# Patient Record
Sex: FEMALE | Race: BLACK OR AFRICAN AMERICAN | NOT HISPANIC OR LATINO | Employment: OTHER | URBAN - METROPOLITAN AREA
[De-identification: names, ages, dates, MRNs, and addresses within clinical notes are randomized per-mention and may not be internally consistent; named-entity substitution may affect disease eponyms.]

---

## 2017-01-03 ENCOUNTER — DOCUMENTATION ONLY (OUTPATIENT)
Dept: FAMILY MEDICINE | Facility: CLINIC | Age: 52
End: 2017-01-03

## 2017-01-03 NOTE — PROGRESS NOTES
Health Maintenance Due   Topic Date Due    Hepatitis C Screening  1965    TETANUS VACCINE  01/02/1982    Pap Smear  01/02/1985    Colonoscopy  01/02/2014

## 2017-01-04 ENCOUNTER — TELEPHONE (OUTPATIENT)
Dept: FAMILY MEDICINE | Facility: CLINIC | Age: 52
End: 2017-01-04

## 2017-01-04 ENCOUNTER — CLINICAL SUPPORT (OUTPATIENT)
Dept: FAMILY MEDICINE | Facility: CLINIC | Age: 52
End: 2017-01-04
Payer: MEDICARE

## 2017-01-04 VITALS — HEART RATE: 68 BPM | SYSTOLIC BLOOD PRESSURE: 138 MMHG | DIASTOLIC BLOOD PRESSURE: 74 MMHG | OXYGEN SATURATION: 96 %

## 2017-01-04 DIAGNOSIS — Z53.21 PROCEDURE AND TREATMENT NOT CARRIED OUT DUE TO PATIENT LEAVING PRIOR TO BEING SEEN BY HEALTH CARE PROVIDER: Primary | ICD-10-CM

## 2017-01-04 PROCEDURE — 99499 UNLISTED E&M SERVICE: CPT | Mod: S$GLB,,, | Performed by: INTERNAL MEDICINE

## 2017-01-04 NOTE — TELEPHONE ENCOUNTER
Patient came in for blood pressure check and it was 138/74 right arm,daya,in sitting position.Pulse was 68,oxygen was 97.Gave results to Dr Gonzales and he said it was good and she could leave.

## 2017-01-05 ENCOUNTER — TELEPHONE (OUTPATIENT)
Dept: PHYSICAL MEDICINE AND REHAB | Facility: CLINIC | Age: 52
End: 2017-01-05

## 2017-01-23 ENCOUNTER — TELEPHONE (OUTPATIENT)
Dept: FAMILY MEDICINE | Facility: CLINIC | Age: 52
End: 2017-01-23

## 2017-01-23 DIAGNOSIS — R53.81 MALAISE AND FATIGUE: Primary | ICD-10-CM

## 2017-01-23 DIAGNOSIS — R53.83 MALAISE AND FATIGUE: Primary | ICD-10-CM

## 2017-01-23 NOTE — TELEPHONE ENCOUNTER
----- Message from Sarah Chatterjee sent at 1/23/2017  9:30 AM CST -----  Patient requesting order for TSH be added to labs for January 26th/if any questions call back at [phone]

## 2017-01-31 ENCOUNTER — LAB VISIT (OUTPATIENT)
Dept: LAB | Facility: HOSPITAL | Age: 52
End: 2017-01-31
Attending: INTERNAL MEDICINE
Payer: MEDICARE

## 2017-01-31 DIAGNOSIS — Z13.21 ENCOUNTER FOR VITAMIN DEFICIENCY SCREENING: ICD-10-CM

## 2017-01-31 DIAGNOSIS — I10 HYPERTENSION, ACCELERATED: ICD-10-CM

## 2017-01-31 DIAGNOSIS — Z11.59 NEED FOR HEPATITIS C SCREENING TEST: ICD-10-CM

## 2017-01-31 DIAGNOSIS — E66.01 MORBID OBESITY WITH BMI OF 50.0-59.9, ADULT: ICD-10-CM

## 2017-01-31 LAB
25(OH)D3+25(OH)D2 SERPL-MCNC: 14 NG/ML
ALBUMIN SERPL BCP-MCNC: 3.5 G/DL
ALP SERPL-CCNC: 58 U/L
ALT SERPL W/O P-5'-P-CCNC: 10 U/L
ANION GAP SERPL CALC-SCNC: 9 MMOL/L
AST SERPL-CCNC: 10 U/L
BILIRUB SERPL-MCNC: 0.3 MG/DL
BUN SERPL-MCNC: 18 MG/DL
CALCIUM SERPL-MCNC: 9.7 MG/DL
CHLORIDE SERPL-SCNC: 108 MMOL/L
CHOLEST/HDLC SERPL: 3.9 {RATIO}
CO2 SERPL-SCNC: 25 MMOL/L
CREAT SERPL-MCNC: 1.2 MG/DL
EST. GFR  (AFRICAN AMERICAN): 59.6 ML/MIN/1.73 M^2
EST. GFR  (NON AFRICAN AMERICAN): 51.7 ML/MIN/1.73 M^2
GLUCOSE SERPL-MCNC: 99 MG/DL
HDL/CHOLESTEROL RATIO: 25.7 %
HDLC SERPL-MCNC: 167 MG/DL
HDLC SERPL-MCNC: 43 MG/DL
LDLC SERPL CALC-MCNC: 104.4 MG/DL
NONHDLC SERPL-MCNC: 124 MG/DL
POTASSIUM SERPL-SCNC: 4.1 MMOL/L
PROT SERPL-MCNC: 7.4 G/DL
SODIUM SERPL-SCNC: 142 MMOL/L
TRIGL SERPL-MCNC: 98 MG/DL

## 2017-01-31 PROCEDURE — 80061 LIPID PANEL: CPT

## 2017-01-31 PROCEDURE — 82306 VITAMIN D 25 HYDROXY: CPT

## 2017-01-31 PROCEDURE — 86803 HEPATITIS C AB TEST: CPT

## 2017-01-31 PROCEDURE — 36415 COLL VENOUS BLD VENIPUNCTURE: CPT | Mod: PO

## 2017-01-31 PROCEDURE — 84443 ASSAY THYROID STIM HORMONE: CPT

## 2017-01-31 PROCEDURE — 80053 COMPREHEN METABOLIC PANEL: CPT

## 2017-02-01 ENCOUNTER — DOCUMENTATION ONLY (OUTPATIENT)
Dept: FAMILY MEDICINE | Facility: CLINIC | Age: 52
End: 2017-02-01

## 2017-02-01 LAB
HCV AB SERPL QL IA: NEGATIVE
TSH SERPL DL<=0.005 MIU/L-ACNC: 1.63 UIU/ML

## 2017-02-02 ENCOUNTER — TELEPHONE (OUTPATIENT)
Dept: FAMILY MEDICINE | Facility: CLINIC | Age: 52
End: 2017-02-02

## 2017-02-02 NOTE — TELEPHONE ENCOUNTER
----- Message from Jaxson Gonzales MD sent at 2/1/2017  1:13 PM CST -----  Notify the patient she does not have hepatitis C

## 2017-02-09 ENCOUNTER — HOSPITAL ENCOUNTER (OUTPATIENT)
Dept: RADIOLOGY | Facility: CLINIC | Age: 52
Discharge: HOME | End: 2017-02-09
Attending: SURGERY
Payer: MEDICARE

## 2017-02-09 DIAGNOSIS — Z01.818 PRE-OP TESTING: ICD-10-CM

## 2017-02-09 PROCEDURE — 71020 XR CHEST PA AND LATERAL: CPT | Mod: 26,,, | Performed by: RADIOLOGY

## 2017-02-09 PROCEDURE — 71020 XR CHEST PA AND LATERAL: CPT | Mod: TC,PO

## 2017-02-21 PROBLEM — R11.0 NAUSEA: Status: ACTIVE | Noted: 2017-02-21

## 2017-02-21 PROBLEM — N28.9 RENAL INSUFFICIENCY: Status: ACTIVE | Noted: 2017-02-21

## 2017-02-21 PROBLEM — E66.01 MORBID OBESITY DUE TO EXCESS CALORIES: Status: ACTIVE | Noted: 2017-02-21

## 2017-02-21 PROBLEM — I10 ESSENTIAL HYPERTENSION: Status: ACTIVE | Noted: 2017-02-21

## 2017-02-21 PROBLEM — N17.9 AKI (ACUTE KIDNEY INJURY): Status: ACTIVE | Noted: 2017-02-21

## 2017-02-22 PROBLEM — Z98.84 S/P LAPAROSCOPIC SLEEVE GASTRECTOMY: Status: ACTIVE | Noted: 2017-02-22

## 2017-03-15 PROBLEM — E66.01 MORBID OBESITY DUE TO EXCESS CALORIES: Status: RESOLVED | Noted: 2017-02-21 | Resolved: 2017-03-15

## 2017-03-15 PROBLEM — A04.8 H. PYLORI INFECTION: Status: ACTIVE | Noted: 2017-03-15

## 2017-03-27 ENCOUNTER — DOCUMENTATION ONLY (OUTPATIENT)
Dept: FAMILY MEDICINE | Facility: CLINIC | Age: 52
End: 2017-03-27

## 2017-03-27 NOTE — PROGRESS NOTES
Health Maintenance Due   Topic Date Due    TETANUS VACCINE  01/02/1982    Pap Smear  01/02/1985    Colonoscopy  01/02/2014

## 2017-06-14 PROBLEM — I10 ESSENTIAL HYPERTENSION: Status: RESOLVED | Noted: 2017-02-21 | Resolved: 2017-06-14

## 2017-07-10 ENCOUNTER — DOCUMENTATION ONLY (OUTPATIENT)
Dept: FAMILY MEDICINE | Facility: CLINIC | Age: 52
End: 2017-07-10

## 2017-07-10 NOTE — PROGRESS NOTES
Health Maintenance Due   Topic Date Due    TETANUS VACCINE  01/02/1982    Colonoscopy  01/02/2014

## 2017-07-11 ENCOUNTER — OFFICE VISIT (OUTPATIENT)
Dept: FAMILY MEDICINE | Facility: CLINIC | Age: 52
End: 2017-07-11
Payer: MEDICARE

## 2017-07-11 VITALS
OXYGEN SATURATION: 98 % | TEMPERATURE: 98 F | BODY MASS INDEX: 40.93 KG/M2 | HEART RATE: 73 BPM | WEIGHT: 222.44 LBS | HEIGHT: 62 IN | SYSTOLIC BLOOD PRESSURE: 198 MMHG | DIASTOLIC BLOOD PRESSURE: 110 MMHG | RESPIRATION RATE: 16 BRPM

## 2017-07-11 DIAGNOSIS — I63.419 CEREBRAL INFARCTION DUE TO EMBOLISM OF MIDDLE CEREBRAL ARTERY, UNSPECIFIED BLOOD VESSEL LATERALITY: ICD-10-CM

## 2017-07-11 DIAGNOSIS — Z12.4 CERVICAL CANCER SCREENING: ICD-10-CM

## 2017-07-11 DIAGNOSIS — R10.13 EPIGASTRIC PAIN: Primary | ICD-10-CM

## 2017-07-11 DIAGNOSIS — J44.9 CHRONIC OBSTRUCTIVE PULMONARY DISEASE, UNSPECIFIED COPD TYPE: ICD-10-CM

## 2017-07-11 DIAGNOSIS — I10 ACCELERATED HYPERTENSION: ICD-10-CM

## 2017-07-11 DIAGNOSIS — I50.32 CHRONIC DIASTOLIC (CONGESTIVE) HEART FAILURE: ICD-10-CM

## 2017-07-11 PROCEDURE — 99214 OFFICE O/P EST MOD 30 MIN: CPT | Mod: S$GLB,,, | Performed by: INTERNAL MEDICINE

## 2017-07-11 PROCEDURE — 99499 UNLISTED E&M SERVICE: CPT | Mod: S$GLB,,, | Performed by: INTERNAL MEDICINE

## 2017-07-11 RX ORDER — IBUPROFEN 800 MG/1
800 TABLET ORAL DAILY PRN
COMMUNITY
End: 2017-07-11

## 2017-07-11 RX ORDER — LISINOPRIL AND HYDROCHLOROTHIAZIDE 10; 12.5 MG/1; MG/1
1 TABLET ORAL DAILY
Qty: 90 TABLET | Refills: 3 | Status: SHIPPED | OUTPATIENT
Start: 2017-07-11 | End: 2017-08-08

## 2017-07-11 NOTE — PROGRESS NOTES
Subjective:       Patient ID: Faustina Rae is a 52 y.o. female.    Chief Complaint: Abdominal Pain (hurts all day and after eating) and Insomnia    HPI       Abdominal Pain.   Had gastric sleeve 5 mo ago.  the patient has been taking Motrin 800 mg 3 times a day for the last week.   ONSET:  7 d   ago.    DURATION:      QUALITY/COURSE: . unchanged    LOCATION:   [facility]  .--Radiation:      INTENSITY/SEVERITY: .Severity is #   5   (10 point scale).  Character:    CONTEXT/WHEN: .--Similar problems: no. Trauma: no.    The following symptoms/statements  are positive if BOLD, negative otherwise.    AGGRAVATING FACTORS: food . medications. alcohol. movement. position . bowel movements . emotional stress .  RELIEF WITH: food or milk, antacids . medications .  position . bowel movements . Eructation.  passing gas .  PAST TREATMENT OR EVALUATION: barium+_enema . Upper_gastrointestinal_series . CT_scans . sonograms . Endoscopic_procedures .  ASSOCIATED SYMPTOMS:      Weight_loss . jaundice .   FEMALE: relation_of_pain_to_menstrual_periods .      . Possibility_of_pregnancy . . dyspareunia .  HISTORY OF: Diabetes. CAD. prior abdomina surgery. Kidney_stones.  Gallbladder_disease. Hiatal_hernia. 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 colitis. Liver_disease.  FH  Colitis . . enteritis .     Last labs:  Lab Results   Component Value Date    WBC 9.63 02/22/2017    HGB 12.9 02/22/2017    HCT 41.2 02/22/2017     02/22/2017    CHOL 167 01/31/2017    TRIG 98 01/31/2017    HDL 43 01/31/2017    ALT 15 02/09/2017    AST 15 02/09/2017     02/22/2017    K 5.0 02/22/2017     02/22/2017    CREATININE 1.18 02/22/2017    BUN 12 02/22/2017    CO2 29 02/22/2017    TSH 1.632 01/31/2017    INR 1.1 08/12/2013    HGBA1C 6.0 12/14/2016               CHIEF COMPLAINT: Hypertension  HPI:     ONSET:      QUALITY/COURSE:   Controlled:  yes     INTENSITY/SEVERITY:  Average blood pressure is 123/77 .     MODIFIERS/TREATMENTS:  Taking medications: no. .High sodium  intake: no. alcohol: no      The following symptoms are positive only if BOLDED, otherwise are negative.      SYMPTOMS/RELATED: Possible medication side effects include:   Depression..  . Cough. . Constipation.    REVIEW OF SYMPTOMS: . Weight_loss . Weight_gain . Leg_cramps ..    TARGET ORGAN DAMAGE:: angina/ prior myocardial infarction, chronic kidney disease, heart failure, left ventricular hypertrophy, peripheral artery disease, prior coronary revascularization, retinopathy, stroke. transient ischemic attack.      Since losing 75 pounds the patient's been breathing much better.  She is a history of COPD no she's doesn't smoke.  She also has a history of acute on chronic diastolic heart failure.  She's also had a stroke and TIAs.  In spite of that she is now able to walk half mile twice a day.       the patient has been having trouble sleeping at night.  Because of her abdominal pain gets getting better when she sleeps she keeps taking naps and going to bed early.  She goes to bed at 8 and wakes up at 2 after taking a couple naps during the day.    Review of Systems   Constitutional: Positive for appetite change. Negative for diaphoresis, fever and unexpected weight change.   Eyes: Negative for visual disturbance.   Respiratory: Negative for cough, chest tightness and shortness of breath.    Cardiovascular: Negative for chest pain.   Gastrointestinal: Positive for abdominal pain. Negative for blood in stool, constipation, diarrhea, nausea and vomiting.   Genitourinary: Negative for dysuria, hematuria, vaginal bleeding and vaginal discharge.   Musculoskeletal: Negative for back pain.   Neurological: Negative for dizziness, syncope, weakness and headaches.   Psychiatric/Behavioral: Negative for dysphoric mood. The patient is not nervous/anxious.        Objective:      Vitals:    07/11/17 1143   BP: (!) 209/107   Pulse: 73   Resp: 16   Temp: 98.1 °F (36.7 °C)   TempSrc: Oral   SpO2: 98%   Weight: 100.9 kg (222 lb 7.1  "oz)   Height: 5' 2" (1.575 m)   PainSc:   8   PainLoc: Abdomen     Physical Exam   Constitutional: She appears well-developed and well-nourished.   Eyes: Pupils are equal, round, and reactive to light.   Cardiovascular: Normal rate, regular rhythm and normal heart sounds.    Pulmonary/Chest: Effort normal and breath sounds normal.   Abdominal: Soft. There is tenderness (epigastric). There is no rebound and no guarding. No hernia.   Neurological: She is alert.   Psychiatric: She has a normal mood and affect. Her behavior is normal. Thought content normal.   Nursing note and vitals reviewed.        Assessment:       No diagnosis found.      Plan:     There are no diagnoses linked to this encounter.  No Follow-up on file.      "

## 2017-07-11 NOTE — PATIENT INSTRUCTIONS
Stop ibuprofen.. You shouldn't be using anti-inflammatory drugs like ibuprofen, Naprosyn or aspirin.  No alcohol or tobacco either.    Try to avoid napping.

## 2017-07-18 ENCOUNTER — LAB VISIT (OUTPATIENT)
Dept: LAB | Facility: HOSPITAL | Age: 52
End: 2017-07-18
Attending: INTERNAL MEDICINE
Payer: MEDICARE

## 2017-07-18 DIAGNOSIS — R10.13 EPIGASTRIC PAIN: ICD-10-CM

## 2017-07-18 LAB
ALBUMIN SERPL BCP-MCNC: 3.2 G/DL
ALP SERPL-CCNC: 61 U/L
ALT SERPL W/O P-5'-P-CCNC: 8 U/L
ANION GAP SERPL CALC-SCNC: 8 MMOL/L
AST SERPL-CCNC: 14 U/L
BASOPHILS # BLD AUTO: 0.01 K/UL
BASOPHILS NFR BLD: 0.1 %
BILIRUB SERPL-MCNC: 0.5 MG/DL
BUN SERPL-MCNC: 19 MG/DL
CALCIUM SERPL-MCNC: 10.2 MG/DL
CHLORIDE SERPL-SCNC: 106 MMOL/L
CO2 SERPL-SCNC: 26 MMOL/L
CREAT SERPL-MCNC: 1.4 MG/DL
DIFFERENTIAL METHOD: NORMAL
EOSINOPHIL # BLD AUTO: 0.1 K/UL
EOSINOPHIL NFR BLD: 1.2 %
ERYTHROCYTE [DISTWIDTH] IN BLOOD BY AUTOMATED COUNT: 14.3 %
EST. GFR  (AFRICAN AMERICAN): 49.5 ML/MIN/1.73 M^2
EST. GFR  (NON AFRICAN AMERICAN): 42.9 ML/MIN/1.73 M^2
GLUCOSE SERPL-MCNC: 85 MG/DL
HCT VFR BLD AUTO: 37.2 %
HGB BLD-MCNC: 12.2 G/DL
LIPASE SERPL-CCNC: 25 U/L
LYMPHOCYTES # BLD AUTO: 2.2 K/UL
LYMPHOCYTES NFR BLD: 32.1 %
MCH RBC QN AUTO: 29 PG
MCHC RBC AUTO-ENTMCNC: 32.8 %
MCV RBC AUTO: 88 FL
MONOCYTES # BLD AUTO: 0.5 K/UL
MONOCYTES NFR BLD: 6.8 %
NEUTROPHILS # BLD AUTO: 4 K/UL
NEUTROPHILS NFR BLD: 59.7 %
PLATELET # BLD AUTO: 230 K/UL
PMV BLD AUTO: 10.4 FL
POTASSIUM SERPL-SCNC: 4.7 MMOL/L
PROT SERPL-MCNC: 7.3 G/DL
RBC # BLD AUTO: 4.21 M/UL
SODIUM SERPL-SCNC: 140 MMOL/L
WBC # BLD AUTO: 6.72 K/UL

## 2017-07-18 PROCEDURE — 80053 COMPREHEN METABOLIC PANEL: CPT

## 2017-07-18 PROCEDURE — 83690 ASSAY OF LIPASE: CPT

## 2017-07-18 PROCEDURE — 36415 COLL VENOUS BLD VENIPUNCTURE: CPT | Mod: PO

## 2017-07-18 PROCEDURE — 85025 COMPLETE CBC W/AUTO DIFF WBC: CPT

## 2017-07-24 ENCOUNTER — DOCUMENTATION ONLY (OUTPATIENT)
Dept: FAMILY MEDICINE | Facility: CLINIC | Age: 52
End: 2017-07-24

## 2017-07-25 ENCOUNTER — OFFICE VISIT (OUTPATIENT)
Dept: FAMILY MEDICINE | Facility: CLINIC | Age: 52
End: 2017-07-25
Payer: MEDICARE

## 2017-07-25 VITALS
TEMPERATURE: 98 F | DIASTOLIC BLOOD PRESSURE: 98 MMHG | SYSTOLIC BLOOD PRESSURE: 182 MMHG | BODY MASS INDEX: 40.4 KG/M2 | WEIGHT: 219.56 LBS | RESPIRATION RATE: 16 BRPM | HEART RATE: 70 BPM | HEIGHT: 62 IN | OXYGEN SATURATION: 95 %

## 2017-07-25 DIAGNOSIS — J30.1 ACUTE SEASONAL ALLERGIC RHINITIS DUE TO POLLEN: ICD-10-CM

## 2017-07-25 DIAGNOSIS — N17.9 ACUTE KIDNEY FAILURE, UNSPECIFIED: ICD-10-CM

## 2017-07-25 DIAGNOSIS — I10 HYPERTENSION, ACCELERATED: Primary | ICD-10-CM

## 2017-07-25 PROCEDURE — 99213 OFFICE O/P EST LOW 20 MIN: CPT | Mod: S$GLB,,, | Performed by: NURSE PRACTITIONER

## 2017-07-25 RX ORDER — FLUTICASONE PROPIONATE 50 MCG
2 SPRAY, SUSPENSION (ML) NASAL DAILY
Qty: 16 G | Refills: 11 | Status: SHIPPED | OUTPATIENT
Start: 2017-07-25 | End: 2020-01-21 | Stop reason: SDUPTHER

## 2017-07-25 RX ORDER — AMLODIPINE BESYLATE 5 MG/1
5 TABLET ORAL DAILY
Qty: 30 TABLET | Refills: 11 | Status: SHIPPED | OUTPATIENT
Start: 2017-07-25 | End: 2017-08-08

## 2017-07-25 NOTE — PROGRESS NOTES
Subjective:       Patient ID: Faustina Rae is a 52 y.o. female.    Chief Complaint: Follow-up    Hypertension   This is a chronic problem. The current episode started more than 1 year ago. The problem has been waxing and waning since onset. The problem is uncontrolled. Associated symptoms include headaches. Pertinent negatives include no blurred vision, chest pain or shortness of breath. Agents associated with hypertension include NSAIDs (stopped taking ibuprofen, did not realize advil was the same thing. ). Risk factors for coronary artery disease include diabetes mellitus and obesity. Past treatments include ACE inhibitors and diuretics. The current treatment provides mild (Denies dry cough. ) improvement. Hypertensive end-organ damage includes CVA.     Having rhinitis, worse in the past few days. Not using anything for it.     Had acute kidney injury several years ago, does not recall what happened, but kidneys have improved since then, appears to have chronic renal disease now.   Review of Systems   Eyes: Negative for blurred vision.   Respiratory: Negative for shortness of breath.    Cardiovascular: Negative for chest pain.   Neurological: Positive for headaches.       Objective:       Lab Results   Component Value Date    WBC 6.72 07/18/2017    HGB 12.2 07/18/2017    HCT 37.2 07/18/2017    MCV 88 07/18/2017     07/18/2017     CMP  Sodium   Date Value Ref Range Status   07/18/2017 140 136 - 145 mmol/L Final     Potassium   Date Value Ref Range Status   07/18/2017 4.7 3.5 - 5.1 mmol/L Final     Comment:     *Slightly Hemolyzed     Chloride   Date Value Ref Range Status   07/18/2017 106 95 - 110 mmol/L Final     CO2   Date Value Ref Range Status   07/18/2017 26 23 - 29 mmol/L Final     Glucose   Date Value Ref Range Status   07/18/2017 85 70 - 110 mg/dL Final     BUN, Bld   Date Value Ref Range Status   07/18/2017 19 6 - 20 mg/dL Final     Creatinine   Date Value Ref Range Status   07/18/2017 1.4 0.5  - 1.4 mg/dL Final   08/10/2013 1.4 0.5 - 1.4 mg/dL Final     Calcium   Date Value Ref Range Status   07/18/2017 10.2 8.7 - 10.5 mg/dL Final   08/10/2013 10.0 8.7 - 10.5 mg/dL Final     Total Protein   Date Value Ref Range Status   07/18/2017 7.3 6.0 - 8.4 g/dL Final     Albumin   Date Value Ref Range Status   07/18/2017 3.2 (L) 3.5 - 5.2 g/dL Final     Total Bilirubin   Date Value Ref Range Status   07/18/2017 0.5 0.1 - 1.0 mg/dL Final     Comment:     For infants and newborns, interpretation of results should be based  on gestational age, weight and in agreement with clinical  observations.  Premature Infant recommended reference ranges:  Up to 24 hours.............<8.0 mg/dL  Up to 48 hours............<12.0 mg/dL  3-5 days..................<15.0 mg/dL  6-29 days.................<15.0 mg/dL       Alkaline Phosphatase   Date Value Ref Range Status   07/18/2017 61 55 - 135 U/L Final   11/20/2012 54 (L) 55 - 135 U/L Final     AST   Date Value Ref Range Status   07/18/2017 14 10 - 40 U/L Final   11/20/2012 12 10 - 40 U/L Final     ALT   Date Value Ref Range Status   07/18/2017 8 (L) 10 - 44 U/L Final     Anion Gap   Date Value Ref Range Status   07/18/2017 8 8 - 16 mmol/L Final   08/10/2013 13 5 - 15 meq/L Final     eGFR if    Date Value Ref Range Status   07/18/2017 49.5 (A) >60 mL/min/1.73 m^2 Final     eGFR if non    Date Value Ref Range Status   07/18/2017 42.9 (A) >60 mL/min/1.73 m^2 Final     Comment:     Calculation used to obtain the estimated glomerular filtration  rate (eGFR) is the CKD-EPI equation. Since race is unknown   in our information system, the eGFR values for   -American and Non--American patients are given   for each creatinine result.       Lipase normal  Physical Exam   Constitutional: She is oriented to person, place, and time. She appears well-developed and well-nourished. No distress.   HENT:   Head: Normocephalic and atraumatic.   Right Ear:  External ear normal.   Left Ear: External ear normal.   Nares boggy and purplish bilaterally.    Eyes: Conjunctivae are normal. Right eye exhibits no discharge. Left eye exhibits no discharge. No scleral icterus.   Cardiovascular: Normal rate, regular rhythm and normal heart sounds.  Exam reveals no gallop and no friction rub.    No murmur heard.  Pulmonary/Chest: Effort normal and breath sounds normal. No respiratory distress. She has no wheezes. She has no rales.   Musculoskeletal: She exhibits no edema.   Neurological: She is alert and oriented to person, place, and time.   Skin: Skin is warm and dry. She is not diaphoretic.   Psychiatric: She has a normal mood and affect. Her behavior is normal.   Nursing note and vitals reviewed.      Assessment:       1. Hypertension, accelerated    2. Acute seasonal allergic rhinitis due to pollen    3. Acute kidney failure, unspecified      acute kidney failure is resolved.   Plan:       Hypertension, accelerated  -     Metanephrines, urine; Future  -     amlodipine (NORVASC) 5 MG tablet; Take 1 tablet (5 mg total) by mouth once daily.  Dispense: 30 tablet; Refill: 11    Acute seasonal allergic rhinitis due to pollen  -     fluticasone (FLONASE) 50 mcg/actuation nasal spray; 2 sprays by Each Nare route once daily.  Dispense: 16 g; Refill: 11    Acute kidney failure, unspecified         2 weeks

## 2017-08-07 ENCOUNTER — DOCUMENTATION ONLY (OUTPATIENT)
Dept: FAMILY MEDICINE | Facility: CLINIC | Age: 52
End: 2017-08-07

## 2017-08-07 NOTE — PROGRESS NOTES
Health Maintenance Due   Topic Date Due    TETANUS VACCINE  01/02/1982    Colonoscopy  01/02/2014    Influenza Vaccine  08/01/2017

## 2017-08-08 ENCOUNTER — OFFICE VISIT (OUTPATIENT)
Dept: FAMILY MEDICINE | Facility: CLINIC | Age: 52
End: 2017-08-08
Payer: MEDICARE

## 2017-08-08 VITALS
RESPIRATION RATE: 16 BRPM | WEIGHT: 217.13 LBS | DIASTOLIC BLOOD PRESSURE: 100 MMHG | OXYGEN SATURATION: 97 % | TEMPERATURE: 98 F | HEART RATE: 70 BPM | BODY MASS INDEX: 39.96 KG/M2 | SYSTOLIC BLOOD PRESSURE: 182 MMHG | HEIGHT: 62 IN

## 2017-08-08 DIAGNOSIS — I10 ACCELERATED HYPERTENSION: Primary | ICD-10-CM

## 2017-08-08 DIAGNOSIS — Z12.11 COLON CANCER SCREENING: ICD-10-CM

## 2017-08-08 DIAGNOSIS — I15.2 HYPERTENSION ASSOCIATED WITH DIABETES: ICD-10-CM

## 2017-08-08 DIAGNOSIS — E11.59 HYPERTENSION ASSOCIATED WITH DIABETES: ICD-10-CM

## 2017-08-08 DIAGNOSIS — E11.9 DIABETES MELLITUS WITHOUT COMPLICATION: ICD-10-CM

## 2017-08-08 PROCEDURE — 99214 OFFICE O/P EST MOD 30 MIN: CPT | Mod: S$GLB,,, | Performed by: NURSE PRACTITIONER

## 2017-08-08 PROCEDURE — 3008F BODY MASS INDEX DOCD: CPT | Mod: S$GLB,,, | Performed by: NURSE PRACTITIONER

## 2017-08-08 PROCEDURE — 4010F ACE/ARB THERAPY RXD/TAKEN: CPT | Mod: S$GLB,,, | Performed by: NURSE PRACTITIONER

## 2017-08-08 PROCEDURE — 3080F DIAST BP >= 90 MM HG: CPT | Mod: S$GLB,,, | Performed by: NURSE PRACTITIONER

## 2017-08-08 PROCEDURE — 3077F SYST BP >= 140 MM HG: CPT | Mod: S$GLB,,, | Performed by: NURSE PRACTITIONER

## 2017-08-08 PROCEDURE — 3044F HG A1C LEVEL LT 7.0%: CPT | Mod: S$GLB,,, | Performed by: NURSE PRACTITIONER

## 2017-08-08 RX ORDER — LISINOPRIL AND HYDROCHLOROTHIAZIDE 20; 25 MG/1; MG/1
1 TABLET ORAL DAILY
Qty: 30 TABLET | Refills: 11 | Status: SHIPPED | OUTPATIENT
Start: 2017-08-08 | End: 2018-01-18

## 2017-08-08 RX ORDER — AMLODIPINE BESYLATE 10 MG/1
10 TABLET ORAL DAILY
Qty: 30 TABLET | Refills: 11 | Status: SHIPPED | OUTPATIENT
Start: 2017-08-08 | End: 2021-08-18 | Stop reason: SDUPTHER

## 2017-08-08 NOTE — PATIENT INSTRUCTIONS
Established High Blood Pressure    High blood pressure (hypertension) is a chronic disease. Often health care providers dont know what causes it. But it can be caused by certain health conditions and medicines.  If you have high blood pressure, you may not have any symptoms. If you do have symptoms, they may include headache, dizziness, changes in your vision, chest pain, and shortness of breath. But even without symptoms, high blood pressure thats not treated raises your risk for heart attack and stroke. High blood pressure is a serious health risk and shouldnt be ignored.  A blood pressure reading is made up of two numbers: a higher number over a lower number. The top number is the systolic pressure. The bottom number is the diastolic pressure. A normal blood pressure is less than 120 over less than 80.  High blood pressure is when either the top number is 140 or higher, or the bottom number is 90 or higher. This must be the result when taking your blood pressure a number of times. The blood pressures between normal and high are called prehypertension.  Home care  If you have high blood pressure, you should do what is listed below to lower your blood pressure. If you are taking medicines for high blood pressure, these methods may reduce or end your need for medicines in the future.  · Begin a weight-loss program if you are overweight.  · Cut back on how much salt you get in your diet. Heres how to do this:  ¨ Dont eat foods that have a lot of salt. These include olives, pickles, smoked meats, and salted potato chips.  ¨ Dont add salt to your food at the table.  ¨ Use only small amounts of salt when cooking.  · Begin an exercise program. Talk with your health care provider about the type of exercise program that would be best for you. It doesn't have to be hard. Even brisk walking for 20 minutes 3 times a week is a good form of exercise.  · Dont take medicines that have heart stimulants. This includes many  cold and sinus decongestant pills and sprays, as well as diet pills. Check the warnings about hypertension on the label. Stimulants such as amphetamine or cocaine could be lethal for someone with high blood pressure. Never take these.  · Limit how much caffeine you get in your diet. Switch to caffeine-free products.  · Stop smoking. If you are a long-time smoker, this can be hard. Enroll in a stop-smoking program to make it more likely that you will quit for good.  · Learn how to handle stress. This is an important part of any program to lower blood pressure. Learn about relaxation methods like meditation, yoga, or biofeedback.  · If your provider prescribed medicines, take them exactly as directed. Missing doses may cause your blood pressure get out of control.  · Consider buying an automatic blood pressure machine. You can get one of these at most pharmacies. Use this to watch your blood pressure at home. Give the results to your provider.  Follow-up care  You will need to make regular visits to your health care provider. This is to check your blood pressure and to make changes to your medicines. Make a follow-up appointment as directed.  When to seek medical advice  Call your health care provider right away if any of these occur:  · Chest pain or shortness of breath  · Severe headache  · Throbbing or rushing sound in the ears  · Nosebleed  · Sudden severe pain in your belly (abdomen)  · Extreme drowsiness, confusion, or fainting  · Dizziness or dizziness with a spinning sensation (vertigo)  · Weakness of an arm or leg or one side of the face  · You have problems speaking or seeing   Date Last Reviewed: 11/25/2014  © 1515-5124 [employer]. [address] [place] [address], [place] [address]. All rights reserved. This information is not intended as a substitute for professional medical care. Always follow your healthcare professional's instructions.        Eating Heart-Healthy Food: Using the DASH  Plan    Eating for your heart doesnt have to be hard or boring. You just need to know how to make healthier choices. The DASH eating plan has been developed to help you do just that. DASH stands for Dietary Approaches to Stop Hypertension. It is a plan that has been proven to be healthier for your heart and to lower your risk for high blood pressure. It can also help lower your risk for cancer, heart disease, osteoporosis, and diabetes.  Choosing from each food group  Choose foods from each of the food groups below each day. Try to get the recommended number of servings for each food group. The serving numbers are based on a diet of 2,000 calories a day. Talk to your doctor if youre unsure about your calorie needs. Along with getting the correct servings, the DASH plan also recommends a sodium intake less than 2,300 mg per day.        Grains  Servings: 6 to 8 a day  A serving is:  · 1 slice bread  · 1 ounce dry cereal  · Half a cup cooked rice, pasta or cereal  Best choices: Whole grains and any grains high in fiber. Vegetables  Servings: 4 to 5 a day  A serving is:  · 1 cup raw leafy vegetable  · Half a cup cut-up raw or cooked vegetable  · Half a cup vegetable juice  Best choices: Fresh or frozen vegetables prepared without added salt or fat.   Fruits  Servings: 4 to 5 a day  A serving is:  · 1 medium fruit  · One-quarter cup dried fruit  · Half a cup fresh, frozen, or canned fruit  · Half a cup of 100% fruit juices  Best choices: A variety of fresh fruits of different colors. Whole fruits are a better choice than fruit juices. Low-fat or fat-free dairy  Servings: 2 to 3 a day  A serving is:  · 1 cup milk  · 1 cup yogurt  · One and a half ounces cheese  Best choices: Skim or 1% milk, low-fat or fat-free yogurt or buttermilk, and low-fat cheeses.         Lean meats, poultry, fish  Servings: 6 or fewer a day  A serving is:  · 1 ounce cooked meats, poultry, or fish  · 1 egg  Best choices: Lean poultry and fish.  Trim away visible fat. Broil, grill, roast, or boil instead of frying. Remove skin from poultry before eating. Limit how much red meat you eat.  Nuts, seeds, beans  Servings: 4 to 5 a week  A serving is:  · One-third cup nuts (one and a half ounces)  · 2 tablespoons nut butter or seeds  · Half a cup cooked dry beans or legumes  Best choices: Dry roasted nuts with no salt added, lentils, kidney beans, garbanzo beans, and whole church beans.   Fats and oils  Servings: 2 to 3 a day  A serving is:  · 1 teaspoon vegetable oil  · 1 teaspoon soft margarine  · 1 tablespoon mayonnaise  · 2 tablespoons salad dressing  Best choices: Nut and vegetable oils (nontropical vegetable oils), such as olive and canola oil. Sweets  Servings: 5 a week or fewer  A serving is:  · 1 tablespoon sugar, maple syrup, or honey  · 1 tablespoon jam or jelly  · 1 half-ounce jelly beans (about 15)  · 1 cup lemonade  Best choices: Dried fruit can be a satisfying sweet. Choose low-fat sweets. And watch your serving sizes!      For more on the DASH eating plan, visit:  www.nhlbi.nih.gov/health/health-topics/topics/dash   Date Last Reviewed: 6/1/2016  © 2440-4179 [employer]. [address] [place] [address], [place] [address]. All rights reserved. This information is not intended as a substitute for professional medical care. Always follow your healthcare professional's instructions.    No more than 6 servings of starches/carbohydrates, 3 fruits, rest is ok     Dash diet given, and explained. Double amlodipine and lisinopril/HCTZ until you get the new ones, then take 1 tab of each new one.

## 2017-08-08 NOTE — PROGRESS NOTES
Subjective:       Patient ID: Faustina Rae is a 52 y.o. female.    Chief Complaint: Hypertension    Hypertension   This is a chronic problem. The current episode started more than 1 year ago. The problem is unchanged. The problem is uncontrolled. Pertinent negatives include no blurred vision, chest pain, headaches or shortness of breath. Risk factors for coronary artery disease include obesity. Past treatments include ACE inhibitors, calcium channel blockers and diuretics. Compliance problems include diet (high sodium diet, using godwin's seasoning).  Hypertensive end-organ damage includes kidney disease. Identifiable causes of hypertension include chronic renal disease.   Did not do urine for metanephrine test yet.     Had diabetes prior to having gastric sleeve  surgery, htn related to diabetes, but now diabetes is well controlled with diet alone and she has normal blood sugars, essentially, diabetes has resolved.   Review of Systems   Eyes: Negative for blurred vision.   Respiratory: Negative for shortness of breath.    Cardiovascular: Negative for chest pain.   Neurological: Negative for headaches.       Objective:      Physical Exam   Constitutional: She is oriented to person, place, and time. She appears well-developed and well-nourished. No distress.   HENT:   Head: Normocephalic and atraumatic.   Eyes: Conjunctivae are normal. Right eye exhibits no discharge. Left eye exhibits no discharge. No scleral icterus.   Cardiovascular: Normal rate, regular rhythm and normal heart sounds.  Exam reveals no gallop and no friction rub.    No murmur heard.  Pulmonary/Chest: Effort normal and breath sounds normal. No respiratory distress. She has no wheezes. She has no rales.   Neurological: She is alert and oriented to person, place, and time.   Skin: Skin is warm and dry. She is not diaphoretic.   Psychiatric: She has a normal mood and affect. Her behavior is normal.   Nursing note and vitals reviewed.       Assessment:     This provider spent more than 25 minutes face to face with patient, more than half the time for counseling and coordination of care as noted.  1. Accelerated hypertension    2. Hypertension associated with diabetes    3. Diabetes mellitus without complication      diabetes and htn with diabetes resolved.   Plan:       Accelerated hypertension  -     US Renal Artery Stenosis Hyperten (xpd); Future; Expected date: 08/08/2017  -     amlodipine (NORVASC) 10 MG tablet; Take 1 tablet (10 mg total) by mouth once daily.  Dispense: 30 tablet; Refill: 11  -     lisinopril-hydrochlorothiazide (PRINZIDE,ZESTORETIC) 20-25 mg Tab; Take 1 tablet by mouth once daily.  Dispense: 30 tablet; Refill: 11    Hypertension associated with diabetes    Diabetes mellitus without complication         Dash diet given, and explained. Double amlodipine and lisinopril/HCTZ until you get the new ones, then take 1 tab of each new one.

## 2017-08-17 DIAGNOSIS — E11.9 TYPE 2 DIABETES MELLITUS WITHOUT COMPLICATION: ICD-10-CM

## 2017-08-18 ENCOUNTER — HOSPITAL ENCOUNTER (OUTPATIENT)
Dept: RADIOLOGY | Facility: HOSPITAL | Age: 52
Discharge: HOME | End: 2017-08-18
Attending: NURSE PRACTITIONER
Payer: MEDICARE

## 2017-08-18 DIAGNOSIS — I10 ACCELERATED HYPERTENSION: ICD-10-CM

## 2017-08-18 PROCEDURE — 93975 VASCULAR STUDY: CPT | Mod: 26,,, | Performed by: RADIOLOGY

## 2017-08-18 PROCEDURE — 76770 US EXAM ABDO BACK WALL COMP: CPT | Mod: 26,59,, | Performed by: RADIOLOGY

## 2017-08-18 PROCEDURE — 76770 US EXAM ABDO BACK WALL COMP: CPT | Mod: TC

## 2017-08-28 ENCOUNTER — DOCUMENTATION ONLY (OUTPATIENT)
Dept: FAMILY MEDICINE | Facility: CLINIC | Age: 52
End: 2017-08-28

## 2017-08-28 NOTE — PROGRESS NOTES
Pre-Visit Chart Review  For Appointment Scheduled on 8-    Health Maintenance Due   Topic Date Due    Eye Exam  01/02/1974    TETANUS VACCINE  01/02/1982    Colonoscopy  01/02/2014    Hemoglobin A1c  06/14/2017    Influenza Vaccine  08/01/2017

## 2017-09-14 ENCOUNTER — INITIAL CONSULT (OUTPATIENT)
Dept: GASTROENTEROLOGY | Facility: CLINIC | Age: 52
End: 2017-09-14
Payer: MEDICARE

## 2017-09-14 VITALS
WEIGHT: 214.5 LBS | DIASTOLIC BLOOD PRESSURE: 63 MMHG | HEIGHT: 62 IN | SYSTOLIC BLOOD PRESSURE: 121 MMHG | BODY MASS INDEX: 39.47 KG/M2 | HEART RATE: 71 BPM

## 2017-09-14 DIAGNOSIS — R10.13 EPIGASTRIC ABDOMINAL PAIN: ICD-10-CM

## 2017-09-14 DIAGNOSIS — Z98.84 S/P LAPAROSCOPIC SLEEVE GASTRECTOMY: ICD-10-CM

## 2017-09-14 DIAGNOSIS — Z12.12 SCREENING FOR COLORECTAL CANCER: ICD-10-CM

## 2017-09-14 DIAGNOSIS — J44.9 CHRONIC OBSTRUCTIVE PULMONARY DISEASE, UNSPECIFIED COPD TYPE: Primary | ICD-10-CM

## 2017-09-14 DIAGNOSIS — Z12.11 SCREENING FOR COLORECTAL CANCER: ICD-10-CM

## 2017-09-14 DIAGNOSIS — E66.01 MORBID OBESITY WITH BMI OF 40.0-44.9, ADULT: ICD-10-CM

## 2017-09-14 DIAGNOSIS — G47.33 OSA (OBSTRUCTIVE SLEEP APNEA): Chronic | ICD-10-CM

## 2017-09-14 PROBLEM — A04.8 H. PYLORI INFECTION: Status: RESOLVED | Noted: 2017-03-15 | Resolved: 2017-09-14

## 2017-09-14 PROBLEM — R11.0 NAUSEA: Status: RESOLVED | Noted: 2017-02-21 | Resolved: 2017-09-14

## 2017-09-14 PROCEDURE — 99499 UNLISTED E&M SERVICE: CPT | Mod: S$GLB,,, | Performed by: INTERNAL MEDICINE

## 2017-09-14 PROCEDURE — 99999 PR PBB SHADOW E&M-EST. PATIENT-LVL III: CPT | Mod: PBBFAC,,, | Performed by: INTERNAL MEDICINE

## 2017-09-14 PROCEDURE — 3078F DIAST BP <80 MM HG: CPT | Mod: S$GLB,,, | Performed by: INTERNAL MEDICINE

## 2017-09-14 PROCEDURE — 3074F SYST BP LT 130 MM HG: CPT | Mod: S$GLB,,, | Performed by: INTERNAL MEDICINE

## 2017-09-14 PROCEDURE — 99204 OFFICE O/P NEW MOD 45 MIN: CPT | Mod: S$GLB,,, | Performed by: INTERNAL MEDICINE

## 2017-09-14 PROCEDURE — 3008F BODY MASS INDEX DOCD: CPT | Mod: S$GLB,,, | Performed by: INTERNAL MEDICINE

## 2017-09-14 NOTE — LETTER
September 14, 2017      Jaxson Gonzales MD  [address]  [facility] [address]           [facility]  [address]  [facility] [address]  Phone: [phone]          Patient: Faustina Rae   MR Number: [id number]   YOB: 1965   Date of Visit: 9/14/2017       Dear Dr. Jaxson Gonzales:    Thank you for referring Faustina Rae to me for evaluation. Attached you will find relevant portions of my assessment and plan of care.    If you have questions, please do not hesitate to call me. I look forward to following Faustina Rae along with you.    Sincerely,    Steven Gomez MD    Enclosure  CC:  No Recipients    If you would like to receive this communication electronically, please contact [email or web address] or [phone] to request more information on [employer] access.    For providers and/or their staff who would like to refer a patient to [facility], please contact us through our one-stop-shop provider referral line, [facility], at [phone].    If you feel you have received this communication in error or would no longer like to receive these types of communications, please e-mail [email or web address]

## 2017-09-14 NOTE — PROGRESS NOTES
"Subjective:       Patient ID: Faustina Rae is a 52 y.o. female.    This patient is new to me.  Referring MD:  Dr. Gonzales for GERD.      Chief Complaint: Gastroesophageal Reflux and Heartburn    Gastroesophageal Reflux   This is a new problem. The current episode started in the past 7 days. The problem occurs frequently. The problem has been waxing and waning. The heartburn duration is several minutes. The heartburn is of moderate intensity. The heartburn wakes her from sleep. The heartburn does not limit her activity. The heartburn changes with position. The symptoms are aggravated by certain foods. Risk factors include obesity. She has tried a histamine-2 antagonist for the symptoms. The treatment provided significant relief.   Notes from Lupe Daivd NP with Dr. Gonzales's office reviewed and significant for trial of H2 blocker as above.  That office has also put in prior request for screening colonoscopy but patient has not yet scheduled.    Review of Systems   Constitutional: Negative for chills and fever.   HENT: Negative for trouble swallowing.    Respiratory: Negative for shortness of breath.    Cardiovascular: Negative for palpitations.   Gastrointestinal: Negative for blood in stool and vomiting.   Genitourinary: Negative for dysuria and hematuria.   Musculoskeletal: Negative for arthralgias and myalgias.   Skin: Negative for color change and rash.   Neurological: Negative for dizziness and headaches.   Hematological: Negative for adenopathy.   Psychiatric/Behavioral: Negative for confusion. The patient is not nervous/anxious.    All other systems reviewed and are negative.      Objective:       Vitals:    09/14/17 1323   BP: 121/63   Pulse: 71   Weight: 97.3 kg (214 lb 8.1 oz)   Height: 5' 2" (1.575 m)       Physical Exam   Constitutional: She appears well-developed and well-nourished.   HENT:   Head: Normocephalic and atraumatic.   Eyes: Pupils are equal, round, and reactive to light. No scleral icterus. "   Neck: Normal range of motion.   Cardiovascular: Normal rate and regular rhythm.    No murmur heard.  Pulmonary/Chest: Effort normal and breath sounds normal. She has no wheezes.   Abdominal: Soft. Bowel sounds are normal. She exhibits no distension. There is no tenderness.   Musculoskeletal: She exhibits no edema or tenderness.   Lymphadenopathy:     She has no cervical adenopathy.   Neurological: She is alert.   Skin: Skin is warm and dry. No rash noted.         Lab Results   Component Value Date    WBC 6.72 07/18/2017    HGB 12.2 07/18/2017    HCT 37.2 07/18/2017    MCV 88 07/18/2017     07/18/2017       CMP  Sodium   Date Value Ref Range Status   07/18/2017 140 136 - 145 mmol/L Final     Potassium   Date Value Ref Range Status   07/18/2017 4.7 3.5 - 5.1 mmol/L Final     Comment:     *Slightly Hemolyzed     Chloride   Date Value Ref Range Status   07/18/2017 106 95 - 110 mmol/L Final     CO2   Date Value Ref Range Status   07/18/2017 26 23 - 29 mmol/L Final     Glucose   Date Value Ref Range Status   07/18/2017 85 70 - 110 mg/dL Final     BUN, Bld   Date Value Ref Range Status   07/18/2017 19 6 - 20 mg/dL Final     Creatinine   Date Value Ref Range Status   07/18/2017 1.4 0.5 - 1.4 mg/dL Final   08/10/2013 1.4 0.5 - 1.4 mg/dL Final     Calcium   Date Value Ref Range Status   07/18/2017 10.2 8.7 - 10.5 mg/dL Final   08/10/2013 10.0 8.7 - 10.5 mg/dL Final     Total Protein   Date Value Ref Range Status   07/18/2017 7.3 6.0 - 8.4 g/dL Final     Albumin   Date Value Ref Range Status   07/18/2017 3.2 (L) 3.5 - 5.2 g/dL Final     Total Bilirubin   Date Value Ref Range Status   07/18/2017 0.5 0.1 - 1.0 mg/dL Final     Comment:     For infants and newborns, interpretation of results should be based  on gestational age, weight and in agreement with clinical  observations.  Premature Infant recommended reference ranges:  Up to 24 hours.............<8.0 mg/dL  Up to 48 hours............<12.0 mg/dL  3-5  days..................<15.0 mg/dL  6-29 days.................<15.0 mg/dL       Alkaline Phosphatase   Date Value Ref Range Status   07/18/2017 61 55 - 135 U/L Final   11/20/2012 54 (L) 55 - 135 U/L Final     AST   Date Value Ref Range Status   07/18/2017 14 10 - 40 U/L Final   11/20/2012 12 10 - 40 U/L Final     ALT   Date Value Ref Range Status   07/18/2017 8 (L) 10 - 44 U/L Final     Anion Gap   Date Value Ref Range Status   07/18/2017 8 8 - 16 mmol/L Final   08/10/2013 13 5 - 15 meq/L Final     eGFR if    Date Value Ref Range Status   07/18/2017 49.5 (A) >60 mL/min/1.73 m^2 Final     eGFR if non    Date Value Ref Range Status   07/18/2017 42.9 (A) >60 mL/min/1.73 m^2 Final     Comment:     Calculation used to obtain the estimated glomerular filtration  rate (eGFR) is the CKD-EPI equation. Since race is unknown   in our information system, the eGFR values for   -American and Non--American patients are given   for each creatinine result.         CXR was independently visualized and reviewed by me and showed borderline cardiomegaly.    Old records from Dr. Gonzales's office reviewed and are as summarized above in the HPI.      Assessment:       1. Chronic obstructive pulmonary disease, unspecified COPD type    2. DAVID (obstructive sleep apnea)    3. Morbid obesity with BMI of 40.0-44.9, adult    4. S/P laparoscopic sleeve gastrectomy    5. Epigastric abdominal pain    6. Screening for colorectal cancer        Plan:       1.  EGD for epigastric pain  2.  Antireflux precautions including avoidance of late night eating and lying down soon after eating.    3.  Agree with antisecretory therapy  4.  Colonoscopy for screening  5.  Further recommendations to follow after above.  6.  Communication will be sent to the referring MD, Dr. Gonzales's office regarding my assessment and plan on this patient via EPIC.

## 2017-10-04 ENCOUNTER — ANESTHESIA (OUTPATIENT)
Dept: ENDOSCOPY | Facility: HOSPITAL | Age: 52
End: 2017-10-04
Payer: MEDICARE

## 2017-10-04 ENCOUNTER — SURGERY (OUTPATIENT)
Age: 52
End: 2017-10-04

## 2017-10-04 ENCOUNTER — TELEPHONE (OUTPATIENT)
Dept: FAMILY MEDICINE | Facility: CLINIC | Age: 52
End: 2017-10-04

## 2017-10-04 ENCOUNTER — ANESTHESIA EVENT (OUTPATIENT)
Dept: ENDOSCOPY | Facility: HOSPITAL | Age: 52
End: 2017-10-04
Payer: MEDICARE

## 2017-10-04 ENCOUNTER — HOSPITAL ENCOUNTER (OUTPATIENT)
Facility: HOSPITAL | Age: 52
Discharge: HOME | End: 2017-10-04
Attending: INTERNAL MEDICINE | Admitting: INTERNAL MEDICINE
Payer: MEDICARE

## 2017-10-04 VITALS
WEIGHT: 212 LBS | TEMPERATURE: 97 F | OXYGEN SATURATION: 100 % | SYSTOLIC BLOOD PRESSURE: 120 MMHG | RESPIRATION RATE: 20 BRPM | BODY MASS INDEX: 39.01 KG/M2 | HEART RATE: 90 BPM | HEIGHT: 62 IN | DIASTOLIC BLOOD PRESSURE: 73 MMHG

## 2017-10-04 DIAGNOSIS — R10.9 ABDOMINAL PAIN: ICD-10-CM

## 2017-10-04 DIAGNOSIS — K29.70 GASTRITIS, PRESENCE OF BLEEDING UNSPECIFIED, UNSPECIFIED CHRONICITY, UNSPECIFIED GASTRITIS TYPE: Primary | ICD-10-CM

## 2017-10-04 DIAGNOSIS — G47.33 OSA (OBSTRUCTIVE SLEEP APNEA): Primary | ICD-10-CM

## 2017-10-04 PROCEDURE — 63600175 PHARM REV CODE 636 W HCPCS: Performed by: NURSE ANESTHETIST, CERTIFIED REGISTERED

## 2017-10-04 PROCEDURE — 37000009 HC ANESTHESIA EA ADD 15 MINS: Performed by: INTERNAL MEDICINE

## 2017-10-04 PROCEDURE — 25000003 PHARM REV CODE 250: Performed by: INTERNAL MEDICINE

## 2017-10-04 PROCEDURE — 43239 EGD BIOPSY SINGLE/MULTIPLE: CPT | Performed by: INTERNAL MEDICINE

## 2017-10-04 PROCEDURE — 43239 EGD BIOPSY SINGLE/MULTIPLE: CPT | Mod: ,,, | Performed by: INTERNAL MEDICINE

## 2017-10-04 PROCEDURE — 27201012 HC FORCEPS, HOT/COLD, DISP: Performed by: INTERNAL MEDICINE

## 2017-10-04 PROCEDURE — 88305 TISSUE EXAM BY PATHOLOGIST: CPT | Performed by: PATHOLOGY

## 2017-10-04 PROCEDURE — 37000008 HC ANESTHESIA 1ST 15 MINUTES: Performed by: INTERNAL MEDICINE

## 2017-10-04 PROCEDURE — D9220A PRA ANESTHESIA: Mod: CRNA,,, | Performed by: NURSE ANESTHETIST, CERTIFIED REGISTERED

## 2017-10-04 PROCEDURE — 88342 IMHCHEM/IMCYTCHM 1ST ANTB: CPT | Mod: 26,,, | Performed by: PATHOLOGY

## 2017-10-04 PROCEDURE — 27100019 HC AMBU BAG ADULT/PED: Performed by: NURSE ANESTHETIST, CERTIFIED REGISTERED

## 2017-10-04 PROCEDURE — 25000003 PHARM REV CODE 250: Performed by: NURSE ANESTHETIST, CERTIFIED REGISTERED

## 2017-10-04 PROCEDURE — D9220A PRA ANESTHESIA: Mod: ANES,,, | Performed by: ANESTHESIOLOGY

## 2017-10-04 RX ORDER — SODIUM CHLORIDE 9 MG/ML
INJECTION, SOLUTION INTRAVENOUS CONTINUOUS
Status: DISCONTINUED | OUTPATIENT
Start: 2017-10-04 | End: 2017-10-04 | Stop reason: HOSPADM

## 2017-10-04 RX ORDER — GLYCOPYRROLATE 0.2 MG/ML
INJECTION INTRAMUSCULAR; INTRAVENOUS
Status: DISCONTINUED
Start: 2017-10-04 | End: 2017-10-04 | Stop reason: HOSPADM

## 2017-10-04 RX ORDER — LIDOCAINE HCL/PF 100 MG/5ML
SYRINGE (ML) INTRAVENOUS
Status: DISCONTINUED | OUTPATIENT
Start: 2017-10-04 | End: 2017-10-04

## 2017-10-04 RX ORDER — PROPOFOL 10 MG/ML
VIAL (ML) INTRAVENOUS
Status: DISCONTINUED | OUTPATIENT
Start: 2017-10-04 | End: 2017-10-04

## 2017-10-04 RX ORDER — SODIUM CHLORIDE 0.9 % (FLUSH) 0.9 %
3 SYRINGE (ML) INJECTION
Status: CANCELLED | OUTPATIENT
Start: 2017-10-04

## 2017-10-04 RX ORDER — PANTOPRAZOLE SODIUM 40 MG/1
40 TABLET, DELAYED RELEASE ORAL DAILY
Qty: 30 TABLET | Refills: 3 | Status: SHIPPED | OUTPATIENT
Start: 2017-10-04 | End: 2017-11-13 | Stop reason: SDUPTHER

## 2017-10-04 RX ORDER — LIDOCAINE HYDROCHLORIDE 20 MG/ML
INJECTION, SOLUTION EPIDURAL; INFILTRATION; INTRACAUDAL; PERINEURAL
Status: DISCONTINUED
Start: 2017-10-04 | End: 2017-10-04 | Stop reason: HOSPADM

## 2017-10-04 RX ORDER — GLYCOPYRROLATE 0.2 MG/ML
INJECTION INTRAMUSCULAR; INTRAVENOUS
Status: DISCONTINUED | OUTPATIENT
Start: 2017-10-04 | End: 2017-10-04

## 2017-10-04 RX ADMIN — PROPOFOL 100 MG: 10 INJECTION, EMULSION INTRAVENOUS at 12:10

## 2017-10-04 RX ADMIN — LIDOCAINE HYDROCHLORIDE 100 MG: 20 INJECTION, SOLUTION INTRAVENOUS at 12:10

## 2017-10-04 RX ADMIN — SODIUM CHLORIDE 1000 ML: 0.9 INJECTION, SOLUTION INTRAVENOUS at 10:10

## 2017-10-04 RX ADMIN — SODIUM CHLORIDE: 0.9 INJECTION, SOLUTION INTRAVENOUS at 12:10

## 2017-10-04 RX ADMIN — GLYCOPYRROLATE 0.2 MG: 0.2 INJECTION, SOLUTION INTRAMUSCULAR; INTRAVENOUS at 12:10

## 2017-10-04 NOTE — TELEPHONE ENCOUNTER
Has not used in years, will fwd to Dr Gonzales.  Did let daughter know that new study may be required. Verbalized understanding.

## 2017-10-04 NOTE — H&P (VIEW-ONLY)
"Subjective:       Patient ID: Faustina Rae is a 52 y.o. female.    This patient is new to me.  Referring MD:  Dr. Gonzales for GERD.      Chief Complaint: Gastroesophageal Reflux and Heartburn    Gastroesophageal Reflux   This is a new problem. The current episode started in the past 7 days. The problem occurs frequently. The problem has been waxing and waning. The heartburn duration is several minutes. The heartburn is of moderate intensity. The heartburn wakes her from sleep. The heartburn does not limit her activity. The heartburn changes with position. The symptoms are aggravated by certain foods. Risk factors include obesity. She has tried a histamine-2 antagonist for the symptoms. The treatment provided significant relief.   Notes from Lupe David NP with Dr. Gonzales's office reviewed and significant for trial of H2 blocker as above.  That office has also put in prior request for screening colonoscopy but patient has not yet scheduled.    Review of Systems   Constitutional: Negative for chills and fever.   HENT: Negative for trouble swallowing.    Respiratory: Negative for shortness of breath.    Cardiovascular: Negative for palpitations.   Gastrointestinal: Negative for blood in stool and vomiting.   Genitourinary: Negative for dysuria and hematuria.   Musculoskeletal: Negative for arthralgias and myalgias.   Skin: Negative for color change and rash.   Neurological: Negative for dizziness and headaches.   Hematological: Negative for adenopathy.   Psychiatric/Behavioral: Negative for confusion. The patient is not nervous/anxious.    All other systems reviewed and are negative.      Objective:       Vitals:    09/14/17 1323   BP: 121/63   Pulse: 71   Weight: 97.3 kg (214 lb 8.1 oz)   Height: 5' 2" (1.575 m)       Physical Exam   Constitutional: She appears well-developed and well-nourished.   HENT:   Head: Normocephalic and atraumatic.   Eyes: Pupils are equal, round, and reactive to light. No scleral icterus. "   Neck: Normal range of motion.   Cardiovascular: Normal rate and regular rhythm.    No murmur heard.  Pulmonary/Chest: Effort normal and breath sounds normal. She has no wheezes.   Abdominal: Soft. Bowel sounds are normal. She exhibits no distension. There is no tenderness.   Musculoskeletal: She exhibits no edema or tenderness.   Lymphadenopathy:     She has no cervical adenopathy.   Neurological: She is alert.   Skin: Skin is warm and dry. No rash noted.         Lab Results   Component Value Date    WBC 6.72 07/18/2017    HGB 12.2 07/18/2017    HCT 37.2 07/18/2017    MCV 88 07/18/2017     07/18/2017       CMP  Sodium   Date Value Ref Range Status   07/18/2017 140 136 - 145 mmol/L Final     Potassium   Date Value Ref Range Status   07/18/2017 4.7 3.5 - 5.1 mmol/L Final     Comment:     *Slightly Hemolyzed     Chloride   Date Value Ref Range Status   07/18/2017 106 95 - 110 mmol/L Final     CO2   Date Value Ref Range Status   07/18/2017 26 23 - 29 mmol/L Final     Glucose   Date Value Ref Range Status   07/18/2017 85 70 - 110 mg/dL Final     BUN, Bld   Date Value Ref Range Status   07/18/2017 19 6 - 20 mg/dL Final     Creatinine   Date Value Ref Range Status   07/18/2017 1.4 0.5 - 1.4 mg/dL Final   08/10/2013 1.4 0.5 - 1.4 mg/dL Final     Calcium   Date Value Ref Range Status   07/18/2017 10.2 8.7 - 10.5 mg/dL Final   08/10/2013 10.0 8.7 - 10.5 mg/dL Final     Total Protein   Date Value Ref Range Status   07/18/2017 7.3 6.0 - 8.4 g/dL Final     Albumin   Date Value Ref Range Status   07/18/2017 3.2 (L) 3.5 - 5.2 g/dL Final     Total Bilirubin   Date Value Ref Range Status   07/18/2017 0.5 0.1 - 1.0 mg/dL Final     Comment:     For infants and newborns, interpretation of results should be based  on gestational age, weight and in agreement with clinical  observations.  Premature Infant recommended reference ranges:  Up to 24 hours.............<8.0 mg/dL  Up to 48 hours............<12.0 mg/dL  3-5  days..................<15.0 mg/dL  6-29 days.................<15.0 mg/dL       Alkaline Phosphatase   Date Value Ref Range Status   07/18/2017 61 55 - 135 U/L Final   11/20/2012 54 (L) 55 - 135 U/L Final     AST   Date Value Ref Range Status   07/18/2017 14 10 - 40 U/L Final   11/20/2012 12 10 - 40 U/L Final     ALT   Date Value Ref Range Status   07/18/2017 8 (L) 10 - 44 U/L Final     Anion Gap   Date Value Ref Range Status   07/18/2017 8 8 - 16 mmol/L Final   08/10/2013 13 5 - 15 meq/L Final     eGFR if    Date Value Ref Range Status   07/18/2017 49.5 (A) >60 mL/min/1.73 m^2 Final     eGFR if non    Date Value Ref Range Status   07/18/2017 42.9 (A) >60 mL/min/1.73 m^2 Final     Comment:     Calculation used to obtain the estimated glomerular filtration  rate (eGFR) is the CKD-EPI equation. Since race is unknown   in our information system, the eGFR values for   -American and Non--American patients are given   for each creatinine result.         CXR was independently visualized and reviewed by me and showed borderline cardiomegaly.    Old records from Dr. Gonzales's office reviewed and are as summarized above in the HPI.      Assessment:       1. Chronic obstructive pulmonary disease, unspecified COPD type    2. DAVID (obstructive sleep apnea)    3. Morbid obesity with BMI of 40.0-44.9, adult    4. S/P laparoscopic sleeve gastrectomy    5. Epigastric abdominal pain    6. Screening for colorectal cancer        Plan:       1.  EGD for epigastric pain  2.  Antireflux precautions including avoidance of late night eating and lying down soon after eating.    3.  Agree with antisecretory therapy  4.  Colonoscopy for screening  5.  Further recommendations to follow after above.  6.  Communication will be sent to the referring MD, Dr. Gonzales's office regarding my assessment and plan on this patient via EPIC.

## 2017-10-04 NOTE — ANESTHESIA PREPROCEDURE EVALUATION
10/04/2017  Faustina Rae is a 52 y.o., female.    Anesthesia Evaluation      I have reviewed the Medications.     Review of Systems  Anesthesia Hx:  No problems with previous Anesthesia   Social:  Non-Smoker, No Alcohol Use    Cardiovascular:   Hypertension    Pulmonary:   COPD Sleep Apnea    Renal/:  Renal/ Normal     Hepatic/GI:  Hepatic/GI Normal    Neurological:   CVA, residual symptoms    Endocrine:  Endocrine Normal        Physical Exam  General:  Obesity    Airway/Jaw/Neck:  Airway Findings: Mouth Opening: Normal Tongue: Normal  General Airway Assessment: Adult, Average  Mallampati: III  Jaw/Neck Findings:  Neck ROM: Normal ROM       Chest/Lungs:  Chest/Lungs Findings: Clear to auscultation, Normal Respiratory Rate     Heart/Vascular:  Heart Findings: Rate: Normal  Rhythm: Regular Rhythm  Sounds: Normal  Heart murmur: negative       Mental Status:  Mental Status Findings:  Cooperative, Alert and Oriented         Anesthesia Plan  Type of Anesthesia, risks & benefits discussed:  Anesthesia Type:  general  Patient's Preference:   Intra-op Monitoring Plan:   Intra-op Monitoring Plan Comments:   Post Op Pain Control Plan:   Post Op Pain Control Plan Comments:   Induction:   IV  Beta Blocker:  Patient is not currently on a Beta-Blocker (No further documentation required).       Informed Consent: Patient understands risks and agrees with Anesthesia plan.  Questions answered. Anesthesia consent signed with patient.  ASA Score: 3     Day of Surgery Review of History & Physical:        Anesthesia Plan Notes: Propofol general.        Ready For Surgery From Anesthesia Perspective.

## 2017-10-04 NOTE — OR NURSING
DR. Chapman SPOKE WITH PATIENT AND HER DAUGHTER.  ALSO ADDRESSED THE SLEEP APNEA ISSUE DURING PROCEDURE, AND STATED PT STRONGLY ADVISED TO USE HER CPAP MACHINE.

## 2017-10-04 NOTE — ANESTHESIA POSTPROCEDURE EVALUATION
"Anesthesia Post Evaluation    Patient: Faustina Rae    Procedure(s) Performed: Procedure(s) (LRB):  ESOPHAGOGASTRODUODENOSCOPY (EGD) (N/A)    Final Anesthesia Type: general  Patient location during evaluation: PACU  Patient participation: Yes- Able to Participate  Level of consciousness: awake and alert  Post-procedure vital signs: reviewed and stable  Pain management: adequate  Airway patency: patent  PONV status at discharge: No PONV  Anesthetic complications: yes  Perioperative Events: other periop events (see comments)  Hilda-operative Events Comments: Profound drop in O2 sats to 40%. Patient with respiratory effort. Breath sounds present, no wheeze. Vocal cords open and clean by fiberoptic scope. Ambu bag used to assist respirations. Patient awakened with coughing productive of secretions. No other sequelae.  Cardiovascular status: hemodynamically stable and blood pressure returned to baseline  Respiratory status: unassisted, spontaneous ventilation and room air  Hydration status: euvolemic  Follow-up not needed.        Visit Vitals  /73   Pulse 90   Temp 36.1 °C (97 °F) (Temporal)   Resp 20   Ht 5' 2" (1.575 m)   Wt 96.2 kg (212 lb)   SpO2 100%   Breastfeeding? No   BMI 38.78 kg/m²       Pain/Divya Score: Pain Assessment Performed: Yes (10/4/2017 10:05 AM)  Presence of Pain: denies (10/4/2017  1:20 PM)  Divya Score: 10 (10/4/2017  1:20 PM)      "

## 2017-10-04 NOTE — TRANSFER OF CARE
"Anesthesia Transfer of Care Note    Patient: Faustina Rae    Procedure(s) Performed: Procedure(s) (LRB):  ESOPHAGOGASTRODUODENOSCOPY (EGD) (N/A)    Patient location: PACU    Anesthesia Type: general    Transport from OR: Transported from OR on room air with adequate spontaneous ventilation    Post pain: adequate analgesia    Post assessment: no apparent anesthetic complications    Post vital signs: stable    Level of consciousness: awake    Nausea/Vomiting: no nausea/vomiting    Complications: none    Transfer of care protocol was followed      Last vitals:   Visit Vitals  BP (!) 143/79 (BP Location: Left arm, Patient Position: Lying)   Pulse 68   Temp 36.6 °C (97.9 °F) (Oral)   Resp 20   Ht 5' 2" (1.575 m)   Wt 96.2 kg (212 lb)   SpO2 100%   Breastfeeding? No   BMI 38.78 kg/m²     "

## 2017-10-04 NOTE — TELEPHONE ENCOUNTER
----- Message from Jeaneth Aburto sent at 10/4/2017  3:39 PM CDT -----  Daughter/Venessa is calling to see about getting patient a new c-pap machine. She had a procedure today and had breathing issues. She had an old one that got so bad that she had to stop using it. Please call to advise on how to get this started at [phone].

## 2017-10-10 ENCOUNTER — TELEPHONE (OUTPATIENT)
Dept: GASTROENTEROLOGY | Facility: CLINIC | Age: 52
End: 2017-10-10

## 2017-10-26 ENCOUNTER — HOSPITAL ENCOUNTER (EMERGENCY)
Facility: HOSPITAL | Age: 52
Discharge: HOME | End: 2017-10-26
Attending: EMERGENCY MEDICINE
Payer: MEDICARE

## 2017-10-26 VITALS
WEIGHT: 210 LBS | BODY MASS INDEX: 38.64 KG/M2 | DIASTOLIC BLOOD PRESSURE: 86 MMHG | SYSTOLIC BLOOD PRESSURE: 175 MMHG | TEMPERATURE: 97 F | HEART RATE: 75 BPM | OXYGEN SATURATION: 99 % | HEIGHT: 62 IN | RESPIRATION RATE: 16 BRPM

## 2017-10-26 DIAGNOSIS — M10.071 ACUTE IDIOPATHIC GOUT OF RIGHT FOOT: Primary | ICD-10-CM

## 2017-10-26 PROCEDURE — 99283 EMERGENCY DEPT VISIT LOW MDM: CPT

## 2017-10-26 PROCEDURE — 25000003 PHARM REV CODE 250: Performed by: EMERGENCY MEDICINE

## 2017-10-26 RX ORDER — COLCHICINE 0.6 MG/1
0.6 TABLET ORAL DAILY
Qty: 14 TABLET | Refills: 0 | Status: SHIPPED | OUTPATIENT
Start: 2017-10-26 | End: 2018-01-18 | Stop reason: ALTCHOICE

## 2017-10-26 RX ORDER — INDOMETHACIN 50 MG/1
50 CAPSULE ORAL 2 TIMES DAILY WITH MEALS
Qty: 14 CAPSULE | Refills: 0 | Status: SHIPPED | OUTPATIENT
Start: 2017-10-26 | End: 2017-11-02

## 2017-10-26 RX ORDER — INDOMETHACIN 25 MG/1
50 CAPSULE ORAL
Status: COMPLETED | OUTPATIENT
Start: 2017-10-26 | End: 2017-10-26

## 2017-10-26 RX ORDER — COLCHICINE 0.6 MG/1
0.6 TABLET, FILM COATED ORAL
Status: COMPLETED | OUTPATIENT
Start: 2017-10-26 | End: 2017-10-26

## 2017-10-26 RX ADMIN — COLCHICINE 0.6 MG: 0.6 CAPSULE ORAL at 07:10

## 2017-10-26 RX ADMIN — INDOMETHACIN 50 MG: 25 CAPSULE ORAL at 07:10

## 2017-10-27 NOTE — ED PROVIDER NOTES
"Encounter Date: 10/26/2017    SCRIBE #1 NOTE: I, Vidhya Vazquez, am scribing for, and in the presence of,  Dr. Robbins. I have scribed the entire note.       History     Chief Complaint   Patient presents with    Foot Pain     right foot, "i think i got gout" had gout in left foot in the past, having right foot pain since yesterday/. no injury       10/26/2017 6:49 PM     Chief complaint: Right foot pain      Faustina Rae is a 52 y.o. female with a history of gout on her left foot, CHF, asthma, COPD, HTN, stroke, and arthritis who presents to the ED with an onset of worsening right foot pain since yesterday. Patient states that she feels like her "foot is breaking in half" when she walks. She reports that the pain is located on the top part of her foot. The patient notes that she has "gout once a year" on the underside of her left foot. Her last episode of gout was "about a year ago," and she was given Colchicine. She does not endorse taking any gout medications daily. The patient notes eating "red sauce with spaghetti and barbeque all this week." She thinks she was "eating barbeque chicken" when her foot pain started. Patient denies having a rash, warmth to skin, foot injury, or any kidney problems. Her PSHx includes a hysterectomy, a cholecystectomy, and a sleeve gastroplasty.       The history is provided by the patient.     Review of patient's allergies indicates:  No Known Allergies  Past Medical History:   Diagnosis Date    Arthritis     Asthma     CHF (congestive heart failure)     COPD (chronic obstructive pulmonary disease)     Hypertension     Leaky heart valve     Rheumatoid arthritis(714.0)     Stroke      Past Surgical History:   Procedure Laterality Date    CHOLECYSTECTOMY      HYSTERECTOMY      SLEEVE GASTROPLASTY  02/21/2017     Family History   Problem Relation Age of Onset    Arthritis Mother     Cancer Mother     Diabetes Mother     Hyperlipidemia Mother     Hypertension " Mother     Stroke Mother     Heart disease Father     Hypertension Father     Asthma Son     Hypertension Sister     Hypertension Sister     Kidney disease Neg Hx      Social History   Substance Use Topics    Smoking status: Never Smoker    Smokeless tobacco: Never Used    Alcohol use No     Review of Systems   Constitutional: Negative for fever.   HENT: Negative for congestion.    Eyes: Negative for visual disturbance.   Respiratory: Negative for wheezing.    Cardiovascular: Negative for chest pain.   Gastrointestinal: Negative for abdominal pain.   Genitourinary: Negative for dysuria.   Musculoskeletal: Negative for joint swelling.        Positive for right foot pain.    Skin: Negative for rash and wound.   Neurological: Negative for syncope.   Hematological: Does not bruise/bleed easily.   Psychiatric/Behavioral: Negative for confusion.       Physical Exam     Initial Vitals [10/26/17 1812]   BP Pulse Resp Temp SpO2   (!) 175/86 75 16 97.4 °F (36.3 °C) 99 %      MAP       115.67         Physical Exam    Nursing note and vitals reviewed.  Constitutional: She appears well-nourished.   HENT:   Head: Normocephalic and atraumatic.   Eyes: Conjunctivae and EOM are normal.   Neck: Normal range of motion. Neck supple. No thyroid mass present.   Cardiovascular: Normal rate and regular rhythm.   Abdominal: Soft. Normal appearance and bowel sounds are normal. There is no tenderness.   Musculoskeletal:   Tenderness with manipulation of medial right mid foot with no underlying skin changes; compromisable warmth contralaterally and ipsilaterally.    Neurological: She is alert and oriented to person, place, and time. She has normal strength. No cranial nerve deficit or sensory deficit.   Skin: Skin is warm and dry. No rash noted. No erythema.   Psychiatric: She has a normal mood and affect. Her speech is normal. Cognition and memory are normal.         ED Course   Procedures  Labs Reviewed - No data to display           Medical Decision Making:   History:   Old Medical Records: I decided to obtain old medical records.  Initial Assessment:   This patient was interviewed and examined and currently has no focal joint complaints concerning for an underlying septic arthritis.  She has a history of gout on the contralateral foot and I do believe she is progressing with this, in early stages, on the right.  She does not take maintenance medication but reports she has been eating a lot of barbecue recently which appears to stimulate these flares in the past.  She denies a traumatic injury and I do not think additional radiographs are warranted.  The patient will be provided oral Indocin and colchicine here in the emergency department.  ED Management:  Patient be sent home with a course of colchicine and Indocin.  She is asked to rest over the next few days and follow-up with her primary care doctor as soon as possible regarding improvement.  She is asked to return to the ER for any new, concerning, or worsening symptoms, including worsening pain rash or fever.  Patient was agreeable with the plan for follow-up and she was discharged in stable condition.            Scribe Attestation:   Scribe #1: I performed the above scribed service and the documentation accurately describes the services I performed. I attest to the accuracy of the note.    I, Dr. Sean Robbins, personally performed the services described in this documentation. All medical record entries made by the scribe were at my direction and in my presence.  I have reviewed the chart and agree that the record reflects my personal performance and is accurate and complete. Sean Robbins MD.  8:27 PM 10/26/2017          ED Course      Clinical Impression:     1. Acute idiopathic gout of right foot          Disposition:   Disposition: Discharged  Condition: Stable                        Sean Robibns MD  10/26/17 2027

## 2017-11-10 ENCOUNTER — NURSE TRIAGE (OUTPATIENT)
Dept: ADMINISTRATIVE | Facility: CLINIC | Age: 52
End: 2017-11-10

## 2017-11-10 NOTE — TELEPHONE ENCOUNTER
"    Reason for Disposition   [1] MILD-MODERATE pain AND [2] constant AND [3] present > 2 hours    Answer Assessment - Initial Assessment Questions  1. LOCATION: "Where does it hurt?"       Upper part of my stomach    2. RADIATION: "Does the pain shoot anywhere else?" (e.g., chest, back)      Denies    3. ONSET: "When did the pain begin?" (e.g., minutes, hours or days ago)      Yesterday     4. SUDDEN: "Gradual or sudden onset?"      When I start to eat and constant pain     5. PATTERN "Does the pain come and go, or is it constant?"     - If constant: "Is it getting better, staying the same, or worsening?"       (Note: Constant means the pain never goes away completely; most serious pain is constant and it progresses)      - If intermittent: "How long does it last?" "Do you have pain now?"      (Note: Intermittent means the pain goes away completely between bouts)      Constant     6. SEVERITY: "How bad is the pain?"  (e.g., Scale 1-10; mild, moderate, or severe)    - MILD (1-3): doesn't interfere with normal activities, abdomen soft and not tender to touch     - MODERATE (4-7): interferes with normal activities or awakens from sleep, tender to touch     - SEVERE (8-10): excruciating pain, doubled over, unable to do any normal activities    Severe    7. RECURRENT SYMPTOM: "Have you ever had this type of abdominal pain before?" If so, ask: "When was the last time?" and "What happened that time?"       Denies    8. CAUSE: "What do you think is causing the abdominal pain?"      I dont know     9. RELIEVING/AGGRAVATING FACTORS: "What makes it better or worse?" (e.g., movement, antacids, bowel movement)      Eating     10. OTHER SYMPTOMS: "Has there been any vomiting, diarrhea, constipation, or urine problems?"        Vomiting Diarrha    11. PREGNANCY: "Is there any chance you are pregnant?" "When was your last menstrual period?"        Denies    Protocols used:  ABDOMINAL PAIN - FEMALE-A-AH    Patient c/o severe abd " pain since yesterday with nausea and vomiting. Patient advised to report to Ed. Patient verbalize understanding.

## 2017-11-13 ENCOUNTER — DOCUMENTATION ONLY (OUTPATIENT)
Dept: FAMILY MEDICINE | Facility: CLINIC | Age: 52
End: 2017-11-13

## 2017-11-13 ENCOUNTER — OFFICE VISIT (OUTPATIENT)
Dept: FAMILY MEDICINE | Facility: CLINIC | Age: 52
End: 2017-11-13
Payer: MEDICARE

## 2017-11-13 VITALS
HEIGHT: 62 IN | RESPIRATION RATE: 16 BRPM | TEMPERATURE: 98 F | BODY MASS INDEX: 40.37 KG/M2 | WEIGHT: 219.38 LBS | DIASTOLIC BLOOD PRESSURE: 90 MMHG | SYSTOLIC BLOOD PRESSURE: 184 MMHG | HEART RATE: 68 BPM | OXYGEN SATURATION: 100 %

## 2017-11-13 DIAGNOSIS — R10.13 EPIGASTRIC PAIN: Primary | ICD-10-CM

## 2017-11-13 DIAGNOSIS — E66.01 MORBID OBESITY: ICD-10-CM

## 2017-11-13 DIAGNOSIS — K29.70 GASTRITIS, PRESENCE OF BLEEDING UNSPECIFIED, UNSPECIFIED CHRONICITY, UNSPECIFIED GASTRITIS TYPE: ICD-10-CM

## 2017-11-13 DIAGNOSIS — R10.9 ABDOMINAL PAIN, UNSPECIFIED ABDOMINAL LOCATION: ICD-10-CM

## 2017-11-13 DIAGNOSIS — R11.10 NON-INTRACTABLE VOMITING, PRESENCE OF NAUSEA NOT SPECIFIED, UNSPECIFIED VOMITING TYPE: ICD-10-CM

## 2017-11-13 PROCEDURE — 99214 OFFICE O/P EST MOD 30 MIN: CPT | Mod: S$GLB,,, | Performed by: INTERNAL MEDICINE

## 2017-11-13 RX ORDER — ONDANSETRON 4 MG/1
4 TABLET, FILM COATED ORAL EVERY 8 HOURS PRN
Qty: 20 TABLET | Refills: 1 | Status: SHIPPED | OUTPATIENT
Start: 2017-11-13 | End: 2018-08-14

## 2017-11-13 RX ORDER — HYDROCODONE BITARTRATE AND ACETAMINOPHEN 5; 325 MG/1; MG/1
1 TABLET ORAL EVERY 6 HOURS PRN
Qty: 10 TABLET | Refills: 0 | Status: SHIPPED | OUTPATIENT
Start: 2017-11-13 | End: 2018-01-18

## 2017-11-13 RX ORDER — PANTOPRAZOLE SODIUM 40 MG/1
40 TABLET, DELAYED RELEASE ORAL 2 TIMES DAILY
Qty: 30 TABLET | Refills: 0 | Status: SHIPPED | OUTPATIENT
Start: 2017-11-13 | End: 2018-01-18 | Stop reason: SDUPTHER

## 2017-11-13 NOTE — PROGRESS NOTES
Subjective:       Patient ID: Faustina Rae is a 52 y.o. female.    Chief Complaint: Abdominal Pain; Nausea; and Emesis    HPI       Abdominal Pain.    ONSET:   7 days  ago.    DURATION:  Continuous    QUALITY/COURSE: .  Worsening    LOCATION:   Epigastric  .--Radiation:  Mid back    INTENSITY/SEVERITY: .Severity is #  10   (10 point scale).  Character: Sharp  CONTEXT/WHEN: .--Similar problems: no. Trauma: no.    The following symptoms/statements  are positive if BOLD, negative otherwise.    AGGRAVATING FACTORS: food . medications. alcohol. movement. position worse when she lays back, better when she leans forward . bowel movements . emotional stress .  RELIEF WITH: food or milk, antacids . medications .  position . bowel movements . Eructation.  passing gas .  PAST TREATMENT OR EVALUATION: barium+_enema . Upper_gastrointestinal_series . CT_scans . sonograms . Endoscopic_procedures .  ASSOCIATED SYMPTOMS:      Weight_loss . jaundice .   FEMALE: relation_of_pain_to_menstrual_periods .      . Possibility_of_pregnancy . . dyspareunia .  HISTORY OF: Diabetes. CAD. prior abdomina surgery. Kidney_stones.  Gallbladder_disease. Hiatal_hernia. 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 colitis. Liver_disease.  FH  Colitis . . enteritis .     Last labs:  Lab Results   Component Value Date    WBC 6.72 07/18/2017    HGB 12.2 07/18/2017    HCT 37.2 07/18/2017     07/18/2017    CHOL 167 01/31/2017    TRIG 98 01/31/2017    HDL 43 01/31/2017    ALT 8 (L) 07/18/2017    AST 14 07/18/2017     07/18/2017    K 4.7 07/18/2017     07/18/2017    CREATININE 1.4 07/18/2017    BUN 19 07/18/2017    CO2 26 07/18/2017    TSH 1.632 01/31/2017    INR 1.1 08/12/2013    HGBA1C 6.0 12/14/2016           Patient had bariatric surgery 4 months ago.  She stopped losing weight and is concerned of lites consult with a bariatric specialist          Review of Systems   Constitutional: Negative for appetite change, fever and unexpected weight change.   Respiratory:  "Negative for cough, chest tightness and shortness of breath.    Gastrointestinal: Positive for abdominal pain and diarrhea (once a day). Negative for blood in stool, constipation, nausea and vomiting (Twice a day).   Genitourinary: Negative for dysuria, hematuria, vaginal bleeding and vaginal discharge.   Musculoskeletal: Negative for back pain.       Objective:      Vitals:    11/13/17 1615   BP: (!) 184/90   Pulse: 68   Resp: 16   Temp: 97.8 °F (36.6 °C)   TempSrc: Oral   SpO2: 100%   Weight: 99.5 kg (219 lb 5.7 oz)   Height: 5' 2" (1.575 m)   PainSc: 10-Worst pain ever   PainLoc: Abdomen   up 9 lbs  Physical Exam   Constitutional: She appears well-developed and well-nourished.   Eyes: Pupils are equal, round, and reactive to light.   Cardiovascular: Normal rate, regular rhythm and normal heart sounds.    Pulmonary/Chest: Effort normal and breath sounds normal.   Abdominal: Soft. There is tenderness (Epigastric).   Neurological: She is alert.   Psychiatric: She has a normal mood and affect. Her behavior is normal. Thought content normal.   Nursing note and vitals reviewed.        Assessment:       1. Epigastric pain    2. Non-intractable vomiting, presence of nausea not specified, unspecified vomiting type    3. Abdominal pain, unspecified abdominal location    4. Gastritis, presence of bleeding unspecified, unspecified chronicity, unspecified gastritis type          Plan:   History is suggestive of acute pancreatitis.  Patient was offered admission as she is in severe pain but refused at this time.    Blood pressures elevated but likely due to pain.  Epigastric pain  -     CBC auto differential; Future; Expected date: 11/13/2017  -     Comprehensive metabolic panel; Future; Expected date: 11/13/2017  -     Lipase; Future; Expected date: 11/13/2017  -     US Abdomen Complete; Future; Expected date: 11/13/2017  -     hydrocodone-acetaminophen 5-325mg (NORCO) 5-325 mg per tablet; Take 1 tablet by mouth every 6 (six) " hours as needed.  Dispense: 10 tablet; Refill: 0    Non-intractable vomiting, presence of nausea not specified, unspecified vomiting type  -     ondansetron (ZOFRAN) 4 MG tablet; Take 1 tablet (4 mg total) by mouth every 8 (eight) hours as needed for Nausea.  Dispense: 20 tablet; Refill: 1    Abdominal pain, unspecified abdominal location  -     pantoprazole (PROTONIX) 40 MG tablet; Take 1 tablet (40 mg total) by mouth 2 (two) times daily.  Dispense: 30 tablet; Refill: 0    Gastritis, presence of bleeding unspecified, unspecified chronicity, unspecified gastritis type  -     pantoprazole (PROTONIX) 40 MG tablet; Take 1 tablet (40 mg total) by mouth 2 (two) times daily.  Dispense: 30 tablet; Refill: 0      Return in about 2 days (around 11/15/2017).

## 2017-11-13 NOTE — PROGRESS NOTES
Health Maintenance Due   Topic Date Due    Eye Exam  01/02/1974    TETANUS VACCINE  01/02/1982    Colonoscopy  01/02/2014    Hemoglobin A1c  06/14/2017    Influenza Vaccine  08/01/2017    Foot Exam  11/10/2017

## 2017-11-14 ENCOUNTER — HOSPITAL ENCOUNTER (OUTPATIENT)
Dept: RADIOLOGY | Facility: CLINIC | Age: 52
Discharge: HOME | End: 2017-11-14
Attending: INTERNAL MEDICINE
Payer: MEDICARE

## 2017-11-14 DIAGNOSIS — R10.13 EPIGASTRIC PAIN: ICD-10-CM

## 2017-11-14 PROCEDURE — 76700 US EXAM ABDOM COMPLETE: CPT | Mod: 26,,, | Performed by: RADIOLOGY

## 2017-11-14 PROCEDURE — 76700 US EXAM ABDOM COMPLETE: CPT | Mod: TC,PO

## 2017-11-15 ENCOUNTER — DOCUMENTATION ONLY (OUTPATIENT)
Dept: FAMILY MEDICINE | Facility: CLINIC | Age: 52
End: 2017-11-15

## 2017-11-15 ENCOUNTER — TELEPHONE (OUTPATIENT)
Dept: FAMILY MEDICINE | Facility: CLINIC | Age: 52
End: 2017-11-15

## 2017-11-15 ENCOUNTER — OFFICE VISIT (OUTPATIENT)
Dept: FAMILY MEDICINE | Facility: CLINIC | Age: 52
End: 2017-11-15
Payer: MEDICARE

## 2017-11-15 VITALS
HEART RATE: 59 BPM | OXYGEN SATURATION: 98 % | TEMPERATURE: 98 F | WEIGHT: 217.81 LBS | SYSTOLIC BLOOD PRESSURE: 158 MMHG | BODY MASS INDEX: 40.08 KG/M2 | HEIGHT: 62 IN | DIASTOLIC BLOOD PRESSURE: 84 MMHG

## 2017-11-15 DIAGNOSIS — K29.70 GASTRITIS, PRESENCE OF BLEEDING UNSPECIFIED, UNSPECIFIED CHRONICITY, UNSPECIFIED GASTRITIS TYPE: Primary | ICD-10-CM

## 2017-11-15 DIAGNOSIS — R11.2 NON-INTRACTABLE VOMITING WITH NAUSEA, UNSPECIFIED VOMITING TYPE: Primary | ICD-10-CM

## 2017-11-15 DIAGNOSIS — K91.2 MALNUTRITION FOLLOWING GASTROINTESTINAL SURGERY: ICD-10-CM

## 2017-11-15 PROCEDURE — 99214 OFFICE O/P EST MOD 30 MIN: CPT | Mod: S$GLB,,, | Performed by: NURSE PRACTITIONER

## 2017-11-15 RX ORDER — PROMETHAZINE HYDROCHLORIDE 25 MG/1
25 TABLET ORAL EVERY 4 HOURS
Qty: 20 TABLET | Refills: 0 | Status: SHIPPED | OUTPATIENT
Start: 2017-11-15 | End: 2018-01-18

## 2017-11-15 RX ORDER — SUCRALFATE 1 G/1
1 TABLET ORAL
Qty: 120 TABLET | Refills: 2 | Status: SHIPPED | OUTPATIENT
Start: 2017-11-15 | End: 2018-01-18

## 2017-11-15 NOTE — MEDICAL/APP STUDENT
Subjective:       Patient ID: Faustina Rae is a 52 y.o. female.    Chief Complaint: Follow-up    Abdominal Pain   Chronicity: started a week ago. The current episode started in the past 7 days. The onset quality is gradual. The problem occurs intermittently. Duration: 10 min per episode after she eats or drinks. The problem has been rapidly worsening. The pain is located in the LUQ, RUQ and epigastric region. The pain is at a severity of 10/10 (after she eats it is a 10/10, but currently it is a 6/10). The pain is severe. The quality of the pain is aching. The abdominal pain radiates to the back. Associated symptoms include diarrhea, flatus, nausea and vomiting. Pertinent negatives include no arthralgias, dysuria, hematuria or melena. The pain is aggravated by eating. Treatments tried: liquid diet. The treatment provided no relief. Prior diagnostic workup includes ultrasound. Her past medical history is significant for abdominal surgery and GERD. There is no history of PUD. Patient's medical history includes kidney stones.     Review of Systems   Respiratory: Negative for shortness of breath.    Cardiovascular: Negative for chest pain, palpitations and leg swelling.   Gastrointestinal: Positive for abdominal pain, diarrhea, flatus, nausea and vomiting. Negative for melena.   Genitourinary: Negative for dysuria and hematuria.   Musculoskeletal: Negative for arthralgias.   Skin: Negative for rash.       Objective:      Physical Exam   Constitutional: She is oriented to person, place, and time. She appears well-developed and well-nourished.   HENT:   Head: Normocephalic and atraumatic.   Eyes: EOM are normal. Pupils are equal, round, and reactive to light.   Neck: Normal range of motion. Neck supple.   Cardiovascular: Normal rate, regular rhythm, normal heart sounds and intact distal pulses.    Pulmonary/Chest: Effort normal and breath sounds normal.   Abdominal: Bowel sounds are normal.   Neurological: She is  alert and oriented to person, place, and time.   Skin: Skin is warm and dry. Capillary refill takes less than 2 seconds.   Psychiatric: She has a normal mood and affect. Her behavior is normal.       Assessment:     Plan:         DO NOT drink sami-aid    Drink lots of fluids (boullion, chicken, or beef broth), drink water or crystal light    Add protein shakes (premier protein)

## 2017-11-15 NOTE — PATIENT INSTRUCTIONS
DO NOT drink sami-aid or sweet tea.      Drink lots of fluids (boullion, chicken, or beef broth), drink water or crystal light     Add protein shakes (premier protein) or choi or slim fast (something with at least 15-20 gms of protein in the drink).      Try triple zero greek yogurt.      No skipping meals, use a protein shake if you are not eating a meal. Stop nibbling

## 2017-11-15 NOTE — TELEPHONE ENCOUNTER
----- Message from Arcelia Coombs sent at 11/15/2017  4:31 PM CST -----  Contact: self  Patient called regarding her appt today. Was prescribed medication but is on backorder until 12/15. Need a substitute Rx. Please contact [phone] (home)       [facility] [address] [place] [address] & [address] [address]  Phone: [phone] Fax: [phone]

## 2017-11-15 NOTE — PROGRESS NOTES
Medical/LEANA Student  Encounter Date: 11/15/2017 2:20 PM  [address]      []Hidagnieszka copied text  []Josephver for attribution information  Subjective:       Patient ID: Faustina Rae is a 52 y.o. female.     Chief Complaint: Follow-up     Abdominal Pain   Chronicity: started a week ago. The current episode started in the past 7 days. The onset quality is gradual. The problem occurs intermittently. Duration: 10 min per episode after she eats or drinks. The problem has been rapidly worsening. The pain is located in the LUQ, RUQ and epigastric region. The pain is at a severity of 10/10 (after she eats it is a 10/10, but currently it is a 6/10). The pain is severe. The quality of the pain is aching. The abdominal pain radiates to the back. Associated symptoms include diarrhea, flatus, nausea and vomiting. Pertinent negatives include no arthralgias, dysuria, hematuria or melena. The pain is aggravated by eating. Treatments tried: liquid diet. The treatment provided no relief. Prior diagnostic workup includes ultrasound. Her past medical history is significant for abdominal surgery and GERD. There is no history of PUD. Patient's medical history includes kidney stones.      Review of Systems   Respiratory: Negative for shortness of breath.    Cardiovascular: Negative for chest pain, palpitations and leg swelling.   Gastrointestinal: Positive for abdominal pain, diarrhea, flatus, nausea and vomiting. Negative for melena.   Genitourinary: Negative for dysuria and hematuria.   Musculoskeletal: Negative for arthralgias.   Skin: Negative for rash.       Objective:      Physical Exam   Constitutional: She is oriented to person, place, and time. She appears well-developed and well-nourished.   HENT:   Head: Normocephalic and atraumatic.   Eyes: EOM are normal. Pupils are equal, round, and reactive to light.   Neck: Normal range of motion. Neck supple.   Cardiovascular: Normal rate, regular rhythm, normal heart sounds and intact  distal pulses.    Pulmonary/Chest: Effort normal and breath sounds normal.   Abdominal: Bowel sounds are normal.   Neurological: She is alert and oriented to person, place, and time.   Skin: Skin is warm and dry. Capillary refill takes less than 2 seconds.   Psychiatric: She has a normal mood and affect. Her behavior is normal.       Assessment:    Gastritis, presence of bleeding unspecified, unspecified chronicity, unspecified gastritis type  -     sucralfate (CARAFATE) 1 gram tablet; Take 1 tablet (1 g total) by mouth 4 (four) times daily before meals and nightly.  Dispense: 120 tablet; Refill: 2    Malnutrition following gastrointestinal surgery  -     Ambulatory referral to Endocrinology      I also saw patient face to face and agree with medical student's H&P , I have diagnosed and written treatment plan.   This provider spent more than 25 minutes face to face with patient, more than half the time for counseling and coordination of care as noted.    Plan:        Gastritis, presence of bleeding unspecified, unspecified chronicity, unspecified gastritis type  -     sucralfate (CARAFATE) 1 gram tablet; Take 1 tablet (1 g total) by mouth 4 (four) times daily before meals and nightly.  Dispense: 120 tablet; Refill: 2    Malnutrition following gastrointestinal surgery  -     Ambulatory referral to Endocrinology        DO NOT drink sami-aid or sweet tea.      Drink lots of fluids (boullion, chicken, or beef broth), drink water or crystal light     Add protein shakes (premier protein) or choi or slim fast (something with at least 15-20 gms of protein in the drink).      Try triple zero greek yogurt.      No skipping meals, use a protein shake if you are not eating a meal. Stop hugo      Electronically signed by Luli St at 11/15/2017  2:50 PM

## 2017-11-17 DIAGNOSIS — R11.2 NON-INTRACTABLE VOMITING WITH NAUSEA, UNSPECIFIED VOMITING TYPE: ICD-10-CM

## 2017-11-20 RX ORDER — PROMETHAZINE HYDROCHLORIDE 25 MG/1
25 TABLET ORAL EVERY 4 HOURS
Qty: 20 TABLET | Refills: 0 | OUTPATIENT
Start: 2017-11-20

## 2017-11-22 DIAGNOSIS — Z13.5 DIABETIC RETINOPATHY SCREENING: ICD-10-CM

## 2017-12-12 ENCOUNTER — DOCUMENTATION ONLY (OUTPATIENT)
Dept: FAMILY MEDICINE | Facility: CLINIC | Age: 52
End: 2017-12-12

## 2018-01-18 ENCOUNTER — DOCUMENTATION ONLY (OUTPATIENT)
Dept: FAMILY MEDICINE | Facility: CLINIC | Age: 53
End: 2018-01-18

## 2018-01-18 ENCOUNTER — OFFICE VISIT (OUTPATIENT)
Dept: FAMILY MEDICINE | Facility: CLINIC | Age: 53
End: 2018-01-18
Payer: MEDICARE

## 2018-01-18 VITALS
HEIGHT: 62 IN | SYSTOLIC BLOOD PRESSURE: 164 MMHG | BODY MASS INDEX: 41.06 KG/M2 | WEIGHT: 223.13 LBS | HEART RATE: 77 BPM | OXYGEN SATURATION: 98 % | TEMPERATURE: 98 F | DIASTOLIC BLOOD PRESSURE: 116 MMHG

## 2018-01-18 DIAGNOSIS — J45.40 MODERATE PERSISTENT ASTHMA WITHOUT COMPLICATION: ICD-10-CM

## 2018-01-18 DIAGNOSIS — Z91.148 NONCOMPLIANCE WITH DIET AND MEDICATION REGIMEN: ICD-10-CM

## 2018-01-18 DIAGNOSIS — R10.9 ABDOMINAL PAIN, UNSPECIFIED ABDOMINAL LOCATION: ICD-10-CM

## 2018-01-18 DIAGNOSIS — E11.8 TYPE 2 DIABETES MELLITUS WITH COMPLICATION, WITHOUT LONG-TERM CURRENT USE OF INSULIN: ICD-10-CM

## 2018-01-18 DIAGNOSIS — E11.59 HYPERTENSION ASSOCIATED WITH DIABETES: Primary | ICD-10-CM

## 2018-01-18 DIAGNOSIS — Z91.199 NONCOMPLIANCE WITH DIET AND MEDICATION REGIMEN: ICD-10-CM

## 2018-01-18 DIAGNOSIS — I15.2 HYPERTENSION ASSOCIATED WITH DIABETES: Primary | ICD-10-CM

## 2018-01-18 DIAGNOSIS — K29.70 GASTRITIS, PRESENCE OF BLEEDING UNSPECIFIED, UNSPECIFIED CHRONICITY, UNSPECIFIED GASTRITIS TYPE: ICD-10-CM

## 2018-01-18 PROCEDURE — 99214 OFFICE O/P EST MOD 30 MIN: CPT | Mod: S$GLB,,, | Performed by: NURSE PRACTITIONER

## 2018-01-18 RX ORDER — MONTELUKAST SODIUM 10 MG/1
10 TABLET ORAL NIGHTLY
Qty: 90 TABLET | Refills: 3 | Status: SHIPPED | OUTPATIENT
Start: 2018-01-18 | End: 2020-01-21 | Stop reason: SDUPTHER

## 2018-01-18 RX ORDER — SUCRALFATE 1 G/1
1 TABLET ORAL
Qty: 360 TABLET | Refills: 0 | Status: SHIPPED | OUTPATIENT
Start: 2018-01-18 | End: 2018-08-14

## 2018-01-18 RX ORDER — PANTOPRAZOLE SODIUM 40 MG/1
40 TABLET, DELAYED RELEASE ORAL 2 TIMES DAILY
Qty: 90 TABLET | Refills: 0 | Status: SHIPPED | OUTPATIENT
Start: 2018-01-18 | End: 2018-04-12 | Stop reason: SDUPTHER

## 2018-01-18 RX ORDER — LISINOPRIL AND HYDROCHLOROTHIAZIDE 12.5; 2 MG/1; MG/1
2 TABLET ORAL DAILY
Qty: 180 TABLET | Refills: 3 | Status: ON HOLD | OUTPATIENT
Start: 2018-01-18 | End: 2018-03-08 | Stop reason: HOSPADM

## 2018-01-18 NOTE — PROGRESS NOTES
Subjective:       Patient ID: Faustina Rae is a 53 y.o. female.    Chief Complaint: Abdominal Pain; Cough; and Nasal Congestion  Has diabetes, had bariatric surgery, but it not following the diet and is gaining weight. She has chronic abdominal pain. Has not had recent a1c.    Having recurrent abdominal pain, not eating all day, then eating fast food, spicy food and only taking carafate once a day. Not taking protonix.    Hypertension is chronic and worsening. She is taking her b/p medication, but not following a low sodium diet, eating grits, luis and eggs for breakfast and popcorn for lunch.   HPI  Review of Systems   Respiratory: Positive for cough. Negative for shortness of breath.    Cardiovascular: Negative for chest pain.   Gastrointestinal: Positive for abdominal pain.   Neurological: Negative for headaches.       Objective:      Physical Exam   Constitutional: She is oriented to person, place, and time. She appears well-developed and well-nourished. No distress.   HENT:   Head: Normocephalic and atraumatic.   Eyes: Conjunctivae are normal. Right eye exhibits no discharge. Left eye exhibits no discharge. No scleral icterus.   Cardiovascular: Normal rate, regular rhythm and normal heart sounds.  Exam reveals no gallop and no friction rub.    No murmur heard.  Pulmonary/Chest: Effort normal and breath sounds normal. No respiratory distress. She has no wheezes. She has no rales.   Abdominal: Soft. Bowel sounds are normal. She exhibits no distension and no mass. There is tenderness. There is no rebound and no guarding.   Epigastric tenderness.   Neurological: She is alert and oriented to person, place, and time.   Skin: Skin is warm and dry. She is not diaphoretic.   Psychiatric: She has a normal mood and affect. Her behavior is normal.   Nursing note and vitals reviewed.      Assessment:     This provider spent more than 25 minutes face to face with patient, more than half the time for counseling and  coordination of care as noted.  This office visit cannot be billed incident to as it does not meet the needed criteria.     1. Hypertension associated with diabetes    2. Moderate persistent asthma without complication    3. Abdominal pain, unspecified abdominal location    4. Gastritis, presence of bleeding unspecified, unspecified chronicity, unspecified gastritis type        Plan:     Hypertension associated with diabetes  -     lisinopril-hydrochlorothiazide (PRINZIDE,ZESTORETIC) 20-12.5 mg per tablet; Take 2 tablets by mouth once daily.  Dispense: 180 tablet; Refill: 3    Moderate persistent asthma without complication  -     montelukast (SINGULAIR) 10 mg tablet; Take 1 tablet (10 mg total) by mouth every evening.  Dispense: 90 tablet; Refill: 3    Abdominal pain, unspecified abdominal location  -     pantoprazole (PROTONIX) 40 MG tablet; Take 1 tablet (40 mg total) by mouth 2 (two) times daily.  Dispense: 90 tablet; Refill: 0    Gastritis, presence of bleeding unspecified, unspecified chronicity, unspecified gastritis type  -     pantoprazole (PROTONIX) 40 MG tablet; Take 1 tablet (40 mg total) by mouth 2 (two) times daily.  Dispense: 90 tablet; Refill: 0  -     sucralfate (CARAFATE) 1 gram tablet; Take 1 tablet (1 g total) by mouth 4 (four) times daily before meals and nightly.  Dispense: 360 tablet; Refill: 0      Explained bariatric diet to patient, why she is not losing weight, why her b/p is elevated due to what she is eating.   For breakfast, either eggs or a protein drink, for lunch either a protein and vegetables or greek yogurt (triple zero is high in protein) or a protein drink (premiere protein has 30 grams of protein). For supper a protein and more low carb vegetables. For dessert or snack, fruit.    1 month with labs prior

## 2018-01-18 NOTE — PATIENT INSTRUCTIONS
For breakfast, either eggs or a protein drink, for lunch either a protein and vegetables or greek yogurt (triple zero is high in protein) or a protein drink (premiere protein has 30 grams of protein). For supper a protein and more low carb vegetables. For dessert or snack, fruit.  If canned fruit, in heavy syrup, wash the syrup off before eating.

## 2018-01-18 NOTE — PROGRESS NOTES
Patient, Faustina Rae (MRN #[id number]), presented with a recorded BMI of 40.81 kg/m^2 consistent with the definition of morbid obesity (ICD-10 E66.01). The patient's morbid obesity was monitored, evaluated, addressed and/or treated. This addendum to the medical record is made on 01/18/2018.

## 2018-02-19 ENCOUNTER — DOCUMENTATION ONLY (OUTPATIENT)
Dept: FAMILY MEDICINE | Facility: CLINIC | Age: 53
End: 2018-02-19

## 2018-02-19 ENCOUNTER — OFFICE VISIT (OUTPATIENT)
Dept: FAMILY MEDICINE | Facility: CLINIC | Age: 53
End: 2018-02-19
Payer: MEDICARE

## 2018-02-19 VITALS
OXYGEN SATURATION: 96 % | HEART RATE: 54 BPM | BODY MASS INDEX: 40.4 KG/M2 | SYSTOLIC BLOOD PRESSURE: 156 MMHG | WEIGHT: 219.56 LBS | HEIGHT: 62 IN | DIASTOLIC BLOOD PRESSURE: 80 MMHG | TEMPERATURE: 98 F

## 2018-02-19 DIAGNOSIS — G47.00 INSOMNIA, UNSPECIFIED TYPE: ICD-10-CM

## 2018-02-19 DIAGNOSIS — E66.01 MORBID OBESITY WITH BMI OF 40.0-44.9, ADULT: ICD-10-CM

## 2018-02-19 DIAGNOSIS — I69.328 CVA, OLD, SPEECH/LANGUAGE DEFICIT: ICD-10-CM

## 2018-02-19 DIAGNOSIS — M72.2 PLANTAR FASCIITIS: ICD-10-CM

## 2018-02-19 DIAGNOSIS — I12.9 HYPERTENSION, RENAL DISEASE, STAGE 1-4 OR UNSPECIFIED CHRONIC KIDNEY DISEASE: Primary | ICD-10-CM

## 2018-02-19 PROCEDURE — 99214 OFFICE O/P EST MOD 30 MIN: CPT | Mod: S$GLB,,, | Performed by: NURSE PRACTITIONER

## 2018-02-19 PROCEDURE — 3008F BODY MASS INDEX DOCD: CPT | Mod: S$GLB,,, | Performed by: NURSE PRACTITIONER

## 2018-02-19 RX ORDER — HYDRALAZINE HYDROCHLORIDE 50 MG/1
50 TABLET, FILM COATED ORAL 3 TIMES DAILY
Qty: 90 TABLET | Refills: 11 | Status: ON HOLD | OUTPATIENT
Start: 2018-02-19 | End: 2018-03-08

## 2018-02-19 RX ORDER — TRAZODONE HYDROCHLORIDE 50 MG/1
50 TABLET ORAL NIGHTLY PRN
Qty: 30 TABLET | Refills: 1 | Status: SHIPPED | OUTPATIENT
Start: 2018-02-19 | End: 2019-03-31 | Stop reason: SDUPTHER

## 2018-02-19 NOTE — PATIENT INSTRUCTIONS
You need to have a good supportive shoe, a tennis shoe works well, like New Balance, Nikkie or Rebok, they need to changed out every 3 months. Exercise the foot by standing on a step that has a railing. Hold on tight to the railing, put the front part of your  Foot on the step (the ball of your foot), holding on to the railing, put all your weight on the back of your foot and stretch the tendon in the back of your leg. Hold it for 1 minute, then ice the foot with a cold can that you keep in the refrigerator. Continue your diet, work on losing another 2 pounds in the next 2 weeks.

## 2018-02-19 NOTE — PROGRESS NOTES
"Subjective:       Patient ID: Faustina Rae is a 53 y.o. female.    Chief Complaint: Leg Pain; Foot Pain; and Follow-up    Foot Pain   This is a recurrent problem. The current episode started in the past 7 days (2 days ago). The problem occurs intermittently. The problem has been waxing and waning (Worse when she first steps on it. ). Pertinent negatives include no chest pain, headaches or numbness. The symptoms are aggravated by walking. She has tried nothing for the symptoms. The treatment provided no relief.   Hypertension   This is a chronic problem. The current episode started more than 1 year ago. The problem is unchanged. The problem is uncontrolled. Associated symptoms include peripheral edema. Pertinent negatives include no chest pain, headaches or shortness of breath. There are no associated agents to hypertension. Past treatments include ACE inhibitors, diuretics and calcium channel blockers. The current treatment provides mild improvement. Compliance problems include diet.  Hypertensive end-organ damage includes kidney disease. Identifiable causes of hypertension include chronic renal disease.     Obesity is chronic, has lost 4 pounds in the past month. Did not get any labs done.     Insomnia is a new problem, but she has been having problems falling asleep and waking early for about a year. She is using OTC "PM medication". They help her to fall asleep, but if she wakes up to use the bathroom, she cannot go back to sleep.   Review of Systems   Respiratory: Negative for shortness of breath.    Cardiovascular: Negative for chest pain.   Neurological: Negative for numbness and headaches.       Objective:       CMP  Sodium   Date Value Ref Range Status   11/14/2017 141 136 - 145 mmol/L Final     Potassium   Date Value Ref Range Status   11/14/2017 4.6 3.5 - 5.1 mmol/L Final     Chloride   Date Value Ref Range Status   11/14/2017 107 95 - 110 mmol/L Final     CO2   Date Value Ref Range Status "   11/14/2017 26 23 - 29 mmol/L Final     Glucose   Date Value Ref Range Status   11/14/2017 90 70 - 110 mg/dL Final     BUN, Bld   Date Value Ref Range Status   11/14/2017 35 (H) 6 - 20 mg/dL Final     Creatinine   Date Value Ref Range Status   11/14/2017 1.6 (H) 0.5 - 1.4 mg/dL Final   08/10/2013 1.4 0.5 - 1.4 mg/dL Final     Calcium   Date Value Ref Range Status   11/14/2017 9.6 8.7 - 10.5 mg/dL Final   08/10/2013 10.0 8.7 - 10.5 mg/dL Final     Total Protein   Date Value Ref Range Status   11/14/2017 7.6 6.0 - 8.4 g/dL Final     Albumin   Date Value Ref Range Status   11/14/2017 3.3 (L) 3.5 - 5.2 g/dL Final     Total Bilirubin   Date Value Ref Range Status   11/14/2017 0.3 0.1 - 1.0 mg/dL Final     Comment:     For infants and newborns, interpretation of results should be based  on gestational age, weight and in agreement with clinical  observations.  Premature Infant recommended reference ranges:  Up to 24 hours.............<8.0 mg/dL  Up to 48 hours............<12.0 mg/dL  3-5 days..................<15.0 mg/dL  6-29 days.................<15.0 mg/dL       Alkaline Phosphatase   Date Value Ref Range Status   11/14/2017 64 55 - 135 U/L Final   11/20/2012 54 (L) 55 - 135 U/L Final     AST   Date Value Ref Range Status   11/14/2017 11 10 - 40 U/L Final   11/20/2012 12 10 - 40 U/L Final     ALT   Date Value Ref Range Status   11/14/2017 7 (L) 10 - 44 U/L Final     Anion Gap   Date Value Ref Range Status   11/14/2017 8 8 - 16 mmol/L Final   08/10/2013 13 5 - 15 meq/L Final     eGFR if    Date Value Ref Range Status   11/14/2017 42.1 (A) >60 mL/min/1.73 m^2 Final     eGFR if non    Date Value Ref Range Status   11/14/2017 36.5 (A) >60 mL/min/1.73 m^2 Final     Comment:     Calculation used to obtain the estimated glomerular filtration  rate (eGFR) is the CKD-EPI equation.            Physical Exam   Constitutional: She is oriented to person, place, and time. She appears well-developed and  well-nourished. No distress.   HENT:   Head: Normocephalic and atraumatic.   Eyes: Conjunctivae are normal. Right eye exhibits no discharge. Left eye exhibits no discharge. No scleral icterus.   Cardiovascular: Normal rate, regular rhythm and normal heart sounds.  Exam reveals no gallop and no friction rub.    No murmur heard.  Pulmonary/Chest: Effort normal and breath sounds normal. No respiratory distress. She has no wheezes. She has no rales.   Musculoskeletal:   No edema or erythema of right foot   Neurological: She is alert and oriented to person, place, and time.   Skin: Skin is warm and dry. She is not diaphoretic.   Psychiatric: She has a normal mood and affect. Her behavior is normal.   Nursing note and vitals reviewed.      Assessment:       1. Hypertension, renal disease, stage 1-4 or unspecified chronic kidney disease    2. Plantar fasciitis    3. Insomnia, unspecified type    4. Morbid obesity with BMI of 40.0-44.9, adult    5. CVA, old, speech/language deficit        Plan:     Hypertension, renal disease, stage 1-4 or unspecified chronic kidney disease  -     hydrALAZINE (APRESOLINE) 50 MG tablet; Take 1 tablet (50 mg total) by mouth 3 (three) times daily.  Dispense: 90 tablet; Refill: 11    Plantar fasciitis    Insomnia, unspecified type  -     traZODone (DESYREL) 50 MG tablet; Take 1 tablet (50 mg total) by mouth nightly as needed for Insomnia.  Dispense: 30 tablet; Refill: 1    Morbid obesity with BMI of 40.0-44.9, adult  -     Ambulatory Referral to Nutrition Services    CVA, old, speech/language deficit    2 weeks with labs prior   Refuses flu vaccine.  You need to have a good supportive shoe, a tennis shoe works well, like New Balance, Nikkie or Rebok, they need to changed out every 3 months. Exercise the foot by standing on a step that has a railing. Hold on tight to the railing, put the front part of your  Foot on the step (the ball of your foot), holding on to the railing, put all your weight  on the back of your foot and stretch the tendon in the back of your leg. Hold it for 1 minute, then ice the foot with a cold can that you keep in the refrigerator. Continue your diet, work on losing another 2 pounds in the next 2 weeks.

## 2018-02-19 NOTE — PROGRESS NOTES
Health Maintenance Due   Topic Date Due    Eye Exam  01/02/1974    TETANUS VACCINE  01/02/1982    Colonoscopy  01/02/2014    Hemoglobin A1c  06/14/2017    Influenza Vaccine  08/01/2017    Foot Exam  11/10/2017    Urine Microalbumin  12/14/2017    Lipid Panel  01/31/2018

## 2018-03-07 ENCOUNTER — HOSPITAL ENCOUNTER (OUTPATIENT)
Facility: HOSPITAL | Age: 53
LOS: 1 days | Discharge: HOME | End: 2018-03-08
Attending: EMERGENCY MEDICINE | Admitting: HOSPITALIST
Payer: MEDICARE

## 2018-03-07 DIAGNOSIS — I63.9 STROKE: ICD-10-CM

## 2018-03-07 DIAGNOSIS — I63.9 ACUTE CVA (CEREBROVASCULAR ACCIDENT): ICD-10-CM

## 2018-03-07 DIAGNOSIS — I63.9 CVA (CEREBRAL VASCULAR ACCIDENT): ICD-10-CM

## 2018-03-07 DIAGNOSIS — I12.9 HYPERTENSION, RENAL DISEASE, STAGE 1-4 OR UNSPECIFIED CHRONIC KIDNEY DISEASE: ICD-10-CM

## 2018-03-07 DIAGNOSIS — I16.0 HYPERTENSIVE URGENCY: Primary | ICD-10-CM

## 2018-03-07 LAB
ALBUMIN SERPL BCP-MCNC: 3.1 G/DL
ALP SERPL-CCNC: 63 U/L
ALT SERPL W/O P-5'-P-CCNC: 8 U/L
ANION GAP SERPL CALC-SCNC: 7 MMOL/L
AST SERPL-CCNC: 12 U/L
BASOPHILS # BLD AUTO: 0.1 K/UL
BASOPHILS NFR BLD: 0.8 %
BILIRUB SERPL-MCNC: 0.2 MG/DL
BUN SERPL-MCNC: 24 MG/DL
CALCIUM SERPL-MCNC: 9.7 MG/DL
CHLORIDE SERPL-SCNC: 109 MMOL/L
CHOLEST SERPL-MCNC: 185 MG/DL
CHOLEST/HDLC SERPL: 4 {RATIO}
CO2 SERPL-SCNC: 28 MMOL/L
CREAT SERPL-MCNC: 1.6 MG/DL
DIFFERENTIAL METHOD: ABNORMAL
EOSINOPHIL # BLD AUTO: 0.1 K/UL
EOSINOPHIL NFR BLD: 1.6 %
ERYTHROCYTE [DISTWIDTH] IN BLOOD BY AUTOMATED COUNT: 14 %
EST. GFR  (AFRICAN AMERICAN): 42 ML/MIN/1.73 M^2
EST. GFR  (NON AFRICAN AMERICAN): 36 ML/MIN/1.73 M^2
GLUCOSE SERPL-MCNC: 85 MG/DL
HCT VFR BLD AUTO: 35.6 %
HDLC SERPL-MCNC: 46 MG/DL
HDLC SERPL: 24.9 %
HGB BLD-MCNC: 11.6 G/DL
INR PPP: 1
LDLC SERPL CALC-MCNC: 119.4 MG/DL
LYMPHOCYTES # BLD AUTO: 2.6 K/UL
LYMPHOCYTES NFR BLD: 34.3 %
MCH RBC QN AUTO: 29.3 PG
MCHC RBC AUTO-ENTMCNC: 32.7 G/DL
MCV RBC AUTO: 90 FL
MONOCYTES # BLD AUTO: 0.5 K/UL
MONOCYTES NFR BLD: 7 %
NEUTROPHILS # BLD AUTO: 4.3 K/UL
NEUTROPHILS NFR BLD: 56.3 %
NONHDLC SERPL-MCNC: 139 MG/DL
PLATELET # BLD AUTO: 239 K/UL
PMV BLD AUTO: 8.1 FL
POCT GLUCOSE: 70 MG/DL (ref 70–110)
POTASSIUM SERPL-SCNC: 4.6 MMOL/L
PROT SERPL-MCNC: 7.2 G/DL
PROTHROMBIN TIME: 9.6 SEC
RBC # BLD AUTO: 3.96 M/UL
SODIUM SERPL-SCNC: 144 MMOL/L
TRIGL SERPL-MCNC: 98 MG/DL
TROPONIN I SERPL DL<=0.01 NG/ML-MCNC: <0.006 NG/ML
TSH SERPL DL<=0.005 MIU/L-ACNC: 1.3 UIU/ML
WBC # BLD AUTO: 7.7 K/UL

## 2018-03-07 PROCEDURE — 80053 COMPREHEN METABOLIC PANEL: CPT

## 2018-03-07 PROCEDURE — 80061 LIPID PANEL: CPT

## 2018-03-07 PROCEDURE — 84443 ASSAY THYROID STIM HORMONE: CPT

## 2018-03-07 PROCEDURE — G0378 HOSPITAL OBSERVATION PER HR: HCPCS

## 2018-03-07 PROCEDURE — 25000003 PHARM REV CODE 250: Performed by: EMERGENCY MEDICINE

## 2018-03-07 PROCEDURE — 96374 THER/PROPH/DIAG INJ IV PUSH: CPT

## 2018-03-07 PROCEDURE — 99285 EMERGENCY DEPT VISIT HI MDM: CPT | Mod: 25

## 2018-03-07 PROCEDURE — 93005 ELECTROCARDIOGRAM TRACING: CPT

## 2018-03-07 PROCEDURE — 84484 ASSAY OF TROPONIN QUANT: CPT

## 2018-03-07 PROCEDURE — 85025 COMPLETE CBC W/AUTO DIFF WBC: CPT

## 2018-03-07 PROCEDURE — 12000002 HC ACUTE/MED SURGE SEMI-PRIVATE ROOM

## 2018-03-07 PROCEDURE — 36415 COLL VENOUS BLD VENIPUNCTURE: CPT

## 2018-03-07 PROCEDURE — 85610 PROTHROMBIN TIME: CPT

## 2018-03-07 PROCEDURE — 82962 GLUCOSE BLOOD TEST: CPT

## 2018-03-07 PROCEDURE — 83036 HEMOGLOBIN GLYCOSYLATED A1C: CPT

## 2018-03-07 RX ORDER — HYDRALAZINE HYDROCHLORIDE 25 MG/1
TABLET, FILM COATED ORAL
Status: DISPENSED
Start: 2018-03-07 | End: 2018-03-08

## 2018-03-07 RX ORDER — LABETALOL HYDROCHLORIDE 5 MG/ML
INJECTION, SOLUTION INTRAVENOUS
Status: DISPENSED
Start: 2018-03-07 | End: 2018-03-08

## 2018-03-07 RX ORDER — HYDRALAZINE HYDROCHLORIDE 25 MG/1
25 TABLET, FILM COATED ORAL
Status: COMPLETED | OUTPATIENT
Start: 2018-03-07 | End: 2018-03-07

## 2018-03-07 RX ORDER — LABETALOL HYDROCHLORIDE 5 MG/ML
10 INJECTION, SOLUTION INTRAVENOUS
Status: COMPLETED | OUTPATIENT
Start: 2018-03-07 | End: 2018-03-07

## 2018-03-07 RX ADMIN — LABETALOL HYDROCHLORIDE 10 MG: 5 INJECTION, SOLUTION INTRAVENOUS at 10:03

## 2018-03-07 RX ADMIN — HYDRALAZINE HYDROCHLORIDE 25 MG: 25 TABLET ORAL at 11:03

## 2018-03-08 ENCOUNTER — DOCUMENTATION ONLY (OUTPATIENT)
Dept: FAMILY MEDICINE | Facility: CLINIC | Age: 53
End: 2018-03-08

## 2018-03-08 VITALS
HEIGHT: 62 IN | HEART RATE: 77 BPM | TEMPERATURE: 98 F | OXYGEN SATURATION: 98 % | DIASTOLIC BLOOD PRESSURE: 84 MMHG | WEIGHT: 223.13 LBS | BODY MASS INDEX: 41.06 KG/M2 | SYSTOLIC BLOOD PRESSURE: 176 MMHG | RESPIRATION RATE: 18 BRPM

## 2018-03-08 PROBLEM — D64.9 NORMOCYTIC ANEMIA: Status: ACTIVE | Noted: 2018-03-08

## 2018-03-08 PROBLEM — I63.9 STROKE: Status: RESOLVED | Noted: 2018-03-08 | Resolved: 2018-03-08

## 2018-03-08 PROBLEM — E88.09 HYPOALBUMINEMIA: Status: ACTIVE | Noted: 2018-03-08

## 2018-03-08 PROBLEM — N18.30 CKD (CHRONIC KIDNEY DISEASE) STAGE 3, GFR 30-59 ML/MIN: Status: ACTIVE | Noted: 2018-03-08

## 2018-03-08 PROBLEM — I63.9 STROKE: Status: ACTIVE | Noted: 2018-03-08

## 2018-03-08 PROBLEM — Z86.73 HISTORY OF CVA (CEREBROVASCULAR ACCIDENT): Status: ACTIVE | Noted: 2018-03-07

## 2018-03-08 LAB
ALBUMIN SERPL BCP-MCNC: 2.8 G/DL
ALP SERPL-CCNC: 56 U/L
ALT SERPL W/O P-5'-P-CCNC: 8 U/L
ANION GAP SERPL CALC-SCNC: 7 MMOL/L
AST SERPL-CCNC: 11 U/L
BASOPHILS # BLD AUTO: 0 K/UL
BASOPHILS NFR BLD: 0.5 %
BILIRUB SERPL-MCNC: 0.3 MG/DL
BILIRUB UR QL STRIP: NEGATIVE
BUN SERPL-MCNC: 22 MG/DL
CALCIUM SERPL-MCNC: 9.4 MG/DL
CHLORIDE SERPL-SCNC: 109 MMOL/L
CLARITY UR: CLEAR
CO2 SERPL-SCNC: 26 MMOL/L
COLOR UR: YELLOW
CREAT SERPL-MCNC: 1.4 MG/DL
DIASTOLIC DYSFUNCTION: NO
DIFFERENTIAL METHOD: ABNORMAL
EOSINOPHIL # BLD AUTO: 0.1 K/UL
EOSINOPHIL NFR BLD: 1.1 %
ERYTHROCYTE [DISTWIDTH] IN BLOOD BY AUTOMATED COUNT: 13.7 %
EST. GFR  (AFRICAN AMERICAN): 49 ML/MIN/1.73 M^2
EST. GFR  (NON AFRICAN AMERICAN): 43 ML/MIN/1.73 M^2
ESTIMATED AVG GLUCOSE: 108 MG/DL
ESTIMATED PA SYSTOLIC PRESSURE: 28.6
GLOBAL PERICARDIAL EFFUSION: ABNORMAL
GLUCOSE SERPL-MCNC: 84 MG/DL
GLUCOSE UR QL STRIP: NEGATIVE
HBA1C MFR BLD HPLC: 5.4 %
HCT VFR BLD AUTO: 34 %
HGB BLD-MCNC: 11.2 G/DL
HGB UR QL STRIP: NEGATIVE
KETONES UR QL STRIP: NEGATIVE
LEUKOCYTE ESTERASE UR QL STRIP: NEGATIVE
LYMPHOCYTES # BLD AUTO: 3 K/UL
LYMPHOCYTES NFR BLD: 50.1 %
MAGNESIUM SERPL-MCNC: 1.6 MG/DL
MCH RBC QN AUTO: 29.6 PG
MCHC RBC AUTO-ENTMCNC: 32.9 G/DL
MCV RBC AUTO: 90 FL
MONOCYTES # BLD AUTO: 0.2 K/UL
MONOCYTES NFR BLD: 3.3 %
NEUTROPHILS # BLD AUTO: 2.7 K/UL
NEUTROPHILS NFR BLD: 45 %
NITRITE UR QL STRIP: NEGATIVE
PH UR STRIP: 6 [PH] (ref 5–8)
PHOSPHATE SERPL-MCNC: 3.9 MG/DL
PLATELET # BLD AUTO: 209 K/UL
PMV BLD AUTO: 8.2 FL
POCT GLUCOSE: 111 MG/DL (ref 70–110)
POTASSIUM SERPL-SCNC: 3.7 MMOL/L
PROT SERPL-MCNC: 6.5 G/DL
PROT UR QL STRIP: NEGATIVE
RBC # BLD AUTO: 3.78 M/UL
RETIRED EF AND QEF - SEE NOTES: 65 (ref 55–65)
SODIUM SERPL-SCNC: 142 MMOL/L
SP GR UR STRIP: 1.01 (ref 1–1.03)
TRICUSPID VALVE REGURGITATION: ABNORMAL
TROPONIN I SERPL DL<=0.01 NG/ML-MCNC: <0.006 NG/ML
URN SPEC COLLECT METH UR: NORMAL
UROBILINOGEN UR STRIP-ACNC: NEGATIVE EU/DL
WBC # BLD AUTO: 6 K/UL

## 2018-03-08 PROCEDURE — 25500020 PHARM REV CODE 255: Performed by: HOSPITALIST

## 2018-03-08 PROCEDURE — G8998 SWALLOW D/C STATUS: HCPCS | Mod: CH

## 2018-03-08 PROCEDURE — 25000242 PHARM REV CODE 250 ALT 637 W/ HCPCS: Performed by: NURSE PRACTITIONER

## 2018-03-08 PROCEDURE — G8996 SWALLOW CURRENT STATUS: HCPCS | Mod: CH

## 2018-03-08 PROCEDURE — 94761 N-INVAS EAR/PLS OXIMETRY MLT: CPT

## 2018-03-08 PROCEDURE — G0378 HOSPITAL OBSERVATION PER HR: HCPCS

## 2018-03-08 PROCEDURE — 87086 URINE CULTURE/COLONY COUNT: CPT

## 2018-03-08 PROCEDURE — G8978 MOBILITY CURRENT STATUS: HCPCS | Mod: CI

## 2018-03-08 PROCEDURE — 92610 EVALUATE SWALLOWING FUNCTION: CPT

## 2018-03-08 PROCEDURE — 25000003 PHARM REV CODE 250: Performed by: PSYCHIATRY & NEUROLOGY

## 2018-03-08 PROCEDURE — 97165 OT EVAL LOW COMPLEX 30 MIN: CPT

## 2018-03-08 PROCEDURE — G8989 SELF CARE D/C STATUS: HCPCS | Mod: CH

## 2018-03-08 PROCEDURE — 25000003 PHARM REV CODE 250: Performed by: NURSE PRACTITIONER

## 2018-03-08 PROCEDURE — G8979 MOBILITY GOAL STATUS: HCPCS | Mod: CI

## 2018-03-08 PROCEDURE — 81003 URINALYSIS AUTO W/O SCOPE: CPT

## 2018-03-08 PROCEDURE — 80053 COMPREHEN METABOLIC PANEL: CPT

## 2018-03-08 PROCEDURE — 85025 COMPLETE CBC W/AUTO DIFF WBC: CPT

## 2018-03-08 PROCEDURE — 99222 1ST HOSP IP/OBS MODERATE 55: CPT | Mod: ,,, | Performed by: PSYCHIATRY & NEUROLOGY

## 2018-03-08 PROCEDURE — G8997 SWALLOW GOAL STATUS: HCPCS | Mod: CH

## 2018-03-08 PROCEDURE — 36415 COLL VENOUS BLD VENIPUNCTURE: CPT

## 2018-03-08 PROCEDURE — 99900035 HC TECH TIME PER 15 MIN (STAT)

## 2018-03-08 PROCEDURE — 84484 ASSAY OF TROPONIN QUANT: CPT

## 2018-03-08 PROCEDURE — 83735 ASSAY OF MAGNESIUM: CPT

## 2018-03-08 PROCEDURE — G8988 SELF CARE GOAL STATUS: HCPCS | Mod: CH

## 2018-03-08 PROCEDURE — 84100 ASSAY OF PHOSPHORUS: CPT

## 2018-03-08 PROCEDURE — 97161 PT EVAL LOW COMPLEX 20 MIN: CPT

## 2018-03-08 PROCEDURE — G8987 SELF CARE CURRENT STATUS: HCPCS | Mod: CH

## 2018-03-08 RX ORDER — HYDRALAZINE HYDROCHLORIDE 25 MG/1
50 TABLET, FILM COATED ORAL EVERY 6 HOURS
Status: DISCONTINUED | OUTPATIENT
Start: 2018-03-08 | End: 2018-03-08 | Stop reason: HOSPADM

## 2018-03-08 RX ORDER — IBUPROFEN 200 MG
24 TABLET ORAL
Status: DISCONTINUED | OUTPATIENT
Start: 2018-03-08 | End: 2018-03-08 | Stop reason: HOSPADM

## 2018-03-08 RX ORDER — SUCRALFATE 1 G/1
1 TABLET ORAL
Status: DISCONTINUED | OUTPATIENT
Start: 2018-03-08 | End: 2018-03-08 | Stop reason: HOSPADM

## 2018-03-08 RX ORDER — HYDRALAZINE HYDROCHLORIDE 25 MG/1
25 TABLET, FILM COATED ORAL EVERY 6 HOURS
Status: DISCONTINUED | OUTPATIENT
Start: 2018-03-08 | End: 2018-03-08

## 2018-03-08 RX ORDER — ATORVASTATIN CALCIUM 40 MG/1
80 TABLET, FILM COATED ORAL DAILY
Status: DISCONTINUED | OUTPATIENT
Start: 2018-03-08 | End: 2018-03-08 | Stop reason: HOSPADM

## 2018-03-08 RX ORDER — HYDRALAZINE HYDROCHLORIDE 25 MG/1
50 TABLET, FILM COATED ORAL EVERY 6 HOURS
Status: DISCONTINUED | OUTPATIENT
Start: 2018-03-08 | End: 2018-03-08

## 2018-03-08 RX ORDER — FLUTICASONE PROPIONATE 110 UG/1
1 AEROSOL, METERED RESPIRATORY (INHALATION) 2 TIMES DAILY
Status: DISCONTINUED | OUTPATIENT
Start: 2018-03-08 | End: 2018-03-08 | Stop reason: HOSPADM

## 2018-03-08 RX ORDER — RAMELTEON 8 MG/1
8 TABLET ORAL NIGHTLY PRN
Status: DISCONTINUED | OUTPATIENT
Start: 2018-03-08 | End: 2018-03-08 | Stop reason: HOSPADM

## 2018-03-08 RX ORDER — POTASSIUM CHLORIDE 20 MEQ/15ML
60 SOLUTION ORAL
Status: DISCONTINUED | OUTPATIENT
Start: 2018-03-08 | End: 2018-03-08 | Stop reason: HOSPADM

## 2018-03-08 RX ORDER — LANOLIN ALCOHOL/MO/W.PET/CERES
800 CREAM (GRAM) TOPICAL
Status: DISCONTINUED | OUTPATIENT
Start: 2018-03-08 | End: 2018-03-08 | Stop reason: HOSPADM

## 2018-03-08 RX ORDER — POTASSIUM CHLORIDE 20 MEQ/15ML
40 SOLUTION ORAL
Status: DISCONTINUED | OUTPATIENT
Start: 2018-03-08 | End: 2018-03-08 | Stop reason: HOSPADM

## 2018-03-08 RX ORDER — HYDRALAZINE HYDROCHLORIDE 50 MG/1
50 TABLET, FILM COATED ORAL 4 TIMES DAILY
Qty: 120 TABLET | Refills: 0 | Status: SHIPPED | OUTPATIENT
Start: 2018-03-08 | End: 2018-04-07

## 2018-03-08 RX ORDER — SODIUM CHLORIDE 9 MG/ML
INJECTION, SOLUTION INTRAVENOUS CONTINUOUS
Status: DISCONTINUED | OUTPATIENT
Start: 2018-03-08 | End: 2018-03-08 | Stop reason: HOSPADM

## 2018-03-08 RX ORDER — ONDANSETRON 2 MG/ML
8 INJECTION INTRAMUSCULAR; INTRAVENOUS EVERY 8 HOURS PRN
Status: DISCONTINUED | OUTPATIENT
Start: 2018-03-08 | End: 2018-03-08 | Stop reason: HOSPADM

## 2018-03-08 RX ORDER — FERROUS SULFATE 325(65) MG
325 TABLET, DELAYED RELEASE (ENTERIC COATED) ORAL DAILY
Status: DISCONTINUED | OUTPATIENT
Start: 2018-03-08 | End: 2018-03-08 | Stop reason: HOSPADM

## 2018-03-08 RX ORDER — LABETALOL HYDROCHLORIDE 5 MG/ML
10 INJECTION, SOLUTION INTRAVENOUS EVERY 4 HOURS PRN
Status: DISCONTINUED | OUTPATIENT
Start: 2018-03-08 | End: 2018-03-08 | Stop reason: HOSPADM

## 2018-03-08 RX ORDER — PANTOPRAZOLE SODIUM 40 MG/1
40 TABLET, DELAYED RELEASE ORAL DAILY
Status: DISCONTINUED | OUTPATIENT
Start: 2018-03-08 | End: 2018-03-08 | Stop reason: HOSPADM

## 2018-03-08 RX ORDER — ACETAMINOPHEN 500 MG
1000 TABLET ORAL EVERY 6 HOURS PRN
Status: DISCONTINUED | OUTPATIENT
Start: 2018-03-08 | End: 2018-03-08 | Stop reason: HOSPADM

## 2018-03-08 RX ORDER — TRAZODONE HYDROCHLORIDE 50 MG/1
50 TABLET ORAL NIGHTLY PRN
Status: DISCONTINUED | OUTPATIENT
Start: 2018-03-08 | End: 2018-03-08 | Stop reason: HOSPADM

## 2018-03-08 RX ORDER — ASPIRIN 325 MG
325 TABLET ORAL DAILY
Status: DISCONTINUED | OUTPATIENT
Start: 2018-03-08 | End: 2018-03-08 | Stop reason: HOSPADM

## 2018-03-08 RX ORDER — IPRATROPIUM BROMIDE AND ALBUTEROL SULFATE 2.5; .5 MG/3ML; MG/3ML
3 SOLUTION RESPIRATORY (INHALATION) EVERY 6 HOURS PRN
Status: DISCONTINUED | OUTPATIENT
Start: 2018-03-08 | End: 2018-03-08 | Stop reason: HOSPADM

## 2018-03-08 RX ORDER — ACETAMINOPHEN 500 MG
1000 TABLET ORAL EVERY 6 HOURS PRN
Refills: 0 | COMMUNITY
Start: 2018-03-08 | End: 2018-07-13

## 2018-03-08 RX ORDER — AMLODIPINE BESYLATE 5 MG/1
10 TABLET ORAL DAILY
Status: DISCONTINUED | OUTPATIENT
Start: 2018-03-08 | End: 2018-03-08 | Stop reason: HOSPADM

## 2018-03-08 RX ORDER — IBUPROFEN 200 MG
16 TABLET ORAL
Status: DISCONTINUED | OUTPATIENT
Start: 2018-03-08 | End: 2018-03-08 | Stop reason: HOSPADM

## 2018-03-08 RX ORDER — AMOXICILLIN 250 MG
1 CAPSULE ORAL 2 TIMES DAILY
Status: DISCONTINUED | OUTPATIENT
Start: 2018-03-08 | End: 2018-03-08 | Stop reason: HOSPADM

## 2018-03-08 RX ORDER — ATORVASTATIN CALCIUM 80 MG/1
80 TABLET, FILM COATED ORAL DAILY
Qty: 30 TABLET | Refills: 0 | Status: SHIPPED | OUTPATIENT
Start: 2018-03-09 | End: 2019-05-31 | Stop reason: ALTCHOICE

## 2018-03-08 RX ORDER — MONTELUKAST SODIUM 10 MG/1
10 TABLET ORAL NIGHTLY
Status: DISCONTINUED | OUTPATIENT
Start: 2018-03-08 | End: 2018-03-08 | Stop reason: HOSPADM

## 2018-03-08 RX ORDER — GLUCAGON 1 MG
1 KIT INJECTION
Status: DISCONTINUED | OUTPATIENT
Start: 2018-03-08 | End: 2018-03-08 | Stop reason: HOSPADM

## 2018-03-08 RX ORDER — SODIUM CHLORIDE 0.9 % (FLUSH) 0.9 %
5 SYRINGE (ML) INJECTION
Status: DISCONTINUED | OUTPATIENT
Start: 2018-03-08 | End: 2018-03-08 | Stop reason: HOSPADM

## 2018-03-08 RX ORDER — ASPIRIN 325 MG
325 TABLET, DELAYED RELEASE (ENTERIC COATED) ORAL DAILY
Refills: 0 | Status: ON HOLD | COMMUNITY
Start: 2018-03-08 | End: 2019-10-06 | Stop reason: HOSPADM

## 2018-03-08 RX ORDER — FLUTICASONE PROPIONATE 50 MCG
2 SPRAY, SUSPENSION (ML) NASAL DAILY
Status: DISCONTINUED | OUTPATIENT
Start: 2018-03-08 | End: 2018-03-08 | Stop reason: HOSPADM

## 2018-03-08 RX ADMIN — FLUTICASONE PROPIONATE 100 MCG: 50 SPRAY, METERED NASAL at 09:03

## 2018-03-08 RX ADMIN — THERA TABS 1 TABLET: TAB at 09:03

## 2018-03-08 RX ADMIN — HYDRALAZINE HYDROCHLORIDE 25 MG: 25 TABLET, FILM COATED ORAL at 11:03

## 2018-03-08 RX ADMIN — SODIUM CHLORIDE: 0.9 INJECTION, SOLUTION INTRAVENOUS at 10:03

## 2018-03-08 RX ADMIN — STANDARDIZED SENNA CONCENTRATE AND DOCUSATE SODIUM 1 TABLET: 8.6; 5 TABLET, FILM COATED ORAL at 09:03

## 2018-03-08 RX ADMIN — SUCRALFATE 1 G: 1 TABLET ORAL at 10:03

## 2018-03-08 RX ADMIN — ASPIRIN 325 MG ORAL TABLET 325 MG: 325 PILL ORAL at 01:03

## 2018-03-08 RX ADMIN — SUCRALFATE 1 G: 1 TABLET ORAL at 05:03

## 2018-03-08 RX ADMIN — PANTOPRAZOLE SODIUM 40 MG: 40 TABLET, DELAYED RELEASE ORAL at 09:03

## 2018-03-08 RX ADMIN — MONTELUKAST SODIUM 10 MG: 10 TABLET, FILM COATED ORAL at 01:03

## 2018-03-08 RX ADMIN — ATORVASTATIN CALCIUM 80 MG: 40 TABLET, FILM COATED ORAL at 10:03

## 2018-03-08 RX ADMIN — SUCRALFATE 1 G: 1 TABLET ORAL at 04:03

## 2018-03-08 RX ADMIN — STANDARDIZED SENNA CONCENTRATE AND DOCUSATE SODIUM 1 TABLET: 8.6; 5 TABLET, FILM COATED ORAL at 01:03

## 2018-03-08 RX ADMIN — HYDRALAZINE HYDROCHLORIDE 25 MG: 25 TABLET, FILM COATED ORAL at 05:03

## 2018-03-08 RX ADMIN — HYDRALAZINE HYDROCHLORIDE 25 MG: 25 TABLET, FILM COATED ORAL at 04:03

## 2018-03-08 RX ADMIN — FERROUS SULFATE TAB EC 325 MG (65 MG FE EQUIVALENT) 325 MG: 325 (65 FE) TABLET DELAYED RESPONSE at 09:03

## 2018-03-08 RX ADMIN — IOHEXOL 100 ML: 350 INJECTION, SOLUTION INTRAVENOUS at 02:03

## 2018-03-08 NOTE — PLAN OF CARE
Problem: Occupational Therapy Goal  Goal: Occupational Therapy Goal  Outcome: Outcome(s) achieved Date Met: 03/08/18  Pt is modified independent with ADLs. No further acute OT needs. Discharging from skilled OT services.    NILAY Conde  3/8/2018

## 2018-03-08 NOTE — ASSESSMENT & PLAN NOTE
Cr stable.  Follow renal function panel.  Avoid non-essential nephrotoxins.  Dose medications for Estimated Creatinine Clearance: 44.7 mL/min (A) (based on SCr of 1.6 mg/dL (H)).

## 2018-03-08 NOTE — PLAN OF CARE
Cm completed the assessment with pt, gume Clifford at bedside.  Pt arrived from home, she is self care and lives alone.  PCP is Dr. Gonzales.  Insurance verified as PHN.  Pt has no DME and denies diabetes, dialysis and coumadin.  Disposition:  Pt will discharge to home with family.  No needs verbalized at this time.       03/08/18 1225   Discharge Assessment   Assessment Type Discharge Planning Assessment   Confirmed/corrected address and phone number on facesheet? Yes   Assessment information obtained from? Patient   Prior to hospitilization cognitive status: Alert/Oriented   Prior to hospitalization functional status: Independent   Current cognitive status: Alert/Oriented   Current Functional Status: Independent   Facility Arrived From: home   Lives With alone   Is patient able to care for self after discharge? Yes   Who are your caregiver(s) and their phone number(s)? wellington Tellez - [phone]   Patient's perception of discharge disposition home or selfcare   Readmission Within The Last 30 Days no previous admission in last 30 days   Patient currently being followed by outpatient case management? Yes   If yes, name of outpatient case management following: insurance company assigned oupatient case management   Patient currently receives any other outside agency services? No   Equipment Currently Used at Home none   Do you have any problems affording any of your prescribed medications? No   Is the patient taking medications as prescribed? yes  [facility] [address]   Does the patient have transportation home? Yes   Transportation Available family or friend will provide   Dialysis Name and Scheduled days NA   Does the patient receive services at the Coumadin Clinic? No   Discharge Plan A Home   Discharge Plan B Home   Patient/Family In Agreement With Plan yes

## 2018-03-08 NOTE — ASSESSMENT & PLAN NOTE
Concern for CVA with LEFT upper extremity heaviness/weakness and worsening speech deficits.  CT negative for acute findings.  Check MRI/MRA brain/neck.  Consult neurology.  Add ASA; needs stroke prevention rx on d/c (ASA, statin).  Allow for permissive HTN to ensure cerebral perfusion (use IV labetalol for > 220/110).  Maintain fall precautions.  Consult PT/OT/SLP.

## 2018-03-08 NOTE — PT/OT/SLP EVAL
"Speech Language Pathology Evaluation  Bedside Swallow    Patient Name:  Faustina Rae   MRN:  [id number]  Admitting Diagnosis: CVA (cerebral vascular accident)    Recommendations:                 General Recommendations:  Speech language evaluation  Diet recommendations:  Regular, Thin, no straws per pt 2/2 s/p gastric sleeve  Aspiration Precautions: Standard aspiration precautions   General Precautions: Standard, fall  Communication strategies:  none and provide increased time to answer    History:     Past Medical History:   Diagnosis Date    Arthritis     Asthma     CHF (congestive heart failure)     COPD (chronic obstructive pulmonary disease)     Hypertension     Leaky heart valve     Rheumatoid arthritis(714.0)     Stroke        Past Surgical History:   Procedure Laterality Date    CHOLECYSTECTOMY      HYSTERECTOMY      SLEEVE GASTROPLASTY  02/21/2017       Social History: Patient lives alone.    Prior Intubation HX:  None this admit.    Modified Barium Swallow: No recent MBSS. Underwent MBSS following CVA 10/2013. +dysphagia, however, dysphagia has resolved per pt.     Chest X-Rays: "No acute abnormality."    Prior diet:Regular textures and thin liquids.     Occupation/hobbies/homemaking: lives alone. Independent with ALS, finances and medication management. She does not dive. Her daughter brings her to run errands.     Subjective     AAOx4. Pleasant and cooperative. Pt denies dysphagia. Reports language at baseline. +expressive aphasia noted; word finding problems at conversational level.       Pain/Comfort:  · Pain Rating 1: 0/10    Objective:     Pt seen for clinical swallow evaluation.     Oral Musculature Evaluation  · Oral Musculature: WFL  · Dentition: present and adequate  · Mucosal Quality: adequate  · Mandibular Strength and Mobility: WFL  · Oral Labial Strength and Mobility: WFL (R eye squints during labial pursing)  · Lingual Strength and Mobility: WFL  · Buccal Strength and Mobility: " WFL  · Volitional Cough: effective  · Volitional Swallow: able to palpate laryngeal rise  · Voice Prior to PO Intake: clear    Bedside Swallow Eval:   Consistencies Assessed:  · Thin liquids via tsp, cup  · Puree applesauce  · Mixed consistencies diced peaches  · Solids cookie     Oral Phase:   · WFL    Pharyngeal Phase:   · no overt clinical signs/symptoms of aspiration  · no overt clinical signs/symptoms of pharyngeal dysphagia    Compensatory Strategies  · None    Assessment:     Faustina Rae is a 53 y.o. female with an SLP diagnosis of oropharyngeal swallow WFL and Aphasia.  Clinical swallowing evaluation completed. All PO trials tolerated with no overt s/s swallow dysfunction. REC regular diet as textures and thin liquids. Pt and sister report speech/language/cognition at baseline. Pt with residual expressive aphasia since CVA 10/2013.         Plan:     · Patient to be seen:      · Plan of Care expires:     · Plan of Care reviewed with:  patient, sibling   · SLP Follow-Up:  Yes        Time Tracking:     SLP Treatment Date:   03/08/18  Speech Start Time:  1129  Speech Stop Time:  1143     Speech Total Time (min):  14 min    Billable Minutes: Eval Swallow and Oral Function 14 and Total Time 14    Raisa Mahan CCC-SLP  03/08/2018

## 2018-03-08 NOTE — PLAN OF CARE
Problem: SLP Goal  Goal: SLP Goal    Clinical swallowing evaluation completed. All PO trials tolerated with no overt s/s swallow dysfunction. REC regular textures with thin liquids. Pt reports speech/lnguage and cognition at baseline. Will f/u for full SLP evaluation.     Raisa Mahan MS, CCC/SLP

## 2018-03-08 NOTE — PT/OT/SLP EVAL
"Occupational Therapy   Evaluation and Discharge Note    Name: Faustina Rae  MRN: [id number]  Admitting Diagnosis:  CVA (cerebral vascular accident)      Recommendations:     Discharge Recommendations: home, home health PT  Discharge Equipment Recommendations:  none  Barriers to discharge:  None    History:     Occupational Profile:  Living Environment: Pt lives alone in a single story apartment. Pt has 5 children (3 live thirty min away &  2 live out of state).  Previous level of function: Pt was independent with ADLs and functional mobility  Roles and Routines: Pt stated she walked everyday prior to admission.  Equipment Owned:  none  Assistance upon Discharge: Pt receive assistance from family if needed.    Past Medical History:   Diagnosis Date    Arthritis     Asthma     CHF (congestive heart failure)     COPD (chronic obstructive pulmonary disease)     Hypertension     Leaky heart valve     Rheumatoid arthritis(714.0)     Stroke        Past Surgical History:   Procedure Laterality Date    CHOLECYSTECTOMY      HYSTERECTOMY      SLEEVE GASTROPLASTY  02/21/2017       Subjective   Pt was found awake and at HOB upon arrival  Pt agreeable to OT evaluation.    Chief Complaint: LUE weakness  Patient/Family stated goals: "I feel fine now."  Communicated with: nurse Torres prior to session.  Pain/Comfort:  · Pain Rating 1: 0/10  · Pain Rating Post-Intervention 1: 0/10    Patients cultural, spiritual, Moravian conflicts given the current situation:      Objective:     Patient found with:  (no connections)    General Precautions: Standard, fall, aphasia   Orthopedic Precautions:N/A   Braces: N/A     Occupational Performance:    Bed Mobility:    · Patient completed Scooting/Bridging with independence  · Patient completed Supine to Sit with independence  · Patient completed Sit to Supine with independence    Functional Mobility/Transfers:  · Patient completed Sit <> Stand Transfer with modified independence  " "with  no assistive device   · Patient completed Toilet Transfer Stand Pivot technique with modified independence with  no AD  · Functional Mobility: Pt ambulated to bathroom with mod I and no AD. No LOB and c/o fatigue    Activities of Daily Living:  · LB Dressing: modified independence to don/doff socks    Cognitive/Visual Perceptual:  Cognitive/Psychosocial Skills:     -       Oriented to: x4   -       Follows Commands/attention:Follows multistep  commands  -       Communication: Pt presented with minor speech disturbance (some stuttering).  -       Safety awareness/insight to disability: intact   -       Mood/Affect/Coping skills/emotional control: Appropriate to situation  Visual/Perceptual:      -Intact    Physical Exam:  Postural examination/scapula alignment:    -       Rounded shoulders  -       Forward head  Upper Extremity Range of Motion:     -       Right Upper Extremity: WNL  -       Left Upper Extremity: WNL  Upper Extremity Strength:    -       Right Upper Extremity: 4/5  -       Left Upper Extremity: 3+/5   Strength:    -       Right Upper Extremity: WNL  -       Left Upper Extremity: WNL  Fine Motor Coordination:    -       Intact  Gross motor coordination:   WFL    Patient left HOB elevated with call button in reach    AMPA 6 Click:  AMPA Total Score: 24    Education:    Assessment:     Faustina Rae is a 53 y.o. female with a medical diagnosis of CVA (cerebral vascular accident). At this time, patient is functioning at their prior level of function and does not require further acute OT services.     Clinical Decision Makin.  OT Low:  "Pt evaluation falls under low complexity for evaluation coding due to performance deficits noted in 1-3 areas as stated above and 0 co-morbities affecting current functional status. Data obtained from problem focused assessments. No modifications or assistance was required for completion of evaluation. Only brief occupational profile and history " "review completed."     Plan:     During this hospitalization, patient does not require further acute OT services.  Please re-consult if situation changes.    · Plan of Care Reviewed with: patient    This Plan of care has been discussed with the patient who was involved in its development and understands and is in agreement with the identified goals and treatment plan    GOALS:    Occupational Therapy Goals     Not on file          Multidisciplinary Problems (Resolved)        Problem: Occupational Therapy Goal    Goal Priority Disciplines Outcome Interventions   Occupational Therapy Goal   (Resolved)     OT, PT/OT Outcome(s) achieved                    Time Tracking:     OT Date of Treatment: 03/08/18  OT Start Time: 0900  OT Stop Time: 0909  OT Total Time (min): 9 min    Billable Minutes:Evaluation 9    Fidel Lamar OT  3/8/2018    "

## 2018-03-08 NOTE — ASSESSMENT & PLAN NOTE
No evidence of acute decompensation.  Continue ACEI as clinically appropriate (allowing for permissive HTN acutely-- MRI pending).  Monitor on telemetry.

## 2018-03-08 NOTE — CONSULTS
"[facility]  Neurology  Consult    Patient Name: Faustina Rae  MRN: [id number]  Admission Date: 3/7/2018  Hospital Length of Stay: 1 days  Code Status: Full Code   Attending Provider: Fred Hernandez MD  Primary Care Physician: Jaxson Gonzales MD   Principal Problem:CVA (cerebral vascular accident)      Reason for consult:  Weakness     Informant:  Patient, chart       HPI:     Faustina Rae is a 53 y.o. year old right handed female with history of large right MCA stroke in 2013, cause undetermined, asthma, CHF< COPD, HTN, RA who presented to the ED with left sided weakness x 2 days admitted for stoke workup.    She initially had her stroke in 2013 which affected her speech (aphasia, interestingly), left arm and left leg which all improved dramatically with rehab. She reports at baseline she has some language difficulty and she staggers on her left leg. For the past 2 days she has had varying levels of left arm "heaviness" that she initially attributed to sagging skin from weight loss (she recently had gastric sleeve surgery). She reports she used to be on aspirin, and possibly plavix and coumadin but her previous primary took her off of everything. In the ED she was profoundly hypertensive at 197/97 with HR 80. She reports hypertension is a long standing problem for her. She did not agree to brain MRI in favor of open MRI at another location. Her HCT only showed the encephalomalacia of her old stroke involving a large right frontoparietal area, ( possibly a right cerebellar stroke which I think may be artifactual.)    No headache, blurred vision, chest pain SOB or illness       Review of systems:  Comprehensive review of systems is negative except as mentioned in the HPI.     Allergies/Meds:   Allergies:  Patient has no known allergies.    Prior to Admission medications    Medication Sig Start Date End Date Taking? Authorizing Provider   albuterol (PROVENTIL) 2.5 mg /3 mL (0.083 %) nebulizer " solution Take 2.5 mg by nebulization every 24 hours as needed.     Yes Historical Provider, MD   amlodipine (NORVASC) 10 MG tablet Take 1 tablet (10 mg total) by mouth once daily. 8/8/17 8/8/18 Yes NANDO Prescott   b complex vitamins tablet Take 1 tablet by mouth once daily.   Yes Historical Provider, MD   calcium citrate-vitamin D3 315-200 mg (CITRACAL+D) 315-200 mg-unit per tablet Take 1 tablet by mouth once daily.    Yes Historical Provider, MD   cyanocobalamin (VITAMIN B-12) 1000 MCG tablet Take 1,000 mcg by mouth once daily.    Yes Historical Provider, MD   ferrous sulfate 325 (65 FE) MG EC tablet Take 325 mg by mouth once daily.    Yes Historical Provider, MD   FLOVENT  mcg/actuation inhaler TAKE 1 PUFF BY MOUTH TWICE DAILY 11/22/16  Yes Jaxson Gonzales MD   fluticasone (FLONASE) 50 mcg/actuation nasal spray 2 sprays by Each Nare route once daily. 7/25/17  Yes ANNDO Prescott   hydrALAZINE (APRESOLINE) 50 MG tablet Take 1 tablet (50 mg total) by mouth 3 (three) times daily.  Patient taking differently: Take 50 mg by mouth every 6 (six) hours.  2/19/18 2/19/19 Yes NANDO Prescott   lisinopril-hydrochlorothiazide (PRINZIDE,ZESTORETIC) 20-12.5 mg per tablet Take 2 tablets by mouth once daily. 1/18/18 1/18/19 Yes NANDO Prescott   montelukast (SINGULAIR) 10 mg tablet Take 1 tablet (10 mg total) by mouth every evening. 1/18/18  Yes NANDO Prescott   multivitamin (THERAGRAN) per tablet Take 1 tablet by mouth once daily.   Yes Historical Provider, MD   ondansetron (ZOFRAN) 4 MG tablet Take 1 tablet (4 mg total) by mouth every 8 (eight) hours as needed for Nausea. 11/13/17  Yes Jaxson Gonzales MD   pantoprazole (PROTONIX) 40 MG tablet Take 1 tablet (40 mg total) by mouth 2 (two) times daily.  Patient taking differently: Take 40 mg by mouth once daily.  1/18/18 3/7/19 Yes NANDO Prescott   sucralfate (CARAFATE) 1 gram tablet Take 1 tablet (1 g total) by mouth 4 (four) times daily  before meals and nightly. 1/18/18  Yes NANDO Prescott   traZODone (DESYREL) 50 MG tablet Take 1 tablet (50 mg total) by mouth nightly as needed for Insomnia. 2/19/18 2/19/19 Yes NANDO Prescott   furosemide (LASIX) 20 MG tablet Take 1 tablet (20 mg total) by mouth once daily. 8/15/13 12/11/13  Rian Burgos MD       Current Medications:    Current Facility-Administered Medications   Medication Dose Route Frequency Provider Last Rate Last Dose    0.9%  NaCl infusion   Intravenous Continuous CATALINA Salazar        acetaminophen tablet 1,000 mg  1,000 mg Oral Q6H PRN Aracelis Benjamin, NP        albuterol-ipratropium 2.5mg-0.5mg/3mL nebulizer solution 3 mL  3 mL Nebulization Q6H PRN Aracelis Roseit, NP        aspirin tablet 325 mg  325 mg Oral Daily Aracelis Benjamin, NP   325 mg at 03/08/18 0124    atorvastatin tablet 80 mg  80 mg Oral Daily uYki Toribio MD        dextrose 50% injection 12.5 g  12.5 g Intravenous PRN Aracelis M. Suit, NP        dextrose 50% injection 25 g  25 g Intravenous PRN Aracelis M. Suit, NP        ferrous sulfate EC tablet 325 mg  325 mg Oral Daily Aracelis MNicky Suit, NP   325 mg at 03/08/18 0939    fluticasone 110 mcg/actuation inhaler 1 puff  1 puff Inhalation BID CATALINA Salazar        fluticasone 50 mcg/actuation nasal spray 100 mcg  2 spray Each Nare Daily Aracelis Benjamin, NP   100 mcg at 03/08/18 0939    glucagon (human recombinant) injection 1 mg  1 mg Intramuscular PRN Aracelis ALTAMIRANO. Suit, NP        glucose chewable tablet 16 g  16 g Oral PRN Aracelis M. Suit, NP        glucose chewable tablet 24 g  24 g Oral PRN Aracelis M. Suit, NP        hydrALAZINE tablet 25 mg  25 mg Oral Q6H CATALINA Salazar        labetalol injection 10 mg  10 mg Intravenous Q4H PRN Aracelis NO Suit, NP        magnesium oxide tablet 800 mg  800 mg Oral PRN Aracelis Benjamin, NP        magnesium oxide tablet 800 mg  800 mg Oral PRN Aracelis Benjamin, NP        montelukast tablet 10 mg   10 mg Oral QHS Aracelis Benjamin, NP   10 mg at 03/08/18 0116    multivitamin tablet 1 tablet  1 tablet Oral Daily Aracelis Benjamin, NP   1 tablet at 03/08/18 0900    ondansetron injection 8 mg  8 mg Intravenous Q8H PRN Aracelis Benjamin, KYREE        pantoprazole EC tablet 40 mg  40 mg Oral Daily Aracelis Benjamin, NP   40 mg at 03/08/18 0937    potassium chloride 10% solution 40 mEq  40 mEq Oral PRN Aracelis Benjamin, NP        potassium chloride 10% solution 60 mEq  60 mEq Oral PRN Aracelis Benjamin, NP        ramelteon tablet 8 mg  8 mg Oral Nightly PRN Aracelis Benjamin, NP        senna-docusate 8.6-50 mg per tablet 1 tablet  1 tablet Oral BID Aracelis Benjamin, NP   1 tablet at 03/08/18 0939    sodium chloride 0.9% flush 5 mL  5 mL Intravenous PRN Aracelis Benjamin, KYREE        sucralfate tablet 1 g  1 g Oral QID (AC & HS) Aracelis Benjamin, NP   1 g at 03/08/18 0556    traZODone tablet 50 mg  50 mg Oral Nightly PRN CATALINA Salazar           Past Medical/Surgical/Family History/Social:   Medical:   Past Medical History:   Diagnosis Date    Arthritis     Asthma     CHF (congestive heart failure)     COPD (chronic obstructive pulmonary disease)     Hypertension     Leaky heart valve     Rheumatoid arthritis(714.0)     Stroke       Surgeries:   Past Surgical History:   Procedure Laterality Date    CHOLECYSTECTOMY      HYSTERECTOMY      SLEEVE GASTROPLASTY  02/21/2017      Family:   Family History   Problem Relation Age of Onset    Arthritis Mother     Cancer Mother     Diabetes Mother     Hyperlipidemia Mother     Hypertension Mother     Stroke Mother     Heart disease Father     Hypertension Father     Asthma Son     Hypertension Sister     Hypertension Sister     Kidney disease Neg Hx      Social:  reports that she has never smoked. She has never used smokeless tobacco. She reports that she does not drink alcohol or use drugs.    Physical Exam:   Vital Signs:   Vitals:    03/08/18 0427   BP:  (!) 165/76   Pulse: 66   Resp: 18   Temp: 97.7 °F (36.5 °C)        PHYSICAL EXAM:  General: Well developed, well nourished.  No acute distress.  HEENT: Atraumatic, normocephalic.  Neck: Trachea midline  Cardiovascular: Vitals reviewed. RRR, no murmurs appreciated. Normal peripheral perfusion.   Pulmonary: No increased work of breathing. Chest sounds clear.   Abdomen/GI: Soft, non tender. No guarding  Musculoskeletal: No obvious joint deformities.    NEUROLOGICAL EXAM:  Mental status: Awake, alert, and oriented to person, place, and time. Recent and remote memory appear to be intact.   Speech/Language: No dysarthria. Naming intact, repetition of simple phrase intact but significant trouble with complex phrase   Cranial nerves: Visual fields full. Pupils equal round and reactive to light, extraocular movements intact, facial strength intact bilaterally, facial sensation V1-V3 intact bilaterally, palate symmetric, tongue midline, hearing grossly intact bilaterally.  Motor: + mild left pronator drift. + mild left orbit. 5 out of 5 strength throughout the upper and lower extremities bilaterally. Normal bulk and tone. No tremor. No asterixis.   Sensation: Intact to temp bilaterally.  Coordination: Finger-nose-finger testing normal bilaterally.  Gait: deferred     Data:     I have personally reviewed the referring provider's notes, labs, & imaging made available to me today.     LABORATORY STUDIES:  Recent Results (from the past 24 hour(s))   POCT glucose    Collection Time: 03/07/18  7:39 PM   Result Value Ref Range    POCT Glucose 70 70 - 110 mg/dL   CBC W/ AUTO DIFFERENTIAL    Collection Time: 03/07/18  8:13 PM   Result Value Ref Range    WBC 7.70 3.90 - 12.70 K/uL    RBC 3.96 (L) 4.00 - 5.40 M/uL    Hemoglobin 11.6 (L) 12.0 - 16.0 g/dL    Hematocrit 35.6 (L) 37.0 - 48.5 %    MCV 90 82 - 98 fL    MCH 29.3 27.0 - 31.0 pg    MCHC 32.7 32.0 - 36.0 g/dL    RDW 14.0 11.5 - 14.5 %    Platelets 239 150 - 350 K/uL    MPV 8.1  (L) 9.2 - 12.9 fL    Gran # (ANC) 4.3 1.8 - 7.7 K/uL    Lymph # 2.6 1.0 - 4.8 K/uL    Mono # 0.5 0.3 - 1.0 K/uL    Eos # 0.1 0.0 - 0.5 K/uL    Baso # 0.10 0.00 - 0.20 K/uL    Gran% 56.3 38.0 - 73.0 %    Lymph% 34.3 18.0 - 48.0 %    Mono% 7.0 4.0 - 15.0 %    Eosinophil% 1.6 0.0 - 8.0 %    Basophil% 0.8 0.0 - 1.9 %    Differential Method Automated    Comprehensive metabolic panel    Collection Time: 03/07/18  8:13 PM   Result Value Ref Range    Sodium 144 136 - 145 mmol/L    Potassium 4.6 3.5 - 5.1 mmol/L    Chloride 109 95 - 110 mmol/L    CO2 28 23 - 29 mmol/L    Glucose 85 70 - 110 mg/dL    BUN, Bld 24 (H) 6 - 20 mg/dL    Creatinine 1.6 (H) 0.5 - 1.4 mg/dL    Calcium 9.7 8.7 - 10.5 mg/dL    Total Protein 7.2 6.0 - 8.4 g/dL    Albumin 3.1 (L) 3.5 - 5.2 g/dL    Total Bilirubin 0.2 0.1 - 1.0 mg/dL    Alkaline Phosphatase 63 55 - 135 U/L    AST 12 10 - 40 U/L    ALT 8 (L) 10 - 44 U/L    Anion Gap 7 (L) 8 - 16 mmol/L    eGFR if African American 42 (A) >60 mL/min/1.73 m^2    eGFR if non African American 36 (A) >60 mL/min/1.73 m^2   Protime-INR    Collection Time: 03/07/18  8:13 PM   Result Value Ref Range    Prothrombin Time 9.6 9.0 - 12.5 sec    INR 1.0 0.8 - 1.2   TSH    Collection Time: 03/07/18  8:13 PM   Result Value Ref Range    TSH 1.296 0.400 - 4.000 uIU/mL   LDL - Lipid Panel    Collection Time: 03/07/18  8:13 PM   Result Value Ref Range    Cholesterol 185 120 - 199 mg/dL    Triglycerides 98 30 - 150 mg/dL    HDL 46 40 - 75 mg/dL    LDL Cholesterol 119.4 63.0 - 159.0 mg/dL    HDL/Chol Ratio 24.9 20.0 - 50.0 %    Total Cholesterol/HDL Ratio 4.0 2.0 - 5.0    Non-HDL Cholesterol 139 mg/dL   Troponin I    Collection Time: 03/07/18  8:13 PM   Result Value Ref Range    Troponin I <0.006 0.000 - 0.026 ng/mL   Comprehensive Metabolic Panel (CMP)    Collection Time: 03/08/18  5:14 AM   Result Value Ref Range    Sodium 142 136 - 145 mmol/L    Potassium 3.7 3.5 - 5.1 mmol/L    Chloride 109 95 - 110 mmol/L    CO2 26 23 - 29  mmol/L    Glucose 84 70 - 110 mg/dL    BUN, Bld 22 (H) 6 - 20 mg/dL    Creatinine 1.4 0.5 - 1.4 mg/dL    Calcium 9.4 8.7 - 10.5 mg/dL    Total Protein 6.5 6.0 - 8.4 g/dL    Albumin 2.8 (L) 3.5 - 5.2 g/dL    Total Bilirubin 0.3 0.1 - 1.0 mg/dL    Alkaline Phosphatase 56 55 - 135 U/L    AST 11 10 - 40 U/L    ALT 8 (L) 10 - 44 U/L    Anion Gap 7 (L) 8 - 16 mmol/L    eGFR if African American 49 (A) >60 mL/min/1.73 m^2    eGFR if non African American 43 (A) >60 mL/min/1.73 m^2   Magnesium    Collection Time: 03/08/18  5:14 AM   Result Value Ref Range    Magnesium 1.6 1.6 - 2.6 mg/dL   Phosphorus    Collection Time: 03/08/18  5:14 AM   Result Value Ref Range    Phosphorus 3.9 2.7 - 4.5 mg/dL   CBC with Automated Differential    Collection Time: 03/08/18  5:14 AM   Result Value Ref Range    WBC 6.00 3.90 - 12.70 K/uL    RBC 3.78 (L) 4.00 - 5.40 M/uL    Hemoglobin 11.2 (L) 12.0 - 16.0 g/dL    Hematocrit 34.0 (L) 37.0 - 48.5 %    MCV 90 82 - 98 fL    MCH 29.6 27.0 - 31.0 pg    MCHC 32.9 32.0 - 36.0 g/dL    RDW 13.7 11.5 - 14.5 %    Platelets 209 150 - 350 K/uL    MPV 8.2 (L) 9.2 - 12.9 fL    Gran # (ANC) 2.7 1.8 - 7.7 K/uL    Lymph # 3.0 1.0 - 4.8 K/uL    Mono # 0.2 (L) 0.3 - 1.0 K/uL    Eos # 0.1 0.0 - 0.5 K/uL    Baso # 0.00 0.00 - 0.20 K/uL    Gran% 45.0 38.0 - 73.0 %    Lymph% 50.1 (H) 18.0 - 48.0 %    Mono% 3.3 (L) 4.0 - 15.0 %    Eosinophil% 1.1 0.0 - 8.0 %    Basophil% 0.5 0.0 - 1.9 %    Differential Method Automated    Troponin I    Collection Time: 03/08/18  5:14 AM   Result Value Ref Range    Troponin I <0.006 0.000 - 0.026 ng/mL   Urinalysis    Collection Time: 03/08/18  7:34 AM   Result Value Ref Range    Specimen UA Urine, Clean Catch     Color, UA Yellow Yellow, Straw, Zulay    Appearance, UA Clear Clear    pH, UA 6.0 5.0 - 8.0    Specific Gravity, UA 1.010 1.005 - 1.030    Protein, UA Negative Negative    Glucose, UA Negative Negative    Ketones, UA Negative Negative    Bilirubin (UA) Negative Negative     Occult Blood UA Negative Negative    Nitrite, UA Negative Negative    Urobilinogen, UA Negative <2.0 EU/dL    Leukocytes, UA Negative Negative       RADIOLOGY STUDIES:  I have personally reviewed the pertinent images performed.   Imaging Results          X-Ray Chest AP Portable (Final result)  Result time 03/08/18 08:39:20    Final result by Scotty Verma Jr., MD (03/08/18 08:39:20)                 Impression:      No acute abnormality.      Electronically signed by: Scotty Verma MD  Date:    03/08/2018  Time:    08:39             Narrative:    EXAMINATION:  XR CHEST AP PORTABLE    CLINICAL HISTORY:  Stroke;    TECHNIQUE:  Single frontal view of the chest was performed.    COMPARISON:  Chest x-ray of February 9, 2017    FINDINGS:  The lungs are clear, with normal appearance of pulmonary vasculature and no pleural effusion or pneumothorax.    The cardiac silhouette is normal in size. The hilar and mediastinal contours are unremarkable.    Bones are intact.                               CT Head Without Contrast (Final result)  Result time 03/08/18 08:16:27    Final result by Jeff Joyner MD (03/08/18 08:16:27)                 Impression:      1. There is no acute abnormality.  There is no hemorrhage, mass, obvious acute infarction.  It should be noted that MRI is more sensitive in the detection of subtle or acute nonhemorrhagic ischemic disease.  2. There is encephalomalacia in the right temporal frontal parietal lobe from remote infarction.  There is also encephalomalacia in the lateral right cerebellum from remote infarction.  3. There is mild to moderate nonspecific white matter change.  These findings likely reflect sequela of chronic microvascular ischemic disease.  Note: Preliminary results were provided by Dr. Clark ([facility]).  There is no significant discrepancy.      Electronically signed by: Jeff Joyner MD  Date:    03/08/2018  Time:    08:16             Narrative:    EXAMINATION:  CT  HEAD WITHOUT CONTRAST    CLINICAL HISTORY:  Facial muscle weakness/paralysis;    TECHNIQUE:  Routine unenhanced axial images were obtained through the head.  Sagittal and coronal reformatted images were created.  The study is reviewed in bone and soft tissue windows.    COMPARISON:  Head CT dated 08/17/2016, head CT dated 08/11/2013, brain MRI also dated 08/11/2013    FINDINGS:  Intracranial contents: There is no acute intracranial abnormality.  There is encephalomalacia in the right temporal, frontal parietal lobes from remote infarction.  There is also encephalomalacia in the lateral right cerebellum from remote infarction.  There is no hemorrhage or mass.  There is mild ex vacuo dilatation of the right lateral ventricle.  There is no midline shift.  There is no abnormal extra-axial fluid collection.  There is a mild to moderate burden of nonspecific periventricular and subcortical white matter hypoattenuation.  The findings likely reflect sequela of chronic microvascular ischemic disease.  The gray-white interface is preserved without obvious acute infarction.  There is no abnormal extra-axial fluid collection.  The basilar cisterns are open.  The cerebellar tonsils are in normal position.    Extracranial contents, calvarium, soft tissues: There is no acute skull fracture.  The calvarium is normal.  The included paranasal sinuses and mastoid air cells are clear.                                Assessment and Plan:  P     #1 left sided weakness - probably recrudescence in the setting os severe hypertension   #2 history of large right MCA stroke in 2013 - with residual aphasia and left leg weakness     Patient with previous significant left sided weakness from right MCA stroke in 2013 now with gradual onset fluctuating mild weakness of the left arm, within the distribution of previous symptoms but much lesser degree. Was very hypertensive on admission. Most likely, this is recrudescence in setting of severe  hypertension. Only MRI will tell which she declines. However, treatment at this  Point will not differe - I still recommend she resume ASA/statin in both cases for secondary stroke prevention which should have not been stopped in first place. If cerebellar stroke (old) is real - then need to consider a cardiac source with a 30 day event monitor. Since unable to MRI would pursue CTA head and neck.     Recommend a gradual reduction to blood pressure       Recommendations:    1. Agree with resuming   2. Resume lipitor 80mg  3. CTA head and neck   4. Follow up in 4-6 weeks in stroke clinic     Thank you for the opportunity to assist in this patient's care.  If you have any questions or concerns, please do not hesitate to contact me at any time.     Yuki Toribio MD  Neurologist

## 2018-03-08 NOTE — PLAN OF CARE
03/08/18 1254   PRE-[id number]   O2 Device (Oxygen Therapy) room air   SpO2 100 %   Pulse Oximetry Type Intermittent   $ Pulse Oximetry - Multiple Charge Pulse Oximetry - Multiple   Inhaler   $ Inhaler Charges Other (see comments)  (MDI not available)

## 2018-03-08 NOTE — PLAN OF CARE
Problem: Patient Care Overview  Goal: Plan of Care Review  Outcome: Ongoing (interventions implemented as appropriate)  POC reviewed with pt, understanding verbalized. BP monitored, Medications given as ordered/PRN, see MAR. Telemetry monitoring in place. AAOX4. SCD's maintained. Stand by Assist. Safety Maintained. Will continue to monitor.

## 2018-03-08 NOTE — H&P
"[facility] Medicine  History & Physical    Patient Name: Faustina Rae  MRN: [id number]  Admission Date: 3/7/2018  Attending Physician: Fred Hernandez MD   Primary Care Provider: Jaxson Gonzales MD         Patient information was obtained from patient and ER records.     Subjective:     Principal Problem:CVA (cerebral vascular accident)    Chief Complaint:   Chief Complaint   Patient presents with    Extremity Weakness     left arm weakness x 2 days / hx. rt. CVA six  years ago        HPI: Faustina Rae is a 53 y.o. female with PMHx of CVA, CHF, COPD, HTN, and arthritis.  She was admitted to the service of hospital medicine with concern for CVA, as well as accelerated HTN. She presented to the ED with c/o progressively worsening "heaviness" and intermittent numbness/weakness to the left arm which started 2 days ago. Pt reports she awoke two days ago with left arm heaviness and numbness which has been intermittent since onset. She also endorses some worsening of expressive aphasia that she states had improved since last CVA but within last two days has become more noticeable . No facial droop, headaches, visual changes, dizziness or lightheadedness. The patient has a hx of CVA  6-7 years ago which resulted in slight left leg weakness and occasional stuttering. No recent neurology visit and reports her physician took her off aspirin. Pt had a recent gastric sleeve surgery in 2017 at [facility] [place]. Not on blood thinners. She denies any chest pain, SOB, N/V, or other complaints at this time.  She reports compliance with all prescribed medications.  Other pertinent medical history as below:    Past Medical History:   Diagnosis Date    Arthritis     Asthma     CHF (congestive heart failure)     COPD (chronic obstructive pulmonary disease)     Hypertension     Leaky heart valve     Rheumatoid arthritis(714.0)     Stroke        Past Surgical History:   Procedure Laterality Date    " CHOLECYSTECTOMY      HYSTERECTOMY      SLEEVE GASTROPLASTY  02/21/2017       Review of patient's allergies indicates:  No Known Allergies    No current facility-administered medications on file prior to encounter.      Current Outpatient Prescriptions on File Prior to Encounter   Medication Sig    albuterol (PROVENTIL) 2.5 mg /3 mL (0.083 %) nebulizer solution Take 2.5 mg by nebulization every 24 hours as needed.      amlodipine (NORVASC) 10 MG tablet Take 1 tablet (10 mg total) by mouth once daily.    b complex vitamins tablet Take 1 tablet by mouth once daily.    calcium citrate-vitamin D3 315-200 mg (CITRACAL+D) 315-200 mg-unit per tablet Take 1 tablet by mouth once daily.     cyanocobalamin (VITAMIN B-12) 1000 MCG tablet Take 1,000 mcg by mouth once daily.     ferrous sulfate 325 (65 FE) MG EC tablet Take 325 mg by mouth once daily.     FLOVENT  mcg/actuation inhaler TAKE 1 PUFF BY MOUTH TWICE DAILY    fluticasone (FLONASE) 50 mcg/actuation nasal spray 2 sprays by Each Nare route once daily.    hydrALAZINE (APRESOLINE) 50 MG tablet Take 1 tablet (50 mg total) by mouth 3 (three) times daily. (Patient taking differently: Take 50 mg by mouth every 6 (six) hours. )    lisinopril-hydrochlorothiazide (PRINZIDE,ZESTORETIC) 20-12.5 mg per tablet Take 2 tablets by mouth once daily.    montelukast (SINGULAIR) 10 mg tablet Take 1 tablet (10 mg total) by mouth every evening.    multivitamin (THERAGRAN) per tablet Take 1 tablet by mouth once daily.    ondansetron (ZOFRAN) 4 MG tablet Take 1 tablet (4 mg total) by mouth every 8 (eight) hours as needed for Nausea.    pantoprazole (PROTONIX) 40 MG tablet Take 1 tablet (40 mg total) by mouth 2 (two) times daily. (Patient taking differently: Take 40 mg by mouth once daily. )    sucralfate (CARAFATE) 1 gram tablet Take 1 tablet (1 g total) by mouth 4 (four) times daily before meals and nightly.    traZODone (DESYREL) 50 MG tablet Take 1 tablet (50 mg  total) by mouth nightly as needed for Insomnia.    [DISCONTINUED] furosemide (LASIX) 20 MG tablet Take 1 tablet (20 mg total) by mouth once daily.     Family History     Problem Relation (Age of Onset)    Arthritis Mother    Asthma Son    Cancer Mother    Diabetes Mother    Heart disease Father    Hyperlipidemia Mother    Hypertension Mother, Father, Sister, Sister    Stroke Mother        Social History Main Topics    Smoking status: Never Smoker    Smokeless tobacco: Never Used    Alcohol use No    Drug use: No    Sexual activity: Not on file     Review of Systems   Constitutional: Negative for activity change, appetite change, chills, fatigue and fever.   HENT: Negative for congestion, postnasal drip, sinus pain, sore throat and trouble swallowing.    Eyes: Negative for photophobia and visual disturbance.   Respiratory: Negative for cough, shortness of breath and wheezing.    Cardiovascular: Negative for chest pain, palpitations and leg swelling.   Gastrointestinal: Negative for abdominal distention, constipation, diarrhea, nausea and vomiting.   Genitourinary: Negative for difficulty urinating, dysuria, flank pain and hematuria.   Musculoskeletal: Negative for arthralgias, myalgias and neck pain.   Skin: Negative for color change.   Neurological: Positive for speech difficulty, weakness and numbness. Negative for dizziness, seizures and headaches.   Psychiatric/Behavioral: Negative for confusion. The patient is not nervous/anxious.      Objective:     Vital Signs (Most Recent):  Temp: 97.7 °F (36.5 °C) (03/08/18 0023)  Pulse: 76 (03/08/18 0032)  Resp: 16 (03/08/18 0023)  BP: (!) 182/77 (03/08/18 0023)  SpO2: 99 % (03/08/18 0032) Vital Signs (24h Range):  Temp:  [97.7 °F (36.5 °C)-98.6 °F (37 °C)] 97.7 °F (36.5 °C)  Pulse:  [70-80] 76  Resp:  [16-18] 16  SpO2:  [97 %-100 %] 99 %  BP: (182-232)/() 182/77     Weight: 101.2 kg (223 lb 1.7 oz)  Body mass index is 40.81 kg/m².    Physical Exam    Constitutional: She is oriented to person, place, and time. She appears well-developed and well-nourished. No distress.   HENT:   Head: Normocephalic and atraumatic.   Eyes: Conjunctivae and EOM are normal. Pupils are equal, round, and reactive to light.   Neck: Normal range of motion. Neck supple. No thyromegaly present.   Cardiovascular: Normal rate, regular rhythm, normal heart sounds and intact distal pulses.    No murmur heard.  Pulmonary/Chest: Effort normal and breath sounds normal. No respiratory distress. Tenderness:      Abdominal: Soft. Bowel sounds are normal. She exhibits no distension. There is no tenderness.   Musculoskeletal: Normal range of motion. She exhibits no edema or tenderness.   Neurological: She is alert and oriented to person, place, and time. No cranial nerve deficit. Coordination normal.   Strength 4/5 Left LEG.  4/5 LEFT arm.  Hand grasps equal bilaterally. Tactile sensation intact bilaterally.  No pronator drift noted.  No facial droop.  +expressive aphasia with delay in responses and rare difficulty with word finding.   Skin: Skin is warm and dry. Capillary refill takes less than 2 seconds. No erythema.   Psychiatric: She has a normal mood and affect. Her behavior is normal. Judgment and thought content normal.         CRANIAL NERVES     CN III, IV, VI   Pupils are equal, round, and reactive to light.  Extraocular motions are normal.        Significant Labs:   CBC:   Recent Labs  Lab 03/07/18 2013   WBC 7.70   HGB 11.6*   HCT 35.6*        CMP:   Recent Labs  Lab 03/07/18 2013      K 4.6      CO2 28   GLU 85   BUN 24*   CREATININE 1.6*   CALCIUM 9.7   PROT 7.2   ALBUMIN 3.1*   BILITOT 0.2   ALKPHOS 63   AST 12   ALT 8*   ANIONGAP 7*   EGFRNONAA 36*     Coagulation:   Recent Labs  Lab 03/07/18 2013   INR 1.0     Lipid Panel:   Recent Labs  Lab 03/07/18 2013   CHOL 185   HDL 46   LDLCALC 119.4   TRIG 98   CHOLHDL 24.9     Troponin:   Recent Labs  Lab  03/07/18 2013   TROPONINI <0.006     TSH:   Recent Labs  Lab 03/07/18 2013   TSH 1.296       Significant Imaging:  CT Head: ([facility]) Chronic right MCA infarct. Age-related cerebral and microangiopathy. No acute intracranial abnormality.     EKG: I reviewed. NSR 70 with no ischemic ST or T wave abnormalities.    Assessment/Plan:     * CVA (cerebral vascular accident)    Concern for CVA with LEFT upper extremity heaviness/weakness and worsening speech deficits.  CT negative for acute findings.  Check MRI/MRA brain/neck.  Consult neurology.  Add ASA; needs stroke prevention rx on d/c (ASA, statin).  Allow for permissive HTN to ensure cerebral perfusion (use IV labetalol for > 220/110).  Maintain fall precautions.  Consult PT/OT/SLP.          Accelerated hypertension    Patient with stable (though elevated) BP after labetalol and hydralazine in ED.  Will hold off on resuming oral antihypertensives for now as there is concern for acute CVA and want to ensure cerebral perfusion. Will utilize PRN labetalol if BP exceeds 220/110.  Resume oral antihypertensives as soon as clinically appropriate (will likely need at least some of these cont'd even if MRI confirms acute CVA as she has very poor HTN control).          CKD (chronic kidney disease) stage 3, GFR 30-59 ml/min    Cr stable.  Follow renal function panel.  Avoid non-essential nephrotoxins.  Dose medications for Estimated Creatinine Clearance: 44.7 mL/min (A) (based on SCr of 1.6 mg/dL (H)).            Chronic diastolic CHF (congestive heart failure)    No evidence of acute decompensation.  Continue ACEI as clinically appropriate (allowing for permissive HTN acutely-- MRI pending).  Monitor on telemetry.          COPD (chronic obstructive pulmonary disease)    No evidence of acute decompensation.  Utilize PRN O2 and bronchodilators as required for shortness of breath.          Morbid obesity with BMI of 40.0-44.9, adult    Body mass index is 40.81 kg/m².  Obesity  compounds patient co-morbidities and complicates treatment course.  Counseling given regarding diet modification and exercise recommendations.                VTE Risk Mitigation         Ordered     Medium Risk of VTE  Once      03/08/18 0023     Place SOL hose  Until discontinued      03/08/18 0023     Place sequential compression device  Until discontinued      03/08/18 0023             Aracelis Benjamin NP  Department of Hospital Medicine   [facility]

## 2018-03-08 NOTE — PLAN OF CARE
Problem: Physical Therapy Goal  Goal: Physical Therapy Goal  Goals to be met by: 3-     Patient will increase functional independence with mobility by performin. Sit to stand transfer with Supervision.  2. Gait  x 250 feet with Supervision.   3. Lower extremity exercise program x20 reps per handout, with assistance as needed.    Outcome: Ongoing (interventions implemented as appropriate)  PT evaluation was completed. Home with home health PT recommended.

## 2018-03-08 NOTE — ASSESSMENT & PLAN NOTE
Patient with stable (though elevated) BP after labetalol and hydralazine in ED.  Will hold off on resuming oral antihypertensives for now as there is concern for acute CVA and want to ensure cerebral perfusion. Will utilize PRN labetalol if BP exceeds 220/110.  Resume oral antihypertensives as soon as clinically appropriate (will likely need at least some of these cont'd even if MRI confirms acute CVA as she has very poor HTN control).

## 2018-03-08 NOTE — PLAN OF CARE
03/08/18 0032   Patient Assessment/Suction   Level of Consciousness (AVPU) alert   PRE-TX-O2-ETCO2   O2 Device (Oxygen Therapy) room air   SpO2 99 %   Pulse Oximetry Type Intermittent

## 2018-03-08 NOTE — ED PROVIDER NOTES
Encounter Date: 3/7/2018    SCRIBE #1 NOTE: Carol ARANDA, am scribing for, and in the presence of, Dr. Robbins.       History     Chief Complaint   Patient presents with    Extremity Weakness     left arm weakness x 2 days / hx. rt. CVA six  years ago     3/7/2018  7:12 PM     Chief Complaint: Extremity weakness    The patient is a 53 y.o. female with PMHx of asthma, CHF, COPD, HTN, leaky heart valve, RA, stroke and arthritis who presents with extremity weakness. Patient c/o progressively worsening weakness to the left arm which started 2 days ago. Pt reports she awoke with left arm heaviness and numbness which has been persistent since onset. She denies any changes in speech. No facial droop, headaches, dizziness or lightheadedness. The patient has a hx of mini strokes 6-7 years ago which resulted in slight left leg weakness and occasional stuttering. No recent neurology visit. Pt had a recent gastric sleeve surgery in 2017 at [facility]. Not on blood thinners. No hx of cardiac issue. No hx of high cholesterol or DM. Shx of hysterectomy, cholecystectomy and sleeve gastroplasty.      The history is provided by the patient and a relative.     Review of patient's allergies indicates:  No Known Allergies  Past Medical History:   Diagnosis Date    Arthritis     Asthma     CHF (congestive heart failure)     COPD (chronic obstructive pulmonary disease)     Hypertension     Leaky heart valve     Rheumatoid arthritis(714.0)     Stroke      Past Surgical History:   Procedure Laterality Date    CHOLECYSTECTOMY      HYSTERECTOMY      SLEEVE GASTROPLASTY  02/21/2017     Family History   Problem Relation Age of Onset    Arthritis Mother     Cancer Mother     Diabetes Mother     Hyperlipidemia Mother     Hypertension Mother     Stroke Mother     Heart disease Father     Hypertension Father     Asthma Son     Hypertension Sister     Hypertension Sister     Kidney disease Neg Hx      Social  History   Substance Use Topics    Smoking status: Never Smoker    Smokeless tobacco: Never Used    Alcohol use No     Review of Systems   Constitutional: Negative for appetite change, chills and fever.   HENT: Negative for congestion, rhinorrhea and sore throat.    Respiratory: Negative for cough and shortness of breath.    Cardiovascular: Negative for chest pain.   Gastrointestinal: Negative for abdominal pain, diarrhea, nausea and vomiting.   Genitourinary: Negative for dysuria.   Musculoskeletal: Negative for back pain and myalgias.   Skin: Negative for rash.   Neurological: Positive for weakness. Negative for dizziness, facial asymmetry, speech difficulty, light-headedness, numbness and headaches.   Hematological: Does not bruise/bleed easily.   All other systems reviewed and are negative.      Physical Exam     Initial Vitals [03/07/18 1841]   BP Pulse Resp Temp SpO2   (!) 195/97 80 18 98.6 °F (37 °C) 100 %      MAP       129.67         Physical Exam    Nursing note and vitals reviewed.  Constitutional: No distress.   HENT:   Head: Normocephalic and atraumatic.   Eyes: EOM are normal. Pupils are equal, round, and reactive to light.   Neck: Normal range of motion.   Cardiovascular: Normal rate, regular rhythm, normal heart sounds and intact distal pulses.   Pulmonary/Chest: Breath sounds normal. She has no wheezes. She has no rhonchi. She has no rales.   Abdominal: Soft. She exhibits no distension. There is no tenderness.   Musculoskeletal: Normal range of motion. She exhibits no edema or tenderness.   Neurological: She is alert and oriented to person, place, and time.   Mild wordless aphasia. No acute gross cranial nerve deficit. 5/5  strength bilaterally with upper extremity flexion and extension. Patient reports sensation deficits to the upper and posterior left arm.   Skin: No rash noted.   Psychiatric: She has a normal mood and affect.         ED Course   Procedures  Labs Reviewed   CBC W/ AUTO  DIFFERENTIAL - Abnormal; Notable for the following:        Result Value    RBC 3.96 (*)     Hemoglobin 11.6 (*)     Hematocrit 35.6 (*)     MPV 8.1 (*)     All other components within normal limits   COMPREHENSIVE METABOLIC PANEL - Abnormal; Notable for the following:     BUN, Bld 24 (*)     Creatinine 1.6 (*)     Albumin 3.1 (*)     ALT 8 (*)     Anion Gap 7 (*)     eGFR if  42 (*)     eGFR if non  36 (*)     All other components within normal limits   PROTIME-INR   TSH   LIPID PANEL   TROPONIN I   POCT GLUCOSE   POCT GLUCOSE     EKG Readings: (Independently Interpreted)   Normal sinus rhythm, 70 bpm, normal axis, normal intervals, possible left atrial enlargement, no STEMI         X-Rays:   Independently Interpreted Readings:   Chest X-Ray: Left basilar discoid atelectasis or scar.   Head CT: Chronic right MCA infarct. Age-related cervical and microangiopathy. No acute intracranial abnormality.     Medical Decision Making:   Clinical Tests:   Lab Tests: Ordered and Reviewed  Radiological Study: Ordered and Reviewed  Medical Tests: Ordered and Reviewed  ED Management:  Faustina Rae is a 53 y.o. female who presents to the Emergency Department with worsenign left arm weakness over the last 48 hours. She reports chronic left lower extremity mild deficit. Lab abnormalities found noncontributory for exacerbating factors. CT scan indicates chronic findings but I do think admission is warranted at this time for further neurological monitoring, MRI imaging and neurological consultation. Case was discussed with Ms. Benjamin who agrees to admit the patient. Patient and daughter updated on treatement plan and agreeable. Patient will be transferred to a telemetry bed in guarded condition.    Of note, patient had markedly elevated blood pressure of oral and IV hypertensives in the ED.            Scribe Attestation:   Scribe #1: I performed the above scribed service and the documentation  accurately describes the services I performed. I attest to the accuracy of the note.               Clinical Impression:   Diagnoses of Stroke and CVA (cerebral vascular accident) were pertinent to this visit.    Disposition:   Disposition: Admitted  Condition: Stable         I, Dr. Sean Robbins, personally performed the services described in this documentation. All medical record entries made by the scribe were at my direction and in my presence.  I have reviewed the chart and agree that the record reflects my personal performance and is accurate and complete. Sean Robbins MD.  2:43 AM 03/08/2018               Sean Robbins MD  03/08/18 0243

## 2018-03-08 NOTE — ASSESSMENT & PLAN NOTE
No evidence of acute decompensation.  Utilize PRN O2 and bronchodilators as required for shortness of breath.

## 2018-03-08 NOTE — PROGRESS NOTES
Health Maintenance Due   Topic Date Due    Eye Exam  01/02/1974    TETANUS VACCINE  01/02/1982    Colonoscopy  01/02/2014    Hemoglobin A1c  06/14/2017    Influenza Vaccine  08/01/2017    Foot Exam  11/10/2017    Urine Microalbumin  12/14/2017

## 2018-03-08 NOTE — ED NOTES
"Pt presents with c/o weakness without numbness to left arm x 2 days. Pt had a stroke 6 years ago with residual deficits of some stuttering with speech and left leg can be "wobbly."   "

## 2018-03-08 NOTE — ASSESSMENT & PLAN NOTE
Body mass index is 40.81 kg/m².  Obesity compounds patient co-morbidities and complicates treatment course.  Counseling given regarding diet modification and exercise recommendations.

## 2018-03-08 NOTE — SUBJECTIVE & OBJECTIVE
Past Medical History:   Diagnosis Date    Arthritis     Asthma     CHF (congestive heart failure)     COPD (chronic obstructive pulmonary disease)     Hypertension     Leaky heart valve     Rheumatoid arthritis(714.0)     Stroke        Past Surgical History:   Procedure Laterality Date    CHOLECYSTECTOMY      HYSTERECTOMY      SLEEVE GASTROPLASTY  02/21/2017       Review of patient's allergies indicates:  No Known Allergies    No current facility-administered medications on file prior to encounter.      Current Outpatient Prescriptions on File Prior to Encounter   Medication Sig    albuterol (PROVENTIL) 2.5 mg /3 mL (0.083 %) nebulizer solution Take 2.5 mg by nebulization every 24 hours as needed.      amlodipine (NORVASC) 10 MG tablet Take 1 tablet (10 mg total) by mouth once daily.    b complex vitamins tablet Take 1 tablet by mouth once daily.    calcium citrate-vitamin D3 315-200 mg (CITRACAL+D) 315-200 mg-unit per tablet Take 1 tablet by mouth once daily.     cyanocobalamin (VITAMIN B-12) 1000 MCG tablet Take 1,000 mcg by mouth once daily.     ferrous sulfate 325 (65 FE) MG EC tablet Take 325 mg by mouth once daily.     FLOVENT  mcg/actuation inhaler TAKE 1 PUFF BY MOUTH TWICE DAILY    fluticasone (FLONASE) 50 mcg/actuation nasal spray 2 sprays by Each Nare route once daily.    hydrALAZINE (APRESOLINE) 50 MG tablet Take 1 tablet (50 mg total) by mouth 3 (three) times daily. (Patient taking differently: Take 50 mg by mouth every 6 (six) hours. )    lisinopril-hydrochlorothiazide (PRINZIDE,ZESTORETIC) 20-12.5 mg per tablet Take 2 tablets by mouth once daily.    montelukast (SINGULAIR) 10 mg tablet Take 1 tablet (10 mg total) by mouth every evening.    multivitamin (THERAGRAN) per tablet Take 1 tablet by mouth once daily.    ondansetron (ZOFRAN) 4 MG tablet Take 1 tablet (4 mg total) by mouth every 8 (eight) hours as needed for Nausea.    pantoprazole (PROTONIX) 40 MG tablet Take 1  tablet (40 mg total) by mouth 2 (two) times daily. (Patient taking differently: Take 40 mg by mouth once daily. )    sucralfate (CARAFATE) 1 gram tablet Take 1 tablet (1 g total) by mouth 4 (four) times daily before meals and nightly.    traZODone (DESYREL) 50 MG tablet Take 1 tablet (50 mg total) by mouth nightly as needed for Insomnia.    [DISCONTINUED] furosemide (LASIX) 20 MG tablet Take 1 tablet (20 mg total) by mouth once daily.     Family History     Problem Relation (Age of Onset)    Arthritis Mother    Asthma Son    Cancer Mother    Diabetes Mother    Heart disease Father    Hyperlipidemia Mother    Hypertension Mother, Father, Sister, Sister    Stroke Mother        Social History Main Topics    Smoking status: Never Smoker    Smokeless tobacco: Never Used    Alcohol use No    Drug use: No    Sexual activity: Not on file     Review of Systems   Constitutional: Negative for activity change, appetite change, chills, fatigue and fever.   HENT: Negative for congestion, postnasal drip, sinus pain, sore throat and trouble swallowing.    Eyes: Negative for photophobia and visual disturbance.   Respiratory: Negative for cough, shortness of breath and wheezing.    Cardiovascular: Negative for chest pain, palpitations and leg swelling.   Gastrointestinal: Negative for abdominal distention, constipation, diarrhea, nausea and vomiting.   Genitourinary: Negative for difficulty urinating, dysuria, flank pain and hematuria.   Musculoskeletal: Negative for arthralgias, myalgias and neck pain.   Skin: Negative for color change.   Neurological: Positive for speech difficulty, weakness and numbness. Negative for dizziness, seizures and headaches.   Psychiatric/Behavioral: Negative for confusion. The patient is not nervous/anxious.      Objective:     Vital Signs (Most Recent):  Temp: 97.7 °F (36.5 °C) (03/08/18 0023)  Pulse: 76 (03/08/18 0032)  Resp: 16 (03/08/18 0023)  BP: (!) 182/77 (03/08/18 0023)  SpO2: 99 %  (03/08/18 0032) Vital Signs (24h Range):  Temp:  [97.7 °F (36.5 °C)-98.6 °F (37 °C)] 97.7 °F (36.5 °C)  Pulse:  [70-80] 76  Resp:  [16-18] 16  SpO2:  [97 %-100 %] 99 %  BP: (182-232)/() 182/77     Weight: 101.2 kg (223 lb 1.7 oz)  Body mass index is 40.81 kg/m².    Physical Exam   Constitutional: She is oriented to person, place, and time. She appears well-developed and well-nourished. No distress.   HENT:   Head: Normocephalic and atraumatic.   Eyes: Conjunctivae and EOM are normal. Pupils are equal, round, and reactive to light.   Neck: Normal range of motion. Neck supple. No thyromegaly present.   Cardiovascular: Normal rate, regular rhythm, normal heart sounds and intact distal pulses.    No murmur heard.  Pulmonary/Chest: Effort normal and breath sounds normal. No respiratory distress. Tenderness:      Abdominal: Soft. Bowel sounds are normal. She exhibits no distension. There is no tenderness.   Musculoskeletal: Normal range of motion. She exhibits no edema or tenderness.   Neurological: She is alert and oriented to person, place, and time. No cranial nerve deficit. Coordination normal.   Strength 4/5 Left LEG.  4/5 LEFT arm.  Hand grasps equal bilaterally. Tactile sensation intact bilaterally.  No pronator drift noted.  No facial droop.  +expressive aphasia with delay in responses and rare difficulty with word finding.   Skin: Skin is warm and dry. Capillary refill takes less than 2 seconds. No erythema.   Psychiatric: She has a normal mood and affect. Her behavior is normal. Judgment and thought content normal.         CRANIAL NERVES     CN III, IV, VI   Pupils are equal, round, and reactive to light.  Extraocular motions are normal.        Significant Labs:   CBC:   Recent Labs  Lab 03/07/18 2013   WBC 7.70   HGB 11.6*   HCT 35.6*        CMP:   Recent Labs  Lab 03/07/18 2013      K 4.6      CO2 28   GLU 85   BUN 24*   CREATININE 1.6*   CALCIUM 9.7   PROT 7.2   ALBUMIN 3.1*    BILITOT 0.2   ALKPHOS 63   AST 12   ALT 8*   ANIONGAP 7*   EGFRNONAA 36*     Coagulation:   Recent Labs  Lab 03/07/18 2013   INR 1.0     Lipid Panel:   Recent Labs  Lab 03/07/18 2013   CHOL 185   HDL 46   LDLCALC 119.4   TRIG 98   CHOLHDL 24.9     Troponin:   Recent Labs  Lab 03/07/18 2013   TROPONINI <0.006     TSH:   Recent Labs  Lab 03/07/18 2013   TSH 1.296       Significant Imaging:  CT Head: ([facility]) Chronic right MCA infarct. Age-related cerebral and microangiopathy. No acute intracranial abnormality.     EKG: I reviewed. NSR 70 with no ischemic ST or T wave abnormalities.

## 2018-03-08 NOTE — PT/OT/SLP EVAL
"Physical Therapy Evaluation    Patient Name:  Faustina Rae   MRN:  [id number]    Recommendations:     Discharge Recommendations:  home health PT   Discharge Equipment Recommendations: none   Barriers to discharge: None    Assessment:     Faustina Rae is a 53 y.o. female admitted with a medical diagnosis of CVA (cerebral vascular accident).  She presents with the following impairments/functional limitations:  weakness, impaired endurance, impaired functional mobilty, decreased upper extremity function, decreased lower extremity function, impaired cardiopulmonary response to activity. Pt with reported resolution of worsening LUE weakness, however does have residual L sided weakness from previous CVA which results in minimal limitations to mobility. She would benefit from continued PT in hospital to prevent deconditioning and decrease fall risk.     Rehab Prognosis:  good; patient would benefit from acute skilled PT services to address these deficits and reach maximum level of function.      Recent Surgery: * No surgery found *      Plan:     During this hospitalization, patient to be seen 5 x/week to address the above listed problems via gait training, therapeutic activities, therapeutic exercises  · Plan of Care Expires:  04/06/18   Plan of Care Reviewed with: patient, sibling    Subjective     Communicated with nurse prior to session.  Patient found supine upon PT entry to room, agreeable to evaluation.      Chief Complaint: "weakness of L side"  Patient comments/goals: "I feel like my L leg gives out on me"  Pain/Comfort:  · Pain Rating 1: 0/10    Patients cultural, spiritual, Uatsdin conflicts given the current situation:  None    Living Environment:  Pt lives alone in a ground floor apartment.  Pt is retired and no longer drives.  Pt has kids in the area to assist her as needed.  Pt stated that she walked everyday prior to admission.  Prior to admission, patients level of function was independent. "   Patient has the following equipment: none.  Upon discharge, patient will have assistance from kids.    Objective:     Patient found with: peripheral IV     General Precautions: Standard, fall, aphasia   Orthopedic Precautions:N/A   Braces: N/A     Exams:  · Fine Motor Coordination:    · -       Intact B  · Sensation:    · -       Intact B  · RUE ROM: WFL  · RUE Strength: WFL  · LUE ROM: WFL  · LUE Strength: decreased  strength compared to RUE  · RLE ROM: WFL  · RLE Strength: WFL  · LLE ROM: WFL  · LLE Strength: DF: 5/5, knee extension 4/5, knee flexion 5/5, hip flexion 5/5    Functional Mobility:  · Balance:  · Pt performed the TUG with a time of 14.93 seconds indicating fall risk (>14 sec is cutoff)  · Pt had good static balance while sitting EOB  · Gait:  · Pt walked 20ft to perform the TUG with stand by assist  · Vitals  · Sittin/98 and Standin/97  · Further mobility held due to hypertension  · Transfers  · Sit to Stand:  Pt needed stand by assist with no AD  · Stand to sit: Pt needed stand by assistance with no AD    AM-PAC 6 CLICK MOBILITY  Total Score:21     Patient left up in chair with all lines intact, call button in reach and sister present.    GOALS:    Physical Therapy Goals        Problem: Physical Therapy Goal    Goal Priority Disciplines Outcome Goal Variances Interventions   Physical Therapy Goal     PT/OT, PT Ongoing (interventions implemented as appropriate)     Description:  Goals to be met by: 3-     Patient will increase functional independence with mobility by performin. Sit to stand transfer with Supervision.  2. Gait  x 250 feet with Supervision.   3. Lower extremity exercise program x20 reps per handout, with assistance as needed.                      History:     Past Medical History:   Diagnosis Date    Arthritis     Asthma     CHF (congestive heart failure)     COPD (chronic obstructive pulmonary disease)     Hypertension     Leaky heart valve      Rheumatoid arthritis(714.0)     Stroke        Past Surgical History:   Procedure Laterality Date    CHOLECYSTECTOMY      HYSTERECTOMY      SLEEVE GASTROPLASTY  02/21/2017       Clinical Decision Making:     History  Co-morbidities and personal factors that may impact the plan of care Examination  Body Structures and Functions, activity limitations and participation restrictions that may impact the plan of care Clinical Presentation   Decision Making/ Complexity Score   Co-morbidities:   [] Time since onset of injury / illness / exacerbation  [] Status of current condition  []Patient's cognitive status and safety concerns    [] Multiple Medical Problems (see med hx)  Personal Factors:   [] Patient's age  [] Prior Level of function   [] Patient's home situation (environment and family support)  [] Patient's level of motivation  [] Expected progression of patient      HISTORY:(criteria)    [] 36478 - no personal factors/history    [] 48030 - has 1-2 personal factor/comorbidity     [] 95130 - has >3 personal factor/comorbidity     Body Regions:  [] Objective examination findings  [] Head     []  Neck  [] Trunk   [] Upper Extremity  [] Lower Extremity    Body Systems:  [] For communication ability, affect, cognition, language, and learning style: the assessment of the ability to make needs known, consciousness, orientation (person, place, and time), expected emotional /behavioral responses, and learning preferences (eg, learning barriers, education  needs)  [] For the neuromuscular system: a general assessment of gross coordinated movement (eg, balance, gait, locomotion, transfers, and transitions) and motor function  (motor control and motor learning)  [] For the musculoskeletal system: the assessment of gross symmetry, gross range of motion, gross strength, height, and weight  [] For the integumentary system: the assessment of pliability(texture), presence of scar formation, skin color, and skin integrity  [] For  cardiovascular/pulmonary system: the assessment of heart rate, respiratory rate, blood pressure, and edema     Activity limitations:    [] Patient's cognitive status and saf ety concerns          [] Status of current condition      [] Weight bearing restriction  [] Cardiopulmunary Restriction    Participation Restrictions:   [] Goals and goal agreement with the patient     [] Rehab potential (prognosis) and probable outcome      Examination of Body System: (criteria)    [] 08195 - addressing 1-2 elements    [] 75123 - addressing a total of 3 or more elements     [] 87043 -  Addressing a total of 4 or more elements         Clinical Presentation: (criteria)  Choose one     On examination of body system using standardized tests and measures patient presents with (CHOOSE ONE) elements from any of the following: body structures and functions, activity limitations, and/or participation restrictions.  Leading to a clinical presentation that is considered (CHOOSE ONE)                              Clinical Decision Making  (Eval Complexity):  Choose One     Time Tracking:     PT Received On: 03/08/18  PT Start Time: 1006     PT Stop Time: 1021  PT Total Time (min): 15 min     Billable Minutes: Evaluation 15      Madyson Kirby [facility]  03/08/2018     I certify that I was present in the room directing the student in service delivery and guiding them using my skilled judgment. As the co-signing therapist I have reviewed the students documentation and am responsible for the treatment, assessment, and plan.       Jessica LeJeune, PT, DPT

## 2018-03-08 NOTE — PLAN OF CARE
Problem: Patient Care Overview  Goal: Plan of Care Review  Outcome: Ongoing (interventions implemented as appropriate)  Pt AAOx4, new admit from Er, PIV in place, saline locked.  Andi/SCD initiated and maintained.  Tele maintained and monitored.  I/O monitored and documented.  Neuro checks assessed.  VS stable.  Hourly rounding completed.  Will continue to monitor closely.  Pt has remained free of injuries and falls throughout shift.  Environment free of clutter, side rails up x2, and call light within reach.  Pt has verbalized understanding of plan of care.

## 2018-03-08 NOTE — HPI
"Faustina Rae is a 53 y.o. female with PMHx of CVA, CHF, COPD, HTN, and arthritis.  She was admitted to the service of hospital medicine with concern for CVA, as well as accelerated HTN. She presented to the ED with c/o progressively worsening "heaviness" and intermittent numbness/weakness to the left arm which started 2 days ago. Pt reports she awoke two days ago with left arm heaviness and numbness which has been intermittent since onset. She also endorses some worsening of expressive aphasia that she states had improved since last CVA but within last two days has become more noticeable . No facial droop, headaches, visual changes, dizziness or lightheadedness. The patient has a hx of CVA  6-7 years ago which resulted in slight left leg weakness and occasional stuttering. No recent neurology visit and reports her physician took her off aspirin. Pt had a recent gastric sleeve surgery in 2017 at [facility]. Not on blood thinners. She denies any chest pain, SOB, N/V, or other complaints at this time.  She reports compliance with all prescribed medications. Patient was placed in hospital for further evaluation and treatment.  "

## 2018-03-09 NOTE — HOSPITAL COURSE
The patient was monitored closely during her stay. She was placed on aspirin, statin, and q 4 hour neuro checks. She was ordered for a Neurology consult and a MRI/MRA of brain. Patient refused MRI/MRA due to reports of claustrophobia and requested it to be done at open MRI. Patient was discussed with Dr. Hernandez and a CTA of head and neck were ordered. Her home diuretic was held due to Hx CKD and going to have IV dye exposure. Patient was given IVF for gentle hydration. Patient initially noted to have Hypertensive urgency in ER and given hydralazine and labetalol with improvement in her HTN noted. Her home medications for HTN were held at first till CVA could be ruled out. CTA of head and neck revealed no new acute abnormalities. It was felt she had not had a CVA and that her symptoms had been caused by severe hypertension. Her initial symptoms had resolved. However, patient was to continue on aspirin and statin for secondary stroke prevention. Her ACE/HCTZ were held due to possible signs of progressing CKD and due to her exposure to IVP dye. She was placed back on home hydralazine and norvasc and her hydralazine dose was increased. Patient was to take her blood pressure and pulse  3 x day and follow up with her PCP next week. Patient to hold her ACEI/HCTZ for now and have repeat BMP prior to PCP appointment to evaluate renal status prior to resuming. She was also to follow up with Dr. Toribio in 4 weeks.

## 2018-03-09 NOTE — DISCHARGE INSTRUCTIONS
Increase your Hydralazine to 4 times a day    Take your blood pressure and your pulse 3 x day and keep log for follow up with PCP    Hold lisinopril/HCT for now    Follow up with Dr. Gonzales next week and do lab work (BMP) in 1-2 days prior to next appointment with results to be sent to Dr. Gonzales    Thank you for choosing [facility] for your medical care. The primary doctor who is taking care of you at the time of your discharge is Fred Hernandez MD.     You were admitted to the hospital with Hypertensive Urgency with History of prior CVA (cerebrovascular accident).     Please note your discharge instructions, including diet/activity restrictions, follow-up appointments, and medication changes.  If you have any questions about your medical issues, prescriptions, or any other questions, please feel free to contact the [facility] Medicine Dept at [phone] and we will help.    Please direct all long term medication refills and follow up to your primary care provider, Jaxson Gonzales MD. Thank you again for letting us take care of your health care needs.    Please note the following discharge instructions per your discharging physician-  uLli Ugalde Np

## 2018-03-09 NOTE — PLAN OF CARE
03/09/18 0834   Final Note   Assessment Type Final Discharge Note   Discharge Disposition Home

## 2018-03-09 NOTE — DISCHARGE SUMMARY
"[facility] Medicine  Discharge Summary      Patient Name: Faustina Rae  MRN: [id number]  Admission Date: 3/7/2018  Hospital Length of Stay: 1 days  Discharge Date and Time:  03/09/2018 5:15 PM  Attending Physician: No att. providers found   Discharging Provider: CATALINA Mcguire  Primary Care Provider: Jaxson Gonzales MD    HPI:   Faustina Rae is a 53 y.o. female with PMHx of CVA, CHF, COPD, HTN, and arthritis.  She was admitted to the service of hospital medicine with concern for CVA, as well as accelerated HTN. She presented to the ED with c/o progressively worsening "heaviness" and intermittent numbness/weakness to the left arm which started 2 days ago. Pt reports she awoke two days ago with left arm heaviness and numbness which has been intermittent since onset. She also endorses some worsening of expressive aphasia that she states had improved since last CVA but within last two days has become more noticeable . No facial droop, headaches, visual changes, dizziness or lightheadedness. The patient has a hx of CVA  6-7 years ago which resulted in slight left leg weakness and occasional stuttering. No recent neurology visit and reports her physician took her off aspirin. Pt had a recent gastric sleeve surgery in 2017 at [facility]. Not on blood thinners. She denies any chest pain, SOB, N/V, or other complaints at this time. She reports compliance with all prescribed medications. A Ct scan of head was done with no acute issues noted. Patient was placed in hospital for further evaluation and treatment.    * No surgery found *      Hospital Course:   The patient was monitored closely during her stay. She was placed on aspirin, statin, and q 4 hour neuro checks. She was ordered for a Neurology consult and a MRI/MRA of brain. Patient refused MRI/MRA due to reports of claustrophobia and requested it to be done at open MRI. Patient was discussed with Dr. Hernandez and a CTA of head " and neck were ordered. Her home diuretic was held due to Hx CKD and going to have IV dye exposure. Patient was given IVF for gentle hydration. Patient initially noted to have Hypertensive urgency in ER and given hydralazine and labetalol with improvement in her HTN noted. Her home medications for HTN were held at first till CVA could be ruled out. CTA of head and neck revealed no new acute abnormalities. It was felt she had not had a CVA and that her symptoms had been caused by severe hypertension. Her initial symptoms had resolved. However, patient was to continue on aspirin and statin for secondary stroke prevention. Her ACE/HCTZ were held due to possible signs of progressing CKD and due to her exposure to IVP dye. She was placed back on home hydralazine and norvasc and her hydralazine dose was increased. Patient was to take her blood pressure and pulse  3 x day and follow up with her PCP next week. Patient to hold her ACEI/HCTZ for now and have repeat BMP prior to PCP appointment to evaluate renal status prior to resuming. She was also to follow up with Dr. Toribio in 4 weeks.     Consults:   Consults         Status Ordering Provider     Inpatient consult to Neurology  Once     Provider:  MD Mary Ware JESSICA M.        Service: Hospital Medicine    Final Active Diagnoses:    Diagnosis Date Noted POA    PRINCIPAL PROBLEM:  History of CVA (cerebrovascular accident) [Z86.73] 03/07/2018 Not Applicable    CKD (chronic kidney disease) stage 3, GFR 30-59 ml/min [N18.3] 03/08/2018 Yes    Normocytic anemia [D64.9] 03/08/2018 Yes    Hypoalbuminemia [E88.09] 03/08/2018 Yes    Chronic diastolic CHF (congestive heart failure) [I50.32] 08/17/2016 Yes    COPD (chronic obstructive pulmonary disease) [J44.9] 08/08/2013 Yes    Morbid obesity with BMI of 40.0-44.9, adult [E66.01, Z68.41] 06/10/2012 Not Applicable      Problems Resolved During this Admission:    Diagnosis Date Noted Date Resolved POA     Stroke [I63.9] 03/08/2018 03/08/2018 Yes    Hypertensive urgency [I16.0] 06/10/2012 03/08/2018 Yes       Discharged Condition: stable    Disposition: Home or Self Care    Follow Up:  Follow-up Information     Jaxson Gonzales MD In 1 week.    Specialties:  Family Medicine, Internal Medicine  Why:  Take blood pressure and pulse 3 x day and keep log for follow up with PCP. BMP 1-2 days prior to follow up appointment  Contact information:  [address]  [facility] [address]  [phone]             Yuki Toribio MD In 4 weeks.    Specialty:  Neurology  Contact information:  [address]  [address]  [facility] [address]  [phone]                 Patient Instructions:     Diet Cardiac     Activity as tolerated     Notify your health care provider if you experience any of the following:  severe persistent headache     Notify your health care provider if you experience any of the following:  persistent dizziness, light-headedness, or visual disturbances     Notify your health care provider if you experience any of the following:  increased confusion or weakness     Notify your health care provider if you experience any of the following:   Order Comments: Any decline in condition     Significant Diagnostic Studies:     CT Head Without Contrast-  1. There is no acute abnormality.  There is no hemorrhage, mass, obvious acute infarction.  It should be noted that MRI is more sensitive in the detection of subtle or acute nonhemorrhagic ischemic disease.  2. There is encephalomalacia in the right temporal frontal parietal lobe from remote infarction.  There is also encephalomalacia in the lateral right cerebellum from remote infarction.  3. There is mild to moderate nonspecific white matter change.  These findings likely reflect sequela of chronic microvascular ischemic disease.  Note: Preliminary results were provided by Dr. Clark ([facility]).  There is no significant discrepancy    CTA Head and Neck-  1. There is  encephalomalacia from chronic infarction in the right temporal, frontal and parietal lobes.  There is also encephalomalacia in the lateral right cerebellum.  These findings are chronic.  There is no acute intracranial abnormality.  There is no hemorrhage, mass effect or obvious acute infarction.  It should be noted that MRI is more sensitive in the detection of subtle or acute nonhemorrhagic ischemic disease.  2. Normal CT angiography head.  There is no critical stenosis, occlusion, thrombosis, dissection or aneurysm involving the vessels which comprised anterior or posterior circulation.  3. CTA neck is also essentially normal.  The vessels are mildly tortuous.  There is limited evaluation of the left subclavian artery due to artifact from left-sided contrast injection.  Otherwise, the aortic arch and the origin of the great vessels are all patent.  The common carotid arteries, internal and external carotid arteries are patent without critical stenosis, occlusion, thrombosis or dissection.  Similarly, the vertebral arteries are patent.  The left vertebral artery is dominant.  Please see above discussion.    Cxr- No acute abnormality    2D Echo-    1 - Concentric remodeling.     2 - Normal left ventricular systolic function (EF 60-65%).     3 - No wall motion abnormalities.     4 - Normal left ventricular diastolic function.     5 - Normal right ventricular systolic function .     6 - The estimated PA systolic pressure is 29 mmHg.     7 - Suggest improved LV diastolic function from Echo in 8/2016.   Intracavitary: There is no evidence of intracavity mass, thrombi, or vegetation.     Labs:   [facility]   Recent Labs  Lab 03/07/18 2013 03/08/18  0514    142   K 4.6 3.7    109   CO2 28 26   GLU 85 84   BUN 24* 22*   CREATININE 1.6* 1.4   CALCIUM 9.7 9.4   PROT 7.2 6.5   ALBUMIN 3.1* 2.8*   BILITOT 0.2 0.3   ALKPHOS 63 56   AST 12 11   ALT 8* 8*   ANIONGAP 7* 7*   ESTGFRAFRICA 42* 49*   EGFRNONAA 36* 43*    and  CBC   Recent Labs  Lab 03/07/18 2013 03/08/18  0514   WBC 7.70 6.00   HGB 11.6* 11.2*   HCT 35.6* 34.0*    209       Pending Diagnostic Studies:     None         Medications:  Reconciled Home Medications:   Discharge Medication List as of 3/8/2018  6:17 PM      START taking these medications    Details   acetaminophen (TYLENOL) 500 MG tablet Take 2 tablets (1,000 mg total) by mouth every 6 (six) hours as needed., Starting Thu 3/8/2018, OTC      aspirin (ECOTRIN) 325 MG EC tablet Take 1 tablet (325 mg total) by mouth once daily., Starting Thu 3/8/2018, Until Fri 3/8/2019, OTC      atorvastatin (LIPITOR) 80 MG tablet Take 1 tablet (80 mg total) by mouth once daily., Starting Fri 3/9/2018, Until Sun 4/8/2018, Normal         CONTINUE these medications which have CHANGED    Details   hydrALAZINE (APRESOLINE) 50 MG tablet Take 1 tablet (50 mg total) by mouth 4 (four) times daily., Starting Thu 3/8/2018, Until Sat 4/7/2018, Normal         CONTINUE these medications which have NOT CHANGED    Details   albuterol (PROVENTIL) 2.5 mg /3 mL (0.083 %) nebulizer solution Take 2.5 mg by nebulization every 24 hours as needed.  , Until Discontinued, Historical Med      amlodipine (NORVASC) 10 MG tablet Take 1 tablet (10 mg total) by mouth once daily., Starting Tue 8/8/2017, Until Wed 8/8/2018, Normal      b complex vitamins tablet Take 1 tablet by mouth once daily., Until Discontinued, Historical Med      calcium citrate-vitamin D3 315-200 mg (CITRACAL+D) 315-200 mg-unit per tablet Take 1 tablet by mouth once daily. , Historical Med      cyanocobalamin (VITAMIN B-12) 1000 MCG tablet Take 1,000 mcg by mouth once daily. , Historical Med      ferrous sulfate 325 (65 FE) MG EC tablet Take 325 mg by mouth once daily. , Historical Med      FLOVENT  mcg/actuation inhaler TAKE 1 PUFF BY MOUTH TWICE DAILY, Normal      fluticasone (FLONASE) 50 mcg/actuation nasal spray 2 sprays by Each Nare route once daily., Starting Tue  7/25/2017, Normal      montelukast (SINGULAIR) 10 mg tablet Take 1 tablet (10 mg total) by mouth every evening., Starting Thu 1/18/2018, Normal      multivitamin (THERAGRAN) per tablet Take 1 tablet by mouth once daily., Until Discontinued, Historical Med      ondansetron (ZOFRAN) 4 MG tablet Take 1 tablet (4 mg total) by mouth every 8 (eight) hours as needed for Nausea., Starting Mon 11/13/2017, Normal      pantoprazole (PROTONIX) 40 MG tablet Take 1 tablet (40 mg total) by mouth 2 (two) times daily., Starting Thu 1/18/2018, Until Thu 3/7/2019, Normal      sucralfate (CARAFATE) 1 gram tablet Take 1 tablet (1 g total) by mouth 4 (four) times daily before meals and nightly., Starting Thu 1/18/2018, Normal      traZODone (DESYREL) 50 MG tablet Take 1 tablet (50 mg total) by mouth nightly as needed for Insomnia., Starting Mon 2/19/2018, Until Tue 2/19/2019, Normal         STOP taking these medications       lisinopril-hydrochlorothiazide (PRINZIDE,ZESTORETIC) 20-12.5 mg per tablet Comments:   Reason for Stopping:               Indwelling Lines/Drains at time of discharge:   Lines/Drains/Airways          No matching active lines, drains, or airways        Time spent on the discharge of patient: 58 minutes  Patient was seen and examined on the date of discharge and determined to be suitable for discharge.    Luli Ugalde, CATALINA  Department of Hospital Medicine  [facility]

## 2018-03-10 LAB — BACTERIA UR CULT: NORMAL

## 2018-03-29 ENCOUNTER — OFFICE VISIT (OUTPATIENT)
Dept: FAMILY MEDICINE | Facility: CLINIC | Age: 53
End: 2018-03-29
Payer: MEDICARE

## 2018-03-29 VITALS
BODY MASS INDEX: 38.56 KG/M2 | OXYGEN SATURATION: 98 % | HEIGHT: 63 IN | HEART RATE: 76 BPM | SYSTOLIC BLOOD PRESSURE: 111 MMHG | TEMPERATURE: 98 F | DIASTOLIC BLOOD PRESSURE: 64 MMHG | WEIGHT: 217.63 LBS | RESPIRATION RATE: 18 BRPM

## 2018-03-29 DIAGNOSIS — R05.3 PERSISTENT COUGH: ICD-10-CM

## 2018-03-29 DIAGNOSIS — R11.0 NAUSEA: ICD-10-CM

## 2018-03-29 DIAGNOSIS — J11.1 INFLUENZA: Primary | ICD-10-CM

## 2018-03-29 PROCEDURE — 99999 PR PBB SHADOW E&M-EST. PATIENT-LVL III: CPT | Mod: PBBFAC,,, | Performed by: FAMILY MEDICINE

## 2018-03-29 PROCEDURE — 3078F DIAST BP <80 MM HG: CPT | Mod: CPTII,S$GLB,, | Performed by: FAMILY MEDICINE

## 2018-03-29 PROCEDURE — 99214 OFFICE O/P EST MOD 30 MIN: CPT | Mod: S$GLB,,, | Performed by: FAMILY MEDICINE

## 2018-03-29 PROCEDURE — 3074F SYST BP LT 130 MM HG: CPT | Mod: CPTII,S$GLB,, | Performed by: FAMILY MEDICINE

## 2018-03-29 RX ORDER — ALBUTEROL SULFATE 90 UG/1
2 AEROSOL, METERED RESPIRATORY (INHALATION) 4 TIMES DAILY
Qty: 1 INHALER | Refills: 1 | Status: SHIPPED | OUTPATIENT
Start: 2018-03-29 | End: 2020-01-21 | Stop reason: SDUPTHER

## 2018-03-29 RX ORDER — OSELTAMIVIR PHOSPHATE 75 MG/1
75 CAPSULE ORAL 2 TIMES DAILY
Qty: 10 CAPSULE | Refills: 0 | Status: SHIPPED | OUTPATIENT
Start: 2018-03-29 | End: 2018-04-03

## 2018-03-29 RX ORDER — PROMETHAZINE HYDROCHLORIDE 25 MG/1
25 TABLET ORAL EVERY 4 HOURS
Qty: 40 TABLET | Refills: 0 | Status: SHIPPED | OUTPATIENT
Start: 2018-03-29 | End: 2018-08-14

## 2018-03-29 NOTE — PROGRESS NOTES
Subjective:       Patient ID: Faustina Rae is a 53 y.o. female.    Chief Complaint: No chief complaint on file.    Influenza   This is a new problem. Episode onset: 2 days. The problem occurs constantly. Associated symptoms include anorexia, a change in bowel habit, chills, congestion, coughing, fatigue, a fever, myalgias, nausea and vomiting. Pertinent negatives include no abdominal pain, chest pain, rash or sore throat. Associated symptoms comments: Also diarrhea. Treatments tried: Diet change. The treatment provided no relief.     Review of Systems   Constitutional: Positive for chills, fatigue and fever.   HENT: Positive for congestion. Negative for sore throat.    Respiratory: Positive for cough. Negative for shortness of breath.    Cardiovascular: Negative for chest pain.   Gastrointestinal: Positive for anorexia, change in bowel habit, nausea and vomiting. Negative for abdominal pain.   Musculoskeletal: Positive for myalgias.   Skin: Negative for rash.   All other systems reviewed and are negative.      Objective:      Physical Exam   Constitutional: She appears well-developed. No distress.   HENT:   Right Ear: Tympanic membrane normal. Tympanic membrane is not erythematous.   Left Ear: Tympanic membrane normal. Tympanic membrane is not erythematous.   Nose: Mucosal edema present. Right sinus exhibits no maxillary sinus tenderness. Left sinus exhibits no maxillary sinus tenderness.   Mouth/Throat: Posterior oropharyngeal erythema present.   Neck: Neck supple.   Cardiovascular: Normal rate and regular rhythm.    No murmur heard.  Pulmonary/Chest: Effort normal and breath sounds normal.   Abdominal: Soft. Bowel sounds are normal. There is no tenderness.   Lymphadenopathy:     She has no cervical adenopathy.   Skin: Skin is warm and dry.       Assessment:       1. Influenza        Plan:      Diagnoses and all orders for this visit:    Influenza    Nausea    Persistent cough    Other orders  -      oseltamivir (TAMIFLU) 75 MG capsule; Take 1 capsule (75 mg total) by mouth 2 (two) times daily.  -     albuterol 90 mcg/actuation inhaler; Inhale 2 puffs into the lungs 4 (four) times daily.  -     promethazine (PHENERGAN) 25 MG tablet; Take 1 tablet (25 mg total) by mouth every 4 (four) hours. As needed for nausea or cough.

## 2018-04-08 DIAGNOSIS — J45.40 MODERATE PERSISTENT ASTHMA WITHOUT COMPLICATION: ICD-10-CM

## 2018-04-08 DIAGNOSIS — I10 HYPERTENSION, ACCELERATED: ICD-10-CM

## 2018-04-08 DIAGNOSIS — I63.411 CEREBRAL INFARCTION DUE TO EMBOLISM OF RIGHT MIDDLE CEREBRAL ARTERY: ICD-10-CM

## 2018-04-09 RX ORDER — HYDRALAZINE HYDROCHLORIDE 100 MG/1
TABLET, FILM COATED ORAL
Qty: 270 TABLET | Refills: 0 | Status: SHIPPED | OUTPATIENT
Start: 2018-04-09 | End: 2018-07-13 | Stop reason: SDUPTHER

## 2018-04-09 RX ORDER — AMLODIPINE BESYLATE 10 MG/1
TABLET ORAL
Qty: 90 TABLET | Refills: 0 | Status: ON HOLD | OUTPATIENT
Start: 2018-04-09 | End: 2018-04-30 | Stop reason: HOSPADM

## 2018-04-09 RX ORDER — MONTELUKAST SODIUM 10 MG/1
TABLET ORAL
Qty: 90 TABLET | Refills: 0 | Status: ON HOLD | OUTPATIENT
Start: 2018-04-09 | End: 2018-04-30 | Stop reason: HOSPADM

## 2018-04-09 RX ORDER — DULOXETIN HYDROCHLORIDE 30 MG/1
CAPSULE, DELAYED RELEASE ORAL
Qty: 90 CAPSULE | Refills: 0 | Status: SHIPPED | OUTPATIENT
Start: 2018-04-09 | End: 2019-10-05

## 2018-04-12 DIAGNOSIS — R10.9 ABDOMINAL PAIN, UNSPECIFIED ABDOMINAL LOCATION: ICD-10-CM

## 2018-04-12 DIAGNOSIS — K29.70 GASTRITIS, PRESENCE OF BLEEDING UNSPECIFIED, UNSPECIFIED CHRONICITY, UNSPECIFIED GASTRITIS TYPE: ICD-10-CM

## 2018-04-12 RX ORDER — PANTOPRAZOLE SODIUM 40 MG/1
40 TABLET, DELAYED RELEASE ORAL DAILY
Qty: 90 TABLET | Refills: 1 | Status: SHIPPED | OUTPATIENT
Start: 2018-04-12 | End: 2020-01-21 | Stop reason: SDUPTHER

## 2018-04-29 ENCOUNTER — HOSPITAL ENCOUNTER (OUTPATIENT)
Facility: HOSPITAL | Age: 53
Discharge: HOME | End: 2018-05-01
Attending: EMERGENCY MEDICINE | Admitting: HOSPITALIST
Payer: MEDICARE

## 2018-04-29 DIAGNOSIS — R07.9 CHEST PAIN: Primary | ICD-10-CM

## 2018-04-29 LAB
ALBUMIN SERPL BCP-MCNC: 3.3 G/DL
ALP SERPL-CCNC: 57 U/L
ALT SERPL W/O P-5'-P-CCNC: 9 U/L
ANION GAP SERPL CALC-SCNC: 10 MMOL/L
AST SERPL-CCNC: 13 U/L
BASOPHILS # BLD AUTO: 0 K/UL
BASOPHILS NFR BLD: 0.5 %
BILIRUB SERPL-MCNC: 0.2 MG/DL
BNP SERPL-MCNC: 47 PG/ML
BUN SERPL-MCNC: 29 MG/DL
CALCIUM SERPL-MCNC: 9.4 MG/DL
CHLORIDE SERPL-SCNC: 110 MMOL/L
CO2 SERPL-SCNC: 23 MMOL/L
CREAT SERPL-MCNC: 1.7 MG/DL
DIFFERENTIAL METHOD: ABNORMAL
EOSINOPHIL # BLD AUTO: 0.1 K/UL
EOSINOPHIL NFR BLD: 1.5 %
ERYTHROCYTE [DISTWIDTH] IN BLOOD BY AUTOMATED COUNT: 14.6 %
EST. GFR  (AFRICAN AMERICAN): 39 ML/MIN/1.73 M^2
EST. GFR  (NON AFRICAN AMERICAN): 34 ML/MIN/1.73 M^2
GLUCOSE SERPL-MCNC: 90 MG/DL
HCT VFR BLD AUTO: 32.4 %
HGB BLD-MCNC: 10.4 G/DL
LYMPHOCYTES # BLD AUTO: 2.3 K/UL
LYMPHOCYTES NFR BLD: 31.1 %
MCH RBC QN AUTO: 29.2 PG
MCHC RBC AUTO-ENTMCNC: 32.2 G/DL
MCV RBC AUTO: 91 FL
MONOCYTES # BLD AUTO: 0.3 K/UL
MONOCYTES NFR BLD: 4.2 %
NEUTROPHILS # BLD AUTO: 4.6 K/UL
NEUTROPHILS NFR BLD: 62.7 %
PLATELET # BLD AUTO: 224 K/UL
PMV BLD AUTO: 8.3 FL
POTASSIUM SERPL-SCNC: 4.3 MMOL/L
PROT SERPL-MCNC: 7 G/DL
RBC # BLD AUTO: 3.57 M/UL
SODIUM SERPL-SCNC: 143 MMOL/L
TROPONIN I SERPL DL<=0.01 NG/ML-MCNC: 0.01 NG/ML
WBC # BLD AUTO: 7.3 K/UL

## 2018-04-29 PROCEDURE — 63600175 PHARM REV CODE 636 W HCPCS: Performed by: EMERGENCY MEDICINE

## 2018-04-29 PROCEDURE — 93010 ELECTROCARDIOGRAM REPORT: CPT | Mod: ,,, | Performed by: INTERNAL MEDICINE

## 2018-04-29 PROCEDURE — 85025 COMPLETE CBC W/AUTO DIFF WBC: CPT

## 2018-04-29 PROCEDURE — 99285 EMERGENCY DEPT VISIT HI MDM: CPT | Mod: 25

## 2018-04-29 PROCEDURE — 80053 COMPREHEN METABOLIC PANEL: CPT

## 2018-04-29 PROCEDURE — 84484 ASSAY OF TROPONIN QUANT: CPT

## 2018-04-29 PROCEDURE — 36415 COLL VENOUS BLD VENIPUNCTURE: CPT

## 2018-04-29 PROCEDURE — 93005 ELECTROCARDIOGRAM TRACING: CPT

## 2018-04-29 PROCEDURE — 83880 ASSAY OF NATRIURETIC PEPTIDE: CPT

## 2018-04-29 RX ORDER — NITROGLYCERIN 0.4 MG/1
0.4 TABLET SUBLINGUAL EVERY 5 MIN PRN
Status: DISCONTINUED | OUTPATIENT
Start: 2018-04-29 | End: 2018-05-01 | Stop reason: HOSPADM

## 2018-04-29 RX ORDER — ASPIRIN 325 MG
325 TABLET ORAL
Status: DISCONTINUED | OUTPATIENT
Start: 2018-04-29 | End: 2018-04-29

## 2018-04-29 RX ADMIN — NITROGLYCERIN 0.4 MG: 0.4 TABLET SUBLINGUAL at 10:04

## 2018-04-29 RX ADMIN — NITROGLYCERIN 0.4 MG: 0.4 TABLET SUBLINGUAL at 11:04

## 2018-04-30 PROBLEM — R07.9 CHEST PAIN: Status: ACTIVE | Noted: 2018-04-30

## 2018-04-30 PROBLEM — I25.729: Status: ACTIVE | Noted: 2018-04-30

## 2018-04-30 PROBLEM — K21.00 GASTROESOPHAGEAL REFLUX DISEASE WITH ESOPHAGITIS: Status: ACTIVE | Noted: 2018-04-30

## 2018-04-30 PROBLEM — I25.118 ATHEROSCLEROSIS OF NATIVE CORONARY ARTERY WITH STABLE ANGINA PECTORIS: Status: ACTIVE | Noted: 2018-04-30

## 2018-04-30 LAB
ALBUMIN SERPL BCP-MCNC: 3 G/DL
ALP SERPL-CCNC: 49 U/L
ALT SERPL W/O P-5'-P-CCNC: 7 U/L
ANION GAP SERPL CALC-SCNC: 7 MMOL/L
AST SERPL-CCNC: 14 U/L
BASOPHILS # BLD AUTO: 0.1 K/UL
BASOPHILS NFR BLD: 0.9 %
BILIRUB SERPL-MCNC: 0.3 MG/DL
BUN SERPL-MCNC: 28 MG/DL
CALCIUM SERPL-MCNC: 9.5 MG/DL
CHLORIDE SERPL-SCNC: 113 MMOL/L
CO2 SERPL-SCNC: 22 MMOL/L
CREAT SERPL-MCNC: 1.5 MG/DL
DIFFERENTIAL METHOD: ABNORMAL
EOSINOPHIL # BLD AUTO: 0.1 K/UL
EOSINOPHIL NFR BLD: 1.7 %
ERYTHROCYTE [DISTWIDTH] IN BLOOD BY AUTOMATED COUNT: 14.4 %
EST. GFR  (AFRICAN AMERICAN): 45 ML/MIN/1.73 M^2
EST. GFR  (NON AFRICAN AMERICAN): 39 ML/MIN/1.73 M^2
GLUCOSE SERPL-MCNC: 86 MG/DL
HCT VFR BLD AUTO: 32 %
HGB BLD-MCNC: 10.7 G/DL
LYMPHOCYTES # BLD AUTO: 2.3 K/UL
LYMPHOCYTES NFR BLD: 38.3 %
MAGNESIUM SERPL-MCNC: 1.8 MG/DL
MCH RBC QN AUTO: 29.9 PG
MCHC RBC AUTO-ENTMCNC: 33.3 G/DL
MCV RBC AUTO: 90 FL
MONOCYTES # BLD AUTO: 0.3 K/UL
MONOCYTES NFR BLD: 5.2 %
NEUTROPHILS # BLD AUTO: 3.2 K/UL
NEUTROPHILS NFR BLD: 53.9 %
PHOSPHATE SERPL-MCNC: 3.4 MG/DL
PLATELET # BLD AUTO: 207 K/UL
PMV BLD AUTO: 7.5 FL
POTASSIUM SERPL-SCNC: 4 MMOL/L
PROT SERPL-MCNC: 6.6 G/DL
RBC # BLD AUTO: 3.58 M/UL
SODIUM SERPL-SCNC: 142 MMOL/L
STRESS ECHO POST ESTIMATED WORKLOAD: 1 METS
STRESS ECHO POST EXERCISE DUR MIN: 1 MIN
STRESS ECHO POST EXERCISE DUR SEC: 34
TROPONIN I SERPL DL<=0.01 NG/ML-MCNC: 0.01 NG/ML
TROPONIN I SERPL DL<=0.01 NG/ML-MCNC: 0.01 NG/ML
TROPONIN I SERPL DL<=0.01 NG/ML-MCNC: <0.006 NG/ML
TSH SERPL DL<=0.005 MIU/L-ACNC: 1.06 UIU/ML
WBC # BLD AUTO: 5.9 K/UL

## 2018-04-30 PROCEDURE — 96374 THER/PROPH/DIAG INJ IV PUSH: CPT

## 2018-04-30 PROCEDURE — 94640 AIRWAY INHALATION TREATMENT: CPT

## 2018-04-30 PROCEDURE — 96361 HYDRATE IV INFUSION ADD-ON: CPT

## 2018-04-30 PROCEDURE — 63600175 PHARM REV CODE 636 W HCPCS

## 2018-04-30 PROCEDURE — G0378 HOSPITAL OBSERVATION PER HR: HCPCS

## 2018-04-30 PROCEDURE — 84443 ASSAY THYROID STIM HORMONE: CPT

## 2018-04-30 PROCEDURE — 85025 COMPLETE CBC W/AUTO DIFF WBC: CPT

## 2018-04-30 PROCEDURE — 84100 ASSAY OF PHOSPHORUS: CPT

## 2018-04-30 PROCEDURE — 25000003 PHARM REV CODE 250: Performed by: NURSE PRACTITIONER

## 2018-04-30 PROCEDURE — 96360 HYDRATION IV INFUSION INIT: CPT

## 2018-04-30 PROCEDURE — 83735 ASSAY OF MAGNESIUM: CPT

## 2018-04-30 PROCEDURE — 63600175 PHARM REV CODE 636 W HCPCS: Performed by: EMERGENCY MEDICINE

## 2018-04-30 PROCEDURE — 25000242 PHARM REV CODE 250 ALT 637 W/ HCPCS: Performed by: NURSE PRACTITIONER

## 2018-04-30 PROCEDURE — 80053 COMPREHEN METABOLIC PANEL: CPT

## 2018-04-30 PROCEDURE — 36415 COLL VENOUS BLD VENIPUNCTURE: CPT

## 2018-04-30 PROCEDURE — 99900035 HC TECH TIME PER 15 MIN (STAT)

## 2018-04-30 PROCEDURE — 84484 ASSAY OF TROPONIN QUANT: CPT | Mod: 91

## 2018-04-30 RX ORDER — METOPROLOL SUCCINATE 50 MG/1
50 TABLET, EXTENDED RELEASE ORAL DAILY
Status: DISCONTINUED | OUTPATIENT
Start: 2018-04-30 | End: 2018-05-01 | Stop reason: HOSPADM

## 2018-04-30 RX ORDER — SODIUM CHLORIDE 9 MG/ML
INJECTION, SOLUTION INTRAVENOUS CONTINUOUS
Status: ACTIVE | OUTPATIENT
Start: 2018-04-30 | End: 2018-04-30

## 2018-04-30 RX ORDER — FLUTICASONE PROPIONATE 50 MCG
2 SPRAY, SUSPENSION (ML) NASAL DAILY
Status: DISCONTINUED | OUTPATIENT
Start: 2018-04-30 | End: 2018-05-01 | Stop reason: HOSPADM

## 2018-04-30 RX ORDER — TRAZODONE HYDROCHLORIDE 50 MG/1
50 TABLET ORAL NIGHTLY PRN
Status: DISCONTINUED | OUTPATIENT
Start: 2018-04-30 | End: 2018-05-01 | Stop reason: HOSPADM

## 2018-04-30 RX ORDER — IPRATROPIUM BROMIDE AND ALBUTEROL SULFATE 2.5; .5 MG/3ML; MG/3ML
3 SOLUTION RESPIRATORY (INHALATION) EVERY 4 HOURS PRN
Status: DISCONTINUED | OUTPATIENT
Start: 2018-04-30 | End: 2018-05-01 | Stop reason: HOSPADM

## 2018-04-30 RX ORDER — MONTELUKAST SODIUM 10 MG/1
10 TABLET ORAL NIGHTLY
Status: DISCONTINUED | OUTPATIENT
Start: 2018-04-30 | End: 2018-05-01 | Stop reason: HOSPADM

## 2018-04-30 RX ORDER — SODIUM CHLORIDE 0.9 % (FLUSH) 0.9 %
5 SYRINGE (ML) INJECTION
Status: DISCONTINUED | OUTPATIENT
Start: 2018-04-30 | End: 2018-05-01 | Stop reason: HOSPADM

## 2018-04-30 RX ORDER — CLONIDINE HYDROCHLORIDE 0.1 MG/1
0.1 TABLET ORAL EVERY 8 HOURS PRN
Status: DISCONTINUED | OUTPATIENT
Start: 2018-04-30 | End: 2018-05-01 | Stop reason: HOSPADM

## 2018-04-30 RX ORDER — ATORVASTATIN CALCIUM 40 MG/1
80 TABLET, FILM COATED ORAL DAILY
Status: DISCONTINUED | OUTPATIENT
Start: 2018-04-30 | End: 2018-05-01 | Stop reason: HOSPADM

## 2018-04-30 RX ORDER — REGADENOSON 0.08 MG/ML
0.4 INJECTION, SOLUTION INTRAVENOUS ONCE
Status: DISCONTINUED | OUTPATIENT
Start: 2018-04-30 | End: 2018-04-30

## 2018-04-30 RX ORDER — AMOXICILLIN 250 MG
1 CAPSULE ORAL 2 TIMES DAILY PRN
Status: DISCONTINUED | OUTPATIENT
Start: 2018-04-30 | End: 2018-05-01 | Stop reason: HOSPADM

## 2018-04-30 RX ORDER — FERROUS SULFATE 325(65) MG
325 TABLET, DELAYED RELEASE (ENTERIC COATED) ORAL DAILY
Status: DISCONTINUED | OUTPATIENT
Start: 2018-04-30 | End: 2018-05-01 | Stop reason: HOSPADM

## 2018-04-30 RX ORDER — SUCRALFATE 1 G/10ML
1 SUSPENSION ORAL EVERY 6 HOURS
Qty: 1 BOTTLE | Refills: 0 | Status: SHIPPED | OUTPATIENT
Start: 2018-04-30 | End: 2018-07-13

## 2018-04-30 RX ORDER — ONDANSETRON 2 MG/ML
4 INJECTION INTRAMUSCULAR; INTRAVENOUS EVERY 6 HOURS PRN
Status: DISCONTINUED | OUTPATIENT
Start: 2018-04-30 | End: 2018-05-01 | Stop reason: HOSPADM

## 2018-04-30 RX ORDER — ACETAMINOPHEN 325 MG/1
650 TABLET ORAL EVERY 6 HOURS PRN
Status: DISCONTINUED | OUTPATIENT
Start: 2018-04-30 | End: 2018-05-01 | Stop reason: HOSPADM

## 2018-04-30 RX ORDER — ASPIRIN 325 MG
325 TABLET, DELAYED RELEASE (ENTERIC COATED) ORAL DAILY
Status: DISCONTINUED | OUTPATIENT
Start: 2018-04-30 | End: 2018-05-01 | Stop reason: HOSPADM

## 2018-04-30 RX ORDER — HYDRALAZINE HYDROCHLORIDE 25 MG/1
100 TABLET, FILM COATED ORAL EVERY 8 HOURS
Status: DISCONTINUED | OUTPATIENT
Start: 2018-04-30 | End: 2018-05-01 | Stop reason: HOSPADM

## 2018-04-30 RX ORDER — REGADENOSON 0.08 MG/ML
0.4 INJECTION, SOLUTION INTRAVENOUS ONCE
Status: COMPLETED | OUTPATIENT
Start: 2018-04-30 | End: 2018-04-30

## 2018-04-30 RX ORDER — DULOXETIN HYDROCHLORIDE 30 MG/1
30 CAPSULE, DELAYED RELEASE ORAL DAILY
Status: DISCONTINUED | OUTPATIENT
Start: 2018-04-30 | End: 2018-05-01 | Stop reason: HOSPADM

## 2018-04-30 RX ORDER — REGADENOSON 0.08 MG/ML
INJECTION, SOLUTION INTRAVENOUS
Status: COMPLETED
Start: 2018-04-30 | End: 2018-04-30

## 2018-04-30 RX ORDER — PANTOPRAZOLE SODIUM 40 MG/1
40 TABLET, DELAYED RELEASE ORAL DAILY
Status: DISCONTINUED | OUTPATIENT
Start: 2018-04-30 | End: 2018-05-01 | Stop reason: HOSPADM

## 2018-04-30 RX ORDER — AMLODIPINE BESYLATE 5 MG/1
10 TABLET ORAL DAILY
Status: DISCONTINUED | OUTPATIENT
Start: 2018-04-30 | End: 2018-05-01 | Stop reason: HOSPADM

## 2018-04-30 RX ORDER — FLUTICASONE PROPIONATE 110 UG/1
1 AEROSOL, METERED RESPIRATORY (INHALATION) 2 TIMES DAILY
Status: DISCONTINUED | OUTPATIENT
Start: 2018-04-30 | End: 2018-05-01 | Stop reason: HOSPADM

## 2018-04-30 RX ORDER — LANOLIN ALCOHOL/MO/W.PET/CERES
1000 CREAM (GRAM) TOPICAL DAILY
Status: DISCONTINUED | OUTPATIENT
Start: 2018-04-30 | End: 2018-05-01 | Stop reason: HOSPADM

## 2018-04-30 RX ORDER — SUCRALFATE 1 G/10ML
1 SUSPENSION ORAL EVERY 6 HOURS
Status: DISCONTINUED | OUTPATIENT
Start: 2018-04-30 | End: 2018-05-01 | Stop reason: HOSPADM

## 2018-04-30 RX ADMIN — REGADENOSON 0.4 MG: 0.08 INJECTION, SOLUTION INTRAVENOUS at 11:04

## 2018-04-30 RX ADMIN — NITROGLYCERIN 0.4 MG: 0.4 TABLET SUBLINGUAL at 01:04

## 2018-04-30 RX ADMIN — FERROUS SULFATE TAB EC 325 MG (65 MG FE EQUIVALENT) 325 MG: 325 (65 FE) TABLET DELAYED RESPONSE at 12:04

## 2018-04-30 RX ADMIN — FLUTICASONE PROPIONATE 100 MCG: 50 SPRAY, METERED NASAL at 12:04

## 2018-04-30 RX ADMIN — TRAZODONE HYDROCHLORIDE 50 MG: 50 TABLET ORAL at 09:04

## 2018-04-30 RX ADMIN — ASPIRIN 325 MG: 325 TABLET, DELAYED RELEASE ORAL at 12:04

## 2018-04-30 RX ADMIN — HYDRALAZINE HYDROCHLORIDE 100 MG: 25 TABLET, FILM COATED ORAL at 04:04

## 2018-04-30 RX ADMIN — PANTOPRAZOLE SODIUM 40 MG: 40 TABLET, DELAYED RELEASE ORAL at 12:04

## 2018-04-30 RX ADMIN — HYDRALAZINE HYDROCHLORIDE 100 MG: 25 TABLET, FILM COATED ORAL at 06:04

## 2018-04-30 RX ADMIN — CLONIDINE HYDROCHLORIDE 0.1 MG: 0.1 TABLET ORAL at 07:04

## 2018-04-30 RX ADMIN — SUCRALFATE 1 G: 1 SUSPENSION ORAL at 05:04

## 2018-04-30 RX ADMIN — SODIUM CHLORIDE: 0.9 INJECTION, SOLUTION INTRAVENOUS at 03:04

## 2018-04-30 RX ADMIN — HYDRALAZINE HYDROCHLORIDE 100 MG: 25 TABLET, FILM COATED ORAL at 09:04

## 2018-04-30 RX ADMIN — FLUTICASONE PROPIONATE 1 PUFF: 110 AEROSOL, METERED RESPIRATORY (INHALATION) at 08:04

## 2018-04-30 RX ADMIN — DULOXETINE 30 MG: 30 CAPSULE, DELAYED RELEASE ORAL at 12:04

## 2018-04-30 RX ADMIN — METOPROLOL SUCCINATE 50 MG: 50 TABLET, EXTENDED RELEASE ORAL at 07:04

## 2018-04-30 RX ADMIN — AMLODIPINE BESYLATE 10 MG: 5 TABLET ORAL at 12:04

## 2018-04-30 RX ADMIN — ATORVASTATIN CALCIUM 80 MG: 40 TABLET, FILM COATED ORAL at 12:04

## 2018-04-30 RX ADMIN — CYANOCOBALAMIN TAB 1000 MCG 1000 MCG: 1000 TAB at 12:04

## 2018-04-30 RX ADMIN — MONTELUKAST SODIUM 10 MG: 10 TABLET, FILM COATED ORAL at 09:04

## 2018-04-30 RX ADMIN — THERA TABS 1 TABLET: TAB at 12:04

## 2018-04-30 NOTE — PLAN OF CARE
Spoke with KYREE Hsu about possible stress test. Stated she will be up to see pt and to keep pt NPO for now. Will monitor.

## 2018-04-30 NOTE — SUBJECTIVE & OBJECTIVE
Past Medical History:   Diagnosis Date    Arthritis     Asthma     CHF (congestive heart failure)     COPD (chronic obstructive pulmonary disease)     Hypertension     Leaky heart valve     Rheumatoid arthritis(714.0)     Stroke        Past Surgical History:   Procedure Laterality Date    CAROTID STENT      3 years ago    CHOLECYSTECTOMY      HYSTERECTOMY      SLEEVE GASTROPLASTY  02/21/2017       Review of patient's allergies indicates:  No Known Allergies    No current facility-administered medications on file prior to encounter.      Current Outpatient Prescriptions on File Prior to Encounter   Medication Sig    acetaminophen (TYLENOL) 500 MG tablet Take 2 tablets (1,000 mg total) by mouth every 6 (six) hours as needed.    albuterol (PROVENTIL) 2.5 mg /3 mL (0.083 %) nebulizer solution Take 2.5 mg by nebulization every 24 hours as needed.      albuterol 90 mcg/actuation inhaler Inhale 2 puffs into the lungs 4 (four) times daily.    amlodipine (NORVASC) 10 MG tablet Take 1 tablet (10 mg total) by mouth once daily.    amLODIPine (NORVASC) 10 MG tablet TAKE 1 TABLET(10 MG) BY MOUTH EVERY DAY    aspirin (ECOTRIN) 325 MG EC tablet Take 1 tablet (325 mg total) by mouth once daily.    atorvastatin (LIPITOR) 80 MG tablet Take 1 tablet (80 mg total) by mouth once daily.    b complex vitamins tablet Take 1 tablet by mouth once daily.    calcium citrate-vitamin D3 315-200 mg (CITRACAL+D) 315-200 mg-unit per tablet Take 1 tablet by mouth once daily.     cyanocobalamin (VITAMIN B-12) 1000 MCG tablet Take 1,000 mcg by mouth once daily.     DULoxetine (CYMBALTA) 30 MG capsule TAKE ONE CAPSULE BY MOUTH DAILY AS NEEDED    ferrous sulfate 325 (65 FE) MG EC tablet Take 325 mg by mouth once daily.     FLOVENT  mcg/actuation inhaler TAKE 1 PUFF BY MOUTH TWICE DAILY    fluticasone (FLONASE) 50 mcg/actuation nasal spray 2 sprays by Each Nare route once daily.    hydrALAZINE (APRESOLINE) 100 MG tablet  TAKE 1 TABLET(100 MG) BY MOUTH THREE TIMES DAILY    montelukast (SINGULAIR) 10 mg tablet Take 1 tablet (10 mg total) by mouth every evening.    montelukast (SINGULAIR) 10 mg tablet TAKE 1 TABLET(10 MG) BY MOUTH EVERY EVENING    multivitamin (THERAGRAN) per tablet Take 1 tablet by mouth once daily.    ondansetron (ZOFRAN) 4 MG tablet Take 1 tablet (4 mg total) by mouth every 8 (eight) hours as needed for Nausea.    pantoprazole (PROTONIX) 40 MG tablet Take 1 tablet (40 mg total) by mouth once daily.    promethazine (PHENERGAN) 25 MG tablet Take 1 tablet (25 mg total) by mouth every 4 (four) hours. As needed for nausea or cough.    sucralfate (CARAFATE) 1 gram tablet Take 1 tablet (1 g total) by mouth 4 (four) times daily before meals and nightly.    traZODone (DESYREL) 50 MG tablet Take 1 tablet (50 mg total) by mouth nightly as needed for Insomnia.    [DISCONTINUED] furosemide (LASIX) 20 MG tablet Take 1 tablet (20 mg total) by mouth once daily.     Family History     Problem Relation (Age of Onset)    Arthritis Mother    Asthma Son    Cancer Mother    Diabetes Mother    Heart disease Father    Hyperlipidemia Mother    Hypertension Mother, Father, Sister, Sister    Stroke Mother        Social History Main Topics    Smoking status: Never Smoker    Smokeless tobacco: Never Used    Alcohol use No    Drug use: No    Sexual activity: Not on file     Review of Systems   Constitutional: Negative for chills, diaphoresis and fever.   HENT: Negative for congestion and sore throat.    Eyes: Negative for discharge and visual disturbance.   Respiratory: Negative for cough, chest tightness and shortness of breath.    Cardiovascular: Positive for chest pain. Negative for palpitations and leg swelling.   Gastrointestinal: Positive for nausea. Negative for abdominal distention, abdominal pain, diarrhea and vomiting.   Genitourinary: Negative for dysuria and frequency.   Musculoskeletal: Negative for arthralgias and  myalgias.   Skin: Negative.    Neurological: Negative for seizures and syncope.     Objective:     Vital Signs (Most Recent):  Temp: 98.5 °F (36.9 °C) (04/29/18 2217)  Pulse: 67 (04/30/18 0002)  Resp: 18 (04/29/18 2217)  BP: (!) 141/89 (04/30/18 0029)  SpO2: 99 % (04/30/18 0002) Vital Signs (24h Range):  Temp:  [98.5 °F (36.9 °C)] 98.5 °F (36.9 °C)  Pulse:  [67-85] 67  Resp:  [18] 18  SpO2:  [97 %-99 %] 99 %  BP: (141-182)/(78-89) 141/89     Weight: 88.5 kg (195 lb)  Body mass index is 33.47 kg/m².    Physical Exam   Constitutional: She is oriented to person, place, and time. She appears well-developed and well-nourished. No distress.   HENT:   Head: Normocephalic and atraumatic.   Eyes: EOM are normal. Pupils are equal, round, and reactive to light.   Neck: Normal range of motion. Neck supple.   Cardiovascular: Normal rate, regular rhythm, normal heart sounds and intact distal pulses.    No murmur heard.  Pulmonary/Chest: Effort normal and breath sounds normal. No respiratory distress. She has no wheezes. She has no rales.   Abdominal: Soft. Bowel sounds are normal. She exhibits no distension. There is no tenderness.   Musculoskeletal: Normal range of motion. She exhibits no edema.   Neurological: She is alert and oriented to person, place, and time.   Skin: Skin is warm and dry. She is not diaphoretic.   Psychiatric: She has a normal mood and affect. Her behavior is normal. Her speech is delayed.   Nursing note and vitals reviewed.        CRANIAL NERVES     CN III, IV, VI   Pupils are equal, round, and reactive to light.  Extraocular motions are normal.        Significant Labs:   CBC:   Recent Labs  Lab 04/29/18 2255   WBC 7.30   HGB 10.4*   HCT 32.4*        CMP:   Recent Labs  Lab 04/29/18 2255      K 4.3      CO2 23   GLU 90   BUN 29*   CREATININE 1.7*   CALCIUM 9.4   PROT 7.0   ALBUMIN 3.3*   BILITOT 0.2   ALKPHOS 57   AST 13   ALT 9*   ANIONGAP 10   EGFRNONAA 34*     Cardiac Markers:    Recent Labs  Lab 04/29/18  2255   BNP 47     Troponin:   Recent Labs  Lab 04/29/18  2255   TROPONINI 0.008       Significant Imaging: I have reviewed all pertinent imaging results/findings within the past 24 hours.

## 2018-04-30 NOTE — PROGRESS NOTES
ETHAN Lemus NP notified.      04/30/18 1717   Vital Signs   Pulse 90   Heart Rate Source Monitor   Resp 18   SpO2 99 %   O2 Device (Oxygen Therapy) room air   BP (!) 194/92   MAP (mmHg) 132   Patient Position Lying

## 2018-04-30 NOTE — ED PROVIDER NOTES
Encounter Date: 4/29/2018    SCRIBE #1 NOTE: Tamiko ARANDA, shana scribing for, and in the presence of, .       History     Chief Complaint   Patient presents with    Chest Pain     for sharp upper chest pain for 30 minutes PTA       04/29/2018 10:24 PM     Chief complaint: chest pain      Faustina Rae is a 53 y.o. female with CHF, HTN, COPD, Asthma, who presents to the ED via EMS for chest pain onset 30 min. Patient describes the pain as sharp acute onset with nausea. She states she was drinking cold ice tea when she noticed the pain. EMS state giving her 2 nitro en route that relieved the pain.Patient endorses use of daily aspirin. Denies abdominal pain, vomiting, diarrhea, diaphoresis, radiating pain, shortness of breath, back pain, neck pain, or fever. Shx includes carotid stents and had an MI <5 years ago. Mhx includes CVA.        The history is provided by the patient. No  was used.     Review of patient's allergies indicates:  No Known Allergies  Past Medical History:   Diagnosis Date    Arthritis     Asthma     CHF (congestive heart failure)     COPD (chronic obstructive pulmonary disease)     Hypertension     Leaky heart valve     Rheumatoid arthritis(714.0)     Stroke      Past Surgical History:   Procedure Laterality Date    CAROTID STENT      3 years ago    CHOLECYSTECTOMY      HYSTERECTOMY      SLEEVE GASTROPLASTY  02/21/2017     Family History   Problem Relation Age of Onset    Arthritis Mother     Cancer Mother     Diabetes Mother     Hyperlipidemia Mother     Hypertension Mother     Stroke Mother     Heart disease Father     Hypertension Father     Asthma Son     Hypertension Sister     Hypertension Sister     Kidney disease Neg Hx      Social History   Substance Use Topics    Smoking status: Never Smoker    Smokeless tobacco: Never Used    Alcohol use No     Review of Systems   Constitutional: Negative for fever.   HENT: Negative for sore  throat.    Respiratory: Negative for cough and shortness of breath.    Cardiovascular: Positive for chest pain.   Gastrointestinal: Positive for nausea. Negative for abdominal pain, diarrhea and vomiting.   Genitourinary: Negative for dysuria.   Musculoskeletal: Negative for back pain.   Skin: Negative for rash.   Neurological: Negative for weakness.   Hematological: Does not bruise/bleed easily.       Physical Exam     Initial Vitals [04/29/18 2217]   BP Pulse Resp Temp SpO2   (!) 182/82 85 18 98.5 °F (36.9 °C) 98 %      MAP       115.33         Physical Exam    Nursing note and vitals reviewed.  Constitutional: She appears well-developed and well-nourished.  Non-toxic appearance. No distress.   HENT:   Head: Normocephalic and atraumatic.   Eyes: EOM are normal. Pupils are equal, round, and reactive to light.   Neck: Normal range of motion. Neck supple. No neck rigidity. No JVD present.   Cardiovascular: Normal rate, regular rhythm, normal heart sounds and intact distal pulses. Exam reveals no gallop and no friction rub.    No murmur heard.  Pulmonary/Chest: Breath sounds normal. She has no wheezes. She has no rhonchi. She has no rales. She exhibits tenderness.   Abdominal: Soft. Bowel sounds are normal. She exhibits no distension. There is no tenderness. There is no rigidity, no rebound and no guarding.   Musculoskeletal: Normal range of motion.   Tenderness to palpation over xyphoid process.   Neurological: She is alert and oriented to person, place, and time. She has normal strength and normal reflexes. No cranial nerve deficit or sensory deficit. She exhibits normal muscle tone. Coordination normal. GCS eye subscore is 4. GCS verbal subscore is 5. GCS motor subscore is 6.   Skin: Skin is warm and dry.   Psychiatric: She has a normal mood and affect. Her speech is normal and behavior is normal. She is not actively hallucinating.         ED Course   Procedures  Labs Reviewed   CBC W/ AUTO DIFFERENTIAL - Abnormal;  Notable for the following:        Result Value    RBC 3.57 (*)     Hemoglobin 10.4 (*)     Hematocrit 32.4 (*)     RDW 14.6 (*)     MPV 8.3 (*)     All other components within normal limits   COMPREHENSIVE METABOLIC PANEL - Abnormal; Notable for the following:     BUN, Bld 29 (*)     Creatinine 1.7 (*)     Albumin 3.3 (*)     ALT 9 (*)     eGFR if  39 (*)     eGFR if non  34 (*)     All other components within normal limits   TROPONIN I   B-TYPE NATRIURETIC PEPTIDE     EKG Readings: (Independently Interpreted)   Normal EKG. Non specific T wave changes in lead II.          Medical Decision Making:   History:   Old Medical Records: I decided to obtain old medical records.  Initial Assessment:   Assessment/Plan:  Faustina Rae is a 53 y.o. female with chest pain.    This is an emergent evaluation.  Patient is complaining of chest pain.  My differential diagnosis includes: Acute MI, unstable angina, stable angina, congestive heart failure, pneumonia, pneumothorax, COPD/asthma, bronchitis, and mass.    Clinically, the patient does not have any symptoms of bronchitis, asthma, or COPD exacerbation.    An EKG was performed and was negative for ST segment elevation.  A chest x-ray was performed and was negative for signs of heart failure or pulmonary edema.  There was no evidence of pneumonia or pneumothorax or mass lesion.    Cardiac markers-troponin and BNP-are both negative.  No evidence for NSTEMI or CHF.  I believe the patient was likely suffering from esophageal spasm as her pain began after drinking cold iced tea and was improved with nitroglycerin however she is high risk.  This patient has multiple cardiac risk factors. Risk stratification with serial cardiac markers and stress testing is warranted in this patient.  I believe the patient should be admitted for observation to the definitively rule out acute coronary syndrome.    I discussed the patient's presentation and workup  with the hospitalist who agrees with placing the patient in the hospital.  I have placed orders for the hospitalist.     Clinton Hurtado M.D. 2:35 AM 4/30/2018      Clinical Tests:   Lab Tests: Reviewed and Ordered  Medical Tests: Ordered and Reviewed            Scribe Attestation:   Scribe #1: I performed the above scribed service and the documentation accurately describes the services I performed. I attest to the accuracy of the note.    I, Bryant Moarles, personally performed the services described in this documentation. All medical record entries made by the scribe were at my direction and in my presence.  I have reviewed the chart and agree that the record reflects my personal performance and is accurate and complete. Clinton Hurtado MD.  2:36 AM 04/30/2018             Clinical Impression:   The encounter diagnosis was Chest pain.    Disposition:   Disposition: Admitted  Condition: Stable                        Clinton Hurtado MD  04/30/18 0237

## 2018-04-30 NOTE — ASSESSMENT & PLAN NOTE
- in NAD  - duo nebs Q 4 PRN SOB / wheezing  - supplemental oxygen as needed to maintain saturations greater than 92%

## 2018-04-30 NOTE — ASSESSMENT & PLAN NOTE
- SBP ranging between 141-182  - resume home dosing of amlodipine and hydralazine   - monitor closely and consider adding PRN agent if indicated

## 2018-04-30 NOTE — ASSESSMENT & PLAN NOTE
- hx of diastolic dysfunction  - most recent ECHO 03/08/18 EF estimated at 65%  - BNP 47  - continue to monitor

## 2018-04-30 NOTE — PLAN OF CARE
Problem: Patient Care Overview  Goal: Plan of Care Review  Outcome: Ongoing (interventions implemented as appropriate)  Pain is well controlled, slept well, episode of chest pain relieved with ntg. No other issues or events. Safety maintained.

## 2018-04-30 NOTE — PLAN OF CARE
Problem: Cardiac: ACS (Acute Coronary Syndrome) (Adult)  Intervention: Manage Acute Chest Pain  Ntg 1/150 sl given. Stat ekg done. Full relief in less than 5 minutes. Nurse practitioner Dannie at bedside during event.

## 2018-04-30 NOTE — PLAN OF CARE
PCP is Dr Gonzales.  Verified insurance as [employer].  Pharmacy is [email or web address] on [facility].  Denies HH/DME.  Patient states her daughter will pick her up on discharge.  Discharge plan is home; no needs.       04/30/18 1505   Discharge Assessment   Assessment Type Discharge Planning Assessment   Confirmed/corrected address and phone number on facesheet? Yes   Assessment information obtained from? Patient   Prior to hospitilization cognitive status: Alert/Oriented   Prior to hospitalization functional status: Independent   Current cognitive status: Alert/Oriented   Current Functional Status: Independent   Lives With alone   Able to Return to Prior Arrangements yes   Is patient able to care for self after discharge? Yes   Patient's perception of discharge disposition home or selfcare   Readmission Within The Last 30 Days no previous admission in last 30 days   Patient currently being followed by outpatient case management? No   Patient currently receives any other outside agency services? No   Equipment Currently Used at Home none   Do you have any problems affording any of your prescribed medications? No   Is the patient taking medications as prescribed? yes   Does the patient have transportation home? Yes   Transportation Available family or friend will provide   Does the patient receive services at the [facility]? No   Discharge Plan A Home   Patient/Family In Agreement With Plan yes

## 2018-04-30 NOTE — HPI
52 y/o female, who presents via EMS to the ED with c/o acute sharp chest discomfort.  She has a PMH of  CAD s/p stent placement, CHF, HTN, COPD, obesity s/p gastric sleeve, and CVA. She reports that she was drinking cold ice tea when the pain started at home.  She endorses associative symptoms of nausea.  She denies SOB, diaphoresis, fever, chills, V/D, or  symptoms.  Discomfort is not affected by deep inspiration, nor is it reproducible to touch.  She states that she was recently treated for pneumonia, but denies any respiratory symptoms at this time.  She reports frequent episodes of similar discomfort that is relieved with PRN NTG.  She received 2 SL NTG while en route with EMS and reports resolution of pain on arrival to ED.Patient endorses compliance with her home medication regimen. Initial troponin 0.008, EKG without obvious acute ischemic changes.

## 2018-04-30 NOTE — H&P
[facility] Hospital Medicine  History & Physical    Patient Name: Faustina Rae  MRN: [id number]  Admission Date: 4/29/2018  Attending Physician: Jenna Coy MD   Primary Care Provider: Jaxson Gonzales MD         Patient information was obtained from patient and ER records.     Subjective:     Principal Problem:Chest pain    Chief Complaint:   Chief Complaint   Patient presents with    Chest Pain     for sharp upper chest pain for 30 minutes PTA        HPI: 54 y/o female, who presents via EMS to the ED with c/o acute sharp chest discomfort.  She has a PMH of  CAD s/p stent placement, CHF, HTN, COPD, obesity s/p gastric sleeve, and CVA. She reports that she was drinking cold ice tea when the pain started at home.  She endorses associative symptoms of nausea.  She denies SOB, diaphoresis, fever, chills, V/D, or  symptoms.  Discomfort is not affected by deep inspiration, nor is it reproducible to touch.  She states that she was recently treated for pneumonia, but denies any respiratory symptoms at this time.  She reports frequent episodes of similar discomfort that is relieved with PRN NTG.  She received 2 SL NTG while en route with EMS and reports resolution of pain on arrival to ED.Patient endorses compliance with her home medication regimen. Initial troponin 0.008, EKG without obvious acute ischemic changes.      Past Medical History:   Diagnosis Date    Arthritis     Asthma     CHF (congestive heart failure)     COPD (chronic obstructive pulmonary disease)     Hypertension     Leaky heart valve     Rheumatoid arthritis(714.0)     Stroke        Past Surgical History:   Procedure Laterality Date    CAROTID STENT      3 years ago    CHOLECYSTECTOMY      HYSTERECTOMY      SLEEVE GASTROPLASTY  02/21/2017       Review of patient's allergies indicates:  No Known Allergies    No current facility-administered medications on file prior to encounter.      Current Outpatient  Prescriptions on File Prior to Encounter   Medication Sig    acetaminophen (TYLENOL) 500 MG tablet Take 2 tablets (1,000 mg total) by mouth every 6 (six) hours as needed.    albuterol (PROVENTIL) 2.5 mg /3 mL (0.083 %) nebulizer solution Take 2.5 mg by nebulization every 24 hours as needed.      albuterol 90 mcg/actuation inhaler Inhale 2 puffs into the lungs 4 (four) times daily.    amlodipine (NORVASC) 10 MG tablet Take 1 tablet (10 mg total) by mouth once daily.    amLODIPine (NORVASC) 10 MG tablet TAKE 1 TABLET(10 MG) BY MOUTH EVERY DAY    aspirin (ECOTRIN) 325 MG EC tablet Take 1 tablet (325 mg total) by mouth once daily.    atorvastatin (LIPITOR) 80 MG tablet Take 1 tablet (80 mg total) by mouth once daily.    b complex vitamins tablet Take 1 tablet by mouth once daily.    calcium citrate-vitamin D3 315-200 mg (CITRACAL+D) 315-200 mg-unit per tablet Take 1 tablet by mouth once daily.     cyanocobalamin (VITAMIN B-12) 1000 MCG tablet Take 1,000 mcg by mouth once daily.     DULoxetine (CYMBALTA) 30 MG capsule TAKE ONE CAPSULE BY MOUTH DAILY AS NEEDED    ferrous sulfate 325 (65 FE) MG EC tablet Take 325 mg by mouth once daily.     FLOVENT  mcg/actuation inhaler TAKE 1 PUFF BY MOUTH TWICE DAILY    fluticasone (FLONASE) 50 mcg/actuation nasal spray 2 sprays by Each Nare route once daily.    hydrALAZINE (APRESOLINE) 100 MG tablet TAKE 1 TABLET(100 MG) BY MOUTH THREE TIMES DAILY    montelukast (SINGULAIR) 10 mg tablet Take 1 tablet (10 mg total) by mouth every evening.    montelukast (SINGULAIR) 10 mg tablet TAKE 1 TABLET(10 MG) BY MOUTH EVERY EVENING    multivitamin (THERAGRAN) per tablet Take 1 tablet by mouth once daily.    ondansetron (ZOFRAN) 4 MG tablet Take 1 tablet (4 mg total) by mouth every 8 (eight) hours as needed for Nausea.    pantoprazole (PROTONIX) 40 MG tablet Take 1 tablet (40 mg total) by mouth once daily.    promethazine (PHENERGAN) 25 MG tablet Take 1 tablet (25 mg  total) by mouth every 4 (four) hours. As needed for nausea or cough.    sucralfate (CARAFATE) 1 gram tablet Take 1 tablet (1 g total) by mouth 4 (four) times daily before meals and nightly.    traZODone (DESYREL) 50 MG tablet Take 1 tablet (50 mg total) by mouth nightly as needed for Insomnia.    [DISCONTINUED] furosemide (LASIX) 20 MG tablet Take 1 tablet (20 mg total) by mouth once daily.     Family History     Problem Relation (Age of Onset)    Arthritis Mother    Asthma Son    Cancer Mother    Diabetes Mother    Heart disease Father    Hyperlipidemia Mother    Hypertension Mother, Father, Sister, Sister    Stroke Mother        Social History Main Topics    Smoking status: Never Smoker    Smokeless tobacco: Never Used    Alcohol use No    Drug use: No    Sexual activity: Not on file     Review of Systems   Constitutional: Negative for chills, diaphoresis and fever.   HENT: Negative for congestion and sore throat.    Eyes: Negative for discharge and visual disturbance.   Respiratory: Negative for cough, chest tightness and shortness of breath.    Cardiovascular: Positive for chest pain. Negative for palpitations and leg swelling.   Gastrointestinal: Positive for nausea. Negative for abdominal distention, abdominal pain, diarrhea and vomiting.   Genitourinary: Negative for dysuria and frequency.   Musculoskeletal: Negative for arthralgias and myalgias.   Skin: Negative.    Neurological: Negative for seizures and syncope.     Objective:     Vital Signs (Most Recent):  Temp: 98.5 °F (36.9 °C) (04/29/18 2217)  Pulse: 67 (04/30/18 0002)  Resp: 18 (04/29/18 2217)  BP: (!) 141/89 (04/30/18 0029)  SpO2: 99 % (04/30/18 0002) Vital Signs (24h Range):  Temp:  [98.5 °F (36.9 °C)] 98.5 °F (36.9 °C)  Pulse:  [67-85] 67  Resp:  [18] 18  SpO2:  [97 %-99 %] 99 %  BP: (141-182)/(78-89) 141/89     Weight: 88.5 kg (195 lb)  Body mass index is 33.47 kg/m².    Physical Exam   Constitutional: She is oriented to person, place,  and time. She appears well-developed and well-nourished. No distress.   HENT:   Head: Normocephalic and atraumatic.   Eyes: EOM are normal. Pupils are equal, round, and reactive to light.   Neck: Normal range of motion. Neck supple.   Cardiovascular: Normal rate, regular rhythm, normal heart sounds and intact distal pulses.    No murmur heard.  Pulmonary/Chest: Effort normal and breath sounds normal. No respiratory distress. She has no wheezes. She has no rales.   Abdominal: Soft. Bowel sounds are normal. She exhibits no distension. There is no tenderness.   Musculoskeletal: Normal range of motion. She exhibits no edema.   Neurological: She is alert and oriented to person, place, and time.   Skin: Skin is warm and dry. She is not diaphoretic.   Psychiatric: She has a normal mood and affect. Her behavior is normal. Her speech is delayed.   Nursing note and vitals reviewed.        CRANIAL NERVES     CN III, IV, VI   Pupils are equal, round, and reactive to light.  Extraocular motions are normal.        Significant Labs:   CBC:   Recent Labs  Lab 04/29/18 2255   WBC 7.30   HGB 10.4*   HCT 32.4*        CMP:   Recent Labs  Lab 04/29/18 2255      K 4.3      CO2 23   GLU 90   BUN 29*   CREATININE 1.7*   CALCIUM 9.4   PROT 7.0   ALBUMIN 3.3*   BILITOT 0.2   ALKPHOS 57   AST 13   ALT 9*   ANIONGAP 10   EGFRNONAA 34*     Cardiac Markers:   Recent Labs  Lab 04/29/18 2255   BNP 47     Troponin:   Recent Labs  Lab 04/29/18 2255   TROPONINI 0.008       Significant Imaging: I have reviewed all pertinent imaging results/findings within the past 24 hours.    Assessment/Plan:     * Chest pain    - presents with acute sharp chest discomfort with associative nausea   - hx of prior stent placement  - reports resolution of discomfort with SL NTG - continue PRN   - EKG without acute ischemic changes noted  - initial troponin 0.008, BNP 47 - trend troponin Q 6 x 3  - possibly GI in nature as pain started while  drinking cold beverage   - GI cocktail PRN x 1 epigastric discomfort / indigestion   - maintain on telemetry  - continue ASA and statin        Chronic diastolic CHF (congestive heart failure)    - hx of diastolic dysfunction  - most recent ECHO 03/08/18 EF estimated at 65%  - BNP 47  - continue to monitor         CKD (chronic kidney disease) stage 3, GFR 30-59 ml/min    - crt 1.7 (baseline 1.1-1.6)  - gentle hydration overnight and reassess fluid status in AM  - trend chemistry daily   - strict I/O's  - avoid nephrotoxins if possible         COPD (chronic obstructive pulmonary disease)    - in NAD  - duo nebs Q 4 PRN SOB / wheezing  - supplemental oxygen as needed to maintain saturations greater than 92%        Essential hypertension    - SBP ranging between 141-182  - resume home dosing of amlodipine and hydralazine   - monitor closely and consider adding PRN agent if indicated         Normocytic anemia    - Hgb 10.4 (baseline 11.2-12.9) slowly trending down  - trend H/H daily   - stool for OCB x 1        Morbid obesity with BMI of 40.0-44.9, adult    - hx of gastric sleeve  - continue ongoing weight loss efforts   - promote healthy lifestyle          VTE Risk Mitigation         Ordered     IP VTE HIGH RISK PATIENT  Once      04/30/18 0045     Place SOL hose  Until discontinued      04/30/18 0045     Place sequential compression device  Until discontinued      04/30/18 0045             Dannie Redding NP  Department of Hospital Medicine   [facility]

## 2018-04-30 NOTE — ASSESSMENT & PLAN NOTE
- crt 1.7 (baseline 1.1-1.6)  - gentle hydration overnight and reassess fluid status in AM  - trend chemistry daily   - strict I/O's  - avoid nephrotoxins if possible

## 2018-04-30 NOTE — PLAN OF CARE
04/30/18 1625   Final Note   Assessment Type Final Discharge Note   Discharge Disposition Home

## 2018-04-30 NOTE — ASSESSMENT & PLAN NOTE
- presents with acute sharp chest discomfort with associative nausea   - hx of prior stent placement  - reports resolution of discomfort with SL NTG - continue PRN   - EKG without acute ischemic changes noted  - initial troponin 0.008, BNP 47 - trend troponin Q 6 x 3  - possibly GI in nature as pain started while drinking cold beverage   - GI cocktail PRN x 1 epigastric discomfort / indigestion   - maintain on telemetry  - continue ASA and statin

## 2018-05-01 VITALS
HEART RATE: 71 BPM | BODY MASS INDEX: 33.29 KG/M2 | DIASTOLIC BLOOD PRESSURE: 61 MMHG | OXYGEN SATURATION: 100 % | SYSTOLIC BLOOD PRESSURE: 125 MMHG | HEIGHT: 64 IN | WEIGHT: 195 LBS | TEMPERATURE: 98 F | RESPIRATION RATE: 15 BRPM

## 2018-05-01 LAB
ALBUMIN SERPL BCP-MCNC: 3 G/DL
ALP SERPL-CCNC: 54 U/L
ALT SERPL W/O P-5'-P-CCNC: 8 U/L
ANION GAP SERPL CALC-SCNC: 8 MMOL/L
AST SERPL-CCNC: 13 U/L
BASOPHILS # BLD AUTO: 0 K/UL
BASOPHILS NFR BLD: 0.5 %
BILIRUB SERPL-MCNC: 0.4 MG/DL
BUN SERPL-MCNC: 21 MG/DL
CALCIUM SERPL-MCNC: 9.5 MG/DL
CHLORIDE SERPL-SCNC: 111 MMOL/L
CO2 SERPL-SCNC: 21 MMOL/L
CREAT SERPL-MCNC: 1.3 MG/DL
DIFFERENTIAL METHOD: ABNORMAL
EOSINOPHIL # BLD AUTO: 0.1 K/UL
EOSINOPHIL NFR BLD: 1.9 %
ERYTHROCYTE [DISTWIDTH] IN BLOOD BY AUTOMATED COUNT: 14.3 %
EST. GFR  (AFRICAN AMERICAN): 54 ML/MIN/1.73 M^2
EST. GFR  (NON AFRICAN AMERICAN): 47 ML/MIN/1.73 M^2
GLUCOSE SERPL-MCNC: 94 MG/DL
HCT VFR BLD AUTO: 31.7 %
HGB BLD-MCNC: 10.7 G/DL
LYMPHOCYTES # BLD AUTO: 1.9 K/UL
LYMPHOCYTES NFR BLD: 33.4 %
MAGNESIUM SERPL-MCNC: 1.7 MG/DL
MCH RBC QN AUTO: 30 PG
MCHC RBC AUTO-ENTMCNC: 33.6 G/DL
MCV RBC AUTO: 89 FL
MONOCYTES # BLD AUTO: 0.2 K/UL
MONOCYTES NFR BLD: 3.9 %
NEUTROPHILS # BLD AUTO: 3.5 K/UL
NEUTROPHILS NFR BLD: 60.3 %
PHOSPHATE SERPL-MCNC: 3.5 MG/DL
PLATELET # BLD AUTO: 225 K/UL
PMV BLD AUTO: 8.1 FL
POTASSIUM SERPL-SCNC: 4 MMOL/L
PROT SERPL-MCNC: 6.5 G/DL
RBC # BLD AUTO: 3.56 M/UL
SODIUM SERPL-SCNC: 140 MMOL/L
WBC # BLD AUTO: 5.8 K/UL

## 2018-05-01 PROCEDURE — 84100 ASSAY OF PHOSPHORUS: CPT

## 2018-05-01 PROCEDURE — 25000003 PHARM REV CODE 250: Performed by: NURSE PRACTITIONER

## 2018-05-01 PROCEDURE — 83735 ASSAY OF MAGNESIUM: CPT

## 2018-05-01 PROCEDURE — G0378 HOSPITAL OBSERVATION PER HR: HCPCS

## 2018-05-01 PROCEDURE — 36415 COLL VENOUS BLD VENIPUNCTURE: CPT

## 2018-05-01 PROCEDURE — 25000242 PHARM REV CODE 250 ALT 637 W/ HCPCS: Performed by: NURSE PRACTITIONER

## 2018-05-01 PROCEDURE — 94761 N-INVAS EAR/PLS OXIMETRY MLT: CPT

## 2018-05-01 PROCEDURE — 85025 COMPLETE CBC W/AUTO DIFF WBC: CPT

## 2018-05-01 PROCEDURE — 80053 COMPREHEN METABOLIC PANEL: CPT

## 2018-05-01 PROCEDURE — 94640 AIRWAY INHALATION TREATMENT: CPT

## 2018-05-01 RX ORDER — METOPROLOL SUCCINATE 50 MG/1
50 TABLET, EXTENDED RELEASE ORAL DAILY
Qty: 30 TABLET | Refills: 0 | Status: SHIPPED | OUTPATIENT
Start: 2018-05-01 | End: 2018-12-13 | Stop reason: SDUPTHER

## 2018-05-01 RX ADMIN — FERROUS SULFATE TAB EC 325 MG (65 MG FE EQUIVALENT) 325 MG: 325 (65 FE) TABLET DELAYED RESPONSE at 09:05

## 2018-05-01 RX ADMIN — AMLODIPINE BESYLATE 10 MG: 5 TABLET ORAL at 08:05

## 2018-05-01 RX ADMIN — PANTOPRAZOLE SODIUM 40 MG: 40 TABLET, DELAYED RELEASE ORAL at 09:05

## 2018-05-01 RX ADMIN — ATORVASTATIN CALCIUM 80 MG: 40 TABLET, FILM COATED ORAL at 08:05

## 2018-05-01 RX ADMIN — METOPROLOL SUCCINATE 50 MG: 50 TABLET, EXTENDED RELEASE ORAL at 09:05

## 2018-05-01 RX ADMIN — DULOXETINE 30 MG: 30 CAPSULE, DELAYED RELEASE ORAL at 09:05

## 2018-05-01 RX ADMIN — FLUTICASONE PROPIONATE 1 PUFF: 110 AEROSOL, METERED RESPIRATORY (INHALATION) at 09:05

## 2018-05-01 RX ADMIN — SUCRALFATE 1 G: 1 SUSPENSION ORAL at 12:05

## 2018-05-01 RX ADMIN — SUCRALFATE 1 G: 1 SUSPENSION ORAL at 05:05

## 2018-05-01 RX ADMIN — FLUTICASONE PROPIONATE 100 MCG: 50 SPRAY, METERED NASAL at 08:05

## 2018-05-01 RX ADMIN — CYANOCOBALAMIN TAB 1000 MCG 1000 MCG: 1000 TAB at 09:05

## 2018-05-01 RX ADMIN — THERA TABS 1 TABLET: TAB at 08:05

## 2018-05-01 RX ADMIN — ASPIRIN 325 MG: 325 TABLET, DELAYED RELEASE ORAL at 09:05

## 2018-05-01 RX ADMIN — HYDRALAZINE HYDROCHLORIDE 100 MG: 25 TABLET, FILM COATED ORAL at 05:05

## 2018-05-01 NOTE — HOSPITAL COURSE
Patient monitored during observation stay. Telemetry with no arrhythmias noted. Troponins trended and negative x 3. Patient underwent Lexiscan which is positive for Moderate intensity reversible defect in inferior wall suggesting ischemia. Cardiology consulted. Dr. Valladares discussed results of cardiac stress test with with patient. Plans for outpatient follow up in 1 week to schedule coronary angiogram. Patient instructed to  continue ASA and statin.     PE; chest CTA. Heart RRR. Denies chest pain and dyspnea.

## 2018-05-01 NOTE — PROGRESS NOTES
05/01/18 0917   Patient Assessment/Suction   Level of Consciousness (AVPU) alert   PRE-TX-O2-ETCO2   O2 Device (Oxygen Therapy) room air   SpO2 100 %   Pulse 71   Resp 15   Inhaler   $ Inhaler Charges MDI (Metered Dose Inahler) Treatment   Respiratory Treatment Status given   SVN/Inhaler Treatment Route mouthpiece   Patient Tolerance good      Well developed

## 2018-05-01 NOTE — ASSESSMENT & PLAN NOTE
- presents with acute sharp chest discomfort with associative nausea   - hx of prior stent placement  - reports resolution of discomfort with SL NTG - continue PRN   - EKG without acute ischemic changes noted  - initial troponin 0.008, BNP 47 - trend troponin Q 6 x 3  - possibly GI in nature as pain started while drinking cold beverage   - GI cocktail PRN x 1 epigastric discomfort / indigestion   - maintain on telemetry  - continue ASA and statin  -SINAN score 4.. NPO for cardiac stress test.

## 2018-05-01 NOTE — NURSING
Alert and oriented. Discussed plan of care with patient. Blood pressure elevated and was explained that her elevated BP is holding discharge at this time per cardiologist. Plan is to control BP before discharge home. Patient denies any s/s of HTN. Verbalized understanding. Will continue to monitor.

## 2018-05-01 NOTE — CONSULTS
HISTORY OF PRESENT ILLNESS:  This 53-year-old patient was admitted from the   Emergency Room.  The patient was in her usual state of health yesterday when she   drank some ice cold tea.  The pain started immediately in the left subxiphoid   area of the chest.  There was no radiation, no associated dyspnea or   diaphoresis.  The pain was intense and she decided to come to the Emergency   Room.  She continued to have pain, which finally resolved around 1:00 p.m.   today.  The pain lasted for several hours continuously.  She denies having any   recent chest pains.  There is no known history of myocardial infarction.  The   patient had cardiac angiography done in 2009, which showed normal coronaries.    She did have moderately severe mitral regurgitation; however, she has done well   without any further cardiac intervention.  She has no history of coronary stent   placement, though it is mentioned in her current H and P.  The patient is   noncompliant with medication.  Echocardiogram in March 2018 showed normal LV   function.    PAST MEDICAL HISTORY:  COPD, hypertension, rheumatoid arthritis, cerebrovascular   accident.    PAST SURGICAL HISTORY:  Status post sleeve gastroplasty, hysterectomy and   cholecystectomy.    ALLERGIES:  Not known.    HOME MEDICATIONS:  Include acetaminophen p.r.n., Proventil p.r.n., amlodipine 10   mg a day, aspirin 325 mg a day, Lipitor 80 mg daily, vitamin B10, B12, calcium,   Cymbalta, ferrous sulfate, hydralazine.    SOCIAL HISTORY:  She denies any drinking or smoking.    PHYSICAL EXAMINATION:  GENERAL:  Shows a well-built female patient in no acute distress.  VITAL SIGNS:  Temperature 98, pulse 90 per minute and regular, blood pressure   170/80.  HEENT:  Normocephalic.  Sclerae nonicteric.  NECK:  No jugular venous distention.  Carotids 1+ without bruit.  CARDIAC:  Regular rhythm.  No murmur, gallop or rub.  There is no chest wall   tenderness.  LUNGS:  Clear to auscultation.  ABDOMEN:   Obese, soft, nontender.  Bowel sounds present.  EXTREMITIES:  No feet or calf tenderness, clubbing or cyanosis.    Chest x-ray is negative for any acute process.  EKG shows normal sinus rhythm   within normal limits x3.    LABORATORY TESTS:  WBC 5.9, hemoglobin 10.7, hematocrit 32, platelet count 207.    Sodium 142, potassium 4.0, chloride 113,  CO2 of 22, BUN 28, creatinine 1.7 and   1.5.  GFR is estimated at 39.  Liver function tests normal.  Troponin 0.008 x3.    BNP 47.  Nuclear stress test suggestive of inferior wall reversible defect.    The actual images are not available for review.    IMPRESSION:  1.  Chest pain with atypical features, myocardial infarction ruled out.  2.  Hypertension.  3.  Chronic kidney disease.  4.  Obesity.  5.  Noncompliance.    I had a long discussion with the patient and explained to her about the report   of the stress test showing reversible defect and possibility of coronary artery   disease.  We discussed the option of continuing medical therapy controlling   blood pressure versus cardiac angiography in the a.m.  She has decided not to   have the cardiac angiography done at this time and wants to continue with   medication.  She may need further adjustment to control her blood pressure   medication.        /alvaro [id number] luis fernando(s)        /SARAHI  dd: 04/30/2018 20:12:58 (CDT)  td: 04/30/2018 23:06:48 (CDT)  Doc ID   #[id number]  Job ID #[id number]    CC: Jenna Coy M.D.

## 2018-05-01 NOTE — PLAN OF CARE
05/01/18 1157   Final Note   Assessment Type Final Discharge Note   Discharge Disposition Home

## 2018-05-01 NOTE — DISCHARGE SUMMARY
[facility] Hospital Medicine  Discharge Summary      Patient Name: Faustina Rae  MRN: [id number]  Admission Date: 4/29/2018  Hospital Length of Stay: 0 days  Discharge Date and Time: 5/1/2018 11:08 AM  Attending Physician: No att. providers found   Discharging Provider: Francia Lemus NP  Primary Care Provider: Jaxson Gonzales MD      HPI:   52 y/o female, who presents via EMS to the ED with c/o acute sharp chest discomfort.  She has a PMH of  CAD s/p stent placement, CHF, HTN, COPD, obesity s/p gastric sleeve, and CVA. She reports that she was drinking cold ice tea when the pain started at home.  She endorses associative symptoms of nausea.  She denies SOB, diaphoresis, fever, chills, V/D, or  symptoms.  Discomfort is not affected by deep inspiration, nor is it reproducible to touch.  She states that she was recently treated for pneumonia, but denies any respiratory symptoms at this time.  She reports frequent episodes of similar discomfort that is relieved with PRN NTG.  She received 2 SL NTG while en route with EMS and reports resolution of pain on arrival to ED.Patient endorses compliance with her home medication regimen. Initial troponin 0.008, EKG without obvious acute ischemic changes.      * No surgery found *      Hospital Course:   Patient monitored during observation stay. Telemetry with no arrhythmias noted. Troponins trended and negative x 3. Patient underwent Lexiscan which is positive for Moderate intensity reversible defect in inferior wall suggesting ischemia. Cardiology consulted. Dr. Valladares discussed results of cardiac stress test with with patient. Plans for outpatient follow up in 1 week to schedule coronary angiogram. Patient instructed to  continue ASA and statin.     PE; chest CTA. Heart RRR. Denies chest pain and dyspnea.      Consults:   Consults         Status Ordering Provider     Inpatient consult to Cardiology  Once     Provider:  Jo Valladares MD     MICHAEL Kendall          No new Assessment & Plan notes have been filed under this hospital service since the last note was generated.  Service: Hospital Medicine    Final Active Diagnoses:    Diagnosis Date Noted POA    PRINCIPAL PROBLEM:  Atherosclerosis of native coronary artery with stable angina pectoris [I25.118] 04/30/2018 Yes    Gastroesophageal reflux disease with esophagitis [K21.0] 04/30/2018 Yes    CKD (chronic kidney disease) stage 3, GFR 30-59 ml/min [N18.3] 03/08/2018 Yes    Normocytic anemia [D64.9] 03/08/2018 Yes    Essential hypertension [I10] 02/21/2017 Yes    Chronic diastolic CHF (congestive heart failure) [I50.32] 08/17/2016 Yes    COPD (chronic obstructive pulmonary disease) [J44.9] 08/08/2013 Yes    Morbid obesity with BMI of 40.0-44.9, adult [E66.01, Z68.41] 06/10/2012 Not Applicable      Problems Resolved During this Admission:    Diagnosis Date Noted Date Resolved POA       Discharged Condition: stable    Disposition: Home or Self Care    Follow Up:  Follow-up Information     Jaxson Gonzales MD In 1 week.    Specialties:  Family Medicine, Internal Medicine  Why:  hosptial follow up  Contact information:  [address]  [facility] [address]  [phone]             Steven Gomez MD In 2 weeks.    Specialty:  Gastroenterology  Why:  outpatient GI evaluation   Contact information:  [address] [facility]  [address]  [place] [address]  [phone]             Jo Valladares MD In 1 week.    Specialty:  Cardiology  Why:  hospital follow up/ angiogram outpatient   Contact information:  [address]  [place] [address]  [phone]                 Patient Instructions:     Diet diabetic     Activity as tolerated     Notify your health care provider if you experience any of the following:  difficulty breathing or increased cough     Notify your health care provider if you experience any of the following:  redness, tenderness, or signs of infection (pain, swelling,  redness, odor or green/yellow discharge around incision site)     Notify your health care provider if you experience any of the following:  severe uncontrolled pain     Notify your health care provider if you experience any of the following:  persistent nausea and vomiting or diarrhea     Notify your health care provider if you experience any of the following:  persistent dizziness, light-headedness, or visual disturbances         Significant Diagnostic Studies: Labs:   BMP:   Recent Labs  Lab 04/29/18 2255 04/30/18  0545 05/01/18  0420   GLU 90 86 94    142 140   K 4.3 4.0 4.0    113* 111*   CO2 23 22* 21*   BUN 29* 28* 21*   CREATININE 1.7* 1.5* 1.3   CALCIUM 9.4 9.5 9.5   MG  --  1.8 1.7    and CMP   Recent Labs  Lab 04/29/18 2255 04/30/18  0545 05/01/18  0420    142 140   K 4.3 4.0 4.0    113* 111*   CO2 23 22* 21*   GLU 90 86 94   BUN 29* 28* 21*   CREATININE 1.7* 1.5* 1.3   CALCIUM 9.4 9.5 9.5   PROT 7.0 6.6 6.5   ALBUMIN 3.3* 3.0* 3.0*   BILITOT 0.2 0.3 0.4   ALKPHOS 57 49* 54*   AST 13 14 13   ALT 9* 7* 8*   ANIONGAP 10 7* 8   ESTGFRAFRICA 39* 45* 54*   EGFRNONAA 34* 39* 47*     Radiology: X-Ray: CXR: X-Ray Chest 1 View (CXR):   Results for orders placed or performed during the hospital encounter of 04/29/18   X-Ray Chest 1 View    Narrative    EXAMINATION:  XR CHEST 1 VIEW    CLINICAL HISTORY:  Chest pain, unspecified    TECHNIQUE:  Single frontal view of the chest was performed.    COMPARISON:  Prior chest of March 7, 2018.    FINDINGS:  The lungs are clear, with normal appearance of pulmonary vasculature and no pleural effusion or pneumothorax.    The cardiac silhouette is normal in size. The hilar and mediastinal contours are unremarkable.    Bones are intact.      Impression    No acute abnormality.      Electronically signed by: Scotty Verma MD  Date:    04/30/2018  Time:    07:55       Pending Diagnostic Studies:     None         Medications:  Reconciled Home Medications:       Medication List      START taking these medications    metoprolol succinate 50 MG 24 hr tablet  Commonly known as:  TOPROL-XL  Take 1 tablet (50 mg total) by mouth once daily.        CHANGE how you take these medications    amLODIPine 10 MG tablet  Commonly known as:  NORVASC  Take 1 tablet (10 mg total) by mouth once daily.  What changed:  Another medication with the same name was removed. Continue taking this medication, and follow the directions you see here.     montelukast 10 mg tablet  Commonly known as:  SINGULAIR  Take 1 tablet (10 mg total) by mouth every evening.  What changed:  Another medication with the same name was removed. Continue taking this medication, and follow the directions you see here.     * sucralfate 1 gram tablet  Commonly known as:  CARAFATE  Take 1 tablet (1 g total) by mouth 4 (four) times daily before meals and nightly.  What changed:  Another medication with the same name was added. Make sure you understand how and when to take each.     * sucralfate 100 mg/mL suspension  Commonly known as:  CARAFATE  Take 10 mLs (1 g total) by mouth every 6 (six) hours.  What changed:  You were already taking a medication with the same name, and this prescription was added. Make sure you understand how and when to take each.        * This list has 2 medication(s) that are the same as other medications prescribed for you. Read the directions carefully, and ask your doctor or other care provider to review them with you.            CONTINUE taking these medications    acetaminophen 500 MG tablet  Commonly known as:  TYLENOL  Take 2 tablets (1,000 mg total) by mouth every 6 (six) hours as needed.     * albuterol 2.5 mg /3 mL (0.083 %) nebulizer solution  Commonly known as:  PROVENTIL  Take 2.5 mg by nebulization every 24 hours as needed.     * albuterol 90 mcg/actuation inhaler  Inhale 2 puffs into the lungs 4 (four) times daily.     aspirin 325 MG EC tablet  Commonly known as:  ECOTRIN  Take 1  tablet (325 mg total) by mouth once daily.     atorvastatin 80 MG tablet  Commonly known as:  LIPITOR  Take 1 tablet (80 mg total) by mouth once daily.     b complex vitamins tablet  Take 1 tablet by mouth once daily.     calcium citrate-vitamin D3 315-200 mg 315-200 mg-unit per tablet  Commonly known as:  CITRACAL+D  Take 1 tablet by mouth once daily.     cyanocobalamin 1000 MCG tablet  Commonly known as:  VITAMIN B-12  Take 1,000 mcg by mouth once daily.     DULoxetine 30 MG capsule  Commonly known as:  CYMBALTA  TAKE ONE CAPSULE BY MOUTH DAILY AS NEEDED     ferrous sulfate 325 (65 FE) MG EC tablet  Take 325 mg by mouth once daily.     * FLOVENT  mcg/actuation inhaler  Generic drug:  fluticasone  TAKE 1 PUFF BY MOUTH TWICE DAILY     * fluticasone 50 mcg/actuation nasal spray  Commonly known as:  FLONASE  2 sprays by Each Nare route once daily.     hydrALAZINE 100 MG tablet  Commonly known as:  APRESOLINE  TAKE 1 TABLET(100 MG) BY MOUTH THREE TIMES DAILY     multivitamin per tablet  Commonly known as:  THERAGRAN  Take 1 tablet by mouth once daily.     ondansetron 4 MG tablet  Commonly known as:  ZOFRAN  Take 1 tablet (4 mg total) by mouth every 8 (eight) hours as needed for Nausea.     pantoprazole 40 MG tablet  Commonly known as:  PROTONIX  Take 1 tablet (40 mg total) by mouth once daily.     promethazine 25 MG tablet  Commonly known as:  PHENERGAN  Take 1 tablet (25 mg total) by mouth every 4 (four) hours. As needed for nausea or cough.     traZODone 50 MG tablet  Commonly known as:  DESYREL  Take 1 tablet (50 mg total) by mouth nightly as needed for Insomnia.        * This list has 4 medication(s) that are the same as other medications prescribed for you. Read the directions carefully, and ask your doctor or other care provider to review them with you.                Indwelling Lines/Drains at time of discharge:   Lines/Drains/Airways          No matching active lines, drains, or airways          Time  spent on the discharge of patient: 35 minutes  Patient was seen and examined on the date of discharge and determined to be suitable for discharge.         Francia Lemus NP  Department of Hospital Medicine  [facility]

## 2018-05-01 NOTE — SUBJECTIVE & OBJECTIVE
Interval History: no new issues. Still with intermittent midsternal chest pain.   States epigastric pain after eating.  Troponin negative. Awaiting stress test results.     Review of Systems   Constitutional: Positive for appetite change and fatigue. Negative for diaphoresis and fever.   Respiratory: Negative for cough and shortness of breath.    Cardiovascular: Positive for chest pain and leg swelling.   Gastrointestinal: Positive for abdominal pain (epigastric) and nausea. Negative for abdominal distention and constipation.   Genitourinary: Negative for flank pain and frequency.   Musculoskeletal: Negative for back pain and gait problem.   Skin: Negative for rash.   Neurological: Negative for speech difficulty and numbness.     Objective:     Vital Signs (Most Recent):  Temp: 97.9 °F (36.6 °C) (04/30/18 1717)  Pulse: 90 (04/30/18 1717)  Resp: 18 (04/30/18 1717)  BP: (!) 194/92 (04/30/18 1717)  SpO2: 99 % (04/30/18 1717) Vital Signs (24h Range):  Temp:  [97 °F (36.1 °C)-98.5 °F (36.9 °C)] 97.9 °F (36.6 °C)  Pulse:  [67-90] 90  Resp:  [18-20] 18  SpO2:  [94 %-99 %] 99 %  BP: (141-199)/(78-97) 194/92     Weight: 88.5 kg (195 lb)  Body mass index is 33.47 kg/m².  No intake or output data in the 24 hours ending 04/30/18 2028   Physical Exam   Constitutional: She is oriented to person, place, and time. She appears well-developed and well-nourished.   HENT:   Head: Normocephalic and atraumatic.   Mouth/Throat: Oropharynx is clear and moist.   Eyes: Conjunctivae and EOM are normal. Pupils are equal, round, and reactive to light.   Neck: Normal range of motion. Neck supple.   Cardiovascular: Normal rate, regular rhythm, normal heart sounds and intact distal pulses.    No murmur heard.  Pulmonary/Chest: Effort normal and breath sounds normal. She has no wheezes.   Abdominal: Soft. Bowel sounds are normal. There is tenderness (epigastric tenderness with palpation).   Musculoskeletal: Normal range of motion.   Neurological:  She is alert and oriented to person, place, and time. Coordination normal.   Skin: Skin is warm and dry.   Psychiatric: She has a normal mood and affect. Her behavior is normal. Judgment normal.   Nursing note and vitals reviewed.      Significant Labs:   BMP:   Recent Labs  Lab 04/30/18  0545   GLU 86      K 4.0   *   CO2 22*   BUN 28*   CREATININE 1.5*   CALCIUM 9.5   MG 1.8     CBC:   Recent Labs  Lab 04/29/18  2255 04/30/18  0545   WBC 7.30 5.90   HGB 10.4* 10.7*   HCT 32.4* 32.0*    207     CMP:   Recent Labs  Lab 04/29/18  2255 04/30/18  0545    142   K 4.3 4.0    113*   CO2 23 22*   GLU 90 86   BUN 29* 28*   CREATININE 1.7* 1.5*   CALCIUM 9.4 9.5   PROT 7.0 6.6   ALBUMIN 3.3* 3.0*   BILITOT 0.2 0.3   ALKPHOS 57 49*   AST 13 14   ALT 9* 7*   ANIONGAP 10 7*   EGFRNONAA 34* 39*     Lipid Panel: No results for input(s): CHOL, HDL, LDLCALC, TRIG, CHOLHDL in the last 48 hours.  Troponin:   Recent Labs  Lab 04/30/18  0545 04/30/18  1235 04/30/18  1741   TROPONINI 0.007 0.009 <0.006       Significant Imaging: I have reviewed and interpreted all pertinent imaging results/findings within the past 24 hours.

## 2018-05-01 NOTE — PROGRESS NOTES
[facility] Hospital Medicine  Progress Note    Patient Name: Faustina Rae  MRN: [id number]  Patient Class: OP- Observation   Admission Date: 4/29/2018  Length of Stay: 0 days  Attending Physician: Jenna Coy MD  Primary Care Provider: Jaxson Gonzales MD        Subjective:     Principal Problem:Atherosclerosis of native coronary artery with stable angina pectoris    HPI:  54 y/o female, who presents via EMS to the ED with c/o acute sharp chest discomfort.  She has a PMH of  CAD s/p stent placement, CHF, HTN, COPD, obesity s/p gastric sleeve, and CVA. She reports that she was drinking cold ice tea when the pain started at home.  She endorses associative symptoms of nausea.  She denies SOB, diaphoresis, fever, chills, V/D, or  symptoms.  Discomfort is not affected by deep inspiration, nor is it reproducible to touch.  She states that she was recently treated for pneumonia, but denies any respiratory symptoms at this time.  She reports frequent episodes of similar discomfort that is relieved with PRN NTG.  She received 2 SL NTG while en route with EMS and reports resolution of pain on arrival to ED.Patient endorses compliance with her home medication regimen. Initial troponin 0.008, EKG without obvious acute ischemic changes.      Hospital Course:  No notes on file    Interval History: no new issues. Still with intermittent midsternal chest pain.   States epigastric pain after eating.  Troponin negative. Awaiting stress test results.     Review of Systems   Constitutional: Positive for appetite change and fatigue. Negative for diaphoresis and fever.   Respiratory: Negative for cough and shortness of breath.    Cardiovascular: Positive for chest pain and leg swelling.   Gastrointestinal: Positive for abdominal pain (epigastric) and nausea. Negative for abdominal distention and constipation.   Genitourinary: Negative for flank pain and frequency.   Musculoskeletal: Negative for back pain  and gait problem.   Skin: Negative for rash.   Neurological: Negative for speech difficulty and numbness.     Objective:     Vital Signs (Most Recent):  Temp: 97.9 °F (36.6 °C) (04/30/18 1717)  Pulse: 90 (04/30/18 1717)  Resp: 18 (04/30/18 1717)  BP: (!) 194/92 (04/30/18 1717)  SpO2: 99 % (04/30/18 1717) Vital Signs (24h Range):  Temp:  [97 °F (36.1 °C)-98.5 °F (36.9 °C)] 97.9 °F (36.6 °C)  Pulse:  [67-90] 90  Resp:  [18-20] 18  SpO2:  [94 %-99 %] 99 %  BP: (141-199)/(78-97) 194/92     Weight: 88.5 kg (195 lb)  Body mass index is 33.47 kg/m².  No intake or output data in the 24 hours ending 04/30/18 2028   Physical Exam   Constitutional: She is oriented to person, place, and time. She appears well-developed and well-nourished.   HENT:   Head: Normocephalic and atraumatic.   Mouth/Throat: Oropharynx is clear and moist.   Eyes: Conjunctivae and EOM are normal. Pupils are equal, round, and reactive to light.   Neck: Normal range of motion. Neck supple.   Cardiovascular: Normal rate, regular rhythm, normal heart sounds and intact distal pulses.    No murmur heard.  Pulmonary/Chest: Effort normal and breath sounds normal. She has no wheezes.   Abdominal: Soft. Bowel sounds are normal. There is tenderness (epigastric tenderness with palpation).   Musculoskeletal: Normal range of motion.   Neurological: She is alert and oriented to person, place, and time. Coordination normal.   Skin: Skin is warm and dry.   Psychiatric: She has a normal mood and affect. Her behavior is normal. Judgment normal.   Nursing note and vitals reviewed.      Significant Labs:   BMP:   Recent Labs  Lab 04/30/18  0545   GLU 86      K 4.0   *   CO2 22*   BUN 28*   CREATININE 1.5*   CALCIUM 9.5   MG 1.8     CBC:   Recent Labs  Lab 04/29/18  2255 04/30/18  0545   WBC 7.30 5.90   HGB 10.4* 10.7*   HCT 32.4* 32.0*    207     CMP:   Recent Labs  Lab 04/29/18  2255 04/30/18  0545    142   K 4.3 4.0    113*   CO2 23 22*   GLU  90 86   BUN 29* 28*   CREATININE 1.7* 1.5*   CALCIUM 9.4 9.5   PROT 7.0 6.6   ALBUMIN 3.3* 3.0*   BILITOT 0.2 0.3   ALKPHOS 57 49*   AST 13 14   ALT 9* 7*   ANIONGAP 10 7*   EGFRNONAA 34* 39*     Lipid Panel: No results for input(s): CHOL, HDL, LDLCALC, TRIG, CHOLHDL in the last 48 hours.  Troponin:   Recent Labs  Lab 04/30/18  0545 04/30/18  1235 04/30/18  1741   TROPONINI 0.007 0.009 <0.006       Significant Imaging: I have reviewed and interpreted all pertinent imaging results/findings within the past 24 hours.    Assessment/Plan:      * Atherosclerosis of native coronary artery with stable angina pectoris    - presents with acute sharp chest discomfort with associative nausea   - hx of prior stent placement  - reports resolution of discomfort with SL NTG - continue PRN   - EKG without acute ischemic changes noted  - initial troponin 0.008, BNP 47 - trend troponin Q 6 x 3  - possibly GI in nature as pain started while drinking cold beverage   - GI cocktail PRN x 1 epigastric discomfort / indigestion   - maintain on telemetry  - continue ASA and statin  -SINAN score 4.. NPO for cardiac stress test.         Gastroesophageal reflux disease with esophagitis      -GI cocktail  - resume PPI. Add carafate.         Normocytic anemia    - Hgb 10.4 (baseline 11.2-12.9) slowly trending down  - trend H/H daily   - stool for OCB x 1        CKD (chronic kidney disease) stage 3, GFR 30-59 ml/min    - crt 1.7 (baseline 1.1-1.6)  - gentle hydration overnight and reassess fluid status in AM  - trend chemistry daily   - strict I/O's  - avoid nephrotoxins if possible         Essential hypertension    - SBP ranging between 141-182  - resume home dosing of amlodipine and hydralazine   - monitor closely and consider adding PRN agent if indicated   -Add beta blocker daily and Clonidine 0.1 mg every 8 hours prn        Chronic diastolic CHF (congestive heart failure)    - hx of diastolic dysfunction  - most recent ECHO 03/08/18 EF  estimated at 65%  - BNP 47  - continue to monitor         COPD (chronic obstructive pulmonary disease)    - in NAD  - duo nebs Q 4 PRN SOB / wheezing  - supplemental oxygen as needed to maintain saturations greater than 92%        Morbid obesity with BMI of 40.0-44.9, adult    - hx of gastric sleeve  - continue ongoing weight loss efforts   - promote healthy lifestyle          VTE Risk Mitigation         Ordered     IP VTE HIGH RISK PATIENT  Once      04/30/18 0045     Place SOL hose  Until discontinued      04/30/18 0045     Place sequential compression device  Until discontinued      04/30/18 0045          Addendum: 1700. Discussed results of cardiac stress test with Dr. Villafuerte + stress test.   Patient known to Dr. Valladares. Call Dr. Valladares to discuss results. Will see patient this evening.     Francia Lemus NP  Department of Hospital Medicine   [facility]

## 2018-05-01 NOTE — ASSESSMENT & PLAN NOTE
- SBP ranging between 141-182  - resume home dosing of amlodipine and hydralazine   - monitor closely and consider adding PRN agent if indicated   -Add beta blocker daily and Clonidine 0.1 mg every 8 hours prn

## 2018-05-01 NOTE — PLAN OF CARE
Problem: Fall Risk (Adult)  Goal: Identify Related Risk Factors and Signs and Symptoms  Related risk factors and signs and symptoms are identified upon initiation of Human Response Clinical Practice Guideline (CPG)   Outcome: Ongoing (interventions implemented as appropriate)  Alert and oriented  Denies pain/discomfort  Blood pressure decreasing with medication: Denies s/s  Patient wants to be discharged home and f/u with cardiology outpt  Sinus rhythm  Out of bed to bathroom  Safe

## 2018-05-02 ENCOUNTER — TELEPHONE (OUTPATIENT)
Dept: MEDSURG UNIT | Facility: HOSPITAL | Age: 53
End: 2018-05-02

## 2018-06-04 ENCOUNTER — NURSE TRIAGE (OUTPATIENT)
Dept: ADMINISTRATIVE | Facility: CLINIC | Age: 53
End: 2018-06-04

## 2018-06-05 NOTE — TELEPHONE ENCOUNTER
"  Reason for Disposition   [1] Can't walk or can barely walk AND [2] new onset    Answer Assessment - Initial Assessment Questions  1. ONSET: "When did the swelling start?" (e.g., minutes, hours, days)      Noted this am  2. LOCATION: "What part of the leg is swollen?"  "Are both legs swollen or just one leg?"      Feet/ankles and hands  3. SEVERITY: "How bad is the swelling?" (e.g., localized; mild, moderate, severe)   - Localized - small area of swelling localized to one leg   - MILD pedal edema - swelling limited to foot and ankle, pitting edema < 1/4 inch (6 mm) deep, rest and elevation eliminate most or all swelling   - MODERATE edema - swelling of lower leg to knee, pitting edema > 1/4 inch (6 mm) deep, rest and elevation only partially reduce swelling   - SEVERE edema - swelling extends above knee, facial or hand swelling present       Has pitting in feet - stated hands so swollen she can hardly hold anything  4. REDNESS: "Does the swelling look red or infected?"      no  5. PAIN: "Is the swelling painful to touch?" If so, ask: "How painful is it?"   (Scale 1-10; mild, moderate or severe)      Painful to walk   6. FEVER: "Do you have a fever?" If so, ask: "What is it, how was it measured, and when did it start?"       n/a  7. CAUSE: "What do you think is causing the leg swelling?"      Not sure  8. MEDICAL HISTORY: "Do you have a history of heart failure, kidney disease, liver failure, or cancer?"      yes  9. RECURRENT SYMPTOM: "Have you had leg swelling before?" If so, ask: "When was the last time?" "What happened that time?"      n/a  10. OTHER SYMPTOMS: "Do you have any other symptoms?" (e.g., chest pain, difficulty breathing)        Difficulty walking  11. PREGNANCY: "Is there any chance you are pregnant?" "When was your last menstrual period?"        n/a    Protocols used: ST LEG SWELLING AND EDEMA-A-    "

## 2018-07-13 ENCOUNTER — OFFICE VISIT (OUTPATIENT)
Dept: FAMILY MEDICINE | Facility: CLINIC | Age: 53
End: 2018-07-13
Payer: MEDICARE

## 2018-07-13 ENCOUNTER — LAB VISIT (OUTPATIENT)
Dept: LAB | Facility: HOSPITAL | Age: 53
End: 2018-07-13
Attending: INTERNAL MEDICINE
Payer: MEDICARE

## 2018-07-13 VITALS
WEIGHT: 219.38 LBS | RESPIRATION RATE: 18 BRPM | TEMPERATURE: 99 F | HEIGHT: 62 IN | DIASTOLIC BLOOD PRESSURE: 92 MMHG | SYSTOLIC BLOOD PRESSURE: 208 MMHG | BODY MASS INDEX: 40.37 KG/M2 | HEART RATE: 84 BPM

## 2018-07-13 DIAGNOSIS — I10 ESSENTIAL HYPERTENSION, MALIGNANT: ICD-10-CM

## 2018-07-13 DIAGNOSIS — N18.30 CHRONIC KIDNEY DISEASE, STAGE III (MODERATE): ICD-10-CM

## 2018-07-13 DIAGNOSIS — R94.4 NONSPECIFIC ABNORMAL RESULTS OF KIDNEY FUNCTION STUDY: ICD-10-CM

## 2018-07-13 DIAGNOSIS — I10 UNCONTROLLED HYPERTENSION: Primary | ICD-10-CM

## 2018-07-13 DIAGNOSIS — M10.9 GOUT, UNSPECIFIED: ICD-10-CM

## 2018-07-13 DIAGNOSIS — R94.4 NONSPECIFIC ABNORMAL RESULTS OF KIDNEY FUNCTION STUDY: Primary | ICD-10-CM

## 2018-07-13 DIAGNOSIS — R51.9 BILATERAL HEADACHES: ICD-10-CM

## 2018-07-13 LAB
25(OH)D3+25(OH)D2 SERPL-MCNC: 37 NG/ML
ALBUMIN SERPL BCP-MCNC: 3.4 G/DL
ANION GAP SERPL CALC-SCNC: 9 MMOL/L
BASOPHILS # BLD AUTO: 0.03 K/UL
BASOPHILS NFR BLD: 0.5 %
BUN SERPL-MCNC: 25 MG/DL
CALCIUM SERPL-MCNC: 9.9 MG/DL
CHLORIDE SERPL-SCNC: 109 MMOL/L
CO2 SERPL-SCNC: 22 MMOL/L
CREAT SERPL-MCNC: 1.5 MG/DL
DIFFERENTIAL METHOD: ABNORMAL
EOSINOPHIL # BLD AUTO: 0.1 K/UL
EOSINOPHIL NFR BLD: 0.8 %
ERYTHROCYTE [DISTWIDTH] IN BLOOD BY AUTOMATED COUNT: 13.1 %
EST. GFR  (AFRICAN AMERICAN): 45.2 ML/MIN/1.73 M^2
EST. GFR  (NON AFRICAN AMERICAN): 39.2 ML/MIN/1.73 M^2
GLUCOSE SERPL-MCNC: 87 MG/DL
HCT VFR BLD AUTO: 37 %
HGB BLD-MCNC: 11.7 G/DL
IMM GRANULOCYTES # BLD AUTO: 0.02 K/UL
IMM GRANULOCYTES NFR BLD AUTO: 0.3 %
LYMPHOCYTES # BLD AUTO: 1.6 K/UL
LYMPHOCYTES NFR BLD: 25.4 %
MAGNESIUM SERPL-MCNC: 2.1 MG/DL
MCH RBC QN AUTO: 30.1 PG
MCHC RBC AUTO-ENTMCNC: 31.6 G/DL
MCV RBC AUTO: 95 FL
MONOCYTES # BLD AUTO: 0.5 K/UL
MONOCYTES NFR BLD: 7 %
NEUTROPHILS # BLD AUTO: 4.3 K/UL
NEUTROPHILS NFR BLD: 66 %
NRBC BLD-RTO: 0 /100 WBC
PHOSPHATE SERPL-MCNC: 3.5 MG/DL
PHOSPHATE SERPL-MCNC: 3.5 MG/DL
PLATELET # BLD AUTO: 251 K/UL
PMV BLD AUTO: 10.8 FL
POTASSIUM SERPL-SCNC: 4.5 MMOL/L
PTH-INTACT SERPL-MCNC: 124 PG/ML
RBC # BLD AUTO: 3.89 M/UL
SODIUM SERPL-SCNC: 140 MMOL/L
URATE SERPL-MCNC: 7.3 MG/DL
WBC # BLD AUTO: 6.45 K/UL

## 2018-07-13 PROCEDURE — 80069 RENAL FUNCTION PANEL: CPT

## 2018-07-13 PROCEDURE — 85025 COMPLETE CBC W/AUTO DIFF WBC: CPT

## 2018-07-13 PROCEDURE — 84550 ASSAY OF BLOOD/URIC ACID: CPT

## 2018-07-13 PROCEDURE — 36415 COLL VENOUS BLD VENIPUNCTURE: CPT | Mod: PO

## 2018-07-13 PROCEDURE — 3008F BODY MASS INDEX DOCD: CPT | Mod: CPTII,S$GLB,, | Performed by: FAMILY MEDICINE

## 2018-07-13 PROCEDURE — 99214 OFFICE O/P EST MOD 30 MIN: CPT | Mod: S$GLB,,, | Performed by: FAMILY MEDICINE

## 2018-07-13 PROCEDURE — 82306 VITAMIN D 25 HYDROXY: CPT

## 2018-07-13 PROCEDURE — 83970 ASSAY OF PARATHORMONE: CPT

## 2018-07-13 PROCEDURE — 99999 PR PBB SHADOW E&M-EST. PATIENT-LVL IV: CPT | Mod: PBBFAC,,, | Performed by: FAMILY MEDICINE

## 2018-07-13 PROCEDURE — 3077F SYST BP >= 140 MM HG: CPT | Mod: CPTII,S$GLB,, | Performed by: FAMILY MEDICINE

## 2018-07-13 PROCEDURE — 83735 ASSAY OF MAGNESIUM: CPT

## 2018-07-13 PROCEDURE — 3080F DIAST BP >= 90 MM HG: CPT | Mod: CPTII,S$GLB,, | Performed by: FAMILY MEDICINE

## 2018-07-13 RX ORDER — BUDESONIDE AND FORMOTEROL FUMARATE DIHYDRATE 160; 4.5 UG/1; UG/1
AEROSOL RESPIRATORY (INHALATION)
COMMUNITY
Start: 2018-04-09 | End: 2018-08-14

## 2018-07-13 RX ORDER — HYDRALAZINE HYDROCHLORIDE 100 MG/1
TABLET, FILM COATED ORAL
Qty: 90 TABLET | Refills: 11 | Status: SHIPPED | OUTPATIENT
Start: 2018-07-13 | End: 2018-12-13 | Stop reason: SDUPTHER

## 2018-07-13 NOTE — PROGRESS NOTES
Subjective:       Patient ID: Faustina Rae is a 53 y.o. female.    Chief Complaint: Headache (X 3 days on/off)    Pt is followed by Nephrology and Cardiology as well as her PCP.  States she feels pretty good overall and still walking twice a day for exercise.      Headache    This is a new problem. The current episode started in the past 7 days. The problem occurs intermittently. The pain is located in the bilateral region. The pain does not radiate. The pain quality is similar to prior headaches. The quality of the pain is described as aching. The pain is moderate. Pertinent negatives include no abdominal pain, fever or nausea. Associated symptoms comments: S/p CVA - no new Neuro symptoms. Exacerbated by: She ran out of Hydralazine; BP is elevated. She has tried NSAIDs for the symptoms. The treatment provided significant relief.     Review of Systems   Constitutional: Negative for fever.   Respiratory: Negative for shortness of breath.    Cardiovascular: Negative for chest pain.   Gastrointestinal: Negative for abdominal pain and nausea.   Skin: Negative for rash.   Neurological: Positive for headaches.   All other systems reviewed and are negative.      Objective:      Physical Exam   Constitutional: She is oriented to person, place, and time. She appears well-developed. No distress.   Cardiovascular: Normal rate and regular rhythm.    No murmur heard.  Pulmonary/Chest: Effort normal and breath sounds normal.   Neurological: She is alert and oriented to person, place, and time.   Dysarthria noted.  Left sided weakness - pt reports all at baseline.       Assessment:       Faustina was seen today for headache.    Diagnoses and all orders for this visit:    Uncontrolled hypertension  -     hydrALAZINE (APRESOLINE) 100 MG tablet; TAKE 1 TABLET(100 MG) BY MOUTH THREE TIMES DAILY    Bilateral headaches      Patient Instructions   Use Tylenol as needed for headache instead of Ibuprofen.

## 2018-07-27 ENCOUNTER — PATIENT MESSAGE (OUTPATIENT)
Dept: GASTROENTEROLOGY | Facility: CLINIC | Age: 53
End: 2018-07-27

## 2018-07-27 DIAGNOSIS — Z12.11 COLON CANCER SCREENING: Primary | ICD-10-CM

## 2018-07-31 ENCOUNTER — DOCUMENTATION ONLY (OUTPATIENT)
Dept: FAMILY MEDICINE | Facility: CLINIC | Age: 53
End: 2018-07-31

## 2018-07-31 NOTE — PROGRESS NOTES
Health Maintenance Due   Topic Date Due    Eye Exam  01/02/1974    TETANUS VACCINE  01/02/1982    Colonoscopy  01/02/2014    Foot Exam  11/10/2017

## 2018-08-14 ENCOUNTER — OFFICE VISIT (OUTPATIENT)
Dept: FAMILY MEDICINE | Facility: CLINIC | Age: 53
End: 2018-08-14
Payer: MEDICARE

## 2018-08-14 ENCOUNTER — HOSPITAL ENCOUNTER (OUTPATIENT)
Dept: RADIOLOGY | Facility: HOSPITAL | Age: 53
Discharge: HOME | End: 2018-08-14
Attending: NURSE PRACTITIONER
Payer: MEDICARE

## 2018-08-14 ENCOUNTER — DOCUMENTATION ONLY (OUTPATIENT)
Dept: FAMILY MEDICINE | Facility: CLINIC | Age: 53
End: 2018-08-14

## 2018-08-14 VITALS
HEIGHT: 67 IN | TEMPERATURE: 98 F | SYSTOLIC BLOOD PRESSURE: 138 MMHG | BODY MASS INDEX: 34.6 KG/M2 | WEIGHT: 220.44 LBS | HEART RATE: 99 BPM | OXYGEN SATURATION: 95 % | DIASTOLIC BLOOD PRESSURE: 80 MMHG

## 2018-08-14 DIAGNOSIS — Z91.199 NON-COMPLIANCE: ICD-10-CM

## 2018-08-14 DIAGNOSIS — R51.9 ACUTE INTRACTABLE HEADACHE, UNSPECIFIED HEADACHE TYPE: ICD-10-CM

## 2018-08-14 DIAGNOSIS — R51.9 ACUTE INTRACTABLE HEADACHE, UNSPECIFIED HEADACHE TYPE: Primary | ICD-10-CM

## 2018-08-14 PROCEDURE — 70450 CT HEAD/BRAIN W/O DYE: CPT | Mod: 26,,, | Performed by: RADIOLOGY

## 2018-08-14 PROCEDURE — 70450 CT HEAD/BRAIN W/O DYE: CPT | Mod: TC

## 2018-08-14 PROCEDURE — 99213 OFFICE O/P EST LOW 20 MIN: CPT | Mod: S$GLB,,, | Performed by: NURSE PRACTITIONER

## 2018-08-14 RX ORDER — PREDNISONE 5 MG/1
TABLET ORAL
Qty: 21 TABLET | Refills: 0 | Status: SHIPPED | OUTPATIENT
Start: 2018-08-14 | End: 2018-10-03 | Stop reason: CLARIF

## 2018-08-14 NOTE — PROGRESS NOTES
Subjective:       Patient ID: Faustina Rae is a 53 y.o. female.    Chief Complaint: Headache    Headache    This is a new problem. The current episode started in the past 7 days (started 3 days ago). The pain is at a severity of 10/10. Associated symptoms include dizziness, a loss of balance, nausea, numbness and photophobia. Pertinent negatives include no blurred vision, hearing loss, visual change or vomiting. She has tried acetaminophen and darkened room (and took double her normal dose of hydralazine.  ) for the symptoms. The treatment provided no relief. Her past medical history is significant for hypertension, migraines in the family and obesity. There is no history of migraine headaches, recent head traumas or sinus disease. (Had a CVA in the past)   But she was seen a month ago for headache at the [facility]. She is a poor historian. States this is the worst headache of her life.     She c/o nausea, feels she is not eating enough, but had bariatric surgery in the past (which she denied until I pointed it out in her chart) and is gaining weight. When told that she needs to eat less she states her daughter told her that she is not eating enough.   Review of Systems   HENT: Negative for hearing loss.    Eyes: Positive for photophobia. Negative for blurred vision.   Gastrointestinal: Positive for nausea. Negative for vomiting.   Neurological: Positive for dizziness, numbness, headaches and loss of balance.       Objective:      Physical Exam   Constitutional: She is oriented to person, place, and time. She appears well-developed and well-nourished. No distress.   HENT:   Head: Normocephalic and atraumatic.   Eyes: Conjunctivae are normal. Pupils are equal, round, and reactive to light. Right eye exhibits no discharge. Left eye exhibits no discharge. No scleral icterus.   Has nastagmus with EOM testing and is unable to follow finger with both eyes.    Cardiovascular: Normal rate, regular rhythm and  normal heart sounds. Exam reveals no gallop and no friction rub.   No murmur heard.  Pulmonary/Chest: Effort normal and breath sounds normal. No respiratory distress. She has no wheezes. She has no rales.   Neurological: She is alert and oriented to person, place, and time.   Skin: Skin is warm and dry. She is not diaphoretic.   Psychiatric: She has a normal mood and affect. Her behavior is normal.   Nursing note and vitals reviewed.      Assessment:       1. Acute intractable headache, unspecified headache type    2. Non-compliance        Plan:     Acute intractable headache, unspecified headache type  -     CT Head Without Contrast; Future; Expected date: 08/14/2018  -     C-reactive protein; Future; Expected date: 08/14/2018  -     Sedimentation rate; Future; Expected date: 08/14/2018  -     CBC auto differential; Future; Expected date: 08/14/2018  -     Comprehensive metabolic panel; Future; Expected date: 08/14/2018  -     predniSONE (DELTASONE) 5 MG tablet; 6 tabs the first day, then 5 the next, then 4, 3, 2, 1  Dispense: 21 tablet; Refill: 0    Non-compliance      Cannot give triptans as it is contraindicated with her history of ischemic CVA and (frequently uncontrolled) HTN, eloiseorcet is not covered by her insurance and she cannot take NSAIDs. Will start prednisone while we try to rule out temporal arteritis.   Do not take extra blood pressure medications unless you call and we instruct you to do so.

## 2018-08-14 NOTE — PROGRESS NOTES
Health Maintenance Due   Topic Date Due    Eye Exam  01/02/1974    TETANUS VACCINE  01/02/1982    Colonoscopy  01/02/2014    Foot Exam  11/10/2017    Influenza Vaccine  08/01/2018

## 2018-08-15 DIAGNOSIS — R51.9 ACUTE INTRACTABLE HEADACHE, UNSPECIFIED HEADACHE TYPE: Primary | ICD-10-CM

## 2018-08-15 DIAGNOSIS — R70.0 ELEVATED SED RATE: ICD-10-CM

## 2018-08-16 DIAGNOSIS — N18.9 CHRONIC KIDNEY DISEASE, UNSPECIFIED CKD STAGE: ICD-10-CM

## 2018-08-28 ENCOUNTER — DOCUMENTATION ONLY (OUTPATIENT)
Dept: FAMILY MEDICINE | Facility: CLINIC | Age: 53
End: 2018-08-28

## 2018-10-03 ENCOUNTER — HOSPITAL ENCOUNTER (EMERGENCY)
Facility: HOSPITAL | Age: 53
Discharge: HOME | End: 2018-10-03
Attending: EMERGENCY MEDICINE
Payer: MEDICARE

## 2018-10-03 VITALS
DIASTOLIC BLOOD PRESSURE: 96 MMHG | TEMPERATURE: 98 F | WEIGHT: 190 LBS | SYSTOLIC BLOOD PRESSURE: 199 MMHG | HEART RATE: 71 BPM | BODY MASS INDEX: 34.96 KG/M2 | HEIGHT: 62 IN | RESPIRATION RATE: 18 BRPM

## 2018-10-03 DIAGNOSIS — R10.13 EPIGASTRIC PAIN: Primary | ICD-10-CM

## 2018-10-03 LAB
ALBUMIN SERPL BCP-MCNC: 3.5 G/DL
ALP SERPL-CCNC: 59 U/L
ALT SERPL W/O P-5'-P-CCNC: 10 U/L
ANION GAP SERPL CALC-SCNC: 8 MMOL/L
AST SERPL-CCNC: 13 U/L
BASOPHILS # BLD AUTO: 0 K/UL
BASOPHILS NFR BLD: 0.4 %
BILIRUB SERPL-MCNC: 0.3 MG/DL
BILIRUB UR QL STRIP: NEGATIVE
BUN SERPL-MCNC: 30 MG/DL
CALCIUM SERPL-MCNC: 9.7 MG/DL
CHLORIDE SERPL-SCNC: 108 MMOL/L
CLARITY UR: CLEAR
CO2 SERPL-SCNC: 23 MMOL/L
COLOR UR: YELLOW
CREAT SERPL-MCNC: 1.8 MG/DL
DIFFERENTIAL METHOD: ABNORMAL
EOSINOPHIL # BLD AUTO: 0.1 K/UL
EOSINOPHIL NFR BLD: 0.8 %
ERYTHROCYTE [DISTWIDTH] IN BLOOD BY AUTOMATED COUNT: 14.3 %
EST. GFR  (AFRICAN AMERICAN): 36 ML/MIN/1.73 M^2
EST. GFR  (NON AFRICAN AMERICAN): 31 ML/MIN/1.73 M^2
GLUCOSE SERPL-MCNC: 90 MG/DL
GLUCOSE UR QL STRIP: NEGATIVE
HCT VFR BLD AUTO: 35.7 %
HGB BLD-MCNC: 11.5 G/DL
HGB UR QL STRIP: NEGATIVE
KETONES UR QL STRIP: NEGATIVE
LEUKOCYTE ESTERASE UR QL STRIP: NEGATIVE
LIPASE SERPL-CCNC: 46 U/L
LYMPHOCYTES # BLD AUTO: 1.9 K/UL
LYMPHOCYTES NFR BLD: 27.5 %
MCH RBC QN AUTO: 28.9 PG
MCHC RBC AUTO-ENTMCNC: 32.4 G/DL
MCV RBC AUTO: 89 FL
MONOCYTES # BLD AUTO: 0.1 K/UL
MONOCYTES NFR BLD: 0.9 %
NEUTROPHILS # BLD AUTO: 4.8 K/UL
NEUTROPHILS NFR BLD: 70.4 %
NITRITE UR QL STRIP: NEGATIVE
PH UR STRIP: 6 [PH] (ref 5–8)
PLATELET # BLD AUTO: 233 K/UL
PMV BLD AUTO: 8.7 FL
POTASSIUM SERPL-SCNC: 4.2 MMOL/L
PROT SERPL-MCNC: 7.2 G/DL
PROT UR QL STRIP: NEGATIVE
RBC # BLD AUTO: 3.99 M/UL
SODIUM SERPL-SCNC: 139 MMOL/L
SP GR UR STRIP: 1.02 (ref 1–1.03)
URN SPEC COLLECT METH UR: NORMAL
UROBILINOGEN UR STRIP-ACNC: NEGATIVE EU/DL
WBC # BLD AUTO: 6.9 K/UL

## 2018-10-03 PROCEDURE — 83690 ASSAY OF LIPASE: CPT

## 2018-10-03 PROCEDURE — 36415 COLL VENOUS BLD VENIPUNCTURE: CPT

## 2018-10-03 PROCEDURE — 96374 THER/PROPH/DIAG INJ IV PUSH: CPT

## 2018-10-03 PROCEDURE — 96375 TX/PRO/DX INJ NEW DRUG ADDON: CPT

## 2018-10-03 PROCEDURE — 99284 EMERGENCY DEPT VISIT MOD MDM: CPT | Mod: 25

## 2018-10-03 PROCEDURE — 81003 URINALYSIS AUTO W/O SCOPE: CPT

## 2018-10-03 PROCEDURE — 80053 COMPREHEN METABOLIC PANEL: CPT

## 2018-10-03 PROCEDURE — 85025 COMPLETE CBC W/AUTO DIFF WBC: CPT

## 2018-10-03 PROCEDURE — 63600175 PHARM REV CODE 636 W HCPCS: Performed by: EMERGENCY MEDICINE

## 2018-10-03 PROCEDURE — C9113 INJ PANTOPRAZOLE SODIUM, VIA: HCPCS | Performed by: EMERGENCY MEDICINE

## 2018-10-03 RX ORDER — PANTOPRAZOLE SODIUM 40 MG/10ML
40 INJECTION, POWDER, LYOPHILIZED, FOR SOLUTION INTRAVENOUS
Status: COMPLETED | OUTPATIENT
Start: 2018-10-03 | End: 2018-10-03

## 2018-10-03 RX ORDER — ONDANSETRON 2 MG/ML
4 INJECTION INTRAMUSCULAR; INTRAVENOUS
Status: COMPLETED | OUTPATIENT
Start: 2018-10-03 | End: 2018-10-03

## 2018-10-03 RX ADMIN — ONDANSETRON 4 MG: 2 INJECTION INTRAMUSCULAR; INTRAVENOUS at 05:10

## 2018-10-03 RX ADMIN — PANTOPRAZOLE SODIUM 40 MG: 40 INJECTION, POWDER, FOR SOLUTION INTRAVENOUS at 05:10

## 2018-10-03 NOTE — ED PROVIDER NOTES
Encounter Date: 10/3/2018    SCRIBE #1 NOTE: IMuriel am scribing for, and in the presence of, Dr. Mcmillan.   SCRIBE #2 NOTE: ROSI Shantelannika Rae am scribing for, and in the presence of, Dr. Mcmillan.     History     Chief Complaint   Patient presents with    Abdominal Pain     C/o mid upper abdominal pain  x 3 days. Pt reports pain worse when eating. Denies nausea or vomiting. Normal BMs. Denies urinary sx.        Time seen by provider: 4:40 PM on 10/03/2018    Faustina Rae is a 53 y.o. female who presents to the ED with an onset of vomiting, diarrhea and abdominal pain. The pt's symptoms began 3 days ago with abd pain. She rates the pain as 10/10 in severity, being located in her upper mid abdomen and radiating to her left lower back, and worsened with eating. She has had 2 episodes of vomiting without blood and took an Exlax with 2 episodes of subsequent diarrhea but no improvement to her abd pain. The pt received a laparoscopic gastric sleeve on 3/15/17 by Dr Robert Joel and has lost 150 lbs but has not had a follow-up GI visit since he left his practice shortly after her surgery. Pt endorses constant nausea at baseline and states that this is unchanged since her surgery. She denies fever, blood in stool or urine, burning with urination, CP, and difficulty breathing. Her PMHx includes HTN. Her SHx includes gallbladder removal. No known drug allergies noted.       The history is provided by the patient.     Review of patient's allergies indicates:  No Known Allergies  Past Medical History:   Diagnosis Date    Arthritis     Asthma     CHF (congestive heart failure)     COPD (chronic obstructive pulmonary disease)     Hypertension     Leaky heart valve     Rheumatoid arthritis(714.0)     Stroke      Past Surgical History:   Procedure Laterality Date    CAROTID STENT      3 years ago    CHOLECYSTECTOMY      ESOPHAGOGASTRODUODENOSCOPY (EGD) N/A 10/4/2017    [facility] by Steven Gomez,  MD at [facility]O    GASTRECTOMY-SLEEVE-LAPAROSCOPIC N/A 2/21/2017    [facility]    HYSTERECTOMY      SLEEVE GASTROPLASTY  02/21/2017     Family History   Problem Relation Age of Onset    Arthritis Mother     Cancer Mother     Diabetes Mother     Hyperlipidemia Mother     Hypertension Mother     Stroke Mother     Heart disease Father     Hypertension Father     Asthma Son     Hypertension Sister     Hypertension Sister     Kidney disease Neg Hx      Social History     Tobacco Use    Smoking status: Never Smoker    Smokeless tobacco: Never Used   Substance Use Topics    Alcohol use: No    Drug use: No     Review of Systems   Constitutional: Negative for fever.   HENT: Negative for sore throat.    Respiratory: Negative for shortness of breath.    Cardiovascular: Negative for chest pain.   Gastrointestinal: Positive for abdominal pain (upper mid), diarrhea (x2 episodes), nausea (chronic, unchanged) and vomiting (x2 episodes). Negative for blood in stool.        Negative for hematemesis.    Genitourinary: Negative for dysuria and hematuria.   Musculoskeletal: Positive for back pain (radiating left lower).   Skin: Negative for rash.   Neurological: Negative for weakness.   Hematological: Does not bruise/bleed easily.     Physical Exam     Initial Vitals   BP Pulse Resp Temp SpO2   10/03/18 1621 10/03/18 1621 10/03/18 1621 10/03/18 1620 --   (!) 199/96 71 18 98.2 °F (36.8 °C)       MAP       --                Physical Exam    Nursing note and vitals reviewed.  Constitutional: She appears well-developed and well-nourished. She is not diaphoretic. No distress.   HENT:   Head: Normocephalic and atraumatic.   Eyes: EOM are normal. Pupils are equal, round, and reactive to light.   Neck: Normal range of motion. Neck supple.   Cardiovascular: Normal rate, regular rhythm, normal heart sounds and intact distal pulses. Exam reveals no gallop and no friction rub.    No murmur  heard.  Pulmonary/Chest: Breath sounds normal. No respiratory distress. She has no wheezes. She has no rhonchi. She has no rales.   Abdominal: Soft. Bowel sounds are normal. She exhibits no distension and no mass. There is tenderness in the epigastric area and left upper quadrant. There is no rebound and no guarding.   Musculoskeletal: Normal range of motion.   Neurological: She is alert and oriented to person, place, and time.   Skin: Skin is warm and dry.   Psychiatric: She has a normal mood and affect. Her behavior is normal. Judgment and thought content normal.       ED Course   Procedures  Labs Reviewed   COMPREHENSIVE METABOLIC PANEL - Abnormal; Notable for the following components:       Result Value    BUN, Bld 30 (*)     Creatinine 1.8 (*)     eGFR if  36 (*)     eGFR if non  31 (*)     All other components within normal limits   CBC W/ AUTO DIFFERENTIAL - Abnormal; Notable for the following components:    RBC 3.99 (*)     Hemoglobin 11.5 (*)     Hematocrit 35.7 (*)     MPV 8.7 (*)     Mono # 0.1 (*)     Mono% 0.9 (*)     All other components within normal limits   URINALYSIS, REFLEX TO URINE CULTURE    Narrative:     Preferred Collection Type->Urine, Clean Catch   LIPASE       Imaging Results          CT Abdomen Pelvis  Without Contrast (In process)    Procedure changed from CT Abdomen Pelvis With Contrast                  Medical Decision Making:   History:   Old Medical Records: I decided to obtain old medical records.  Initial Assessment:   53-year-old female presenting with a chief complaint of upper abdominal pain.  Differential Diagnosis:   My differential diagnosis includes ulcer, pancreatitis, gastritis and gastric sleeve leak.  Clinical Tests:   Lab Tests: Ordered and Reviewed  Radiological Study: Reviewed and Ordered  ED Management:  The patient was emergently evaluated in the emergency department, her evaluation was significant for middle-age female with  epigastric and left upper quadrant tenderness to palpation.  The patient's labs showed no acute abnormalities.  The patient was handed over at the change of shift to Dr. Robbins pending the results of her CT scan.  If her CT scan is okay then she will likely be discharged home with a referral to see her GI physician for further workup of her symptoms.             Scribe Attestation:   Scribe #1: I performed the above scribed service and the documentation accurately describes the services I performed. I attest to the accuracy of the note.  Scribe #2: I performed the above scribed service and the documentation accurately describes the services I performed. I attest to the accuracy of the note.        I, Dr. Damien Mcmillan, personally performed the services described in this documentation. All medical record entries made by the scribe were at my direction and in my presence.  I have reviewed the chart and agree that the record reflects my personal performance and is accurate and complete. Damien Mcmillan MD.  9:09 AM 10/04/2018          Clinical Impression:   The encounter diagnosis was Epigastric pain.                             Damien Mcmillan MD  10/04/18 0909

## 2018-10-04 ENCOUNTER — PES CALL (OUTPATIENT)
Dept: ADMINISTRATIVE | Facility: CLINIC | Age: 53
End: 2018-10-04

## 2018-11-15 DIAGNOSIS — N18.9 CHRONIC KIDNEY DISEASE, UNSPECIFIED CKD STAGE: ICD-10-CM

## 2018-11-15 DIAGNOSIS — Z12.39 BREAST CANCER SCREENING: ICD-10-CM

## 2018-12-08 ENCOUNTER — LAB VISIT (OUTPATIENT)
Dept: LAB | Facility: HOSPITAL | Age: 53
End: 2018-12-08
Attending: SPECIALIST
Payer: MEDICARE

## 2018-12-08 DIAGNOSIS — M18.30 TRAUMATIC DEGENERATIVE ARTHRITIS OF CARPOMETACARPAL JOINT OF THUMB: Primary | ICD-10-CM

## 2018-12-08 LAB
ANION GAP SERPL CALC-SCNC: 8 MMOL/L
BUN SERPL-MCNC: 24 MG/DL
CALCIUM SERPL-MCNC: 10 MG/DL
CHLORIDE SERPL-SCNC: 109 MMOL/L
CO2 SERPL-SCNC: 25 MMOL/L
CREAT SERPL-MCNC: 1.7 MG/DL
EST. GFR  (AFRICAN AMERICAN): 38.9 ML/MIN/1.73 M^2
EST. GFR  (NON AFRICAN AMERICAN): 33.7 ML/MIN/1.73 M^2
GLUCOSE SERPL-MCNC: 68 MG/DL
POTASSIUM SERPL-SCNC: 3.8 MMOL/L
SODIUM SERPL-SCNC: 142 MMOL/L

## 2018-12-08 PROCEDURE — 36415 COLL VENOUS BLD VENIPUNCTURE: CPT | Mod: PO

## 2018-12-08 PROCEDURE — 80048 BASIC METABOLIC PNL TOTAL CA: CPT

## 2018-12-12 ENCOUNTER — DOCUMENTATION ONLY (OUTPATIENT)
Dept: FAMILY MEDICINE | Facility: CLINIC | Age: 53
End: 2018-12-12

## 2018-12-12 NOTE — PROGRESS NOTES
Health Maintenance Due   Topic Date Due    Fecal Occult Blood Test (FOBT)/[employer]  1965    TETANUS VACCINE  01/02/1982    Eye Exam  09/30/2017    Foot Exam  11/10/2017    Influenza Vaccine  08/01/2018    Hemoglobin A1c  09/07/2018    Mammogram  11/10/2018

## 2018-12-13 ENCOUNTER — OFFICE VISIT (OUTPATIENT)
Dept: FAMILY MEDICINE | Facility: CLINIC | Age: 53
End: 2018-12-13
Payer: MEDICARE

## 2018-12-13 VITALS
RESPIRATION RATE: 16 BRPM | HEIGHT: 62 IN | DIASTOLIC BLOOD PRESSURE: 84 MMHG | WEIGHT: 220.44 LBS | BODY MASS INDEX: 40.57 KG/M2 | HEART RATE: 64 BPM | SYSTOLIC BLOOD PRESSURE: 132 MMHG

## 2018-12-13 DIAGNOSIS — J44.9 CHRONIC OBSTRUCTIVE PULMONARY DISEASE, UNSPECIFIED COPD TYPE: ICD-10-CM

## 2018-12-13 DIAGNOSIS — I20.89 ANGINA AT REST: Primary | ICD-10-CM

## 2018-12-13 DIAGNOSIS — I50.9 CONGESTIVE HEART FAILURE, UNSPECIFIED HF CHRONICITY, UNSPECIFIED HEART FAILURE TYPE: ICD-10-CM

## 2018-12-13 DIAGNOSIS — I10 UNCONTROLLED HYPERTENSION: ICD-10-CM

## 2018-12-13 DIAGNOSIS — N18.30 CHRONIC KIDNEY DISEASE, STAGE 3: ICD-10-CM

## 2018-12-13 PROCEDURE — 99499 UNLISTED E&M SERVICE: CPT | Mod: S$GLB,,, | Performed by: INTERNAL MEDICINE

## 2018-12-13 PROCEDURE — 3075F SYST BP GE 130 - 139MM HG: CPT | Mod: CPTII,S$GLB,, | Performed by: INTERNAL MEDICINE

## 2018-12-13 PROCEDURE — 99214 OFFICE O/P EST MOD 30 MIN: CPT | Mod: S$GLB,,, | Performed by: INTERNAL MEDICINE

## 2018-12-13 PROCEDURE — 3008F BODY MASS INDEX DOCD: CPT | Mod: CPTII,S$GLB,, | Performed by: INTERNAL MEDICINE

## 2018-12-13 PROCEDURE — 3079F DIAST BP 80-89 MM HG: CPT | Mod: CPTII,S$GLB,, | Performed by: INTERNAL MEDICINE

## 2018-12-13 RX ORDER — HYDRALAZINE HYDROCHLORIDE 100 MG/1
TABLET, FILM COATED ORAL
Qty: 90 TABLET | Refills: 11 | Status: SHIPPED | OUTPATIENT
Start: 2018-12-13 | End: 2019-08-30

## 2018-12-13 RX ORDER — METOPROLOL SUCCINATE 50 MG/1
50 TABLET, EXTENDED RELEASE ORAL DAILY
Qty: 30 TABLET | Refills: 0 | Status: ON HOLD | OUTPATIENT
Start: 2018-12-13 | End: 2019-10-06 | Stop reason: SDUPTHER

## 2018-12-13 RX ORDER — ISOSORBIDE DINITRATE 5 MG/1
5 TABLET ORAL 3 TIMES DAILY
Qty: 90 TABLET | Refills: 11 | Status: SHIPPED | OUTPATIENT
Start: 2018-12-13 | End: 2020-03-27 | Stop reason: SDUPTHER

## 2018-12-13 NOTE — PROGRESS NOTES
"Subjective:       Patient ID: Faustina Rae is a 53 y.o. female.    Chief Complaint: Hospital Follow Up (refills)    HPI       CHIEF COMPLAINT: CHEST PAIN    HPI: had moderate intensity inferior wall reversible defect but angiogram showed nothing that could be stented.  She has a history of congestive heart failure and COPD.    ONSET/TIMIN wk ago  ago    DURATION:  15 mins  QUALITY/COURSE:  unchanged.    SEVERITY: #    8  /10 now 0 ( on 1 to 10 scale)    PREVIOUS CARDIAC TESTING: : angiogram, stress test.     LOCATION:  substernal   Radiation -  none    All choices for next 4 sections below are positive to the patient ONLY if BOLDED, otherwise negative:    AGGRAVATING FACTORS: Swallowing, , Deep_Breathing, Coughing, Neck/Arm/Chest_Movement     RELIEVING FACTORS: Nitroglycerin, Resting, Change_of_Position    ASSOCIATED SYMPTOMS:  Hemoptysis,Tenderness,  Leg_Pain    RISK FACTORS: Diabetes, HTN, Hyperlipidemia, smoking,         The patient has a baseline estimated GFR of 32 and did receive contrast for her angiogram.            Review of Systems   Constitutional: Positive for diaphoresis. Negative for fever and unexpected weight change.   HENT: Negative for trouble swallowing.    Respiratory: Positive for shortness of breath. Negative for cough.    Cardiovascular: Positive for chest pain and palpitations. Negative for leg swelling.   Gastrointestinal: Negative for nausea and vomiting.   Neurological: Positive for dizziness and light-headedness. Negative for syncope and numbness.   Psychiatric/Behavioral: Negative for dysphoric mood. The patient is not nervous/anxious.          Objective:      Vitals:    18 1033   BP: 132/84   Pulse: 64   Resp: 16   Weight: 100 kg (220 lb 7.4 oz)   Height: 5' 2" (1.575 m)   PainSc: 0-No pain     Physical Exam   Constitutional: She appears well-developed and well-nourished.   Cardiovascular: Normal rate, regular rhythm and normal heart sounds.   Pulmonary/Chest: Effort " normal and breath sounds normal.   Abdominal: Soft. There is no tenderness.   Musculoskeletal: She exhibits no edema.   Neurological: She is alert.   Psychiatric: She has a normal mood and affect. Her behavior is normal. Thought content normal.   Nursing note and vitals reviewed.        Assessment:       1. Angina at rest    2. Chronic kidney disease, stage 3    3. Congestive heart failure, unspecified HF chronicity, unspecified heart failure type    4. Chronic obstructive pulmonary disease, unspecified COPD type    5. Uncontrolled hypertension          Plan:       Angina at rest  -     isosorbide dinitrate (ISORDIL) 5 MG Tab; Take 1 tablet (5 mg total) by mouth 3 (three) times daily.  Dispense: 90 tablet; Refill: 11  -     metoprolol succinate (TOPROL-XL) 50 MG 24 hr tablet; Take 1 tablet (50 mg total) by mouth once daily.  Dispense: 30 tablet; Refill: 0    Chronic kidney disease, stage 3  -     Comprehensive metabolic panel; Future; Expected date: 12/13/2018    Congestive heart failure, unspecified HF chronicity, unspecified heart failure type  -     metoprolol succinate (TOPROL-XL) 50 MG 24 hr tablet; Take 1 tablet (50 mg total) by mouth once daily.  Dispense: 30 tablet; Refill: 0    Chronic obstructive pulmonary disease, unspecified COPD type    Uncontrolled hypertension  -     hydrALAZINE (APRESOLINE) 100 MG tablet; TAKE 1 TABLET(100 MG) BY MOUTH THREE TIMES DAILY  Dispense: 90 tablet; Refill: 11      Follow-up in about 6 weeks (around 1/24/2019).

## 2018-12-13 NOTE — PATIENT INSTRUCTIONS
Lose 5 pounds.  Suggest [place] diet    If you get a chest pain last more than 5 minutes taken nitroglycerin and some Mylanta.  If it lasts more than 20 minutes call [phone]    Isordil  cause a headache.  If it is a problem you can take Tylenol 20 min before you take the Isordil

## 2018-12-20 ENCOUNTER — TELEPHONE (OUTPATIENT)
Dept: FAMILY MEDICINE | Facility: CLINIC | Age: 53
End: 2018-12-20

## 2018-12-20 NOTE — TELEPHONE ENCOUNTER
----- Message from Ramses Jensen sent at 12/20/2018  3:48 PM CST -----  Contact: Patient   Advised needs to be seen sooner than 12-26-18 at 3:PM if at all possible.  The peeling hands and lips have just started, her angiogram was about two weeks ago.  Patient thinks it could be related to procedure.  Please call [phone] (C)

## 2018-12-20 NOTE — TELEPHONE ENCOUNTER
----- Message from Ramses Jensen sent at 12/20/2018  3:35 PM CST -----  Contact: Patient  Returning call to Francia Marcelo MA .  Please call [phone] (C)

## 2018-12-20 NOTE — TELEPHONE ENCOUNTER
----- Message from Ramses Jensen sent at 12/20/2018  3:02 PM CST -----  Contact: Daughter Cisco  Advised ptnt's hands and lips have been peeling since her angiogram about two weeks ago.  And has cough and would like something prescribed.  Please call Venessa at [phone]

## 2019-01-03 DIAGNOSIS — E11.9 TYPE 2 DIABETES MELLITUS WITHOUT COMPLICATION, UNSPECIFIED WHETHER LONG TERM INSULIN USE: ICD-10-CM

## 2019-01-06 ENCOUNTER — OFFICE VISIT (OUTPATIENT)
Dept: URGENT CARE | Facility: CLINIC | Age: 54
End: 2019-01-06
Payer: MEDICARE

## 2019-01-06 VITALS
SYSTOLIC BLOOD PRESSURE: 186 MMHG | BODY MASS INDEX: 40.67 KG/M2 | RESPIRATION RATE: 14 BRPM | OXYGEN SATURATION: 98 % | TEMPERATURE: 99 F | HEART RATE: 90 BPM | HEIGHT: 62 IN | DIASTOLIC BLOOD PRESSURE: 97 MMHG | WEIGHT: 221 LBS

## 2019-01-06 DIAGNOSIS — H20.9 UVEITIS OF LEFT EYE: Primary | ICD-10-CM

## 2019-01-06 PROCEDURE — 99214 PR OFFICE/OUTPT VISIT, EST, LEVL IV, 30-39 MIN: ICD-10-PCS | Mod: S$GLB,,, | Performed by: NURSE PRACTITIONER

## 2019-01-06 PROCEDURE — 3008F BODY MASS INDEX DOCD: CPT | Mod: CPTII,S$GLB,, | Performed by: NURSE PRACTITIONER

## 2019-01-06 PROCEDURE — 3077F SYST BP >= 140 MM HG: CPT | Mod: CPTII,S$GLB,, | Performed by: NURSE PRACTITIONER

## 2019-01-06 PROCEDURE — 99214 OFFICE O/P EST MOD 30 MIN: CPT | Mod: S$GLB,,, | Performed by: NURSE PRACTITIONER

## 2019-01-06 PROCEDURE — 3008F PR BODY MASS INDEX (BMI) DOCUMENTED: ICD-10-PCS | Mod: CPTII,S$GLB,, | Performed by: NURSE PRACTITIONER

## 2019-01-06 PROCEDURE — 3080F PR MOST RECENT DIASTOLIC BLOOD PRESSURE >= 90 MM HG: ICD-10-PCS | Mod: CPTII,S$GLB,, | Performed by: NURSE PRACTITIONER

## 2019-01-06 PROCEDURE — 3080F DIAST BP >= 90 MM HG: CPT | Mod: CPTII,S$GLB,, | Performed by: NURSE PRACTITIONER

## 2019-01-06 PROCEDURE — 3077F PR MOST RECENT SYSTOLIC BLOOD PRESSURE >= 140 MM HG: ICD-10-PCS | Mod: CPTII,S$GLB,, | Performed by: NURSE PRACTITIONER

## 2019-01-06 RX ORDER — GENTAMICIN SULFATE 3 MG/ML
1 SOLUTION/ DROPS OPHTHALMIC EVERY 4 HOURS
Qty: 15 ML | Refills: 0 | Status: SHIPPED | OUTPATIENT
Start: 2019-01-06 | End: 2019-10-05

## 2019-01-06 NOTE — PROGRESS NOTES
"Subjective:       Patient ID: Faustina Rae is a 54 y.o. female.    Vitals:  height is 5' 2" (1.575 m) and weight is 100.2 kg (221 lb). Her temperature is 98.5 °F (36.9 °C). Her blood pressure is 186/97 (abnormal) and her pulse is 90. Her respiration is 14 and oxygen saturation is 98%.     Chief Complaint: Conjunctivitis    Left eye - swollen. States she woke up with discharge and pain.       Conjunctivitis   This is a new problem. The current episode started today. The problem has been gradually worsening. Associated symptoms include headaches.       HENT: Positive for ear pain and ear discharge.    Neurological: Positive for headaches.       Objective:      Physical Exam   Constitutional: She is oriented to person, place, and time. She appears well-developed and well-nourished.   HENT:   Mouth/Throat: Oropharynx is clear and moist.   Eyes: EOM are normal.   L eye injected, + tearing. No FB, no fluorescine uptake, Entire conjunctiva injected    Neck: Normal range of motion.   Cardiovascular: Normal rate.   Pulmonary/Chest: Effort normal.   Abdominal: Soft.   Musculoskeletal: Normal range of motion.   Neurological: She is alert and oriented to person, place, and time.   Psychiatric: She has a normal mood and affect. Her behavior is normal.   Nursing note and vitals reviewed.      Assessment:       1. Uveitis of left eye        Plan:         Uveitis of left eye    Other orders  -     gentamicin (GARAMYCIN) 0.3 % ophthalmic solution; Place 1 drop into the left eye every 4 (four) hours.  Dispense: 15 mL; Refill: 0    Pt presents since this a.m. With severe burning to L eye with discharge. She does wear color contacts and has had them on for approx 4 weeks. No FB, no abrasion. Discharged with gentamycin gtts and advised with ophthalmologist follow up.      "

## 2019-01-06 NOTE — PROGRESS NOTES
Subjective:       Patient ID: Faustina Rae is a 54 y.o. female.    Chief Complaint: Conjunctivitis    HPI  Review of Systems    Objective:      Physical Exam    Assessment:       No diagnosis found.    Plan:       ***

## 2019-01-06 NOTE — PATIENT INSTRUCTIONS
DO not wear Contacts till symptoms resolve. Follow up with EYE doctor in 2 days.  Iritis    The iris is the colored part of the eye that controls the size of the pupil. Iritis is an inflammation of the iris. It can be caused by injury to the eye or disease elsewhere in the body. Diseases linked to iritis include gout, Lyme disease, inflammatory bowel disease, or and rheumatoid arthritis. Often, no cause is found.  Iritis usually develops suddenly in one eye. Symptoms include redness, pain in the eye or brow region, sensitivity to light, and blurred vision.  Iritis is treated with eye drops to dilate the pupil and reduce pain. This will cause your vision to be blurred from a few hours up to a week, depending on the medicine used. Steroid drops may also be prescribed, to reduce pain from inflammation. Iritis from eye injury usually resolves in 1 to 2 weeks. Iritis from other causes may take several weeks to months to resolve.  Home care  · Use eye drops as prescribed.  · Wear sunglasses to decrease light sensitivity and discomfort.  · If your eye is dilated or if you have been given an eye patch, your driving ability will be affected. Do not drive until the blurred vision wears off and you no longer need an eye patch.  · You may use acetaminophen or ibuprofen to control pain, unless another medicine was prescribed. (Note: If you have chronic liver or kidney disease, or if you have ever had a stomach ulcer or gastrointestinal bleeding, talk with your doctor before using these medicines.)  Follow-up care  Follow up with your healthcare provider, or as advised.  When to seek medical advice  Contact your healthcare provider right away if any of these occur:  · Symptoms do not start to improve after 1 week.  · Iritis from eye injury causes symptoms for more than 2 weeks.  · Pain increases.  · Your eye becomes red.  · You lose some or all of your vision.  Date Last Reviewed: 6/22/2015  © 8130-1216 [employer] [address] [place] [address]. All rights reserved. This information is not intended as a substitute for professional medical care. Always follow your healthcare professional's instructions.

## 2019-01-15 ENCOUNTER — PATIENT OUTREACH (OUTPATIENT)
Dept: ADMINISTRATIVE | Facility: HOSPITAL | Age: 54
End: 2019-01-15

## 2019-01-15 NOTE — LETTER
"January 23, 2019    Faustina Rae  [address]  [address]  [place] [address]             [facility]  [address]  [facility] [address]  Phone: [phone] Dear Julia [facility] is committed to your overall health and would like to ensure that you are up to date on your recommended test and/or procedures.   Jaxson Gonzales MD  has found that your chart shows you may be due for the following:     DIABETIC EYE EXAM & FOOT EXAM   HEMOGLOBIN  A1C   MAMMOGRAM   COLORECTAL CANCER SCREENING     If you have already had your screening, or you have made an appointment for your screening, congratulations!  You're on the road to good health. If you haven't signed up for a colorectal screening please accept this invitation to be screened.       According to the American Cancer Society, colon cancer is the third most common cancer for people in the [place].  A simple screening test "Fit Kit" - done in your own home - can help find colon cancer at an e mayelin stage when it can be treated, even before any signs or symptoms develop.     Testing for blood in your stool (feces or bowel movement) is the first step. If you have an upcoming visit with your Primary Care Physician you can  a Fit Kit during your visit or you can  a Fit Kit at your Primary Care Clinic prior to your visit.     The Fit Kit includes:     Instructions on how to perform the test   (1) Sheet of tissue paper   (1) Small Absorption pad   (1) Bottle to hold the sample and a small probe to help you take the sample   (1) Small plastic bio-hazard bag   (1) Postage-paid return envelope     Please do your test (the instructions will tell you how) and then return your sample in the postage-paid return envelope within 24 hours of collection.     If your test results are negative, you won't need testing again for another year.  If results show you need more testing, we will call you with next steps.     Regular colorectal cancer " "screening is one of the most powerful weapons for preventing colon cancer.  The website https://www.cancer.org/cancer/colon-rectal-cancer.html can answer many of your questions about this cancer and its prevention.  Just search for "colorectal cancer".     If you have any questions please call [facility] [phone] for assistance.     If you have had any of the above done at another facility, please let us know so that we may obtain copies from that facility.  If you have a copy of these records, please provide a copy for us to scan into your chart.  You are welcome to request that the report be faxed to us at  ([phone]).     Otherwise, please schedule these appointments at your earliest convenience by calling [phone] or going to [email or web address].     If you have an upcoming scheduled appointment, please disregard this letter.     Sincerely,   Your [facility]   MD Holly Chamberlain LPN Clinical Care Coordinator   [facility] [address] [place] [address]   Phone [phone]   Fax [phone]        "

## 2019-01-31 DIAGNOSIS — E11.9 TYPE 2 DIABETES MELLITUS WITHOUT COMPLICATION: ICD-10-CM

## 2019-02-14 DIAGNOSIS — N18.9 CHRONIC KIDNEY DISEASE, UNSPECIFIED CKD STAGE: ICD-10-CM

## 2019-03-08 DIAGNOSIS — E11.9 TYPE 2 DIABETES MELLITUS WITHOUT COMPLICATION: ICD-10-CM

## 2019-03-31 DIAGNOSIS — G47.00 INSOMNIA, UNSPECIFIED TYPE: ICD-10-CM

## 2019-04-01 RX ORDER — TRAZODONE HYDROCHLORIDE 50 MG/1
TABLET ORAL
Qty: 30 TABLET | Refills: 2 | Status: SHIPPED | OUTPATIENT
Start: 2019-04-01 | End: 2019-05-31 | Stop reason: ALTCHOICE

## 2019-05-14 ENCOUNTER — TELEPHONE (OUTPATIENT)
Dept: FAMILY MEDICINE | Facility: CLINIC | Age: 54
End: 2019-05-14

## 2019-05-14 NOTE — TELEPHONE ENCOUNTER
----- Message from Rachelle Tellez sent at 5/14/2019 10:40 AM CDT -----  Contact: pt  Calling with questions before her appointment today and please advise [phone] (home)

## 2019-05-23 DIAGNOSIS — N18.9 CHRONIC KIDNEY DISEASE, UNSPECIFIED CKD STAGE: ICD-10-CM

## 2019-05-31 ENCOUNTER — OFFICE VISIT (OUTPATIENT)
Dept: FAMILY MEDICINE | Facility: CLINIC | Age: 54
End: 2019-05-31
Payer: MEDICARE

## 2019-05-31 VITALS
TEMPERATURE: 98 F | WEIGHT: 228.63 LBS | DIASTOLIC BLOOD PRESSURE: 90 MMHG | HEART RATE: 70 BPM | SYSTOLIC BLOOD PRESSURE: 190 MMHG | HEIGHT: 62 IN | BODY MASS INDEX: 42.07 KG/M2

## 2019-05-31 DIAGNOSIS — E55.9 VITAMIN D DEFICIENCY: ICD-10-CM

## 2019-05-31 DIAGNOSIS — E11.40 TYPE 2 DIABETES MELLITUS WITH DIABETIC NEUROPATHY, WITHOUT LONG-TERM CURRENT USE OF INSULIN: Primary | ICD-10-CM

## 2019-05-31 DIAGNOSIS — J44.89 COPD WITH ASTHMA: ICD-10-CM

## 2019-05-31 DIAGNOSIS — E53.8 B12 DEFICIENCY: ICD-10-CM

## 2019-05-31 DIAGNOSIS — E78.5 HYPERLIPIDEMIA ASSOCIATED WITH TYPE 2 DIABETES MELLITUS: ICD-10-CM

## 2019-05-31 DIAGNOSIS — G47.33 OSA (OBSTRUCTIVE SLEEP APNEA): ICD-10-CM

## 2019-05-31 DIAGNOSIS — E11.69 HYPERLIPIDEMIA ASSOCIATED WITH TYPE 2 DIABETES MELLITUS: ICD-10-CM

## 2019-05-31 DIAGNOSIS — R53.83 LETHARGY: ICD-10-CM

## 2019-05-31 PROCEDURE — 99999 PR PBB SHADOW E&M-EST. PATIENT-LVL V: CPT | Mod: PBBFAC,,, | Performed by: FAMILY MEDICINE

## 2019-05-31 PROCEDURE — 99214 PR OFFICE/OUTPT VISIT, EST, LEVL IV, 30-39 MIN: ICD-10-PCS | Mod: 25,S$GLB,, | Performed by: FAMILY MEDICINE

## 2019-05-31 PROCEDURE — 3080F PR MOST RECENT DIASTOLIC BLOOD PRESSURE >= 90 MM HG: ICD-10-PCS | Mod: CPTII,S$GLB,, | Performed by: FAMILY MEDICINE

## 2019-05-31 PROCEDURE — 99999 PR PBB SHADOW E&M-EST. PATIENT-LVL V: ICD-10-PCS | Mod: PBBFAC,,, | Performed by: FAMILY MEDICINE

## 2019-05-31 PROCEDURE — 3077F SYST BP >= 140 MM HG: CPT | Mod: CPTII,S$GLB,, | Performed by: FAMILY MEDICINE

## 2019-05-31 PROCEDURE — 99499 RISK ADDL DX/OHS AUDIT: ICD-10-PCS | Mod: S$GLB,,, | Performed by: FAMILY MEDICINE

## 2019-05-31 PROCEDURE — 3080F DIAST BP >= 90 MM HG: CPT | Mod: CPTII,S$GLB,, | Performed by: FAMILY MEDICINE

## 2019-05-31 PROCEDURE — 99214 OFFICE O/P EST MOD 30 MIN: CPT | Mod: 25,S$GLB,, | Performed by: FAMILY MEDICINE

## 2019-05-31 PROCEDURE — 99499 UNLISTED E&M SERVICE: CPT | Mod: S$GLB,,, | Performed by: FAMILY MEDICINE

## 2019-05-31 PROCEDURE — 3077F PR MOST RECENT SYSTOLIC BLOOD PRESSURE >= 140 MM HG: ICD-10-PCS | Mod: CPTII,S$GLB,, | Performed by: FAMILY MEDICINE

## 2019-05-31 PROCEDURE — 3008F BODY MASS INDEX DOCD: CPT | Mod: CPTII,S$GLB,, | Performed by: FAMILY MEDICINE

## 2019-05-31 PROCEDURE — 3008F PR BODY MASS INDEX (BMI) DOCUMENTED: ICD-10-PCS | Mod: CPTII,S$GLB,, | Performed by: FAMILY MEDICINE

## 2019-05-31 NOTE — PROGRESS NOTES
Patient, Faustina Rae (MRN #[id number]), presented with a recent Estimated Glumerular Filtration Rate (EGFR) between 30 and 45 consistent with the definition of chronic kidney disease stage 3 - moderate (ICD10 - N18.3).    eGFR if    Date Value Ref Range Status   12/08/2018 38.9 (A) >60 mL/min/1.73 m^2 Final       The patient's chronic kidney disease stage 3 was monitored, evaluated, addressed and/or treated. This addendum to the medical record is made on 05/31/2019.

## 2019-05-31 NOTE — PROGRESS NOTES
Subjective:       Patient ID: Faustina Rae is a 54 y.o. female.    Chief Complaint: Pre-op Exam    HPI   Patient presents requesting clearance for bariatric surgery.  She is a patient of Dr. Gonzales last seen on 12/13/2018.  Unfortunately she is an extremely poor historian and cannot tell me much of anything about her past medical history and is unsure of what medications she is currently taking.  We were able to speak with her daughter on the phone and she was somewhat more help but much of her information was obtained through chart review.  At her last visit she was diagnosed with angina at rest, congestive heart failure, COPD and uncontrolled hypertension and was recommended a 6 week follow-up but she does not recall any of this.  She tells me she is following with her cardiologist (Dr. Valladares) closely for all of her care and she only sees Dr. Gonzales when she is sick.  She is unsure of what type of bariatric surgery she is going to be completing but tells me she has had a previous gastric surgery where she lost a significant amount of weight but gained much of this back.  She tells me she had absolutely no problems with anesthesia or the surgery and had a quick recovery discharged same day.  Reviewing her records it appears she has had a gastric sleeve procedure previously.  Her daughter informed me that at her last cardiology visit several medications were discontinued although they were not certain of which ones.  Her daughter thought lisinopril was discontinued secondary to decreased kidney function although I do not have this medication on her med list.  She is certain that Lipitor was discontinued although they are unsure of why.  Her daughter does not believe she has been taking Lasix for some time even though she has had some lower extremity edema on and off.  She is also no longer taking trazodone although they are unsure of why.  She tells me she continues to have problems with sleep but her daughter  tells me this is because she is not using her CPAP machine which apparently was lost 3-4 years ago with moving.  She has a history of seasonal allergies but tells me these are well controlled on her singular and Flonase.  She does note that if she does not use these her asthma and COPD are significantly worse.  She admits to chronic shortness of breath and cough with exertion but tells me using her Flovent daily she is only using her albuterol rescue inhaler 2-4 times daily.  She notes she use this before and after her daily walk for exercise.  She is taking a daily aspirin with her history of CVA but as above her statin has been discontinued for unknown reasons.  She denies any muscle aches or other adverse effects when using the medication.  She has history of congestive heart failure but denies symptoms of orthopnea or claudication although she does admit to infrequent lower extremity edema bilateral.  She does not watch her fluid intake or sodium intake and does not monitor her weight at home.  She has a history of hypertension and her blood pressures are significantly elevated today although she tells me this is somewhat normal for her.  She tells me she does check her blood pressures at home but notes they are all over the place ranging from 120-210/.  She tells me she believes she has taken all of her blood pressure medications this morning.  She takes calcium plus vitamin-D likely for her previous gastric surgery but she tells me she believe she has had vitamin-D and vitamin B12 deficiency previously.  It is unknown as to why she takes Cymbalta as she denies any anxiety or depression history but this may be secondary to diabetic peripheral neuropathy as she tells me she has had stocking-glove neuropathy for quite some time although she strongly denies this being secondary to diabetes.  She is requesting referral to Podiatry for their opinion on this today when discussing it with her as symptoms are  much worse in her feet.  She has a history of GERD but tells me she has not had any breakthrough reflux symptoms with use of her Protonix.  She also tells me she has a history of iron deficiency anemia and uses iron supplementation for this.  She has a history of mixed hyperlipidemia remotely although both her and her daughter tell me her cholesterol levels checked by her cardiologist have been normal when she was taking her Lipitor.  They have no other concerns or complaints today.    Review of Systems   Constitutional: Positive for fatigue. Negative for activity change, appetite change, chills, fever and unexpected weight change.   HENT: Positive for sinus pain. Negative for congestion, dental problem, ear pain, hearing loss, postnasal drip, rhinorrhea, sinus pressure, sneezing, sore throat, tinnitus, trouble swallowing and voice change.    Eyes: Negative for photophobia, pain and visual disturbance.   Respiratory: Positive for apnea, cough and shortness of breath. Negative for chest tightness and wheezing.    Cardiovascular: Positive for leg swelling. Negative for chest pain and palpitations.   Gastrointestinal: Negative for abdominal pain, blood in stool, constipation, diarrhea, nausea and vomiting.   Endocrine: Positive for cold intolerance and polydipsia. Negative for heat intolerance, polyphagia and polyuria.   Genitourinary: Negative for difficulty urinating, dysuria, flank pain, frequency, pelvic pain and urgency.   Musculoskeletal: Negative for arthralgias and myalgias.   Skin: Negative for rash and wound.   Neurological: Positive for numbness.   Hematological: Negative for adenopathy. Does not bruise/bleed easily.   Psychiatric/Behavioral: Positive for sleep disturbance. Negative for dysphoric mood. The patient is not nervous/anxious.          Objective:      Vitals:    05/31/19 1436 05/31/19 1522   BP: (!) 191/95 (!) 190/90   Pulse: 70    Temp: 98 °F (36.7 °C)    TempSrc: Oral    Weight: 103.7 kg (228 lb  "9.9 oz)    Height: 5' 2" (1.575 m)    PainSc: 0-No pain      Physical Exam   Constitutional: She is oriented to person, place, and time. She appears well-developed and well-nourished. She is cooperative.  Non-toxic appearance. She does not have a sickly appearance. She does not appear ill. No distress.   Pulse ox 97%.   HENT:   Head: Normocephalic and atraumatic.   Right Ear: External ear normal.   Left Ear: External ear normal.   Nose: Nose normal.   Mouth/Throat: Oropharynx is clear and moist.   Eyes: Pupils are equal, round, and reactive to light. Conjunctivae and EOM are normal. No scleral icterus.   Neck: Trachea normal, normal range of motion and phonation normal. Neck supple. No JVD present. No thyroid mass and no thyromegaly present.   Cardiovascular: Normal rate, regular rhythm and normal heart sounds. Exam reveals no gallop and no friction rub.   No murmur heard.  Pulses:       Dorsalis pedis pulses are 2+ on the right side.        Posterior tibial pulses are 2+ on the right side.   Pulmonary/Chest: Effort normal. No respiratory distress. She has no decreased breath sounds. She has wheezes in the right lower field and the left lower field. She has no rhonchi. She has no rales. Chest wall is not dull to percussion. She exhibits no tenderness and no bony tenderness.   Faint expiratory wheezes at the bilateral bases.   Abdominal: Soft. Bowel sounds are normal. She exhibits no distension and no mass. There is no tenderness. There is no guarding.   Musculoskeletal: Normal range of motion. She exhibits edema (Trace edema bilateral.). She exhibits no deformity.        Right foot: There is normal range of motion and no deformity.   Diabetic foot exam only allowed right.  Left-sided refused.   Feet:   Right Foot:   Protective Sensation: 5 sites tested. 0 sites sensed.   Skin Integrity: Positive for callus and dry skin. Negative for ulcer, blister, skin breakdown, erythema or warmth.   Lymphadenopathy:     She has " no cervical adenopathy.   Neurological: She is alert and oriented to person, place, and time.   Skin: Skin is warm and dry. She is not diaphoretic.   Psychiatric: She has a normal mood and affect. Her behavior is normal. Judgment and thought content normal.   Nursing note and vitals reviewed.        Assessment:       1. Type 2 diabetes mellitus with diabetic neuropathy, without long-term current use of insulin    2. COPD with asthma    3. DAVID (obstructive sleep apnea)    4. Lethargy    5. Vitamin D deficiency    6. B12 deficiency    7. Hyperlipidemia associated with type 2 diabetes mellitus          Plan:   Type 2 diabetes mellitus with diabetic neuropathy, without long-term current use of insulin  -     Ambulatory referral to Podiatry  -     Hemoglobin A1c; Future; Expected date: 05/31/2019    COPD with asthma  -     Ambulatory referral to Pulmonology    DAVID (obstructive sleep apnea)  -     Ambulatory referral to Sleep Disorders    Lethargy  -     CBC auto differential; Future; Expected date: 05/31/2019  -     Comprehensive metabolic panel; Future; Expected date: 05/31/2019  -     TSH; Future; Expected date: 05/31/2019    Vitamin D deficiency  -     Vitamin D; Future; Expected date: 05/31/2019    B12 deficiency  -     Vitamin B12; Future; Expected date: 05/31/2019    Hyperlipidemia associated with type 2 diabetes mellitus  -     Lipid panel; Future; Expected date: 05/31/2019      Follow up if symptoms worsen or fail to improve.    Faustina appears to be at her baseline today but has some problems with uncontrolled obstructive sleep apnea, asthma/COPD, cardiac symptoms and uncontrolled blood pressures.  I advised her that I would need to determine what surgery she was going to undertake and I asked my nurse to call the [facility] to determine this.  Unfortunately they were closed and we will have to determine this next week.  I also advised her that we would have to get records from her  cardiologist to determine what medications were stopped and why she is not taking Lipitor any longer as this is strongly indicated for her.  Unfortunately their office was closed as well but we have faxed a records release for this information.  I advised her that she will need cardiac clearance prior to surgery and she told me that this had already been given noting that she had completed a chest x-ray and EKG already with this.  We will obtain these records if this is the case.  I also advised her I would recommend pulmonary function testing with her frequent complaints of shortness of breath and coughing even with these improved or resolved with her albuterol use.  Her daughter was also very interested in her getting back on a CPAP machine and advised him I would be happy to refer her to both Sleep Medicine and pulmonology for these problems. They were interested in both referrals during the visit by my nurse informed me after the visit that she refused to set up an appointment for pulmonology.  With her past medical history and reported low energy levels I recommended checking labs as above and reviewing these with her she was in agreement with all of these but was not fasting today.  After the visit my nurse provided me with an order from [facility] that had all of these labs listed plus a.m. serum magnesium, zinc, thiamin, ferritin, pre-albumin and vitamin-A listed.  She wanted to complete these labs outside of our office and I have asked her just to make sure a copy is sent to me.  We had a fairly lengthy conversation about diabetes and peripheral neuropathy and I could not convince her that her symptoms may be secondary to diabetes.  She was interested in referral to Podiatry for their expert opinion on this and I have placed this referral for her as she requests.  Clearance for surgery will be held pending records and results.  Discussing follow-up with her, she advised me she would  continue following with her cardiologist for all of her care and would continue to see Dr. Gonzales as needed.  I recommended against this and advised her to make a follow-up appointment with him as soon as possible for routine checkup and to recheck her blood pressure as she would not accept a blood pressure log for 2 week nursing follow-up visit for recheck.  She would also not allow me to make any changes to her current blood pressure medications.  I did discuss the Tdap vaccine in detail with risks and benefits and highly recommended she receive this prior to her surgery as she has not had a tetanus vaccine in greater than 10 years.  She was initially interested in this but I advised her she would have to receive this at the pharmacy as her insurance would not cover vaccination here.  She advised me she would not receive any vaccines at the pharmacy and wanted a doctor to do this and would pay out-of-pocket for this.  Unfortunately she could not receive the vaccine as she would not sign any documentation stating that she would be responsible for the cost of the vaccine if her insurance did not cover this which it should not.    Faustina was seen for a 30 minute visit in greater than half this time was spent coordinating care and counseling on the above.

## 2019-06-03 ENCOUNTER — TELEPHONE (OUTPATIENT)
Dept: FAMILY MEDICINE | Facility: CLINIC | Age: 54
End: 2019-06-03

## 2019-06-03 NOTE — TELEPHONE ENCOUNTER
Rika states pt is not ready for this clearance she has not completed all visits yet. Rika states she will except your pre op clearance to hold on to if you clear her. Rika knows she needs Cardiac clearance from her Cardio dr as well.             ----- Message from Tono Anderson sent at 6/3/2019  9:15 AM CDT -----  Type: Needs Medical Advice    Who Called:  [facility]/Rika  Best Call Back Number: [phone]  Additional Information: Type of surgery the patient is having  - gastric sleeve under general anesthesia. Fax clearance: [phone]

## 2019-06-04 NOTE — TELEPHONE ENCOUNTER
Rika notified of the findings and states she will contact pt and have pt see her PCP, or cardiologist

## 2019-06-05 ENCOUNTER — TELEPHONE (OUTPATIENT)
Dept: FAMILY MEDICINE | Facility: CLINIC | Age: 54
End: 2019-06-05

## 2019-06-05 NOTE — TELEPHONE ENCOUNTER
Informed Alysia Dr David did not clear pt as her BP was uncontrolled and pt refused a change in meds or a f/u for her bp

## 2019-06-05 NOTE — TELEPHONE ENCOUNTER
----- Message from [address] sent at 6/5/2019 10:29 AM CDT -----  Contact: Alysia with Dr. Beal  Type: Needs Medical Advice    Who Called:  Alysia Davis Call Back Number: [phone]   Fax number: [phone]  Additional Information: Calling to follow up to see if the pt kept her appointment on 5/31/19 and that the pt has clearance for her procedure. Please call to advise.

## 2019-07-26 ENCOUNTER — DOCUMENTATION ONLY (OUTPATIENT)
Dept: FAMILY MEDICINE | Facility: CLINIC | Age: 54
End: 2019-07-26

## 2019-07-26 ENCOUNTER — OFFICE VISIT (OUTPATIENT)
Dept: FAMILY MEDICINE | Facility: CLINIC | Age: 54
End: 2019-07-26
Payer: MEDICARE

## 2019-07-26 VITALS
TEMPERATURE: 98 F | HEART RATE: 75 BPM | WEIGHT: 231.5 LBS | HEIGHT: 62 IN | SYSTOLIC BLOOD PRESSURE: 130 MMHG | DIASTOLIC BLOOD PRESSURE: 60 MMHG | BODY MASS INDEX: 42.6 KG/M2 | OXYGEN SATURATION: 98 %

## 2019-07-26 DIAGNOSIS — I50.32 CHRONIC DIASTOLIC CHF (CONGESTIVE HEART FAILURE): ICD-10-CM

## 2019-07-26 DIAGNOSIS — E66.01 MORBID OBESITY WITH BMI OF 40.0-44.9, ADULT: ICD-10-CM

## 2019-07-26 DIAGNOSIS — N18.30 CKD (CHRONIC KIDNEY DISEASE) STAGE 3, GFR 30-59 ML/MIN: Primary | ICD-10-CM

## 2019-07-26 DIAGNOSIS — B35.4 TINEA CORPORIS: ICD-10-CM

## 2019-07-26 PROCEDURE — 99499 RISK ADDL DX/OHS AUDIT: ICD-10-PCS | Mod: S$GLB,,, | Performed by: NURSE PRACTITIONER

## 2019-07-26 PROCEDURE — 99214 PR OFFICE/OUTPT VISIT, EST, LEVL IV, 30-39 MIN: ICD-10-PCS | Mod: S$GLB,,, | Performed by: NURSE PRACTITIONER

## 2019-07-26 PROCEDURE — 99499 UNLISTED E&M SERVICE: CPT | Mod: S$GLB,,, | Performed by: NURSE PRACTITIONER

## 2019-07-26 PROCEDURE — 99214 OFFICE O/P EST MOD 30 MIN: CPT | Mod: S$GLB,,, | Performed by: NURSE PRACTITIONER

## 2019-07-26 RX ORDER — KETOCONAZOLE 20 MG/G
CREAM TOPICAL DAILY
Qty: 60 G | Refills: 2 | Status: SHIPPED | OUTPATIENT
Start: 2019-07-26 | End: 2020-01-21 | Stop reason: SDUPTHER

## 2019-07-26 RX ORDER — NYSTATIN 100000 [USP'U]/G
POWDER TOPICAL 2 TIMES DAILY
Qty: 60 G | Refills: 2 | Status: SHIPPED | OUTPATIENT
Start: 2019-07-26 | End: 2020-01-21 | Stop reason: SDUPTHER

## 2019-07-26 NOTE — PATIENT INSTRUCTIONS
Slimfast, choi, premiere protein shakes are all ok. Do not mix a whole bag of frozen fruit into a protein shake, it is too many carbs.   You can try triple zero yogurt in your protein shakes, only one container to a shake, no more than 1/2 cup fruit.  A shake is a meal REPLACEMENT.     Keep a diet diary of everything you are eating and drinking. STOP putting sugar in the tea. Either drink it without sweeteners or use an artificial sweetener.     Get your labs done. Come back in 2 weeks. Wash and pat dry the rash areas twice a day, use the cream after it is dry. Once the rash is gone, continue wash and dry the area twice a day and use the powder instead of the cream.

## 2019-07-26 NOTE — PROGRESS NOTES
Health Maintenance Due   Topic Date Due    TETANUS VACCINE  01/02/1982    Aspirin/Antiplatelet Therapy  01/02/1982    High Dose Statin  01/02/1985    Colonoscopy  01/02/2014    Eye Exam  09/30/2017    Hemoglobin A1c  09/07/2018    Mammogram  11/10/2018    Lipid Panel  03/07/2019

## 2019-07-26 NOTE — PROGRESS NOTES
Subjective:       Patient ID: Faustina Rae is a 54 y.o. female.    Chief Complaint: Rash and Mid-back Pain  Patient is here today with her daughter.  Pt. has rash under both breasts, itchy, sometimes smells bad and keeps coming back. It also gets in skin folds at times. She has tried putting rubbing alcohol on it, but that was painful, however it did seem to dry the rash. She puts cotton balls in the skin folds at night.     She had a sleeve done in 2017, but the surgeon left after that and she has not been following the bariatric diet. She has lost about 50 pounds since 2016, but is eating too many carbs daily and skipping meals. Now wants to have a danay-n-Y.     She states she has pain in back from large breasts, wants breast reduction.  She also wants a tummy tuck.     Has chronic kidney disease, has not had any recent labs done. Labs were ordered in May, she states she did them at [facility], but we have no record of them.     Has chronic congestive heart failure, sees Dr. Valladares. States he stopped her lisinopril because of her kidney disease, did not put her on entresto or any ace/arb.   HPI  Review of Systems   Constitutional: Negative for fatigue, fever and unexpected weight change.   HENT: Negative for congestion, hearing loss and sore throat.    Eyes: Negative for pain, redness and visual disturbance.   Respiratory: Negative for cough and shortness of breath.    Cardiovascular: Negative for chest pain and leg swelling.   Gastrointestinal: Negative for constipation, diarrhea, nausea and vomiting.   Endocrine: Negative for cold intolerance and heat intolerance.   Genitourinary: Negative for dysuria and hematuria.   Musculoskeletal: Positive for back pain. Negative for arthralgias and myalgias.   Skin: Negative for pallor and rash.   Neurological: Negative for dizziness and headaches.   Psychiatric/Behavioral: Negative for dysphoric mood. The patient is not nervous/anxious.        Objective:       CMP  Sodium    Date Value Ref Range Status   12/08/2018 142 136 - 145 mmol/L Final     Potassium   Date Value Ref Range Status   12/08/2018 3.8 3.5 - 5.1 mmol/L Final     Chloride   Date Value Ref Range Status   12/08/2018 109 95 - 110 mmol/L Final     CO2   Date Value Ref Range Status   12/08/2018 25 23 - 29 mmol/L Final     Glucose   Date Value Ref Range Status   12/08/2018 68 (L) 70 - 110 mg/dL Final     BUN, Bld   Date Value Ref Range Status   12/08/2018 24 (H) 6 - 20 mg/dL Final     Creatinine   Date Value Ref Range Status   12/08/2018 1.7 (H) 0.5 - 1.4 mg/dL Final   08/10/2013 1.4 0.5 - 1.4 mg/dL Final     Calcium   Date Value Ref Range Status   12/08/2018 10.0 8.7 - 10.5 mg/dL Final   08/10/2013 10.0 8.7 - 10.5 mg/dL Final     Total Protein   Date Value Ref Range Status   10/03/2018 7.2 6.0 - 8.4 g/dL Final     Albumin   Date Value Ref Range Status   10/03/2018 3.5 3.5 - 5.2 g/dL Final     Total Bilirubin   Date Value Ref Range Status   10/03/2018 0.3 0.1 - 1.0 mg/dL Final     Comment:     For infants and newborns, interpretation of results should be based  on gestational age, weight and in agreement with clinical  observations.  Premature Infant recommended reference ranges:  Up to 24 hours.............<8.0 mg/dL  Up to 48 hours............<12.0 mg/dL  3-5 days..................<15.0 mg/dL  6-29 days.................<15.0 mg/dL       Alkaline Phosphatase   Date Value Ref Range Status   10/03/2018 59 55 - 135 U/L Final   11/20/2012 54 (L) 55 - 135 U/L Final     AST   Date Value Ref Range Status   10/03/2018 13 10 - 40 U/L Final   11/20/2012 12 10 - 40 U/L Final     ALT   Date Value Ref Range Status   10/03/2018 10 10 - 44 U/L Final     Anion Gap   Date Value Ref Range Status   12/08/2018 8 8 - 16 mmol/L Final   08/10/2013 13 5 - 15 meq/L Final     eGFR if    Date Value Ref Range Status   12/08/2018 38.9 (A) >60 mL/min/1.73 m^2 Final     eGFR if non    Date Value Ref Range Status    12/08/2018 33.7 (A) >60 mL/min/1.73 m^2 Final     Comment:     Calculation used to obtain the estimated glomerular filtration  rate (eGFR) is the CKD-EPI equation.          Physical Exam   Constitutional: She is oriented to person, place, and time. She appears well-developed and well-nourished. No distress.   HENT:   Head: Normocephalic and atraumatic.   Eyes: Conjunctivae are normal. Right eye exhibits no discharge. Left eye exhibits no discharge. No scleral icterus.   Cardiovascular: Normal rate, regular rhythm and normal heart sounds. Exam reveals no gallop and no friction rub.   No murmur heard.  Pulmonary/Chest: Effort normal and breath sounds normal. No stridor. No respiratory distress. She has no wheezes. She has no rales.   Musculoskeletal: She exhibits no edema.   Neurological: She is alert and oriented to person, place, and time.   Skin: Skin is warm and dry. No rash noted. She is not diaphoretic. No pallor.   Psychiatric: She has a normal mood and affect. Her behavior is normal.   Nursing note and vitals reviewed.      Assessment:     This provider spent  25 minutes face to face with patient, more than half the time for counseling and coordination of care as noted.    1. CKD (chronic kidney disease) stage 3, GFR 30-59 ml/min    2. Morbid obesity with BMI of 40.0-44.9, adult    3. Tinea corporis    4. Chronic diastolic CHF (congestive heart failure)        Plan:       CKD (chronic kidney disease) stage 3, GFR 30-59 ml/min    Morbid obesity with BMI of 40.0-44.9, adult    Tinea corporis  -     nystatin (MYCOSTATIN) powder; Apply topically 2 (two) times daily.  Dispense: 60 g; Refill: 2  -     ketoconazole (NIZORAL) 2 % cream; Apply topically once daily.  Dispense: 60 g; Refill: 2    Chronic diastolic CHF (congestive heart failure)    Had long discussion of her weight, her diet and her kidney disease. Explained that she needs to work on a better diet and get labs so we can follow up on her health before  she considers any more surgery.   Slimfast, choi, premiere protein shakes are all ok. Do not mix a whole bag of frozen fruit into a protein shake, it is too many carbs.   You can try triple zero yogurt in your protein shakes, only one container to a shake, no more than 1/2 cup fruit.  A shake is a meal REPLACEMENT.     Keep a diet diary of everything you are eating and drinking. STOP putting sugar in the tea. Either drink it without sweeteners or use an artificial sweetener.     Get your labs done so we can see how your kidneys are doing. Come back in 2 weeks.    Wash and pat dry the rash areas twice a day, use the cream after it is dry. Once the rash is gone, continue wash and dry the area twice a day and use the powder instead of the cream. Will try to contact Dr. Valladares to see if patient was supposed to stop lisinopril permanently, or if she is supposed to be on something to take it's place.

## 2019-08-08 ENCOUNTER — TELEPHONE (OUTPATIENT)
Dept: FAMILY MEDICINE | Facility: CLINIC | Age: 54
End: 2019-08-08

## 2019-08-08 NOTE — TELEPHONE ENCOUNTER
----- Message from Garrett Frye sent at 8/8/2019  3:32 PM CDT -----  Contact: Venessa Tellez (Daughter)  Type:  RX Refill Request    Who Called:  Venessa Tellez (Daughter)  Refill or New Rx:  Refill  RX Name and Strength:  lisinopril-hydrochlorothiazide (PRINZIDE,ZESTORETIC) 20-12.5 mg per tablet   How is the patient currently taking it? (ex. 1XDay):  x2 daily  Is this a 30 day or 90 day RX:  90  Preferred Pharmacy with phone number:    [facility] #[id number] [address] & [address] [address]  Phone: [phone] Fax: [phone]  Local or Mail Order:  Local  Ordering Provider:  Christian Davis Call Back Number:  [phone]  Additional Information:  Please advise when refill is complete

## 2019-08-09 NOTE — TELEPHONE ENCOUNTER
I see in your last notes on 7/26 that Will try to contact Dr. Valladares to see if patient was supposed to stop lisinopril permanently, or if she is supposed to be on something to take it's place. Did you find out from Dr. Valladares?

## 2019-08-14 NOTE — TELEPHONE ENCOUNTER
Called Venessa and advised her that they will need to futher discuss with Dr. Valladares because our office tried getting that information but havent heard anything back regarding it. Venessa verbalized understanding.

## 2019-08-21 ENCOUNTER — TELEPHONE (OUTPATIENT)
Dept: FAMILY MEDICINE | Facility: CLINIC | Age: 54
End: 2019-08-21

## 2019-08-21 NOTE — TELEPHONE ENCOUNTER
----- Message from [address] sent at 8/21/2019  3:55 PM CDT -----  Contact: pt  Type: Needs Medical Advice    Who Called:  pt  Symptoms (please be specific):  Left bottom side of pt's mouth is swollen and hurts  How long has patient had these symptoms:  Since yesterday  Pharmacy name and phone #:    [facility] #[id number] [address] & [address] [address]  Phone: [phone] Fax: [phone]      Best Call Back Number: [phone]  Additional Information: pt states that it just started last night. Not sure what brought it on. Pt would like some antibiotics  and pain pills called into the pharmacy. Would like to also speak to a nurse in the office.

## 2019-08-21 NOTE — TELEPHONE ENCOUNTER
Returned patient's call and advised that she needed an appointment. Offered her an appointment for tomorrow but patient stated that she could not come at that time and she will call back for an appointment.

## 2019-08-26 DIAGNOSIS — N18.9 CHRONIC KIDNEY DISEASE, UNSPECIFIED CKD STAGE: ICD-10-CM

## 2019-08-29 ENCOUNTER — LAB VISIT (OUTPATIENT)
Dept: LAB | Facility: HOSPITAL | Age: 54
End: 2019-08-29
Attending: INTERNAL MEDICINE
Payer: MEDICARE

## 2019-08-29 DIAGNOSIS — N18.30 CHRONIC KIDNEY DISEASE, STAGE 3: ICD-10-CM

## 2019-08-29 DIAGNOSIS — E11.9 TYPE 2 DIABETES MELLITUS WITHOUT COMPLICATION: ICD-10-CM

## 2019-08-29 LAB
ALBUMIN SERPL BCP-MCNC: 3.6 G/DL (ref 3.5–5.2)
ALP SERPL-CCNC: 65 U/L (ref 55–135)
ALT SERPL W/O P-5'-P-CCNC: 9 U/L (ref 10–44)
ANION GAP SERPL CALC-SCNC: 9 MMOL/L (ref 8–16)
AST SERPL-CCNC: 16 U/L (ref 10–40)
BILIRUB SERPL-MCNC: 0.2 MG/DL (ref 0.1–1)
BUN SERPL-MCNC: 24 MG/DL (ref 6–20)
CALCIUM SERPL-MCNC: 10 MG/DL (ref 8.7–10.5)
CHLORIDE SERPL-SCNC: 111 MMOL/L (ref 95–110)
CHOLEST SERPL-MCNC: 168 MG/DL (ref 120–199)
CHOLEST/HDLC SERPL: 3.7 {RATIO} (ref 2–5)
CO2 SERPL-SCNC: 21 MMOL/L (ref 23–29)
CREAT SERPL-MCNC: 1.4 MG/DL (ref 0.5–1.4)
EST. GFR  (AFRICAN AMERICAN): 48.8 ML/MIN/1.73 M^2
EST. GFR  (NON AFRICAN AMERICAN): 42.3 ML/MIN/1.73 M^2
GLUCOSE SERPL-MCNC: 83 MG/DL (ref 70–110)
HDLC SERPL-MCNC: 45 MG/DL (ref 40–75)
HDLC SERPL: 26.8 % (ref 20–50)
LDLC SERPL CALC-MCNC: 93.2 MG/DL (ref 63–159)
NONHDLC SERPL-MCNC: 123 MG/DL
POTASSIUM SERPL-SCNC: 4.6 MMOL/L (ref 3.5–5.1)
PROT SERPL-MCNC: 7.6 G/DL (ref 6–8.4)
SODIUM SERPL-SCNC: 141 MMOL/L (ref 136–145)
TRIGL SERPL-MCNC: 149 MG/DL (ref 30–150)

## 2019-08-29 PROCEDURE — 36415 COLL VENOUS BLD VENIPUNCTURE: CPT | Mod: PO

## 2019-08-29 PROCEDURE — 80053 COMPREHEN METABOLIC PANEL: CPT

## 2019-08-29 PROCEDURE — 83036 HEMOGLOBIN GLYCOSYLATED A1C: CPT

## 2019-08-29 PROCEDURE — 80061 LIPID PANEL: CPT

## 2019-08-30 ENCOUNTER — OFFICE VISIT (OUTPATIENT)
Dept: FAMILY MEDICINE | Facility: CLINIC | Age: 54
End: 2019-08-30
Payer: MEDICARE

## 2019-08-30 ENCOUNTER — DOCUMENTATION ONLY (OUTPATIENT)
Dept: FAMILY MEDICINE | Facility: CLINIC | Age: 54
End: 2019-08-30

## 2019-08-30 VITALS
HEIGHT: 62 IN | SYSTOLIC BLOOD PRESSURE: 134 MMHG | RESPIRATION RATE: 16 BRPM | BODY MASS INDEX: 42.03 KG/M2 | OXYGEN SATURATION: 97 % | HEART RATE: 88 BPM | TEMPERATURE: 98 F | DIASTOLIC BLOOD PRESSURE: 70 MMHG | WEIGHT: 228.38 LBS

## 2019-08-30 DIAGNOSIS — R19.7 DIARRHEA, UNSPECIFIED TYPE: ICD-10-CM

## 2019-08-30 DIAGNOSIS — I15.1 HYPERTENSION SECONDARY TO OTHER RENAL DISORDERS: ICD-10-CM

## 2019-08-30 DIAGNOSIS — R42 ORTHOSTATIC DIZZINESS: Primary | ICD-10-CM

## 2019-08-30 DIAGNOSIS — M27.3 DRY TOOTH SOCKET: ICD-10-CM

## 2019-08-30 LAB
ESTIMATED AVG GLUCOSE: 108 MG/DL (ref 68–131)
HBA1C MFR BLD HPLC: 5.4 % (ref 4–5.6)

## 2019-08-30 PROCEDURE — 99499 UNLISTED E&M SERVICE: CPT | Mod: S$GLB,,, | Performed by: INTERNAL MEDICINE

## 2019-08-30 PROCEDURE — 3078F DIAST BP <80 MM HG: CPT | Mod: CPTII,S$GLB,, | Performed by: INTERNAL MEDICINE

## 2019-08-30 PROCEDURE — 99499 RISK ADDL DX/OHS AUDIT: ICD-10-PCS | Mod: S$GLB,,, | Performed by: INTERNAL MEDICINE

## 2019-08-30 PROCEDURE — 3078F PR MOST RECENT DIASTOLIC BLOOD PRESSURE < 80 MM HG: ICD-10-PCS | Mod: CPTII,S$GLB,, | Performed by: INTERNAL MEDICINE

## 2019-08-30 PROCEDURE — 99214 PR OFFICE/OUTPT VISIT, EST, LEVL IV, 30-39 MIN: ICD-10-PCS | Mod: S$GLB,,, | Performed by: INTERNAL MEDICINE

## 2019-08-30 PROCEDURE — 3075F PR MOST RECENT SYSTOLIC BLOOD PRESS GE 130-139MM HG: ICD-10-PCS | Mod: CPTII,S$GLB,, | Performed by: INTERNAL MEDICINE

## 2019-08-30 PROCEDURE — 3008F BODY MASS INDEX DOCD: CPT | Mod: CPTII,S$GLB,, | Performed by: INTERNAL MEDICINE

## 2019-08-30 PROCEDURE — 3075F SYST BP GE 130 - 139MM HG: CPT | Mod: CPTII,S$GLB,, | Performed by: INTERNAL MEDICINE

## 2019-08-30 PROCEDURE — 99214 OFFICE O/P EST MOD 30 MIN: CPT | Mod: S$GLB,,, | Performed by: INTERNAL MEDICINE

## 2019-08-30 PROCEDURE — 3008F PR BODY MASS INDEX (BMI) DOCUMENTED: ICD-10-PCS | Mod: CPTII,S$GLB,, | Performed by: INTERNAL MEDICINE

## 2019-08-30 RX ORDER — HYDRALAZINE HYDROCHLORIDE 25 MG/1
25 TABLET, FILM COATED ORAL EVERY 12 HOURS
Qty: 60 TABLET | Refills: 11 | Status: SHIPPED | OUTPATIENT
Start: 2019-08-30 | End: 2020-08-20 | Stop reason: SDUPTHER

## 2019-08-30 RX ORDER — METOLAZONE 2.5 MG/1
2.5 TABLET ORAL DAILY
Qty: 30 TABLET | Refills: 11 | Status: SHIPPED | OUTPATIENT
Start: 2019-08-30 | End: 2021-06-16

## 2019-08-30 NOTE — PROGRESS NOTES
Subjective:      9:03 AM     Patient ID: Faustina Rae is a 54 y.o. female.    Chief Complaint: Dizziness and Dental Pain    HPI     CHIEF COMPLAINT: dizziness .  HPI:     ONSET/TIMING: Onset       days ago.         Trauma: no    DURATION:  Intermittent     QUALITY/COURSE:  unchanged    INTENSITY/SEVERITY:  severity 5   (on a 1-10 scale).        MODIFIERS/TREATMENTS:   Taking medications:   . ENT consult done: no.     SYMPTOMS/RELATED:  Possible medication side effects include:        The following symptoms/statements are positive if BOLDED, otherwise negative.          CONTEXT/WHEN:  Lying down.  Sitting up .Standing up. turning head.  Sudden.. Trauma. Similar_problems_in_past.           .     REVIEW OF SYSTEMS :   vertigo . visual aura.    CULPRIT MEDICATIONS: : alpha blockers, beta blockers, calcium channel blockers, diuretics, epileptogenic medication, hypoglycemic agents, hypotensive medications            CHIEF COMPLAINT: Hypertension  HPI:  Patient stop taking Lasix because it was making her incontinent    ONSET:      QUALITY/COURSE:   Unchanged.     INTENSITY/SEVERITY:  Average blood pressure is:  5 days ago was 200/100.      MODIFIERS/TREATMENTS:  Taking medications: yes. .High sodium intake: no. alcohol: no      The following symptoms are positive only if BOLDED, otherwise are negative.      SYMPTOMS/RELATED: Possible medication side effects include:   Depression..  . Cough. . Constipation.    REVIEW OF SYMPTOMS: . Weight_loss 3 lb . Weight_gain . Leg_cramps .Potency_problems .    TARGET ORGAN DAMAGE:: angina/ prior myocardial infarction, chronic kidney disease, heart failure, left ventricular hypertrophy, peripheral artery disease, prior coronary revascularization, retinopathy, stroke. transient ischemic attack.          Review of Systems      Objective:      Vitals:    08/30/19 0847   BP: 122/68   Pulse: 88   Resp: 16   Temp: 98.4 °F (36.9 °C)   TempSrc: Oral   SpO2: 97%   Weight: 103.6 kg (228 lb  "6.3 oz)   Height: 5' 2" (1.575 m)   PainSc: 10-Worst pain ever   PainLoc: Teeth     Physical Exam   Constitutional: She appears well-developed and well-nourished.   2 sec capillary refill   HENT:   Right upper 2nd tooth has broken off below the gum   Cardiovascular: Normal rate, regular rhythm and normal heart sounds.   Pulmonary/Chest: Effort normal and breath sounds normal.   Abdominal: Soft. There is no tenderness.   Neurological: She is alert.   Psychiatric: She has a normal mood and affect. Her behavior is normal. Thought content normal.   Nursing note and vitals reviewed.        Assessment:       1. Orthostatic dizziness    2. Hypertension secondary to other renal disorders    3. Diarrhea, unspecified type    4. Dry tooth socket          Plan:   Is mildly dehydrated.  This is from diarrhea.  For her blood pressure she will definitely need a diuretic so will switch her to metolazone from Lasix.  Cut back on the hydralazine to 25 mg  Given Gatorade  Orthostatic dizziness    Hypertension secondary to other renal disorders  -     hydrALAZINE (APRESOLINE) 25 MG tablet; Take 1 tablet (25 mg total) by mouth every 12 (twelve) hours.  Dispense: 60 tablet; Refill: 11  -     metOLazone (ZAROXOLYN) 2.5 MG tablet; Take 1 tablet (2.5 mg total) by mouth once daily.  Dispense: 30 tablet; Refill: 11  -     Comprehensive metabolic panel; Future; Expected date: 08/30/2019    Diarrhea, unspecified type  -     Comprehensive metabolic panel; Future; Expected date: 08/30/2019    Dry tooth socket      Follow up in about 6 weeks (around 10/11/2019).      "

## 2019-08-30 NOTE — PROGRESS NOTES
Health Maintenance Due   Topic Date Due    TETANUS VACCINE  01/02/1982    Aspirin/Antiplatelet Therapy  01/02/1982    High Dose Statin  01/02/1985    Colonoscopy  01/02/2014    Eye Exam  09/30/2017    Mammogram  11/10/2018

## 2019-08-30 NOTE — PATIENT INSTRUCTIONS
Start metolazone when you're not dizzy or having diarrhea    Imodium ad 2 with the first loose bowel movement and then one with every loose bowel movement thereafter.    For the dental pain pack the dry socket with saline soaked cotton ball and see a dentist    Lose 5 pounds.  Suggest [facility]    Please fill out the patient experience survey.

## 2019-09-06 ENCOUNTER — OFFICE VISIT (OUTPATIENT)
Dept: FAMILY MEDICINE | Facility: CLINIC | Age: 54
End: 2019-09-06
Payer: MEDICARE

## 2019-09-06 ENCOUNTER — CLINICAL SUPPORT (OUTPATIENT)
Dept: FAMILY MEDICINE | Facility: CLINIC | Age: 54
End: 2019-09-06
Attending: INTERNAL MEDICINE
Payer: MEDICARE

## 2019-09-06 VITALS
HEART RATE: 68 BPM | SYSTOLIC BLOOD PRESSURE: 130 MMHG | WEIGHT: 227.5 LBS | HEIGHT: 62 IN | TEMPERATURE: 97 F | DIASTOLIC BLOOD PRESSURE: 82 MMHG | BODY MASS INDEX: 41.86 KG/M2

## 2019-09-06 DIAGNOSIS — I15.1 HYPERTENSION SECONDARY TO OTHER RENAL DISORDERS: Primary | ICD-10-CM

## 2019-09-06 DIAGNOSIS — I15.1 HYPERTENSION SECONDARY TO OTHER RENAL DISORDERS: ICD-10-CM

## 2019-09-06 DIAGNOSIS — N18.30 CKD (CHRONIC KIDNEY DISEASE) STAGE 3, GFR 30-59 ML/MIN: ICD-10-CM

## 2019-09-06 DIAGNOSIS — Z12.11 COLON CANCER SCREENING: ICD-10-CM

## 2019-09-06 DIAGNOSIS — R19.7 DIARRHEA, UNSPECIFIED TYPE: ICD-10-CM

## 2019-09-06 DIAGNOSIS — E78.5 HYPERLIPIDEMIA ASSOCIATED WITH TYPE 2 DIABETES MELLITUS: ICD-10-CM

## 2019-09-06 DIAGNOSIS — E66.01 MORBID OBESITY: ICD-10-CM

## 2019-09-06 DIAGNOSIS — E11.9 CONTROLLED TYPE 2 DIABETES MELLITUS WITHOUT COMPLICATION, WITHOUT LONG-TERM CURRENT USE OF INSULIN: ICD-10-CM

## 2019-09-06 DIAGNOSIS — E11.69 HYPERLIPIDEMIA ASSOCIATED WITH TYPE 2 DIABETES MELLITUS: ICD-10-CM

## 2019-09-06 DIAGNOSIS — Z12.39 BREAST CANCER SCREENING: ICD-10-CM

## 2019-09-06 PROCEDURE — 3075F SYST BP GE 130 - 139MM HG: CPT | Mod: CPTII,S$GLB,, | Performed by: INTERNAL MEDICINE

## 2019-09-06 PROCEDURE — 90686 IIV4 VACC NO PRSV 0.5 ML IM: CPT | Mod: S$GLB,,, | Performed by: INTERNAL MEDICINE

## 2019-09-06 PROCEDURE — 3044F PR MOST RECENT HEMOGLOBIN A1C LEVEL <7.0%: ICD-10-PCS | Mod: CPTII,S$GLB,, | Performed by: INTERNAL MEDICINE

## 2019-09-06 PROCEDURE — 3075F PR MOST RECENT SYSTOLIC BLOOD PRESS GE 130-139MM HG: ICD-10-PCS | Mod: CPTII,S$GLB,, | Performed by: INTERNAL MEDICINE

## 2019-09-06 PROCEDURE — 99499 UNLISTED E&M SERVICE: CPT | Mod: S$GLB,,, | Performed by: INTERNAL MEDICINE

## 2019-09-06 PROCEDURE — 99214 PR OFFICE/OUTPT VISIT, EST, LEVL IV, 30-39 MIN: ICD-10-PCS | Mod: 25,S$GLB,, | Performed by: INTERNAL MEDICINE

## 2019-09-06 PROCEDURE — 99499 RISK ADDL DX/OHS AUDIT: ICD-10-PCS | Mod: S$GLB,,, | Performed by: INTERNAL MEDICINE

## 2019-09-06 PROCEDURE — G0008 FLU VACCINE (QUAD) GREATER THAN OR EQUAL TO 3YO PRESERVATIVE FREE IM: ICD-10-PCS | Mod: S$GLB,,, | Performed by: INTERNAL MEDICINE

## 2019-09-06 PROCEDURE — 3044F HG A1C LEVEL LT 7.0%: CPT | Mod: CPTII,S$GLB,, | Performed by: INTERNAL MEDICINE

## 2019-09-06 PROCEDURE — 3079F DIAST BP 80-89 MM HG: CPT | Mod: CPTII,S$GLB,, | Performed by: INTERNAL MEDICINE

## 2019-09-06 PROCEDURE — 3008F PR BODY MASS INDEX (BMI) DOCUMENTED: ICD-10-PCS | Mod: CPTII,S$GLB,, | Performed by: INTERNAL MEDICINE

## 2019-09-06 PROCEDURE — 3079F PR MOST RECENT DIASTOLIC BLOOD PRESSURE 80-89 MM HG: ICD-10-PCS | Mod: CPTII,S$GLB,, | Performed by: INTERNAL MEDICINE

## 2019-09-06 PROCEDURE — G0008 ADMIN INFLUENZA VIRUS VAC: HCPCS | Mod: S$GLB,,, | Performed by: INTERNAL MEDICINE

## 2019-09-06 PROCEDURE — 90686 FLU VACCINE (QUAD) GREATER THAN OR EQUAL TO 3YO PRESERVATIVE FREE IM: ICD-10-PCS | Mod: S$GLB,,, | Performed by: INTERNAL MEDICINE

## 2019-09-06 PROCEDURE — 3008F BODY MASS INDEX DOCD: CPT | Mod: CPTII,S$GLB,, | Performed by: INTERNAL MEDICINE

## 2019-09-06 PROCEDURE — 99214 OFFICE O/P EST MOD 30 MIN: CPT | Mod: 25,S$GLB,, | Performed by: INTERNAL MEDICINE

## 2019-09-06 RX ORDER — ATORVASTATIN CALCIUM 40 MG/1
40 TABLET, FILM COATED ORAL DAILY
Qty: 30 TABLET | Refills: 11 | Status: SHIPPED | OUTPATIENT
Start: 2019-09-06 | End: 2020-01-21

## 2019-09-06 NOTE — PROGRESS NOTES
"Subjective:      9:08 AM     Patient ID: Faustina Rae is a 54 y.o. female.    Chief Complaint: Dizziness (one week follow up) and return to work    HPI     Patient's diarrhea is resolved.  She no longer has orthostatic dizziness.        CHIEF COMPLAINT: Hypertension  HPI:     ONSET:      QUALITY/COURSE:   Unchanged.     INTENSITY/SEVERITY:  Average blood pressure is unknown.      MODIFIERS/TREATMENTS:  Taking medications: yes. .High sodium intake: no. alcohol: no      The following symptoms are positive only if BOLDED, otherwise are negative.      SYMPTOMS/RELATED: Possible medication side effects include:   Depression..  . Cough. . Constipation.    REVIEW OF SYMPTOMS: . Weight_loss . Weight_gain . Leg_cramps .Potency_problems .    TARGET ORGAN DAMAGE:: angina/ prior myocardial infarction, chronic kidney disease, heart failure, left ventricular hypertrophy, peripheral artery disease, prior coronary revascularization, retinopathy, stroke. transient ischemic attack.    Patient would like to lose weight and is interested in bariatric surgery.    Review of Systems      Objective:      Vitals:    09/06/19 0845   BP: 130/82   Pulse: 68   Temp: 97 °F (36.1 °C)   TempSrc: Oral   Weight: 103.2 kg (227 lb 8.2 oz)   Height: 5' 2" (1.575 m)   PainSc: 0-No pain     Physical Exam   Constitutional: She appears well-developed and well-nourished.   Cardiovascular: Normal rate, regular rhythm and normal heart sounds.   Pulmonary/Chest: Effort normal and breath sounds normal.   Abdominal: Soft. There is no tenderness.   Neurological: She is alert.   Psychiatric: She has a normal mood and affect. Her behavior is normal. Thought content normal.   Nursing note and vitals reviewed.      The 10-year ASCVD risk score ([place] JORGE Jr., et al., 2013) is: 12.4%    Values used to calculate the score:      Age: 54 years      Sex: Female      Is Non- : Yes      Diabetic: Yes      Tobacco smoker: No      Systolic Blood " Pressure: 130 mmHg      Is BP treated: Yes      HDL Cholesterol: 45 mg/dL      Total Cholesterol: 168 mg/dL    Assessment:       1. Hypertension secondary to other renal disorders    2. CKD (chronic kidney disease) stage 3, GFR 30-59 ml/min    3. Controlled type 2 diabetes mellitus without complication, without long-term current use of insulin    4. Breast cancer screening    5. Colon cancer screening    6. Morbid obesity    7. Hyperlipidemia associated with type 2 diabetes mellitus          Plan:   Tetanus shot refused.    Hypertension secondary to other renal disorders  -     Comprehensive metabolic panel; Future; Expected date: 09/06/2019    CKD (chronic kidney disease) stage 3, GFR 30-59 ml/min    Controlled type 2 diabetes mellitus without complication, without long-term current use of insulin  -     Ambulatory referral to Optometry  -     CBC auto differential; Future; Expected date: 09/06/2019  -     Comprehensive metabolic panel; Future; Expected date: 09/06/2019  -     Hemoglobin A1c; Future; Expected date: 09/06/2019  -     Lipid panel; Future; Expected date: 09/06/2019  -     Microalbumin/creatinine urine ratio; Future    Breast cancer screening  -     Mammo Digital Screening Bilat; Future; Expected date: 09/06/2019    Colon cancer screening  -     Fecal Immunochemical Test (iFOBT); Future; Expected date: 09/20/2019    Morbid obesity  -     Ambulatory Referral to Bariatric Medicine    Hyperlipidemia associated with type 2 diabetes mellitus  -     atorvastatin (LIPITOR) 40 MG tablet; Take 1 tablet (40 mg total) by mouth once daily.  Dispense: 30 tablet; Refill: 11  -     Lipid panel; Future; Expected date: 09/06/2019    Other orders  -     Influenza - Quadrivalent (3 years & older) (PF)      Follow up in about 3 months (around 12/6/2019).

## 2019-09-24 ENCOUNTER — TELEPHONE (OUTPATIENT)
Dept: FAMILY MEDICINE | Facility: CLINIC | Age: 54
End: 2019-09-24

## 2019-09-30 ENCOUNTER — TELEPHONE (OUTPATIENT)
Dept: FAMILY MEDICINE | Facility: CLINIC | Age: 54
End: 2019-09-30

## 2019-09-30 NOTE — TELEPHONE ENCOUNTER
Patient stated that you had gave her a doctor's name regarding getting a breast reduction but she can't remember the name. Please advise.

## 2019-09-30 NOTE — TELEPHONE ENCOUNTER
----- Message from Simone Mae sent at 9/30/2019 12:32 PM CDT -----  Contact: self   Patient want to speak with a nurse regarding what doctors you referred her too patient can't remember please call back at [phone]

## 2019-10-05 ENCOUNTER — HOSPITAL ENCOUNTER (OUTPATIENT)
Facility: HOSPITAL | Age: 54
Discharge: HOME | End: 2019-10-06
Attending: EMERGENCY MEDICINE | Admitting: INTERNAL MEDICINE
Payer: MEDICARE

## 2019-10-05 DIAGNOSIS — I63.81 ACUTE LACUNAR INFARCTION: Primary | ICD-10-CM

## 2019-10-05 DIAGNOSIS — R41.82 ALTERED MENTAL STATUS, UNSPECIFIED ALTERED MENTAL STATUS TYPE: ICD-10-CM

## 2019-10-05 DIAGNOSIS — R42 DIZZINESS: ICD-10-CM

## 2019-10-05 DIAGNOSIS — I20.89 ANGINA AT REST: ICD-10-CM

## 2019-10-05 DIAGNOSIS — I50.9 CONGESTIVE HEART FAILURE, UNSPECIFIED HF CHRONICITY, UNSPECIFIED HEART FAILURE TYPE: ICD-10-CM

## 2019-10-05 LAB
ALBUMIN SERPL BCP-MCNC: 3.7 G/DL (ref 3.5–5.2)
ALP SERPL-CCNC: 56 U/L (ref 55–135)
ALT SERPL W/O P-5'-P-CCNC: 11 U/L (ref 10–44)
ANION GAP SERPL CALC-SCNC: 9 MMOL/L (ref 8–16)
AST SERPL-CCNC: 13 U/L (ref 10–40)
B-HCG UR QL: NEGATIVE
BASOPHILS # BLD AUTO: 0.01 K/UL (ref 0–0.2)
BASOPHILS NFR BLD: 0.1 % (ref 0–1.9)
BILIRUB SERPL-MCNC: 0.8 MG/DL (ref 0.1–1)
BILIRUB UR QL STRIP: NEGATIVE
BNP SERPL-MCNC: 25 PG/ML (ref 0–99)
BUN SERPL-MCNC: 43 MG/DL (ref 6–20)
CALCIUM SERPL-MCNC: 9.3 MG/DL (ref 8.7–10.5)
CHLORIDE SERPL-SCNC: 103 MMOL/L (ref 95–110)
CLARITY UR: CLEAR
CO2 SERPL-SCNC: 26 MMOL/L (ref 23–29)
COLOR UR: YELLOW
CREAT SERPL-MCNC: 1.9 MG/DL (ref 0.5–1.4)
CTP QC/QA: YES
DIFFERENTIAL METHOD: ABNORMAL
EOSINOPHIL # BLD AUTO: 0.1 K/UL (ref 0–0.5)
EOSINOPHIL NFR BLD: 1.1 % (ref 0–8)
ERYTHROCYTE [DISTWIDTH] IN BLOOD BY AUTOMATED COUNT: 13.7 % (ref 11.5–14.5)
EST. GFR  (AFRICAN AMERICAN): 33.7 ML/MIN/1.73 M^2
EST. GFR  (NON AFRICAN AMERICAN): 29.3 ML/MIN/1.73 M^2
GLUCOSE SERPL-MCNC: 106 MG/DL (ref 70–110)
GLUCOSE SERPL-MCNC: 114 MG/DL (ref 70–110)
GLUCOSE UR QL STRIP: NEGATIVE
HCT VFR BLD AUTO: 40.6 % (ref 37–48.5)
HGB BLD-MCNC: 12.8 G/DL (ref 12–16)
HGB UR QL STRIP: NEGATIVE
IMM GRANULOCYTES # BLD AUTO: 0.03 K/UL (ref 0–0.04)
IMM GRANULOCYTES NFR BLD AUTO: 0.4 % (ref 0–0.5)
INR PPP: 1
KETONES UR QL STRIP: NEGATIVE
LEUKOCYTE ESTERASE UR QL STRIP: NEGATIVE
LYMPHOCYTES # BLD AUTO: 2 K/UL (ref 1–4.8)
LYMPHOCYTES NFR BLD: 26.9 % (ref 18–48)
MAGNESIUM SERPL-MCNC: 1.8 MG/DL (ref 1.6–2.6)
MCH RBC QN AUTO: 29.1 PG (ref 27–31)
MCHC RBC AUTO-ENTMCNC: 31.5 G/DL (ref 32–36)
MCV RBC AUTO: 92 FL (ref 82–98)
MONOCYTES # BLD AUTO: 0.7 K/UL (ref 0.3–1)
MONOCYTES NFR BLD: 9.4 % (ref 4–15)
NEUTROPHILS # BLD AUTO: 4.7 K/UL (ref 1.8–7.7)
NEUTROPHILS NFR BLD: 62.1 % (ref 38–73)
NITRITE UR QL STRIP: NEGATIVE
NRBC BLD-RTO: 0 /100 WBC
PH UR STRIP: 6 [PH] (ref 5–8)
PLATELET # BLD AUTO: 245 K/UL (ref 150–350)
PMV BLD AUTO: 10.6 FL (ref 9.2–12.9)
POTASSIUM SERPL-SCNC: 4.5 MMOL/L (ref 3.5–5.1)
PROT SERPL-MCNC: 7.8 G/DL (ref 6–8.4)
PROT UR QL STRIP: NEGATIVE
PROTHROMBIN TIME: 12.6 SEC (ref 11.7–14)
RBC # BLD AUTO: 4.4 M/UL (ref 4–5.4)
SODIUM SERPL-SCNC: 138 MMOL/L (ref 136–145)
SP GR UR STRIP: 1.01 (ref 1–1.03)
TROPONIN I SERPL DL<=0.01 NG/ML-MCNC: <0.03 NG/ML (ref 0.02–0.04)
TROPONIN I SERPL DL<=0.01 NG/ML-MCNC: <0.03 NG/ML (ref 0.02–0.04)
TSH SERPL DL<=0.005 MIU/L-ACNC: 1.4 UIU/ML (ref 0.34–5.6)
URN SPEC COLLECT METH UR: NORMAL
UROBILINOGEN UR STRIP-ACNC: NEGATIVE EU/DL
WBC # BLD AUTO: 7.48 K/UL (ref 3.9–12.7)

## 2019-10-05 PROCEDURE — 25000003 PHARM REV CODE 250: Performed by: NURSE PRACTITIONER

## 2019-10-05 PROCEDURE — 80053 COMPREHEN METABOLIC PANEL: CPT

## 2019-10-05 PROCEDURE — 93005 ELECTROCARDIOGRAM TRACING: CPT

## 2019-10-05 PROCEDURE — 84484 ASSAY OF TROPONIN QUANT: CPT

## 2019-10-05 PROCEDURE — 96360 HYDRATION IV INFUSION INIT: CPT

## 2019-10-05 PROCEDURE — G0378 HOSPITAL OBSERVATION PER HR: HCPCS

## 2019-10-05 PROCEDURE — 84443 ASSAY THYROID STIM HORMONE: CPT

## 2019-10-05 PROCEDURE — 96361 HYDRATE IV INFUSION ADD-ON: CPT

## 2019-10-05 PROCEDURE — 99285 EMERGENCY DEPT VISIT HI MDM: CPT | Mod: 25

## 2019-10-05 PROCEDURE — 81003 URINALYSIS AUTO W/O SCOPE: CPT

## 2019-10-05 PROCEDURE — 85610 PROTHROMBIN TIME: CPT

## 2019-10-05 PROCEDURE — 96372 THER/PROPH/DIAG INJ SC/IM: CPT | Mod: 59

## 2019-10-05 PROCEDURE — 63600175 PHARM REV CODE 636 W HCPCS: Performed by: EMERGENCY MEDICINE

## 2019-10-05 PROCEDURE — 85025 COMPLETE CBC W/AUTO DIFF WBC: CPT

## 2019-10-05 PROCEDURE — 81025 URINE PREGNANCY TEST: CPT | Performed by: EMERGENCY MEDICINE

## 2019-10-05 PROCEDURE — 63600175 PHARM REV CODE 636 W HCPCS: Performed by: NURSE PRACTITIONER

## 2019-10-05 PROCEDURE — 83735 ASSAY OF MAGNESIUM: CPT

## 2019-10-05 PROCEDURE — 36415 COLL VENOUS BLD VENIPUNCTURE: CPT

## 2019-10-05 PROCEDURE — 84484 ASSAY OF TROPONIN QUANT: CPT | Mod: 91

## 2019-10-05 PROCEDURE — 82962 GLUCOSE BLOOD TEST: CPT

## 2019-10-05 PROCEDURE — 83880 ASSAY OF NATRIURETIC PEPTIDE: CPT

## 2019-10-05 RX ORDER — METOPROLOL SUCCINATE 50 MG/1
50 TABLET, EXTENDED RELEASE ORAL DAILY
Status: DISCONTINUED | OUTPATIENT
Start: 2019-10-06 | End: 2019-10-06

## 2019-10-05 RX ORDER — FERROUS SULFATE 325(65) MG
325 TABLET ORAL DAILY
Status: DISCONTINUED | OUTPATIENT
Start: 2019-10-06 | End: 2019-10-06 | Stop reason: HOSPADM

## 2019-10-05 RX ORDER — ONDANSETRON 2 MG/ML
4 INJECTION INTRAMUSCULAR; INTRAVENOUS EVERY 6 HOURS PRN
Status: DISCONTINUED | OUTPATIENT
Start: 2019-10-05 | End: 2019-10-06 | Stop reason: HOSPADM

## 2019-10-05 RX ORDER — ENOXAPARIN SODIUM 100 MG/ML
40 INJECTION SUBCUTANEOUS EVERY 24 HOURS
Status: DISCONTINUED | OUTPATIENT
Start: 2019-10-05 | End: 2019-10-06 | Stop reason: HOSPADM

## 2019-10-05 RX ORDER — SODIUM CHLORIDE 9 MG/ML
INJECTION, SOLUTION INTRAVENOUS CONTINUOUS
Status: DISCONTINUED | OUTPATIENT
Start: 2019-10-05 | End: 2019-10-06 | Stop reason: HOSPADM

## 2019-10-05 RX ORDER — HYDRALAZINE HYDROCHLORIDE 25 MG/1
25 TABLET, FILM COATED ORAL EVERY 12 HOURS
Status: DISCONTINUED | OUTPATIENT
Start: 2019-10-06 | End: 2019-10-05

## 2019-10-05 RX ORDER — SODIUM CHLORIDE 0.9 % (FLUSH) 0.9 %
10 SYRINGE (ML) INJECTION
Status: DISCONTINUED | OUTPATIENT
Start: 2019-10-05 | End: 2019-10-06 | Stop reason: HOSPADM

## 2019-10-05 RX ORDER — ATORVASTATIN CALCIUM 40 MG/1
40 TABLET, FILM COATED ORAL DAILY
Status: DISCONTINUED | OUTPATIENT
Start: 2019-10-06 | End: 2019-10-06

## 2019-10-05 RX ORDER — ASPIRIN 325 MG
325 TABLET, DELAYED RELEASE (ENTERIC COATED) ORAL DAILY
Status: DISCONTINUED | OUTPATIENT
Start: 2019-10-06 | End: 2019-10-06 | Stop reason: HOSPADM

## 2019-10-05 RX ORDER — ACETAMINOPHEN 325 MG/1
650 TABLET ORAL EVERY 6 HOURS PRN
Status: DISCONTINUED | OUTPATIENT
Start: 2019-10-05 | End: 2019-10-06 | Stop reason: HOSPADM

## 2019-10-05 RX ORDER — PANTOPRAZOLE SODIUM 40 MG/1
40 TABLET, DELAYED RELEASE ORAL DAILY
Status: DISCONTINUED | OUTPATIENT
Start: 2019-10-06 | End: 2019-10-06 | Stop reason: HOSPADM

## 2019-10-05 RX ORDER — ASPIRIN 325 MG
325 TABLET, DELAYED RELEASE (ENTERIC COATED) ORAL DAILY
Status: DISCONTINUED | OUTPATIENT
Start: 2019-10-06 | End: 2019-10-05

## 2019-10-05 RX ORDER — LANOLIN ALCOHOL/MO/W.PET/CERES
1000 CREAM (GRAM) TOPICAL DAILY
Status: DISCONTINUED | OUTPATIENT
Start: 2019-10-06 | End: 2019-10-06 | Stop reason: HOSPADM

## 2019-10-05 RX ORDER — AMLODIPINE BESYLATE 5 MG/1
10 TABLET ORAL DAILY
Status: DISCONTINUED | OUTPATIENT
Start: 2019-10-06 | End: 2019-10-05

## 2019-10-05 RX ORDER — FLUTICASONE PROPIONATE 50 MCG
2 SPRAY, SUSPENSION (ML) NASAL DAILY
Status: DISCONTINUED | OUTPATIENT
Start: 2019-10-06 | End: 2019-10-06 | Stop reason: HOSPADM

## 2019-10-05 RX ORDER — MONTELUKAST SODIUM 10 MG/1
10 TABLET ORAL NIGHTLY
Status: DISCONTINUED | OUTPATIENT
Start: 2019-10-05 | End: 2019-10-06 | Stop reason: HOSPADM

## 2019-10-05 RX ORDER — ISOSORBIDE DINITRATE 5 MG/1
5 TABLET ORAL 3 TIMES DAILY
Status: DISCONTINUED | OUTPATIENT
Start: 2019-10-06 | End: 2019-10-05

## 2019-10-05 RX ADMIN — MONTELUKAST 10 MG: 10 TABLET, FILM COATED ORAL at 08:10

## 2019-10-05 RX ADMIN — SODIUM CHLORIDE 1000 ML: 0.9 INJECTION, SOLUTION INTRAVENOUS at 01:10

## 2019-10-05 RX ADMIN — ENOXAPARIN SODIUM 40 MG: 100 INJECTION SUBCUTANEOUS at 08:10

## 2019-10-05 RX ADMIN — SODIUM CHLORIDE: 0.9 INJECTION, SOLUTION INTRAVENOUS at 08:10

## 2019-10-05 NOTE — PLAN OF CARE
This note also relates to the following rows which could not be included:  Pulse - Cannot attach notes to unvalidated device data       10/05/19 4547   Patient Assessment/Suction   Level of Consciousness (AVPU) alert   Respiratory Effort Normal;Unlabored   Expansion/Accessory Muscles/Retractions diaphragmatic;expansion symmetric;no use of accessory muscles   Rhythm/Pattern, Respiratory no shortness of breath reported;pattern regular;unlabored   PRE-TX-O2   O2 Device (Oxygen Therapy) room air   SpO2 99 %   Resp 16

## 2019-10-05 NOTE — ED PROVIDER NOTES
Encounter Date: 10/5/2019       History     Chief Complaint   Patient presents with    Hypertension     THIS AM    Dizziness     54-year-old female presents with dizziness, patient reports acute onset of dizziness at approximately 10:00 a.m. patient reports that after this event she became tired and she reports that she went to sleep.  Patient woke up and her family encouraged her to go to the hospital for evaluation.  Patient reports that she feels tired but otherwise has no current symptoms. Patient denies focal weakness patient denies shortness of breath patient denies chest pain patient does have a past medical history of CVA, COPD, CHF.        Review of patient's allergies indicates:  No Known Allergies  Past Medical History:   Diagnosis Date    Arthritis     Asthma     CHF (congestive heart failure)     COPD (chronic obstructive pulmonary disease)     Depression     Hyperlipemia     Hypertension     Leaky heart valve     Obese     Rheumatoid arthritis(714.0)     Stroke      Past Surgical History:   Procedure Laterality Date    CAROTID STENT      3 years ago    CHOLECYSTECTOMY      HYSTERECTOMY      SLEEVE GASTROPLASTY  02/21/2017     Family History   Problem Relation Age of Onset    Arthritis Mother     Cancer Mother     Diabetes Mother     Hyperlipidemia Mother     Hypertension Mother     Stroke Mother     Heart disease Father     Hypertension Father     Asthma Son     Hypertension Sister     Hypertension Sister     Kidney disease Neg Hx      Social History     Tobacco Use    Smoking status: Never Smoker    Smokeless tobacco: Never Used   Substance Use Topics    Alcohol use: No    Drug use: No     Review of Systems   Constitutional: Negative for fever.   HENT: Negative for congestion, rhinorrhea, sore throat and trouble swallowing.    Eyes: Negative for visual disturbance.   Respiratory: Negative for cough, chest tightness, shortness of breath and wheezing.    Cardiovascular:  Negative for chest pain, palpitations and leg swelling.   Gastrointestinal: Negative for abdominal distention, abdominal pain, constipation, diarrhea, nausea and vomiting.   Genitourinary: Negative for difficulty urinating, dysuria, flank pain and frequency.   Musculoskeletal: Negative for arthralgias, back pain, joint swelling and neck pain.   Skin: Negative for color change and rash.   Neurological: Positive for dizziness. Negative for syncope, speech difficulty, weakness, numbness and headaches.   All other systems reviewed and are negative.      Physical Exam     Initial Vitals [10/05/19 1321]   BP Pulse Resp Temp SpO2   117/61 (!) 55 16 97.7 °F (36.5 °C) 100 %      MAP       --         Physical Exam    Nursing note and vitals reviewed.  Constitutional: She appears well-developed and well-nourished. She is not diaphoretic. No distress.   HENT:   Head: Normocephalic and atraumatic.   Right Ear: External ear normal.   Left Ear: External ear normal.   Nose: Nose normal.   Mouth/Throat: Oropharynx is clear and moist. No oropharyngeal exudate.   Eyes: Conjunctivae and EOM are normal. Pupils are equal, round, and reactive to light. Right eye exhibits no discharge. Left eye exhibits no discharge. No scleral icterus.   Neck: Normal range of motion. Neck supple. No thyromegaly present. No tracheal deviation present. No JVD present.   Cardiovascular: Normal rate, regular rhythm, normal heart sounds and intact distal pulses. Exam reveals no gallop and no friction rub.    No murmur heard.  Pulmonary/Chest: Breath sounds normal. No stridor. No respiratory distress. She has no wheezes. She has no rhonchi. She has no rales. She exhibits no tenderness.   Abdominal: Soft. Bowel sounds are normal. She exhibits no distension and no mass. There is no tenderness. There is no rebound and no guarding.   Musculoskeletal: Normal range of motion. She exhibits no edema or tenderness.   Lymphadenopathy:     She has no cervical adenopathy.    Neurological: She is alert and oriented to person, place, and time. She has normal strength. She displays normal reflexes. No cranial nerve deficit or sensory deficit.   Skin: Skin is warm and dry. No rash and no abscess noted. No erythema. No pallor.         ED Course   Procedures  Labs Reviewed   CBC W/ AUTO DIFFERENTIAL - Abnormal; Notable for the following components:       Result Value    Mean Corpuscular Hemoglobin Conc 31.5 (*)     All other components within normal limits   COMPREHENSIVE METABOLIC PANEL - Abnormal; Notable for the following components:    Glucose 114 (*)     BUN, Bld 43 (*)     Creatinine 1.9 (*)     eGFR if  33.7 (*)     eGFR if non  29.3 (*)     All other components within normal limits    Narrative:     Recoll. [id number] by Jigsee at 10/05/2019 14:25, reason: Specimen   hemolyzed  Notified [facility]   B-TYPE NATRIURETIC PEPTIDE   MAGNESIUM    Narrative:     Recoll. [id number] by [email or web address] at 10/05/2019 14:25, reason: Specimen   hemolyzed  Notified [facility]   PROTIME-INR   TROPONIN I    Narrative:     Recoll. [id number] by [email or web address] at 10/05/2019 14:25, reason: Specimen   hemolyzed  Notified [facility]   TSH    Narrative:     Recoll. [id number] by Jigsee at 10/05/2019 14:25, reason: Specimen   hemolyzed  Notified [facility]   URINALYSIS   DRUG SCREEN PANEL, URINE EMERGENCY   POCT URINE PREGNANCY   POCT GLUCOSE   POCT GLUCOSE MONITORING CONTINUOUS        ECG Results          EKG 12-lead (In process)  Result time 10/05/19 14:42:20    In process by Interface, Lab In [facility] (10/05/19 14:42:20)                 Narrative:    Test Reason : R42,    Vent. Rate : 055 BPM     Atrial Rate : 055 BPM     P-R Int : 196 ms          QRS Dur : 086 ms      QT Int : 480 ms       P-R-T Axes : 059 010 017 degrees     QTc Int : 459 ms    Sinus bradycardia  Otherwise normal ECG  When compared with ECG of 30-APR-2018 01:24,  No significant change was  found    Referred By: [email or web address]   SELF           Confirmed By:                   In process by Interface, Lab In [facility] (10/05/19 13:42:06)                 Narrative:    Test Reason : R42,    Vent. Rate : 055 BPM     Atrial Rate : 055 BPM     P-R Int : 196 ms          QRS Dur : 086 ms      QT Int : 480 ms       P-R-T Axes : 059 010 017 degrees     QTc Int : 459 ms    Sinus bradycardia  Otherwise normal ECG  When compared with ECG of 30-APR-2018 01:24,  No significant change was found    Referred By: [email or web address]   SELF           Confirmed By:                             Imaging Results          MRI BRAIN WITHOUT CONTRAST (In process)                US Carotid Bilateral (Final result)  Result time 10/05/19 19:58:19    Final result by Daniela Deng MD (10/05/19 19:58:19)                 Impression:      No hemodynamically significant internal carotid artery stenosis.      Electronically signed by: Daniela Deng MD  Date:    10/05/2019  Time:    19:58             Narrative:    EXAMINATION:  US CAROTID BILATERAL    CLINICAL HISTORY:  AMS; dizziness;    TECHNIQUE:  CMS MANDATED QUALITY DATA - CAROTID - 195    All measurements and percent stenosis described below were determined using NASCET criteria or criteria similar to NASCET, as defined by the Society of Radiologists in Ultrasound Consensus Conference, Radiology, 2003    COMPARISON:  08/17/2016    FINDINGS:  Color Doppler, spectral Doppler, and grayscale analysis was performed.    Grayscale images show there is minimal scattered plaque bilaterally.    Right internal carotid artery estimated peak systolic velocity is 77 cm/sec. Antegrade flow in right vertebral artery.    Left internal carotid artery estimated peak systolic velocity is 127 cm/sec. Antegrade flow in left vertebral artery.                               CT Head Without Contrast (Final result)  Result time 10/05/19 14:48:15    Final result by Daniela Deng MD (10/05/19 14:48:15)                  Impression:      Stable old infarct in the right parietal and temporal lobes    No acute intracranial process      Electronically signed by: Daniela Deng MD  Date:    10/05/2019  Time:    14:48             Narrative:      CMS MANDATED QUALITY DATA - CT RADIATION - 436    All CT scans at this facility utilize dose modulation, iterative reconstruction, and/or weight based dosing when appropriate to reduce radiation dose to as low as reasonably achievable.    EXAMINATION:  CT HEAD WITHOUT CONTRAST    CLINICAL HISTORY:  Syncope/fainting;    TECHNIQUE:  Head CT without IV contrast. All CT scans at this facility use dose modulation, iterative reconstruction, and/or weight based dosing when appropriate to reduce radiation dose to as low as reasonably achievable.    COMPARISON:  08/14/2018    FINDINGS:  The ventricles and sulci are normal in size and configuration for age.  There is no hemorrhage, mass or midline shift.  There are no extra-axial fluid collections.    There is stable wedge-shaped area of encephalomalacia in the posterior right parietal and temporal lobes compatible with prior infarct.    The paranasal sinuses and mastoid air cells are clear.                               X-Ray Chest AP Portable (Final result)  Result time 10/05/19 14:16:08    Final result by Daniela Deng MD (10/05/19 14:16:08)                 Impression:      No acute cardiopulmonary abnormality.      Electronically signed by: Daniela Deng MD  Date:    10/05/2019  Time:    14:16             Narrative:    EXAMINATION:  XR CHEST AP PORTABLE    CLINICAL HISTORY:  congestion;    FINDINGS:  Portable chest at 13:47 hours is compared to 11/24/2018 shows normal cardiomediastinal silhouette.    Lungs are clear. Pulmonary vasculature is normal. No acute osseous abnormality.                                 Medical Decision Making:   History:   Old Medical Records: I decided to obtain old medical records.  Initial Assessment:   Emergent  evaluation of 54-year-old female presenting with dizziness starting at approximately 10:00 a.m., differential diagnosis is broad but includes dehydration, electrolyte abnormality, endocrine dysfunction, infection, CVA, ACS.  Patient found to be orthostatic positive.              Attending Attestation:             Attending ED Notes:   Discuss case with Neurology in the do not feel that patient is a candidate for tPA given that her symptoms resolved sometime ago, patient will however be admitted to Internal Medicine with Neurology to follow for further evaluation and management.               Clinical Impression:       ICD-10-CM ICD-9-CM   1. Altered mental status, unspecified altered mental status type R41.82 780.97   2. Dizziness R42 780.4                                Alex Mccurdy MD  10/05/19 2135

## 2019-10-06 ENCOUNTER — CLINICAL SUPPORT (OUTPATIENT)
Dept: CARDIOLOGY | Facility: HOSPITAL | Age: 54
End: 2019-10-06
Attending: INTERNAL MEDICINE
Payer: MEDICARE

## 2019-10-06 VITALS
HEART RATE: 54 BPM | HEIGHT: 63 IN | WEIGHT: 228.81 LBS | TEMPERATURE: 98 F | OXYGEN SATURATION: 94 % | SYSTOLIC BLOOD PRESSURE: 134 MMHG | RESPIRATION RATE: 20 BRPM | DIASTOLIC BLOOD PRESSURE: 61 MMHG | BODY MASS INDEX: 40.54 KG/M2

## 2019-10-06 DIAGNOSIS — I50.9 CONGESTIVE HEART FAILURE, UNSPECIFIED HF CHRONICITY, UNSPECIFIED HEART FAILURE TYPE: ICD-10-CM

## 2019-10-06 DIAGNOSIS — I20.89 ANGINA AT REST: ICD-10-CM

## 2019-10-06 PROBLEM — R41.82 ALTERED MENTAL STATUS: Status: RESOLVED | Noted: 2019-10-05 | Resolved: 2019-10-06

## 2019-10-06 PROBLEM — I63.81 ACUTE LACUNAR INFARCTION: Status: ACTIVE | Noted: 2019-10-06

## 2019-10-06 LAB
ALBUMIN SERPL BCP-MCNC: 3.3 G/DL (ref 3.5–5.2)
ALP SERPL-CCNC: 49 U/L (ref 55–135)
ALT SERPL W/O P-5'-P-CCNC: 12 U/L (ref 10–44)
ANION GAP SERPL CALC-SCNC: 8 MMOL/L (ref 8–16)
AORTIC ROOT ANNULUS: 2.45 CM
AORTIC VALVE CUSP SEPERATION: 1.71 CM
AST SERPL-CCNC: 12 U/L (ref 10–40)
AV INDEX (PROSTH): 1.09
AV MEAN GRADIENT: 6 MMHG
AV PEAK GRADIENT: 11 MMHG
AV VALVE AREA: 3.42 CM2
AV VELOCITY RATIO: 91.89
BASOPHILS # BLD AUTO: 0.02 K/UL (ref 0–0.2)
BASOPHILS NFR BLD: 0.3 % (ref 0–1.9)
BILIRUB SERPL-MCNC: 0.7 MG/DL (ref 0.1–1)
BSA FOR ECHO PROCEDURE: 1.98 M2
BUN SERPL-MCNC: 37 MG/DL (ref 6–20)
CALCIUM SERPL-MCNC: 9.1 MG/DL (ref 8.7–10.5)
CHLORIDE SERPL-SCNC: 103 MMOL/L (ref 95–110)
CHOLEST SERPL-MCNC: 165 MG/DL (ref 120–199)
CHOLEST/HDLC SERPL: 4.9 {RATIO} (ref 2–5)
CK MB SERPL-MCNC: 1.5 NG/ML (ref 0.1–6.5)
CK MB SERPL-MCNC: 1.5 NG/ML (ref 0.1–6.5)
CO2 SERPL-SCNC: 26 MMOL/L (ref 23–29)
CREAT SERPL-MCNC: 1.7 MG/DL (ref 0.5–1.4)
CV ECHO LV RWT: 0.53 CM
DIFFERENTIAL METHOD: ABNORMAL
DOP CALC AO PEAK VEL: 1.66 M/S
DOP CALC AO VTI: 32.91 CM
DOP CALC LVOT AREA: 3.1 CM2
DOP CALC LVOT DIAMETER: 2 CM
DOP CALC LVOT PEAK VEL: 152.53 M/S
DOP CALC LVOT STROKE VOLUME: 112.41 CM3
DOP CALCLVOT PEAK VEL VTI: 35.8 CM
E WAVE DECELERATION TIME: 248.63 MSEC
E/A RATIO: 0.93
E/E' RATIO: 14.57 M/S
ECHO LV POSTERIOR WALL: 1.21 CM (ref 0.6–1.1)
EOSINOPHIL # BLD AUTO: 0.1 K/UL (ref 0–0.5)
EOSINOPHIL NFR BLD: 1.1 % (ref 0–8)
ERYTHROCYTE [DISTWIDTH] IN BLOOD BY AUTOMATED COUNT: 13.5 % (ref 11.5–14.5)
EST. GFR  (AFRICAN AMERICAN): 38.6 ML/MIN/1.73 M^2
EST. GFR  (NON AFRICAN AMERICAN): 33.5 ML/MIN/1.73 M^2
ESTIMATED AVG GLUCOSE: 117 MG/DL (ref 68–131)
FRACTIONAL SHORTENING: 29 % (ref 28–44)
GLUCOSE SERPL-MCNC: 100 MG/DL (ref 70–110)
HBA1C MFR BLD HPLC: 5.7 % (ref 4.5–6.2)
HCT VFR BLD AUTO: 38.3 % (ref 37–48.5)
HDLC SERPL-MCNC: 34 MG/DL (ref 40–75)
HDLC SERPL: 20.6 % (ref 20–50)
HGB BLD-MCNC: 12 G/DL (ref 12–16)
IMM GRANULOCYTES # BLD AUTO: 0.02 K/UL (ref 0–0.04)
IMM GRANULOCYTES NFR BLD AUTO: 0.3 % (ref 0–0.5)
INTERVENTRICULAR SEPTUM: 1.21 CM (ref 0.6–1.1)
LDLC SERPL CALC-MCNC: 98 MG/DL (ref 63–159)
LEFT ATRIUM SIZE: 3.45 CM
LEFT INTERNAL DIMENSION IN SYSTOLE: 3.24 CM (ref 2.1–4)
LEFT VENTRICLE DIASTOLIC VOLUME INDEX: 34.14 ML/M2
LEFT VENTRICLE DIASTOLIC VOLUME: 69.9 ML
LEFT VENTRICLE MASS INDEX: 100 G/M2
LEFT VENTRICLE SYSTOLIC VOLUME INDEX: 14.3 ML/M2
LEFT VENTRICLE SYSTOLIC VOLUME: 29.2 ML
LEFT VENTRICULAR INTERNAL DIMENSION IN DIASTOLE: 4.55 CM (ref 3.5–6)
LEFT VENTRICULAR MASS: 203.95 G
LV LATERAL E/E' RATIO: 12.75 M/S
LV SEPTAL E/E' RATIO: 17 M/S
LYMPHOCYTES # BLD AUTO: 1.6 K/UL (ref 1–4.8)
LYMPHOCYTES NFR BLD: 24.9 % (ref 18–48)
MAGNESIUM SERPL-MCNC: 1.8 MG/DL (ref 1.6–2.6)
MCH RBC QN AUTO: 28.7 PG (ref 27–31)
MCHC RBC AUTO-ENTMCNC: 31.3 G/DL (ref 32–36)
MCV RBC AUTO: 92 FL (ref 82–98)
MONOCYTES # BLD AUTO: 0.5 K/UL (ref 0.3–1)
MONOCYTES NFR BLD: 7.6 % (ref 4–15)
MV PEAK A VEL: 1.1 M/S
MV PEAK E VEL: 1.02 M/S
NEUTROPHILS # BLD AUTO: 4.3 K/UL (ref 1.8–7.7)
NEUTROPHILS NFR BLD: 65.8 % (ref 38–73)
NONHDLC SERPL-MCNC: 131 MG/DL
NRBC BLD-RTO: 0 /100 WBC
PHOSPHATE SERPL-MCNC: 3.9 MG/DL (ref 2.7–4.5)
PISA TR MAX VEL: 2.74 M/S
PLATELET # BLD AUTO: 222 K/UL (ref 150–350)
PMV BLD AUTO: 10.3 FL (ref 9.2–12.9)
POTASSIUM SERPL-SCNC: 3.8 MMOL/L (ref 3.5–5.1)
PROT SERPL-MCNC: 7 G/DL (ref 6–8.4)
PV PEAK VELOCITY: 90.44 CM/S
RBC # BLD AUTO: 4.18 M/UL (ref 4–5.4)
SODIUM SERPL-SCNC: 137 MMOL/L (ref 136–145)
TDI LATERAL: 0.08 M/S
TDI SEPTAL: 0.06 M/S
TDI: 0.07 M/S
TR MAX PG: 30 MMHG
TRIGL SERPL-MCNC: 165 MG/DL (ref 30–150)
TROPONIN I SERPL DL<=0.01 NG/ML-MCNC: <0.03 NG/ML (ref 0.02–0.04)
WBC # BLD AUTO: 6.58 K/UL (ref 3.9–12.7)

## 2019-10-06 PROCEDURE — 83735 ASSAY OF MAGNESIUM: CPT

## 2019-10-06 PROCEDURE — 97165 OT EVAL LOW COMPLEX 30 MIN: CPT

## 2019-10-06 PROCEDURE — 84100 ASSAY OF PHOSPHORUS: CPT

## 2019-10-06 PROCEDURE — 97162 PT EVAL MOD COMPLEX 30 MIN: CPT

## 2019-10-06 PROCEDURE — 93306 TTE W/DOPPLER COMPLETE: CPT

## 2019-10-06 PROCEDURE — 25000003 PHARM REV CODE 250: Performed by: NURSE PRACTITIONER

## 2019-10-06 PROCEDURE — 85025 COMPLETE CBC W/AUTO DIFF WBC: CPT

## 2019-10-06 PROCEDURE — 82553 CREATINE MB FRACTION: CPT

## 2019-10-06 PROCEDURE — 36415 COLL VENOUS BLD VENIPUNCTURE: CPT

## 2019-10-06 PROCEDURE — G0378 HOSPITAL OBSERVATION PER HR: HCPCS

## 2019-10-06 PROCEDURE — 25000003 PHARM REV CODE 250: Performed by: INTERNAL MEDICINE

## 2019-10-06 PROCEDURE — 94761 N-INVAS EAR/PLS OXIMETRY MLT: CPT

## 2019-10-06 PROCEDURE — 84484 ASSAY OF TROPONIN QUANT: CPT

## 2019-10-06 PROCEDURE — 82553 CREATINE MB FRACTION: CPT | Mod: 91

## 2019-10-06 PROCEDURE — 83036 HEMOGLOBIN GLYCOSYLATED A1C: CPT

## 2019-10-06 PROCEDURE — 80053 COMPREHEN METABOLIC PANEL: CPT

## 2019-10-06 PROCEDURE — 80061 LIPID PANEL: CPT

## 2019-10-06 PROCEDURE — 96361 HYDRATE IV INFUSION ADD-ON: CPT

## 2019-10-06 RX ORDER — CLOPIDOGREL BISULFATE 75 MG/1
75 TABLET ORAL DAILY
Qty: 30 TABLET | Refills: 11 | Status: SHIPPED | OUTPATIENT
Start: 2019-10-06 | End: 2020-04-08 | Stop reason: SDUPTHER

## 2019-10-06 RX ORDER — ATORVASTATIN CALCIUM 40 MG/1
40 TABLET, FILM COATED ORAL NIGHTLY
Status: DISCONTINUED | OUTPATIENT
Start: 2019-10-06 | End: 2019-10-06 | Stop reason: HOSPADM

## 2019-10-06 RX ORDER — METOPROLOL SUCCINATE 25 MG/1
25 TABLET, EXTENDED RELEASE ORAL DAILY
Status: DISCONTINUED | OUTPATIENT
Start: 2019-10-06 | End: 2019-10-06 | Stop reason: HOSPADM

## 2019-10-06 RX ORDER — METOPROLOL SUCCINATE 25 MG/1
50 TABLET, EXTENDED RELEASE ORAL DAILY
Qty: 30 TABLET | Refills: 0 | Status: SHIPPED | OUTPATIENT
Start: 2019-10-06 | End: 2019-10-06 | Stop reason: SDUPTHER

## 2019-10-06 RX ADMIN — ASPIRIN 325 MG: 325 TABLET, COATED ORAL at 12:10

## 2019-10-06 RX ADMIN — ASPIRIN 325 MG: 325 TABLET, COATED ORAL at 10:10

## 2019-10-06 RX ADMIN — THERA TABS 1 TABLET: TAB at 10:10

## 2019-10-06 RX ADMIN — CYANOCOBALAMIN TAB 1000 MCG 1000 MCG: 1000 TAB at 10:10

## 2019-10-06 RX ADMIN — ATORVASTATIN CALCIUM 40 MG: 40 TABLET, FILM COATED ORAL at 10:10

## 2019-10-06 RX ADMIN — PANTOPRAZOLE SODIUM 40 MG: 40 TABLET, DELAYED RELEASE ORAL at 10:10

## 2019-10-06 RX ADMIN — FERROUS SULFATE TAB 325 MG (65 MG ELEMENTAL FE) 325 MG: 325 (65 FE) TAB at 10:10

## 2019-10-06 NOTE — CONSULTS
[facility]  Neurology  Consult Note    Patient Name: Faustina Rae  MRN: [id number]  Admission Date: 10/5/2019  Hospital Length of Stay: 0 days  Code Status: Full Code   Attending Provider: Marty Vargas MD   Consulting Provider: Dr. Baez  Consulting NP: Mariely Vidal NP  Primary Care Physician: Jaxson Gonzales MD  Principal Problem:Altered mental status    Consults  Subjective:     Chief Complaint:  dizziness    HPI: Mrs. Rae is a 54 year female with a history of HTN, CVA, CHF, COPD, CKD, and leaky heart valve who presented to the ED last night with the complaint of dizziness. The patient reports that she had an acute onset of dizziness at about 10am this morning. She states that she felt tired so she laid down for a nap for an unknown amount of time. Her daughter states that she called the patient on the phone and the patient sounded drowsy with slow, slurred speech. 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 She encouraged the patient to come to the ED to be evaluated. In the ED the patient was found to be orthostatic.    Neurology Interval History: Patient is seen and examined by Dr. Baez. Patient reports that she feels back to her baseline today. She has not experienced any more episodes of dizziness. She states she keeps her grandchildren 7 days a week from 5am-8pm. Their ages are 3, 2, and 1. She states she stays in a constant state of stress because of this. She has a history of previous CVA 4 years ago. She is complaint with lipitor 40mg daily and  daily. She is AAOx3, no facial droop, weakness, or numbness. She denies any difficulty with gait. She experiences some mild object recall deficit. NIHSS of 1.Her MRI did show a small acute lacunar infarct involving left centrum semiovale and an old right lacunar infarct.  MRA and TTE pending. She reports no further complaints today.    Past  Medical History:   Diagnosis Date    Arthritis     Asthma     CHF (congestive heart failure)     COPD (chronic obstructive pulmonary disease)     Depression     Hyperlipemia     Hypertension     Leaky heart valve     Obese     Rheumatoid arthritis(714.0)     Stroke        Past Surgical History:   Procedure Laterality Date    CAROTID STENT      3 years ago    CHOLECYSTECTOMY      HYSTERECTOMY      SLEEVE GASTROPLASTY  02/21/2017       Review of patient's allergies indicates:  No Known Allergies    Current Neurological Medications:    Current Outpatient Medications on File Prior to Encounter   Medication Sig    albuterol 90 mcg/actuation inhaler Inhale 2 puffs into the lungs 4 (four) times daily.    aspirin (ECOTRIN) 325 MG EC tablet Take 1 tablet (325 mg total) by mouth once daily.    atorvastatin (LIPITOR) 40 MG tablet Take 1 tablet (40 mg total) by mouth once daily.    b complex vitamins tablet Take 1 tablet by mouth once daily.    calcium citrate-vitamin D3 315-200 mg (CITRACAL+D) 315-200 mg-unit per tablet Take 1 tablet by mouth once daily.     cyanocobalamin (VITAMIN B-12) 1000 MCG tablet Take 1,000 mcg by mouth once daily.     ferrous sulfate 325 (65 FE) MG EC tablet Take 325 mg by mouth once daily.     FLOVENT  mcg/actuation inhaler TAKE 1 PUFF BY MOUTH TWICE DAILY (Patient taking differently: Inhale 1 puff into the lungs every 12 (twelve) hours. )    fluticasone (FLONASE) 50 mcg/actuation nasal spray 2 sprays by Each Nare route once daily.    hydrALAZINE (APRESOLINE) 25 MG tablet Take 1 tablet (25 mg total) by mouth every 12 (twelve) hours.    isosorbide dinitrate (ISORDIL) 5 MG Tab Take 1 tablet (5 mg total) by mouth 3 (three) times daily.    ketoconazole (NIZORAL) 2 % cream Apply topically once daily. (Patient taking differently: Apply 1 application topically once daily. )    metOLazone (ZAROXOLYN) 2.5 MG tablet Take 1 tablet (2.5 mg total) by mouth once daily.     metoprolol succinate (TOPROL-XL) 50 MG 24 hr tablet Take 1 tablet (50 mg total) by mouth once daily.    montelukast (SINGULAIR) 10 mg tablet Take 1 tablet (10 mg total) by mouth every evening.    multivitamin (THERAGRAN) per tablet Take 1 tablet by mouth once daily.    nystatin (MYCOSTATIN) powder Apply topically 2 (two) times daily.    pantoprazole (PROTONIX) 40 MG tablet Take 1 tablet (40 mg total) by mouth once daily.    amlodipine (NORVASC) 10 MG tablet Take 1 tablet (10 mg total) by mouth once daily.    [DISCONTINUED] furosemide (LASIX) 20 MG tablet Take 1 tablet (20 mg total) by mouth once daily.      Family History     Problem Relation (Age of Onset)    Arthritis Mother    Asthma Son    Cancer Mother    Diabetes Mother    Heart disease Father    Hyperlipidemia Mother    Hypertension Mother, Father, Sister, Sister    Stroke Mother        Tobacco Use    Smoking status: Never Smoker    Smokeless tobacco: Never Used   Substance and Sexual Activity    Alcohol use: No    Drug use: No    Sexual activity: Not on file     Review of Systems   Constitutional: Positive for fatigue. Negative for chills, diaphoresis, fever and unexpected weight change.   HENT: Negative for sore throat, tinnitus, trouble swallowing and voice change.    Eyes: Negative for visual disturbance.   Respiratory: Negative for cough, choking, chest tightness, shortness of breath and wheezing.    Cardiovascular: Negative for chest pain, palpitations and leg swelling.   Gastrointestinal: Negative for abdominal pain, blood in stool, constipation, diarrhea, nausea and vomiting.   Genitourinary: Negative for difficulty urinating, dysuria, flank pain, frequency, hematuria, urgency, vaginal bleeding and vaginal discharge.   Musculoskeletal: Negative for gait problem, myalgias, neck pain and neck stiffness.   Skin: Negative for rash and wound.   Neurological: Positive for dizziness, speech difficulty and weakness on admission, has since resolved.  Negative for tremors, syncope, facial asymmetry, light-headedness, numbness and headaches.   Psychiatric/Behavioral: Negative for confusion and sleep disturbance. The patient is not nervous/anxious.      Objective:     Vital Signs (Most Recent):  Temp: 98 °F (36.7 °C) (10/06/19 0828)  Pulse: (!) 56 (10/06/19 1031)  Resp: 18 (10/06/19 0828)  BP: (!) 121/58 (10/06/19 0828)  SpO2: 100 % (10/06/19 1031) Vital Signs (24h Range):  Temp:  [97.7 °F (36.5 °C)-98.5 °F (36.9 °C)] 98 °F (36.7 °C)  Pulse:  [49-64] 56  Resp:  [14-21] 18  SpO2:  [86 %-100 %] 100 %  BP: ()/(51-83) 121/58     Weight: 103.8 kg (228 lb 13.4 oz)  Body mass index is 40.54 kg/m².    Physical Exam   Constitutional: She is oriented to person, place, and time. She appears well-developed and well-nourished.   HENT:   Head: Normocephalic and atraumatic.   Eyes: Pupils are equal, round, and reactive to light.   Neck: Normal range of motion. Neck supple.   Cardiovascular: Normal rate and regular rhythm.   Pulmonary/Chest: Effort normal.   Abdominal: Soft.   Musculoskeletal: Normal range of motion.   Neurological: She is alert and oriented to person, place, and time. She has normal strength. She has a normal Finger-Nose-Finger Test and a normal Heel to Shin Test.   Reflex Scores:       Tricep reflexes are 2+ on the right side and 2+ on the left side.       Bicep reflexes are 2+ on the right side and 2+ on the left side.       Brachioradialis reflexes are 2+ on the right side and 2+ on the left side.       Patellar reflexes are 2+ on the right side and 2+ on the left side.       Achilles reflexes are 2+ on the right side and 2+ on the left side.  Skin: Skin is warm and dry. Capillary refill takes less than 2 seconds.   Psychiatric: She has a normal mood and affect.       NEUROLOGICAL EXAMINATION:     MENTAL STATUS   Oriented to person, place, and time.   Level of consciousness: alert  Knowledge: good.        Experiences some mild object recall deficit.  NIHSS of 1.     CRANIAL NERVES   Cranial nerves II through XII intact.     CN III, IV, VI   Pupils are equal, round, and reactive to light.    MOTOR EXAM   Muscle bulk: normal  Overall muscle tone: normal    Strength   Strength 5/5 throughout.     REFLEXES     Reflexes   Right brachioradialis: 2+  Left brachioradialis: 2+  Right biceps: 2+  Left biceps: 2+  Right triceps: 2+  Left triceps: 2+  Right patellar: 2+  Left patellar: 2+  Right achilles: 2+  Left achilles: 2+  Right : 2+  Left : 2+    SENSORY EXAM   Light touch normal.     GAIT AND COORDINATION      Coordination   Finger to nose coordination: normal  Heel to shin coordination: normal    Tremor   Resting tremor: absent  Intention tremor: absent      Significant Labs:   Hgb A1C: pending  LDL:98.0  TSH: 1.4  Creat: 1.7      Significant Imaging:     MRA brain: pending  TTE w/bubble: pending    MRI brain without contrast:    IMPRESSION:    Small acute lacunar infarct involving left centrum semiovale.  Large area of gliosis involving the right posterior parietal, temporal lobe and  right cerebellar hemisphere.  Old lacunar infarction right centrum semiovale and right basal ganglia region.  Moderate diffuse brain parenchymal atrophy changes.  Moderate periventricular chronic microangiopathic ischemic changes.  Partially empty sella.      CUS:  Impression       No hemodynamically significant internal carotid artery stenosis.         CT head without contrast  Impression       Stable old infarct in the right parietal and temporal lobes    No acute intracranial process     Pending imaging: MRA Brain, TTE    Assessment and Plan:   1. Acute lacunar infarct involving left centrum semiovale  -Obtain MRA brain and TTE today.  -Start Plavix 75mg daily for three weeks in addition to ASA 325mg. After completing the three weeks of dual platelet therapy, patient may discontinue the ASA 325mg daily and just remain on Plavix 75mg daily. She will need to follow up with us  in 2 weeks. She needs to continue Lipitor 40mg daily.     2. AMS  -Has resolved. Patient back to baseline.    Active Diagnoses:    Diagnosis Date Noted POA    PRINCIPAL PROBLEM:  Altered mental status [R41.82] 10/05/2019 Yes      Problems Resolved During this Admission:       VTE Risk Mitigation (From admission, onward)         Ordered     enoxaparin injection 40 mg  Daily      10/05/19 1825     IP VTE HIGH RISK PATIENT  Once      10/05/19 1825     Place sequential compression device  Until discontinued      10/05/19 1825                Patient was seen and examined by Dr. Baez.      Neurology  [facility]      [phone] within 2 weeks from discharge.  Stroke education was provided including stroke risk factors modification and any acute neurological changes including weakness, confusion, visual changes to come straight to the ER.  All side effects of new medications were discussed with patient/next of kin including mood changes, severe rash, organ failure,and severe lab abnormalities.

## 2019-10-06 NOTE — H&P
[facility] Medicine  History & Physical    Patient Name: Faustina Rae  MRN: [id number]  Admission Date: 10/5/2019  Attending Physician: Joselyn Bentley MD   Primary Care Provider: Jaxson Gonzales MD         Patient information was obtained from patient, relative(s) and ER records.     Subjective:     Principal Problem:Altered mental status    Chief Complaint:   Chief Complaint   Patient presents with    Hypertension     THIS AM    Dizziness        HPI: Mrs. Rae is a 54 year female with a history of HTN, CVA, CHF, COPD, CKD, and leaky heart valve who presents to the ED with the complaint of dizziness. The patient reports that she had an acute onset of dizziness at about 10am this morning. She states that she felt tired so she laid down for a nap for an unknown amount of time. Her daughter states that she called the patient on the phone and the patient sounded drowsy with slow, slurred speech. 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 She encouraged the patient to come to the ED to be evaluated. In the ED the patient is found to be orthostatic. Lab work shows elevated BUN/Creat 43/1.9. On assessment, the patient is drowsy, but alert and oriented x3.  She denies blurred vision, headache, fever, chills, chest pain, sob, difficulty speaking, difficulty swallowing, focal neuro deficits, nausea, vomiting, diarrhea, numbness, or tingling.      Past Medical History:   Diagnosis Date    Arthritis     Asthma     CHF (congestive heart failure)     COPD (chronic obstructive pulmonary disease)     Depression     Hyperlipemia     Hypertension     Leaky heart valve     Obese     Rheumatoid arthritis(714.0)     Stroke        Past Surgical History:   Procedure Laterality Date    CAROTID STENT      3 years ago    CHOLECYSTECTOMY      HYSTERECTOMY      SLEEVE GASTROPLASTY  02/21/2017       Review of  patient's allergies indicates:  No Known Allergies    No current facility-administered medications on file prior to encounter.      Current Outpatient Medications on File Prior to Encounter   Medication Sig    albuterol 90 mcg/actuation inhaler Inhale 2 puffs into the lungs 4 (four) times daily.    aspirin (ECOTRIN) 325 MG EC tablet Take 1 tablet (325 mg total) by mouth once daily.    atorvastatin (LIPITOR) 40 MG tablet Take 1 tablet (40 mg total) by mouth once daily.    b complex vitamins tablet Take 1 tablet by mouth once daily.    calcium citrate-vitamin D3 315-200 mg (CITRACAL+D) 315-200 mg-unit per tablet Take 1 tablet by mouth once daily.     cyanocobalamin (VITAMIN B-12) 1000 MCG tablet Take 1,000 mcg by mouth once daily.     ferrous sulfate 325 (65 FE) MG EC tablet Take 325 mg by mouth once daily.     FLOVENT  mcg/actuation inhaler TAKE 1 PUFF BY MOUTH TWICE DAILY (Patient taking differently: Inhale 1 puff into the lungs every 12 (twelve) hours. )    fluticasone (FLONASE) 50 mcg/actuation nasal spray 2 sprays by Each Nare route once daily.    hydrALAZINE (APRESOLINE) 25 MG tablet Take 1 tablet (25 mg total) by mouth every 12 (twelve) hours.    isosorbide dinitrate (ISORDIL) 5 MG Tab Take 1 tablet (5 mg total) by mouth 3 (three) times daily.    ketoconazole (NIZORAL) 2 % cream Apply topically once daily. (Patient taking differently: Apply 1 application topically once daily. )    metOLazone (ZAROXOLYN) 2.5 MG tablet Take 1 tablet (2.5 mg total) by mouth once daily.    metoprolol succinate (TOPROL-XL) 50 MG 24 hr tablet Take 1 tablet (50 mg total) by mouth once daily.    montelukast (SINGULAIR) 10 mg tablet Take 1 tablet (10 mg total) by mouth every evening.    multivitamin (THERAGRAN) per tablet Take 1 tablet by mouth once daily.    nystatin (MYCOSTATIN) powder Apply topically 2 (two) times daily.    pantoprazole (PROTONIX) 40 MG tablet Take 1 tablet (40 mg total) by mouth once daily.     amlodipine (NORVASC) 10 MG tablet Take 1 tablet (10 mg total) by mouth once daily.    [DISCONTINUED] furosemide (LASIX) 20 MG tablet Take 1 tablet (20 mg total) by mouth once daily.     Family History     Problem Relation (Age of Onset)    Arthritis Mother    Asthma Son    Cancer Mother    Diabetes Mother    Heart disease Father    Hyperlipidemia Mother    Hypertension Mother, Father, Sister, Sister    Stroke Mother        Tobacco Use    Smoking status: Never Smoker    Smokeless tobacco: Never Used   Substance and Sexual Activity    Alcohol use: No    Drug use: No    Sexual activity: Not on file     Review of Systems   Constitutional: Positive for fatigue. Negative for chills, diaphoresis, fever and unexpected weight change.   HENT: Negative for sore throat, tinnitus, trouble swallowing and voice change.    Eyes: Negative for visual disturbance.   Respiratory: Negative for cough, choking, chest tightness, shortness of breath and wheezing.    Cardiovascular: Negative for chest pain, palpitations and leg swelling.   Gastrointestinal: Negative for abdominal pain, blood in stool, constipation, diarrhea, nausea and vomiting.   Genitourinary: Negative for difficulty urinating, dysuria, flank pain, frequency, hematuria, urgency, vaginal bleeding and vaginal discharge.   Musculoskeletal: Negative for gait problem, myalgias, neck pain and neck stiffness.   Skin: Negative for rash and wound.   Neurological: Positive for dizziness, speech difficulty and weakness. Negative for tremors, syncope, facial asymmetry, light-headedness, numbness and headaches.   Psychiatric/Behavioral: Negative for confusion and sleep disturbance. The patient is not nervous/anxious.      Objective:     Vital Signs (Most Recent):  Temp: 98.5 °F (36.9 °C) (10/05/19 2100)  Pulse: (!) 54 (10/05/19 2100)  Resp: 18 (10/05/19 2100)  BP: (!) 141/70 (10/05/19 2100)  SpO2: 97 % (10/05/19 2100) Vital Signs (24h Range):  Temp:  [97.7 °F (36.5 °C)-98.5  °F (36.9 °C)] 98.5 °F (36.9 °C)  Pulse:  [49-64] 54  Resp:  [14-21] 18  SpO2:  [86 %-100 %] 97 %  BP: ()/(51-83) 141/70     Weight: 88.5 kg (195 lb)  Body mass index is 34.54 kg/m².    Physical Exam   Constitutional: She is oriented to person, place, and time. She appears well-developed and well-nourished. She appears lethargic.   HENT:   Head: Normocephalic and atraumatic.   Eyes: Pupils are equal, round, and reactive to light. Conjunctivae, EOM and lids are normal.   Neck: Trachea normal and normal range of motion. Neck supple.   Cardiovascular: Regular rhythm, S1 normal, S2 normal, normal heart sounds, intact distal pulses and normal pulses. Bradycardia present.   Pulmonary/Chest: Effort normal and breath sounds normal.   Abdominal: Soft. Normal appearance and bowel sounds are normal.   Neurological: She is oriented to person, place, and time. She has normal strength. She appears lethargic. GCS eye subscore is 4. GCS verbal subscore is 5. GCS motor subscore is 6.   Skin: Skin is warm, dry and intact. Capillary refill takes less than 2 seconds.   Psychiatric: She has a normal mood and affect. Her speech is delayed. She is slowed.         CRANIAL NERVES     CN III, IV, VI   Pupils are equal, round, and reactive to light.  Extraocular motions are normal.        Significant Labs:   CBC:   Recent Labs   Lab 10/05/19  1355   WBC 7.48   HGB 12.8   HCT 40.6        CMP:   Recent Labs   Lab 10/05/19  1505      K 4.5      CO2 26   *   BUN 43*   CREATININE 1.9*   CALCIUM 9.3   PROT 7.8   ALBUMIN 3.7   BILITOT 0.8   ALKPHOS 56   AST 13   ALT 11   ANIONGAP 9   EGFRNONAA 29.3*     Cardiac Markers:   Recent Labs   Lab 10/05/19  1355   BNP 25     Magnesium:   Recent Labs   Lab 10/05/19  1505   MG 1.8     Troponin:   Recent Labs   Lab 10/05/19  1505 10/05/19  2133   TROPONINI <0.030 <0.030     Urine Studies:   Recent Labs   Lab 10/05/19  1411   COLORU Yellow   APPEARANCEUA Clear   PHUR 6.0    SPECGRAV 1.010   PROTEINUA Negative   GLUCUA Negative   KETONESU Negative   BILIRUBINUA Negative   OCCULTUA Negative   NITRITE Negative   UROBILINOGEN Negative   LEUKOCYTESUR Negative       Significant Imaging: I have reviewed all pertinent imaging results/findings within the past 24 hours.    Assessment/Plan:     * Altered mental status  Admit to Cardiology B  Consult Neurology  CT of head with old infarct  MRI brain  2-D echo with bubble study  U/S bilateral carotids  Neuro checks  PT/OT/ST consult  Continue meds to include ASA/Statin  Check lipid panel/Hba1c      VTE Risk Mitigation (From admission, onward)         Ordered     enoxaparin injection 40 mg  Daily      10/05/19 1825     IP VTE HIGH RISK PATIENT  Once      10/05/19 1825     Place sequential compression device  Until discontinued      10/05/19 1825                   Rosa Schneider NP  Department of Hospital Medicine   [facility]

## 2019-10-06 NOTE — HPI
Mrs. Rae is a 54 year female with a history of HTN, CVA, CHF, COPD, CKD, and leaky heart valve who presents to the ED with the complaint of dizziness. The patient reports that she had an acute onset of dizziness at about 10am this morning. She states that she felt tired so she laid down for a nap for an unknown amount of time. Her daughter states that she called the patient on the phone and the patient sounded drowsy with slow, slurred speech. 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 She encouraged the patient to come to the ED to be evaluated. In the ED the patient is found to be orthostatic. Lab work shows elevated BUN/Creat 43/1.9. On assessment, the patient is drowsy, but alert and oriented x3.  She denies blurred vision, headache, fever, chills, chest pain, sob, difficulty speaking, difficulty swallowing, focal neuro deficits, nausea, vomiting, diarrhea, numbness, or tingling.

## 2019-10-06 NOTE — PLAN OF CARE
OT initial eval completed. Pt back to baseline with ADLS and fx transfers. Will discontinued OT orders. Pt No further OT needs.

## 2019-10-06 NOTE — PLAN OF CARE
10/06/19 0945   IRWIN Message   Medicare Outpatient and Observation Notification regarding financial responsibility Given to patient/caregiver;Explained to patient/caregiver;Signed/date by patient/caregiver   Date IRWIN was signed 10/06/19   Time IRWIN was signed 0945

## 2019-10-06 NOTE — PT/OT/SLP EVAL
Occupational Therapy   Evaluation and Discharge Note    Name: Faustina Rae  MRN: [id number]  Admitting Diagnosis:  Altered mental status      Recommendations:     Discharge Recommendations: home  Discharge Equipment Recommendations:  none  Barriers to discharge:  None    Assessment:     Faustina Rae is a 54 y.o. female with a medical diagnosis of Altered mental status. At this time, patient is functioning at their prior level of function and does not require further acute OT services.     Plan:     During this hospitalization, patient does not require further acute OT services.  Please re-consult if situation changes.    · Plan of Care Reviewed with: patient    Subjective     Chief Complaint: none  Patient/Family Comments/goals: none    Occupational Profile:  Living Environment: pt. lives in ground floor apartment with tub/shower combo.  Previous level of function: Indep ADLS, ambulation, drives, does IADLS; watches grandchildren age 1,2 4.   Roles and Routines: active  Equipment Used at home:  none  Assistance upon Discharge: esther    Pain/Comfort:  · Pain Rating 1: 0/10  · Pain Rating Post-Intervention 1: 0/10    Patients cultural, spiritual, Moravian conflicts given the current situation: no    Objective:     Communicated with: nurse prior to session.  Patient found HOB elevated with   upon OT entry to room.    General Precautions: Standard, fall   Orthopedic Precautions:N/A   Braces: N/A     Occupational Performance:    Bed Mobility:    · Patient completed Rolling/Turning to Right with independence  · Patient completed Scooting/Bridging with independence  · Patient completed Supine to Sit with independence  · Patient completed Sit to Supine with independence    Functional Mobility/Transfers:  · Patient completed Sit <> Stand Transfer with independence  with  no assistive device   · Patient completed Toilet Transfer Step Transfer technique with independence with  no AD   Functional Mobility:  ambulated independently to bathroom sink and back to bed; no LOB    Activities of Daily Living:  · Feeding:  independence .  · Grooming: independence standing at sink  · Upper Body Dressing: independence .  · Lower Body Dressing: independence socks  · Toileting: independence toilet    Cognitive/Visual Perceptual:  Cognitive/Psychosocial Skills:     -       Oriented to: Person, Place, Time and Situation   -       Follows Commands/attention:Follows multistep  commands  -       Communication: dysarthria  -       Memory: No Deficits noted  -       Safety awareness/insight to disability: intact   -       Mood/Affect/Coping skills/emotional control: Appropriate to situation  Visual/Perceptual:      -Intact .    Physical Exam:  Balance:    -       sitting : good  standing:  good  dynamic:  good -  Postural examination/scapula alignment:    -       No postural abnormalities identified  Sensation:    -       Intact  Motor Planning:    -       intact  Dominant hand:    -       right  Upper Extremity Range of Motion:     -       Right Upper Extremity: WFL  -       Left Upper Extremity: WFL  Upper Extremity Strength:    -       Right Upper Extremity: WFL  -       Left Upper Extremity: WFL   Strength:    -       Right Upper Extremity: WFL  -       Left Upper Extremity: WFL  Fine Motor Coordination:    -       Intact  Gross motor coordination:   WFL  Neurological:    -       normal tone    AMPAC 6 Click ADL:  AMPAC Total Score: 24    Treatment & Education:  Role of oT   DC OT no OT needs identified.  Education:    Patient left seated EOB with all lines intact and call button in reach    GOALS:   Multidisciplinary Problems     Occupational Therapy Goals     Not on file                History:     Past Medical History:   Diagnosis Date    Arthritis     Asthma     CHF (congestive heart failure)     COPD (chronic obstructive pulmonary disease)     Depression     Hyperlipemia     Hypertension     Leaky heart valve      Obese     Rheumatoid arthritis(714.0)     Stroke        Past Surgical History:   Procedure Laterality Date    CAROTID STENT      3 years ago    CHOLECYSTECTOMY      HYSTERECTOMY      SLEEVE GASTROPLASTY  02/21/2017       Time Tracking:     OT Date of Treatment: 10/06/19  OT Start Time: 1049  OT Stop Time: 1105  OT Total Time (min): 16 min    Billable Minutes:Evaluation 16  Total Time 16    Sharon Berg OT  10/6/2019

## 2019-10-06 NOTE — PT/OT/SLP EVAL
"Physical Therapy Evaluation and Discharge Note    Patient Name:  Faustina Rae   MRN:  [id number]    Recommendations:     Discharge Recommendations:  home   Discharge Equipment Recommendations: none   Barriers to discharge: None    Assessment:     Faustina Rae is a 54 y.o. female admitted with a medical diagnosis of Altered mental status. .  At this time, patient is functioning at their prior level of function and does not require further acute PT services.     Recent Surgery: * No surgery found *      Plan:     During this hospitalization, patient does not require further acute PT services.  Please re-consult if situation changes.      Subjective     Chief Complaint: "I feel better"  Patient/Family Comments/goals: to go home  Pain/Comfort:  · Pain Rating 1: 0/10    Patients cultural, spiritual, Worship conflicts given the current situation:      Living Environment:  Pt lives alone and takes care of 3 grandchildren ages 1,2 and 4. No steps to enter house.   Prior to admission, patients level of function was independent.  Equipment used at home: none.  DME owned (not currently used): unknown.  Upon discharge, patient will have assistance from family.    Objective:     Communicated with rn prior to session.  Patient found supine with telemetry upon PT entry to room.    General Precautions: Standard,     Orthopedic Precautions:    Braces:       Exams:  · Cognitive Exam:  Patient is oriented to Person, Place, Time and Situation  · Gross Motor Coordination:  WFL  · RUE Strength: WNL  · LUE Strength: WfL but 4/5  · RLE ROM: WNL  · RLE Strength: WNL  · LLE ROM: WNL  · LLE Strength: WNL    Functional Mobility:  · Bed Mobility:     · Supine to Sit: modified independence  · Sit to Supine: modified independence  · Transfers:     · Sit to Stand:  modified independence with no AD  · Gait: pt able to amb 325 ft no assistive device, mild limp on LLE from prior CVA per pt. No sob, no lob, no dizziness.     AM-PAC 6 CLICK " MOBILITY  Total Score:24       Therapeutic Activities and Exercises:   education, fall prevention, dc planning.     AM-PAC 6 CLICK MOBILITY  Total Score:24     Patient left supine with call button in reach and rn notified.    GOALS:   Multidisciplinary Problems     Physical Therapy Goals     Not on file                History:     Past Medical History:   Diagnosis Date    Arthritis     Asthma     CHF (congestive heart failure)     COPD (chronic obstructive pulmonary disease)     Depression     Hyperlipemia     Hypertension     Leaky heart valve     Obese     Rheumatoid arthritis(714.0)     Stroke        Past Surgical History:   Procedure Laterality Date    CAROTID STENT      3 years ago    CHOLECYSTECTOMY      HYSTERECTOMY      SLEEVE GASTROPLASTY  02/21/2017       Time Tracking:     PT Received On: 10/06/19  PT Start Time: 1010     PT Stop Time: 1021  PT Total Time (min): 11 min     Billable Minutes: Evaluation 11      Isabel Seaman, PT  10/06/2019

## 2019-10-06 NOTE — NURSING
Post d/c contacted by Dr. Baez NP @17:03. Asked to inform pt of the need to take plavix with aspirin for 3 weeks then d/c aspirin. Will call back regarding loading dose of plavix. Called back at 17:22. Told that pt does not need loading dose. Call pt at 17:23. Pt returned call at 17:25. Instructed pt to take 325mg aspirin with 75mg plavix daily for 3 weeks, then can d/c aspirin. Pt verbalized understanding of instructions provided.

## 2019-10-06 NOTE — NURSING
D/c instructions reviewed with pt. Pt verbalized understanding of information provided. Pt transported downstairs via wheelchair to private vehicle without incident.

## 2019-10-06 NOTE — PROGRESS NOTES
MRI of Brain was done. Patient came with AMS, confusion and dizziness. Symptoms began earlier today.

## 2019-10-06 NOTE — ASSESSMENT & PLAN NOTE
Admit to Cardiology B  Consult Neurology  CT of head with old infarct  MRI brain  2-D echo with bubble study  U/S bilateral carotids  Neuro checks  PT/OT/ST consult  Continue meds to include ASA/Statin  Check lipid panel/Hba1c

## 2019-10-07 ENCOUNTER — TELEPHONE (OUTPATIENT)
Dept: FAMILY MEDICINE | Facility: CLINIC | Age: 54
End: 2019-10-07

## 2019-10-07 DIAGNOSIS — I63.89 CEREBROVASCULAR ACCIDENT (CVA) DUE TO OTHER MECHANISM: Primary | ICD-10-CM

## 2019-10-07 NOTE — TELEPHONE ENCOUNTER
----- Message from Dulce Beltrán sent at 10/7/2019  4:03 PM CDT -----  Contact: Zoie  Type: Needs Medical Advice    Who Called:  ERVIN Lino care coordinator/ [employer]  Best Call Back Number: [phone]  Additional Information: patient need orders for home health PT , OT, ST, and walker. [facility]. Diagnosis of CVA. Please advise

## 2019-10-07 NOTE — TELEPHONE ENCOUNTER
----- Message from Sarai Busby sent at 10/7/2019  4:03 PM CDT -----  Contact: Venessa  Type: Needs Medical Advice    Who Called:  gume Clifford  Best Call Back Number: [phone] (daughter) [phone](patient)  Additional Information: daughter stated patient had stroke. Patient already has appt next Tuesday 10/16 but daughter is requesting to either be seen this week and/or put orders for physical and speech therapy until  Sees her on Tuesday. Please call to advise, thank you

## 2019-10-08 ENCOUNTER — TELEPHONE (OUTPATIENT)
Dept: FAMILY MEDICINE | Facility: CLINIC | Age: 54
End: 2019-10-08

## 2019-10-08 NOTE — HOSPITAL COURSE
Patient is a 54-year-old  female with known history of hypertension, prior CVA with no residual deficit, COPD and chronic kidney disease stage 3 presented to the ED with chief complaint of dizziness. MRI brain revealed a small acute lacunar infarct involving the left centrum semiovale.  A old lacunar infarct on the right side was also noted. Other workup including MRA of the head and neck and echocardiogram with bubble study unremarkable.  Patient compliant with home aspirin and atorvastatin.  Advised to start clopidogrel in addition to aspirin for 3 weeks and then switch to clopidogrel 75 mg daily.  Advised to follow up with Neurology.  Seen by PT/OT; no acute therapy needs noted.  Stable to be discharged home.    Instructions provided to follow up with primary care physician as outpatient. Patient verbalized understanding and is aware to contact primary care physician or return to ED if new or worsening symptoms.    Physical exam on the day of discharge:  General: Patient resting comfortably in no acute distress.  Lungs: CTA. Good air entry.  Cor: Regular rate and rhythm. No murmurs. No pedal edema.  Abd: Soft. Nontender. Non-distended.  Neuro: Alert and oriented x3.  Speech is clear and coherent.  Cranial nerves 2-12 are grossly intact. Motor strength is 5/5 in all extremities.  Sensory exam unremarkable.  No dysmetria or dysdiadochokinesia.  Reflexes are 2+ at the level of knee.  No Romberg sign.  Gait unremarkable.  Ext: No clubbing. No cyanosis.

## 2019-10-08 NOTE — DISCHARGE SUMMARY
[facility] Medicine  Discharge Summary      Patient Name: Faustina Rae  MRN: [id number]  Admission Date: 10/5/2019  Hospital Length of Stay: 0 days  Discharge Date and Time: 10/6/2019  4:20 PM  Attending Physician: No att. providers found   Discharging Provider: Marty Vargas MD  Primary Care Provider: Jaxson Gonzales MD      HPI:   Mrs. Rae is a 54 year female with a history of HTN, CVA, CHF, COPD, CKD, and leaky heart valve who presents to the ED with the complaint of dizziness. The patient reports that she had an acute onset of dizziness at about 10am this morning. She states that she felt tired so she laid down for a nap for an unknown amount of time. Her daughter states that she called the patient on the phone and the patient sounded drowsy with slow, slurred speech. 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 She encouraged the patient to come to the ED to be evaluated. In the ED the patient is found to be orthostatic. Lab work shows elevated BUN/Creat 43/1.9. On assessment, the patient is drowsy, but alert and oriented x3.  She denies blurred vision, headache, fever, chills, chest pain, sob, difficulty speaking, difficulty swallowing, focal neuro deficits, nausea, vomiting, diarrhea, numbness, or tingling.      * No surgery found *      Hospital Course:   Patient is a 54-year-old  female with known history of hypertension, prior CVA with no residual deficit, COPD and chronic kidney disease stage 3 presented to the ED with chief complaint of dizziness. MRI brain revealed a small acute lacunar infarct involving the left centrum semiovale.  A old lacunar infarct on the right side was also noted. Other workup including MRA of the head and neck and echocardiogram with bubble study unremarkable.  Patient compliant with home aspirin and atorvastatin.  Advised to start clopidogrel in  addition to aspirin for 3 weeks and then switch to clopidogrel 75 mg daily.  Advised to follow up with Neurology.  Seen by PT/OT; no acute therapy needs noted.  Stable to be discharged home.    Instructions provided to follow up with primary care physician as outpatient. Patient verbalized understanding and is aware to contact primary care physician or return to ED if new or worsening symptoms.    Physical exam on the day of discharge:  General: Patient resting comfortably in no acute distress.  Lungs: CTA. Good air entry.  Cor: Regular rate and rhythm. No murmurs. No pedal edema.  Abd: Soft. Nontender. Non-distended.  Neuro: Alert and oriented x3.  Speech is clear and coherent.  Cranial nerves 2-12 are grossly intact. Motor strength is 5/5 in all extremities.  Sensory exam unremarkable.  No dysmetria or dysdiadochokinesia.  Reflexes are 2+ at the level of knee.  No Romberg sign.  Gait unremarkable.  Ext: No clubbing. No cyanosis.          Consults:     No new Assessment & Plan notes have been filed under this hospital service since the last note was generated.  Service: Hospital Medicine    Final Active Diagnoses:    Diagnosis Date Noted POA    PRINCIPAL PROBLEM:  Acute lacunar infarction [I63.81] 10/06/2019 Yes      Problems Resolved During this Admission:    Diagnosis Date Noted Date Resolved POA    Altered mental status [R41.82] 10/05/2019 10/06/2019 Yes       Discharged Condition: fair    Disposition: Home or Self Care    Follow Up:  Follow-up Information     Jaxson Gonzales MD. Schedule an appointment as soon as possible for a visit in 1 week.    Specialties:  Family Medicine, Internal Medicine  Why:  Stroke follow-up   Contact information:  [address]  [facility] [address]  [phone]             Dannie Baez MD. Schedule an appointment as soon as possible for a visit in 2 weeks.    Specialty:  Neurology  Why:  Stroke follow-up   Contact information:  [address] [facility]  [address]  [phone]                 Patient Instructions:      Diet Cardiac     Notify your health care provider if you experience any of the following:  temperature >100.4     Notify your health care provider if you experience any of the following:  persistent nausea and vomiting or diarrhea     Notify your health care provider if you experience any of the following:  persistent dizziness, light-headedness, or visual disturbances     Activity as tolerated       Significant Diagnostic Studies: Labs: All labs within the past 24 hours have been reviewed    Pending Diagnostic Studies:     None         Medications:  Reconciled Home Medications:      Medication List      START taking these medications    clopidogrel 75 mg tablet  Commonly known as:  PLAVIX  Take 1 tablet (75 mg total) by mouth once daily.        CHANGE how you take these medications    * FLOVENT  mcg/actuation inhaler  Generic drug:  fluticasone propionate  TAKE 1 PUFF BY MOUTH TWICE DAILY  What changed:  when to take this     * fluticasone propionate 50 mcg/actuation nasal spray  Commonly known as:  FLONASE  2 sprays by Each Nare route once daily.  What changed:  Another medication with the same name was changed. Make sure you understand how and when to take each.     ketoconazole 2 % cream  Commonly known as:  NIZORAL  Apply topically once daily.  What changed:  how much to take     metoprolol succinate 25 MG 24 hr tablet  Commonly known as:  TOPROL-XL  Take 2 tablets (50 mg total) by mouth once daily.  What changed:  medication strength         * This list has 2 medication(s) that are the same as other medications prescribed for you. Read the directions carefully, and ask your doctor or other care provider to review them with you.            CONTINUE taking these medications    albuterol 90 mcg/actuation inhaler  Commonly known as:  PROVENTIL/VENTOLIN HFA  Inhale 2 puffs into the lungs 4 (four) times daily.     amLODIPine 10 MG tablet  Commonly  known as:  NORVASC  Take 1 tablet (10 mg total) by mouth once daily.     atorvastatin 40 MG tablet  Commonly known as:  LIPITOR  Take 1 tablet (40 mg total) by mouth once daily.     b complex vitamins tablet  Take 1 tablet by mouth once daily.     calcium citrate-vitamin D3 315-200 mg 315-200 mg-unit per tablet  Commonly known as:  CITRACAL+D  Take 1 tablet by mouth once daily.     cyanocobalamin 1000 MCG tablet  Commonly known as:  VITAMIN B-12  Take 1,000 mcg by mouth once daily.     ferrous sulfate 325 (65 FE) MG EC tablet  Take 325 mg by mouth once daily.     hydrALAZINE 25 MG tablet  Commonly known as:  APRESOLINE  Take 1 tablet (25 mg total) by mouth every 12 (twelve) hours.     isosorbide dinitrate 5 MG Tab  Commonly known as:  ISORDIL  Take 1 tablet (5 mg total) by mouth 3 (three) times daily.     metOLazone 2.5 MG tablet  Commonly known as:  ZAROXOLYN  Take 1 tablet (2.5 mg total) by mouth once daily.     montelukast 10 mg tablet  Commonly known as:  SINGULAIR  Take 1 tablet (10 mg total) by mouth every evening.     multivitamin per tablet  Commonly known as:  THERAGRAN  Take 1 tablet by mouth once daily.     nystatin powder  Commonly known as:  MYCOSTATIN  Apply topically 2 (two) times daily.     pantoprazole 40 MG tablet  Commonly known as:  PROTONIX  Take 1 tablet (40 mg total) by mouth once daily.        STOP taking these medications    aspirin 325 MG EC tablet  Commonly known as:  ECOTRIN     DULoxetine 30 MG capsule  Commonly known as:  CYMBALTA     gentamicin 0.3 % ophthalmic solution  Commonly known as:  GARAMYCIN            Indwelling Lines/Drains at time of discharge:   Lines/Drains/Airways     None                 Time spent on the discharge of patient: 35 minutes  Patient was seen and examined on the date of discharge and determined to be suitable for discharge.         Marty Vargas MD  Department of Hospital Medicine  [facility]

## 2019-10-08 NOTE — TELEPHONE ENCOUNTER
----- Message from Mily Palacio sent at 10/8/2019  8:49 AM CDT -----  Per pt declined Outpatient Therapy need [facility] she states. Please call to set that up.         Thanks!

## 2019-10-08 NOTE — TELEPHONE ENCOUNTER
----- Message from Marlyn Feliz sent at 10/8/2019  3:06 PM CDT -----  Contact: Venessa daughter  Type: Needs Medical Advice    Who Called:  Venessa  Symptoms (please be specific):  Pt needs home health orders  Best Call Back Number: [phone]  Additional Information: Pt is unable to get to therapy she needs them to come to her

## 2019-10-15 ENCOUNTER — OFFICE VISIT (OUTPATIENT)
Dept: FAMILY MEDICINE | Facility: CLINIC | Age: 54
End: 2019-10-15
Payer: MEDICARE

## 2019-10-15 ENCOUNTER — DOCUMENTATION ONLY (OUTPATIENT)
Dept: FAMILY MEDICINE | Facility: CLINIC | Age: 54
End: 2019-10-15

## 2019-10-15 VITALS
HEIGHT: 63 IN | DIASTOLIC BLOOD PRESSURE: 86 MMHG | BODY MASS INDEX: 40.43 KG/M2 | SYSTOLIC BLOOD PRESSURE: 144 MMHG | WEIGHT: 228.19 LBS | HEART RATE: 63 BPM | RESPIRATION RATE: 16 BRPM | OXYGEN SATURATION: 94 % | TEMPERATURE: 98 F

## 2019-10-15 DIAGNOSIS — I15.1 HYPERTENSION SECONDARY TO OTHER RENAL DISORDERS: ICD-10-CM

## 2019-10-15 DIAGNOSIS — G47.00 INSOMNIA, UNSPECIFIED TYPE: ICD-10-CM

## 2019-10-15 DIAGNOSIS — Z12.31 BREAST CANCER SCREENING BY MAMMOGRAM: ICD-10-CM

## 2019-10-15 DIAGNOSIS — R51.0 POSITIONAL HEADACHE: ICD-10-CM

## 2019-10-15 DIAGNOSIS — I63.89 CEREBROVASCULAR ACCIDENT (CVA) DUE TO OTHER MECHANISM: Primary | ICD-10-CM

## 2019-10-15 PROBLEM — I63.9 CEREBROVASCULAR ACCIDENT: Status: ACTIVE | Noted: 2019-04-23

## 2019-10-15 PROCEDURE — 3008F PR BODY MASS INDEX (BMI) DOCUMENTED: ICD-10-PCS | Mod: CPTII,S$GLB,, | Performed by: INTERNAL MEDICINE

## 2019-10-15 PROCEDURE — 99499 UNLISTED E&M SERVICE: CPT | Mod: S$GLB,,, | Performed by: INTERNAL MEDICINE

## 2019-10-15 PROCEDURE — 99499 RISK ADDL DX/OHS AUDIT: ICD-10-PCS | Mod: S$GLB,,, | Performed by: INTERNAL MEDICINE

## 2019-10-15 PROCEDURE — 99214 OFFICE O/P EST MOD 30 MIN: CPT | Mod: S$GLB,,, | Performed by: INTERNAL MEDICINE

## 2019-10-15 PROCEDURE — 3008F BODY MASS INDEX DOCD: CPT | Mod: CPTII,S$GLB,, | Performed by: INTERNAL MEDICINE

## 2019-10-15 PROCEDURE — 3077F PR MOST RECENT SYSTOLIC BLOOD PRESSURE >= 140 MM HG: ICD-10-PCS | Mod: CPTII,S$GLB,, | Performed by: INTERNAL MEDICINE

## 2019-10-15 PROCEDURE — 3077F SYST BP >= 140 MM HG: CPT | Mod: CPTII,S$GLB,, | Performed by: INTERNAL MEDICINE

## 2019-10-15 PROCEDURE — 99214 PR OFFICE/OUTPT VISIT, EST, LEVL IV, 30-39 MIN: ICD-10-PCS | Mod: S$GLB,,, | Performed by: INTERNAL MEDICINE

## 2019-10-15 PROCEDURE — 3079F DIAST BP 80-89 MM HG: CPT | Mod: CPTII,S$GLB,, | Performed by: INTERNAL MEDICINE

## 2019-10-15 PROCEDURE — 3079F PR MOST RECENT DIASTOLIC BLOOD PRESSURE 80-89 MM HG: ICD-10-PCS | Mod: CPTII,S$GLB,, | Performed by: INTERNAL MEDICINE

## 2019-10-15 RX ORDER — TRAZODONE HYDROCHLORIDE 50 MG/1
TABLET ORAL
COMMUNITY
End: 2020-01-21 | Stop reason: SDUPTHER

## 2019-10-15 RX ORDER — FUROSEMIDE 20 MG/1
TABLET ORAL
COMMUNITY
End: 2021-06-16

## 2019-10-15 NOTE — PATIENT INSTRUCTIONS
Take a baby aspirin every day for 3 weeks and then stop.  Start Plavix every day and continue indefinitely.    Need to try to move as much as possible.  Use the walker so you do not fall.  Do not stay in bed all day.      Thank you for choosing [facility].     Please fill out the patient experience survey.

## 2019-10-15 NOTE — PROGRESS NOTES
"Subjective:      10:40 AM     Patient ID: Faustina Rae is a 54 y.o. female.    Chief Complaint: Hospital Follow Up (need orders for in home PT and speech therapy,order for walker or cane) and acute lacunar infarction    HPI   Patient was hospitalized on 10/08/2019 for problems with alertness and of the speaking.  She was admitted to the hospital and found to have a lacunar infarct.  She is having trouble with her speech still and also trouble walking.  She has not fallen yet.  The patient it has not been taking her Plavix or her aspirin in did not know she needs to take the aspirin.  She denies any worsening.  She has however getting headaches involving the forehead the last 2 min and are 5/10 severity.  When she gets the she just lays down in bed for the whole day.  She says the headaches are worse when she is sitting up.  The headaches started after the stroke.  She has no headache after the 2 min at all.  She is just afraid that she is going to get more.    Review of Systems      Objective:      Vitals:    10/15/19 1025   BP: (!) 144/86   Pulse: 63   Resp: 16   Temp: 98.3 °F (36.8 °C)   TempSrc: Oral   SpO2: (!) 94%   Weight: 103.5 kg (228 lb 2.8 oz)   Height: 5' 3" (1.6 m)   PainSc: 0-No pain     Physical Exam   Constitutional: She appears well-developed and well-nourished.   Cardiovascular: Normal rate, regular rhythm and normal heart sounds.   Pulmonary/Chest: Effort normal and breath sounds normal.   Abdominal: Soft. There is no tenderness.   Neurological: She is alert.   Psychiatric: She has a normal mood and affect. Her behavior is normal. Thought content normal.   Nursing note and vitals reviewed.        Assessment:       1. Cerebrovascular accident (CVA) due to other mechanism    2. Positional headache    3. Hypertension secondary to other renal disorders    4. Insomnia, unspecified type          Plan:       Cerebrovascular accident (CVA) due to other mechanism  -     Ambulatory referral to Home " Health  -     WALKER FOR HOME USE  -     Ambulatory consult to Neurology    Positional headache    Hypertension secondary to other renal disorders    Insomnia, unspecified type  -     Ambulatory consult to Neurology      Follow up in about 6 weeks (around 11/26/2019).

## 2019-10-15 NOTE — PROGRESS NOTES
Health Maintenance Due   Topic Date Due    TETANUS VACCINE  01/02/1982    Colonoscopy  01/02/2014    Eye Exam  09/30/2017    Pneumococcal Vaccine (Highest Risk) (2 of 3 - PCV13) 12/21/2017    Mammogram  11/10/2018    Urine Microalbumin  11/24/2019

## 2019-10-16 RX ORDER — METOPROLOL SUCCINATE 25 MG/1
TABLET, EXTENDED RELEASE ORAL
Qty: 180 TABLET | Refills: 0 | Status: SHIPPED | OUTPATIENT
Start: 2019-10-16 | End: 2020-03-27 | Stop reason: SDUPTHER

## 2019-10-17 ENCOUNTER — TELEPHONE (OUTPATIENT)
Dept: FAMILY MEDICINE | Facility: CLINIC | Age: 54
End: 2019-10-17

## 2019-10-17 NOTE — TELEPHONE ENCOUNTER
----- Message from Josephine Palacio sent at 10/17/2019  2:54 PM CDT -----  Type: Needs Medical Advice    Who Called:  Patient     Pharmacy name and phone #:   [id number] - [address] & [address] [address]  Phone: [phone] Fax: [phone]      Best Call Back Number: [phone]   Additional Information: pt is checking on her Rx that he was supposed to call in for her thinks its called the Plavix - for her last visit she never got - please contact pt directly and advise

## 2019-10-29 ENCOUNTER — HOSPITAL ENCOUNTER (OUTPATIENT)
Dept: RADIOLOGY | Facility: HOSPITAL | Age: 54
Discharge: HOME | End: 2019-10-29
Attending: INTERNAL MEDICINE
Payer: MEDICARE

## 2019-10-29 DIAGNOSIS — Z12.39 BREAST CANCER SCREENING: ICD-10-CM

## 2019-10-29 PROCEDURE — 77067 MAMMO DIGITAL SCREENING BILAT WITH TOMOSYNTHESIS_CAD: ICD-10-PCS | Mod: 26,,, | Performed by: RADIOLOGY

## 2019-10-29 PROCEDURE — 77067 SCR MAMMO BI INCL CAD: CPT | Mod: 26,,, | Performed by: RADIOLOGY

## 2019-10-29 PROCEDURE — 77063 BREAST TOMOSYNTHESIS BI: CPT | Mod: 26,,, | Performed by: RADIOLOGY

## 2019-10-29 PROCEDURE — 77063 MAMMO DIGITAL SCREENING BILAT WITH TOMOSYNTHESIS_CAD: ICD-10-PCS | Mod: 26,,, | Performed by: RADIOLOGY

## 2019-10-29 PROCEDURE — 77067 SCR MAMMO BI INCL CAD: CPT | Mod: TC

## 2019-10-30 ENCOUNTER — TELEPHONE (OUTPATIENT)
Dept: FAMILY MEDICINE | Facility: CLINIC | Age: 54
End: 2019-10-30

## 2019-10-30 NOTE — TELEPHONE ENCOUNTER
----- Message from Aracelis  sent at 10/30/2019  3:39 PM CDT -----  Contact: Ryan  Type: Needs Medical Advice    Who Called:  Daughter  Best Call Back Number:   Additional Information: Requesting a call back from nurse regarding Home Health has not contacted pt ,please call with advice ASAP so pt can get Home Health call on number provided

## 2019-10-30 NOTE — TELEPHONE ENCOUNTER
Notified michelle that I faxed over the referral to [facility] again and I gave her the phone number to contact them.

## 2019-10-31 ENCOUNTER — PATIENT OUTREACH (OUTPATIENT)
Dept: ADMINISTRATIVE | Facility: HOSPITAL | Age: 54
End: 2019-10-31

## 2019-10-31 ENCOUNTER — TELEPHONE (OUTPATIENT)
Dept: FAMILY MEDICINE | Facility: CLINIC | Age: 54
End: 2019-10-31

## 2019-10-31 DIAGNOSIS — I63.89 CEREBROVASCULAR ACCIDENT (CVA) DUE TO OTHER MECHANISM: Primary | ICD-10-CM

## 2019-10-31 NOTE — LETTER
October 31, 2019    Faustina Rae  [address]  [address]  [place] [address]             [facility]  [address]  [facility] [address]  Phone: [phone] Dear Julia [facility] is committed to your overall health and would like to ensure that you are up to date on all of your health maintenance testing.  Our records indicate that you are overdue for your colorectal cancer screening.  After reviewing your chart, it has been documented that a FIT KIT (colorectal cancer screening kit) was given at a clinic visit or  mailed to you,  but the lab has yet to receive the kit so that we may update your chart.   This is a friendly reminder to complete this test (the instructions will tell you how) and then return your sample in the postage-paid return envelope within 24 hours of collection.  Please remember to put the collection date on your sample!    If your test results are negative, you won't need testing again for another year.  If results show you need more testing, we will call you with next steps.    Sincerely,      Jaxson Gonzales MD and your [facility]

## 2019-10-31 NOTE — TELEPHONE ENCOUNTER
----- Message from Sommer Cerda sent at 10/31/2019  1:46 PM CDT -----  Contact: Aracelis copeland/ [facility]  Type: Needs Medical Advice    Who Called:  Aracelis copeland/ [facility]  Best Call Back Number: Aracelis at , ext [phone]  Additional Information: Calling to inform the Doctor that [facility] isn't in network with the patient's insurance company [employer] and they can't accept the referral. Please advise

## 2019-10-31 NOTE — TELEPHONE ENCOUNTER
Advised patient that the number provided was not in service. She will have her daughter call us back with the [employer] company information.

## 2019-10-31 NOTE — TELEPHONE ENCOUNTER
----- Message from Ancelmo Goldberg sent at 10/31/2019  9:13 AM CDT -----  Type: Needs Medical Advice    Who Called:  Patient    Best Call Back Number: [phone]  Additional Information: Patient states that her Home Health was out of network and would like another order for home health sent to [phone].  Please call to advise.

## 2019-11-05 ENCOUNTER — PATIENT OUTREACH (OUTPATIENT)
Dept: ADMINISTRATIVE | Facility: HOSPITAL | Age: 54
End: 2019-11-05

## 2019-11-05 NOTE — LETTER
November 5, 2019    Faustina Rae  [address]  [address]  [place] [address]     [facility]  [address]  [facility] [address]  Phone: [phone] Faustina Rae  [address]  [address]  [place] [address]    Dear, Faustina Rae,    [facility] is committed to your overall health.  To help you get the most out of each of your visits, we will review your information to make sure you are up to date on all of your recommended tests and/or procedures.  Jaxson Gonzales MD  has found that your chart shows you may be due for the following:    Health Maintenance Due   Topic    TETANUS VACCINE     Colonoscopy     Eye Exam     Pneumococcal Vaccine (Highest Risk) (2 of 3 - PCV13)    Urine Microalbumin      If you have had any of the above done at another facility, please bring the records with you or Fax them to [phone] so that your record at [facility] will be complete. If you have not had any of these tests or procedures done recently and would like to complete this testing ,  please call [phone] or send a message through your [facility] portal to your provider's office.     If you have an upcoming scheduled appointment for the above test and/or procedures, please disregard this letter.    If you are currently taking medication, please bring it with you to your appointment for review.    Thank you for letting us care for you,    Phil Santoyo, Care Coordinator  [facility]  Phone: [phone]  Fax: [phone]

## 2019-11-11 ENCOUNTER — TELEPHONE (OUTPATIENT)
Dept: NEUROLOGY | Facility: CLINIC | Age: 54
End: 2019-11-11

## 2019-11-11 NOTE — TELEPHONE ENCOUNTER
Called and spoke to pt about referral; let her know that we don't have anything available at this time; I let her know that we can put her on the wait list or she could call [facility] ph number provided. Pt wanted me to call her daughter Venessa at [phone], called no answer left [facility].

## 2019-11-12 ENCOUNTER — PATIENT OUTREACH (OUTPATIENT)
Dept: ADMINISTRATIVE | Facility: OTHER | Age: 54
End: 2019-11-12

## 2019-11-15 ENCOUNTER — TELEPHONE (OUTPATIENT)
Dept: FAMILY MEDICINE | Facility: CLINIC | Age: 54
End: 2019-11-15

## 2019-11-15 NOTE — TELEPHONE ENCOUNTER
Returned patient's call and advised her that she will need to further discuss with pcp on Tuesday when she comes in for her appt. Insurance will not cover a nebulizer if she is not seen for symptoms. Patient verbalized understanding.

## 2019-11-15 NOTE — TELEPHONE ENCOUNTER
----- Message from Sarah Chatterjee sent at 11/15/2019  2:06 PM CST -----  Type: Needs Nebulizer     Who Called:  Patient  Best Call Back Number: [phone]  Additional Information: Requesting Nebulizer machine be ordered/please call back to advise.

## 2019-11-19 ENCOUNTER — OFFICE VISIT (OUTPATIENT)
Dept: FAMILY MEDICINE | Facility: CLINIC | Age: 54
End: 2019-11-19
Payer: MEDICARE

## 2019-11-19 VITALS
HEIGHT: 63 IN | BODY MASS INDEX: 41.02 KG/M2 | WEIGHT: 231.5 LBS | DIASTOLIC BLOOD PRESSURE: 88 MMHG | TEMPERATURE: 98 F | SYSTOLIC BLOOD PRESSURE: 138 MMHG

## 2019-11-19 DIAGNOSIS — N18.30 CKD (CHRONIC KIDNEY DISEASE) STAGE 3, GFR 30-59 ML/MIN: ICD-10-CM

## 2019-11-19 DIAGNOSIS — E11.9 CONTROLLED TYPE 2 DIABETES MELLITUS WITHOUT COMPLICATION, WITHOUT LONG-TERM CURRENT USE OF INSULIN: ICD-10-CM

## 2019-11-19 DIAGNOSIS — J45.40 MODERATE PERSISTENT ASTHMA WITHOUT COMPLICATION: Primary | ICD-10-CM

## 2019-11-19 PROCEDURE — 3008F BODY MASS INDEX DOCD: CPT | Mod: CPTII,S$GLB,, | Performed by: INTERNAL MEDICINE

## 2019-11-19 PROCEDURE — 99213 PR OFFICE/OUTPT VISIT, EST, LEVL III, 20-29 MIN: ICD-10-PCS | Mod: S$GLB,,, | Performed by: INTERNAL MEDICINE

## 2019-11-19 PROCEDURE — 3079F DIAST BP 80-89 MM HG: CPT | Mod: CPTII,S$GLB,, | Performed by: INTERNAL MEDICINE

## 2019-11-19 PROCEDURE — 3075F PR MOST RECENT SYSTOLIC BLOOD PRESS GE 130-139MM HG: ICD-10-PCS | Mod: CPTII,S$GLB,, | Performed by: INTERNAL MEDICINE

## 2019-11-19 PROCEDURE — 99213 OFFICE O/P EST LOW 20 MIN: CPT | Mod: S$GLB,,, | Performed by: INTERNAL MEDICINE

## 2019-11-19 PROCEDURE — 3079F PR MOST RECENT DIASTOLIC BLOOD PRESSURE 80-89 MM HG: ICD-10-PCS | Mod: CPTII,S$GLB,, | Performed by: INTERNAL MEDICINE

## 2019-11-19 PROCEDURE — 3075F SYST BP GE 130 - 139MM HG: CPT | Mod: CPTII,S$GLB,, | Performed by: INTERNAL MEDICINE

## 2019-11-19 PROCEDURE — 3008F PR BODY MASS INDEX (BMI) DOCUMENTED: ICD-10-PCS | Mod: CPTII,S$GLB,, | Performed by: INTERNAL MEDICINE

## 2019-11-19 NOTE — PATIENT INSTRUCTIONS
If the nebulizer isn't working double your Lasix to 40 mg.  Daily.      Low-Salt Diet  This diet removes foods that are high in salt. It also limits the amount of salt you use when cooking. It is most often used for people with high blood pressure, edema (fluid retention), and kidney, liver, or heart disease.  Table salt contains the mineral sodium. Your body needs sodium to work normally. But too much sodium can make your health problems worse. Your healthcare provider is recommending a low-salt (also called low-sodium) diet for you. Your total daily allowance of salt is 1,500 to 2,300 milligrams (mg). It is less than 1 teaspoon of table salt. This means you can have only about 500 to 700 mg of sodium at each meal. People with certain health problems should limit salt intake to the lower end of the recommended range.    When you cook, don´t add much salt. If you can cook without using salt, even better. Don´t add salt to your food at the table.  When shopping, read food labels. Salt is often called sodium on the label. Choose foods that are salt-free, low salt, or very low salt. Note that foods with reduced salt may not lower your salt intake enough.    Beans, potatoes, and pasta  Ok: Dry beans, split peas, lentils, potatoes, rice, macaroni, pasta, spaghetti without added salt  Avoid: Potato chips, tortilla chips, and similar products  Breads and cereals  Ok: Low-sodium breads, rolls, cereals, and cakes; low-salt crackers, matzo crackers  Avoid: Salted crackers, pretzels, popcorn, Japanese toast, pancakes, muffins  Dairy  Ok: Milk, chocolate milk, hot chocolate mix, low-salt cheeses, and yogurt  Avoid: Processed cheese and cheese spreads; Roquefort, Camembert, and cottage cheese; buttermilk, instant breakfast drink  Desserts  Ok: Ice cream, frozen yogurt, juice bars, gelatin, cookies and pies, sugar, honey, jelly, hard candy  Avoid: Most pies, cakes and cookies prepared or processed with salt; instant  pudding  Drinks  Ok: Tea, coffee, fizzy (carbonated) drinks, juices  Avoid: Flavored coffees, electrolyte replacement drinks, sports drinks  Meats  Ok: All fresh meat, fish, poultry, low-salt tuna, eggs, egg substitute  Avoid: Smoked, pickled, brine-cured, or salted meats and fish. This includes luis, chipped beef, corned beef, hot dogs, deli meats, ham, kosher meats, salt pork, sausage, canned tuna, salted codfish, smoked salmon, herring, sardines, or anchovies.  Seasonings and spices  Ok: Most seasonings are okay. Good substitutes for salt include: fresh herb blends, hot sauce, lemon, garlic, bazan, vinegar, dry mustard, parsley, cilantro, horseradish, tomato paste, regular margarine, mayonnaise, unsalted butter, cream cheese, vegetable oil, cream, low-salt salad dressing and gravy.  Avoid: Regular ketchup, relishes, pickles, soy sauce, teriyaki sauce, Worcestershire sauce, BBQ sauce, tartar sauce, meat tenderizer, chili sauce, regular gravy, regular salad dressing, salted butter  Soups  Ok: Low-salt soups and broths made with allowed foods  Avoid: Bouillon cubes, soups with smoked or salted meats, regular soup and broth  Vegetables  Ok: Most vegetables are okay; also low-salt tomato and vegetable juices  Avoid: Sauerkraut and other brine-soaked vegetables; pickles and other pickled vegetables; tomato juice, olives  © 7265-5397 [employer]. [address] [place] [address]. All rights reserved. This information is not intended as a substitute for professional medical care. Always follow your healthcare professional's instructions.      Thank you for choosing [facility].     Please fill out the patient experience survey.

## 2019-11-19 NOTE — PROGRESS NOTES
"Subjective:      12:01 PM     Patient ID: Faustina Rae is a 54 y.o. female.    Chief Complaint: Medication Refill (nebulizer and medication ); Cough; and Nasal Congestion    HPI         CHIEF COMPLAINT: Cough(+).  HPI:  Only short of breath at night, mainly orthopnea.  Patient lost her nebulizer which she used in the past and since then has been worse.    ONSET/TIMIN month   ago    DURATION:               Paroxysmal: no .    QUALITY/COURSE:. unchanged    INTENSITY/SEVERITY: Severity is #  8 at night (10 point scale)      The following symptoms/statements are positive if BOLD, negative otherwise.      CONTEXT/WHEN:  Tobacco_use. Smokers_in_home. Seasonal_pattern. Allergies/Hayfever. Sinusitis. Irritant_Exposure(smoke/dust/fumes). Exposure_to_others_with_similar_symptoms.        Similar_problems_in_past.   PAST TREATMENT OR EVALUATION:   previous PPD. Recent_previous_chest_x-ray. Recent_antibiotics.  Associated Symptoms:     sputum production: scant. copious. Hemoptysis.  Medical History: Past_pulmonary_infections.  Cardiovascular_disease/ congestive heart failure.chronic_lung_disease.  tuberculosis. Asthma. AIDS. Gastroesophageal_reflux_disease .      Review of Systems      Objective:      Vitals:    19 1147   BP: 138/88   Temp: 98.1 °F (36.7 °C)   TempSrc: Oral   Weight: 105 kg (231 lb 7.7 oz)   Height: 5' 3" (1.6 m)   PainSc: 0-No pain     Physical Exam   Constitutional: She appears well-developed and well-nourished.   Cardiovascular: Normal rate, regular rhythm and normal heart sounds.   Pulmonary/Chest: Effort normal and breath sounds normal.   Abdominal: Soft. There is no tenderness.   Musculoskeletal: She exhibits no edema.   Neurological: She is alert.   Psychiatric: She has a normal mood and affect. Her behavior is normal. Thought content normal.   Nursing note and vitals reviewed.        Assessment:       1. Moderate persistent asthma without complication          Plan:       Moderate " persistent asthma without complication  -     NEBULIZER FOR HOME USE      No follow-ups on file.

## 2019-11-25 ENCOUNTER — PATIENT OUTREACH (OUTPATIENT)
Dept: ADMINISTRATIVE | Facility: OTHER | Age: 54
End: 2019-11-25

## 2019-11-29 DIAGNOSIS — N18.9 CHRONIC KIDNEY DISEASE, UNSPECIFIED CKD STAGE: ICD-10-CM

## 2019-12-16 ENCOUNTER — PATIENT OUTREACH (OUTPATIENT)
Dept: ADMINISTRATIVE | Facility: OTHER | Age: 54
End: 2019-12-16

## 2020-01-21 ENCOUNTER — OFFICE VISIT (OUTPATIENT)
Dept: FAMILY MEDICINE | Facility: CLINIC | Age: 55
End: 2020-01-21
Payer: MEDICARE

## 2020-01-21 VITALS
OXYGEN SATURATION: 98 % | DIASTOLIC BLOOD PRESSURE: 68 MMHG | WEIGHT: 233.94 LBS | HEIGHT: 63 IN | HEART RATE: 81 BPM | SYSTOLIC BLOOD PRESSURE: 138 MMHG | RESPIRATION RATE: 18 BRPM | TEMPERATURE: 98 F | BODY MASS INDEX: 41.45 KG/M2

## 2020-01-21 DIAGNOSIS — B35.4 TINEA CORPORIS: ICD-10-CM

## 2020-01-21 DIAGNOSIS — E66.01 MORBID OBESITY: ICD-10-CM

## 2020-01-21 DIAGNOSIS — K29.70 GASTRITIS, PRESENCE OF BLEEDING UNSPECIFIED, UNSPECIFIED CHRONICITY, UNSPECIFIED GASTRITIS TYPE: ICD-10-CM

## 2020-01-21 DIAGNOSIS — J44.89 COPD WITH ASTHMA: Primary | ICD-10-CM

## 2020-01-21 DIAGNOSIS — E11.69 HYPERLIPIDEMIA ASSOCIATED WITH TYPE 2 DIABETES MELLITUS: ICD-10-CM

## 2020-01-21 DIAGNOSIS — Z12.11 COLON CANCER SCREENING: ICD-10-CM

## 2020-01-21 DIAGNOSIS — R10.9 ABDOMINAL PAIN, UNSPECIFIED ABDOMINAL LOCATION: ICD-10-CM

## 2020-01-21 DIAGNOSIS — I50.9 CONGESTIVE HEART FAILURE, UNSPECIFIED HF CHRONICITY, UNSPECIFIED HEART FAILURE TYPE: ICD-10-CM

## 2020-01-21 DIAGNOSIS — E78.5 HYPERLIPIDEMIA ASSOCIATED WITH TYPE 2 DIABETES MELLITUS: ICD-10-CM

## 2020-01-21 DIAGNOSIS — G47.00 INSOMNIA, UNSPECIFIED TYPE: ICD-10-CM

## 2020-01-21 DIAGNOSIS — N18.30 CKD (CHRONIC KIDNEY DISEASE) STAGE 3, GFR 30-59 ML/MIN: ICD-10-CM

## 2020-01-21 DIAGNOSIS — I69.321 DYSPHASIA AS LATE EFFECT OF CEREBROVASCULAR ACCIDENT (CVA): ICD-10-CM

## 2020-01-21 DIAGNOSIS — J30.1 ACUTE SEASONAL ALLERGIC RHINITIS DUE TO POLLEN: ICD-10-CM

## 2020-01-21 DIAGNOSIS — I20.89 ANGINA AT REST: ICD-10-CM

## 2020-01-21 PROBLEM — I63.9 CEREBROVASCULAR ACCIDENT: Status: RESOLVED | Noted: 2019-04-23 | Resolved: 2020-01-21

## 2020-01-21 PROCEDURE — 99214 PR OFFICE/OUTPT VISIT, EST, LEVL IV, 30-39 MIN: ICD-10-PCS | Mod: S$GLB,,, | Performed by: NURSE PRACTITIONER

## 2020-01-21 PROCEDURE — 99214 OFFICE O/P EST MOD 30 MIN: CPT | Mod: S$GLB,,, | Performed by: NURSE PRACTITIONER

## 2020-01-21 RX ORDER — ALBUTEROL SULFATE 90 UG/1
2 AEROSOL, METERED RESPIRATORY (INHALATION) 4 TIMES DAILY
Qty: 1 INHALER | Refills: 5 | Status: SHIPPED | OUTPATIENT
Start: 2020-01-21 | End: 2020-08-10

## 2020-01-21 RX ORDER — FLUTICASONE PROPIONATE 50 MCG
2 SPRAY, SUSPENSION (ML) NASAL DAILY
Qty: 16 G | Refills: 11 | Status: SHIPPED | OUTPATIENT
Start: 2020-01-21 | End: 2022-03-24 | Stop reason: SDUPTHER

## 2020-01-21 RX ORDER — NYSTATIN 100000 [USP'U]/G
POWDER TOPICAL 2 TIMES DAILY
Qty: 60 G | Refills: 2 | Status: SHIPPED | OUTPATIENT
Start: 2020-01-21 | End: 2022-03-24 | Stop reason: SDUPTHER

## 2020-01-21 RX ORDER — MONTELUKAST SODIUM 10 MG/1
10 TABLET ORAL NIGHTLY
Qty: 90 TABLET | Refills: 3 | Status: SHIPPED | OUTPATIENT
Start: 2020-01-21 | End: 2023-11-22 | Stop reason: SDUPTHER

## 2020-01-21 RX ORDER — PANTOPRAZOLE SODIUM 40 MG/1
40 TABLET, DELAYED RELEASE ORAL DAILY
Qty: 30 TABLET | Refills: 0 | Status: SHIPPED | OUTPATIENT
Start: 2020-01-21 | End: 2020-03-16

## 2020-01-21 RX ORDER — ATORVASTATIN CALCIUM 40 MG/1
40 TABLET, FILM COATED ORAL NIGHTLY
Qty: 30 TABLET | Refills: 11
Start: 2020-01-21 | End: 2020-04-08

## 2020-01-21 RX ORDER — FLUTICASONE PROPIONATE 110 UG/1
1 AEROSOL, METERED RESPIRATORY (INHALATION) 2 TIMES DAILY
Qty: 12 G | Refills: 11 | Status: SHIPPED | OUTPATIENT
Start: 2020-01-21 | End: 2020-03-27 | Stop reason: SDUPTHER

## 2020-01-21 RX ORDER — TRAZODONE HYDROCHLORIDE 50 MG/1
50 TABLET ORAL NIGHTLY PRN
Qty: 90 TABLET | Refills: 1 | Status: SHIPPED | OUTPATIENT
Start: 2020-01-21 | End: 2020-06-15

## 2020-01-21 NOTE — PATIENT INSTRUCTIONS
Get co Q-10 100 mg. Over the counter and take one daily, if leg cramps persist after a week increase to 2 caps a day.    Get labs done as ordered 11/19/19.  Use flovent twice a day, everyday and rinse your mouth with water after use, spit the water out.

## 2020-01-22 ENCOUNTER — DOCUMENTATION ONLY (OUTPATIENT)
Dept: FAMILY MEDICINE | Facility: CLINIC | Age: 55
End: 2020-01-22

## 2020-01-27 ENCOUNTER — PATIENT OUTREACH (OUTPATIENT)
Dept: ADMINISTRATIVE | Facility: OTHER | Age: 55
End: 2020-01-27

## 2020-01-29 ENCOUNTER — OFFICE VISIT (OUTPATIENT)
Dept: PLASTIC SURGERY | Facility: CLINIC | Age: 55
End: 2020-01-29
Payer: MEDICARE

## 2020-01-29 VITALS
DIASTOLIC BLOOD PRESSURE: 87 MMHG | SYSTOLIC BLOOD PRESSURE: 147 MMHG | HEIGHT: 63 IN | WEIGHT: 229.94 LBS | HEART RATE: 88 BPM | BODY MASS INDEX: 40.74 KG/M2

## 2020-01-29 DIAGNOSIS — G89.29 CHRONIC UPPER BACK PAIN: ICD-10-CM

## 2020-01-29 DIAGNOSIS — M54.9 CHRONIC UPPER BACK PAIN: ICD-10-CM

## 2020-01-29 DIAGNOSIS — G89.29 CHRONIC NECK PAIN: ICD-10-CM

## 2020-01-29 DIAGNOSIS — M54.2 CHRONIC NECK PAIN: ICD-10-CM

## 2020-01-29 DIAGNOSIS — N62 MACROMASTIA: Primary | ICD-10-CM

## 2020-01-29 DIAGNOSIS — R21 EXCORIATED RASH: ICD-10-CM

## 2020-01-29 PROCEDURE — 99203 OFFICE O/P NEW LOW 30 MIN: CPT | Mod: S$GLB,,, | Performed by: SURGERY

## 2020-01-29 PROCEDURE — 3008F PR BODY MASS INDEX (BMI) DOCUMENTED: ICD-10-PCS | Mod: CPTII,S$GLB,, | Performed by: SURGERY

## 2020-01-29 PROCEDURE — 3077F PR MOST RECENT SYSTOLIC BLOOD PRESSURE >= 140 MM HG: ICD-10-PCS | Mod: CPTII,S$GLB,, | Performed by: SURGERY

## 2020-01-29 PROCEDURE — 3077F SYST BP >= 140 MM HG: CPT | Mod: CPTII,S$GLB,, | Performed by: SURGERY

## 2020-01-29 PROCEDURE — 99203 PR OFFICE/OUTPT VISIT, NEW, LEVL III, 30-44 MIN: ICD-10-PCS | Mod: S$GLB,,, | Performed by: SURGERY

## 2020-01-29 PROCEDURE — 3079F DIAST BP 80-89 MM HG: CPT | Mod: CPTII,S$GLB,, | Performed by: SURGERY

## 2020-01-29 PROCEDURE — 3008F BODY MASS INDEX DOCD: CPT | Mod: CPTII,S$GLB,, | Performed by: SURGERY

## 2020-01-29 PROCEDURE — 99999 PR PBB SHADOW E&M-EST. PATIENT-LVL III: ICD-10-PCS | Mod: PBBFAC,,, | Performed by: SURGERY

## 2020-01-29 PROCEDURE — 99999 PR PBB SHADOW E&M-EST. PATIENT-LVL III: CPT | Mod: PBBFAC,,, | Performed by: SURGERY

## 2020-01-29 PROCEDURE — 3079F PR MOST RECENT DIASTOLIC BLOOD PRESSURE 80-89 MM HG: ICD-10-PCS | Mod: CPTII,S$GLB,, | Performed by: SURGERY

## 2020-01-29 NOTE — PROGRESS NOTES
History & Physical    SUBJECTIVE:   Chief complaint: large breasts    History of Present Illness:  Patient is a 55 y.o. female presents with symptomatic bilateral large pendulous  breasts. She is 45 DD and often would have to wear 2 bras. States that she has back and neck pain for greater than 4 years. Takes naproxen relieve back and neck pain. Complaints of shoulder grooving from milo straps and rashes under the breast for which she has seen a dermatologist and uses baby powder and cream, and triple oitment abx with minimal relief of symptoms. Also has macerating rashes during summer time. She is active.    Of note her PMH is significant for TIA in 2018 and CVA in 2014 due to hypertension. She reports hypertension is well controlled now.     Past Medical History:   Diagnosis Date    Arthritis     Asthma     CHF (congestive heart failure)     COPD (chronic obstructive pulmonary disease)     Depression     Hyperlipemia     Hypertension     Leaky heart valve     Obese     Rheumatoid arthritis(714.0)     Stroke        Past Surgical History:   Procedure Laterality Date    CAROTID STENT      3 years ago    CHOLECYSTECTOMY      HYSTERECTOMY      SLEEVE GASTROPLASTY  02/21/2017       Family History   Problem Relation Age of Onset    Arthritis Mother     Cancer Mother     Diabetes Mother     Hyperlipidemia Mother     Hypertension Mother     Stroke Mother     Breast cancer Mother     Heart disease Father     Hypertension Father     Asthma Son     Hypertension Sister     Hypertension Sister     Kidney disease Neg Hx        Social History     Socioeconomic History    Marital status:      Spouse name: Not on file    Number of children: Not on file    Years of education: Not on file    Highest education level: Not on file   Occupational History    Not on file   Social Needs    Financial resource strain: Not on file    Food insecurity:     Worry: Not on file     Inability: Not on file     Transportation needs:     Medical: Not on file     Non-medical: Not on file   Tobacco Use    Smoking status: Never Smoker    Smokeless tobacco: Never Used   Substance and Sexual Activity    Alcohol use: No    Drug use: No    Sexual activity: Not on file   Lifestyle    Physical activity:     Days per week: Not on file     Minutes per session: Not on file    Stress: Not on file   Relationships    Social connections:     Talks on phone: Not on file     Gets together: Not on file     Attends Anabaptist service: Not on file     Active member of club or organization: Not on file     Attends meetings of clubs or organizations: Not on file     Relationship status: Not on file   Other Topics Concern    Not on file   Social History Narrative    Not on file       Current Outpatient Medications   Medication Sig Dispense Refill    albuterol (PROVENTIL/VENTOLIN HFA) 90 mcg/actuation inhaler Inhale 2 puffs into the lungs 4 (four) times daily. 1 Inhaler 5    amlodipine (NORVASC) 10 MG tablet Take 1 tablet (10 mg total) by mouth once daily. 30 tablet 11    atorvastatin (LIPITOR) 40 MG tablet Take 1 tablet (40 mg total) by mouth every evening. 30 tablet 11    b complex vitamins tablet Take 1 tablet by mouth once daily.      calcium citrate-vitamin D3 315-200 mg (CITRACAL+D) 315-200 mg-unit per tablet Take 1 tablet by mouth once daily.       clopidogrel (PLAVIX) 75 mg tablet Take 1 tablet (75 mg total) by mouth once daily. 30 tablet 11    cyanocobalamin (VITAMIN B-12) 1000 MCG tablet Take 1,000 mcg by mouth once daily.       ferrous sulfate 325 (65 FE) MG EC tablet Take 325 mg by mouth once daily.       fluticasone propionate (FLONASE) 50 mcg/actuation nasal spray 2 sprays (100 mcg total) by Each Nostril route once daily. 16 g 11    fluticasone propionate (FLOVENT HFA) 110 mcg/actuation inhaler Inhale 1 puff into the lungs 2 (two) times daily. Controller. Rinse mouth with water and spit out after use. 12 g 11  "   furosemide (LASIX) 20 MG tablet furosemide 20 mg tablet      hydrALAZINE (APRESOLINE) 25 MG tablet Take 1 tablet (25 mg total) by mouth every 12 (twelve) hours. 60 tablet 11    isosorbide dinitrate (ISORDIL) 5 MG Tab Take 1 tablet (5 mg total) by mouth 3 (three) times daily. 90 tablet 11    metOLazone (ZAROXOLYN) 2.5 MG tablet Take 1 tablet (2.5 mg total) by mouth once daily. 30 tablet 11    metoprolol succinate (TOPROL-XL) 25 MG 24 hr tablet TAKE 2 TABLET BY MOUTH EVERY DAY. 180 tablet 0    montelukast (SINGULAIR) 10 mg tablet Take 1 tablet (10 mg total) by mouth every evening. 90 tablet 3    multivitamin (THERAGRAN) per tablet Take 1 tablet by mouth once daily.      nystatin (MYCOSTATIN) powder Apply topically 2 (two) times daily. 60 g 2    pantoprazole (PROTONIX) 40 MG tablet Take 1 tablet (40 mg total) by mouth once daily. 30 tablet 0    traZODone (DESYREL) 50 MG tablet Take 1 tablet (50 mg total) by mouth nightly as needed for Insomnia. 90 tablet 1     No current facility-administered medications for this visit.        Review of patient's allergies indicates:  No Known Allergies      Review of Systems:    Review of Systems   HENT: Positive for neck pain.    Musculoskeletal: Positive for back pain.         OBJECTIVE:     BP (!) 147/87   Pulse 88   Ht 5' 3" (1.6 m)   Wt 104.3 kg (229 lb 15 oz)   LMP  (LMP Unknown) Comment: Does not menstruate   BMI 40.73 kg/m²       Physical Exam:    Physical Exam   Constitutional: She is oriented to person, place, and time. She appears well-developed and well-nourished.   Neck: Normal range of motion. Neck supple. No tracheal deviation present.   Cardiovascular: Normal rate, regular rhythm and normal heart sounds.    Pulmonary/Chest: Effort normal and breath sounds normal. bilaterally enlarged breasts, evidence of previous rashes, shoulder grooving, no palpable masses, nipple everted  Abdominal: Soft. Bowel sounds are normal.   Musculoskeletal: Normal range of " motion.   Neurological: She is alert and oriented to person, place, and time.   Skin: Skin is warm.       Last MMG 11/2019 - no abnormalities    ASSESSMENT/PLAN:     1.Symptomatic Bilateral Macromastia  2. Chronic neck pain  3. Chronic back pain  4. Chronic breast rashes    PLAN:Plan    -Will need 1800 gm reduction per side  -Will need medical clearance from PCP and neurologist  -Will submit paper work for insurance approval  -Photos obtained  -Risk, benefits, and alternatives explained.

## 2020-02-06 ENCOUNTER — TELEPHONE (OUTPATIENT)
Dept: NEUROLOGY | Facility: CLINIC | Age: 55
End: 2020-02-06

## 2020-02-06 NOTE — TELEPHONE ENCOUNTER
Called and let pt know that we will have to cancel her appt with Dr Tellez due to medical leave; instructed pt to call PCP and asked to be referred to another neurologist in the area; pt stated that she wanted to be put on the wait list for when Dr Tellez returned around June time frame; pt verbalized understanding.

## 2020-02-20 ENCOUNTER — TELEPHONE (OUTPATIENT)
Dept: FAMILY MEDICINE | Facility: CLINIC | Age: 55
End: 2020-02-20

## 2020-02-20 NOTE — TELEPHONE ENCOUNTER
----- Message from Ramses Jensen sent at 2/20/2020  8:11 AM CST -----  Contact: Ptnt  [phone]  Type: Needs Medical Advice    Who Called:  Ptnt  [phone]    Symptoms (please be specific):  Bad cold congestion started Tuesday.    How long has patient had these symptoms: Since Tuesday.    Pharmacy name and phone #:      [facility] #[id number] [address] & [address] [address]  Phone: [phone] Fax: [phone]    Best Call Back Number:Local    Additional Information:    Advised would like something sent to pharmacy for her symptoms today, please call to confirm when sent.

## 2020-02-21 ENCOUNTER — OFFICE VISIT (OUTPATIENT)
Dept: FAMILY MEDICINE | Facility: CLINIC | Age: 55
End: 2020-02-21
Payer: MEDICARE

## 2020-02-21 ENCOUNTER — DOCUMENTATION ONLY (OUTPATIENT)
Dept: FAMILY MEDICINE | Facility: CLINIC | Age: 55
End: 2020-02-21

## 2020-02-21 VITALS
TEMPERATURE: 98 F | OXYGEN SATURATION: 97 % | RESPIRATION RATE: 16 BRPM | HEART RATE: 85 BPM | SYSTOLIC BLOOD PRESSURE: 142 MMHG | HEIGHT: 63 IN | WEIGHT: 237 LBS | BODY MASS INDEX: 41.99 KG/M2 | DIASTOLIC BLOOD PRESSURE: 100 MMHG

## 2020-02-21 DIAGNOSIS — Z12.11 COLON CANCER SCREENING: ICD-10-CM

## 2020-02-21 DIAGNOSIS — J44.1 CHRONIC OBSTRUCTIVE PULMONARY DISEASE WITH ACUTE EXACERBATION: Primary | ICD-10-CM

## 2020-02-21 DIAGNOSIS — I10 ESSENTIAL HYPERTENSION: ICD-10-CM

## 2020-02-21 DIAGNOSIS — I50.32 CHRONIC DIASTOLIC CHF (CONGESTIVE HEART FAILURE): ICD-10-CM

## 2020-02-21 PROCEDURE — 99214 PR OFFICE/OUTPT VISIT, EST, LEVL IV, 30-39 MIN: ICD-10-PCS | Mod: S$GLB,,, | Performed by: NURSE PRACTITIONER

## 2020-02-21 PROCEDURE — 99214 OFFICE O/P EST MOD 30 MIN: CPT | Mod: S$GLB,,, | Performed by: NURSE PRACTITIONER

## 2020-02-21 RX ORDER — AZITHROMYCIN 250 MG/1
TABLET, FILM COATED ORAL
Qty: 6 TABLET | Refills: 0 | Status: SHIPPED | OUTPATIENT
Start: 2020-02-21 | End: 2020-02-26

## 2020-02-21 RX ORDER — PROMETHAZINE HYDROCHLORIDE AND DEXTROMETHORPHAN HYDROBROMIDE 6.25; 15 MG/5ML; MG/5ML
5 SYRUP ORAL NIGHTLY
Qty: 120 ML | Refills: 0 | Status: SHIPPED | OUTPATIENT
Start: 2020-02-21 | End: 2020-03-02

## 2020-02-21 RX ORDER — PREDNISONE 20 MG/1
20 TABLET ORAL DAILY
Qty: 5 TABLET | Refills: 0 | Status: SHIPPED | OUTPATIENT
Start: 2020-02-21 | End: 2020-03-27

## 2020-02-21 NOTE — PROGRESS NOTES
Health Maintenance Due   Topic Date Due    TETANUS VACCINE  01/02/1982    Colonoscopy  01/02/2014    Pneumococcal Vaccine (Highest Risk) (2 of 3 - PCV13) 12/21/2017    Eye Exam  02/14/2020

## 2020-02-21 NOTE — PROGRESS NOTES
Subjective:       Patient ID: Faustina Rae is a 55 y.o. female.    Chief Complaint: Cough (no refills needed)    Cough   This is a recurrent (Has known COPD and asthma) problem. The current episode started in the past 7 days (started 3 days ago). The problem has been gradually worsening. The problem occurs every few minutes. The cough is productive of sputum. Associated symptoms include chills, a fever, myalgias, nasal congestion and shortness of breath. Pertinent negatives include no headaches. The symptoms are aggravated by lying down (can't lay down or she coughs.). Risk factors: denies any travel or being around anyone who has travelled anywhere.  She has tried a beta-agonist inhaler (using nebulizer twice a day) for the symptoms. The treatment provided mild relief. Her past medical history is significant for asthma and COPD.     Has chronic HTN, worsening, forgot to take her b/p meds today. Has h/o CVA.     Has chronic CHF, has 7 pound weight gain recently.   Review of Systems   Constitutional: Positive for chills and fever.   Respiratory: Positive for cough and shortness of breath.    Musculoskeletal: Positive for myalgias.   Neurological: Negative for headaches.       Objective:      Physical Exam   Constitutional: She is oriented to person, place, and time. She appears well-developed and well-nourished. No distress.   HENT:   Head: Normocephalic and atraumatic.   Right Ear: External ear normal.   Mouth/Throat: Oropharynx is clear and moist. No oropharyngeal exudate.   Right TM pearly grey with light reflex, left canal has cerumen impaction.   Eyes: Conjunctivae are normal. Right eye exhibits no discharge. Left eye exhibits no discharge. No scleral icterus.   Cardiovascular: Normal rate, regular rhythm and normal heart sounds. Exam reveals no gallop and no friction rub.   No murmur heard.  Pulmonary/Chest: Effort normal and breath sounds normal. No stridor. No respiratory distress. She has no wheezes.  She has no rales.   Musculoskeletal: She exhibits no edema.   Neurological: She is alert and oriented to person, place, and time.   Skin: Skin is warm and dry. No rash noted. She is not diaphoretic. No pallor.   Psychiatric: She has a normal mood and affect. Her behavior is normal.   Nursing note and vitals reviewed.      Assessment:       1. Chronic obstructive pulmonary disease with acute exacerbation    2. Colon cancer screening    3. Chronic diastolic CHF (congestive heart failure)    4. Essential hypertension        Plan:       Chronic obstructive pulmonary disease with acute exacerbation  -     predniSONE (DELTASONE) 20 MG tablet; Take 1 tablet (20 mg total) by mouth once daily.  Dispense: 5 tablet; Refill: 0  -     promethazine-dextromethorphan (PROMETHAZINE-DM) 6.25-15 mg/5 mL Syrp; Take 5 mLs by mouth every evening. for 10 days  Dispense: 120 mL; Refill: 0  -     azithromycin (Z-LISA) 250 MG tablet; Take 2 tablets by mouth on day 1; Take 1 tablet by mouth on days 2-5  Dispense: 6 tablet; Refill: 0    Colon cancer screening  -     Case request GI: COLONOSCOPY    Chronic diastolic CHF (congestive heart failure)    Essential hypertension         1 month with labs ordered 11/19/19 prior. 1 week if not better. Increase nebulizer treatments to 4 times a day while coughing.  Weight yourself daily, if you have a 2 pound weight gain in 1 day, or a 4 pound weight gain in 1 week, call us right away.

## 2020-02-21 NOTE — PATIENT INSTRUCTIONS
1 month with labs ordered 11/19/19 prior. 1 week if not better. Increase nebulizer treatments to 4 times a day while coughing.  Weight yourself daily, if you have a 2 pound weight gain in 1 day, or a 4 pound weight gain in 1 week, call us right away.

## 2020-02-22 ENCOUNTER — HOSPITAL ENCOUNTER (EMERGENCY)
Facility: HOSPITAL | Age: 55
Discharge: HOME | End: 2020-02-22
Attending: EMERGENCY MEDICINE
Payer: MEDICARE

## 2020-02-22 VITALS
BODY MASS INDEX: 36.8 KG/M2 | HEIGHT: 62 IN | SYSTOLIC BLOOD PRESSURE: 223 MMHG | WEIGHT: 200 LBS | OXYGEN SATURATION: 98 % | RESPIRATION RATE: 18 BRPM | TEMPERATURE: 99 F | HEART RATE: 77 BPM | DIASTOLIC BLOOD PRESSURE: 111 MMHG

## 2020-02-22 DIAGNOSIS — R05.9 COUGH: ICD-10-CM

## 2020-02-22 LAB
INFLUENZA A, MOLECULAR: NEGATIVE
INFLUENZA B, MOLECULAR: NEGATIVE
SPECIMEN SOURCE: NORMAL

## 2020-02-22 PROCEDURE — 87502 INFLUENZA DNA AMP PROBE: CPT

## 2020-02-22 PROCEDURE — 99284 EMERGENCY DEPT VISIT MOD MDM: CPT | Mod: 25

## 2020-02-22 PROCEDURE — 94640 AIRWAY INHALATION TREATMENT: CPT

## 2020-02-22 PROCEDURE — 25000003 PHARM REV CODE 250: Performed by: EMERGENCY MEDICINE

## 2020-02-22 PROCEDURE — 25000242 PHARM REV CODE 250 ALT 637 W/ HCPCS: Performed by: EMERGENCY MEDICINE

## 2020-02-22 RX ORDER — DOXYCYCLINE 100 MG/1
100 CAPSULE ORAL 2 TIMES DAILY
Qty: 20 CAPSULE | Refills: 0 | Status: SHIPPED | OUTPATIENT
Start: 2020-02-22 | End: 2020-03-03

## 2020-02-22 RX ORDER — BENZONATATE 100 MG/1
200 CAPSULE ORAL 3 TIMES DAILY PRN
Qty: 30 CAPSULE | Refills: 1 | Status: SHIPPED | OUTPATIENT
Start: 2020-02-22 | End: 2020-03-23

## 2020-02-22 RX ORDER — IPRATROPIUM BROMIDE AND ALBUTEROL SULFATE 2.5; .5 MG/3ML; MG/3ML
3 SOLUTION RESPIRATORY (INHALATION)
Status: COMPLETED | OUTPATIENT
Start: 2020-02-22 | End: 2020-02-22

## 2020-02-22 RX ORDER — AMLODIPINE BESYLATE 5 MG/1
10 TABLET ORAL
Status: COMPLETED | OUTPATIENT
Start: 2020-02-22 | End: 2020-02-22

## 2020-02-22 RX ORDER — AMLODIPINE BESYLATE 5 MG/1
5 TABLET ORAL
Status: DISCONTINUED | OUTPATIENT
Start: 2020-02-22 | End: 2020-02-22

## 2020-02-22 RX ORDER — BENZONATATE 100 MG/1
200 CAPSULE ORAL 3 TIMES DAILY PRN
Status: DISCONTINUED | OUTPATIENT
Start: 2020-02-22 | End: 2020-02-22 | Stop reason: HOSPADM

## 2020-02-22 RX ADMIN — IPRATROPIUM BROMIDE AND ALBUTEROL SULFATE 3 ML: .5; 3 SOLUTION RESPIRATORY (INHALATION) at 06:02

## 2020-02-22 RX ADMIN — BENZONATATE 200 MG: 100 CAPSULE ORAL at 06:02

## 2020-02-22 RX ADMIN — AMLODIPINE BESYLATE 10 MG: 5 TABLET ORAL at 06:02

## 2020-02-23 NOTE — ED PROVIDER NOTES
Encounter Date: 2/22/2020       History     Chief Complaint   Patient presents with    Cough     X 4 DAYS, SEEN YESTERDAY AT PCP    Generalized Body Aches     55-year-old female with cough and runny nose and generalized body aches for the past 4 days.  Patient denies fever or chills or nausea vomiting or chest pain or shortness of breath. Patient has inhaler and has been using inhaler which is helping.  Patient was coughing all night so came here.  Patient denies chest pain or shortness of breath or abdominal pain or weakness or numbness.  Denies fever chills. Patient has COPD and has medication at home.  Patient said she missed her afternoon blood pressure medication.        Review of patient's allergies indicates:  No Known Allergies  Past Medical History:   Diagnosis Date    Arthritis     Asthma     CHF (congestive heart failure)     COPD (chronic obstructive pulmonary disease)     Depression     Hyperlipemia     Hypertension     Leaky heart valve     Obese     Obese     Rheumatoid arthritis(714.0)     Stroke      Past Surgical History:   Procedure Laterality Date    CAROTID STENT      3 years ago    CHOLECYSTECTOMY      HYSTERECTOMY      SLEEVE GASTROPLASTY  02/21/2017     Family History   Problem Relation Age of Onset    Arthritis Mother     Cancer Mother     Diabetes Mother     Hyperlipidemia Mother     Hypertension Mother     Stroke Mother     Breast cancer Mother     Heart disease Father     Hypertension Father     Asthma Son     Hypertension Sister     Hypertension Sister     Kidney disease Neg Hx      Social History     Tobacco Use    Smoking status: Never Smoker    Smokeless tobacco: Never Used   Substance Use Topics    Alcohol use: No    Drug use: No     Review of Systems   Constitutional: Negative.    HENT: Positive for congestion, postnasal drip, rhinorrhea and sinus pressure. Negative for ear pain and sore throat.    Eyes: Negative.  Negative for pain and visual  disturbance.   Respiratory: Positive for cough.    Cardiovascular: Negative.  Negative for chest pain.   Gastrointestinal: Negative.  Negative for abdominal pain, constipation, diarrhea and vomiting.   Endocrine: Negative.    Genitourinary: Negative.  Negative for difficulty urinating and dysuria.   Musculoskeletal: Negative.  Negative for myalgias.   Skin: Negative.    Allergic/Immunologic: Negative.    Neurological: Negative.  Negative for headaches.   Hematological: Negative.  Negative for adenopathy.   Psychiatric/Behavioral: Negative.    All other systems reviewed and are negative.      Physical Exam     Initial Vitals [02/22/20 1726]   BP Pulse Resp Temp SpO2   (!) 200/96 70 18 98.7 °F (37.1 °C) 98 %      MAP       --         Physical Exam    Nursing note and vitals reviewed.  Constitutional: She appears well-developed and well-nourished.   HENT:   Head: Normocephalic and atraumatic.   Nose: Nose normal.   Mouth/Throat: Oropharynx is clear and moist.   Eyes: Conjunctivae and EOM are normal. Pupils are equal, round, and reactive to light.   Neck: Normal range of motion. Neck supple. No thyromegaly present. No tracheal deviation present. No JVD present.   Cardiovascular: Normal rate, regular rhythm, normal heart sounds and intact distal pulses.   No murmur heard.  Pulmonary/Chest: Breath sounds normal. No stridor. No respiratory distress. She has no wheezes. She has no rhonchi. She has no rales. She exhibits no tenderness.   Abdominal: Soft. Bowel sounds are normal. She exhibits no distension. There is no tenderness.   Musculoskeletal: Normal range of motion. She exhibits no edema.   Neurological: She is alert and oriented to person, place, and time. She has normal strength. GCS score is 15. GCS eye subscore is 4. GCS verbal subscore is 5. GCS motor subscore is 6.   Skin: Skin is warm and dry. Capillary refill takes less than 2 seconds.   Psychiatric: She has a normal mood and affect. Thought content normal.          ED Course   Procedures  Labs Reviewed   INFLUENZA A AND B ANTIGEN          Imaging Results          X-Ray Chest PA And Lateral (Final result)  Result time 02/22/20 17:52:33    Final result by Mikel Acosta MD (02/22/20 17:52:33)                 Impression:      Normal 2 view chest.      Electronically signed by: Mikel Acosta MD  Date:    02/22/2020  Time:    17:52             Narrative:    EXAMINATION:  XR CHEST PA AND LATERAL    CLINICAL HISTORY:  Cough    COMPARISON:  October 5, 2019    FINDINGS:  PA and lateral views demonstrate no cardiac, pulmonary, or osseous abnormalities.                              X-Rays:   Independently Interpreted Readings:   Other Readings:  Chest x-ray within normal limits    Medical Decision Making:   Differential Diagnosis:   55-year-old female presented emergency department with cough and symptoms of upper respiratory infection and bronchitis.  Patient feeling well and has difficulty sleeping at night secondary to cough.  Chest x-ray unremarkable and patient nontoxic.  Patient missed blood pressure medications of 1 dose of medication given in the emergency department.  As feeling better discharged with instructions and follow-up.  Given patient's multiple medical problems will treat with antibiotics as infection does not seem to be improving fast enough.  Clinical Tests:   Radiological Study: Reviewed                                 Clinical Impression:       ICD-10-CM ICD-9-CM   1. Cough R05 786.2                             Salvador Maravilla MD  02/22/20 1825

## 2020-02-23 NOTE — ED NOTES
PROVIDER AWARE OF ABNORMAL VS. PT OK TO BE DC. PT INSTRUCTED TO MONITOR BP AND F/U WITH PCP. PT VERBALIZED FULL UNDERSTANDING.

## 2020-02-26 ENCOUNTER — TELEPHONE (OUTPATIENT)
Dept: FAMILY MEDICINE | Facility: CLINIC | Age: 55
End: 2020-02-26

## 2020-02-26 NOTE — TELEPHONE ENCOUNTER
----- Message from Lizzette Jackson sent at 2/26/2020  7:30 AM CST -----  Contact: self  Type:  RX Refill Request    Who Called:  patient  Refill or New Rx:  Refill  RX Name and Strength:  Needs refill for her gout medicine didn't know the name   How is the patient currently taking it? (ex. 1XDay):  As directed  Is this a 30 day or 90 day RX:  30  Preferred Pharmacy with phone number:    [facility] #[id number] [address] & [address] [address]  Phone: [phone] Fax: [phone]  Local or Mail Order:  local  Ordering Provider:  Dr Christian Davis Call Back Number:  [phone] (home)   Additional Information:  She also wanted an appt but nothing available until Friday, she can hardly walk and hurting real bad.  Thank you

## 2020-02-28 DIAGNOSIS — E11.9 TYPE 2 DIABETES MELLITUS WITHOUT COMPLICATION, UNSPECIFIED WHETHER LONG TERM INSULIN USE: ICD-10-CM

## 2020-02-28 DIAGNOSIS — N18.9 CHRONIC KIDNEY DISEASE, UNSPECIFIED CKD STAGE: ICD-10-CM

## 2020-03-06 ENCOUNTER — TELEPHONE (OUTPATIENT)
Dept: PLASTIC SURGERY | Facility: CLINIC | Age: 55
End: 2020-03-06

## 2020-03-06 NOTE — TELEPHONE ENCOUNTER
I called pt to re schedule her medical photography appointment pt no showed for.  Pt answered the phone but when I tried to verify that she was the correct person, patient got silent.  Pt did not say anything as I continued to repeat her name to see if she was still there. Call was disconnected.

## 2020-03-13 ENCOUNTER — PATIENT OUTREACH (OUTPATIENT)
Dept: ADMINISTRATIVE | Facility: HOSPITAL | Age: 55
End: 2020-03-13

## 2020-03-13 NOTE — PROGRESS NOTES
Chart review completed 03/13/2020.  Care Everywhere updates requested and reviewed.  Immunizations reconciled. Media reviewed.     Letter mailed.  Portal Message Sent.

## 2020-03-13 NOTE — LETTER
March 23, 2020    Faustina Rae  [address]  [address]  [place] [address]             [facility]  [address]  [facility] [address]  Phone: [phone] Faustina Rae   [address]   [address]   [place] [address]     Dear, Faustina Rae,     [facility] is committed to your overall health.  To help you get the most out of each of your visits, we will review your information to make sure you are up to date on all of your recommended tests and/or procedures.  Jaxson Gonzales MD  has found that your chart shows you may be due for the following:     TETANUS VACCINE   Colonoscopy   Pneumococcal Vaccine   Eye Exam     If you have had any of the above done at another facility, please bring the records with you or Fax them to [phone] so that your record at [facility] will be complete. If you have not had any of these tests or procedures done recently and would like to complete this testing ,  please call [phone] or send a message through your [facility] portal to your provider's office.     If you have an upcoming scheduled appointment for the above test and/or procedures, please disregard this letter.     If you are currently taking medication, please bring it with you to your appointment for review.     Thank you for letting us care for you,     Phil Santoyo, Care Coordinator   [facility]   Phone: [phone]   Fax: [phone]

## 2020-03-16 DIAGNOSIS — R10.9 ABDOMINAL PAIN, UNSPECIFIED ABDOMINAL LOCATION: ICD-10-CM

## 2020-03-16 DIAGNOSIS — K29.70 GASTRITIS, PRESENCE OF BLEEDING UNSPECIFIED, UNSPECIFIED CHRONICITY, UNSPECIFIED GASTRITIS TYPE: ICD-10-CM

## 2020-03-16 RX ORDER — PANTOPRAZOLE SODIUM 40 MG/1
TABLET, DELAYED RELEASE ORAL
Qty: 30 TABLET | Refills: 2 | Status: SHIPPED | OUTPATIENT
Start: 2020-03-16 | End: 2020-06-15

## 2020-03-26 NOTE — PATIENT INSTRUCTIONS
If you are asked to answer a survey from [facility], please do so and let them know how I did at your visit today.     Weigh yourself every day, if your weight goes up 2 pounds in 24 hours (1 day) or 4 pounds in 1 week, let us know immediately.  Use flonase 1 spray each nostril twice a day and cut back on using albuterol to 1 or 2 times a day, if cough improves, continue flonase and stop albuterol.     The Corona virus is not very hardy, soap and water will kill it. Please wash your hands with soap and water frequently. The virus can survive on hard surfaces like plastic and metal for about 3 days and on cardboard for about 24 hours. It can stay in the air for about 3 hours after someone coughs or sneezes. Wash hard surfaces that are frequently touched with soap and water. Avoid other people. Stay home as much as you can. If you have to go out, Stay 6 feet away from other people. Avoid crowds, do not go to gatherings of people.

## 2020-03-26 NOTE — PROGRESS NOTES
Subjective:       Patient ID: Faustina Rae is a 55 y.o. female.    Chief Complaint: COPD; Congestive Heart Failure; and Gout  The patient location is: [place]  The chief complaint leading to consultation is: congestive heart   Visit type: Virtual visit with synchronous audio and video  Total time spent with patient: 30  Each patient to whom he or she provides medical services by telemedicine is:  (1) informed of the relationship between the physician and patient and the respective role of any other health care provider with respect to management of the patient; and (2) notified that he or she may decline to receive medical services by telemedicine and may withdraw from such care at any time.    Notes:   Patient has chronic copd, had exacerbation, but has improved. She still has occassional cough and is using nebulized albuterol 3 times a day. Cough is dry, she denies fever.  Saw plastic surgeon about having breast reduction. He is agreeable, but will need clearance. Not likely it will be done any time soon due to Covid 19 pandemic and she understands this. Pt. Is ok if it does not get done until next year.   Has labs ordered since November of 2019, still has not gotten them done. Has transportation difficulties.   Has chronic CHF, is not weighing herself daily because her scale is not working properly.   Had some pain in her feet last week and believes it was gout. It lasted for a day or two, she did not treat it and the pain improved. States she has had foot pain that lasted a week in the past.  Was treated about 3 years ago at the ER for gout, has never been seen for it in outpatient clinic. Does not take allopurinol, has not had a uric acid test done. Wants gout treatment now.   HPI  Review of Systems    Objective:    All medications reviewed with patient  Physical Exam  There is no physical exam as this was a virtual visit.   Patient was able to point camera at her foot which did not appear to be  erythematous or swollen.   Assessment:       1. Congestive heart failure, unspecified HF chronicity, unspecified heart failure type    2. COPD with asthma    3. Angina at rest    4. Gout, unspecified cause, unspecified chronicity, unspecified site    5. Allergic rhinitis, unspecified seasonality, unspecified trigger        Plan:     Congestive heart failure, unspecified HF chronicity, unspecified heart failure type  -     metoprolol succinate (TOPROL-XL) 25 MG 24 hr tablet; Take 2 tablets (50 mg total) by mouth once daily.  Dispense: 60 tablet; Refill: 11    COPD with asthma  -     fluticasone propionate (FLOVENT HFA) 110 mcg/actuation inhaler; Inhale 1 puff into the lungs 2 (two) times daily. Controller. Rinse mouth with water and spit out after use.  Dispense: 12 g; Refill: 11    Angina at rest  -     isosorbide dinitrate (ISORDIL) 5 MG Tab; Take 1 tablet (5 mg total) by mouth 3 (three) times daily.  Dispense: 90 tablet; Refill: 11  -     metoprolol succinate (TOPROL-XL) 25 MG 24 hr tablet; Take 2 tablets (50 mg total) by mouth once daily.  Dispense: 60 tablet; Refill: 11    Gout, unspecified cause, unspecified chronicity, unspecified site  -     Uric acid; Future; Expected date: 03/27/2020    Allergic rhinitis, unspecified seasonality, unspecified trigger      Weigh yourself every day, if your weight goes up 2 pounds in 24 hours (1 day) or 4 pounds in 1 week, let us know immediately.  Use flonase 1 spray each nostril twice a day and cut back on using albuterol to 1 or 2 times a day, if cough improves, continue flonase and stop albuterol. Discussed gout and that if her uric acid level is high, we will prescribe treatment, but does not appear to be in gout crisis currently and should not be taking anything until we have a positive uric acid level or she has an attack. The patient verbalized understanding and is agreeable.        The Corona virus is not very hardy, soap and water will kill it. Please wash your hands  with soap and water frequently. The virus can survive on hard surfaces like plastic and metal for about 3 days and on cardboard for about 24 hours. It can stay in the air for about 3 hours after someone coughs or sneezes. Wash hard surfaces that are frequently touched with soap and water. Avoid other people. Stay home as much as you can. If you have to go out, Stay 6 feet away from other people. Avoid crowds, do not go to gatherings of people.

## 2020-03-27 ENCOUNTER — OFFICE VISIT (OUTPATIENT)
Dept: FAMILY MEDICINE | Facility: CLINIC | Age: 55
End: 2020-03-27
Payer: MEDICARE

## 2020-03-27 DIAGNOSIS — M10.9 GOUT, UNSPECIFIED CAUSE, UNSPECIFIED CHRONICITY, UNSPECIFIED SITE: ICD-10-CM

## 2020-03-27 DIAGNOSIS — J30.9 ALLERGIC RHINITIS, UNSPECIFIED SEASONALITY, UNSPECIFIED TRIGGER: ICD-10-CM

## 2020-03-27 DIAGNOSIS — I20.89 ANGINA AT REST: ICD-10-CM

## 2020-03-27 DIAGNOSIS — J44.89 COPD WITH ASTHMA: ICD-10-CM

## 2020-03-27 DIAGNOSIS — I50.9 CONGESTIVE HEART FAILURE, UNSPECIFIED HF CHRONICITY, UNSPECIFIED HEART FAILURE TYPE: Primary | ICD-10-CM

## 2020-03-27 PROCEDURE — 99214 PR OFFICE/OUTPT VISIT, EST, LEVL IV, 30-39 MIN: ICD-10-PCS | Mod: 95,,, | Performed by: NURSE PRACTITIONER

## 2020-03-27 PROCEDURE — 99214 OFFICE O/P EST MOD 30 MIN: CPT | Mod: 95,,, | Performed by: NURSE PRACTITIONER

## 2020-03-27 RX ORDER — METOPROLOL SUCCINATE 25 MG/1
50 TABLET, EXTENDED RELEASE ORAL DAILY
Qty: 60 TABLET | Refills: 11 | Status: SHIPPED | OUTPATIENT
Start: 2020-03-27 | End: 2020-08-20 | Stop reason: SDUPTHER

## 2020-03-27 RX ORDER — FLUTICASONE PROPIONATE 110 UG/1
1 AEROSOL, METERED RESPIRATORY (INHALATION) 2 TIMES DAILY
Qty: 12 G | Refills: 11 | Status: SHIPPED | OUTPATIENT
Start: 2020-03-27 | End: 2023-03-06 | Stop reason: SDUPTHER

## 2020-03-27 RX ORDER — ISOSORBIDE DINITRATE 5 MG/1
5 TABLET ORAL 3 TIMES DAILY
Qty: 90 TABLET | Refills: 11 | Status: SHIPPED | OUTPATIENT
Start: 2020-03-27 | End: 2020-08-20 | Stop reason: SDUPTHER

## 2020-04-07 ENCOUNTER — PATIENT OUTREACH (OUTPATIENT)
Dept: ADMINISTRATIVE | Facility: OTHER | Age: 55
End: 2020-04-07

## 2020-04-07 NOTE — PROGRESS NOTES
"NEUROLOGY  Outpatient CONSULT  *Telemedicine*  [facility]  [address], [place] [address]  [phone] (office) / [phone] (fax)    Patient Name:  Faustina Rae  :  1965  MR #:  [id number]  Acct #:  [id number]    Date of Neurology Consult: 2020    Name of Provider: Yessenia Rajan, [facility]-    Other Physicians:  Jaxson Gonzales MD (Primary Care Physician); No ref. provider found (Referring)      Chief Complaint: Stroke and Establish Care      History of Present Illness (HPI):  Faustina Rae is a very pleasant 55 y.o. R-handed female who was referred by her PCP to establish neurological care given her history of multiple CVAs.     She states that she suffered her first CVA about 3-4 years ago. She said that she was having trouble breathing due to her asthma and told her daughter that she needed to go to the hospital. She states that her daughter noted that her speech was "weird." She was in the hospital for over a month and was in rehab for 2 months. She also had some weakness of the L side and walked with a limp. She still has some speech difficulty and LLE weakness per her report, but got better after her stay in rehab. She was most recently at [facility] for new onset dizziness and states that she saw Dr. Baez and was told that she had a TIA. At one point, she was taking ASA for stroke prevention, but was told to stop taking it by one of her doctors. She then had another stroke and started taking it again. She is now taking just Plavix per Dr. Baez. She has reportedly been on a statin for quite some time and denies any intolerance or ASE. She had gastric surgery about 3 years ago and has lost a considerable amount of weight, but still has more to loose. She lives alone, but has a daughter and a sister nearby. She does not have any issues with ADLs. She denies any vision trouble, but is due to see her eye doctor again. She endorses transportation issues. She does " get overwhelmed easily, which makes her anxious. Loud noises, like trips to the grocery store, often trigger her. She has not had any falls. She still struggles with her speech and states that she gets flustered when she can't get her words out. She states that she used to wear a CPAP, but no longer has her machine. She states that her BP used to be very high, ranging in the 200s/100s. Now, she states that it typically runs 130s/60-70s. She is aware that she is overdue for repeat lab work and has orders from her PCP, but wants to wait until current COVID19 situation improves to have these done. She does not have any acute concerns today.    Extensive review of the EMR reveals hospitalization at [facility] with subsequent transfer to [facility] in August 2013 after a large R MCA infarct. Thereafter, she was evaluated in March 2018 by Dr. Toribio at [facility] for acute onset dizziness in the setting of HTN. It is not clear if she had another infarct at that time, as she deferred repeat MRI. CTA H&N was done and did r/o any LVO and aneurysm. Most recently (October 2019), she was admitted to [facility] and was evaluated by Dr. Baez for acute onset dizziness, encephalopathy and speech changes. Imaging revealed a new lacunar infarct of the left centrum semiovale. She was discharged on DAPT for 3 weeks and continued on Lipitor 40mg.    Treatment to date:    ASA, Lipitor, gastric bypass surgery    Review of Systems:   General: Weight gain: No, Weight Loss: No, Fatigue: No,   Fever: No, Chills: No, Night Sweats: No, Insomnia: Yes, Excessive sleeping: No   Respiratory:  Cough: Yes, Shortness of Breath: No,   Wheezing: No, Excessive Snoring: No, Coughing up blood: No  Endocrine: Heat Intolerance: No, Cold Intolerance: No,   Excessive Thirst: No, Excessive Hunger: No,   Eyes:  Blurred Vision: No, Double Vision: No,   Light Sensitivity: No, Eye pain: No  Musculoskeletal: Muscle Aches/Pain: No, Joint Pain/Swelling: No, Muscle Cramps: No, Muscle Weakness:  No, Neck Pain: No, Back Pain: No   Neurological: Difficulty Walking/Falls: No, Headache Migraine: Yes, Dizziness/Vertigo: No, Fainting: No, Difficulty with Speech: Yes, Weakness: No, Tingling/Numbness: No, Tremors: No, Memory Problems: No, Seizures: No, Difficulty Swallowing: No, Altered Taste: No.  Cardiovascular: Chest Pain: No, Shortness of Breath: No,   Palpitations: No,  Gastrointestinal: Nausea/Vomiting: No, Constipation: No, Diarrhea: No, Bloody Stools: No   Psych/Cog:  Depression: Yes, Anxiety: No, Hallucinations: No, Problems Concentrating: No  : Frequent Urination: No, Incontinence: No, Blood of Urine: No, Urinary Infections: No, Changes in Sex Drive: No   ENT:Hearing Loss: No, Earache: No, Ringing in Ears: No,   Facial Pain: No, Chronic Congestion: No   Immune: Seasonal Allergies: No, Hives and/or Rashes: No  The remainder of the review of twelve body systems was reviewed and normal.    Past Medical, Surgical, Family & Social History:   Past Medical History:   Diagnosis Date    Arthritis     Asthma     CHF (congestive heart failure)     COPD (chronic obstructive pulmonary disease)     Depression     Hyperlipemia     Hypertension     Leaky heart valve     Obese     Obese     Obstructive sleep apnea     lost her CPAP    Rheumatoid arthritis(714.0)     Stroke      Past Surgical History:   Procedure Laterality Date    CAROTID STENT      3 years ago    CHOLECYSTECTOMY      HYSTERECTOMY      SLEEVE GASTROPLASTY  02/21/2017     Family History   Problem Relation Age of Onset    Arthritis Mother     Cancer Mother     Diabetes Mother     Hyperlipidemia Mother     Hypertension Mother     Stroke Mother     Breast cancer Mother     Heart disease Father     Hypertension Father     Asthma Son     Hypertension Sister     Hypertension Sister     Kidney disease Neg Hx      Alcohol use:  reports that she does not drink alcohol.   (Of note, 0.6 oz = 1 beer or 6 oz = 10 beers).  Tobacco use:   reports that she has never smoked. She has never used smokeless tobacco.  Street drug use:  reports that she does not use drugs.  Allergies: Patient has no known allergies..    Home Medications:     Current Outpatient Medications:     albuterol (PROVENTIL/VENTOLIN HFA) 90 mcg/actuation inhaler, Inhale 2 puffs into the lungs 4 (four) times daily., Disp: 1 Inhaler, Rfl: 5    amlodipine (NORVASC) 10 MG tablet, Take 1 tablet (10 mg total) by mouth once daily., Disp: 30 tablet, Rfl: 11    atorvastatin (LIPITOR) 80 MG tablet, Take 1 tablet (80 mg total) by mouth every evening., Disp: 90 tablet, Rfl: 1    b complex vitamins tablet, Take 1 tablet by mouth once daily., Disp: , Rfl:     calcium citrate-vitamin D3 315-200 mg (CITRACAL+D) 315-200 mg-unit per tablet, Take 1 tablet by mouth once daily. , Disp: , Rfl:     clopidogreL (PLAVIX) 75 mg tablet, Take 1 tablet (75 mg total) by mouth once daily., Disp: 90 tablet, Rfl: 1    cyanocobalamin (VITAMIN B-12) 1000 MCG tablet, Take 1,000 mcg by mouth once daily. , Disp: , Rfl:     ferrous sulfate 325 (65 FE) MG EC tablet, Take 325 mg by mouth once daily. , Disp: , Rfl:     fluticasone propionate (FLONASE) 50 mcg/actuation nasal spray, 2 sprays (100 mcg total) by Each Nostril route once daily., Disp: 16 g, Rfl: 11    fluticasone propionate (FLOVENT HFA) 110 mcg/actuation inhaler, Inhale 1 puff into the lungs 2 (two) times daily. Controller. Rinse mouth with water and spit out after use., Disp: 12 g, Rfl: 11    furosemide (LASIX) 20 MG tablet, furosemide 20 mg tablet, Disp: , Rfl:     hydrALAZINE (APRESOLINE) 25 MG tablet, Take 1 tablet (25 mg total) by mouth every 12 (twelve) hours., Disp: 60 tablet, Rfl: 11    isosorbide dinitrate (ISORDIL) 5 MG Tab, Take 1 tablet (5 mg total) by mouth 3 (three) times daily., Disp: 90 tablet, Rfl: 11    metOLazone (ZAROXOLYN) 2.5 MG tablet, Take 1 tablet (2.5 mg total) by mouth once daily., Disp: 30 tablet, Rfl: 11    metoprolol  succinate (TOPROL-XL) 25 MG 24 hr tablet, Take 2 tablets (50 mg total) by mouth once daily., Disp: 60 tablet, Rfl: 11    montelukast (SINGULAIR) 10 mg tablet, Take 1 tablet (10 mg total) by mouth every evening., Disp: 90 tablet, Rfl: 3    multivitamin (THERAGRAN) per tablet, Take 1 tablet by mouth once daily., Disp: , Rfl:     nystatin (MYCOSTATIN) powder, Apply topically 2 (two) times daily., Disp: 60 g, Rfl: 2    pantoprazole (PROTONIX) 40 MG tablet, TAKE 1 TABLET(40 MG) BY MOUTH EVERY DAY, Disp: 30 tablet, Rfl: 2    traZODone (DESYREL) 50 MG tablet, Take 1 tablet (50 mg total) by mouth nightly as needed for Insomnia., Disp: 90 tablet, Rfl: 1    Physical Examination:  LMP  (LMP Unknown) Comment: Does not menstruate   No VS were taken today - virtual visit     GENERAL:  General appearance: Well, non-toxic appearing.  No apparent distress. Obese.     MENTAL STATUS:  Alert, oriented to person/place/time/president  Speech: mild degree of expressive aphasia, mild dysarthria  Follows complex commands  Normal recall and naming    CRANIAL NERVES:  EOMI, No ptosis, No nystagmus.  V - Facial sensation: normal.  VII - Face symmetry & mobility: normal.  VIII - Hearing: normal.  XI - Shoulder shrug: normal.  XII - Tongue protrusion: normal.    GROSS MOTOR:  Ambulatory with assistive devices  No abnormal movements noted.  Finger-nose normal  No drift    SENSORY:  No deficits reported    Diagnostic Data Reviewed:   Component      Latest Ref Rng & Units 10/6/2019 10/5/2019   WBC      3.90 - 12.70 K/uL 6.58    RBC      4.00 - 5.40 M/uL 4.18    Hemoglobin      12.0 - 16.0 g/dL 12.0    Hematocrit      37.0 - 48.5 % 38.3    MCV      82 - 98 fL 92    MCH      27.0 - 31.0 pg 28.7    MCHC      32.0 - 36.0 g/dL 31.3 (L)    RDW      11.5 - 14.5 % 13.5    Platelets      150 - 350 K/uL 222    MPV      9.2 - 12.9 fL 10.3    Immature Granulocytes      0.0 - 0.5 % 0.3    Gran # (ANC)      1.8 - 7.7 K/uL 4.3    Immature Grans (Abs)       0.00 - 0.04 K/uL 0.02    Lymph #      1.0 - 4.8 K/uL 1.6    Mono #      0.3 - 1.0 K/uL 0.5    Eos #      0.0 - 0.5 K/uL 0.1    Baso #      0.00 - 0.20 K/uL 0.02    nRBC      0 /100 WBC 0    Gran%      38.0 - 73.0 % 65.8    Lymph%      18.0 - 48.0 % 24.9    Mono%      4.0 - 15.0 % 7.6    Eosinophil%      0.0 - 8.0 % 1.1    Basophil%      0.0 - 1.9 % 0.3    Differential Method       Automated    Sodium      136 - 145 mmol/L 137    Potassium      3.5 - 5.1 mmol/L 3.8    Chloride      95 - 110 mmol/L 103    CO2      23 - 29 mmol/L 26    Glucose      70 - 110 mg/dL 100    BUN, Bld      6 - 20 mg/dL 37 (H)    Creatinine      0.5 - 1.4 mg/dL 1.7 (H)    Calcium      8.7 - 10.5 mg/dL 9.1    PROTEIN TOTAL      6.0 - 8.4 g/dL 7.0    Albumin      3.5 - 5.2 g/dL 3.3 (L)    BILIRUBIN TOTAL      0.1 - 1.0 mg/dL 0.7    Alkaline Phosphatase      55 - 135 U/L 49 (L)    AST      10 - 40 U/L 12    ALT      10 - 44 U/L 12    Anion Gap      8 - 16 mmol/L 8    eGFR if African American      >60 mL/min/1.73 m:2 38.6 (A)    eGFR if non African American      >60 mL/min/1.73 m:2 33.5 (A)    Cholesterol      120 - 199 mg/dL 165    Triglycerides      30 - 150 mg/dL 165 (H)    HDL      40 - 75 mg/dL 34 (L)    LDL Cholesterol External      63.0 - 159.0 mg/dL 98.0    Hdl/Cholesterol Ratio      20.0 - 50.0 % 20.6    Total Cholesterol/HDL Ratio      2.0 - 5.0 4.9    Non-HDL Cholesterol      mg/dL 131    Hemoglobin A1C External      4.5 - 6.2 % 5.7    Estimated Avg Glucose      68 - 131 mg/dL 117    TSH      0.340 - 5.600 uIU/mL  1.400           Assessment and Plan:  Faustina Rae is a 55 y.o. R-handed female who has had multiple CVAs in the past.     Problem List Items Addressed This Visit        Neuro    Dysarthria    History of CVA (cerebrovascular accident) - Primary    Overview     Last imaging 10/2019 - MRI, MRA, CUS, TTE  Risk factors: HTN, obesity, HLD, DAVID  Deficits: dysarthria, expressive aphasia, L HP  Secondary prevention: Plavix,  Lipitor         Current Assessment & Plan     Continue risk factor control for secondary prevention:  Last LDL above goal at 98 despite Lipitor 40mg. Will step up therapy to Lipitor 80mg qHS for LDL goal of < 70 given her age  Continue Plavix - refill today  Pt reports BP at goal (ranging 130s/70s) on current medications - f/u with PCP for continued monitoring  Last A1C 5.7 - continue f/u with PCP for monitoring  Pt previously on CPAP for DAVID, but has been off of therapy for several years per report. Will likely need repeat HST/testing since having bariatric surgery to determine if she still has DAVID. If so, certainly would benefit from being on treatment for DAVID given CVA risk associated with untreated disease. Will try to arrange for referral at next visit given current COVID19 pandemic.          Hemiparesis affecting left side as late effect of cerebrovascular accident    Current Assessment & Plan     Mild residual deficit in the LLE per report. No reported recent falls. Ambulatory without any assistive devices at present.             Cardiac/Vascular    Chronic diastolic CHF (congestive heart failure)    Overview     Last TTE 10/2019 with preserved EF  No cardiologist, follows with PCP         Essential hypertension    Current Assessment & Plan     Continue current medications. BP reportedly at goal. Follow up as planned with PCP.          Hyperlipidemia, unspecified    Overview     LDL 98 in 10/2019 despite Lipitor 40mg  As above, step up to Lipitor 80mg today given history and age for goal LDL < 70  Repeat labs as planned when able.             Endocrine    Morbid obesity with BMI of 40.0-44.9, adult    Current Assessment & Plan     S/p gastric surgery, but remains above goal weight  Dietary changes with regards to hyperlipidemia discussed with patient today.          S/P laparoscopic sleeve gastrectomy       Other    DAVID (obstructive sleep apnea) (Chronic)    Current Assessment & Plan     As above, likely  needs to be retested since having gastric surgery and to even requalify for CPAP.   Will refer to sleep medicine at a later date once COVID19 situation improves given that she is certainly at risk for further CVA           Other Visit Diagnoses     Hyperlipidemia associated with type 2 diabetes mellitus              The patient will return to clinic for face to face evaluation in 3 months.      I have discussed this patient's case in detail with Dr. Su, as well.     Important to note, also  has a past medical history of Arthritis, Asthma, CHF (congestive heart failure), COPD (chronic obstructive pulmonary disease), Depression, Hyperlipemia, Hypertension, Leaky heart valve, Obese, Obese, Obstructive sleep apnea, Rheumatoid arthritis(714.0), and Stroke.    The patient location is at home in [place], [place]  The chief complaint leading to consultation is history of multiple CVAs  Visit type: Virtual visit with synchronous audio and video  Total time spent with patient: 32 minutes  Each patient to whom he or she provides medical services by telemedicine is:  (1) informed of the relationship between the physician and patient and the respective role of any other health care provider with respect to management of the patient; and (2) notified that he or she may decline to receive medical services by telemedicine and may withdraw from such care at any time.        Yessenia Rajan, ACN - AG

## 2020-04-08 ENCOUNTER — TELEPHONE (OUTPATIENT)
Dept: NEUROLOGY | Facility: CLINIC | Age: 55
End: 2020-04-08

## 2020-04-08 ENCOUNTER — OFFICE VISIT (OUTPATIENT)
Dept: NEUROLOGY | Facility: CLINIC | Age: 55
End: 2020-04-08
Payer: MEDICARE

## 2020-04-08 DIAGNOSIS — E78.5 HYPERLIPIDEMIA, UNSPECIFIED HYPERLIPIDEMIA TYPE: ICD-10-CM

## 2020-04-08 DIAGNOSIS — I50.32 CHRONIC DIASTOLIC CHF (CONGESTIVE HEART FAILURE): ICD-10-CM

## 2020-04-08 DIAGNOSIS — G47.33 OSA (OBSTRUCTIVE SLEEP APNEA): Chronic | ICD-10-CM

## 2020-04-08 DIAGNOSIS — R47.1 DYSARTHRIA: ICD-10-CM

## 2020-04-08 DIAGNOSIS — I69.354 HEMIPARESIS AFFECTING LEFT SIDE AS LATE EFFECT OF CEREBROVASCULAR ACCIDENT: ICD-10-CM

## 2020-04-08 DIAGNOSIS — E66.01 MORBID OBESITY WITH BMI OF 40.0-44.9, ADULT: ICD-10-CM

## 2020-04-08 DIAGNOSIS — E11.69 HYPERLIPIDEMIA ASSOCIATED WITH TYPE 2 DIABETES MELLITUS: ICD-10-CM

## 2020-04-08 DIAGNOSIS — E78.5 HYPERLIPIDEMIA ASSOCIATED WITH TYPE 2 DIABETES MELLITUS: ICD-10-CM

## 2020-04-08 DIAGNOSIS — Z98.84 S/P LAPAROSCOPIC SLEEVE GASTRECTOMY: ICD-10-CM

## 2020-04-08 DIAGNOSIS — I10 ESSENTIAL HYPERTENSION: ICD-10-CM

## 2020-04-08 DIAGNOSIS — Z86.73 HISTORY OF CVA (CEREBROVASCULAR ACCIDENT): Primary | ICD-10-CM

## 2020-04-08 PROBLEM — R10.9 ABDOMINAL PAIN: Status: RESOLVED | Noted: 2017-10-04 | Resolved: 2020-04-08

## 2020-04-08 PROCEDURE — 3044F HG A1C LEVEL LT 7.0%: CPT | Mod: CPTII,,, | Performed by: NURSE PRACTITIONER

## 2020-04-08 PROCEDURE — 99214 OFFICE O/P EST MOD 30 MIN: CPT | Mod: 95,,, | Performed by: NURSE PRACTITIONER

## 2020-04-08 PROCEDURE — 99214 PR OFFICE/OUTPT VISIT, EST, LEVL IV, 30-39 MIN: ICD-10-PCS | Mod: 95,,, | Performed by: NURSE PRACTITIONER

## 2020-04-08 PROCEDURE — 99499 UNLISTED E&M SERVICE: CPT | Mod: 95,,, | Performed by: NURSE PRACTITIONER

## 2020-04-08 PROCEDURE — 3044F PR MOST RECENT HEMOGLOBIN A1C LEVEL <7.0%: ICD-10-PCS | Mod: CPTII,,, | Performed by: NURSE PRACTITIONER

## 2020-04-08 PROCEDURE — 99499 RISK ADDL DX/OHS AUDIT: ICD-10-PCS | Mod: 95,,, | Performed by: NURSE PRACTITIONER

## 2020-04-08 RX ORDER — ATORVASTATIN CALCIUM 80 MG/1
80 TABLET, FILM COATED ORAL NIGHTLY
Qty: 90 TABLET | Refills: 1
Start: 2020-04-08 | End: 2021-08-18 | Stop reason: SDUPTHER

## 2020-04-08 RX ORDER — CLOPIDOGREL BISULFATE 75 MG/1
75 TABLET ORAL DAILY
Qty: 90 TABLET | Refills: 1 | Status: SHIPPED | OUTPATIENT
Start: 2020-04-08 | End: 2020-08-20 | Stop reason: SDUPTHER

## 2020-04-08 NOTE — ASSESSMENT & PLAN NOTE
S/p gastric surgery, but remains above goal weight  Dietary changes with regards to hyperlipidemia discussed with patient today.

## 2020-04-08 NOTE — ASSESSMENT & PLAN NOTE
Continue risk factor control for secondary prevention:  Last LDL above goal at 98 despite Lipitor 40mg. Will step up therapy to Lipitor 80mg qHS for LDL goal of < 70 given her age  Continue Plavix - refill today  Pt reports BP at goal (ranging 130s/70s) on current medications - f/u with PCP for continued monitoring  Last A1C 5.7 - continue f/u with PCP for monitoring  Pt previously on CPAP for DAVID, but has been off of therapy for several years per report. Will likely need repeat HST/testing since having bariatric surgery to determine if she still has DAVID. If so, certainly would benefit from being on treatment for DAVID given CVA risk associated with untreated disease. Will try to arrange for referral at next visit given current COVID19 pandemic.

## 2020-04-08 NOTE — PATIENT INSTRUCTIONS
For stroke prevention, continue taking Plavix (clopidogrel) daily. A refill for this medication was sent to your pharmacy today. You do NOT need to take additional aspirin.     Continue taking your blood pressure medications as prescribed. We would like to keep your BP < 140/90 mmHg. Follow up with your PCP for management.     Stop taking your current Lipitor (atorvastatin) 40mg. We are increasing the dose to 80mg daily. A prescription was sent to your pharmacy today. Please notify me if you have any problems at this higher dose, such as muscle pain or weakness.     Please have your labs done ASAP as we discussed.     STROKE EDUCATION  1. I have discussed the patient's stroke risk factors and the importance to modify them in order to reduce the risk of future events.   2. Discussed the importance of a healthy diet and daily exercise in order to reduce the risk of future strokes.  3. Reviewed the usual stroke warning signs and symptoms, and the need to activate EMS (call 911) as soon as the symptoms present.   *Sudden onset numbness or weakness of the face, arm, or leg; especially on one side of he body  *Sudden confusion, trouble speaking, or understanding  *Sudden trouble seeing in one or both eyes  *Sudden trouble walking, dizziness, loss of coordination  *Sudden severe headache with no known cause  4. Discussed target goal range for BP <140/90 diabetic patients <130/80  5. Discussed target goal range for Lipids- Total Cholesterol <200, LDL <100 diabetic patients <70  6. Discussed target goal range for HgA1c <7.0  7. Discussed mportance of evaluation for and control of sleep apnea  8. I have given the patient/caregiver written instructions and information regarding stroke.    Recommended Mediterranean dietEating Heart-Healthy Food: Using the DASH Plan  Eating for your heart doesnt have to be hard or boring. You just need to know how to make healthier choices. The DASH eating plan has been developed to help you do  just that. DASH stands for Dietary Approaches to Stop Hypertension. It is a plan that has been proven to be healthier for your heart and to lower your risk for high blood pressure. It can also help lower your risk for cancer, heart disease, osteoporosis, and diabetes.  Choosing from Each Food Group  Choose foods from each of the food groups below each day. Try to get the recommended number of servings for each food group. The serving numbers are based on a diet of 2,000 calories a day. Talk to your doctor if youre unsure about your calorie needs.  Grains   Servings: 7-8 a day  A serving is:  · 1 slice bread  · 1 ounce dry cereal  · half a cup cooked rice or pasta  Best choices: Whole grains and any grains high in fiber.  Vegetables   Servings: 4-5 a day  A serving is:  · 1 cup raw leafy vegetable  · Half a cup cooked vegetable  · Three-quarter cup vegetable juice  Best choices: Fresh or frozen vegetable prepared without too much added salt or fat.    Fruits   Servings: 4-5 a day  A serving is:  · Three-quarter cup fruit juice  · 1 medium fruit  · One-quarter cup dried fruit  · One-half cup fresh, frozen, or canned fruit  Best choices: A variety of fresh fruits of different colors. Whole fruits are a much better choice than fruit juices.  Low-fat or Fat Free Dairy   Servings: 2-3 a day  A serving is:  · 8 ounces milk  · 1 cup yogurt  · One and a half ounces cheese  Best choices: Skim or 1% milk, low-fat or fat free yogurt or buttermilk, and low-fat cheeses.       Meat, Poultry, Fish   Servings: 2 or fewer a day  A serving is:  · 3 ounces cooked meat, poultry, or fish  Best choices: Lean meats and fish. Trim away visible fat. Broil, roast, or boil instead of frying. Remove skin from poultry before eating.  Nuts, Seeds, Beans   Servings: 4-5 a week  A serving is:  · One third cup nuts (or one and a half ounces)  · 2 tablespoons sunflower seeds  · Half a cup cooked beans  Best choices: Dry roasted nuts with no salt  added, lentils, kidney beans, garbanzo beans, and whole church beans.    Fats and Oils   Servings: 2 a day  A serving is:  · 1 teaspoon vegetable oil  · 1 teaspoon soft margarine  · 1 tablespoon low-fat mayonnaise  · 1 teaspoon regular mayonnaise  · 2 tablespoons light salad dressing  · 1 tablespoon regular salad dressing  Best choices: Monounsaturated and polyunsaturated fats such as olive, canola, or safflower oil.  Sweets   Servings: 5 a week or fewer  A serving is:  · 1 tablespoon sugar, maple syrup, or honey  · 1 tablespoon jam or jelly  · 1 half-ounce jelly beans (about 15)  · 8 ounces lemonade  Best choices: Dried fruit can be a satisfying sweet. Choose low-fat sweets when possible. And watch your serving sizes!       FALL PREVENTION   Falls often occur due to slipping, tripping or losing your balance. Here are ways to reduce your risk of falling again.   · Was there anything that caused your fall that can be fixed, removed or replaced?   · Make your home safe by keeping walkways clear of objects you may trip over.   · Use non-slip pads under rugs.   · Do not walk in poorly lit areas.   · Do not stand on chairs or wobbly ladders.   · Use caution when reaching overhead or looking upward. This position can cause a loss of balance.   · Be sure your shoes fit properly, have non-slip bottoms and are in good condition.   · Be cautious when going up and down stairs, curbs, and when walking on uneven sidewalks.   · If your balance is poor, consider using a cane or walker.   · If your fall was related to alcohol use, stop or limit alcohol intake.   · If your fall was related to use of sleeping medicines, talk to your doctor about this. You may need to reduce your dosage at bedtime if you awaken during the night to go to the bathroom.   · To reduce the need for nighttime bathroom trips:   · Avoid drinking fluids for several hours before going to bed   · Empty your bladder before going to bed   · Men can keep a urinal at  the bedside   © 5902-3297 [employer], [address] [place] [address], [place] [address]. All rights reserved. This information is not intended as a substitute for professional medical care. Always follow your healthcare professional's instructions.

## 2020-04-08 NOTE — ASSESSMENT & PLAN NOTE
As above, likely needs to be retested since having gastric surgery and to even requalify for CPAP.   Will refer to sleep medicine at a later date once COVID19 situation improves given that she is certainly at risk for further CVA

## 2020-04-08 NOTE — ASSESSMENT & PLAN NOTE
Mild residual deficit in the LLE per report. No reported recent falls. Ambulatory without any assistive devices at present.

## 2020-05-05 ENCOUNTER — PATIENT MESSAGE (OUTPATIENT)
Dept: ADMINISTRATIVE | Facility: HOSPITAL | Age: 55
End: 2020-05-05

## 2020-05-12 ENCOUNTER — TELEPHONE (OUTPATIENT)
Dept: FAMILY MEDICINE | Facility: CLINIC | Age: 55
End: 2020-05-12

## 2020-05-12 NOTE — TELEPHONE ENCOUNTER
----- Message from [address] sent at 5/12/2020  1:51 PM CDT -----  Contact: pt  Name of Caller: pt   Reason for Visit/Symptoms: urinary incontinence and lethargic   Best Contact Number or Confirm if [place] Preferred: [phone]  Preferred Date/Time of Appointment: wants to be seen today   Interested in Virtual Visit: NO  Additional Information:

## 2020-05-13 ENCOUNTER — OFFICE VISIT (OUTPATIENT)
Dept: FAMILY MEDICINE | Facility: CLINIC | Age: 55
End: 2020-05-13
Payer: MEDICARE

## 2020-05-13 VITALS
TEMPERATURE: 98 F | HEIGHT: 62 IN | DIASTOLIC BLOOD PRESSURE: 66 MMHG | RESPIRATION RATE: 16 BRPM | BODY MASS INDEX: 42.44 KG/M2 | WEIGHT: 230.63 LBS | OXYGEN SATURATION: 96 % | HEART RATE: 89 BPM | SYSTOLIC BLOOD PRESSURE: 110 MMHG

## 2020-05-13 DIAGNOSIS — R32 URINARY INCONTINENCE, UNSPECIFIED TYPE: Primary | ICD-10-CM

## 2020-05-13 DIAGNOSIS — N30.00 ACUTE CYSTITIS WITHOUT HEMATURIA: ICD-10-CM

## 2020-05-13 LAB
BILIRUB SERPL-MCNC: ABNORMAL MG/DL
BLOOD URINE, POC: ABNORMAL
COLOR, POC UA: YELLOW
GLUCOSE UR QL STRIP: NORMAL
KETONES UR QL STRIP: ABNORMAL
LEUKOCYTE ESTERASE URINE, POC: ABNORMAL
NITRITE, POC UA: POSITIVE
PH, POC UA: 5
PROTEIN, POC: ABNORMAL
SPECIFIC GRAVITY, POC UA: 1.02
UROBILINOGEN, POC UA: NORMAL

## 2020-05-13 PROCEDURE — 87086 URINE CULTURE/COLONY COUNT: CPT

## 2020-05-13 PROCEDURE — 81002 POCT URINE DIPSTICK WITHOUT MICROSCOPE: ICD-10-PCS | Mod: S$GLB,,, | Performed by: NURSE PRACTITIONER

## 2020-05-13 PROCEDURE — 81002 URINALYSIS NONAUTO W/O SCOPE: CPT | Mod: S$GLB,,, | Performed by: NURSE PRACTITIONER

## 2020-05-13 PROCEDURE — 99213 OFFICE O/P EST LOW 20 MIN: CPT | Mod: 25,S$GLB,, | Performed by: NURSE PRACTITIONER

## 2020-05-13 PROCEDURE — 99213 PR OFFICE/OUTPT VISIT, EST, LEVL III, 20-29 MIN: ICD-10-PCS | Mod: 25,S$GLB,, | Performed by: NURSE PRACTITIONER

## 2020-05-13 RX ORDER — SULFAMETHOXAZOLE AND TRIMETHOPRIM 800; 160 MG/1; MG/1
1 TABLET ORAL 2 TIMES DAILY
Qty: 14 TABLET | Refills: 0 | Status: SHIPPED | OUTPATIENT
Start: 2020-05-13 | End: 2020-05-20

## 2020-05-13 NOTE — PATIENT INSTRUCTIONS
If you are asked to answer a survey from [facility], please do so and let them know how I did at your visit today.     Drink at least 8 (8 ounce glasses) of water a day, and for 2 days have 1 can of brothy soup a day like chicken and rice or beef noodle. Take the antibiotics with food as they can upset your stomach. If any worsening cough, shortness of breath of fever (chills) contact us immediately. Follow up in 1 week if not better.

## 2020-05-13 NOTE — PROGRESS NOTES
Subjective:       Patient ID: Faustina Rae is a 55 y.o. female.    Chief Complaint: No chief complaint on file.    Fatigue   This is a new problem. The current episode started in the past 7 days (Started 4 days ago). The problem occurs constantly. Associated symptoms include chills, coughing and fatigue. Pertinent negatives include no fever, nausea or vomiting.   Urinary Tract Infection    This is a new problem. The current episode started in the past 7 days. The problem occurs intermittently. The problem has been gradually improving. The quality of the pain is described as burning. There has been no fever. Associated symptoms include chills. Pertinent negatives include no nausea, sweats or vomiting.     Review of Systems   Constitutional: Positive for chills and fatigue. Negative for fever.   Respiratory: Positive for cough.    Gastrointestinal: Negative for nausea and vomiting.       Objective:      Physical Exam   Constitutional: She is oriented to person, place, and time. She appears well-developed and well-nourished. No distress.   HENT:   Head: Normocephalic and atraumatic.   Eyes: Conjunctivae are normal. Right eye exhibits no discharge. Left eye exhibits no discharge. No scleral icterus.   Cardiovascular: Normal rate, regular rhythm and normal heart sounds. Exam reveals no gallop and no friction rub.   No murmur heard.  Pulmonary/Chest: Effort normal and breath sounds normal. No stridor. No respiratory distress. She has no wheezes. She has no rales.   Musculoskeletal: She exhibits no edema.   NO spinal or CVA tenderness bilaterally with percussion.    Neurological: She is alert and oriented to person, place, and time.   Skin: Skin is warm and dry. No rash noted. She is not diaphoretic. No pallor.   Psychiatric: She has a normal mood and affect. Her behavior is normal.   Nursing note and vitals reviewed.    U/A positive for leukocytes, nitrite and bilirubin  Assessment:        This office visit cannot be  billed incident to as it does not meet the needed criteria.     1. Urinary incontinence, unspecified type        Plan:       Urinary incontinence, unspecified type  -     POCT urine dipstick without microscope    Acute cystitis without hematuria  -     Urine culture  -     sulfamethoxazole-trimethoprim 800-160mg (BACTRIM DS) 800-160 mg Tab; Take 1 tablet by mouth 2 (two) times daily. for 7 days  Dispense: 14 tablet; Refill: 0      Drink at least 8 (8 ounce glasses) of water a day, and for 2 days have 1 can of brothy soup a day like chicken and rice or beef noodle. Take the antibiotics with food as they can upset your stomach. If any worsening cough, shortness of breath of fever (chills) contact us immediately. Follow up in 1 week if not better.

## 2020-05-15 LAB
BACTERIA UR CULT: NORMAL
BACTERIA UR CULT: NORMAL

## 2020-05-25 ENCOUNTER — TELEPHONE (OUTPATIENT)
Dept: ENDOSCOPY | Facility: HOSPITAL | Age: 55
End: 2020-05-25

## 2020-07-16 ENCOUNTER — OFFICE VISIT (OUTPATIENT)
Dept: FAMILY MEDICINE | Facility: CLINIC | Age: 55
End: 2020-07-16
Payer: MEDICARE

## 2020-07-16 VITALS
TEMPERATURE: 98 F | DIASTOLIC BLOOD PRESSURE: 86 MMHG | BODY MASS INDEX: 43.32 KG/M2 | SYSTOLIC BLOOD PRESSURE: 138 MMHG | HEIGHT: 62 IN | OXYGEN SATURATION: 97 % | WEIGHT: 235.44 LBS | HEART RATE: 60 BPM

## 2020-07-16 DIAGNOSIS — J44.9 CHRONIC OBSTRUCTIVE PULMONARY DISEASE, UNSPECIFIED COPD TYPE: ICD-10-CM

## 2020-07-16 DIAGNOSIS — E66.01 MORBID OBESITY WITH BMI OF 40.0-44.9, ADULT: ICD-10-CM

## 2020-07-16 DIAGNOSIS — G47.00 INSOMNIA, UNSPECIFIED TYPE: ICD-10-CM

## 2020-07-16 DIAGNOSIS — I10 ESSENTIAL HYPERTENSION: ICD-10-CM

## 2020-07-16 DIAGNOSIS — R51.9 ACUTE NONINTRACTABLE HEADACHE, UNSPECIFIED HEADACHE TYPE: Primary | ICD-10-CM

## 2020-07-16 PROCEDURE — 3079F DIAST BP 80-89 MM HG: CPT | Mod: CPTII,S$GLB,, | Performed by: FAMILY MEDICINE

## 2020-07-16 PROCEDURE — 3075F SYST BP GE 130 - 139MM HG: CPT | Mod: CPTII,S$GLB,, | Performed by: FAMILY MEDICINE

## 2020-07-16 PROCEDURE — 99213 PR OFFICE/OUTPT VISIT, EST, LEVL III, 20-29 MIN: ICD-10-PCS | Mod: S$GLB,,, | Performed by: FAMILY MEDICINE

## 2020-07-16 PROCEDURE — 3079F PR MOST RECENT DIASTOLIC BLOOD PRESSURE 80-89 MM HG: ICD-10-PCS | Mod: CPTII,S$GLB,, | Performed by: FAMILY MEDICINE

## 2020-07-16 PROCEDURE — 99999 PR PBB SHADOW E&M-EST. PATIENT-LVL V: CPT | Mod: PBBFAC,,, | Performed by: FAMILY MEDICINE

## 2020-07-16 PROCEDURE — 99213 OFFICE O/P EST LOW 20 MIN: CPT | Mod: S$GLB,,, | Performed by: FAMILY MEDICINE

## 2020-07-16 PROCEDURE — 3075F PR MOST RECENT SYSTOLIC BLOOD PRESS GE 130-139MM HG: ICD-10-PCS | Mod: CPTII,S$GLB,, | Performed by: FAMILY MEDICINE

## 2020-07-16 PROCEDURE — 99999 PR PBB SHADOW E&M-EST. PATIENT-LVL V: ICD-10-PCS | Mod: PBBFAC,,, | Performed by: FAMILY MEDICINE

## 2020-07-16 PROCEDURE — 3008F BODY MASS INDEX DOCD: CPT | Mod: CPTII,S$GLB,, | Performed by: FAMILY MEDICINE

## 2020-07-16 PROCEDURE — 3008F PR BODY MASS INDEX (BMI) DOCUMENTED: ICD-10-PCS | Mod: CPTII,S$GLB,, | Performed by: FAMILY MEDICINE

## 2020-07-16 RX ORDER — ESCITALOPRAM OXALATE 10 MG/1
10 TABLET ORAL DAILY
COMMUNITY
Start: 2020-07-08 | End: 2020-12-10 | Stop reason: SDUPTHER

## 2020-07-16 NOTE — PATIENT INSTRUCTIONS
Take half medication as discussed for 4 days and go back to full tablet daily as prescribed.  Take tylenol for headaches as needed for pain.

## 2020-07-16 NOTE — PROGRESS NOTES
Subjective:       Patient ID: Faustina Rae is a 55 y.o. female.    Chief Complaint: Headache    This patient is new to me.  Ms Weems presents to the clinic today with complaints of a headache that started two days ago. Patient brought her lexapro with her as she just started taking this and believes this may be related to the headaches. Patient states she takes the medication at night before bed and wakes up with the headaches in the night. Plan of care discussed with patient to take half dose of lexapro for 4 days and then resume one tablet daily as prescribed; patient verbalized understanding.     Patient states she does not have any known COVID exposure, no cough, SOB, fever, or loss of taste or smell.     Headache   This is a new problem. The current episode started in the past 7 days. The problem occurs daily. The problem has been unchanged. The pain is located in the frontal region. The pain does not radiate. The pain quality is not similar to prior headaches. The quality of the pain is described as aching. The pain is at a severity of 3/10. Pertinent negatives include no abdominal pain, coughing, dizziness, ear pain, eye pain, eye redness, fever, nausea, sinus pressure, sore throat or vomiting. Nothing (new medication) aggravates the symptoms.     Review of Systems   Constitutional: Negative for activity change, appetite change, chills, diaphoresis and fever.   HENT: Negative for congestion, ear pain, postnasal drip, sinus pressure, sneezing and sore throat.    Eyes: Negative for pain, discharge, redness and itching.   Respiratory: Negative for apnea, cough, chest tightness, shortness of breath and wheezing.    Cardiovascular: Negative for chest pain and leg swelling.   Gastrointestinal: Negative for abdominal distention, abdominal pain, constipation, diarrhea, nausea and vomiting.   Genitourinary: Negative for difficulty urinating, dysuria, flank pain and frequency.   Skin: Negative for color change,  rash and wound.   Neurological: Positive for headaches. Negative for dizziness.       Patient Active Problem List   Diagnosis    Morbid obesity with BMI of 40.0-44.9, adult    DAVID (obstructive sleep apnea)    Asthma    Drug-induced hyperglycemia    COPD (chronic obstructive pulmonary disease)    Dysarthria    Chronic diastolic CHF (congestive heart failure)    Essential hypertension    S/P laparoscopic sleeve gastrectomy    History of CVA (cerebrovascular accident)    CKD (chronic kidney disease) stage 3, GFR 30-59 ml/min    Normocytic anemia    Hypoalbuminemia    Atherosclerosis of native coronary artery with stable angina pectoris    Gastroesophageal reflux disease with esophagitis    Acute lacunar infarction    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   Constitutional:       General: She is not in acute distress.     Appearance: Normal appearance. She is well-developed. She is obese.   HENT:      Head: Normocephalic and atraumatic.      Comments: Reports fontal headache not tender on palpation.     Nose: Nose normal.   Eyes:      Conjunctiva/sclera: Conjunctivae normal.      Pupils: Pupils are equal, round, and reactive to light.   Neck:      Musculoskeletal: Normal range of motion and neck supple.   Cardiovascular:      Rate and Rhythm: Normal rate and regular rhythm.      Heart sounds: Normal heart sounds.   Pulmonary:      Effort: Pulmonary effort is normal. No respiratory distress.      Breath sounds: Normal breath sounds.   Abdominal:      General: There is no distension.      Palpations: Abdomen is soft.      Tenderness: There is no abdominal tenderness.   Skin:     General: Skin is warm and dry.      Findings: No rash.    Neurological:      Mental Status: She is alert and oriented to person, place, and time.   Psychiatric:         Behavior: Behavior normal.         Lab Results   Component Value Date    WBC 6.58 10/06/2019    HGB 12.0 10/06/2019    HCT 38.3 10/06/2019     10/06/2019    CHOL 165 10/06/2019    TRIG 165 (H) 10/06/2019    HDL 34 (L) 10/06/2019    ALT 12 10/06/2019    AST 12 10/06/2019     10/06/2019    K 3.8 10/06/2019     10/06/2019    CREATININE 1.7 (H) 10/06/2019    BUN 37 (H) 10/06/2019    CO2 26 10/06/2019    TSH 1.400 10/05/2019    INR 1.0 10/05/2019    HGBA1C 5.7 10/06/2019     The ASCVD Risk score (Yash PATEL Jr., et al., 2013) failed to calculate for the following reasons:    The patient has a prior MI or stroke diagnosis    Assessment:       1. Acute nonintractable headache, unspecified headache type    2. Insomnia, unspecified type    3. Morbid obesity with BMI of 40.0-44.9, adult    4. Essential hypertension    5. Chronic obstructive pulmonary disease, unspecified COPD type        Plan:       Faustina was seen today for headache.    Diagnoses and all orders for this visit:    Acute nonintractable headache, unspecified headache type  Talk half dose of lexapro for 4 days then take one tablet a day as prescribed.   Take OTC tylenol for headaches as directed.  Continue medications as prescribed.  Follow up with PCP    Insomnia, unspecified type  Continue medications as prescribed.  Follow up with PCP    Morbid obesity with BMI of 40.0-44.9, adult  Healthy diet  Regular exercise  Continue medications as prescribed.  Follow up with PCP    Essential hypertension  Continue medications as prescribed.  Follow up with PCP    Chronic obstructive pulmonary disease, unspecified COPD type  Continue medications as prescribed.  Follow up with PCP    Follow up if symptoms worsen or fail to improve.

## 2020-08-20 DIAGNOSIS — I20.89 ANGINA AT REST: ICD-10-CM

## 2020-08-20 DIAGNOSIS — I50.9 CONGESTIVE HEART FAILURE, UNSPECIFIED HF CHRONICITY, UNSPECIFIED HEART FAILURE TYPE: ICD-10-CM

## 2020-08-20 DIAGNOSIS — R10.9 ABDOMINAL PAIN, UNSPECIFIED ABDOMINAL LOCATION: ICD-10-CM

## 2020-08-20 DIAGNOSIS — J44.89 COPD WITH ASTHMA: ICD-10-CM

## 2020-08-20 DIAGNOSIS — K29.70 GASTRITIS, PRESENCE OF BLEEDING UNSPECIFIED, UNSPECIFIED CHRONICITY, UNSPECIFIED GASTRITIS TYPE: ICD-10-CM

## 2020-08-20 DIAGNOSIS — G47.00 INSOMNIA, UNSPECIFIED TYPE: ICD-10-CM

## 2020-08-20 DIAGNOSIS — I15.1 HYPERTENSION SECONDARY TO OTHER RENAL DISORDERS: ICD-10-CM

## 2020-08-21 RX ORDER — ISOSORBIDE DINITRATE 5 MG/1
5 TABLET ORAL 3 TIMES DAILY
Qty: 270 TABLET | Refills: 0 | Status: SHIPPED | OUTPATIENT
Start: 2020-08-21 | End: 2021-06-10

## 2020-08-21 RX ORDER — HYDRALAZINE HYDROCHLORIDE 25 MG/1
25 TABLET, FILM COATED ORAL EVERY 12 HOURS
Qty: 180 TABLET | Refills: 0 | Status: SHIPPED | OUTPATIENT
Start: 2020-08-21 | End: 2020-09-14 | Stop reason: SDUPTHER

## 2020-08-21 RX ORDER — PANTOPRAZOLE SODIUM 40 MG/1
40 TABLET, DELAYED RELEASE ORAL DAILY
Qty: 90 TABLET | Refills: 0 | Status: SHIPPED | OUTPATIENT
Start: 2020-08-21 | End: 2020-09-14

## 2020-08-21 RX ORDER — TRAZODONE HYDROCHLORIDE 50 MG/1
TABLET ORAL
Qty: 90 TABLET | Refills: 0 | Status: SHIPPED | OUTPATIENT
Start: 2020-08-21 | End: 2021-03-11

## 2020-08-21 RX ORDER — ALBUTEROL SULFATE 90 UG/1
AEROSOL, METERED RESPIRATORY (INHALATION)
Qty: 8.5 G | Refills: 0 | Status: SHIPPED | OUTPATIENT
Start: 2020-08-21 | End: 2022-01-07

## 2020-08-21 RX ORDER — METOPROLOL SUCCINATE 25 MG/1
50 TABLET, EXTENDED RELEASE ORAL DAILY
Qty: 180 TABLET | Refills: 0 | Status: SHIPPED | OUTPATIENT
Start: 2020-08-21 | End: 2021-05-10 | Stop reason: SDUPTHER

## 2020-08-21 RX ORDER — CLOPIDOGREL BISULFATE 75 MG/1
75 TABLET ORAL DAILY
Qty: 90 TABLET | Refills: 0 | Status: SHIPPED | OUTPATIENT
Start: 2020-08-21 | End: 2021-02-25 | Stop reason: ALTCHOICE

## 2020-09-14 ENCOUNTER — OFFICE VISIT (OUTPATIENT)
Dept: FAMILY MEDICINE | Facility: CLINIC | Age: 55
End: 2020-09-14
Payer: MEDICARE

## 2020-09-14 VITALS
OXYGEN SATURATION: 99 % | DIASTOLIC BLOOD PRESSURE: 96 MMHG | RESPIRATION RATE: 16 BRPM | BODY MASS INDEX: 43.13 KG/M2 | WEIGHT: 234.38 LBS | HEART RATE: 85 BPM | SYSTOLIC BLOOD PRESSURE: 140 MMHG | HEIGHT: 62 IN | TEMPERATURE: 98 F

## 2020-09-14 DIAGNOSIS — E78.5 HYPERLIPIDEMIA, UNSPECIFIED HYPERLIPIDEMIA TYPE: ICD-10-CM

## 2020-09-14 DIAGNOSIS — Z98.84 S/P LAPAROSCOPIC SLEEVE GASTRECTOMY: ICD-10-CM

## 2020-09-14 DIAGNOSIS — R73.9 HYPERGLYCEMIA: ICD-10-CM

## 2020-09-14 DIAGNOSIS — I10 ESSENTIAL HYPERTENSION: Primary | ICD-10-CM

## 2020-09-14 DIAGNOSIS — E66.01 MORBID OBESITY WITH BMI OF 40.0-44.9, ADULT: ICD-10-CM

## 2020-09-14 DIAGNOSIS — R30.0 DYSURIA: ICD-10-CM

## 2020-09-14 LAB
BILIRUB SERPL-MCNC: NORMAL MG/DL
BLOOD URINE, POC: NORMAL
CLARITY, POC UA: NORMAL
COLOR, POC UA: YELLOW
GLUCOSE UR QL STRIP: NORMAL
KETONES UR QL STRIP: NORMAL
LEUKOCYTE ESTERASE URINE, POC: NORMAL
NITRITE, POC UA: NORMAL
PH, POC UA: 5
PROTEIN, POC: NORMAL
SPECIFIC GRAVITY, POC UA: 1.02
UROBILINOGEN, POC UA: NORMAL

## 2020-09-14 PROCEDURE — 81002 URINALYSIS NONAUTO W/O SCOPE: CPT | Mod: S$GLB,,, | Performed by: NURSE PRACTITIONER

## 2020-09-14 PROCEDURE — 81002 POCT URINE DIPSTICK WITHOUT MICROSCOPE: ICD-10-PCS | Mod: S$GLB,,, | Performed by: NURSE PRACTITIONER

## 2020-09-14 PROCEDURE — 99214 OFFICE O/P EST MOD 30 MIN: CPT | Mod: 25,S$GLB,, | Performed by: NURSE PRACTITIONER

## 2020-09-14 PROCEDURE — 99214 PR OFFICE/OUTPT VISIT, EST, LEVL IV, 30-39 MIN: ICD-10-PCS | Mod: 25,S$GLB,, | Performed by: NURSE PRACTITIONER

## 2020-09-14 RX ORDER — HYDRALAZINE HYDROCHLORIDE 25 MG/1
25 TABLET, FILM COATED ORAL EVERY 8 HOURS
Qty: 270 TABLET | Refills: 3 | Status: SHIPPED | OUTPATIENT
Start: 2020-09-14 | End: 2021-02-25 | Stop reason: SDUPTHER

## 2020-09-14 NOTE — PROGRESS NOTES
Subjective:      10:43 AM     Patient ID: Faustina Rae is a 55 y.o. female.    Chief Complaint: Back Pain (labs) and Urinary Tract Infection (possible uti, about a week ago)    Urinary Tract Infection   This is a new problem. The current episode started in the past 7 days (1 week ago). The problem occurs intermittently. The problem has been unchanged. The patient is experiencing no pain. There has been no fever. She is sexually active. There is no history of pyelonephritis. Associated symptoms include urgency. Pertinent negatives include no chills, frequency, hematuria, nausea, sweats, vomiting, constipation or rash. She has tried increased fluids (unsweet tea & water) for the symptoms. The treatment provided moderate relief. Her past medical history is significant for hypertension.   Low-back Pain  This is a new problem. The current episode started in the past 7 days (1 week ago). The problem occurs 2 to 4 times per day (when moving heavy items). The problem has been gradually improving. Pertinent negatives include no arthralgias, chest pain, chills, congestion, coughing, fatigue, fever, headaches, myalgias, nausea, rash, sore throat or vomiting. The symptoms are aggravated by exertion. She has tried nothing for the symptoms. The treatment provided no relief.   Hypertension  This is a chronic problem. The current episode started more than 1 year ago. The problem has been gradually worsening since onset. The problem is uncontrolled. Pertinent negatives include no chest pain, headaches, orthopnea, palpitations, peripheral edema, shortness of breath or sweats. There are no associated agents to hypertension. Risk factors for coronary artery disease include sedentary lifestyle, obesity and post-menopausal state. Past treatments include diuretics, beta blockers and calcium channel blockers. The current treatment provides mild improvement. Compliance problems include diet and exercise.  Hypertensive end-organ damage  "includes angina, kidney disease, CVA and heart failure.   Pt states also needs a psychiatric evaluation for surgical clearance for hiatal hernia repair. Denies depression, suicidal or homicidal thoughts, being down or hopeless.  Review of Systems   Constitutional: Negative for chills, fatigue, fever and unexpected weight change.   HENT: Negative for congestion, hearing loss and sore throat.    Eyes: Negative for pain, redness and visual disturbance.   Respiratory: Negative for cough and shortness of breath.    Cardiovascular: Negative for chest pain, palpitations, orthopnea and leg swelling.   Gastrointestinal: Negative for constipation, diarrhea, nausea and vomiting.   Endocrine: Negative for cold intolerance and heat intolerance.   Genitourinary: Positive for urgency. Negative for dysuria, frequency and hematuria.   Musculoskeletal: Negative for arthralgias and myalgias.   Skin: Negative for pallor and rash.   Neurological: Negative for dizziness and headaches.   Psychiatric/Behavioral: Negative for dysphoric mood. The patient is not nervous/anxious.          Objective:    U/A normal except bilirubin 3+  Vitals:    09/14/20 1030   BP: (!) 140/96   Pulse: 85   Resp: 16   Temp: 98.3 °F (36.8 °C)   TempSrc: Temporal   SpO2: 99%   Weight: 106.3 kg (234 lb 5.6 oz)   Height: 5' 2" (1.575 m)   PainSc:   2   PainLoc: Back       Physical Exam  Vitals signs and nursing note reviewed.   Constitutional:       General: She is not in acute distress.     Appearance: Normal appearance. She is well-developed. She is not diaphoretic.   HENT:      Head: Normocephalic and atraumatic.   Eyes:      General: No scleral icterus.        Right eye: No discharge.         Left eye: No discharge.      Conjunctiva/sclera: Conjunctivae normal.   Cardiovascular:      Rate and Rhythm: Normal rate and regular rhythm.      Heart sounds: Normal heart sounds. No murmur. No friction rub. No gallop.    Pulmonary:      Effort: Pulmonary effort is " normal. No respiratory distress.      Breath sounds: Normal breath sounds. No stridor. No wheezing, rhonchi or rales.   Musculoskeletal:         General: No swelling or tenderness.      Lumbar back: She exhibits no tenderness, no swelling, no deformity and no spasm.   Skin:     General: Skin is warm and dry.      Capillary Refill: Capillary refill takes less than 2 seconds.      Coloration: Skin is not jaundiced or pale.      Findings: No rash.   Neurological:      Mental Status: She is alert and oriented to person, place, and time.   Psychiatric:         Mood and Affect: Mood normal.         Behavior: Behavior normal.           Assessment:       1. Essential hypertension    2. Dysuria    3. S/P laparoscopic sleeve gastrectomy    4. Morbid obesity with BMI of 40.0-44.9, adult    5. Hyperglycemia    6. Hyperlipidemia, unspecified hyperlipidemia type          Plan:       Essential hypertension  -     hydrALAZINE (APRESOLINE) 25 MG tablet; Take 1 tablet (25 mg total) by mouth every 8 (eight) hours.  Dispense: 270 tablet; Refill: 3  -     Comprehensive metabolic panel; Future; Expected date: 09/14/2020  -     Lipid Panel; Future; Expected date: 09/14/2020  -     Microalbumin/creatinine urine ratio; Future; Expected date: 09/14/2020  -     CBC auto differential; Future; Expected date: 09/14/2020  -     TSH; Future; Expected date: 09/14/2020    Dysuria  -     POCT urine dipstick without microscope    S/P laparoscopic sleeve gastrectomy  -     Ambulatory referral/consult to Bariatric Surgery; Future; Expected date: 09/21/2020  -     Ambulatory referral/consult to Psychology; Future; Expected date: 10/14/2020    Morbid obesity with BMI of 40.0-44.9, adult  -     Ambulatory referral/consult to Bariatric Surgery; Future; Expected date: 09/21/2020  -     Ambulatory referral/consult to Psychology; Future; Expected date: 10/14/2020    Hyperglycemia  -     Hemoglobin A1C; Future; Expected date: 09/14/2020    Hyperlipidemia,  unspecified hyperlipidemia type  -     Lipid Panel; Future; Expected date: 09/14/2020      Continue to work on weight loss, avoid soda, drink water or sugar-free tea. Stop moving heavy furniture.

## 2020-09-14 NOTE — PATIENT INSTRUCTIONS
If you are asked to answer a survey from [facility], please do so and let them know how I did at your visit today.     Continue to work on weight loss, avoid soda, drink water or sugar-free tea. Stop moving heavy furniture.

## 2020-09-18 ENCOUNTER — PATIENT MESSAGE (OUTPATIENT)
Dept: BARIATRICS | Facility: CLINIC | Age: 55
End: 2020-09-18

## 2020-09-28 ENCOUNTER — PATIENT OUTREACH (OUTPATIENT)
Dept: ADMINISTRATIVE | Facility: HOSPITAL | Age: 55
End: 2020-09-28

## 2020-09-28 NOTE — PROGRESS NOTES
Chart review completed 2020.  Care Everywhere updates requested and reviewed.  Immunizations reconciled. Media reports reviewed.  Duplicate HM overrides and  orders removed.  Overdue HM topic chart audit and/or requested.  Overdue lab testing linked to upcoming lab appointments if applies.    LETTER MAILED    Health Maintenance Due   Topic Date Due    HIV Screening  1979    TETANUS VACCINE  1982    Shingles Vaccine (1 of 2) 2014    Colorectal Cancer Screening  2014    Eye Exam  2020    Foot Exam  2020    Influenza Vaccine (1) 2020

## 2020-09-28 NOTE — LETTER
September 28, 2020    Faustina Rae  [address]  [address]  [place] [address]             [facility]  [address]  [facility] [address]  Phone: [phone] [facility] is committed to your overall health.  To help you get the most out of each of your visits, we will review your information to make sure you are up to date on all of your recommended tests and/or procedures.      Dr. Jaxson Gonzales MD has found that your chart shows you may be due for a:    COLORECTAL CANCER SCREENING  DIABETIC EYE EXAM  DIABETIC FOOT EXAM     If you have had any of the above done at another facility, please bring the records or information with you so that your record at [facility] will be complete.  If you would like to schedule any of these, please contact the clinic at [phone].    If you are currently taking medication, please bring it with you to your appointment for review.    Also, if you have any type of Advanced Directives, please bring them with you to your office visit so we may scan them into your chart.    Thank You,    Your [facility],  MD Isidra Cody LPN Clinical Care Coordinator  [facility]  [address] [address]  Phone [phone]  Fax: [phone]

## 2020-09-29 ENCOUNTER — PATIENT MESSAGE (OUTPATIENT)
Dept: OTHER | Facility: OTHER | Age: 55
End: 2020-09-29

## 2020-10-05 ENCOUNTER — PATIENT MESSAGE (OUTPATIENT)
Dept: ADMINISTRATIVE | Facility: HOSPITAL | Age: 55
End: 2020-10-05

## 2020-10-09 ENCOUNTER — TELEPHONE (OUTPATIENT)
Dept: FAMILY MEDICINE | Facility: CLINIC | Age: 55
End: 2020-10-09

## 2020-12-02 ENCOUNTER — PATIENT MESSAGE (OUTPATIENT)
Dept: ADMINISTRATIVE | Facility: HOSPITAL | Age: 55
End: 2020-12-02

## 2020-12-04 ENCOUNTER — PES CALL (OUTPATIENT)
Dept: ADMINISTRATIVE | Facility: OTHER | Age: 55
End: 2020-12-04

## 2020-12-10 ENCOUNTER — OUTPATIENT CASE MANAGEMENT (OUTPATIENT)
Dept: ADMINISTRATIVE | Facility: OTHER | Age: 55
End: 2020-12-10

## 2020-12-10 ENCOUNTER — OFFICE VISIT (OUTPATIENT)
Dept: FAMILY MEDICINE | Facility: CLINIC | Age: 55
End: 2020-12-10
Payer: MEDICARE

## 2020-12-10 VITALS
RESPIRATION RATE: 16 BRPM | SYSTOLIC BLOOD PRESSURE: 138 MMHG | DIASTOLIC BLOOD PRESSURE: 98 MMHG | HEART RATE: 64 BPM | BODY MASS INDEX: 42.92 KG/M2 | WEIGHT: 233.25 LBS | TEMPERATURE: 98 F | HEIGHT: 62 IN

## 2020-12-10 DIAGNOSIS — F41.9 ANXIETY AND DEPRESSION: ICD-10-CM

## 2020-12-10 DIAGNOSIS — Z11.4 ENCOUNTER FOR SCREENING FOR HIV: ICD-10-CM

## 2020-12-10 DIAGNOSIS — E66.01 MORBID OBESITY WITH BMI OF 40.0-44.9, ADULT: ICD-10-CM

## 2020-12-10 DIAGNOSIS — F32.A ANXIETY AND DEPRESSION: ICD-10-CM

## 2020-12-10 DIAGNOSIS — I10 ESSENTIAL HYPERTENSION: Primary | ICD-10-CM

## 2020-12-10 DIAGNOSIS — J44.9 CHRONIC OBSTRUCTIVE PULMONARY DISEASE, UNSPECIFIED COPD TYPE: ICD-10-CM

## 2020-12-10 DIAGNOSIS — Z23 NEED FOR INFLUENZA VACCINATION: ICD-10-CM

## 2020-12-10 PROCEDURE — 3008F BODY MASS INDEX DOCD: CPT | Mod: CPTII,S$GLB,, | Performed by: INTERNAL MEDICINE

## 2020-12-10 PROCEDURE — 99499 UNLISTED E&M SERVICE: CPT | Mod: S$GLB,,, | Performed by: INTERNAL MEDICINE

## 2020-12-10 PROCEDURE — G0008 FLU VACCINE (QUAD) GREATER THAN OR EQUAL TO 3YO PRESERVATIVE FREE IM: ICD-10-PCS | Mod: S$GLB,,, | Performed by: INTERNAL MEDICINE

## 2020-12-10 PROCEDURE — 1126F PR PAIN SEVERITY QUANTIFIED, NO PAIN PRESENT: ICD-10-PCS | Mod: S$GLB,,, | Performed by: INTERNAL MEDICINE

## 2020-12-10 PROCEDURE — 99214 PR OFFICE/OUTPT VISIT, EST, LEVL IV, 30-39 MIN: ICD-10-PCS | Mod: 25,S$GLB,, | Performed by: INTERNAL MEDICINE

## 2020-12-10 PROCEDURE — 1126F AMNT PAIN NOTED NONE PRSNT: CPT | Mod: S$GLB,,, | Performed by: INTERNAL MEDICINE

## 2020-12-10 PROCEDURE — 3080F DIAST BP >= 90 MM HG: CPT | Mod: CPTII,S$GLB,, | Performed by: INTERNAL MEDICINE

## 2020-12-10 PROCEDURE — 3008F PR BODY MASS INDEX (BMI) DOCUMENTED: ICD-10-PCS | Mod: CPTII,S$GLB,, | Performed by: INTERNAL MEDICINE

## 2020-12-10 PROCEDURE — 90686 IIV4 VACC NO PRSV 0.5 ML IM: CPT | Mod: S$GLB,,, | Performed by: INTERNAL MEDICINE

## 2020-12-10 PROCEDURE — 90686 FLU VACCINE (QUAD) GREATER THAN OR EQUAL TO 3YO PRESERVATIVE FREE IM: ICD-10-PCS | Mod: S$GLB,,, | Performed by: INTERNAL MEDICINE

## 2020-12-10 PROCEDURE — G0008 ADMIN INFLUENZA VIRUS VAC: HCPCS | Mod: S$GLB,,, | Performed by: INTERNAL MEDICINE

## 2020-12-10 PROCEDURE — 3075F SYST BP GE 130 - 139MM HG: CPT | Mod: CPTII,S$GLB,, | Performed by: INTERNAL MEDICINE

## 2020-12-10 PROCEDURE — 99214 OFFICE O/P EST MOD 30 MIN: CPT | Mod: 25,S$GLB,, | Performed by: INTERNAL MEDICINE

## 2020-12-10 PROCEDURE — 3075F PR MOST RECENT SYSTOLIC BLOOD PRESS GE 130-139MM HG: ICD-10-PCS | Mod: CPTII,S$GLB,, | Performed by: INTERNAL MEDICINE

## 2020-12-10 PROCEDURE — 99499 RISK ADDL DX/OHS AUDIT: ICD-10-PCS | Mod: S$GLB,,, | Performed by: INTERNAL MEDICINE

## 2020-12-10 PROCEDURE — 3080F PR MOST RECENT DIASTOLIC BLOOD PRESSURE >= 90 MM HG: ICD-10-PCS | Mod: CPTII,S$GLB,, | Performed by: INTERNAL MEDICINE

## 2020-12-10 RX ORDER — SPIRONOLACTONE 25 MG/1
25 TABLET ORAL DAILY
Qty: 30 TABLET | Refills: 11 | Status: SHIPPED | OUTPATIENT
Start: 2020-12-10 | End: 2021-05-10 | Stop reason: SDUPTHER

## 2020-12-10 RX ORDER — ESCITALOPRAM OXALATE 10 MG/1
10 TABLET ORAL DAILY
Qty: 90 TABLET | Refills: 1 | Status: SHIPPED | OUTPATIENT
Start: 2020-12-10 | End: 2021-03-11 | Stop reason: SDUPTHER

## 2020-12-10 NOTE — PATIENT INSTRUCTIONS
.Guidelines for General Prevention of COVID-19    o Take steps to protect yourself from COVID-19. Perform hand hygiene frequently. Wash your hands often with soap and water for at least 20 seconds of use and alcohol-based hand , covering all surfaces of your hands and rubbing them together until they feel dry.  o Avoid touching your eyes, nose, and mouth with unwashed hands.  o Avoid close contact with people and stay home if youre sick, except to get medical care.   o Cover coughs and sneezes with a tissue, or use the inside of your elbow. Immediately wash your hands or use hand .     For more information, see CDC link below:    https://www.cdc.gov/coronavirus/2019-ncov/hcp/guidance-prevent-spread.html#precautions      Lose 5 pounds.  Suggest [place] diet      Avoid  alcohol  Low-Salt Diet  This diet removes foods that are high in salt. It also limits the amount of salt you use when cooking. It is most often used for people with high blood pressure, edema (fluid retention), and kidney, liver, or heart disease.  Table salt contains the mineral sodium. Your body needs sodium to work normally. But too much sodium can make your health problems worse. Your healthcare provider is recommending a low-salt (also called low-sodium) diet for you. Your total daily allowance of salt is 1,500 to 2,300 milligrams (mg). It is less than 1 teaspoon of table salt. This means you can have only about 500 to 700 mg of sodium at each meal. People with certain health problems should limit salt intake to the lower end of the recommended range.    When you cook, don´t add much salt. If you can cook without using salt, even better. Don´t add salt to your food at the table.  When shopping, read food labels. Salt is often called sodium on the label. Choose foods that are salt-free, low salt, or very low salt. Note that foods with reduced salt may not lower your salt intake enough.    Beans, potatoes, and pasta  Ok: Dry  beans, split peas, lentils, potatoes, rice, macaroni, pasta, spaghetti without added salt  Avoid: Potato chips, tortilla chips, and similar products  Breads and cereals  Ok: Low-sodium breads, rolls, cereals, and cakes; low-salt crackers, matzo crackers  Avoid: Salted crackers, pretzels, popcorn, Luxem[place] toast, pancakes, muffins  Dairy  Ok: Milk, chocolate milk, hot chocolate mix, low-salt cheeses, and yogurt  Avoid: Processed cheese and cheese spreads; Roquefort, Camembert, and cottage cheese; buttermilk, instant breakfast drink  Desserts  Ok: Ice cream, frozen yogurt, juice bars, gelatin, cookies and pies, sugar, honey, jelly, hard candy  Avoid: Most pies, cakes and cookies prepared or processed with salt; instant pudding  Drinks  Ok: Tea, coffee, fizzy (carbonated) drinks, juices  Avoid: Flavored coffees, electrolyte replacement drinks, sports drinks  Meats  Ok: All fresh meat, fish, poultry, low-salt tuna, eggs, egg substitute  Avoid: Smoked, pickled, brine-cured, or salted meats and fish. This includes luis, chipped beef, corned beef, hot dogs, deli meats, ham, kosher meats, salt pork, sausage, canned tuna, salted codfish, smoked salmon, herring, sardines, or anchovies.  Seasonings and spices  Ok: Most seasonings are okay. Good substitutes for salt include: fresh herb blends, hot sauce, lemon, garlic, bazan, vinegar, dry mustard, parsley, cilantro, horseradish, tomato paste, regular margarine, mayonnaise, unsalted butter, cream cheese, vegetable oil, cream, low-salt salad dressing and gravy.  Avoid: Regular ketchup, relishes, pickles, soy sauce, teriyaki sauce, Worcestershire sauce, BBQ sauce, tartar sauce, meat tenderizer, chili sauce, regular gravy, regular salad dressing, salted butter  Soups  Ok: Low-salt soups and broths made with allowed foods  Avoid: Bouillon cubes, soups with smoked or salted meats, regular soup and broth  Vegetables  Ok: Most vegetables are okay; also low-salt tomato and vegetable  juices  Avoid: Sauerkraut and other brine-soaked vegetables; pickles and other pickled vegetables; tomato juice, olives  © 5483-2950 [employer]. [address] [place] [address], [place] [address]. All rights reserved. This information is not intended as a substitute for professional medical care. Always follow your healthcare professional's instructions.    If you have labs scheduled at [facility] you need to make an appointment. This is a measure to protect you from covid 19.      Thank you for choosing [facility].

## 2020-12-10 NOTE — PROGRESS NOTES
Subjective:      9:23 AM     Patient ID: Faustina Rae is a 55 y.o. female.    Chief Complaint: wet cough (x 1 month,no refills needed)    HPI     Patient has been socializing on the weekends with out using masks.  Daughter like it reinforce that she take covid precautions.      CHIEF COMPLAINT: Hypertension  HPI:     ONSET:      QUALITY/COURSE:   Unchanged.     INTENSITY/SEVERITY:  Average blood pressure is unknown.      MODIFIERS/TREATMENTS:  Taking medications: yes. .High sodium intake: no. alcohol: no      The following symptoms are positive only if BOLDED, otherwise are negative.      SYMPTOMS/RELATED: Possible medication side effects include:   Depression..  . Cough. . Constipation.    REVIEW OF SYMPTOMS: . Weight_loss . Weight_gain . Leg_cramps ..    TARGET ORGAN DAMAGE:: angina/ prior myocardial infarction, chronic kidney disease, heart failure, left ventricular hypertrophy, peripheral artery disease, prior coronary revascularization, retinopathy, stroke. transient ischemic attack.    The      CHIEF COMPLAINT: Cough(+).  HPI:  Use a nebulizer twice a day.    ONSET/TIMIN years ago    DURATION:               Paroxysmal: no .    QUALITY/COURSE:. unchanged    INTENSITY/SEVERITY: Severity is #  3 (10 point scale)      The following symptoms/statements are positive if BOLD, negative otherwise.      CONTEXT/WHEN:  Tobacco_use. Smokers_in_home. Seasonal_pattern. Allergies/Hayfever. Sinusitis. Irritant_Exposure(smoke/dust/fumes). Exposure_to_others_with_similar_symptoms.        Similar_problems_in_past.   PAST TREATMENT OR EVALUATION:   previous PPD. Recent_previous_chest_x-ray. Recent_antibiotics.  Associated Symptoms:     sputum production: scant. copious. Hemoptysis.  Medical History: Past_pulmonary_infections.  Cardiovascular_disease.chronic_lung_disease.  tuberculosis. Asthma. AIDS. Gastroesophageal_reflux_disease .      Review of Systems      Objective:      Vitals:    12/10/20 0915   BP: (!) 138/98  "  Pulse: 64   Resp: 16   Temp: 97.5 °F (36.4 °C)   TempSrc: Oral   Weight: 105.8 kg (233 lb 4 oz)   Height: 5' 2" (1.575 m)   PainSc: 0-No pain     Physical Exam  Vitals signs and nursing note reviewed.   Constitutional:       Appearance: She is well-developed.   Cardiovascular:      Rate and Rhythm: Normal rate and regular rhythm.      Heart sounds: Normal heart sounds.   Pulmonary:      Effort: Pulmonary effort is normal.      Breath sounds: Normal breath sounds.   Abdominal:      Palpations: Abdomen is soft.      Tenderness: There is no abdominal tenderness.   Neurological:      Mental Status: She is alert.   Psychiatric:         Behavior: Behavior normal.         Thought Content: Thought content normal.       No results found for this or any previous visit (from the past 1008 hour(s)).       Assessment:       1. Essential hypertension    2. Chronic obstructive pulmonary disease, unspecified COPD type    3. Morbid obesity with BMI of 40.0-44.9, adult    4. Anxiety and depression    5. Need for influenza vaccination    6. Encounter for screening for HIV          Plan:       Essential hypertension  -     Lipid Panel; Future; Expected date: 12/10/2020  -     Comprehensive Metabolic Panel; Future; Expected date: 12/10/2020  -     Ambulatory referral/consult to Outpatient Case Management  -     spironolactone (ALDACTONE) 25 MG tablet; Take 1 tablet (25 mg total) by mouth once daily.  Dispense: 30 tablet; Refill: 11    Chronic obstructive pulmonary disease, unspecified COPD type  -     Ambulatory referral/consult to Outpatient Case Management  -     CBC Auto Differential; Future; Expected date: 12/10/2020    Morbid obesity with BMI of 40.0-44.9, adult    Anxiety and depression  -     escitalopram oxalate (LEXAPRO) 10 MG tablet; Take 1 tablet (10 mg total) by mouth once daily.  Dispense: 90 tablet; Refill: 1    Need for influenza vaccination  -     Influenza - Quadrivalent (3 years & older) (PF)    Encounter for " screening for HIV  -     Rapid HIV; Future; Expected date: 12/10/2020      Follow up in about 3 months (around 3/10/2021).

## 2020-12-10 NOTE — LETTER
December 15, 2020    Faustina Rae  [address]  [address]  [place] [address]             [facility] [address] [place] [address] Dear MS Rae:      I work with [facility]'s Outpatient Case Management Department. We received a referral to call you to discuss your medical history.These services are free of charge and are offered to [facility] patients who have recently been discharged from any of our facilities or who have complex medical conditions that may require the skill of a nurse to assist with management.         .      I attempted to reach you by telephone, but I was unsuccessful. Please call our department so that we can go over some questions with you regarding your health. My phone number is [phone].     The Outpatient Case Management Department can be reached at [phone] from 8:00AM to 4:30 PM on Monday thru Friday. [facility] also has a program where a nurse is available 24/7 to answer questions or provide medical advice, their number is [phone].    Thanks,  Margarita Young RN [facility]   Outpatient Care Management

## 2020-12-11 ENCOUNTER — PATIENT MESSAGE (OUTPATIENT)
Dept: OTHER | Facility: OTHER | Age: 55
End: 2020-12-11

## 2020-12-11 NOTE — PROGRESS NOTES
12/11/20-Attempt assessment with patient for outpatient case management. No answer and mail box is full. RN OPCM 1'st assessment attempt.   12/15/20-Attempt assessment with patient for outpatient case management. No answer. Mail box is full. Letter Mailed. 2'nd assessment attempt.   12/23/20-Attempt assessment with  patient for outpatient case management. No answer. Mailbox is full. 3'rd assessment attempt. Case Closure. Message to PCP who placed referral.

## 2020-12-23 ENCOUNTER — TELEPHONE (OUTPATIENT)
Dept: FAMILY MEDICINE | Facility: CLINIC | Age: 55
End: 2020-12-23

## 2020-12-23 ENCOUNTER — LAB VISIT (OUTPATIENT)
Dept: LAB | Facility: HOSPITAL | Age: 55
End: 2020-12-23
Attending: INTERNAL MEDICINE
Payer: MEDICARE

## 2020-12-23 DIAGNOSIS — J44.9 CHRONIC OBSTRUCTIVE PULMONARY DISEASE, UNSPECIFIED COPD TYPE: ICD-10-CM

## 2020-12-23 DIAGNOSIS — I10 ESSENTIAL HYPERTENSION: ICD-10-CM

## 2020-12-23 DIAGNOSIS — Z11.4 ENCOUNTER FOR SCREENING FOR HIV: ICD-10-CM

## 2020-12-23 LAB
ALBUMIN SERPL BCP-MCNC: 3.5 G/DL (ref 3.5–5.2)
ALP SERPL-CCNC: 68 U/L (ref 55–135)
ALT SERPL W/O P-5'-P-CCNC: 7 U/L (ref 10–44)
ANION GAP SERPL CALC-SCNC: 7 MMOL/L (ref 8–16)
AST SERPL-CCNC: 11 U/L (ref 10–40)
BASOPHILS # BLD AUTO: 0.02 K/UL (ref 0–0.2)
BASOPHILS NFR BLD: 0.3 % (ref 0–1.9)
BILIRUB SERPL-MCNC: 0.5 MG/DL (ref 0.1–1)
BUN SERPL-MCNC: 29 MG/DL (ref 6–20)
CALCIUM SERPL-MCNC: 9.3 MG/DL (ref 8.7–10.5)
CHLORIDE SERPL-SCNC: 107 MMOL/L (ref 95–110)
CHOLEST SERPL-MCNC: 162 MG/DL (ref 120–199)
CHOLEST/HDLC SERPL: 3.5 {RATIO} (ref 2–5)
CO2 SERPL-SCNC: 27 MMOL/L (ref 23–29)
CREAT SERPL-MCNC: 1.9 MG/DL (ref 0.5–1.4)
DIFFERENTIAL METHOD: ABNORMAL
EOSINOPHIL # BLD AUTO: 0.1 K/UL (ref 0–0.5)
EOSINOPHIL NFR BLD: 0.8 % (ref 0–8)
ERYTHROCYTE [DISTWIDTH] IN BLOOD BY AUTOMATED COUNT: 13.6 % (ref 11.5–14.5)
EST. GFR  (AFRICAN AMERICAN): 33.5 ML/MIN/1.73 M^2
EST. GFR  (NON AFRICAN AMERICAN): 29.1 ML/MIN/1.73 M^2
GLUCOSE SERPL-MCNC: 90 MG/DL (ref 70–110)
HCT VFR BLD AUTO: 40.4 % (ref 37–48.5)
HDLC SERPL-MCNC: 46 MG/DL (ref 40–75)
HDLC SERPL: 28.4 % (ref 20–50)
HGB BLD-MCNC: 12.3 G/DL (ref 12–16)
HIV1+2 IGG SERPL QL IA.RAPID: NORMAL
IMM GRANULOCYTES # BLD AUTO: 0.02 K/UL (ref 0–0.04)
IMM GRANULOCYTES NFR BLD AUTO: 0.3 % (ref 0–0.5)
LDLC SERPL CALC-MCNC: 97.6 MG/DL (ref 63–159)
LYMPHOCYTES # BLD AUTO: 1.3 K/UL (ref 1–4.8)
LYMPHOCYTES NFR BLD: 20.4 % (ref 18–48)
MCH RBC QN AUTO: 28.3 PG (ref 27–31)
MCHC RBC AUTO-ENTMCNC: 30.4 G/DL (ref 32–36)
MCV RBC AUTO: 93 FL (ref 82–98)
MONOCYTES # BLD AUTO: 0.5 K/UL (ref 0.3–1)
MONOCYTES NFR BLD: 7.3 % (ref 4–15)
NEUTROPHILS # BLD AUTO: 4.7 K/UL (ref 1.8–7.7)
NEUTROPHILS NFR BLD: 70.9 % (ref 38–73)
NONHDLC SERPL-MCNC: 116 MG/DL
NRBC BLD-RTO: 0 /100 WBC
PLATELET # BLD AUTO: 257 K/UL (ref 150–350)
PMV BLD AUTO: 10.9 FL (ref 9.2–12.9)
POTASSIUM SERPL-SCNC: 4.7 MMOL/L (ref 3.5–5.1)
PROT SERPL-MCNC: 7.6 G/DL (ref 6–8.4)
RBC # BLD AUTO: 4.34 M/UL (ref 4–5.4)
SODIUM SERPL-SCNC: 141 MMOL/L (ref 136–145)
TRIGL SERPL-MCNC: 92 MG/DL (ref 30–150)
WBC # BLD AUTO: 6.56 K/UL (ref 3.9–12.7)

## 2020-12-23 PROCEDURE — 86703 HIV-1/HIV-2 1 RESULT ANTBDY: CPT | Mod: HCNC

## 2020-12-23 PROCEDURE — 36415 COLL VENOUS BLD VENIPUNCTURE: CPT | Mod: HCNC,PO

## 2020-12-23 PROCEDURE — 80053 COMPREHEN METABOLIC PANEL: CPT | Mod: HCNC

## 2020-12-23 PROCEDURE — 80061 LIPID PANEL: CPT | Mod: HCNC

## 2020-12-23 PROCEDURE — 85025 COMPLETE CBC W/AUTO DIFF WBC: CPT | Mod: HCNC

## 2020-12-23 NOTE — TELEPHONE ENCOUNTER
Informational       ----- Message from Margarita Young RN sent at 12/23/2020  2:52 PM CST -----  Regarding: referral on 12/10/20  Have been unable to contact patient for Outpatient Case Management after several attempts. Case Closure.     Thank you,   Margarita Young RN [facility]

## 2020-12-23 NOTE — PROGRESS NOTES
Normal labs, more to come.   Dr. Gonzales has reviewed your result, let us know if you have any problems

## 2021-01-04 ENCOUNTER — PATIENT MESSAGE (OUTPATIENT)
Dept: ADMINISTRATIVE | Facility: HOSPITAL | Age: 56
End: 2021-01-04

## 2021-01-11 ENCOUNTER — PATIENT OUTREACH (OUTPATIENT)
Dept: ADMINISTRATIVE | Facility: HOSPITAL | Age: 56
End: 2021-01-11

## 2021-01-13 ENCOUNTER — PATIENT OUTREACH (OUTPATIENT)
Dept: ADMINISTRATIVE | Facility: HOSPITAL | Age: 56
End: 2021-01-13

## 2021-01-20 ENCOUNTER — OFFICE VISIT (OUTPATIENT)
Dept: ORTHOPEDICS | Facility: CLINIC | Age: 56
End: 2021-01-20
Payer: MEDICARE

## 2021-01-20 VITALS
SYSTOLIC BLOOD PRESSURE: 118 MMHG | WEIGHT: 233 LBS | DIASTOLIC BLOOD PRESSURE: 74 MMHG | BODY MASS INDEX: 42.88 KG/M2 | HEIGHT: 62 IN

## 2021-01-20 DIAGNOSIS — S90.31XA CONTUSION OF RIGHT FOOT, INITIAL ENCOUNTER: ICD-10-CM

## 2021-01-20 DIAGNOSIS — S99.921A RIGHT FOOT INJURY, INITIAL ENCOUNTER: ICD-10-CM

## 2021-01-20 DIAGNOSIS — S89.91XA: ICD-10-CM

## 2021-01-20 DIAGNOSIS — S92.244A CLOSED NONDISPLACED FRACTURE OF MEDIAL CUNEIFORM OF RIGHT FOOT, INITIAL ENCOUNTER: Primary | ICD-10-CM

## 2021-01-20 DIAGNOSIS — M79.671 FOOT PAIN, RIGHT: ICD-10-CM

## 2021-01-20 PROCEDURE — 1125F AMNT PAIN NOTED PAIN PRSNT: CPT | Mod: S$GLB,,, | Performed by: PHYSICIAN ASSISTANT

## 2021-01-20 PROCEDURE — 3078F DIAST BP <80 MM HG: CPT | Mod: S$GLB,,, | Performed by: PHYSICIAN ASSISTANT

## 2021-01-20 PROCEDURE — 3074F SYST BP LT 130 MM HG: CPT | Mod: S$GLB,,, | Performed by: PHYSICIAN ASSISTANT

## 2021-01-20 PROCEDURE — 3008F PR BODY MASS INDEX (BMI) DOCUMENTED: ICD-10-PCS | Mod: S$GLB,,, | Performed by: PHYSICIAN ASSISTANT

## 2021-01-20 PROCEDURE — 1125F PR PAIN SEVERITY QUANTIFIED, PAIN PRESENT: ICD-10-PCS | Mod: S$GLB,,, | Performed by: PHYSICIAN ASSISTANT

## 2021-01-20 PROCEDURE — 99204 PR OFFICE/OUTPT VISIT, NEW, LEVL IV, 45-59 MIN: ICD-10-PCS | Mod: S$GLB,,, | Performed by: PHYSICIAN ASSISTANT

## 2021-01-20 PROCEDURE — 3074F PR MOST RECENT SYSTOLIC BLOOD PRESSURE < 130 MM HG: ICD-10-PCS | Mod: S$GLB,,, | Performed by: PHYSICIAN ASSISTANT

## 2021-01-20 PROCEDURE — 3008F BODY MASS INDEX DOCD: CPT | Mod: S$GLB,,, | Performed by: PHYSICIAN ASSISTANT

## 2021-01-20 PROCEDURE — 99204 OFFICE O/P NEW MOD 45 MIN: CPT | Mod: S$GLB,,, | Performed by: PHYSICIAN ASSISTANT

## 2021-01-20 PROCEDURE — 3078F PR MOST RECENT DIASTOLIC BLOOD PRESSURE < 80 MM HG: ICD-10-PCS | Mod: S$GLB,,, | Performed by: PHYSICIAN ASSISTANT

## 2021-01-20 RX ORDER — HYDROCODONE BITARTRATE AND ACETAMINOPHEN 5; 325 MG/1; MG/1
TABLET ORAL
COMMUNITY
Start: 2021-01-17 | End: 2021-01-20

## 2021-01-20 RX ORDER — HYDROCODONE BITARTRATE AND ACETAMINOPHEN 7.5; 325 MG/1; MG/1
1 TABLET ORAL EVERY 6 HOURS PRN
Qty: 28 TABLET | Refills: 0 | Status: SHIPPED | OUTPATIENT
Start: 2021-01-20 | End: 2021-01-27

## 2021-01-22 ENCOUNTER — OFFICE VISIT (OUTPATIENT)
Dept: PODIATRY | Facility: CLINIC | Age: 56
End: 2021-01-22
Payer: MEDICARE

## 2021-01-22 VITALS — HEART RATE: 67 BPM | OXYGEN SATURATION: 96 % | HEIGHT: 62 IN | BODY MASS INDEX: 42.62 KG/M2

## 2021-01-22 DIAGNOSIS — M79.671 RIGHT FOOT PAIN: ICD-10-CM

## 2021-01-22 DIAGNOSIS — S90.31XA CONTUSION OF RIGHT FOOT, INITIAL ENCOUNTER: ICD-10-CM

## 2021-01-22 DIAGNOSIS — S89.91XA: Primary | ICD-10-CM

## 2021-01-22 DIAGNOSIS — S99.921A INJURY OF RIGHT FOOT, INITIAL ENCOUNTER: ICD-10-CM

## 2021-01-22 PROCEDURE — 1125F PR PAIN SEVERITY QUANTIFIED, PAIN PRESENT: ICD-10-PCS | Mod: S$GLB,,, | Performed by: PODIATRIST

## 2021-01-22 PROCEDURE — 3008F BODY MASS INDEX DOCD: CPT | Mod: CPTII,S$GLB,, | Performed by: PODIATRIST

## 2021-01-22 PROCEDURE — 3008F PR BODY MASS INDEX (BMI) DOCUMENTED: ICD-10-PCS | Mod: CPTII,S$GLB,, | Performed by: PODIATRIST

## 2021-01-22 PROCEDURE — 1125F AMNT PAIN NOTED PAIN PRSNT: CPT | Mod: S$GLB,,, | Performed by: PODIATRIST

## 2021-01-22 PROCEDURE — 99203 OFFICE O/P NEW LOW 30 MIN: CPT | Mod: S$GLB,,, | Performed by: PODIATRIST

## 2021-01-22 PROCEDURE — 99203 PR OFFICE/OUTPT VISIT, NEW, LEVL III, 30-44 MIN: ICD-10-PCS | Mod: S$GLB,,, | Performed by: PODIATRIST

## 2021-01-29 ENCOUNTER — TELEPHONE (OUTPATIENT)
Dept: FAMILY MEDICINE | Facility: CLINIC | Age: 56
End: 2021-01-29

## 2021-02-04 ENCOUNTER — PATIENT OUTREACH (OUTPATIENT)
Dept: ADMINISTRATIVE | Facility: HOSPITAL | Age: 56
End: 2021-02-04

## 2021-02-18 ENCOUNTER — TELEPHONE (OUTPATIENT)
Dept: FAMILY MEDICINE | Facility: CLINIC | Age: 56
End: 2021-02-18

## 2021-02-19 ENCOUNTER — HOSPITAL ENCOUNTER (OUTPATIENT)
Dept: RADIOLOGY | Facility: CLINIC | Age: 56
Discharge: HOME | End: 2021-02-19
Attending: PODIATRIST
Payer: MEDICARE

## 2021-02-19 ENCOUNTER — OFFICE VISIT (OUTPATIENT)
Dept: PODIATRY | Facility: CLINIC | Age: 56
End: 2021-02-19
Payer: MEDICARE

## 2021-02-19 VITALS — BODY MASS INDEX: 43.98 KG/M2 | WEIGHT: 239 LBS | HEIGHT: 62 IN | RESPIRATION RATE: 16 BRPM

## 2021-02-19 DIAGNOSIS — S99.921D INJURY OF RIGHT FOOT, SUBSEQUENT ENCOUNTER: Primary | ICD-10-CM

## 2021-02-19 DIAGNOSIS — S90.31XD CONTUSION OF RIGHT FOOT, SUBSEQUENT ENCOUNTER: ICD-10-CM

## 2021-02-19 DIAGNOSIS — M79.671 RIGHT FOOT PAIN: ICD-10-CM

## 2021-02-19 PROCEDURE — 73630 XR FOOT COMPLETE 3 VIEW RIGHT: ICD-10-PCS | Mod: RT,S$GLB,, | Performed by: RADIOLOGY

## 2021-02-19 PROCEDURE — 73630 X-RAY EXAM OF FOOT: CPT | Mod: RT,S$GLB,, | Performed by: RADIOLOGY

## 2021-02-19 PROCEDURE — 99213 PR OFFICE/OUTPT VISIT, EST, LEVL III, 20-29 MIN: ICD-10-PCS | Mod: S$GLB,,, | Performed by: PODIATRIST

## 2021-02-19 PROCEDURE — 3008F PR BODY MASS INDEX (BMI) DOCUMENTED: ICD-10-PCS | Mod: CPTII,S$GLB,, | Performed by: PODIATRIST

## 2021-02-19 PROCEDURE — 3008F BODY MASS INDEX DOCD: CPT | Mod: CPTII,S$GLB,, | Performed by: PODIATRIST

## 2021-02-19 PROCEDURE — 99213 OFFICE O/P EST LOW 20 MIN: CPT | Mod: S$GLB,,, | Performed by: PODIATRIST

## 2021-02-25 ENCOUNTER — PATIENT OUTREACH (OUTPATIENT)
Dept: ADMINISTRATIVE | Facility: HOSPITAL | Age: 56
End: 2021-02-25

## 2021-02-25 ENCOUNTER — OFFICE VISIT (OUTPATIENT)
Dept: FAMILY MEDICINE | Facility: CLINIC | Age: 56
End: 2021-02-25
Payer: MEDICARE

## 2021-02-25 VITALS
WEIGHT: 234.56 LBS | SYSTOLIC BLOOD PRESSURE: 146 MMHG | DIASTOLIC BLOOD PRESSURE: 98 MMHG | OXYGEN SATURATION: 95 % | HEIGHT: 62 IN | BODY MASS INDEX: 43.16 KG/M2 | TEMPERATURE: 98 F | RESPIRATION RATE: 18 BRPM | HEART RATE: 68 BPM

## 2021-02-25 DIAGNOSIS — Z12.11 COLON CANCER SCREENING: ICD-10-CM

## 2021-02-25 DIAGNOSIS — Z12.31 BREAST CANCER SCREENING BY MAMMOGRAM: ICD-10-CM

## 2021-02-25 DIAGNOSIS — K29.70 GASTRITIS, PRESENCE OF BLEEDING UNSPECIFIED, UNSPECIFIED CHRONICITY, UNSPECIFIED GASTRITIS TYPE: ICD-10-CM

## 2021-02-25 DIAGNOSIS — J44.9 CHRONIC OBSTRUCTIVE PULMONARY DISEASE, UNSPECIFIED COPD TYPE: ICD-10-CM

## 2021-02-25 DIAGNOSIS — Z78.0 POST-MENOPAUSAL: ICD-10-CM

## 2021-02-25 DIAGNOSIS — E66.01 MORBID OBESITY WITH BMI OF 40.0-44.9, ADULT: ICD-10-CM

## 2021-02-25 DIAGNOSIS — N18.32 STAGE 3B CHRONIC KIDNEY DISEASE: ICD-10-CM

## 2021-02-25 DIAGNOSIS — I50.32 CHRONIC DIASTOLIC CHF (CONGESTIVE HEART FAILURE): ICD-10-CM

## 2021-02-25 DIAGNOSIS — I69.354 HEMIPARESIS AFFECTING LEFT SIDE AS LATE EFFECT OF CEREBROVASCULAR ACCIDENT: ICD-10-CM

## 2021-02-25 DIAGNOSIS — Z23 NEED FOR DIPHTHERIA-TETANUS-PERTUSSIS (TDAP) VACCINE: ICD-10-CM

## 2021-02-25 DIAGNOSIS — I20.89 ANGINA AT REST: ICD-10-CM

## 2021-02-25 DIAGNOSIS — R10.9 ABDOMINAL PAIN, UNSPECIFIED ABDOMINAL LOCATION: ICD-10-CM

## 2021-02-25 DIAGNOSIS — I10 ESSENTIAL HYPERTENSION: ICD-10-CM

## 2021-02-25 DIAGNOSIS — Z00.00 ENCOUNTER FOR PREVENTIVE HEALTH EXAMINATION: Primary | ICD-10-CM

## 2021-02-25 DIAGNOSIS — Z87.81 HISTORY OF FRACTURE: ICD-10-CM

## 2021-02-25 DIAGNOSIS — G47.30 SLEEP APNEA, UNSPECIFIED TYPE: ICD-10-CM

## 2021-02-25 PROBLEM — E78.5 HYPERLIPIDEMIA ASSOCIATED WITH TYPE 2 DIABETES MELLITUS: Status: ACTIVE | Noted: 2021-02-25

## 2021-02-25 PROBLEM — E11.69 HYPERLIPIDEMIA ASSOCIATED WITH TYPE 2 DIABETES MELLITUS: Status: ACTIVE | Noted: 2021-02-25

## 2021-02-25 PROCEDURE — G9920 SCRNING PERF AND NEGATIVE: HCPCS | Mod: CPTII,S$GLB,, | Performed by: NURSE PRACTITIONER

## 2021-02-25 PROCEDURE — 1126F AMNT PAIN NOTED NONE PRSNT: CPT | Mod: S$GLB,,, | Performed by: NURSE PRACTITIONER

## 2021-02-25 PROCEDURE — G9920 PR SCREENING AND NEGATIVE: ICD-10-PCS | Mod: CPTII,S$GLB,, | Performed by: NURSE PRACTITIONER

## 2021-02-25 PROCEDURE — 3008F PR BODY MASS INDEX (BMI) DOCUMENTED: ICD-10-PCS | Mod: CPTII,S$GLB,, | Performed by: NURSE PRACTITIONER

## 2021-02-25 PROCEDURE — G0439 PR MEDICARE ANNUAL WELLNESS SUBSEQUENT VISIT: ICD-10-PCS | Mod: S$GLB,,, | Performed by: NURSE PRACTITIONER

## 2021-02-25 PROCEDURE — G0439 PPPS, SUBSEQ VISIT: HCPCS | Mod: S$GLB,,, | Performed by: NURSE PRACTITIONER

## 2021-02-25 PROCEDURE — 1126F PR PAIN SEVERITY QUANTIFIED, NO PAIN PRESENT: ICD-10-PCS | Mod: S$GLB,,, | Performed by: NURSE PRACTITIONER

## 2021-02-25 PROCEDURE — 3008F BODY MASS INDEX DOCD: CPT | Mod: CPTII,S$GLB,, | Performed by: NURSE PRACTITIONER

## 2021-02-25 RX ORDER — PANTOPRAZOLE SODIUM 40 MG/1
40 TABLET, DELAYED RELEASE ORAL DAILY
Qty: 30 TABLET | Refills: 2 | Status: SHIPPED | OUTPATIENT
Start: 2021-02-25 | End: 2021-03-11

## 2021-02-25 RX ORDER — HYDRALAZINE HYDROCHLORIDE 50 MG/1
50 TABLET, FILM COATED ORAL EVERY 8 HOURS
Qty: 270 TABLET | Refills: 3 | Status: SHIPPED | OUTPATIENT
Start: 2021-02-25 | End: 2022-01-05 | Stop reason: SDUPTHER

## 2021-03-04 ENCOUNTER — DOCUMENTATION ONLY (OUTPATIENT)
Dept: FAMILY MEDICINE | Facility: CLINIC | Age: 56
End: 2021-03-04

## 2021-03-04 ENCOUNTER — OFFICE VISIT (OUTPATIENT)
Dept: PODIATRY | Facility: CLINIC | Age: 56
End: 2021-03-04
Payer: MEDICARE

## 2021-03-04 ENCOUNTER — HOSPITAL ENCOUNTER (OUTPATIENT)
Dept: RADIOLOGY | Facility: CLINIC | Age: 56
Discharge: HOME | End: 2021-03-04
Attending: PODIATRIST
Payer: MEDICARE

## 2021-03-04 VITALS — BODY MASS INDEX: 42.9 KG/M2 | HEIGHT: 62 IN | RESPIRATION RATE: 16 BRPM

## 2021-03-04 DIAGNOSIS — S99.921D INJURY OF RIGHT FOOT, SUBSEQUENT ENCOUNTER: Primary | ICD-10-CM

## 2021-03-04 DIAGNOSIS — M79.671 RIGHT FOOT PAIN: ICD-10-CM

## 2021-03-04 DIAGNOSIS — S90.31XD CONTUSION OF RIGHT FOOT, SUBSEQUENT ENCOUNTER: ICD-10-CM

## 2021-03-04 PROCEDURE — 73630 XR FOOT COMPLETE 3 VIEW RIGHT: ICD-10-PCS | Mod: RT,S$GLB,, | Performed by: RADIOLOGY

## 2021-03-04 PROCEDURE — 1125F AMNT PAIN NOTED PAIN PRSNT: CPT | Mod: S$GLB,,, | Performed by: PODIATRIST

## 2021-03-04 PROCEDURE — 1125F PR PAIN SEVERITY QUANTIFIED, PAIN PRESENT: ICD-10-PCS | Mod: S$GLB,,, | Performed by: PODIATRIST

## 2021-03-04 PROCEDURE — 99213 PR OFFICE/OUTPT VISIT, EST, LEVL III, 20-29 MIN: ICD-10-PCS | Mod: S$GLB,,, | Performed by: PODIATRIST

## 2021-03-04 PROCEDURE — 3008F PR BODY MASS INDEX (BMI) DOCUMENTED: ICD-10-PCS | Mod: CPTII,S$GLB,, | Performed by: PODIATRIST

## 2021-03-04 PROCEDURE — 99213 OFFICE O/P EST LOW 20 MIN: CPT | Mod: S$GLB,,, | Performed by: PODIATRIST

## 2021-03-04 PROCEDURE — 73630 X-RAY EXAM OF FOOT: CPT | Mod: RT,S$GLB,, | Performed by: RADIOLOGY

## 2021-03-04 PROCEDURE — 3008F BODY MASS INDEX DOCD: CPT | Mod: CPTII,S$GLB,, | Performed by: PODIATRIST

## 2021-03-08 ENCOUNTER — TELEPHONE (OUTPATIENT)
Dept: FAMILY MEDICINE | Facility: CLINIC | Age: 56
End: 2021-03-08

## 2021-03-08 DIAGNOSIS — Z00.00 ENCOUNTER FOR PREVENTIVE HEALTH EXAMINATION: Primary | ICD-10-CM

## 2021-03-11 ENCOUNTER — OFFICE VISIT (OUTPATIENT)
Dept: FAMILY MEDICINE | Facility: CLINIC | Age: 56
End: 2021-03-11
Payer: MEDICARE

## 2021-03-11 VITALS
WEIGHT: 234.56 LBS | SYSTOLIC BLOOD PRESSURE: 118 MMHG | DIASTOLIC BLOOD PRESSURE: 88 MMHG | HEART RATE: 74 BPM | BODY MASS INDEX: 43.16 KG/M2 | HEIGHT: 62 IN | RESPIRATION RATE: 16 BRPM | TEMPERATURE: 98 F

## 2021-03-11 DIAGNOSIS — F41.9 ANXIETY AND DEPRESSION: ICD-10-CM

## 2021-03-11 DIAGNOSIS — F32.5 MAJOR DEPRESSIVE DISORDER, SINGLE EPISODE, IN FULL REMISSION: ICD-10-CM

## 2021-03-11 DIAGNOSIS — F32.A ANXIETY AND DEPRESSION: ICD-10-CM

## 2021-03-11 DIAGNOSIS — I15.1 HYPERTENSION SECONDARY TO OTHER RENAL DISORDERS: ICD-10-CM

## 2021-03-11 DIAGNOSIS — I50.32 CHRONIC DIASTOLIC CHF (CONGESTIVE HEART FAILURE): ICD-10-CM

## 2021-03-11 DIAGNOSIS — E66.01 MORBID OBESITY WITH BMI OF 40.0-44.9, ADULT: ICD-10-CM

## 2021-03-11 DIAGNOSIS — Z01.818 PREOP EXAMINATION: Primary | ICD-10-CM

## 2021-03-11 PROCEDURE — 99499 RISK ADDL DX/OHS AUDIT: ICD-10-PCS | Mod: S$GLB,,, | Performed by: INTERNAL MEDICINE

## 2021-03-11 PROCEDURE — 3008F PR BODY MASS INDEX (BMI) DOCUMENTED: ICD-10-PCS | Mod: CPTII,S$GLB,, | Performed by: INTERNAL MEDICINE

## 2021-03-11 PROCEDURE — 3008F BODY MASS INDEX DOCD: CPT | Mod: CPTII,S$GLB,, | Performed by: INTERNAL MEDICINE

## 2021-03-11 PROCEDURE — 3079F DIAST BP 80-89 MM HG: CPT | Mod: CPTII,S$GLB,, | Performed by: INTERNAL MEDICINE

## 2021-03-11 PROCEDURE — 93005 EKG 12-LEAD: ICD-10-PCS | Mod: S$GLB,,, | Performed by: INTERNAL MEDICINE

## 2021-03-11 PROCEDURE — 93005 ELECTROCARDIOGRAM TRACING: CPT | Mod: S$GLB,,, | Performed by: INTERNAL MEDICINE

## 2021-03-11 PROCEDURE — 99214 PR OFFICE/OUTPT VISIT, EST, LEVL IV, 30-39 MIN: ICD-10-PCS | Mod: S$GLB,,, | Performed by: INTERNAL MEDICINE

## 2021-03-11 PROCEDURE — 99214 OFFICE O/P EST MOD 30 MIN: CPT | Mod: S$GLB,,, | Performed by: INTERNAL MEDICINE

## 2021-03-11 PROCEDURE — 99499 UNLISTED E&M SERVICE: CPT | Mod: S$GLB,,, | Performed by: INTERNAL MEDICINE

## 2021-03-11 PROCEDURE — 1126F PR PAIN SEVERITY QUANTIFIED, NO PAIN PRESENT: ICD-10-PCS | Mod: S$GLB,,, | Performed by: INTERNAL MEDICINE

## 2021-03-11 PROCEDURE — 3074F PR MOST RECENT SYSTOLIC BLOOD PRESSURE < 130 MM HG: ICD-10-PCS | Mod: CPTII,S$GLB,, | Performed by: INTERNAL MEDICINE

## 2021-03-11 PROCEDURE — 93010 EKG 12-LEAD: ICD-10-PCS | Mod: S$GLB,ICN,, | Performed by: INTERNAL MEDICINE

## 2021-03-11 PROCEDURE — 93010 ELECTROCARDIOGRAM REPORT: CPT | Mod: S$GLB,ICN,, | Performed by: INTERNAL MEDICINE

## 2021-03-11 PROCEDURE — 1126F AMNT PAIN NOTED NONE PRSNT: CPT | Mod: S$GLB,,, | Performed by: INTERNAL MEDICINE

## 2021-03-11 PROCEDURE — 3079F PR MOST RECENT DIASTOLIC BLOOD PRESSURE 80-89 MM HG: ICD-10-PCS | Mod: CPTII,S$GLB,, | Performed by: INTERNAL MEDICINE

## 2021-03-11 PROCEDURE — 3074F SYST BP LT 130 MM HG: CPT | Mod: CPTII,S$GLB,, | Performed by: INTERNAL MEDICINE

## 2021-03-11 RX ORDER — ESCITALOPRAM OXALATE 10 MG/1
10 TABLET ORAL DAILY
Qty: 90 TABLET | Refills: 1 | Status: SHIPPED | OUTPATIENT
Start: 2021-03-11 | End: 2022-02-09 | Stop reason: SDUPTHER

## 2021-03-12 ENCOUNTER — PATIENT OUTREACH (OUTPATIENT)
Dept: ADMINISTRATIVE | Facility: HOSPITAL | Age: 56
End: 2021-03-12

## 2021-03-29 ENCOUNTER — IMMUNIZATION (OUTPATIENT)
Dept: PRIMARY CARE CLINIC | Facility: CLINIC | Age: 56
End: 2021-03-29
Payer: MEDICARE

## 2021-03-29 DIAGNOSIS — Z23 NEED FOR VACCINATION: Primary | ICD-10-CM

## 2021-03-29 PROCEDURE — 91300 COVID-19, MRNA, LNP-S, PF, 30 MCG/0.3 ML DOSE VACCINE: CPT | Mod: S$GLB,,, | Performed by: FAMILY MEDICINE

## 2021-03-29 PROCEDURE — 0001A COVID-19, MRNA, LNP-S, PF, 30 MCG/0.3 ML DOSE VACCINE: ICD-10-PCS | Mod: CV19,S$GLB,, | Performed by: FAMILY MEDICINE

## 2021-03-29 PROCEDURE — 91300 COVID-19, MRNA, LNP-S, PF, 30 MCG/0.3 ML DOSE VACCINE: ICD-10-PCS | Mod: S$GLB,,, | Performed by: FAMILY MEDICINE

## 2021-03-29 PROCEDURE — 0001A COVID-19, MRNA, LNP-S, PF, 30 MCG/0.3 ML DOSE VACCINE: CPT | Mod: CV19,S$GLB,, | Performed by: FAMILY MEDICINE

## 2021-03-31 ENCOUNTER — TELEPHONE (OUTPATIENT)
Dept: FAMILY MEDICINE | Facility: CLINIC | Age: 56
End: 2021-03-31

## 2021-04-05 ENCOUNTER — PATIENT MESSAGE (OUTPATIENT)
Dept: ADMINISTRATIVE | Facility: HOSPITAL | Age: 56
End: 2021-04-05

## 2021-04-19 ENCOUNTER — IMMUNIZATION (OUTPATIENT)
Dept: PRIMARY CARE CLINIC | Facility: CLINIC | Age: 56
End: 2021-04-19
Payer: MEDICARE

## 2021-04-19 DIAGNOSIS — Z23 NEED FOR VACCINATION: Primary | ICD-10-CM

## 2021-04-19 PROCEDURE — 91300 COVID-19, MRNA, LNP-S, PF, 30 MCG/0.3 ML DOSE VACCINE: ICD-10-PCS | Mod: S$GLB,,, | Performed by: PHYSICIAN ASSISTANT

## 2021-04-19 PROCEDURE — 0002A COVID-19, MRNA, LNP-S, PF, 30 MCG/0.3 ML DOSE VACCINE: ICD-10-PCS | Mod: CV19,S$GLB,, | Performed by: PHYSICIAN ASSISTANT

## 2021-04-19 PROCEDURE — 91300 COVID-19, MRNA, LNP-S, PF, 30 MCG/0.3 ML DOSE VACCINE: CPT | Mod: S$GLB,,, | Performed by: PHYSICIAN ASSISTANT

## 2021-04-19 PROCEDURE — 0002A COVID-19, MRNA, LNP-S, PF, 30 MCG/0.3 ML DOSE VACCINE: CPT | Mod: CV19,S$GLB,, | Performed by: PHYSICIAN ASSISTANT

## 2021-04-28 ENCOUNTER — LAB VISIT (OUTPATIENT)
Dept: LAB | Facility: HOSPITAL | Age: 56
End: 2021-04-28
Attending: SURGERY
Payer: MEDICARE

## 2021-04-28 DIAGNOSIS — Z13.29 SCREENING FOR THYROID DISORDER: ICD-10-CM

## 2021-04-28 DIAGNOSIS — E55.9 AVITAMINOSIS D: ICD-10-CM

## 2021-04-28 DIAGNOSIS — Z79.899 ENCOUNTER FOR LONG-TERM (CURRENT) USE OF OTHER MEDICATIONS: ICD-10-CM

## 2021-04-28 DIAGNOSIS — R68.89 MECHANICAL AND MOTOR PROBLEMS WITH INTERNAL ORGANS: ICD-10-CM

## 2021-04-28 DIAGNOSIS — Z13.220 SCREENING FOR LIPOID DISORDERS: ICD-10-CM

## 2021-04-28 DIAGNOSIS — R53.83 FATIGUE: ICD-10-CM

## 2021-04-28 DIAGNOSIS — Z13.21 SCREENING FOR MALNUTRITION: ICD-10-CM

## 2021-04-28 LAB
25(OH)D3+25(OH)D2 SERPL-MCNC: 30 NG/ML (ref 30–96)
ALBUMIN SERPL BCP-MCNC: 3.4 G/DL (ref 3.5–5.2)
ALP SERPL-CCNC: 60 U/L (ref 55–135)
ALT SERPL W/O P-5'-P-CCNC: 8 U/L (ref 10–44)
ANION GAP SERPL CALC-SCNC: 8 MMOL/L (ref 8–16)
AST SERPL-CCNC: 14 U/L (ref 10–40)
BASOPHILS # BLD AUTO: 0.02 K/UL (ref 0–0.2)
BASOPHILS NFR BLD: 0.3 % (ref 0–1.9)
BILIRUB SERPL-MCNC: 0.4 MG/DL (ref 0.1–1)
BUN SERPL-MCNC: 25 MG/DL (ref 6–20)
CALCIUM SERPL-MCNC: 8.9 MG/DL (ref 8.7–10.5)
CHLORIDE SERPL-SCNC: 109 MMOL/L (ref 95–110)
CHOLEST SERPL-MCNC: 165 MG/DL (ref 120–199)
CHOLEST/HDLC SERPL: 3.7 {RATIO} (ref 2–5)
CO2 SERPL-SCNC: 25 MMOL/L (ref 23–29)
CREAT SERPL-MCNC: 1.5 MG/DL (ref 0.5–1.4)
DIFFERENTIAL METHOD: ABNORMAL
EOSINOPHIL # BLD AUTO: 0.1 K/UL (ref 0–0.5)
EOSINOPHIL NFR BLD: 1 % (ref 0–8)
ERYTHROCYTE [DISTWIDTH] IN BLOOD BY AUTOMATED COUNT: 13.7 % (ref 11.5–14.5)
EST. GFR  (AFRICAN AMERICAN): 44.3 ML/MIN/1.73 M^2
EST. GFR  (NON AFRICAN AMERICAN): 38.4 ML/MIN/1.73 M^2
FERRITIN SERPL-MCNC: 60 NG/ML (ref 20–300)
GLUCOSE SERPL-MCNC: 80 MG/DL (ref 70–110)
HCT VFR BLD AUTO: 38.2 % (ref 37–48.5)
HDLC SERPL-MCNC: 45 MG/DL (ref 40–75)
HDLC SERPL: 27.3 % (ref 20–50)
HGB BLD-MCNC: 11.9 G/DL (ref 12–16)
IMM GRANULOCYTES # BLD AUTO: 0.02 K/UL (ref 0–0.04)
IMM GRANULOCYTES NFR BLD AUTO: 0.3 % (ref 0–0.5)
LDLC SERPL CALC-MCNC: 103.8 MG/DL (ref 63–159)
LYMPHOCYTES # BLD AUTO: 1.4 K/UL (ref 1–4.8)
LYMPHOCYTES NFR BLD: 23.6 % (ref 18–48)
MAGNESIUM SERPL-MCNC: 1.9 MG/DL (ref 1.6–2.6)
MCH RBC QN AUTO: 28.8 PG (ref 27–31)
MCHC RBC AUTO-ENTMCNC: 31.2 G/DL (ref 32–36)
MCV RBC AUTO: 93 FL (ref 82–98)
MONOCYTES # BLD AUTO: 0.4 K/UL (ref 0.3–1)
MONOCYTES NFR BLD: 6.9 % (ref 4–15)
NEUTROPHILS # BLD AUTO: 4.1 K/UL (ref 1.8–7.7)
NEUTROPHILS NFR BLD: 67.9 % (ref 38–73)
NONHDLC SERPL-MCNC: 120 MG/DL
NRBC BLD-RTO: 0 /100 WBC
PLATELET # BLD AUTO: 322 K/UL (ref 150–450)
PMV BLD AUTO: 10.3 FL (ref 9.2–12.9)
POTASSIUM SERPL-SCNC: 4 MMOL/L (ref 3.5–5.1)
PREALB SERPL-MCNC: 14 MG/DL (ref 20–43)
PROT SERPL-MCNC: 7.5 G/DL (ref 6–8.4)
RBC # BLD AUTO: 4.13 M/UL (ref 4–5.4)
SODIUM SERPL-SCNC: 142 MMOL/L (ref 136–145)
TRIGL SERPL-MCNC: 81 MG/DL (ref 30–150)
TSH SERPL DL<=0.005 MIU/L-ACNC: 0.98 UIU/ML (ref 0.4–4)
VIT B12 SERPL-MCNC: 450 PG/ML (ref 210–950)
WBC # BLD AUTO: 6.05 K/UL (ref 3.9–12.7)

## 2021-04-28 PROCEDURE — 80061 LIPID PANEL: CPT | Mod: 91 | Performed by: SURGERY

## 2021-04-28 PROCEDURE — 85025 COMPLETE CBC W/AUTO DIFF WBC: CPT | Performed by: SURGERY

## 2021-04-28 PROCEDURE — 82607 VITAMIN B-12: CPT | Performed by: SURGERY

## 2021-04-28 PROCEDURE — 84425 ASSAY OF VITAMIN B-1: CPT | Performed by: SURGERY

## 2021-04-28 PROCEDURE — 84443 ASSAY THYROID STIM HORMONE: CPT | Performed by: SURGERY

## 2021-04-28 PROCEDURE — 80053 COMPREHEN METABOLIC PANEL: CPT | Performed by: SURGERY

## 2021-04-28 PROCEDURE — 84590 ASSAY OF VITAMIN A: CPT | Performed by: SURGERY

## 2021-04-28 PROCEDURE — 84630 ASSAY OF ZINC: CPT | Performed by: SURGERY

## 2021-04-28 PROCEDURE — 82728 ASSAY OF FERRITIN: CPT | Performed by: SURGERY

## 2021-04-28 PROCEDURE — 83735 ASSAY OF MAGNESIUM: CPT | Performed by: SURGERY

## 2021-04-28 PROCEDURE — 84134 ASSAY OF PREALBUMIN: CPT | Performed by: SURGERY

## 2021-04-28 PROCEDURE — 82306 VITAMIN D 25 HYDROXY: CPT | Performed by: SURGERY

## 2021-05-03 LAB — ZINC SERPL-MCNC: 56 UG/DL (ref 60–130)

## 2021-05-04 LAB
VIT A SERPL-MCNC: 64 UG/DL (ref 38–106)
VIT B1 BLD-MCNC: 42 UG/L (ref 38–122)

## 2021-05-05 ENCOUNTER — OFFICE VISIT (OUTPATIENT)
Dept: FAMILY MEDICINE | Facility: CLINIC | Age: 56
End: 2021-05-05
Payer: MEDICARE

## 2021-05-05 VITALS
SYSTOLIC BLOOD PRESSURE: 136 MMHG | BODY MASS INDEX: 43.05 KG/M2 | RESPIRATION RATE: 16 BRPM | OXYGEN SATURATION: 98 % | HEIGHT: 62 IN | TEMPERATURE: 99 F | HEART RATE: 87 BPM | WEIGHT: 233.94 LBS | DIASTOLIC BLOOD PRESSURE: 76 MMHG

## 2021-05-05 DIAGNOSIS — Z01.818 PREOP EXAMINATION: Primary | ICD-10-CM

## 2021-05-05 DIAGNOSIS — I50.32 CHRONIC DIASTOLIC CHF (CONGESTIVE HEART FAILURE): ICD-10-CM

## 2021-05-05 DIAGNOSIS — E66.01 MORBID OBESITY WITH BMI OF 40.0-44.9, ADULT: ICD-10-CM

## 2021-05-05 PROCEDURE — 1126F AMNT PAIN NOTED NONE PRSNT: CPT | Mod: S$GLB,,, | Performed by: INTERNAL MEDICINE

## 2021-05-05 PROCEDURE — 3008F BODY MASS INDEX DOCD: CPT | Mod: CPTII,S$GLB,, | Performed by: INTERNAL MEDICINE

## 2021-05-05 PROCEDURE — 3008F PR BODY MASS INDEX (BMI) DOCUMENTED: ICD-10-PCS | Mod: CPTII,S$GLB,, | Performed by: INTERNAL MEDICINE

## 2021-05-05 PROCEDURE — 99213 PR OFFICE/OUTPT VISIT, EST, LEVL III, 20-29 MIN: ICD-10-PCS | Mod: S$GLB,,, | Performed by: INTERNAL MEDICINE

## 2021-05-05 PROCEDURE — 99213 OFFICE O/P EST LOW 20 MIN: CPT | Mod: S$GLB,,, | Performed by: INTERNAL MEDICINE

## 2021-05-05 PROCEDURE — 1126F PR PAIN SEVERITY QUANTIFIED, NO PAIN PRESENT: ICD-10-PCS | Mod: S$GLB,,, | Performed by: INTERNAL MEDICINE

## 2021-05-10 DIAGNOSIS — I10 ESSENTIAL HYPERTENSION: ICD-10-CM

## 2021-05-10 DIAGNOSIS — K29.70 GASTRITIS, PRESENCE OF BLEEDING UNSPECIFIED, UNSPECIFIED CHRONICITY, UNSPECIFIED GASTRITIS TYPE: ICD-10-CM

## 2021-05-10 DIAGNOSIS — I20.89 ANGINA AT REST: ICD-10-CM

## 2021-05-10 DIAGNOSIS — I50.9 CONGESTIVE HEART FAILURE, UNSPECIFIED HF CHRONICITY, UNSPECIFIED HEART FAILURE TYPE: ICD-10-CM

## 2021-05-10 DIAGNOSIS — R10.9 ABDOMINAL PAIN, UNSPECIFIED ABDOMINAL LOCATION: ICD-10-CM

## 2021-05-10 DIAGNOSIS — G47.00 INSOMNIA, UNSPECIFIED TYPE: ICD-10-CM

## 2021-05-10 RX ORDER — SPIRONOLACTONE 25 MG/1
25 TABLET ORAL DAILY
Qty: 90 TABLET | Refills: 1 | Status: ON HOLD | OUTPATIENT
Start: 2021-05-10 | End: 2022-10-18 | Stop reason: HOSPADM

## 2021-05-10 RX ORDER — TRAZODONE HYDROCHLORIDE 50 MG/1
TABLET ORAL
Qty: 90 TABLET | Refills: 1 | Status: SHIPPED | OUTPATIENT
Start: 2021-05-10 | End: 2022-02-09 | Stop reason: SDUPTHER

## 2021-05-10 RX ORDER — METOPROLOL SUCCINATE 25 MG/1
50 TABLET, EXTENDED RELEASE ORAL DAILY
Qty: 180 TABLET | Refills: 1 | Status: SHIPPED | OUTPATIENT
Start: 2021-05-10 | End: 2021-06-10

## 2021-05-10 RX ORDER — PANTOPRAZOLE SODIUM 40 MG/1
40 TABLET, DELAYED RELEASE ORAL DAILY
Qty: 90 TABLET | Refills: 1 | Status: SHIPPED | OUTPATIENT
Start: 2021-05-10 | End: 2023-02-27 | Stop reason: SDUPTHER

## 2021-06-16 ENCOUNTER — OFFICE VISIT (OUTPATIENT)
Dept: FAMILY MEDICINE | Facility: CLINIC | Age: 56
End: 2021-06-16
Payer: MEDICARE

## 2021-06-16 VITALS
RESPIRATION RATE: 16 BRPM | TEMPERATURE: 98 F | DIASTOLIC BLOOD PRESSURE: 84 MMHG | WEIGHT: 233.25 LBS | HEIGHT: 62 IN | HEART RATE: 73 BPM | BODY MASS INDEX: 42.92 KG/M2 | SYSTOLIC BLOOD PRESSURE: 144 MMHG | OXYGEN SATURATION: 97 %

## 2021-06-16 DIAGNOSIS — I10 ESSENTIAL HYPERTENSION: Primary | ICD-10-CM

## 2021-06-16 DIAGNOSIS — N18.32 STAGE 3B CHRONIC KIDNEY DISEASE: ICD-10-CM

## 2021-06-16 DIAGNOSIS — I50.32 CHRONIC DIASTOLIC CHF (CONGESTIVE HEART FAILURE): ICD-10-CM

## 2021-06-16 PROCEDURE — 3077F SYST BP >= 140 MM HG: CPT | Mod: CPTII,S$GLB,, | Performed by: INTERNAL MEDICINE

## 2021-06-16 PROCEDURE — 3008F BODY MASS INDEX DOCD: CPT | Mod: CPTII,S$GLB,, | Performed by: INTERNAL MEDICINE

## 2021-06-16 PROCEDURE — 99214 PR OFFICE/OUTPT VISIT, EST, LEVL IV, 30-39 MIN: ICD-10-PCS | Mod: S$GLB,,, | Performed by: INTERNAL MEDICINE

## 2021-06-16 PROCEDURE — 1126F PR PAIN SEVERITY QUANTIFIED, NO PAIN PRESENT: ICD-10-PCS | Mod: S$GLB,,, | Performed by: INTERNAL MEDICINE

## 2021-06-16 PROCEDURE — 99499 RISK ADDL DX/OHS AUDIT: ICD-10-PCS | Mod: S$GLB,,, | Performed by: INTERNAL MEDICINE

## 2021-06-16 PROCEDURE — 99499 UNLISTED E&M SERVICE: CPT | Mod: S$GLB,,, | Performed by: INTERNAL MEDICINE

## 2021-06-16 PROCEDURE — 3079F DIAST BP 80-89 MM HG: CPT | Mod: CPTII,S$GLB,, | Performed by: INTERNAL MEDICINE

## 2021-06-16 PROCEDURE — 3077F PR MOST RECENT SYSTOLIC BLOOD PRESSURE >= 140 MM HG: ICD-10-PCS | Mod: CPTII,S$GLB,, | Performed by: INTERNAL MEDICINE

## 2021-06-16 PROCEDURE — 3008F PR BODY MASS INDEX (BMI) DOCUMENTED: ICD-10-PCS | Mod: CPTII,S$GLB,, | Performed by: INTERNAL MEDICINE

## 2021-06-16 PROCEDURE — 3079F PR MOST RECENT DIASTOLIC BLOOD PRESSURE 80-89 MM HG: ICD-10-PCS | Mod: CPTII,S$GLB,, | Performed by: INTERNAL MEDICINE

## 2021-06-16 PROCEDURE — 99214 OFFICE O/P EST MOD 30 MIN: CPT | Mod: S$GLB,,, | Performed by: INTERNAL MEDICINE

## 2021-06-16 PROCEDURE — 1126F AMNT PAIN NOTED NONE PRSNT: CPT | Mod: S$GLB,,, | Performed by: INTERNAL MEDICINE

## 2021-06-16 RX ORDER — METOLAZONE 2.5 MG/1
2.5 TABLET ORAL DAILY
Qty: 30 TABLET | Refills: 11 | Status: ON HOLD | OUTPATIENT
Start: 2021-06-16 | End: 2022-10-18 | Stop reason: HOSPADM

## 2021-06-18 ENCOUNTER — TELEPHONE (OUTPATIENT)
Dept: FAMILY MEDICINE | Facility: CLINIC | Age: 56
End: 2021-06-18

## 2021-06-22 ENCOUNTER — TELEPHONE (OUTPATIENT)
Dept: FAMILY MEDICINE | Facility: CLINIC | Age: 56
End: 2021-06-22

## 2021-06-23 ENCOUNTER — PATIENT OUTREACH (OUTPATIENT)
Dept: ADMINISTRATIVE | Facility: HOSPITAL | Age: 56
End: 2021-06-23

## 2021-07-07 ENCOUNTER — PATIENT MESSAGE (OUTPATIENT)
Dept: ADMINISTRATIVE | Facility: HOSPITAL | Age: 56
End: 2021-07-07

## 2021-07-30 ENCOUNTER — TELEPHONE (OUTPATIENT)
Dept: FAMILY MEDICINE | Facility: CLINIC | Age: 56
End: 2021-07-30

## 2021-08-04 ENCOUNTER — PATIENT MESSAGE (OUTPATIENT)
Dept: ADMINISTRATIVE | Facility: HOSPITAL | Age: 56
End: 2021-08-04

## 2021-08-17 ENCOUNTER — HOSPITAL ENCOUNTER (EMERGENCY)
Facility: HOSPITAL | Age: 56
Discharge: HOME | End: 2021-08-17
Attending: EMERGENCY MEDICINE
Payer: MEDICARE

## 2021-08-17 VITALS
HEIGHT: 62 IN | OXYGEN SATURATION: 100 % | SYSTOLIC BLOOD PRESSURE: 232 MMHG | HEART RATE: 66 BPM | TEMPERATURE: 98 F | BODY MASS INDEX: 36.8 KG/M2 | DIASTOLIC BLOOD PRESSURE: 107 MMHG | WEIGHT: 200 LBS | RESPIRATION RATE: 16 BRPM

## 2021-08-17 DIAGNOSIS — M25.562 LEFT KNEE PAIN: ICD-10-CM

## 2021-08-17 DIAGNOSIS — R42 EPISODE OF DIZZINESS: ICD-10-CM

## 2021-08-17 LAB
ANION GAP SERPL CALC-SCNC: 8 MMOL/L (ref 8–16)
BASOPHILS # BLD AUTO: 0.03 K/UL (ref 0–0.2)
BASOPHILS NFR BLD: 0.3 % (ref 0–1.9)
BUN SERPL-MCNC: 25 MG/DL (ref 6–20)
CALCIUM SERPL-MCNC: 9.5 MG/DL (ref 8.7–10.5)
CHLORIDE SERPL-SCNC: 110 MMOL/L (ref 95–110)
CO2 SERPL-SCNC: 22 MMOL/L (ref 23–29)
CREAT SERPL-MCNC: 1.8 MG/DL (ref 0.5–1.4)
DIFFERENTIAL METHOD: ABNORMAL
EOSINOPHIL # BLD AUTO: 0.1 K/UL (ref 0–0.5)
EOSINOPHIL NFR BLD: 0.7 % (ref 0–8)
ERYTHROCYTE [DISTWIDTH] IN BLOOD BY AUTOMATED COUNT: 13.8 % (ref 11.5–14.5)
EST. GFR  (AFRICAN AMERICAN): 36 ML/MIN/1.73 M^2
EST. GFR  (NON AFRICAN AMERICAN): 31 ML/MIN/1.73 M^2
GLUCOSE SERPL-MCNC: 107 MG/DL (ref 70–110)
HCT VFR BLD AUTO: 37.7 % (ref 37–48.5)
HGB BLD-MCNC: 11.8 G/DL (ref 12–16)
IMM GRANULOCYTES # BLD AUTO: 0.04 K/UL (ref 0–0.04)
IMM GRANULOCYTES NFR BLD AUTO: 0.4 % (ref 0–0.5)
LYMPHOCYTES # BLD AUTO: 1.3 K/UL (ref 1–4.8)
LYMPHOCYTES NFR BLD: 12.5 % (ref 18–48)
MCH RBC QN AUTO: 29.1 PG (ref 27–31)
MCHC RBC AUTO-ENTMCNC: 31.3 G/DL (ref 32–36)
MCV RBC AUTO: 93 FL (ref 82–98)
MONOCYTES # BLD AUTO: 0.6 K/UL (ref 0.3–1)
MONOCYTES NFR BLD: 6 % (ref 4–15)
NEUTROPHILS # BLD AUTO: 8.3 K/UL (ref 1.8–7.7)
NEUTROPHILS NFR BLD: 80.1 % (ref 38–73)
NRBC BLD-RTO: 0 /100 WBC
PLATELET # BLD AUTO: 246 K/UL (ref 150–450)
PMV BLD AUTO: 10.1 FL (ref 9.2–12.9)
POTASSIUM SERPL-SCNC: 4 MMOL/L (ref 3.5–5.1)
RBC # BLD AUTO: 4.06 M/UL (ref 4–5.4)
SODIUM SERPL-SCNC: 140 MMOL/L (ref 136–145)
WBC # BLD AUTO: 10.36 K/UL (ref 3.9–12.7)

## 2021-08-17 PROCEDURE — 80048 BASIC METABOLIC PNL TOTAL CA: CPT | Performed by: EMERGENCY MEDICINE

## 2021-08-17 PROCEDURE — 93005 ELECTROCARDIOGRAM TRACING: CPT

## 2021-08-17 PROCEDURE — 99285 EMERGENCY DEPT VISIT HI MDM: CPT | Mod: 25

## 2021-08-17 PROCEDURE — 85025 COMPLETE CBC W/AUTO DIFF WBC: CPT | Performed by: EMERGENCY MEDICINE

## 2021-08-17 PROCEDURE — 36415 COLL VENOUS BLD VENIPUNCTURE: CPT | Performed by: EMERGENCY MEDICINE

## 2021-08-18 ENCOUNTER — OFFICE VISIT (OUTPATIENT)
Dept: FAMILY MEDICINE | Facility: CLINIC | Age: 56
End: 2021-08-18
Payer: MEDICARE

## 2021-08-18 VITALS
HEART RATE: 62 BPM | BODY MASS INDEX: 42.84 KG/M2 | HEIGHT: 62 IN | RESPIRATION RATE: 16 BRPM | OXYGEN SATURATION: 95 % | TEMPERATURE: 98 F | WEIGHT: 232.81 LBS | SYSTOLIC BLOOD PRESSURE: 183 MMHG | DIASTOLIC BLOOD PRESSURE: 98 MMHG

## 2021-08-18 DIAGNOSIS — E78.5 HYPERLIPIDEMIA, UNSPECIFIED HYPERLIPIDEMIA TYPE: ICD-10-CM

## 2021-08-18 DIAGNOSIS — M25.562 ACUTE PAIN OF LEFT KNEE: ICD-10-CM

## 2021-08-18 DIAGNOSIS — I10 ESSENTIAL HYPERTENSION: ICD-10-CM

## 2021-08-18 DIAGNOSIS — Z86.73 HISTORY OF CVA (CEREBROVASCULAR ACCIDENT): ICD-10-CM

## 2021-08-18 DIAGNOSIS — I10 ACCELERATED HYPERTENSION: ICD-10-CM

## 2021-08-18 DIAGNOSIS — Z12.11 COLON CANCER SCREENING: ICD-10-CM

## 2021-08-18 DIAGNOSIS — M25.462 EFFUSION OF LEFT KNEE: Primary | ICD-10-CM

## 2021-08-18 DIAGNOSIS — M79.672 LEFT FOOT PAIN: ICD-10-CM

## 2021-08-18 DIAGNOSIS — Z12.31 BREAST CANCER SCREENING BY MAMMOGRAM: ICD-10-CM

## 2021-08-18 PROCEDURE — 99499 RISK ADDL DX/OHS AUDIT: ICD-10-PCS | Mod: S$GLB,,, | Performed by: INTERNAL MEDICINE

## 2021-08-18 PROCEDURE — 99499 UNLISTED E&M SERVICE: CPT | Mod: S$GLB,,, | Performed by: INTERNAL MEDICINE

## 2021-08-18 PROCEDURE — 1160F RVW MEDS BY RX/DR IN RCRD: CPT | Mod: CPTII,S$GLB,, | Performed by: INTERNAL MEDICINE

## 2021-08-18 PROCEDURE — 1159F MED LIST DOCD IN RCRD: CPT | Mod: CPTII,S$GLB,, | Performed by: INTERNAL MEDICINE

## 2021-08-18 PROCEDURE — 3008F BODY MASS INDEX DOCD: CPT | Mod: CPTII,S$GLB,, | Performed by: INTERNAL MEDICINE

## 2021-08-18 PROCEDURE — 1160F PR REVIEW ALL MEDS BY PRESCRIBER/CLIN PHARMACIST DOCUMENTED: ICD-10-PCS | Mod: CPTII,S$GLB,, | Performed by: INTERNAL MEDICINE

## 2021-08-18 PROCEDURE — 3008F PR BODY MASS INDEX (BMI) DOCUMENTED: ICD-10-PCS | Mod: CPTII,S$GLB,, | Performed by: INTERNAL MEDICINE

## 2021-08-18 PROCEDURE — 3077F SYST BP >= 140 MM HG: CPT | Mod: CPTII,S$GLB,, | Performed by: INTERNAL MEDICINE

## 2021-08-18 PROCEDURE — 99214 PR OFFICE/OUTPT VISIT, EST, LEVL IV, 30-39 MIN: ICD-10-PCS | Mod: S$GLB,,, | Performed by: INTERNAL MEDICINE

## 2021-08-18 PROCEDURE — 1125F PR PAIN SEVERITY QUANTIFIED, PAIN PRESENT: ICD-10-PCS | Mod: CPTII,S$GLB,, | Performed by: INTERNAL MEDICINE

## 2021-08-18 PROCEDURE — 99214 OFFICE O/P EST MOD 30 MIN: CPT | Mod: S$GLB,,, | Performed by: INTERNAL MEDICINE

## 2021-08-18 PROCEDURE — 3080F PR MOST RECENT DIASTOLIC BLOOD PRESSURE >= 90 MM HG: ICD-10-PCS | Mod: CPTII,S$GLB,, | Performed by: INTERNAL MEDICINE

## 2021-08-18 PROCEDURE — 3080F DIAST BP >= 90 MM HG: CPT | Mod: CPTII,S$GLB,, | Performed by: INTERNAL MEDICINE

## 2021-08-18 PROCEDURE — 3077F PR MOST RECENT SYSTOLIC BLOOD PRESSURE >= 140 MM HG: ICD-10-PCS | Mod: CPTII,S$GLB,, | Performed by: INTERNAL MEDICINE

## 2021-08-18 PROCEDURE — 1125F AMNT PAIN NOTED PAIN PRSNT: CPT | Mod: CPTII,S$GLB,, | Performed by: INTERNAL MEDICINE

## 2021-08-18 PROCEDURE — 1159F PR MEDICATION LIST DOCUMENTED IN MEDICAL RECORD: ICD-10-PCS | Mod: CPTII,S$GLB,, | Performed by: INTERNAL MEDICINE

## 2021-08-18 RX ORDER — AMLODIPINE BESYLATE 10 MG/1
10 TABLET ORAL DAILY
Qty: 30 TABLET | Refills: 11 | Status: ON HOLD | OUTPATIENT
Start: 2021-08-18 | End: 2022-10-18

## 2021-08-18 RX ORDER — CLOPIDOGREL BISULFATE 75 MG/1
75 TABLET ORAL DAILY
Qty: 30 TABLET | Refills: 11 | Status: SHIPPED | OUTPATIENT
Start: 2021-08-18 | End: 2023-03-06 | Stop reason: SDUPTHER

## 2021-08-18 RX ORDER — ATORVASTATIN CALCIUM 80 MG/1
80 TABLET, FILM COATED ORAL NIGHTLY
Qty: 90 TABLET | Refills: 1 | Status: ON HOLD
Start: 2021-08-18 | End: 2022-10-18

## 2021-08-18 RX ORDER — HYDROCODONE BITARTRATE AND ACETAMINOPHEN 5; 325 MG/1; MG/1
1 TABLET ORAL EVERY 4 HOURS PRN
Qty: 12 TABLET | Refills: 0 | Status: SHIPPED | OUTPATIENT
Start: 2021-08-18 | End: 2021-08-28

## 2021-08-20 ENCOUNTER — TELEPHONE (OUTPATIENT)
Dept: FAMILY MEDICINE | Facility: CLINIC | Age: 56
End: 2021-08-20

## 2021-08-20 DIAGNOSIS — M25.562 ACUTE PAIN OF LEFT KNEE: Primary | ICD-10-CM

## 2021-08-24 ENCOUNTER — HOSPITAL ENCOUNTER (OUTPATIENT)
Dept: RADIOLOGY | Facility: HOSPITAL | Age: 56
Discharge: HOME | End: 2021-08-24
Attending: ORTHOPAEDIC SURGERY
Payer: MEDICARE

## 2021-08-24 ENCOUNTER — OFFICE VISIT (OUTPATIENT)
Dept: ORTHOPEDICS | Facility: CLINIC | Age: 56
End: 2021-08-24
Payer: MEDICARE

## 2021-08-24 VITALS — RESPIRATION RATE: 18 BRPM | HEIGHT: 62 IN | BODY MASS INDEX: 42.69 KG/M2 | WEIGHT: 232 LBS

## 2021-08-24 DIAGNOSIS — S86.912A KNEE STRAIN, LEFT, INITIAL ENCOUNTER: Primary | ICD-10-CM

## 2021-08-24 DIAGNOSIS — M25.562 LEFT KNEE PAIN, UNSPECIFIED CHRONICITY: ICD-10-CM

## 2021-08-24 DIAGNOSIS — M25.562 LEFT KNEE PAIN, UNSPECIFIED CHRONICITY: Primary | ICD-10-CM

## 2021-08-24 PROCEDURE — 3008F BODY MASS INDEX DOCD: CPT | Mod: CPTII,S$GLB,, | Performed by: ORTHOPAEDIC SURGERY

## 2021-08-24 PROCEDURE — 73560 XR KNEE ORTHO LEFT: ICD-10-PCS | Mod: 26,RT,, | Performed by: RADIOLOGY

## 2021-08-24 PROCEDURE — 99999 PR PBB SHADOW E&M-EST. PATIENT-LVL IV: CPT | Mod: PBBFAC,,, | Performed by: ORTHOPAEDIC SURGERY

## 2021-08-24 PROCEDURE — 1125F PR PAIN SEVERITY QUANTIFIED, PAIN PRESENT: ICD-10-PCS | Mod: CPTII,S$GLB,, | Performed by: ORTHOPAEDIC SURGERY

## 2021-08-24 PROCEDURE — 99203 OFFICE O/P NEW LOW 30 MIN: CPT | Mod: S$GLB,,, | Performed by: ORTHOPAEDIC SURGERY

## 2021-08-24 PROCEDURE — 1159F PR MEDICATION LIST DOCUMENTED IN MEDICAL RECORD: ICD-10-PCS | Mod: CPTII,S$GLB,, | Performed by: ORTHOPAEDIC SURGERY

## 2021-08-24 PROCEDURE — 73562 X-RAY EXAM OF KNEE 3: CPT | Mod: 26,LT,, | Performed by: RADIOLOGY

## 2021-08-24 PROCEDURE — 99203 PR OFFICE/OUTPT VISIT, NEW, LEVL III, 30-44 MIN: ICD-10-PCS | Mod: S$GLB,,, | Performed by: ORTHOPAEDIC SURGERY

## 2021-08-24 PROCEDURE — 3008F PR BODY MASS INDEX (BMI) DOCUMENTED: ICD-10-PCS | Mod: CPTII,S$GLB,, | Performed by: ORTHOPAEDIC SURGERY

## 2021-08-24 PROCEDURE — 73560 X-RAY EXAM OF KNEE 1 OR 2: CPT | Mod: 26,RT,, | Performed by: RADIOLOGY

## 2021-08-24 PROCEDURE — 1159F MED LIST DOCD IN RCRD: CPT | Mod: CPTII,S$GLB,, | Performed by: ORTHOPAEDIC SURGERY

## 2021-08-24 PROCEDURE — 1125F AMNT PAIN NOTED PAIN PRSNT: CPT | Mod: CPTII,S$GLB,, | Performed by: ORTHOPAEDIC SURGERY

## 2021-08-24 PROCEDURE — 73560 X-RAY EXAM OF KNEE 1 OR 2: CPT | Mod: 59,TC,PN,RT

## 2021-08-24 PROCEDURE — 73562 XR KNEE ORTHO LEFT: ICD-10-PCS | Mod: 26,LT,, | Performed by: RADIOLOGY

## 2021-08-24 PROCEDURE — 1160F PR REVIEW ALL MEDS BY PRESCRIBER/CLIN PHARMACIST DOCUMENTED: ICD-10-PCS | Mod: CPTII,S$GLB,, | Performed by: ORTHOPAEDIC SURGERY

## 2021-08-24 PROCEDURE — 99999 PR PBB SHADOW E&M-EST. PATIENT-LVL IV: ICD-10-PCS | Mod: PBBFAC,,, | Performed by: ORTHOPAEDIC SURGERY

## 2021-08-24 PROCEDURE — 1160F RVW MEDS BY RX/DR IN RCRD: CPT | Mod: CPTII,S$GLB,, | Performed by: ORTHOPAEDIC SURGERY

## 2021-08-24 RX ORDER — IBUPROFEN 600 MG/1
600 TABLET ORAL 3 TIMES DAILY
Qty: 60 TABLET | Refills: 1 | Status: SHIPPED | OUTPATIENT
Start: 2021-08-24 | End: 2022-03-31

## 2021-08-25 ENCOUNTER — TELEPHONE (OUTPATIENT)
Dept: FAMILY MEDICINE | Facility: CLINIC | Age: 56
End: 2021-08-25

## 2021-08-25 DIAGNOSIS — M25.462 EFFUSION OF LEFT KNEE: Primary | ICD-10-CM

## 2021-09-09 ENCOUNTER — TELEPHONE (OUTPATIENT)
Dept: FAMILY MEDICINE | Facility: CLINIC | Age: 56
End: 2021-09-09

## 2021-09-20 ENCOUNTER — PATIENT OUTREACH (OUTPATIENT)
Dept: ADMINISTRATIVE | Facility: OTHER | Age: 56
End: 2021-09-20

## 2021-09-20 DIAGNOSIS — E11.9 DIABETES MELLITUS WITHOUT COMPLICATION: Primary | ICD-10-CM

## 2021-10-07 ENCOUNTER — PATIENT MESSAGE (OUTPATIENT)
Dept: ADMINISTRATIVE | Facility: HOSPITAL | Age: 56
End: 2021-10-07

## 2021-12-01 ENCOUNTER — PATIENT OUTREACH (OUTPATIENT)
Dept: ADMINISTRATIVE | Facility: HOSPITAL | Age: 56
End: 2021-12-01
Payer: MEDICARE

## 2021-12-30 ENCOUNTER — TELEPHONE (OUTPATIENT)
Dept: FAMILY MEDICINE | Facility: CLINIC | Age: 56
End: 2021-12-30
Payer: MEDICARE

## 2022-01-05 ENCOUNTER — OFFICE VISIT (OUTPATIENT)
Dept: FAMILY MEDICINE | Facility: CLINIC | Age: 57
End: 2022-01-05
Payer: MEDICARE

## 2022-01-05 VITALS
TEMPERATURE: 98 F | OXYGEN SATURATION: 98 % | RESPIRATION RATE: 18 BRPM | DIASTOLIC BLOOD PRESSURE: 108 MMHG | HEART RATE: 94 BPM | HEIGHT: 62 IN | WEIGHT: 229 LBS | SYSTOLIC BLOOD PRESSURE: 200 MMHG | BODY MASS INDEX: 42.14 KG/M2

## 2022-01-05 DIAGNOSIS — J44.89 COPD WITH ASTHMA: ICD-10-CM

## 2022-01-05 DIAGNOSIS — I10 ESSENTIAL HYPERTENSION: ICD-10-CM

## 2022-01-05 PROCEDURE — 99214 PR OFFICE/OUTPT VISIT, EST, LEVL IV, 30-39 MIN: ICD-10-PCS | Mod: S$GLB,,, | Performed by: INTERNAL MEDICINE

## 2022-01-05 PROCEDURE — 99214 OFFICE O/P EST MOD 30 MIN: CPT | Mod: S$GLB,,, | Performed by: INTERNAL MEDICINE

## 2022-01-05 PROCEDURE — 3008F PR BODY MASS INDEX (BMI) DOCUMENTED: ICD-10-PCS | Mod: CPTII,S$GLB,, | Performed by: INTERNAL MEDICINE

## 2022-01-05 PROCEDURE — 3008F BODY MASS INDEX DOCD: CPT | Mod: CPTII,S$GLB,, | Performed by: INTERNAL MEDICINE

## 2022-01-05 PROCEDURE — 1159F MED LIST DOCD IN RCRD: CPT | Mod: CPTII,S$GLB,, | Performed by: INTERNAL MEDICINE

## 2022-01-05 PROCEDURE — 3077F PR MOST RECENT SYSTOLIC BLOOD PRESSURE >= 140 MM HG: ICD-10-PCS | Mod: CPTII,S$GLB,, | Performed by: INTERNAL MEDICINE

## 2022-01-05 PROCEDURE — 3080F DIAST BP >= 90 MM HG: CPT | Mod: CPTII,S$GLB,, | Performed by: INTERNAL MEDICINE

## 2022-01-05 PROCEDURE — 3077F SYST BP >= 140 MM HG: CPT | Mod: CPTII,S$GLB,, | Performed by: INTERNAL MEDICINE

## 2022-01-05 PROCEDURE — 1159F PR MEDICATION LIST DOCUMENTED IN MEDICAL RECORD: ICD-10-PCS | Mod: CPTII,S$GLB,, | Performed by: INTERNAL MEDICINE

## 2022-01-05 PROCEDURE — 3080F PR MOST RECENT DIASTOLIC BLOOD PRESSURE >= 90 MM HG: ICD-10-PCS | Mod: CPTII,S$GLB,, | Performed by: INTERNAL MEDICINE

## 2022-01-05 RX ORDER — AMLODIPINE BESYLATE 5 MG/1
5 TABLET ORAL DAILY
Qty: 30 TABLET | Refills: 11 | Status: SHIPPED | OUTPATIENT
Start: 2022-01-05 | End: 2022-02-16 | Stop reason: SDUPTHER

## 2022-01-05 RX ORDER — CHLORTHALIDONE 25 MG/1
12.5 TABLET ORAL DAILY
Qty: 45 TABLET | Refills: 1 | Status: ON HOLD | OUTPATIENT
Start: 2022-01-05 | End: 2022-10-18 | Stop reason: HOSPADM

## 2022-01-05 RX ORDER — HYDRALAZINE HYDROCHLORIDE 50 MG/1
50 TABLET, FILM COATED ORAL EVERY 8 HOURS
Qty: 270 TABLET | Refills: 3 | Status: SHIPPED | OUTPATIENT
Start: 2022-01-05 | End: 2023-02-27 | Stop reason: SDUPTHER

## 2022-01-05 RX ORDER — LOSARTAN POTASSIUM 100 MG/1
100 TABLET ORAL DAILY
Qty: 90 TABLET | Refills: 1 | Status: SHIPPED | OUTPATIENT
Start: 2022-01-05 | End: 2022-03-24 | Stop reason: ALTCHOICE

## 2022-01-05 NOTE — PATIENT INSTRUCTIONS
Lose 5 pounds.  Suggest [place] diet       Avoid  alcohol  Low-Salt Diet  This diet removes foods that are high in salt. It also limits the amount of salt you use when cooking. It is most often used for people with high blood pressure, edema (fluid retention), and kidney, liver, or heart disease.  Table salt contains the mineral sodium. Your body needs sodium to work normally. But too much sodium can make your health problems worse. Your healthcare provider is recommending a low-salt (also called low-sodium) diet for you. Your total daily allowance of salt is 1,500 to 2,300 milligrams (mg). It is less than 1 teaspoon of table salt. This means you can have only about 500 to 700 mg of sodium at each meal. People with certain health problems should limit salt intake to the lower end of the recommended range.    When you cook, don´t add much salt. If you can cook without using salt, even better. Don´t add salt to your food at the table.  When shopping, read food labels. Salt is often called sodium on the label. Choose foods that are salt-free, low salt, or very low salt. Note that foods with reduced salt may not lower your salt intake enough.    Beans, potatoes, and pasta  Ok: Dry beans, split peas, lentils, potatoes, rice, macaroni, pasta, spaghetti without added salt  Avoid: Potato chips, tortilla chips, and similar products  Breads and cereals  Ok: Low-sodium breads, rolls, cereals, and cakes; low-salt crackers, matzo crackers  Avoid: Salted crackers, pretzels, popcorn, Somali toast, pancakes, muffins  Dairy  Ok: Milk, chocolate milk, hot chocolate mix, low-salt cheeses, and yogurt  Avoid: Processed cheese and cheese spreads; Roquefort, Camembert, and cottage cheese; buttermilk, instant breakfast drink  Desserts  Ok: Ice cream, frozen yogurt, juice bars, gelatin, cookies and pies, sugar, honey, jelly, hard candy  Avoid: Most pies, cakes and cookies prepared or processed with salt; instant pudding  Drinks  Ok:  Tea, coffee, fizzy (carbonated) drinks, juices  Avoid: Flavored coffees, electrolyte replacement drinks, sports drinks  Meats  Ok: All fresh meat, fish, poultry, low-salt tuna, eggs, egg substitute  Avoid: Smoked, pickled, brine-cured, or salted meats and fish. This includes luis, chipped beef, corned beef, hot dogs, deli meats, ham, kosher meats, salt pork, sausage, canned tuna, salted codfish, smoked salmon, herring, sardines, or anchovies.  Seasonings and spices  Ok: Most seasonings are okay. Good substitutes for salt include: fresh herb blends, hot sauce, lemon, garlic, bazan, vinegar, dry mustard, parsley, cilantro, horseradish, tomato paste, regular margarine, mayonnaise, unsalted butter, cream cheese, vegetable oil, cream, low-salt salad dressing and gravy.  Avoid: Regular ketchup, relishes, pickles, soy sauce, teriyaki sauce, Worcestershire sauce, BBQ sauce, tartar sauce, meat tenderizer, chili sauce, regular gravy, regular salad dressing, salted butter  Soups  Ok: Low-salt soups and broths made with allowed foods  Avoid: Bouillon cubes, soups with smoked or salted meats, regular soup and broth  Vegetables  Ok: Most vegetables are okay; also low-salt tomato and vegetable juices  Avoid: Sauerkraut and other brine-soaked vegetables; pickles and other pickled vegetables; tomato juice, olives  © 3710-8859 [employer]. [address] [place] [address]. All rights reserved. This information is not intended as a substitute for professional medical care. Always follow your healthcare professional's instructions.          If you have labs scheduled at [facility] you need to make an appointment. This is a measure to protect you from covid 19.      Thank you for choosing [facility].     Please fill out the patient experience survey.

## 2022-01-05 NOTE — PROGRESS NOTES
"Subjective:      4:00 PM     Patient ID: Faustina Rae is a 57 y.o. female.    Chief Complaint: Medication Refill and Pre-op Exam (Dr siddiqui wants dr henson to check blood pressure so he cant do another surgery. Pt stated)    HPI           CHIEF COMPLAINT: Hypertension  HPI:     ONSET:      QUALITY/COURSE:   Unchanged.     INTENSITY/SEVERITY:  Average blood pressure is unknown.      MODIFIERS/TREATMENTS:  Taking medications: yes. .High sodium intake: no. alcohol: no      The following symptoms are positive only if BOLDED, otherwise are negative.      SYMPTOMS/RELATED: Possible medication side effects include:   Depression..  . Cough. . Constipation.    REVIEW OF SYMPTOMS: . Weight_loss . Weight_gain . Leg_cramps .Potency_problems .    TARGET ORGAN DAMAGE:: angina/ prior myocardial infarction, chronic kidney disease, heart failure, left ventricular hypertrophy, peripheral artery disease, prior coronary revascularization, retinopathy, stroke. transient ischemic attack.      Review of Systems      Objective:      Vitals:    01/05/22 1535   BP: (!) 200/108   Pulse: 94   Resp: 18   Temp: 98 °F (36.7 °C)   TempSrc: Oral   SpO2: 98%   Weight: 103.9 kg (229 lb)   Height: 5' 2" (1.575 m)   PainSc: 0-No pain     Physical Exam  Vitals and nursing note reviewed.   Constitutional:       Appearance: She is well-developed and well-nourished.   Cardiovascular:      Rate and Rhythm: Normal rate and regular rhythm.      Heart sounds: Normal heart sounds.   Pulmonary:      Effort: Pulmonary effort is normal.      Breath sounds: Normal breath sounds.   Abdominal:      Palpations: Abdomen is soft.      Tenderness: There is no abdominal tenderness.   Neurological:      Mental Status: She is alert.   Psychiatric:         Mood and Affect: Mood and affect normal.         Behavior: Behavior normal.         Thought Content: Thought content normal.       No results found for this or any previous visit (from the past 1008 hour(s)).     "   Assessment:       No diagnosis found.      Plan:       There are no diagnoses linked to this encounter.  No follow-ups on file.

## 2022-01-06 ENCOUNTER — TELEPHONE (OUTPATIENT)
Dept: FAMILY MEDICINE | Facility: CLINIC | Age: 57
End: 2022-01-06
Payer: MEDICARE

## 2022-01-06 NOTE — TELEPHONE ENCOUNTER
----- Message from Ancelmo Goldberg sent at 1/6/2022  1:54 PM CST -----  Type: Needs Medical Advice  Who Called:  Patient    Pharmacy name and phone #:    [facility] #[id number] - [place] [id number] [facility] & [address] [address]  Phone: [phone] Fax: [phone]      Best Call Back Number: [phone]  Additional Information: Patient states that her refills aren't at the pharmacy:  Teslon pearls--Not on file  Rescue Inhaler--Pt not sure of which type of inhaler    Please call to advise

## 2022-01-07 RX ORDER — BENZONATATE 200 MG/1
200 CAPSULE ORAL 3 TIMES DAILY PRN
Qty: 30 CAPSULE | Refills: 1 | Status: SHIPPED | OUTPATIENT
Start: 2022-01-07 | End: 2022-01-11

## 2022-01-07 RX ORDER — ALBUTEROL SULFATE 90 UG/1
AEROSOL, METERED RESPIRATORY (INHALATION)
Qty: 8.5 G | Refills: 0 | Status: SHIPPED | OUTPATIENT
Start: 2022-01-07 | End: 2022-03-24 | Stop reason: SDUPTHER

## 2022-01-08 ENCOUNTER — TELEPHONE (OUTPATIENT)
Dept: FAMILY MEDICINE | Facility: CLINIC | Age: 57
End: 2022-01-08
Payer: MEDICARE

## 2022-01-08 NOTE — TELEPHONE ENCOUNTER
----- Message from Kathrine Gil sent at 1/7/2022 11:59 AM CST -----  Regarding: advice  Contact: timo  Type: Needs Medical Advice  Who Called:  pt  Symptoms (please be specific):  n/a  How long has patient had these symptoms:  n/a  Pharmacy name and phone #:  n/a  Best Call Back Number: [phone]    Additional Information: regarding her stomach issues she having

## 2022-01-08 NOTE — TELEPHONE ENCOUNTER
----- Message from Audra Gil sent at 1/7/2022  1:50 PM CST -----  Contact: pt  Type: Needs Medical Advice  Who Called:  pt   Symptoms (please be specific):  leg pain  How long has patient had these symptoms:  yesterday  Pharmacy name and phone #:    [facility] #[id number] - [address] & [address] [address]  Phone: [phone] Fax: [phone]      Best Call Back Number: [phone]    Additional Information: please call pt for medication and pain in leg

## 2022-01-10 ENCOUNTER — PATIENT OUTREACH (OUTPATIENT)
Dept: ADMINISTRATIVE | Facility: OTHER | Age: 57
End: 2022-01-10
Payer: MEDICARE

## 2022-01-10 NOTE — PROGRESS NOTES
Health Maintenance Due   Topic Date Due    TETANUS VACCINE  Never done    Colorectal Cancer Screening  Never done    Shingles Vaccine (1 of 2) Never done    Mammogram  10/29/2020    Influenza Vaccine (1) 09/01/2021    COVID-19 Vaccine (3 - Booster for Pfizer series) 10/19/2021     Updates were requested from care everywhere.  Chart was reviewed for overdue Proactive [facility] Encounters (GET) topics (CRS, Breast Cancer Screening, Eye exam)  Health Maintenance has been updated.  LINKS immunization registry triggered.  Immunizations were reconciled.

## 2022-01-11 ENCOUNTER — TELEPHONE (OUTPATIENT)
Dept: FAMILY MEDICINE | Facility: CLINIC | Age: 57
End: 2022-01-11
Payer: MEDICARE

## 2022-01-11 DIAGNOSIS — J44.89 COPD WITH ASTHMA: Primary | ICD-10-CM

## 2022-01-11 RX ORDER — PROMETHAZINE HYDROCHLORIDE AND DEXTROMETHORPHAN HYDROBROMIDE 6.25; 15 MG/5ML; MG/5ML
5 SYRUP ORAL EVERY 6 HOURS PRN
Qty: 200 ML | Refills: 0 | Status: SHIPPED | OUTPATIENT
Start: 2022-01-11 | End: 2022-01-21

## 2022-01-11 NOTE — TELEPHONE ENCOUNTER
----- Message from Juventino Almeida sent at 1/11/2022 10:31 AM CST -----  Contact: pt  Type: Needs Medical Advice  Who Called:  pt    Pharmacy name and phone #:    [facility][id number] [place] [address] [facility] & [address] [address]  Phone: [phone] Fax: [phone]      Best Call Back Number: .phone  Additional Information: med that was sent, a cough syrup was not covered by her ins....  please send an alternative and let her know when it is sent.

## 2022-01-12 ENCOUNTER — TELEPHONE (OUTPATIENT)
Dept: FAMILY MEDICINE | Facility: CLINIC | Age: 57
End: 2022-01-12

## 2022-01-12 ENCOUNTER — LAB VISIT (OUTPATIENT)
Dept: LAB | Facility: HOSPITAL | Age: 57
End: 2022-01-12
Attending: INTERNAL MEDICINE
Payer: MEDICARE

## 2022-01-12 ENCOUNTER — CLINICAL SUPPORT (OUTPATIENT)
Dept: FAMILY MEDICINE | Facility: CLINIC | Age: 57
End: 2022-01-12
Payer: MEDICARE

## 2022-01-12 VITALS — DIASTOLIC BLOOD PRESSURE: 70 MMHG | SYSTOLIC BLOOD PRESSURE: 138 MMHG

## 2022-01-12 DIAGNOSIS — I10 ESSENTIAL HYPERTENSION: ICD-10-CM

## 2022-01-12 DIAGNOSIS — E11.9 DIABETES MELLITUS WITHOUT COMPLICATION: ICD-10-CM

## 2022-01-12 DIAGNOSIS — J44.89 COPD WITH ASTHMA: Primary | ICD-10-CM

## 2022-01-12 LAB
ALBUMIN SERPL BCP-MCNC: 3.6 G/DL (ref 3.5–5.2)
ALP SERPL-CCNC: 63 U/L (ref 55–135)
ALT SERPL W/O P-5'-P-CCNC: 9 U/L (ref 10–44)
ANION GAP SERPL CALC-SCNC: 9 MMOL/L (ref 8–16)
AST SERPL-CCNC: 14 U/L (ref 10–40)
BASOPHILS # BLD AUTO: 0.02 K/UL (ref 0–0.2)
BASOPHILS NFR BLD: 0.3 % (ref 0–1.9)
BILIRUB SERPL-MCNC: 0.4 MG/DL (ref 0.1–1)
BUN SERPL-MCNC: 29 MG/DL (ref 6–20)
CALCIUM SERPL-MCNC: 9.9 MG/DL (ref 8.7–10.5)
CHLORIDE SERPL-SCNC: 104 MMOL/L (ref 95–110)
CO2 SERPL-SCNC: 24 MMOL/L (ref 23–29)
CREAT SERPL-MCNC: 1.8 MG/DL (ref 0.5–1.4)
CTP QC/QA: YES
DIFFERENTIAL METHOD: ABNORMAL
EOSINOPHIL # BLD AUTO: 0 K/UL (ref 0–0.5)
EOSINOPHIL NFR BLD: 0.7 % (ref 0–8)
ERYTHROCYTE [DISTWIDTH] IN BLOOD BY AUTOMATED COUNT: 14.2 % (ref 11.5–14.5)
EST. GFR  (AFRICAN AMERICAN): 35.3 ML/MIN/1.73 M^2
EST. GFR  (NON AFRICAN AMERICAN): 30.6 ML/MIN/1.73 M^2
ESTIMATED AVG GLUCOSE: 108 MG/DL (ref 68–131)
GLUCOSE SERPL-MCNC: 106 MG/DL (ref 70–110)
HBA1C MFR BLD: 5.4 % (ref 4–5.6)
HCT VFR BLD AUTO: 43.2 % (ref 37–48.5)
HGB BLD-MCNC: 13.3 G/DL (ref 12–16)
IMM GRANULOCYTES # BLD AUTO: 0.01 K/UL (ref 0–0.04)
IMM GRANULOCYTES NFR BLD AUTO: 0.2 % (ref 0–0.5)
LYMPHOCYTES # BLD AUTO: 1.7 K/UL (ref 1–4.8)
LYMPHOCYTES NFR BLD: 28.6 % (ref 18–48)
MCH RBC QN AUTO: 28.4 PG (ref 27–31)
MCHC RBC AUTO-ENTMCNC: 30.8 G/DL (ref 32–36)
MCV RBC AUTO: 92 FL (ref 82–98)
MONOCYTES # BLD AUTO: 0.5 K/UL (ref 0.3–1)
MONOCYTES NFR BLD: 8 % (ref 4–15)
NEUTROPHILS # BLD AUTO: 3.7 K/UL (ref 1.8–7.7)
NEUTROPHILS NFR BLD: 62.2 % (ref 38–73)
NRBC BLD-RTO: 0 /100 WBC
PLATELET # BLD AUTO: 315 K/UL (ref 150–450)
PMV BLD AUTO: 10.6 FL (ref 9.2–12.9)
POTASSIUM SERPL-SCNC: 4.2 MMOL/L (ref 3.5–5.1)
PROT SERPL-MCNC: 7.9 G/DL (ref 6–8.4)
RBC # BLD AUTO: 4.68 M/UL (ref 4–5.4)
SARS-COV-2 RDRP RESP QL NAA+PROBE: NEGATIVE
SODIUM SERPL-SCNC: 137 MMOL/L (ref 136–145)
WBC # BLD AUTO: 5.88 K/UL (ref 3.9–12.7)

## 2022-01-12 PROCEDURE — 80053 COMPREHEN METABOLIC PANEL: CPT | Mod: HCNC | Performed by: INTERNAL MEDICINE

## 2022-01-12 PROCEDURE — 36415 COLL VENOUS BLD VENIPUNCTURE: CPT | Mod: HCNC,PO | Performed by: INTERNAL MEDICINE

## 2022-01-12 PROCEDURE — U0002: ICD-10-PCS | Mod: QW,S$GLB,, | Performed by: INTERNAL MEDICINE

## 2022-01-12 PROCEDURE — U0002 COVID-19 LAB TEST NON-CDC: HCPCS | Mod: QW,S$GLB,, | Performed by: INTERNAL MEDICINE

## 2022-01-12 PROCEDURE — 85025 COMPLETE CBC W/AUTO DIFF WBC: CPT | Mod: HCNC | Performed by: INTERNAL MEDICINE

## 2022-01-12 PROCEDURE — 83036 HEMOGLOBIN GLYCOSYLATED A1C: CPT | Mod: HCNC | Performed by: INTERNAL MEDICINE

## 2022-01-12 NOTE — TELEPHONE ENCOUNTER
I suggest she try Robitussin DM.  She is having trouble at night she can take a double dose but not during the day.

## 2022-01-12 NOTE — PROGRESS NOTES
Patient comes in for a nurse BP check. Patient's BP was 138/70. Advised patient to continue current medication and f/u as directed. Patient also wanted to be checked for covid to make sure her cough was just related to her copd and not covid. Test was negative.

## 2022-01-12 NOTE — TELEPHONE ENCOUNTER
Patient is wanting to know if there was alternative to the cough medication that was sent in. Her insurance is not covering the promethazine-DM. I advised her that I don't think medicare covers cough syrup. Do you know if they cover any and if not is there something OTC that she should get instead?

## 2022-02-09 ENCOUNTER — TELEPHONE (OUTPATIENT)
Dept: FAMILY MEDICINE | Facility: CLINIC | Age: 57
End: 2022-02-09
Payer: MEDICARE

## 2022-02-09 DIAGNOSIS — G47.00 INSOMNIA, UNSPECIFIED TYPE: ICD-10-CM

## 2022-02-09 DIAGNOSIS — F41.9 ANXIETY AND DEPRESSION: ICD-10-CM

## 2022-02-09 DIAGNOSIS — F32.A ANXIETY AND DEPRESSION: ICD-10-CM

## 2022-02-09 RX ORDER — ESCITALOPRAM OXALATE 10 MG/1
10 TABLET ORAL DAILY
Qty: 90 TABLET | Refills: 1 | Status: SHIPPED | OUTPATIENT
Start: 2022-02-09 | End: 2023-02-27 | Stop reason: SDUPTHER

## 2022-02-09 RX ORDER — TRAZODONE HYDROCHLORIDE 50 MG/1
TABLET ORAL
Qty: 90 TABLET | Refills: 1 | Status: SHIPPED | OUTPATIENT
Start: 2022-02-09 | End: 2023-02-27 | Stop reason: SDUPTHER

## 2022-02-16 ENCOUNTER — TELEPHONE (OUTPATIENT)
Dept: FAMILY MEDICINE | Facility: CLINIC | Age: 57
End: 2022-02-16

## 2022-02-16 ENCOUNTER — OFFICE VISIT (OUTPATIENT)
Dept: FAMILY MEDICINE | Facility: CLINIC | Age: 57
End: 2022-02-16
Payer: MEDICARE

## 2022-02-16 VITALS
HEIGHT: 62 IN | WEIGHT: 223.56 LBS | TEMPERATURE: 99 F | DIASTOLIC BLOOD PRESSURE: 80 MMHG | OXYGEN SATURATION: 97 % | RESPIRATION RATE: 16 BRPM | SYSTOLIC BLOOD PRESSURE: 136 MMHG | BODY MASS INDEX: 41.14 KG/M2 | HEART RATE: 88 BPM

## 2022-02-16 DIAGNOSIS — E66.01 MORBID OBESITY WITH BMI OF 40.0-44.9, ADULT: ICD-10-CM

## 2022-02-16 DIAGNOSIS — F32.A ANXIETY AND DEPRESSION: ICD-10-CM

## 2022-02-16 DIAGNOSIS — F41.9 ANXIETY AND DEPRESSION: ICD-10-CM

## 2022-02-16 DIAGNOSIS — I10 ESSENTIAL HYPERTENSION: Primary | ICD-10-CM

## 2022-02-16 DIAGNOSIS — E78.5 HYPERLIPIDEMIA, UNSPECIFIED HYPERLIPIDEMIA TYPE: ICD-10-CM

## 2022-02-16 DIAGNOSIS — N18.32 STAGE 3B CHRONIC KIDNEY DISEASE: ICD-10-CM

## 2022-02-16 DIAGNOSIS — R10.9 ABDOMINAL PAIN, UNSPECIFIED ABDOMINAL LOCATION: ICD-10-CM

## 2022-02-16 PROCEDURE — 99499 RISK ADDL DX/OHS AUDIT: ICD-10-PCS | Mod: S$GLB,,,

## 2022-02-16 PROCEDURE — 1159F MED LIST DOCD IN RCRD: CPT | Mod: HCNC,CPTII,S$GLB,

## 2022-02-16 PROCEDURE — 3079F DIAST BP 80-89 MM HG: CPT | Mod: HCNC,CPTII,S$GLB,

## 2022-02-16 PROCEDURE — 99214 PR OFFICE/OUTPT VISIT, EST, LEVL IV, 30-39 MIN: ICD-10-PCS | Mod: HCNC,S$GLB,,

## 2022-02-16 PROCEDURE — 3044F HG A1C LEVEL LT 7.0%: CPT | Mod: HCNC,CPTII,S$GLB,

## 2022-02-16 PROCEDURE — 1159F PR MEDICATION LIST DOCUMENTED IN MEDICAL RECORD: ICD-10-PCS | Mod: HCNC,CPTII,S$GLB,

## 2022-02-16 PROCEDURE — 3008F PR BODY MASS INDEX (BMI) DOCUMENTED: ICD-10-PCS | Mod: HCNC,CPTII,S$GLB,

## 2022-02-16 PROCEDURE — 4010F ACE/ARB THERAPY RXD/TAKEN: CPT | Mod: HCNC,CPTII,S$GLB,

## 2022-02-16 PROCEDURE — 99214 OFFICE O/P EST MOD 30 MIN: CPT | Mod: HCNC,S$GLB,,

## 2022-02-16 PROCEDURE — 99999 PR PBB SHADOW E&M-EST. PATIENT-LVL V: CPT | Mod: PBBFAC,HCNC,,

## 2022-02-16 PROCEDURE — 4010F PR ACE/ARB THEARPY RXD/TAKEN: ICD-10-PCS | Mod: HCNC,CPTII,S$GLB,

## 2022-02-16 PROCEDURE — 99499 UNLISTED E&M SERVICE: CPT | Mod: S$GLB,,,

## 2022-02-16 PROCEDURE — 3075F SYST BP GE 130 - 139MM HG: CPT | Mod: HCNC,CPTII,S$GLB,

## 2022-02-16 PROCEDURE — 99999 PR PBB SHADOW E&M-EST. PATIENT-LVL V: ICD-10-PCS | Mod: PBBFAC,HCNC,,

## 2022-02-16 PROCEDURE — 1160F RVW MEDS BY RX/DR IN RCRD: CPT | Mod: HCNC,CPTII,S$GLB,

## 2022-02-16 PROCEDURE — 3008F BODY MASS INDEX DOCD: CPT | Mod: HCNC,CPTII,S$GLB,

## 2022-02-16 PROCEDURE — 3075F PR MOST RECENT SYSTOLIC BLOOD PRESS GE 130-139MM HG: ICD-10-PCS | Mod: HCNC,CPTII,S$GLB,

## 2022-02-16 PROCEDURE — 1160F PR REVIEW ALL MEDS BY PRESCRIBER/CLIN PHARMACIST DOCUMENTED: ICD-10-PCS | Mod: HCNC,CPTII,S$GLB,

## 2022-02-16 PROCEDURE — 3044F PR MOST RECENT HEMOGLOBIN A1C LEVEL <7.0%: ICD-10-PCS | Mod: HCNC,CPTII,S$GLB,

## 2022-02-16 PROCEDURE — 3079F PR MOST RECENT DIASTOLIC BLOOD PRESSURE 80-89 MM HG: ICD-10-PCS | Mod: HCNC,CPTII,S$GLB,

## 2022-02-16 NOTE — TELEPHONE ENCOUNTER
Please call the patient to inform her that I would like to get labs done prior to her seeing Dr. Tay 6 months from now. Please schedule these labs to be done prior to her seeing him.

## 2022-02-16 NOTE — TELEPHONE ENCOUNTER
Call placed to patient who requested to be contacted tomorrow for scheduling. States she was eating barbeque with her grandchildren at the time of the call. And unable to talk at time of call. Will call back tomorrow to assist with scheduling. Order for A1C unable to be scheduled. Received error message stating diagnosis attached is not covered by insurance. Will send message to Mr Lanza for notification.

## 2022-02-16 NOTE — PROGRESS NOTES
Subjective:       Patient ID: Faustina Rae is a 57 y.o. female.    Chief Complaint: Abdominal Pain    Faustina Rae is a 56 yo F pt w/ MHx listed below that presents to the clinic w/ complaints of acute on chronic abdominal pain over the last two days. She states that this is a chronic problem for her due to an abdominal hernia. She explains that she has been seeing a Dr. Rain about fixing this problem surgically, however the surgery was put on hold due to poorly controlled blood pressures. She mentions a decreased appetite despite eating at her favorite restaurant yesterday. She denies fever, chills, heartburn, N, V, D changes in BMs. Her blood pressures at home have been well controlled with systolics in the 130s. Her BP is controlled on presentation today.       Past Medical History:   Diagnosis Date    Anxiety     Arthritis     Asthma     CHF (congestive heart failure)     COPD (chronic obstructive pulmonary disease)     Hyperlipemia     Hypertension     Leaky heart valve     Obese     Obese     Obstructive sleep apnea     lost her CPAP    Pneumonia     Rheumatoid arthritis(714.0)     Stroke        Review of patient's allergies indicates:  No Known Allergies      Current Outpatient Medications:     albuterol (PROVENTIL/VENTOLIN HFA) 90 mcg/actuation inhaler, INHALE 2 PUFFS INTO THE LUNGS FOUR TIMES DAILY, Disp: 8.5 g, Rfl: 0    amLODIPine (NORVASC) 10 MG tablet, Take 1 tablet (10 mg total) by mouth once daily., Disp: 30 tablet, Rfl: 11    atorvastatin (LIPITOR) 80 MG tablet, Take 1 tablet (80 mg total) by mouth every evening., Disp: 90 tablet, Rfl: 1    b complex vitamins tablet, Take 1 tablet by mouth once daily., Disp: , Rfl:     calcium citrate-vitamin D3 315-200 mg (CITRACAL+D) 315-200 mg-unit per tablet, Take 1 tablet by mouth once daily. , Disp: , Rfl:     chlorthalidone (HYGROTEN) 25 MG Tab, Take 0.5 tablets (12.5 mg total) by mouth once daily., Disp: 45 tablet, Rfl: 1     clopidogreL (PLAVIX) 75 mg tablet, Take 1 tablet (75 mg total) by mouth once daily., Disp: 30 tablet, Rfl: 11    cyanocobalamin (VITAMIN B-12) 1000 MCG tablet, Take 1,000 mcg by mouth once daily. , Disp: , Rfl:     EScitalopram oxalate (LEXAPRO) 10 MG tablet, Take 1 tablet (10 mg total) by mouth once daily., Disp: 90 tablet, Rfl: 1    ferrous sulfate 325 (65 FE) MG EC tablet, Take 325 mg by mouth once daily. , Disp: , Rfl:     fluticasone propionate (FLONASE) 50 mcg/actuation nasal spray, 2 sprays (100 mcg total) by Each Nostril route once daily., Disp: 16 g, Rfl: 11    fluticasone propionate (FLOVENT HFA) 110 mcg/actuation inhaler, Inhale 1 puff into the lungs 2 (two) times daily. Controller. Rinse mouth with water and spit out after use., Disp: 12 g, Rfl: 11    hydrALAZINE (APRESOLINE) 50 MG tablet, Take 1 tablet (50 mg total) by mouth every 8 (eight) hours., Disp: 270 tablet, Rfl: 3    ibuprofen (ADVIL,MOTRIN) 600 MG tablet, Take 1 tablet (600 mg total) by mouth 3 (three) times daily., Disp: 60 tablet, Rfl: 1    isosorbide dinitrate (ISORDIL) 5 MG Tab, TAKE 1 TABLET(5 MG) BY MOUTH THREE TIMES DAILY, Disp: 90 tablet, Rfl: 0    losartan (COZAAR) 100 MG tablet, Take 1 tablet (100 mg total) by mouth once daily., Disp: 90 tablet, Rfl: 1    metOLazone (ZAROXOLYN) 2.5 MG tablet, Take 1 tablet (2.5 mg total) by mouth once daily., Disp: 30 tablet, Rfl: 11    metoprolol succinate (TOPROL-XL) 25 MG 24 hr tablet, TAKE 2 TABLETS(50 MG) BY MOUTH EVERY DAY, Disp: 180 tablet, Rfl: 0    montelukast (SINGULAIR) 10 mg tablet, Take 1 tablet (10 mg total) by mouth every evening., Disp: 90 tablet, Rfl: 3    multivitamin (THERAGRAN) per tablet, Take 1 tablet by mouth once daily., Disp: , Rfl:     nystatin (MYCOSTATIN) powder, Apply topically 2 (two) times daily., Disp: 60 g, Rfl: 2    pantoprazole (PROTONIX) 40 MG tablet, Take 1 tablet (40 mg total) by mouth once daily., Disp: 90 tablet, Rfl: 1    spironolactone  "(ALDACTONE) 25 MG tablet, Take 1 tablet (25 mg total) by mouth once daily., Disp: 90 tablet, Rfl: 1    traZODone (DESYREL) 50 MG tablet, 1 tab po qhs prn for insomnia., Disp: 90 tablet, Rfl: 1    Review of Systems   Constitutional: Positive for appetite change. Negative for activity change, chills, diaphoresis, fatigue, fever and unexpected weight change.   HENT: Negative for congestion, ear pain, postnasal drip, rhinorrhea, sinus pressure, sneezing, sore throat and trouble swallowing.    Eyes: Negative for pain, itching and visual disturbance.   Respiratory: Negative for cough, chest tightness, shortness of breath and wheezing.    Cardiovascular: Negative for chest pain, palpitations and leg swelling.   Gastrointestinal: Positive for abdominal pain. Negative for abdominal distention, blood in stool, constipation, diarrhea, nausea and vomiting.   Endocrine: Negative for cold intolerance and heat intolerance.   Genitourinary: Negative for difficulty urinating, dysuria, frequency, hematuria and urgency.   Musculoskeletal: Negative for arthralgias, back pain, myalgias and neck pain.   Skin: Negative for color change, pallor, rash and wound.   Neurological: Negative for dizziness, syncope, weakness, numbness and headaches.   Hematological: Negative for adenopathy.   Psychiatric/Behavioral: Negative for behavioral problems. The patient is not nervous/anxious.        Objective:      /80 (BP Location: Right arm, Patient Position: Sitting, BP Method: Large (Manual))   Pulse 88   Temp 98.7 °F (37.1 °C) (Oral)   Resp 16   Ht 5' 2" (1.575 m)   Wt 101.4 kg (223 lb 8.7 oz)   LMP  (LMP Unknown) Comment: Does not menstruate   SpO2 97%   BMI 40.89 kg/m²   Physical Exam  Vitals reviewed.   Constitutional:       General: She is not in acute distress.     Appearance: Normal appearance. She is not ill-appearing, toxic-appearing or diaphoretic.   HENT:      Head: Normocephalic.      Right Ear: External ear normal.      " Left Ear: External ear normal.      Nose: Nose normal. No congestion or rhinorrhea.      Mouth/Throat:      Mouth: Mucous membranes are moist.      Pharynx: Oropharynx is clear.   Eyes:      General: No scleral icterus.        Right eye: No discharge.         Left eye: No discharge.      Extraocular Movements: Extraocular movements intact.      Conjunctiva/sclera: Conjunctivae normal.   Cardiovascular:      Rate and Rhythm: Normal rate and regular rhythm.      Pulses: Normal pulses.      Heart sounds: Normal heart sounds. No murmur heard.  No friction rub. No gallop.    Pulmonary:      Effort: Pulmonary effort is normal. No respiratory distress.      Breath sounds: Normal breath sounds. No wheezing, rhonchi or rales.   Chest:      Chest wall: No tenderness.   Abdominal:      General: There is no distension.      Palpations: Abdomen is soft. There is no mass.      Tenderness: There is abdominal tenderness. There is no guarding.      Hernia: A hernia is present.   Musculoskeletal:         General: No swelling, tenderness or deformity. Normal range of motion.      Cervical back: Normal range of motion.      Right lower leg: No edema.      Left lower leg: No edema.   Skin:     General: Skin is warm and dry.      Capillary Refill: Capillary refill takes less than 2 seconds.      Coloration: Skin is not jaundiced.      Findings: No bruising, erythema, lesion or rash.   Neurological:      Mental Status: She is alert and oriented to person, place, and time.      Gait: Gait normal.   Psychiatric:         Mood and Affect: Mood normal.         Behavior: Behavior normal.         Thought Content: Thought content normal.         Judgment: Judgment normal.         Assessment:       1. Essential hypertension    2. Abdominal pain, unspecified abdominal location    3. Anxiety and depression    4. Morbid obesity with BMI of 40.0-44.9, adult    5. Stage 3b chronic kidney disease    6. Hyperlipidemia, unspecified hyperlipidemia type         Plan:       Essential hypertension       -     Stable. Continue meds as prescribed. Will continue to monitor. Patient provided BP recording sheet to measure her blood pressures daily at home to show at upcoming appointments.  -     Comprehensive Metabolic Panel; Future; Expected date: 02/16/2022  -     CBC Auto Differential; Future; Expected date: 02/16/2022    Abdominal pain, unspecified abdominal location         -    Likely 2/2 hernia and chronic problem she has been experiencing prior. Pt to contact Dr. Rain to discuss this and perhaps have surgery scheduled.    Anxiety and depression         -    Stable. Continue meds as prescribed. Will continue to monitor.    Morbid obesity with BMI of 40.0-44.9, adult          -    Pt would likely benefit from lifestyle modifications. Healthy diet, exercise and weight loss would likely improve her overall health and wellbeing.   -     Hemoglobin A1C; Future; Expected date: 02/16/2022    Stage 3b chronic kidney disease          -     Stable on previous check. Will continue to monitor. Avoid medications that stress the kidneys.           -     Hemoglobin A1C; Future; Expected date: 02/16/2022    -     Comprehensive Metabolic Panel; Future; Expected date: 02/16/2022    -     CBC Auto Differential; Future; Expected date: 02/16/2022    Hyperlipidemia, unspecified hyperlipidemia type         -     Stable. Continue meds as prescribed. Will continue to monitor.          -     Lipid Panel; Future; Expected date: 02/16/2022          Patient to follow up in 6 months to establish with an MD. Fasting labs prior.        Dirk Lanza PA-C  Family Medicine Physician Assistant       I spent a total of 30 minutes on the day of the visit.This includes face to face time and non-face to face time preparing to see the patient (eg, review of tests), obtaining and/or reviewing separately obtained history, documenting clinical information in the electronic or other health record,  independently interpreting results and communicating results to the patient/family/caregiver, or care coordinator.      We have addressed [4] Moderate: 1 or more chronic illnesses with exacerbation, progression, or side effects of treatment / 2 or more stable chronic illnesses / 1 undiagnosed new problem with uncertain prognosis / 1 acute illness with systemic symptoms / 1 acute complicated injury  The complexity of the data reviewed and analyzed for this visit was [3] Limited (Reviewed prior external note, ordered unique testing or reviewed the results of each unique test)   The risk of complications and/or morbidity or mortality are [4] Moderate risk (I.e. prescription drug management / decision regarding minor surgery with identified pt or procedure risk factors / decision regarding elective major surgery without identified pt or procedure risk factors / diagnosis or treatment significantly limited by social determinants of health)   The level of Medical Decision Making for this visit is [4] Moderate

## 2022-02-16 NOTE — PATIENT INSTRUCTIONS
If you are due for any health screening(s) below please notify me so we can arrange them to be ordered and scheduled to maintain your health.     Health Maintenance   Topic Date Due    TETANUS VACCINE  Never done    Mammogram  10/29/2020    Hemoglobin A1c  07/12/2022    High Dose Statin  02/16/2023    Aspirin/Antiplatelet Therapy  02/16/2023    Lipid Panel  04/28/2026    Hepatitis C Screening  Completed       Breast Cancer Screening    Breast cancer is the second most common cancer in women after skin cancer, and the second leading cause of death from cancer after lung cancer. Mammograms can detect breast cancer early, which significantly increases the chances of curing the cancer.      A screening mammogram is an x-ray image of the breasts used for early breast cancer detection. It can help reduce the number of deaths from breast cancer among women. To get a clear image, the breast is placed between two plastic plates to make it flat. How often a mammogram is needed depends on your age and your breast cancer risk.    Although you are still overdue for this important screening, due to the COVID-19 pandemic, we recommend scheduling it in the near future.    Colon Cancer Screening    Of cancers affecting both men and women, colorectal cancer is the third leading cancer killer in the [place]. But it doesnt have to be. Screening can prevent colorectal cancer or find it at an early stage when treatment often leads to a cure.    A colonoscopy is the preferred test for detecting colon cancer. It is needed only once every 10 years if results are negative. While sedated, a flexible, lighted tube with a tiny camera is inserted into the rectum and advanced through the colon to look for cancers. An alternative screening test that is used at home and returned to the lab may also be used. It detects hidden blood in bowel movements which could indicate cancer in the colon. If results are positive, you will need a  colonoscopy to determine if the blood is a sign of cancer. This type of follow up (diagnostic) colonoscopy usually requires additional copays as required by your insurance provider. Please contact your PCP if you have any questions.    Although you are still overdue for this important screening, due to the COVID-19 pandemic, we recommend scheduling it in the near future.

## 2022-02-17 NOTE — TELEPHONE ENCOUNTER
Lab appointment scheduled for the date of 8-. Patient agreed to appointment date, time, and location. An appointment reminder has also been mailed to patient's home address for upcoming lab and office appointments.

## 2022-03-03 ENCOUNTER — TELEPHONE (OUTPATIENT)
Dept: FAMILY MEDICINE | Facility: CLINIC | Age: 57
End: 2022-03-03
Payer: MEDICARE

## 2022-03-03 NOTE — TELEPHONE ENCOUNTER
----- Message from Marlyn Joseph sent at 3/3/2022  2:35 PM CST -----  Contact: pt  Type: Needs Medical Advice  Who Called: Faustina cook  Symptoms (please be specific):  cold, runny nose, body drained  How long has patient had these symptoms:  3/1/22  Pharmacy name and phone #:    [facility] #[id number] [address] & [address] [address]  Phone: [phone] Fax: [phone]    Best Call Back Number: [phone]  Additional Information:

## 2022-03-03 NOTE — TELEPHONE ENCOUNTER
Patient reports nasal congestion, lack of energy, and non-productive cough. States symptoms started 2 days ago. Unable to locate an appointment this week [place] or surrounding clinics. Advised patient to be seen at an urgent care facility of choice. Patient verbalized understanding.

## 2022-03-23 ENCOUNTER — TELEPHONE (OUTPATIENT)
Dept: FAMILY MEDICINE | Facility: CLINIC | Age: 57
End: 2022-03-23
Payer: MEDICARE

## 2022-03-23 NOTE — TELEPHONE ENCOUNTER
Patient scheduled  ----- Message from Jeannine Solano sent at 3/23/2022 10:09 AM CDT -----  Contact: pt  Type:  Sooner Appointment Request    Caller is requesting a sooner appointment.  Caller declined first available appointment listed below.  Caller will not accept being placed on the waitlist and is requesting a message be sent to doctor.    Name of Caller:  pt  When is the first available appointment?  03/29/2022  Symptoms:  stomach pains for three days  Best Call Back Number:  [phone]      Additional Information:  she would like an appt tomorrow

## 2022-03-24 ENCOUNTER — OFFICE VISIT (OUTPATIENT)
Dept: FAMILY MEDICINE | Facility: CLINIC | Age: 57
End: 2022-03-24
Payer: MEDICARE

## 2022-03-24 VITALS
HEART RATE: 92 BPM | DIASTOLIC BLOOD PRESSURE: 88 MMHG | SYSTOLIC BLOOD PRESSURE: 150 MMHG | RESPIRATION RATE: 17 BRPM | HEIGHT: 62 IN | BODY MASS INDEX: 43.69 KG/M2 | TEMPERATURE: 98 F | WEIGHT: 237.44 LBS | OXYGEN SATURATION: 98 %

## 2022-03-24 DIAGNOSIS — R10.13 EPIGASTRIC PAIN: Primary | ICD-10-CM

## 2022-03-24 DIAGNOSIS — J30.1 ACUTE SEASONAL ALLERGIC RHINITIS DUE TO POLLEN: ICD-10-CM

## 2022-03-24 DIAGNOSIS — I10 ESSENTIAL HYPERTENSION: ICD-10-CM

## 2022-03-24 DIAGNOSIS — J44.89 COPD WITH ASTHMA: ICD-10-CM

## 2022-03-24 DIAGNOSIS — B35.4 TINEA CORPORIS: ICD-10-CM

## 2022-03-24 DIAGNOSIS — Z86.73 HISTORY OF CVA (CEREBROVASCULAR ACCIDENT): ICD-10-CM

## 2022-03-24 DIAGNOSIS — Z98.890 STATUS POST GASTROPLASTY: ICD-10-CM

## 2022-03-24 DIAGNOSIS — K90.9 IMPAIRED INTESTINAL ABSORPTION: ICD-10-CM

## 2022-03-24 PROCEDURE — 4010F ACE/ARB THERAPY RXD/TAKEN: CPT | Mod: CPTII,S$GLB,, | Performed by: STUDENT IN AN ORGANIZED HEALTH CARE EDUCATION/TRAINING PROGRAM

## 2022-03-24 PROCEDURE — 3044F PR MOST RECENT HEMOGLOBIN A1C LEVEL <7.0%: ICD-10-PCS | Mod: CPTII,S$GLB,, | Performed by: STUDENT IN AN ORGANIZED HEALTH CARE EDUCATION/TRAINING PROGRAM

## 2022-03-24 PROCEDURE — 1159F MED LIST DOCD IN RCRD: CPT | Mod: CPTII,S$GLB,, | Performed by: STUDENT IN AN ORGANIZED HEALTH CARE EDUCATION/TRAINING PROGRAM

## 2022-03-24 PROCEDURE — 3077F SYST BP >= 140 MM HG: CPT | Mod: CPTII,S$GLB,, | Performed by: STUDENT IN AN ORGANIZED HEALTH CARE EDUCATION/TRAINING PROGRAM

## 2022-03-24 PROCEDURE — 99499 RISK ADDL DX/OHS AUDIT: ICD-10-PCS | Mod: S$GLB,,, | Performed by: STUDENT IN AN ORGANIZED HEALTH CARE EDUCATION/TRAINING PROGRAM

## 2022-03-24 PROCEDURE — 99215 OFFICE O/P EST HI 40 MIN: CPT | Mod: S$GLB,,, | Performed by: STUDENT IN AN ORGANIZED HEALTH CARE EDUCATION/TRAINING PROGRAM

## 2022-03-24 PROCEDURE — 99499 UNLISTED E&M SERVICE: CPT | Mod: S$GLB,,, | Performed by: STUDENT IN AN ORGANIZED HEALTH CARE EDUCATION/TRAINING PROGRAM

## 2022-03-24 PROCEDURE — 1159F PR MEDICATION LIST DOCUMENTED IN MEDICAL RECORD: ICD-10-PCS | Mod: CPTII,S$GLB,, | Performed by: STUDENT IN AN ORGANIZED HEALTH CARE EDUCATION/TRAINING PROGRAM

## 2022-03-24 PROCEDURE — 99999 PR PBB SHADOW E&M-EST. PATIENT-LVL V: ICD-10-PCS | Mod: PBBFAC,,, | Performed by: STUDENT IN AN ORGANIZED HEALTH CARE EDUCATION/TRAINING PROGRAM

## 2022-03-24 PROCEDURE — 3079F PR MOST RECENT DIASTOLIC BLOOD PRESSURE 80-89 MM HG: ICD-10-PCS | Mod: CPTII,S$GLB,, | Performed by: STUDENT IN AN ORGANIZED HEALTH CARE EDUCATION/TRAINING PROGRAM

## 2022-03-24 PROCEDURE — 3044F HG A1C LEVEL LT 7.0%: CPT | Mod: CPTII,S$GLB,, | Performed by: STUDENT IN AN ORGANIZED HEALTH CARE EDUCATION/TRAINING PROGRAM

## 2022-03-24 PROCEDURE — 1160F PR REVIEW ALL MEDS BY PRESCRIBER/CLIN PHARMACIST DOCUMENTED: ICD-10-PCS | Mod: CPTII,S$GLB,, | Performed by: STUDENT IN AN ORGANIZED HEALTH CARE EDUCATION/TRAINING PROGRAM

## 2022-03-24 PROCEDURE — 99999 PR PBB SHADOW E&M-EST. PATIENT-LVL V: CPT | Mod: PBBFAC,,, | Performed by: STUDENT IN AN ORGANIZED HEALTH CARE EDUCATION/TRAINING PROGRAM

## 2022-03-24 PROCEDURE — 99215 PR OFFICE/OUTPT VISIT, EST, LEVL V, 40-54 MIN: ICD-10-PCS | Mod: S$GLB,,, | Performed by: STUDENT IN AN ORGANIZED HEALTH CARE EDUCATION/TRAINING PROGRAM

## 2022-03-24 PROCEDURE — 3077F PR MOST RECENT SYSTOLIC BLOOD PRESSURE >= 140 MM HG: ICD-10-PCS | Mod: CPTII,S$GLB,, | Performed by: STUDENT IN AN ORGANIZED HEALTH CARE EDUCATION/TRAINING PROGRAM

## 2022-03-24 PROCEDURE — 1160F RVW MEDS BY RX/DR IN RCRD: CPT | Mod: CPTII,S$GLB,, | Performed by: STUDENT IN AN ORGANIZED HEALTH CARE EDUCATION/TRAINING PROGRAM

## 2022-03-24 PROCEDURE — 3008F BODY MASS INDEX DOCD: CPT | Mod: CPTII,S$GLB,, | Performed by: STUDENT IN AN ORGANIZED HEALTH CARE EDUCATION/TRAINING PROGRAM

## 2022-03-24 PROCEDURE — 3079F DIAST BP 80-89 MM HG: CPT | Mod: CPTII,S$GLB,, | Performed by: STUDENT IN AN ORGANIZED HEALTH CARE EDUCATION/TRAINING PROGRAM

## 2022-03-24 PROCEDURE — 4010F PR ACE/ARB THEARPY RXD/TAKEN: ICD-10-PCS | Mod: CPTII,S$GLB,, | Performed by: STUDENT IN AN ORGANIZED HEALTH CARE EDUCATION/TRAINING PROGRAM

## 2022-03-24 PROCEDURE — 3008F PR BODY MASS INDEX (BMI) DOCUMENTED: ICD-10-PCS | Mod: CPTII,S$GLB,, | Performed by: STUDENT IN AN ORGANIZED HEALTH CARE EDUCATION/TRAINING PROGRAM

## 2022-03-24 RX ORDER — LANOLIN ALCOHOL/MO/W.PET/CERES
1000 CREAM (GRAM) TOPICAL DAILY
Qty: 90 TABLET | Refills: 3 | Status: SHIPPED | OUTPATIENT
Start: 2022-03-24 | End: 2023-03-06 | Stop reason: SDUPTHER

## 2022-03-24 RX ORDER — FLUTICASONE PROPIONATE 50 MCG
2 SPRAY, SUSPENSION (ML) NASAL DAILY
Qty: 16 G | Refills: 11 | Status: SHIPPED | OUTPATIENT
Start: 2022-03-24 | End: 2023-03-06 | Stop reason: SDUPTHER

## 2022-03-24 RX ORDER — ALBUTEROL SULFATE 90 UG/1
AEROSOL, METERED RESPIRATORY (INHALATION)
Qty: 8.5 G | Refills: 0 | Status: SHIPPED | OUTPATIENT
Start: 2022-03-24 | End: 2023-02-27 | Stop reason: SDUPTHER

## 2022-03-24 RX ORDER — FERROUS SULFATE 325(65) MG
325 TABLET, DELAYED RELEASE (ENTERIC COATED) ORAL DAILY
Qty: 90 TABLET | Refills: 3 | Status: SHIPPED | OUTPATIENT
Start: 2022-03-24 | End: 2023-03-06 | Stop reason: SDUPTHER

## 2022-03-24 RX ORDER — VALSARTAN 80 MG/1
80 TABLET ORAL DAILY
Qty: 90 TABLET | Refills: 3 | Status: ON HOLD | OUTPATIENT
Start: 2022-03-24 | End: 2022-10-18 | Stop reason: HOSPADM

## 2022-03-24 RX ORDER — NYSTATIN 100000 [USP'U]/G
POWDER TOPICAL 2 TIMES DAILY
Qty: 60 G | Refills: 2 | Status: SHIPPED | OUTPATIENT
Start: 2022-03-24 | End: 2022-11-09

## 2022-03-24 RX ORDER — MULTIVITAMIN
1 TABLET ORAL DAILY
Qty: 90 TABLET | Refills: 3 | Status: SHIPPED | OUTPATIENT
Start: 2022-03-24

## 2022-03-25 NOTE — PATIENT INSTRUCTIONS
If you are due for any health screening(s) below please notify me so we can arrange them to be ordered and scheduled to maintain your health.     Health Maintenance   Topic Date Due    TETANUS VACCINE  Never done    Mammogram  10/29/2020    Hemoglobin A1c  07/12/2022    Aspirin/Antiplatelet Therapy  02/16/2023    High Dose Statin  03/24/2023    Lipid Panel  04/28/2026    Hepatitis C Screening  Completed       Breast Cancer Screening    Breast cancer is the second most common cancer in women after skin cancer, and the second leading cause of death from cancer after lung cancer. Mammograms can detect breast cancer early, which significantly increases the chances of curing the cancer.      A screening mammogram is an x-ray image of the breasts used for early breast cancer detection. It can help reduce the number of deaths from breast cancer among women. To get a clear image, the breast is placed between two plastic plates to make it flat. How often a mammogram is needed depends on your age and your breast cancer risk.    Although you are still overdue for this important screening, due to the COVID-19 pandemic, we recommend scheduling it in the near future.  Colon Cancer Screening    Of cancers affecting both men and women, colorectal cancer is the third leading cancer killer in the [place]. But it doesnt have to be. Screening can prevent colorectal cancer or find it at an early stage when treatment often leads to a cure.    A colonoscopy is the preferred test for detecting colon cancer. It is needed only once every 10 years if results are negative. While sedated, a flexible, lighted tube with a tiny camera is inserted into the rectum and advanced through the colon to look for cancers. An alternative screening test that is used at home and returned to the lab may also be used. It detects hidden blood in bowel movements which could indicate cancer in the colon. If results are positive, you will need a colonoscopy  to determine if the blood is a sign of cancer. This type of follow up (diagnostic) colonoscopy usually requires additional copays as required by your insurance provider. Please contact your PCP if you have any questions.    Although you are still overdue for this important screening, due to the COVID-19 pandemic, we recommend scheduling it in the near future.

## 2022-03-25 NOTE — PROGRESS NOTES
[facility] Note    Subjective:     Patient ID: Faustina Rae is a 57 y.o. female.    Chief Complaint: Abdominal Pain    HPI:  57-YO F complains of stomach pain over the last three days. Denies any nausea, vomiting, or changes in bowel movements. She reports that her pain is worse when she eats, and pain is present regardless of what food she eats. She was seeing Dr. Jay Rain for surgical consult ([facility]), but has not followed up. Laying down reduces the pain.    Previous history of a hiatal hernia diagnosed in 2018 on CT and diastolic heart failure. She also reports sleeping propped up because lying flat causes her to cough.     She is interested in stopping her plavix since she is on asparin. She has not seen neurology since 4-8-2020.    Needs to establish new PCP. Previous PCP was Dr. Gonzales.    Requests medication refills.     Past Medical History:   Diagnosis Date    Anxiety     Arthritis     Asthma     CHF (congestive heart failure)     COPD (chronic obstructive pulmonary disease)     Hyperlipemia     Hypertension     Leaky heart valve     Obese     Obese     Obstructive sleep apnea     lost her CPAP    Pneumonia     Rheumatoid arthritis(714.0)     Stroke      Past Surgical History:   Procedure Laterality Date    CAROTID STENT      3 years ago    CHOLECYSTECTOMY      HYSTERECTOMY      SLEEVE GASTROPLASTY  02/21/2017     Review of patient's allergies indicates:  No Known Allergies    She reports that she has never smoked. She has never used smokeless tobacco. She reports current alcohol use. She reports that she does not use drugs.    Her family history includes Arthritis in her mother; Asthma in her son; Breast cancer in her mother; Cancer in her mother; Diabetes in her mother; Heart disease in her father; Hyperlipidemia in her mother; Hypertension in her father, mother, sister, and sister; Stroke in her mother.  Medications listed in  "plan.    Review of Systems   Constitutional: Negative for activity change, appetite change and fever.   HENT: Negative for ear discharge, ear pain, rhinorrhea, sinus pressure and sinus pain.    Respiratory: Positive for cough (When lying flat) and shortness of breath.    Cardiovascular: Negative for chest pain and palpitations.   Gastrointestinal: Positive for abdominal pain. Negative for diarrhea, nausea and vomiting.   Musculoskeletal: Negative for arthralgias and myalgias.   Skin: Negative for rash.   Neurological: Negative for dizziness, light-headedness and headaches.     Objective:      BP (!) 150/88 (BP Location: Right arm, Patient Position: Sitting, BP Method: Large (Manual))   Pulse 92   Temp 98.1 °F (36.7 °C) (Oral)   Resp 17   Ht 5' 2" (1.575 m)   Wt 107.7 kg (237 lb 7 oz)   LMP  (LMP Unknown) Comment: Does not menstruate   SpO2 98%   BMI 43.43 kg/m²     Wt Readings from Last 3 Encounters:   03/24/22 107.7 kg (237 lb 7 oz)   02/16/22 101.4 kg (223 lb 8.7 oz)   01/05/22 103.9 kg (229 lb)     BP Readings from Last 3 Encounters:   03/24/22 (!) 150/88   02/16/22 136/80   01/12/22 138/70     Physical Exam  Vitals and nursing note reviewed.   Constitutional:       General: She is not in acute distress.     Appearance: She is obese.   HENT:      Head: Normocephalic.      Right Ear: External ear normal.      Left Ear: External ear normal.   Eyes:      General: No scleral icterus.     Extraocular Movements: Extraocular movements intact.      Conjunctiva/sclera: Conjunctivae normal.   Cardiovascular:      Rate and Rhythm: Normal rate and regular rhythm.      Heart sounds: Normal heart sounds, S1 normal and S2 normal. No murmur heard.    No friction rub. No gallop.   Pulmonary:      Effort: Pulmonary effort is normal.      Breath sounds: Normal breath sounds.   Abdominal:      General: Abdomen is flat.      Palpations: Abdomen is soft.      Tenderness: There is no abdominal tenderness. There is no guarding. "   Musculoskeletal:      Right lower leg: No edema.      Left lower leg: No edema.   Neurological:      General: No focal deficit present.      Mental Status: She is alert and oriented to person, place, and time.   Psychiatric:         Attention and Perception: Attention normal.         Mood and Affect: Mood normal.         Behavior: Behavior normal.       Assessment:     1. Epigastric pain    2. Impaired intestinal absorption    3. Status post gastroplasty    4. COPD with asthma    5. Acute seasonal allergic rhinitis due to pollen    6. Tinea corporis    7. Essential hypertension    8. History of CVA (cerebrovascular accident)      Plan:     1. Epigastric pain  - CT Abdomen Pelvis  Without Contrast; Future  - Recommended patient to call Dr. Rain.     2. Impaired intestinal absorption  - multivitamin (THERAGRAN) per tablet; Take 1 tablet by mouth once daily.  Dispense: 90 tablet; Refill: 3  - ferrous sulfate 325 (65 FE) MG EC tablet; Take 1 tablet (325 mg total) by mouth once daily.  Dispense: 90 tablet; Refill: 3  - cyanocobalamin (VITAMIN B-12) 1000 MCG tablet; Take 1 tablet (1,000 mcg total) by mouth once daily.  Dispense: 90 tablet; Refill: 3    3. Status post gastroplasty  - multivitamin (THERAGRAN) per tablet; Take 1 tablet by mouth once daily.  Dispense: 90 tablet; Refill: 3  - ferrous sulfate 325 (65 FE) MG EC tablet; Take 1 tablet (325 mg total) by mouth once daily.  Dispense: 90 tablet; Refill: 3  - cyanocobalamin (VITAMIN B-12) 1000 MCG tablet; Take 1 tablet (1,000 mcg total) by mouth once daily.  Dispense: 90 tablet; Refill: 3  - Procedure was 2-. Left her with malabsorption of vitamins.     4. COPD with asthma  - albuterol (PROVENTIL/VENTOLIN HFA) 90 mcg/actuation inhaler; INHALE 2 PUFFS INTO THE LUNGS FOUR TIMES DAILY  Dispense: 8.5 g; Refill: 0    5. Acute seasonal allergic rhinitis due to pollen  - fluticasone propionate (FLONASE) 50 mcg/actuation nasal spray; 2 sprays (100 mcg total) by Each  Nostril route once daily.  Dispense: 16 g; Refill: 11    6. Tinea corporis  - nystatin (MYCOSTATIN) powder; Apply topically 2 (two) times daily.  Dispense: 60 g; Refill: 2    7. Essential hypertension  - valsartan (DIOVAN) 80 MG tablet; Take 1 tablet (80 mg total) by mouth once daily.  Dispense: 90 tablet; Refill: 3  - Rechecked BP and it was 160/80. She agreed to change medicine.     8. History of CVA (cerebrovascular accident)  - Ambulatory referral/consult to Neurology; Future  - Needs f/u with Yessenia Radha. Last appt was 4-8-2020.  Let neurology decide if she needs to be on plavix.     F/u with me in 2 weeks for BP check.   Recommended patient to contact Dr. Rain for surgical consult and cardiology for f/u.   Follow-up as needed.     Medication List with Changes/Refills   New Medications    VALSARTAN (DIOVAN) 80 MG TABLET    Take 1 tablet (80 mg total) by mouth once daily.   Current Medications    AMLODIPINE (NORVASC) 10 MG TABLET    Take 1 tablet (10 mg total) by mouth once daily.    ATORVASTATIN (LIPITOR) 80 MG TABLET    Take 1 tablet (80 mg total) by mouth every evening.    B COMPLEX VITAMINS TABLET    Take 1 tablet by mouth once daily.    CALCIUM CITRATE-VITAMIN D3 315-200 MG (CITRACAL+D) 315-200 MG-UNIT PER TABLET    Take 1 tablet by mouth once daily.     CHLORTHALIDONE (HYGROTEN) 25 MG TAB    Take 0.5 tablets (12.5 mg total) by mouth once daily.    CLOPIDOGREL (PLAVIX) 75 MG TABLET    Take 1 tablet (75 mg total) by mouth once daily.    ESCITALOPRAM OXALATE (LEXAPRO) 10 MG TABLET    Take 1 tablet (10 mg total) by mouth once daily.    FLUTICASONE PROPIONATE (FLOVENT HFA) 110 MCG/ACTUATION INHALER    Inhale 1 puff into the lungs 2 (two) times daily. Controller. Rinse mouth with water and spit out after use.    HYDRALAZINE (APRESOLINE) 50 MG TABLET    Take 1 tablet (50 mg total) by mouth every 8 (eight) hours.    IBUPROFEN (ADVIL,MOTRIN) 600 MG TABLET    Take 1 tablet (600 mg total) by mouth 3 (three)  times daily.    ISOSORBIDE DINITRATE (ISORDIL) 5 MG TAB    TAKE 1 TABLET(5 MG) BY MOUTH THREE TIMES DAILY    METOLAZONE (ZAROXOLYN) 2.5 MG TABLET    Take 1 tablet (2.5 mg total) by mouth once daily.    METOPROLOL SUCCINATE (TOPROL-XL) 25 MG 24 HR TABLET    TAKE 2 TABLETS(50 MG) BY MOUTH EVERY DAY    MONTELUKAST (SINGULAIR) 10 MG TABLET    Take 1 tablet (10 mg total) by mouth every evening.    PANTOPRAZOLE (PROTONIX) 40 MG TABLET    Take 1 tablet (40 mg total) by mouth once daily.    SPIRONOLACTONE (ALDACTONE) 25 MG TABLET    Take 1 tablet (25 mg total) by mouth once daily.    TRAZODONE (DESYREL) 50 MG TABLET    1 tab po qhs prn for insomnia.   Changed and/or Refilled Medications    Modified Medication Previous Medication    ALBUTEROL (PROVENTIL/VENTOLIN HFA) 90 MCG/ACTUATION INHALER albuterol (PROVENTIL/VENTOLIN HFA) 90 mcg/actuation inhaler       INHALE 2 PUFFS INTO THE LUNGS FOUR TIMES DAILY    INHALE 2 PUFFS INTO THE LUNGS FOUR TIMES DAILY    CYANOCOBALAMIN (VITAMIN B-12) 1000 MCG TABLET cyanocobalamin (VITAMIN B-12) 1000 MCG tablet       Take 1 tablet (1,000 mcg total) by mouth once daily.    Take 1,000 mcg by mouth once daily.     FERROUS SULFATE 325 (65 FE) MG EC TABLET ferrous sulfate 325 (65 FE) MG EC tablet       Take 1 tablet (325 mg total) by mouth once daily.    Take 325 mg by mouth once daily.     FLUTICASONE PROPIONATE (FLONASE) 50 MCG/ACTUATION NASAL SPRAY fluticasone propionate (FLONASE) 50 mcg/actuation nasal spray       2 sprays (100 mcg total) by Each Nostril route once daily.    2 sprays (100 mcg total) by Each Nostril route once daily.    MULTIVITAMIN (THERAGRAN) PER TABLET multivitamin (THERAGRAN) per tablet       Take 1 tablet by mouth once daily.    Take 1 tablet by mouth once daily.    NYSTATIN (MYCOSTATIN) POWDER nystatin (MYCOSTATIN) powder       Apply topically 2 (two) times daily.    Apply topically 2 (two) times daily.   Discontinued Medications    LOSARTAN (COZAAR) 100 MG TABLET     Take 1 tablet (100 mg total) by mouth once daily.     Modified Medications    Modified Medication Previous Medication    ALBUTEROL (PROVENTIL/VENTOLIN HFA) 90 MCG/ACTUATION INHALER albuterol (PROVENTIL/VENTOLIN HFA) 90 mcg/actuation inhaler       INHALE 2 PUFFS INTO THE LUNGS FOUR TIMES DAILY    INHALE 2 PUFFS INTO THE LUNGS FOUR TIMES DAILY    CYANOCOBALAMIN (VITAMIN B-12) 1000 MCG TABLET cyanocobalamin (VITAMIN B-12) 1000 MCG tablet       Take 1 tablet (1,000 mcg total) by mouth once daily.    Take 1,000 mcg by mouth once daily.     FERROUS SULFATE 325 (65 FE) MG EC TABLET ferrous sulfate 325 (65 FE) MG EC tablet       Take 1 tablet (325 mg total) by mouth once daily.    Take 325 mg by mouth once daily.     FLUTICASONE PROPIONATE (FLONASE) 50 MCG/ACTUATION NASAL SPRAY fluticasone propionate (FLONASE) 50 mcg/actuation nasal spray       2 sprays (100 mcg total) by Each Nostril route once daily.    2 sprays (100 mcg total) by Each Nostril route once daily.    MULTIVITAMIN (THERAGRAN) PER TABLET multivitamin (THERAGRAN) per tablet       Take 1 tablet by mouth once daily.    Take 1 tablet by mouth once daily.    NYSTATIN (MYCOSTATIN) POWDER nystatin (MYCOSTATIN) powder       Apply topically 2 (two) times daily.    Apply topically 2 (two) times daily.     Medication Monitoring    I discussed imaging findings, diagnosis, possibilities, treatment options, medications, risks, and benefits. She had many questions regarding the options and long-term effects. All questions were answered. She expressed understanding after counseling regarding the diagnosis and recommendations. She was capable and demonstrated competence with understanding of these options. Shared decision making was performed resulting in her choosing the current treatment plan. Patient handout was given with instructions and recommendations. Advised the patient that if they become pregnant to alert us immediately to assess for medication changes.     I also  discussed the importance of close follow up to discuss labs, change or modify her medications if needed, monitor side effects, and further evaluation of medical problems.       Jenan Almanza PA-C  Family Medicine Physician Assistant       03/24/2022     If you are due for any health screening(s) below please notify me so we can arrange them to be ordered and scheduled to maintain your health.     Health Maintenance Due   Topic Date Due    TETANUS VACCINE  Never done    Colorectal Cancer Screening  Never done    Shingles Vaccine (1 of 2) Never done    Mammogram  10/29/2020    Influenza Vaccine (1) 09/01/2021    COVID-19 Vaccine (3 - Booster for Pf[employer] series) 09/19/2021

## 2022-03-30 ENCOUNTER — HOSPITAL ENCOUNTER (OUTPATIENT)
Dept: RADIOLOGY | Facility: HOSPITAL | Age: 57
Discharge: HOME | End: 2022-03-30
Attending: STUDENT IN AN ORGANIZED HEALTH CARE EDUCATION/TRAINING PROGRAM
Payer: MEDICARE

## 2022-03-30 DIAGNOSIS — R10.13 EPIGASTRIC PAIN: ICD-10-CM

## 2022-03-30 PROCEDURE — A9698 NON-RAD CONTRAST MATERIALNOC: HCPCS | Performed by: STUDENT IN AN ORGANIZED HEALTH CARE EDUCATION/TRAINING PROGRAM

## 2022-03-30 PROCEDURE — 74176 CT ABDOMEN PELVIS WITHOUT CONTRAST: ICD-10-PCS | Mod: 26,,, | Performed by: RADIOLOGY

## 2022-03-30 PROCEDURE — 74176 CT ABD & PELVIS W/O CONTRAST: CPT | Mod: TC

## 2022-03-30 PROCEDURE — 25500020 PHARM REV CODE 255: Performed by: STUDENT IN AN ORGANIZED HEALTH CARE EDUCATION/TRAINING PROGRAM

## 2022-03-30 PROCEDURE — 74176 CT ABD & PELVIS W/O CONTRAST: CPT | Mod: 26,,, | Performed by: RADIOLOGY

## 2022-03-30 RX ADMIN — IOHEXOL 700 ML: 12 SOLUTION ORAL at 11:03

## 2022-03-31 ENCOUNTER — TELEPHONE (OUTPATIENT)
Dept: FAMILY MEDICINE | Facility: CLINIC | Age: 57
End: 2022-03-31
Payer: MEDICARE

## 2022-03-31 ENCOUNTER — OFFICE VISIT (OUTPATIENT)
Dept: FAMILY MEDICINE | Facility: CLINIC | Age: 57
End: 2022-03-31
Payer: MEDICARE

## 2022-03-31 VITALS
DIASTOLIC BLOOD PRESSURE: 80 MMHG | HEART RATE: 86 BPM | SYSTOLIC BLOOD PRESSURE: 130 MMHG | WEIGHT: 236.13 LBS | OXYGEN SATURATION: 97 % | BODY MASS INDEX: 43.45 KG/M2 | TEMPERATURE: 98 F | HEIGHT: 62 IN

## 2022-03-31 DIAGNOSIS — I69.321 DYSPHASIA AS LATE EFFECT OF CEREBROVASCULAR ACCIDENT (CVA): ICD-10-CM

## 2022-03-31 DIAGNOSIS — T14.8XXA DRAINAGE FROM WOUND: ICD-10-CM

## 2022-03-31 DIAGNOSIS — D64.9 ANEMIA, UNSPECIFIED TYPE: ICD-10-CM

## 2022-03-31 DIAGNOSIS — R10.13 EPIGASTRIC PAIN: Primary | ICD-10-CM

## 2022-03-31 DIAGNOSIS — J44.89 COPD WITH ASTHMA: ICD-10-CM

## 2022-03-31 DIAGNOSIS — K92.1 MELENA: ICD-10-CM

## 2022-03-31 DIAGNOSIS — Z86.73 HISTORY OF CVA (CEREBROVASCULAR ACCIDENT): ICD-10-CM

## 2022-03-31 PROCEDURE — 93005 ELECTROCARDIOGRAM TRACING: CPT | Mod: S$GLB,,, | Performed by: NURSE PRACTITIONER

## 2022-03-31 PROCEDURE — 1160F RVW MEDS BY RX/DR IN RCRD: CPT | Mod: CPTII,S$GLB,, | Performed by: NURSE PRACTITIONER

## 2022-03-31 PROCEDURE — 3008F BODY MASS INDEX DOCD: CPT | Mod: CPTII,S$GLB,, | Performed by: NURSE PRACTITIONER

## 2022-03-31 PROCEDURE — 1159F MED LIST DOCD IN RCRD: CPT | Mod: CPTII,S$GLB,, | Performed by: NURSE PRACTITIONER

## 2022-03-31 PROCEDURE — 3075F SYST BP GE 130 - 139MM HG: CPT | Mod: CPTII,S$GLB,, | Performed by: NURSE PRACTITIONER

## 2022-03-31 PROCEDURE — 93010 EKG 12-LEAD: ICD-10-PCS | Mod: S$GLB,,, | Performed by: INTERNAL MEDICINE

## 2022-03-31 PROCEDURE — 99215 PR OFFICE/OUTPT VISIT, EST, LEVL V, 40-54 MIN: ICD-10-PCS | Mod: S$GLB,,, | Performed by: NURSE PRACTITIONER

## 2022-03-31 PROCEDURE — 87070 CULTURE OTHR SPECIMN AEROBIC: CPT | Performed by: NURSE PRACTITIONER

## 2022-03-31 PROCEDURE — 93010 ELECTROCARDIOGRAM REPORT: CPT | Mod: S$GLB,,, | Performed by: INTERNAL MEDICINE

## 2022-03-31 PROCEDURE — 4010F ACE/ARB THERAPY RXD/TAKEN: CPT | Mod: CPTII,S$GLB,, | Performed by: NURSE PRACTITIONER

## 2022-03-31 PROCEDURE — 87088 URINE BACTERIA CULTURE: CPT | Performed by: NURSE PRACTITIONER

## 2022-03-31 PROCEDURE — 87077 CULTURE AEROBIC IDENTIFY: CPT | Performed by: NURSE PRACTITIONER

## 2022-03-31 PROCEDURE — 3075F PR MOST RECENT SYSTOLIC BLOOD PRESS GE 130-139MM HG: ICD-10-PCS | Mod: CPTII,S$GLB,, | Performed by: NURSE PRACTITIONER

## 2022-03-31 PROCEDURE — 3044F PR MOST RECENT HEMOGLOBIN A1C LEVEL <7.0%: ICD-10-PCS | Mod: CPTII,S$GLB,, | Performed by: NURSE PRACTITIONER

## 2022-03-31 PROCEDURE — 93005 EKG 12-LEAD: ICD-10-PCS | Mod: S$GLB,,, | Performed by: NURSE PRACTITIONER

## 2022-03-31 PROCEDURE — 1159F PR MEDICATION LIST DOCUMENTED IN MEDICAL RECORD: ICD-10-PCS | Mod: CPTII,S$GLB,, | Performed by: NURSE PRACTITIONER

## 2022-03-31 PROCEDURE — 1160F PR REVIEW ALL MEDS BY PRESCRIBER/CLIN PHARMACIST DOCUMENTED: ICD-10-PCS | Mod: CPTII,S$GLB,, | Performed by: NURSE PRACTITIONER

## 2022-03-31 PROCEDURE — 87186 SC STD MICRODIL/AGAR DIL: CPT | Performed by: NURSE PRACTITIONER

## 2022-03-31 PROCEDURE — 3079F DIAST BP 80-89 MM HG: CPT | Mod: CPTII,S$GLB,, | Performed by: NURSE PRACTITIONER

## 2022-03-31 PROCEDURE — 3079F PR MOST RECENT DIASTOLIC BLOOD PRESSURE 80-89 MM HG: ICD-10-PCS | Mod: CPTII,S$GLB,, | Performed by: NURSE PRACTITIONER

## 2022-03-31 PROCEDURE — 99999 PR PBB SHADOW E&M-EST. PATIENT-LVL V: CPT | Mod: PBBFAC,,, | Performed by: NURSE PRACTITIONER

## 2022-03-31 PROCEDURE — 87086 URINE CULTURE/COLONY COUNT: CPT | Performed by: NURSE PRACTITIONER

## 2022-03-31 PROCEDURE — 99215 OFFICE O/P EST HI 40 MIN: CPT | Mod: S$GLB,,, | Performed by: NURSE PRACTITIONER

## 2022-03-31 PROCEDURE — 4010F PR ACE/ARB THEARPY RXD/TAKEN: ICD-10-PCS | Mod: CPTII,S$GLB,, | Performed by: NURSE PRACTITIONER

## 2022-03-31 PROCEDURE — 99999 PR PBB SHADOW E&M-EST. PATIENT-LVL V: ICD-10-PCS | Mod: PBBFAC,,, | Performed by: NURSE PRACTITIONER

## 2022-03-31 PROCEDURE — 3044F HG A1C LEVEL LT 7.0%: CPT | Mod: CPTII,S$GLB,, | Performed by: NURSE PRACTITIONER

## 2022-03-31 PROCEDURE — 3008F PR BODY MASS INDEX (BMI) DOCUMENTED: ICD-10-PCS | Mod: CPTII,S$GLB,, | Performed by: NURSE PRACTITIONER

## 2022-03-31 RX ORDER — SUCRALFATE 1 G/1
1 TABLET ORAL 4 TIMES DAILY
Qty: 120 TABLET | Refills: 0 | Status: SHIPPED | OUTPATIENT
Start: 2022-03-31 | End: 2022-04-03

## 2022-03-31 RX ORDER — TRAMADOL HYDROCHLORIDE AND ACETAMINOPHEN 37.5; 325 MG/1; MG/1
1 TABLET, FILM COATED ORAL EVERY 4 HOURS
Qty: 20 TABLET | Refills: 0 | Status: SHIPPED | OUTPATIENT
Start: 2022-03-31 | End: 2023-05-09

## 2022-03-31 RX ORDER — IPRATROPIUM BROMIDE AND ALBUTEROL SULFATE 2.5; .5 MG/3ML; MG/3ML
3 SOLUTION RESPIRATORY (INHALATION) EVERY 6 HOURS PRN
Qty: 75 ML | Refills: 0 | Status: SHIPPED | OUTPATIENT
Start: 2022-03-31 | End: 2022-11-03 | Stop reason: SDUPTHER

## 2022-03-31 NOTE — TELEPHONE ENCOUNTER
----- Message from Taryn Mcmillan MA sent at 3/30/2022  4:28 PM CDT -----  Patient states she is still having bad abdominal pain, she has been resting in bed.   She states she did reach out to Dr Koffi wheat and has an appt on 4/11 with the other doctor in his office.

## 2022-03-31 NOTE — PROGRESS NOTES
[facility] Note    Subjective:     Patient ID: Faustina Rae is a 57 y.o. female.    Chief Complaint: Follow up for abdominal pain    HPI:  57-YO F presents as follow-up for epigastric abdominal pain. Onset 3/21/22. She was seen in the office on 3-24-22 for the same complaint and states the pain has worsened. The pain is described as aching, severe, and is 10/10 in intensity. Pain is located in the epigastric region. No radiation of the pain. Symptoms have been gradually worsening since. Aggravating factors: eating and sitting up. Alleviating factors: laying down and supporting with girdle or hand. Associated symptoms:  headache. The patient denies chills, constipation, nausea, vomiting, diarrhea, vaginal discharge, dysuria, and fever. She reports that a headache comes and goes when her pain intensifies. Denies any vision changes, numbness, or fevers.  No  symptoms.  Did not try any medication for symptoms.    She contacted Dr. Banda for surgical evaluation to repair a hernia and her appt is 4-11-22. She is taking her valsartan and is tolerating it well.     She reports black stools which started after drinking the prep for the CT. Has not had Pepto. Had wine one week ago about a cup. Notes BC powder use one week ago. None since.  No colonoscopy per patient report.  Chart indicates colonoscopy in 2014 but report not available for review.     Unrelated to abdominal pain, she requests a refill of her albuterol-ipratropium nebulized solution due to allergies related to pollen and asthma copd overlap.   She reports some dyspnea on exertion that is relieved when resting and using her inhaler. This started 1 week ago. Denies any chest pain, apnea,     Patient notes at visit drainage from the umbilicus which started in the office.  New complaint.  Patient notes an odor.    Ros  No fever chills  Orthopnea.  Patient roots the knee sleeps elevated and has not had any increase in her elevation to  rest.    Past Medical History:   Diagnosis Date    Anxiety     Arthritis     Asthma     CHF (congestive heart failure)     COPD (chronic obstructive pulmonary disease)     Hyperlipemia     Hypertension     Leaky heart valve     Obese     Obese     Obstructive sleep apnea     lost her CPAP    Pneumonia     Rheumatoid arthritis(714.0)     Stroke      Past Surgical History:   Procedure Laterality Date    CAROTID STENT      3 years ago    CHOLECYSTECTOMY      HYSTERECTOMY      SLEEVE GASTROPLASTY  02/21/2017     Review of patient's allergies indicates:  No Known Allergies    She reports that she has never smoked. She has never used smokeless tobacco. She reports current alcohol use. She reports that she does not use drugs.    Her family history includes Arthritis in her mother; Asthma in her son; Breast cancer in her mother; Cancer in her mother; Diabetes in her mother; Heart disease in her father; Hyperlipidemia in her mother; Hypertension in her father, mother, sister, and sister; Stroke in her mother.    Medications listed in plan.    Review of Systems   Constitutional: Negative for activity change, appetite change and fever.   HENT: Negative for ear discharge, ear pain, rhinorrhea, sinus pressure and sinus pain.    Respiratory: Positive for shortness of breath (With exertion). Negative for cough.    Cardiovascular: Negative for chest pain and palpitations.   Gastrointestinal: Positive for abdominal pain and nausea. Negative for abdominal distention, constipation, diarrhea and vomiting.   Genitourinary: Negative for dysuria, flank pain, hematuria and vaginal discharge.   Musculoskeletal: Negative for arthralgias and myalgias.   Skin: Negative for rash.   Neurological: Positive for speech difficulty (Chronic dysphasia). Negative for dizziness, light-headedness and headaches.     Objective:      /80 (BP Location: Right arm, Patient Position: Sitting, BP Method: Large (Manual))   Pulse 86    "Temp 98.3 °F (36.8 °C) (Oral)   Ht 5' 2" (1.575 m)   Wt 107.1 kg (236 lb 1.8 oz)   LMP  (LMP Unknown) Comment: Does not menstruate   SpO2 97%   BMI 43.19 kg/m²     Wt Readings from Last 3 Encounters:   03/31/22 107.1 kg (236 lb 1.8 oz)   03/24/22 107.7 kg (237 lb 7 oz)   02/16/22 101.4 kg (223 lb 8.7 oz)     BP Readings from Last 3 Encounters:   03/31/22 130/80   03/24/22 (!) 150/88   02/16/22 136/80     Physical Exam  Vitals and nursing note reviewed.   Constitutional:       General: She is not in acute distress.     Appearance: She is obese. She is ill-appearing. She is not diaphoretic.   HENT:      Head: Normocephalic.      Right Ear: External ear normal.      Left Ear: External ear normal.   Eyes:      General: No scleral icterus.     Extraocular Movements: Extraocular movements intact.      Conjunctiva/sclera: Conjunctivae normal.   Cardiovascular:      Rate and Rhythm: Normal rate and regular rhythm.      Heart sounds: Normal heart sounds, S1 normal and S2 normal. No murmur heard.    No friction rub. No gallop.   Pulmonary:      Effort: Pulmonary effort is normal.      Breath sounds: Normal breath sounds.      Comments: Cough during deep breath while lying down for abdominal assessment.  Abdominal:      General: Abdomen is flat. A surgical scar is present. Bowel sounds are normal.      Palpations: Abdomen is soft. There is no fluid wave, mass or pulsatile mass.      Tenderness: There is no abdominal tenderness. There is no guarding. Negative signs include Emmanuel's sign, Rovsing's sign and McBurney's sign.      Comments: Surgical scars healed. Clear/white drainage scant from umbilicus.    Musculoskeletal:      Right lower leg: No edema.      Left lower leg: No edema.   Neurological:      General: No focal deficit present.      Mental Status: She is alert and oriented to person, place, and time.   Psychiatric:         Attention and Perception: Attention normal.         Mood and Affect: Mood normal.         " Behavior: Behavior normal.       Assessment:     1. Epigastric pain    2. History of CVA (cerebrovascular accident)    3. 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 Melena    5. Drainage from wound    6. COPD with asthma    7. Anemia, unspecified type      Plan:     1. Epigastric pain  - Comprehensive Metabolic Panel; Future  - CBC Auto Differential; Future  - Lipase; Future  - Amylase; Future  - Ferritin; Future  - Iron and TIBC; Future  - IN OFFICE EKG 12-LEAD (to Muse)  - Urinalysis; Future  - Urine culture  - H. pylori antigen, stool; Future  - sucralfate (CARAFATE) 1 gram tablet; Take 1 tablet (1 g total) by mouth 4 (four) times daily.  Dispense: 120 tablet; Refill: 0  - tramadol-acetaminophen 37.5-325 mg (ULTRACET) 37.5-325 mg Tab; Take 1 tablet by mouth every 4 (four) hours.  Dispense: 20 tablet; Refill: 0  - Troponin I; Future    2. History of CVA (cerebrovascular accident)    3. 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 Melena  - Occult blood x 1, stool; Future    5. Drainage from wound  - Aerobic culture    6. COPD with asthma  - albuterol-ipratropium (DUO-NEB) 2.5 mg-0.5 mg/3 mL nebulizer solution; Take 3 mLs by nebulization every 6 (six) hours as needed for Wheezing or Shortness of Breath. Rescue  Dispense: 75 mL; Refill: 0    7. Anemia, unspecified type  - Ferritin; Future  - Iron and TIBC; Future    Considering: gastritis, peptic ulcer, pancreatitis, hiatal hernia, small-bowel obstruction, diverticulitis, appendicitis, cholelithiasis, and mesenteric ischemia (unlikely due to normal physical exam).   Lipase and amylase checked today.  CBC CMP.  Add Carafate to current Protonix use.  Consider EGD if symptoms persist.  Consider GI referral if symptoms not improving.  Labs today. Patient verbalized understanding and agrees with plan.     Appt with Dr. Burnett 8-19-22.   Patient will be notified when labs return and further recommendations will be given at that  time regarding follow up.    She was instructed to call the clinic or go to the emergency department if her symptoms do not improve, worsens, or if new symptoms develop. As we discussed that symptoms could worsen over the next 24 hours she was advised that if any increased swelling, pain, or numbness arise to go immediately to the ED. Patient knows to call any time if an emergency arises. Shared decision making occurred and she verbalized understanding in agreement with this plan.     Medication List with Changes/Refills   New Medications    ALBUTEROL-IPRATROPIUM (DUO-NEB) 2.5 MG-0.5 MG/3 ML NEBULIZER SOLUTION    Take 3 mLs by nebulization every 6 (six) hours as needed for Wheezing or Shortness of Breath. Rescue    SUCRALFATE (CARAFATE) 1 GRAM TABLET    Take 1 tablet (1 g total) by mouth 4 (four) times daily.    TRAMADOL-ACETAMINOPHEN 37.5-325 MG (ULTRACET) 37.5-325 MG TAB    Take 1 tablet by mouth every 4 (four) hours.   Current Medications    ALBUTEROL (PROVENTIL/VENTOLIN HFA) 90 MCG/ACTUATION INHALER    INHALE 2 PUFFS INTO THE LUNGS FOUR TIMES DAILY    AMLODIPINE (NORVASC) 10 MG TABLET    Take 1 tablet (10 mg total) by mouth once daily.    ATORVASTATIN (LIPITOR) 80 MG TABLET    Take 1 tablet (80 mg total) by mouth every evening.    B COMPLEX VITAMINS TABLET    Take 1 tablet by mouth once daily.    CALCIUM CITRATE-VITAMIN D3 315-200 MG (CITRACAL+D) 315-200 MG-UNIT PER TABLET    Take 1 tablet by mouth once daily.     CHLORTHALIDONE (HYGROTEN) 25 MG TAB    Take 0.5 tablets (12.5 mg total) by mouth once daily.    CLOPIDOGREL (PLAVIX) 75 MG TABLET    Take 1 tablet (75 mg total) by mouth once daily.    CYANOCOBALAMIN (VITAMIN B-12) 1000 MCG TABLET    Take 1 tablet (1,000 mcg total) by mouth once daily.    ESCITALOPRAM OXALATE (LEXAPRO) 10 MG TABLET    Take 1 tablet (10 mg total) by mouth once daily.    FERROUS SULFATE 325 (65 FE) MG EC TABLET    Take 1 tablet (325 mg total) by mouth once daily.    FLUTICASONE  PROPIONATE (FLONASE) 50 MCG/ACTUATION NASAL SPRAY    2 sprays (100 mcg total) by Each Nostril route once daily.    FLUTICASONE PROPIONATE (FLOVENT HFA) 110 MCG/ACTUATION INHALER    Inhale 1 puff into the lungs 2 (two) times daily. Controller. Rinse mouth with water and spit out after use.    HYDRALAZINE (APRESOLINE) 50 MG TABLET    Take 1 tablet (50 mg total) by mouth every 8 (eight) hours.    ISOSORBIDE DINITRATE (ISORDIL) 5 MG TAB    TAKE 1 TABLET(5 MG) BY MOUTH THREE TIMES DAILY    METOLAZONE (ZAROXOLYN) 2.5 MG TABLET    Take 1 tablet (2.5 mg total) by mouth once daily.    METOPROLOL SUCCINATE (TOPROL-XL) 25 MG 24 HR TABLET    TAKE 2 TABLETS(50 MG) BY MOUTH EVERY DAY    MONTELUKAST (SINGULAIR) 10 MG TABLET    Take 1 tablet (10 mg total) by mouth every evening.    MULTIVITAMIN (THERAGRAN) PER TABLET    Take 1 tablet by mouth once daily.    NYSTATIN (MYCOSTATIN) POWDER    Apply topically 2 (two) times daily.    PANTOPRAZOLE (PROTONIX) 40 MG TABLET    Take 1 tablet (40 mg total) by mouth once daily.    SPIRONOLACTONE (ALDACTONE) 25 MG TABLET    Take 1 tablet (25 mg total) by mouth once daily.    TRAZODONE (DESYREL) 50 MG TABLET    1 tab po qhs prn for insomnia.    VALSARTAN (DIOVAN) 80 MG TABLET    Take 1 tablet (80 mg total) by mouth once daily.   Discontinued Medications    IBUPROFEN (ADVIL,MOTRIN) 600 MG TABLET    Take 1 tablet (600 mg total) by mouth 3 (three) times daily.     Modified Medications    No medications on file     Medication Monitoring    I discussed imaging findings, diagnosis, possibilities, treatment options, medications, risks, and benefits. She had many questions regarding the options and long-term effects. All questions were answered. She expressed understanding after counseling regarding the diagnosis and recommendations. She was capable and demonstrated competence with understanding of these options. Shared decision making was performed resulting in her choosing the current treatment plan.  Patient handout was given with instructions and recommendations. Advised the patient that if they become pregnant to alert us immediately to assess for medication changes.     I also discussed the importance of close follow up to discuss labs, change or modify her medications if needed, monitor side effects, and further evaluation of medical problems.       Jenna Almanza PA-C  Family Medicine Physician Assistant       03/31/2022     Attesting:   Reviewed HPI and physical examination as stated above.  Exam and interview patient independent of PA assessment.  I agree with points as above.  High suspicion for gastritis but also consider hiatal hernia pancreatitis probable.  Doubtful of appendicitis given abdominal examination.  CT was acceptable.  Check labs CBC CMP lipase iron due to anemia urine urine culture stool study for H pylori.  Stool for occult blood.  I will review all results and address accordingly.  COPD asthma overlap refill ipratropium and albuterol nebulizer.  If fecal occult blood positive for blood then consider colonoscopy.  There is documentation of a colonoscopy in 2014 will without report that I am able to visualize.  Considering GI referral  I reviewed the EKG hiding of QRS noted but not technically measuring 2 mm.  Some biphasic presentation and V1 and V2 of P wave consider atrial enlargement.  No ST elevation and any lead.  Troponin level pending.  Refer to ED if positive.

## 2022-04-01 ENCOUNTER — LAB VISIT (OUTPATIENT)
Dept: LAB | Facility: HOSPITAL | Age: 57
End: 2022-04-01
Attending: NURSE PRACTITIONER
Payer: MEDICARE

## 2022-04-01 DIAGNOSIS — R10.13 EPIGASTRIC PAIN: ICD-10-CM

## 2022-04-01 LAB
BACTERIA #/AREA URNS HPF: ABNORMAL /HPF
BILIRUB UR QL STRIP: NEGATIVE
CLARITY UR: CLEAR
COLOR UR: YELLOW
EPITH CASTS #/AREA URNS LPF: 0 /LPF
GLUCOSE UR QL STRIP: NEGATIVE
HGB UR QL STRIP: NEGATIVE
HYALINE CASTS #/AREA URNS LPF: 0 /LPF
KETONES UR QL STRIP: NEGATIVE
LEUKOCYTE ESTERASE UR QL STRIP: ABNORMAL
MICROSCOPIC COMMENT: ABNORMAL
NITRITE UR QL STRIP: POSITIVE
PH UR STRIP: 6 [PH] (ref 5–8)
PROT UR QL STRIP: ABNORMAL
RBC #/AREA URNS HPF: 0 /HPF (ref 0–4)
SP GR UR STRIP: 1.02 (ref 1–1.03)
URN SPEC COLLECT METH UR: ABNORMAL
UROBILINOGEN UR STRIP-ACNC: 1 EU/DL
WBC #/AREA URNS HPF: 35 /HPF (ref 0–5)

## 2022-04-01 PROCEDURE — 81000 URINALYSIS NONAUTO W/SCOPE: CPT | Performed by: NURSE PRACTITIONER

## 2022-04-03 DIAGNOSIS — R10.13 EPIGASTRIC PAIN: Primary | ICD-10-CM

## 2022-04-03 DIAGNOSIS — R94.31 ABNORMAL EKG: ICD-10-CM

## 2022-04-03 LAB — BACTERIA UR CULT: ABNORMAL

## 2022-04-03 RX ORDER — NITROFURANTOIN 25; 75 MG/1; MG/1
100 CAPSULE ORAL 2 TIMES DAILY
Qty: 20 CAPSULE | Refills: 0 | Status: SHIPPED | OUTPATIENT
Start: 2022-04-03 | End: 2022-10-16

## 2022-04-04 LAB — BACTERIA SPEC AEROBE CULT: NORMAL

## 2022-04-05 ENCOUNTER — TELEPHONE (OUTPATIENT)
Dept: FAMILY MEDICINE | Facility: CLINIC | Age: 57
End: 2022-04-05
Payer: MEDICARE

## 2022-04-05 DIAGNOSIS — R94.31 ABNORMAL ELECTROCARDIOGRAM (ECG) (EKG): ICD-10-CM

## 2022-04-05 DIAGNOSIS — R10.13 EPIGASTRIC PAIN: Primary | ICD-10-CM

## 2022-04-05 NOTE — TELEPHONE ENCOUNTER
----- Message from Prema López sent at 4/5/2022 12:13 PM CDT -----  Regarding: INCOMPLETE ORDER  Please submit an order for the NUCLEAR STRESS TEST to be scheduled with the NM Myocardial Perfusion Spect Multi Pharmacologic TEST.  This exam has 2 parts we are unable to schedule until both parts have been ordered.    Thank You,

## 2022-04-06 ENCOUNTER — TELEPHONE (OUTPATIENT)
Dept: FAMILY MEDICINE | Facility: CLINIC | Age: 57
End: 2022-04-06
Payer: MEDICARE

## 2022-04-06 NOTE — TELEPHONE ENCOUNTER
----- Message from Julisa Kelly sent at 4/6/2022 12:36 PM CDT -----  .Type:  Patient Call Back    Who Called: PT       Does the patient know what this is regarding?: PT CALLED ABOUT GETTING HER LABS [facility] I ONLY SEE THE STAT ORDERS I SCHEDULED THOSE BUT I WAS UNCLEAR IF THIS IS ALL THE LABS PT NEEDED FROM OUR CALL PLEASE REACH OUT TO PT     Would the patient rather a call back YES     Best Call Back Number: [phone]    Additional Information: Thank You

## 2022-04-07 ENCOUNTER — TELEPHONE (OUTPATIENT)
Dept: FAMILY MEDICINE | Facility: CLINIC | Age: 57
End: 2022-04-07

## 2022-04-07 ENCOUNTER — LAB VISIT (OUTPATIENT)
Dept: LAB | Facility: HOSPITAL | Age: 57
End: 2022-04-07
Attending: NURSE PRACTITIONER
Payer: MEDICARE

## 2022-04-07 DIAGNOSIS — R94.31 ABNORMAL EKG: ICD-10-CM

## 2022-04-07 LAB
BNP SERPL-MCNC: 27 PG/ML (ref 0–99)
TROPONIN I SERPL DL<=0.01 NG/ML-MCNC: 0.01 NG/ML (ref 0–0.03)

## 2022-04-07 PROCEDURE — 82550 ASSAY OF CK (CPK): CPT | Performed by: NURSE PRACTITIONER

## 2022-04-07 PROCEDURE — 84484 ASSAY OF TROPONIN QUANT: CPT | Performed by: NURSE PRACTITIONER

## 2022-04-07 PROCEDURE — 82552 ASSAY OF CPK IN BLOOD: CPT | Performed by: NURSE PRACTITIONER

## 2022-04-07 PROCEDURE — 83880 ASSAY OF NATRIURETIC PEPTIDE: CPT | Performed by: NURSE PRACTITIONER

## 2022-04-07 NOTE — TELEPHONE ENCOUNTER
----- Message from Prema López sent at 4/7/2022 10:11 AM CDT -----  Regarding: INCORRECT ORDER  Please submit an order for the NUCLEAR STRESS TEST to be scheduled with the NM Myocardial Perfusion Spect Multi Pharmacologic TEST.  This exam has 2 parts we are unable to schedule until both parts have been ordered.     The order placed Nuclear Stress - Cardiology Interpreted is not correct we are unable to schedule.    Thank You,

## 2022-04-12 LAB
CK BB CFR SERPL ELPH: 9.9 %
CK MB CFR SERPL ELPH: 0 % (ref 0–3.3)
CK MM CFR SERPL ELPH: 90.1 % (ref 96.7–100)
CK SERPL-CCNC: 75 U/L (ref 30–223)

## 2022-04-14 ENCOUNTER — TELEPHONE (OUTPATIENT)
Dept: FAMILY MEDICINE | Facility: CLINIC | Age: 57
End: 2022-04-14
Payer: MEDICARE

## 2022-04-14 NOTE — TELEPHONE ENCOUNTER
----- Message from Lalita Shah sent at 4/14/2022 11:06 AM CDT -----  Contact: Patient  Type:  Needs Medical Advice    Who Called:  Patient       Would the patient rather a call back or a response via [facility]?  Call    Best Call Back Number:  [phone] (home)     Additional Information:  Patient is needing to speak to the nurse to get lab work ordered

## 2022-04-14 NOTE — TELEPHONE ENCOUNTER
Returned call to patient in regards to needing labs ordered. No answer , left voicemail to return call.

## 2022-04-14 NOTE — TELEPHONE ENCOUNTER
----- Message from Prema López sent at 4/13/2022 11:44 AM CDT -----  Regarding: NUCLEAR STRESS ORDER  Please complete the order for NM Myocardial Perfusion Spect Multi Pharmacologic exam.  There are two parts to this order the second part is the Nuclear Stress Test this order is attached to the first part must it must be selected.   We are unable to schedule Nuclear Stress - Cardiology Interpreted orders.    Thank You,

## 2022-04-18 ENCOUNTER — HOSPITAL ENCOUNTER (EMERGENCY)
Facility: HOSPITAL | Age: 57
Discharge: HOME | End: 2022-04-18
Attending: EMERGENCY MEDICINE
Payer: MEDICARE

## 2022-04-18 ENCOUNTER — TELEPHONE (OUTPATIENT)
Dept: FAMILY MEDICINE | Facility: CLINIC | Age: 57
End: 2022-04-18
Payer: MEDICARE

## 2022-04-18 ENCOUNTER — TELEPHONE (OUTPATIENT)
Dept: FAMILY MEDICINE | Facility: CLINIC | Age: 57
End: 2022-04-18

## 2022-04-18 VITALS
RESPIRATION RATE: 18 BRPM | TEMPERATURE: 98 F | HEIGHT: 62 IN | HEART RATE: 67 BPM | DIASTOLIC BLOOD PRESSURE: 93 MMHG | OXYGEN SATURATION: 100 % | WEIGHT: 250 LBS | SYSTOLIC BLOOD PRESSURE: 193 MMHG | BODY MASS INDEX: 46.01 KG/M2
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
## 2022-04-18 DIAGNOSIS — I10 UNCONTROLLED HYPERTENSION: ICD-10-CM

## 2022-04-18 DIAGNOSIS — S39.012A STRAIN OF LUMBAR REGION, INITIAL ENCOUNTER: ICD-10-CM

## 2022-04-18 PROCEDURE — 25000003 PHARM REV CODE 250: Performed by: PHYSICIAN ASSISTANT

## 2022-04-18 PROCEDURE — 99284 EMERGENCY DEPT VISIT MOD MDM: CPT | Mod: 25

## 2022-04-18 PROCEDURE — 63600175 PHARM REV CODE 636 W HCPCS: Performed by: EMERGENCY MEDICINE

## 2022-04-18 PROCEDURE — 96372 THER/PROPH/DIAG INJ SC/IM: CPT | Performed by: EMERGENCY MEDICINE

## 2022-04-18 RX ORDER — CLONIDINE HYDROCHLORIDE 0.1 MG/1
0.1 TABLET ORAL
Status: COMPLETED | OUTPATIENT
Start: 2022-04-18 | End: 2022-04-18

## 2022-04-18 RX ORDER — HYDRALAZINE HYDROCHLORIDE 25 MG/1
25 TABLET, FILM COATED ORAL
Status: DISCONTINUED | OUTPATIENT
Start: 2022-04-18 | End: 2022-04-18

## 2022-04-18 RX ORDER — METHOCARBAMOL 500 MG/1
500 TABLET, FILM COATED ORAL
Status: COMPLETED | OUTPATIENT
Start: 2022-04-18 | End: 2022-04-18

## 2022-04-18 RX ORDER — METHOCARBAMOL 500 MG/1
500 TABLET, FILM COATED ORAL 3 TIMES DAILY PRN
Qty: 15 TABLET | Refills: 0 | Status: SHIPPED | OUTPATIENT
Start: 2022-04-18 | End: 2022-04-23

## 2022-04-18 RX ORDER — HYDRALAZINE HYDROCHLORIDE 25 MG/1
50 TABLET, FILM COATED ORAL
Status: COMPLETED | OUTPATIENT
Start: 2022-04-18 | End: 2022-04-18

## 2022-04-18 RX ORDER — HYDROCODONE BITARTRATE AND ACETAMINOPHEN 10; 325 MG/1; MG/1
1 TABLET ORAL
Status: COMPLETED | OUTPATIENT
Start: 2022-04-18 | End: 2022-04-18

## 2022-04-18 RX ORDER — HYDROMORPHONE HYDROCHLORIDE 2 MG/ML
1 INJECTION, SOLUTION INTRAMUSCULAR; INTRAVENOUS; SUBCUTANEOUS
Status: COMPLETED | OUTPATIENT
Start: 2022-04-18 | End: 2022-04-18

## 2022-04-18 RX ORDER — HYDROCODONE BITARTRATE AND ACETAMINOPHEN 5; 325 MG/1; MG/1
1 TABLET ORAL EVERY 4 HOURS PRN
Qty: 18 TABLET | Refills: 0 | Status: ON HOLD | OUTPATIENT
Start: 2022-04-18 | End: 2023-07-14 | Stop reason: HOSPADM

## 2022-04-18 RX ORDER — MORPHINE SULFATE 2 MG/ML
6 INJECTION, SOLUTION INTRAMUSCULAR; INTRAVENOUS
Status: DISCONTINUED | OUTPATIENT
Start: 2022-04-18 | End: 2022-04-18

## 2022-04-18 RX ADMIN — CLONIDINE HYDROCHLORIDE 0.1 MG: 0.1 TABLET ORAL at 09:04

## 2022-04-18 RX ADMIN — HYDRALAZINE HYDROCHLORIDE 50 MG: 25 TABLET, FILM COATED ORAL at 09:04

## 2022-04-18 RX ADMIN — HYDROMORPHONE HYDROCHLORIDE 1 MG: 2 INJECTION, SOLUTION INTRAMUSCULAR; INTRAVENOUS; SUBCUTANEOUS at 09:04

## 2022-04-18 RX ADMIN — METHOCARBAMOL TABLETS 500 MG: 500 TABLET, COATED ORAL at 08:04

## 2022-04-18 RX ADMIN — HYDROCODONE BITARTRATE AND ACETAMINOPHEN 1 TABLET: 10; 325 TABLET ORAL at 08:04
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
## 2022-04-19 ENCOUNTER — TELEPHONE (OUTPATIENT)
Dept: FAMILY MEDICINE | Facility: CLINIC | Age: 57
End: 2022-04-19
Payer: MEDICARE

## 2022-04-19 DIAGNOSIS — R10.13 ABDOMINAL PAIN, EPIGASTRIC: Primary | ICD-10-CM

## 2022-04-19 DIAGNOSIS — R94.31 ABNORMAL ELECTROCARDIOGRAM: ICD-10-CM

## 2022-04-19 DIAGNOSIS — R94.31 ABNORMAL ELECTROCARDIOGRAM (ECG) (EKG): ICD-10-CM

## 2022-04-19 DIAGNOSIS — R10.13 EPIGASTRIC PAIN: ICD-10-CM
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
## 2022-04-19 NOTE — DISCHARGE INSTRUCTIONS
Continue your routine blood pressure medications.  You need to keep a log of your blood pressures so that you can bring this log with you to your primary care provider.  Take pain medication as needed for symptoms.  Follow up with your PCP or the back specialist listed.  For worsening symptoms, chest pain, shortness of breath, increased abdominal pain, high grade fever, stroke or stroke like symptoms, immediately go to the nearest Emergency Room or call 911 as soon as possible.

## 2022-05-09 ENCOUNTER — LAB VISIT (OUTPATIENT)
Dept: LAB | Facility: HOSPITAL | Age: 57
End: 2022-05-09
Attending: SURGERY
Payer: MEDICARE

## 2022-05-09 DIAGNOSIS — Z79.899 ENCOUNTER FOR LONG-TERM (CURRENT) USE OF OTHER MEDICATIONS: ICD-10-CM

## 2022-05-09 DIAGNOSIS — Z13.21 SCREENING FOR MALNUTRITION: ICD-10-CM

## 2022-05-09 DIAGNOSIS — R53.83 FATIGUE: Primary | ICD-10-CM

## 2022-05-09 DIAGNOSIS — Z13.0 SCREENING FOR IRON DEFICIENCY ANEMIA: ICD-10-CM

## 2022-05-09 DIAGNOSIS — Z13.29 SCREENING FOR THYROID DISORDER: ICD-10-CM

## 2022-05-09 DIAGNOSIS — Z13.220 SCREENING FOR LIPOID DISORDERS: ICD-10-CM

## 2022-05-09 LAB
ALBUMIN SERPL BCP-MCNC: 3.4 G/DL (ref 3.5–5.2)
ALP SERPL-CCNC: 57 U/L (ref 55–135)
ALT SERPL W/O P-5'-P-CCNC: 9 U/L (ref 10–44)
ANION GAP SERPL CALC-SCNC: 10 MMOL/L (ref 8–16)
AST SERPL-CCNC: 12 U/L (ref 10–40)
BASOPHILS # BLD AUTO: 0.02 K/UL (ref 0–0.2)
BASOPHILS NFR BLD: 0.4 % (ref 0–1.9)
BILIRUB SERPL-MCNC: 0.3 MG/DL (ref 0.1–1)
BUN SERPL-MCNC: 23 MG/DL (ref 6–20)
CALCIUM SERPL-MCNC: 9.8 MG/DL (ref 8.7–10.5)
CHLORIDE SERPL-SCNC: 111 MMOL/L (ref 95–110)
CHOLEST SERPL-MCNC: 170 MG/DL (ref 120–199)
CHOLEST/HDLC SERPL: 3.6 {RATIO} (ref 2–5)
CO2 SERPL-SCNC: 22 MMOL/L (ref 23–29)
CREAT SERPL-MCNC: 1.6 MG/DL (ref 0.5–1.4)
DIFFERENTIAL METHOD: ABNORMAL
EOSINOPHIL # BLD AUTO: 0.2 K/UL (ref 0–0.5)
EOSINOPHIL NFR BLD: 3.6 % (ref 0–8)
ERYTHROCYTE [DISTWIDTH] IN BLOOD BY AUTOMATED COUNT: 13.7 % (ref 11.5–14.5)
EST. GFR  (AFRICAN AMERICAN): 40.7 ML/MIN/1.73 M^2
EST. GFR  (NON AFRICAN AMERICAN): 35.3 ML/MIN/1.73 M^2
ESTIMATED AVG GLUCOSE: 103 MG/DL (ref 68–131)
GLUCOSE SERPL-MCNC: 81 MG/DL (ref 70–110)
HBA1C MFR BLD: 5.2 % (ref 4–5.6)
HCT VFR BLD AUTO: 39.1 % (ref 37–48.5)
HDLC SERPL-MCNC: 47 MG/DL (ref 40–75)
HDLC SERPL: 27.6 % (ref 20–50)
HGB BLD-MCNC: 12.1 G/DL (ref 12–16)
IMM GRANULOCYTES # BLD AUTO: 0.01 K/UL (ref 0–0.04)
IMM GRANULOCYTES NFR BLD AUTO: 0.2 % (ref 0–0.5)
LDLC SERPL CALC-MCNC: 108.8 MG/DL (ref 63–159)
LYMPHOCYTES # BLD AUTO: 1.5 K/UL (ref 1–4.8)
LYMPHOCYTES NFR BLD: 26.6 % (ref 18–48)
MCH RBC QN AUTO: 29.3 PG (ref 27–31)
MCHC RBC AUTO-ENTMCNC: 30.9 G/DL (ref 32–36)
MCV RBC AUTO: 95 FL (ref 82–98)
MONOCYTES # BLD AUTO: 0.4 K/UL (ref 0.3–1)
MONOCYTES NFR BLD: 7.2 % (ref 4–15)
NEUTROPHILS # BLD AUTO: 3.4 K/UL (ref 1.8–7.7)
NEUTROPHILS NFR BLD: 62 % (ref 38–73)
NONHDLC SERPL-MCNC: 123 MG/DL
NRBC BLD-RTO: 0 /100 WBC
PLATELET # BLD AUTO: 240 K/UL (ref 150–450)
PMV BLD AUTO: 10.6 FL (ref 9.2–12.9)
POTASSIUM SERPL-SCNC: 4.4 MMOL/L (ref 3.5–5.1)
PROT SERPL-MCNC: 7.1 G/DL (ref 6–8.4)
RBC # BLD AUTO: 4.13 M/UL (ref 4–5.4)
SODIUM SERPL-SCNC: 143 MMOL/L (ref 136–145)
TRIGL SERPL-MCNC: 71 MG/DL (ref 30–150)
TSH SERPL DL<=0.005 MIU/L-ACNC: 1.66 UIU/ML (ref 0.4–4)
WBC # BLD AUTO: 5.53 K/UL (ref 3.9–12.7)

## 2022-05-09 PROCEDURE — 80061 LIPID PANEL: CPT | Performed by: SURGERY

## 2022-05-09 PROCEDURE — 83036 HEMOGLOBIN GLYCOSYLATED A1C: CPT | Performed by: SURGERY

## 2022-05-09 PROCEDURE — 84443 ASSAY THYROID STIM HORMONE: CPT | Performed by: SURGERY

## 2022-05-09 PROCEDURE — 85025 COMPLETE CBC W/AUTO DIFF WBC: CPT | Performed by: SURGERY

## 2022-05-09 PROCEDURE — 80053 COMPREHEN METABOLIC PANEL: CPT | Performed by: SURGERY

## 2022-05-09 PROCEDURE — 36415 COLL VENOUS BLD VENIPUNCTURE: CPT | Mod: PO | Performed by: SURGERY

## 2022-05-13 ENCOUNTER — TELEPHONE (OUTPATIENT)
Dept: CARDIOLOGY | Facility: HOSPITAL | Age: 57
End: 2022-05-13

## 2022-05-20 NOTE — PLAN OF CARE
04/30/18 0210   Patient Assessment/Suction   Level of Consciousness (AVPU) alert   Respiratory Effort Normal;Unlabored   Expansion/Accessory Muscles/Retractions expansion symmetric;no retractions;no use of accessory muscles   All Lung Fields Breath Sounds clear   PRE-TX-O2-ETCO2   O2 Device (Oxygen Therapy) room air   SpO2 97 %   Pulse Oximetry Type Intermittent   Aerosol Therapy   $ Aerosol Therapy Charges PRN treatment not required   Respiratory Treatment Status PRN treatment not required      Conjuntivae and eyelids appear normal,  Sclerae : White without injection

## 2022-06-10 ENCOUNTER — PATIENT MESSAGE (OUTPATIENT)
Dept: CARDIOLOGY | Facility: HOSPITAL | Age: 57
End: 2022-06-10

## 2022-06-10 ENCOUNTER — TELEPHONE (OUTPATIENT)
Dept: CARDIOLOGY | Facility: HOSPITAL | Age: 57
End: 2022-06-10

## 2022-09-15 ENCOUNTER — TELEPHONE (OUTPATIENT)
Dept: FAMILY MEDICINE | Facility: CLINIC | Age: 57
End: 2022-09-15
Payer: MEDICARE

## 2022-09-15 NOTE — TELEPHONE ENCOUNTER
----- Message from Ny Dangelo sent at 9/15/2022  8:46 AM CDT -----  Contact: patient  Type:  Patient Call          Who Called:patient         Does the patient know what this is regarding?: requesting a call back to have appt on 9/16 rescheduled ;please advise           Would the patient rather a call back or a response via [facility]? Call           Best Call Back Number:[phone]             Additional Information:

## 2022-10-03 ENCOUNTER — HOSPITAL ENCOUNTER (EMERGENCY)
Facility: HOSPITAL | Age: 57
Discharge: HOME | End: 2022-10-03
Attending: EMERGENCY MEDICINE
Payer: MEDICARE

## 2022-10-03 VITALS
BODY MASS INDEX: 42.18 KG/M2 | HEART RATE: 68 BPM | RESPIRATION RATE: 17 BRPM | WEIGHT: 229.19 LBS | HEIGHT: 62 IN | SYSTOLIC BLOOD PRESSURE: 166 MMHG | OXYGEN SATURATION: 100 % | TEMPERATURE: 99 F | DIASTOLIC BLOOD PRESSURE: 85 MMHG

## 2022-10-03 DIAGNOSIS — R42 DIZZINESS: ICD-10-CM

## 2022-10-03 DIAGNOSIS — I10 ACCELERATED HYPERTENSION: ICD-10-CM

## 2022-10-03 DIAGNOSIS — N17.9 AKI (ACUTE KIDNEY INJURY): Primary | ICD-10-CM

## 2022-10-03 LAB
ALBUMIN SERPL BCP-MCNC: 3.1 G/DL (ref 3.5–5.2)
ALP SERPL-CCNC: 68 U/L (ref 55–135)
ALT SERPL W/O P-5'-P-CCNC: 7 U/L (ref 10–44)
ANION GAP SERPL CALC-SCNC: 7 MMOL/L (ref 8–16)
AST SERPL-CCNC: 10 U/L (ref 10–40)
BASOPHILS # BLD AUTO: 0.02 K/UL (ref 0–0.2)
BASOPHILS NFR BLD: 0.2 % (ref 0–1.9)
BILIRUB SERPL-MCNC: 0.2 MG/DL (ref 0.1–1)
BUN SERPL-MCNC: 27 MG/DL (ref 6–20)
CALCIUM SERPL-MCNC: 9.7 MG/DL (ref 8.7–10.5)
CHLORIDE SERPL-SCNC: 112 MMOL/L (ref 95–110)
CO2 SERPL-SCNC: 21 MMOL/L (ref 23–29)
CREAT SERPL-MCNC: 2.8 MG/DL (ref 0.5–1.4)
DIFFERENTIAL METHOD: ABNORMAL
EOSINOPHIL # BLD AUTO: 0 K/UL (ref 0–0.5)
EOSINOPHIL NFR BLD: 0.5 % (ref 0–8)
ERYTHROCYTE [DISTWIDTH] IN BLOOD BY AUTOMATED COUNT: 14.3 % (ref 11.5–14.5)
EST. GFR  (NO RACE VARIABLE): 19 ML/MIN/1.73 M^2
GLUCOSE SERPL-MCNC: 106 MG/DL (ref 70–110)
HCT VFR BLD AUTO: 38.1 % (ref 37–48.5)
HGB BLD-MCNC: 12.4 G/DL (ref 12–16)
IMM GRANULOCYTES # BLD AUTO: 0.07 K/UL (ref 0–0.04)
IMM GRANULOCYTES NFR BLD AUTO: 0.9 % (ref 0–0.5)
LYMPHOCYTES # BLD AUTO: 1.3 K/UL (ref 1–4.8)
LYMPHOCYTES NFR BLD: 16.4 % (ref 18–48)
MCH RBC QN AUTO: 29.2 PG (ref 27–31)
MCHC RBC AUTO-ENTMCNC: 32.5 G/DL (ref 32–36)
MCV RBC AUTO: 90 FL (ref 82–98)
MONOCYTES # BLD AUTO: 0.5 K/UL (ref 0.3–1)
MONOCYTES NFR BLD: 6.7 % (ref 4–15)
NEUTROPHILS # BLD AUTO: 6 K/UL (ref 1.8–7.7)
NEUTROPHILS NFR BLD: 75.3 % (ref 38–73)
NRBC BLD-RTO: 0 /100 WBC
PLATELET # BLD AUTO: 276 K/UL (ref 150–450)
PMV BLD AUTO: 9.7 FL (ref 9.2–12.9)
POTASSIUM SERPL-SCNC: 4.3 MMOL/L (ref 3.5–5.1)
PROT SERPL-MCNC: 7.4 G/DL (ref 6–8.4)
RBC # BLD AUTO: 4.24 M/UL (ref 4–5.4)
SODIUM SERPL-SCNC: 140 MMOL/L (ref 136–145)
WBC # BLD AUTO: 8.01 K/UL (ref 3.9–12.7)

## 2022-10-03 PROCEDURE — 36415 COLL VENOUS BLD VENIPUNCTURE: CPT | Performed by: EMERGENCY MEDICINE

## 2022-10-03 PROCEDURE — 25000003 PHARM REV CODE 250: Performed by: EMERGENCY MEDICINE

## 2022-10-03 PROCEDURE — 85025 COMPLETE CBC W/AUTO DIFF WBC: CPT | Performed by: EMERGENCY MEDICINE

## 2022-10-03 PROCEDURE — 96374 THER/PROPH/DIAG INJ IV PUSH: CPT

## 2022-10-03 PROCEDURE — 99285 EMERGENCY DEPT VISIT HI MDM: CPT | Mod: 25

## 2022-10-03 PROCEDURE — 96361 HYDRATE IV INFUSION ADD-ON: CPT

## 2022-10-03 PROCEDURE — 80053 COMPREHEN METABOLIC PANEL: CPT | Performed by: EMERGENCY MEDICINE

## 2022-10-03 RX ORDER — HYDRALAZINE HYDROCHLORIDE 25 MG/1
50 TABLET, FILM COATED ORAL ONCE
Status: COMPLETED | OUTPATIENT
Start: 2022-10-03 | End: 2022-10-03

## 2022-10-03 RX ORDER — AMLODIPINE BESYLATE 5 MG/1
10 TABLET ORAL ONCE
Status: COMPLETED | OUTPATIENT
Start: 2022-10-03 | End: 2022-10-03

## 2022-10-03 RX ORDER — LABETALOL HYDROCHLORIDE 5 MG/ML
20 INJECTION, SOLUTION INTRAVENOUS
Status: COMPLETED | OUTPATIENT
Start: 2022-10-03 | End: 2022-10-03

## 2022-10-03 RX ORDER — VALSARTAN 80 MG/1
80 TABLET ORAL DAILY
Status: DISCONTINUED | OUTPATIENT
Start: 2022-10-03 | End: 2022-10-03 | Stop reason: HOSPADM

## 2022-10-03 RX ORDER — METOPROLOL SUCCINATE 25 MG/1
50 TABLET, EXTENDED RELEASE ORAL ONCE
Status: COMPLETED | OUTPATIENT
Start: 2022-10-03 | End: 2022-10-03

## 2022-10-03 RX ORDER — MECLIZINE HYDROCHLORIDE 25 MG/1
50 TABLET ORAL
Status: COMPLETED | OUTPATIENT
Start: 2022-10-03 | End: 2022-10-03

## 2022-10-03 RX ORDER — MECLIZINE HYDROCHLORIDE 25 MG/1
25 TABLET ORAL 3 TIMES DAILY PRN
Qty: 20 TABLET | Refills: 0 | Status: SHIPPED | OUTPATIENT
Start: 2022-10-03 | End: 2023-07-12

## 2022-10-03 RX ADMIN — AMLODIPINE BESYLATE 10 MG: 5 TABLET ORAL at 12:10

## 2022-10-03 RX ADMIN — VALSARTAN 80 MG: 80 TABLET, FILM COATED ORAL at 02:10

## 2022-10-03 RX ADMIN — LABETALOL HYDROCHLORIDE 20 MG: 5 INJECTION INTRAVENOUS at 02:10

## 2022-10-03 RX ADMIN — MECLIZINE HYDROCHLORIDE 50 MG: 25 TABLET ORAL at 12:10

## 2022-10-03 RX ADMIN — HYDRALAZINE HYDROCHLORIDE 50 MG: 25 TABLET, FILM COATED ORAL at 12:10

## 2022-10-03 RX ADMIN — LABETALOL HYDROCHLORIDE 20 MG: 5 INJECTION INTRAVENOUS at 03:10

## 2022-10-03 RX ADMIN — METOPROLOL SUCCINATE 50 MG: 25 TABLET, EXTENDED RELEASE ORAL at 12:10

## 2022-10-03 NOTE — ED PROVIDER NOTES
"Encounter Date: 10/3/2022       History     Chief Complaint   Patient presents with    Dizziness     Started yesterday.  States that she had "zigzags" in her vision yesterday.  States she feels off balance today.     Time seen by provider: 11:21 AM on 10/03/2022    Faustina Rae is a 57 y.o. female who presents to the ED with an onset of dizziness that began yesterday. She states  she was sitting outside yesterday when she began seeing zigzags. She thought it was because of her blood pressure so she took her medications and laid down for a couple of hours. Today she still feels off balance and almost fell when trying to go to the bathroom. She also reports abdominal pain today and it worsens when she coughs a lot. She had coffee this AM and took her Hydrochlorothiazide today but not her Hydralazine or Amlodipine because she thought it might be affecting her symptoms. The patient denies blood in the stool, fever, SOB, or any other symptoms at this time. She has a PMHx of asthma, CHF, COPD, HTN, leaky heart valve, stroke, obesity, and HLD. She has a PSHx of hysterectomy, cholecystectomy, sleeve gastroplasty, and carotid stent. Patient uses breathing treatments regularly.    The history is provided by the patient.   Review of patient's allergies indicates:  No Known Allergies  Past Medical History:   Diagnosis Date    Anxiety     Arthritis     Asthma     CHF (congestive heart failure)     COPD (chronic obstructive pulmonary disease)     Hyperlipemia     Hypertension     Leaky heart valve     Obese     Obese     Obstructive sleep apnea     lost her CPAP    Pneumonia     Rheumatoid arthritis(714.0)     Stroke      Past Surgical History:   Procedure Laterality Date    CAROTID STENT      3 years ago    CHOLECYSTECTOMY      HYSTERECTOMY      SLEEVE GASTROPLASTY  02/21/2017     Family History   Problem Relation Age of Onset    Arthritis Mother     Cancer Mother     Diabetes Mother     Hyperlipidemia Mother     " Hypertension Mother     Stroke Mother     Breast cancer Mother     Heart disease Father     Hypertension Father     Asthma Son     Hypertension Sister     Hypertension Sister     Kidney disease Neg Hx      Social History     Tobacco Use    Smoking status: Never    Smokeless tobacco: Never   Substance Use Topics    Alcohol use: Yes     Comment: rare, about once a month    Drug use: No     Review of Systems   Constitutional:  Negative for fever.   Respiratory:  Negative for shortness of breath.    Gastrointestinal:  Positive for abdominal pain. Negative for blood in stool.   Genitourinary:  Negative for flank pain.   Musculoskeletal:  Negative for gait problem.   Neurological:  Positive for dizziness. Negative for weakness.   Psychiatric/Behavioral:  Negative for confusion.      Physical Exam     Initial Vitals [10/03/22 1108]   BP Pulse Resp Temp SpO2   (!) 216/99 83 18 97.9 °F (36.6 °C) 98 %      MAP       --         Physical Exam    Nursing note and vitals reviewed.  Constitutional: She appears well-developed and well-nourished.   HENT:   Head: Normocephalic and atraumatic.   Eyes: Conjunctivae and EOM are normal. Pupils are equal, round, and reactive to light. Right eye exhibits no nystagmus. Left eye exhibits no nystagmus.   Neck: Neck supple. Carotid bruit is not present.   Normal range of motion.  Cardiovascular:  Normal rate, regular rhythm and normal heart sounds.     Exam reveals no gallop and no friction rub.       No murmur heard.  Pulmonary/Chest: Effort normal and breath sounds normal. No respiratory distress. She has no wheezes. She has no rhonchi. She has no rales.   Abdominal: Abdomen is soft. She exhibits no distension. There is no abdominal tenderness.   Musculoskeletal:         General: Normal range of motion.      Cervical back: Normal range of motion and neck supple.     Neurological: She is alert and oriented to person, place, and time.   Skin: Skin is warm and dry. No erythema.   Psychiatric:  She has a normal mood and affect.       ED Course   Critical Care    Date/Time: 10/3/2022 4:32 PM  Performed by: Sohail Feng III, MD  Authorized by: Sohail Feng III, MD   Direct patient critical care time: 120 minutes  Total critical care time (exclusive of procedural time) : 120 minutes  Critical care was necessary to treat or prevent imminent or life-threatening deterioration of the following conditions: CNS failure or compromise.  Critical care was time spent personally by me on the following activities: development of treatment plan with patient or surrogate, evaluation of patient's response to treatment, examination of patient, obtaining history from patient or surrogate, ordering and performing treatments and interventions, ordering and review of laboratory studies, ordering and review of radiographic studies, pulse oximetry, re-evaluation of patient's condition and review of old charts.      Labs Reviewed   CBC W/ AUTO DIFFERENTIAL - Abnormal; Notable for the following components:       Result Value    Immature Granulocytes 0.9 (*)     Immature Grans (Abs) 0.07 (*)     Gran % 75.3 (*)     Lymph % 16.4 (*)     All other components within normal limits   COMPREHENSIVE METABOLIC PANEL - Abnormal; Notable for the following components:    Chloride 112 (*)     CO2 21 (*)     BUN 27 (*)     Creatinine 2.8 (*)     Albumin 3.1 (*)     ALT 7 (*)     Anion Gap 7 (*)     eGFR 19 (*)     All other components within normal limits          Imaging Results              CT Head Without Contrast (Final result)  Result time 10/03/22 11:55:02      Final result by Jeff Joyner MD (10/03/22 11:55:02)                   Impression:      1. There is no obvious acute abnormality.  There is volume loss and nonspecific white matter change with multiple remote infarctions.  There is no hemorrhage, mass or acute infarction.  It should be noted that MRI is more sensitive in the detection of subtle or acute nonhemorrhagic  ischemic disease.      Electronically signed by: Jeff Joyner MD  Date:    10/03/2022  Time:    11:55               Narrative:    EXAMINATION:  CT HEAD WITHOUT CONTRAST    CLINICAL HISTORY:  Dizziness, persistent/recurrent, cardiac or vascular cause suspected; Dizziness and giddiness    TECHNIQUE:  Routine unenhanced axial images were obtained through the head.  Sagittal and coronal reformatted images were created.  The study is reviewed in bone and soft tissue windows.    COMPARISON:  Brain MRI dated 10/05/2019, head CT also dated 10/05/2019    FINDINGS:  Intracranial contents: There are multiple remote infarctions.  There are multiple old infarctions in the right cerebellum.  There are punctate remote infarctions in the left cerebellum and left paramedian suhail.  There is a large remote infarction in the right middle cerebral artery vascular territory.  Comparison brain MRI demonstrated an acute punctate lacunar infarction in the left centrum semiovale.  There are remote infarctions in the basal ganglia bilaterally.  Decreased volume of the right cerebral peduncle is secondary to will layering degeneration.  There is no obvious acute abnormality.  There is no hemorrhage.  There is no mass.  The gray-white interface is preserved.  There is no midline shift.  There is mild ex vacuo dilatation of the right lateral ventricle.  There is nonspecific white matter change.    Extracranial contents, calvarium, soft tissues: The included paranasal sinuses and mastoid air cells are clear.  There are changes of hyperostosis frontalis interna.  There is no fracture.                                       Medications   valsartan tablet 80 mg (80 mg Oral Given 10/3/22 1433)   meclizine tablet 50 mg (50 mg Oral Given 10/3/22 1213)   amLODIPine tablet 10 mg (10 mg Oral Given 10/3/22 1213)   hydrALAZINE tablet 50 mg (50 mg Oral Given 10/3/22 1213)   metoprolol succinate (TOPROL-XL) 24 hr tablet 50 mg (50 mg Oral Given 10/3/22  1213)   labetaloL injection 20 mg (20 mg Intravenous Given 10/3/22 1421)   labetaloL injection 20 mg (20 mg Intravenous Given 10/3/22 1555)     Medical Decision Making:   History:   Old Medical Records: I decided to obtain old medical records.  Independently Interpreted Test(s):   I have ordered and independently interpreted X-rays - see prior notes.  Clinical Tests:   Lab Tests: Ordered and Reviewed  Radiological Study: Ordered and Reviewed  ED Management:  57-year-old female presents with dizziness.  She has a history of hypertension but did not take antihypertensives today.  Blood pressure is markedly elevated and required 2 doses of intravenous labetalol in addition to her oral antihypertensives.  She has no focal neurologic deficits.  CT is obtained with no evidence of CVA.  She is found have acute kidney injury with a creatinine of 2.8 which is increased from her baseline of 1.6.  She is encouraged to follow up with primary care for management of her hypertension and is to return for worsening symptoms.     APC / Resident Notes:   I, Dr. Sohail Feng III, personally performed the services described in this documentation. All medical record entries made by the scribe were at my direction and in my presence.  I have reviewed the chart and agree that the record reflects my personal performance and is accurate and complete   Scribe Attestation:   Scribe #1: I performed the above scribed service and the documentation accurately describes the services I performed. I attest to the accuracy of the note.                   Clinical Impression:   Final diagnoses:  [R42] Dizziness  [N17.9] MAKENNA (acute kidney injury) (Primary)  [I10] Accelerated hypertension      ED Disposition Condition    Discharge Stable          ED Prescriptions       Medication Sig Dispense Start Date End Date Auth. Provider    meclizine (ANTIVERT) 25 mg tablet Take 1 tablet (25 mg total) by mouth 3 (three) times daily as needed for Dizziness. 20  tablet 10/3/2022 -- Sohail Feng III, MD          Follow-up Information       Follow up With Specialties Details Why Contact Info    Kayden Cerda MD Family Medicine In 2 days  [address] [facility]  [address]  [facility]  [place] [address]  [phone]               Sohail Feng III, MD  10/03/22 0542

## 2022-10-16 ENCOUNTER — HOSPITAL ENCOUNTER (INPATIENT)
Facility: HOSPITAL | Age: 57
LOS: 2 days | Discharge: HOME | DRG: 304 | End: 2022-10-18
Attending: EMERGENCY MEDICINE | Admitting: INTERNAL MEDICINE
Payer: MEDICARE

## 2022-10-16 DIAGNOSIS — R05.9 COUGH: ICD-10-CM

## 2022-10-16 DIAGNOSIS — I20.89 ANGINA AT REST: ICD-10-CM

## 2022-10-16 DIAGNOSIS — R07.9 CHEST PAIN: ICD-10-CM

## 2022-10-16 DIAGNOSIS — I10 ACCELERATED HYPERTENSION: ICD-10-CM

## 2022-10-16 DIAGNOSIS — R79.89 TROPONIN LEVEL ELEVATED: ICD-10-CM

## 2022-10-16 DIAGNOSIS — I50.9 CONGESTIVE HEART FAILURE, UNSPECIFIED HF CHRONICITY, UNSPECIFIED HEART FAILURE TYPE: ICD-10-CM

## 2022-10-16 DIAGNOSIS — Z86.73 HISTORY OF CVA (CEREBROVASCULAR ACCIDENT): ICD-10-CM

## 2022-10-16 DIAGNOSIS — I16.1 HYPERTENSIVE EMERGENCY: Primary | ICD-10-CM

## 2022-10-16 DIAGNOSIS — I16.0 HYPERTENSIVE URGENCY: ICD-10-CM

## 2022-10-16 DIAGNOSIS — E78.5 HYPERLIPIDEMIA, UNSPECIFIED HYPERLIPIDEMIA TYPE: ICD-10-CM

## 2022-10-16 LAB
ALBUMIN SERPL BCP-MCNC: 3.7 G/DL (ref 3.5–5.2)
ALP SERPL-CCNC: 61 U/L (ref 55–135)
ALT SERPL W/O P-5'-P-CCNC: 10 U/L (ref 10–44)
ANION GAP SERPL CALC-SCNC: 10 MMOL/L (ref 8–16)
AST SERPL-CCNC: 15 U/L (ref 10–40)
BASOPHILS # BLD AUTO: 0.02 K/UL (ref 0–0.2)
BASOPHILS NFR BLD: 0.3 % (ref 0–1.9)
BILIRUB SERPL-MCNC: 0.5 MG/DL (ref 0.1–1)
BNP SERPL-MCNC: 53 PG/ML (ref 0–99)
BUN SERPL-MCNC: 41 MG/DL (ref 6–20)
CALCIUM SERPL-MCNC: 9.5 MG/DL (ref 8.7–10.5)
CHLORIDE SERPL-SCNC: 106 MMOL/L (ref 95–110)
CO2 SERPL-SCNC: 23 MMOL/L (ref 23–29)
CREAT SERPL-MCNC: 2.9 MG/DL (ref 0.5–1.4)
DIFFERENTIAL METHOD: NORMAL
EOSINOPHIL # BLD AUTO: 0.1 K/UL (ref 0–0.5)
EOSINOPHIL NFR BLD: 1.5 % (ref 0–8)
ERYTHROCYTE [DISTWIDTH] IN BLOOD BY AUTOMATED COUNT: 14.5 % (ref 11.5–14.5)
EST. GFR  (NO RACE VARIABLE): 18.2 ML/MIN/1.73 M^2
GLUCOSE SERPL-MCNC: 94 MG/DL (ref 70–110)
HCT VFR BLD AUTO: 37.3 % (ref 37–48.5)
HGB BLD-MCNC: 12 G/DL (ref 12–16)
IMM GRANULOCYTES # BLD AUTO: 0.02 K/UL (ref 0–0.04)
IMM GRANULOCYTES NFR BLD AUTO: 0.3 % (ref 0–0.5)
INFLUENZA A, MOLECULAR: NEGATIVE
INFLUENZA B, MOLECULAR: NEGATIVE
INR PPP: 1
LYMPHOCYTES # BLD AUTO: 1.5 K/UL (ref 1–4.8)
LYMPHOCYTES NFR BLD: 21.4 % (ref 18–48)
MCH RBC QN AUTO: 29.3 PG (ref 27–31)
MCHC RBC AUTO-ENTMCNC: 32.2 G/DL (ref 32–36)
MCV RBC AUTO: 91 FL (ref 82–98)
MONOCYTES # BLD AUTO: 0.6 K/UL (ref 0.3–1)
MONOCYTES NFR BLD: 8.1 % (ref 4–15)
NEUTROPHILS # BLD AUTO: 4.9 K/UL (ref 1.8–7.7)
NEUTROPHILS NFR BLD: 68.4 % (ref 38–73)
NRBC BLD-RTO: 0 /100 WBC
PLATELET # BLD AUTO: 274 K/UL (ref 150–450)
PMV BLD AUTO: 9.8 FL (ref 9.2–12.9)
POTASSIUM SERPL-SCNC: 4.6 MMOL/L (ref 3.5–5.1)
PROT SERPL-MCNC: 8 G/DL (ref 6–8.4)
PROTHROMBIN TIME: 12.7 SEC (ref 11.4–13.7)
RBC # BLD AUTO: 4.1 M/UL (ref 4–5.4)
SARS-COV-2 RDRP RESP QL NAA+PROBE: NEGATIVE
SODIUM SERPL-SCNC: 139 MMOL/L (ref 136–145)
SPECIMEN SOURCE: NORMAL
TROPONIN I SERPL DL<=0.01 NG/ML-MCNC: 0.07 NG/ML
WBC # BLD AUTO: 7.14 K/UL (ref 3.9–12.7)

## 2022-10-16 PROCEDURE — 63600175 PHARM REV CODE 636 W HCPCS: Performed by: INTERNAL MEDICINE

## 2022-10-16 PROCEDURE — 25000242 PHARM REV CODE 250 ALT 637 W/ HCPCS: Performed by: STUDENT IN AN ORGANIZED HEALTH CARE EDUCATION/TRAINING PROGRAM

## 2022-10-16 PROCEDURE — 99900035 HC TECH TIME PER 15 MIN (STAT)

## 2022-10-16 PROCEDURE — 80053 COMPREHEN METABOLIC PANEL: CPT | Performed by: EMERGENCY MEDICINE

## 2022-10-16 PROCEDURE — 93005 ELECTROCARDIOGRAM TRACING: CPT | Performed by: SPECIALIST

## 2022-10-16 PROCEDURE — 84484 ASSAY OF TROPONIN QUANT: CPT | Mod: 91 | Performed by: EMERGENCY MEDICINE

## 2022-10-16 PROCEDURE — 85025 COMPLETE CBC W/AUTO DIFF WBC: CPT | Performed by: EMERGENCY MEDICINE

## 2022-10-16 PROCEDURE — 36415 COLL VENOUS BLD VENIPUNCTURE: CPT | Performed by: INTERNAL MEDICINE

## 2022-10-16 PROCEDURE — 20000000 HC ICU ROOM

## 2022-10-16 PROCEDURE — U0002 COVID-19 LAB TEST NON-CDC: HCPCS | Performed by: STUDENT IN AN ORGANIZED HEALTH CARE EDUCATION/TRAINING PROGRAM

## 2022-10-16 PROCEDURE — 85610 PROTHROMBIN TIME: CPT | Performed by: EMERGENCY MEDICINE

## 2022-10-16 PROCEDURE — 84484 ASSAY OF TROPONIN QUANT: CPT | Performed by: INTERNAL MEDICINE

## 2022-10-16 PROCEDURE — 93010 EKG 12-LEAD: ICD-10-PCS | Mod: ,,, | Performed by: SPECIALIST

## 2022-10-16 PROCEDURE — 96374 THER/PROPH/DIAG INJ IV PUSH: CPT

## 2022-10-16 PROCEDURE — 25000003 PHARM REV CODE 250: Performed by: INTERNAL MEDICINE

## 2022-10-16 PROCEDURE — 99285 EMERGENCY DEPT VISIT HI MDM: CPT | Mod: 25

## 2022-10-16 PROCEDURE — 63600175 PHARM REV CODE 636 W HCPCS: Performed by: STUDENT IN AN ORGANIZED HEALTH CARE EDUCATION/TRAINING PROGRAM

## 2022-10-16 PROCEDURE — 94640 AIRWAY INHALATION TREATMENT: CPT

## 2022-10-16 PROCEDURE — 21400001 HC TELEMETRY ROOM

## 2022-10-16 PROCEDURE — 83880 ASSAY OF NATRIURETIC PEPTIDE: CPT | Performed by: EMERGENCY MEDICINE

## 2022-10-16 PROCEDURE — 87502 INFLUENZA DNA AMP PROBE: CPT | Performed by: STUDENT IN AN ORGANIZED HEALTH CARE EDUCATION/TRAINING PROGRAM

## 2022-10-16 PROCEDURE — 93010 ELECTROCARDIOGRAM REPORT: CPT | Mod: ,,, | Performed by: SPECIALIST

## 2022-10-16 PROCEDURE — 96375 TX/PRO/DX INJ NEW DRUG ADDON: CPT

## 2022-10-16 RX ORDER — TRAZODONE HYDROCHLORIDE 50 MG/1
50 TABLET ORAL NIGHTLY PRN
Status: DISCONTINUED | OUTPATIENT
Start: 2022-10-16 | End: 2022-10-18 | Stop reason: HOSPADM

## 2022-10-16 RX ORDER — METOPROLOL SUCCINATE 50 MG/1
50 TABLET, EXTENDED RELEASE ORAL DAILY
Status: DISCONTINUED | OUTPATIENT
Start: 2022-10-17 | End: 2022-10-18 | Stop reason: HOSPADM

## 2022-10-16 RX ORDER — ACETAMINOPHEN 325 MG/1
650 TABLET ORAL EVERY 8 HOURS PRN
Status: DISCONTINUED | OUTPATIENT
Start: 2022-10-16 | End: 2022-10-18 | Stop reason: HOSPADM

## 2022-10-16 RX ORDER — HYDRALAZINE HYDROCHLORIDE 25 MG/1
50 TABLET, FILM COATED ORAL EVERY 8 HOURS
Status: DISCONTINUED | OUTPATIENT
Start: 2022-10-17 | End: 2022-10-18 | Stop reason: HOSPADM

## 2022-10-16 RX ORDER — HYDRALAZINE HYDROCHLORIDE 20 MG/ML
10 INJECTION INTRAMUSCULAR; INTRAVENOUS
Status: COMPLETED | OUTPATIENT
Start: 2022-10-16 | End: 2022-10-16

## 2022-10-16 RX ORDER — ESCITALOPRAM OXALATE 10 MG/1
10 TABLET ORAL DAILY
Status: DISCONTINUED | OUTPATIENT
Start: 2022-10-17 | End: 2022-10-18 | Stop reason: HOSPADM

## 2022-10-16 RX ORDER — LANOLIN ALCOHOL/MO/W.PET/CERES
1 CREAM (GRAM) TOPICAL DAILY
Status: DISCONTINUED | OUTPATIENT
Start: 2022-10-17 | End: 2022-10-18 | Stop reason: HOSPADM

## 2022-10-16 RX ORDER — LANOLIN ALCOHOL/MO/W.PET/CERES
800 CREAM (GRAM) TOPICAL
Status: DISCONTINUED | OUTPATIENT
Start: 2022-10-16 | End: 2022-10-18 | Stop reason: HOSPADM

## 2022-10-16 RX ORDER — FLUTICASONE PROPIONATE 50 MCG
2 SPRAY, SUSPENSION (ML) NASAL DAILY
Status: DISCONTINUED | OUTPATIENT
Start: 2022-10-17 | End: 2022-10-18 | Stop reason: HOSPADM

## 2022-10-16 RX ORDER — IPRATROPIUM BROMIDE AND ALBUTEROL SULFATE 2.5; .5 MG/3ML; MG/3ML
3 SOLUTION RESPIRATORY (INHALATION)
Status: COMPLETED | OUTPATIENT
Start: 2022-10-16 | End: 2022-10-16

## 2022-10-16 RX ORDER — IPRATROPIUM BROMIDE AND ALBUTEROL SULFATE 2.5; .5 MG/3ML; MG/3ML
3 SOLUTION RESPIRATORY (INHALATION) EVERY 4 HOURS PRN
Status: DISCONTINUED | OUTPATIENT
Start: 2022-10-16 | End: 2022-10-18 | Stop reason: HOSPADM

## 2022-10-16 RX ORDER — TRAMADOL HYDROCHLORIDE 50 MG/1
1 TABLET ORAL
COMMUNITY
Start: 2022-05-25 | End: 2022-10-16

## 2022-10-16 RX ORDER — BUDESONIDE 0.5 MG/2ML
0.5 INHALANT ORAL EVERY 12 HOURS
Status: DISCONTINUED | OUTPATIENT
Start: 2022-10-17 | End: 2022-10-18 | Stop reason: HOSPADM

## 2022-10-16 RX ORDER — LORAZEPAM 2 MG/ML
0.5 INJECTION INTRAMUSCULAR EVERY 6 HOURS PRN
Status: DISCONTINUED | OUTPATIENT
Start: 2022-10-16 | End: 2022-10-18 | Stop reason: HOSPADM

## 2022-10-16 RX ORDER — TRAMADOL HYDROCHLORIDE AND ACETAMINOPHEN 37.5; 325 MG/1; MG/1
2 TABLET, FILM COATED ORAL EVERY 6 HOURS
COMMUNITY
End: 2022-10-16

## 2022-10-16 RX ORDER — LIDOCAINE 50 MG/G
PATCH TOPICAL
COMMUNITY
Start: 2022-05-25 | End: 2022-10-16

## 2022-10-16 RX ORDER — SODIUM,POTASSIUM PHOSPHATES 280-250MG
2 POWDER IN PACKET (EA) ORAL
Status: DISCONTINUED | OUTPATIENT
Start: 2022-10-16 | End: 2022-10-18 | Stop reason: HOSPADM

## 2022-10-16 RX ORDER — HYDROCODONE BITARTRATE AND ACETAMINOPHEN 5; 325 MG/1; MG/1
1 TABLET ORAL EVERY 4 HOURS PRN
Status: DISCONTINUED | OUTPATIENT
Start: 2022-10-16 | End: 2022-10-18 | Stop reason: HOSPADM

## 2022-10-16 RX ORDER — SODIUM CHLORIDE, SODIUM LACTATE, POTASSIUM CHLORIDE, CALCIUM CHLORIDE 600; 310; 30; 20 MG/100ML; MG/100ML; MG/100ML; MG/100ML
INJECTION, SOLUTION INTRAVENOUS CONTINUOUS
Status: DISCONTINUED | OUTPATIENT
Start: 2022-10-16 | End: 2022-10-17

## 2022-10-16 RX ORDER — TALC
6 POWDER (GRAM) TOPICAL NIGHTLY PRN
Status: DISCONTINUED | OUTPATIENT
Start: 2022-10-16 | End: 2022-10-18 | Stop reason: HOSPADM

## 2022-10-16 RX ORDER — CLONIDINE HYDROCHLORIDE 0.1 MG/1
0.2 TABLET ORAL EVERY 6 HOURS PRN
Status: DISCONTINUED | OUTPATIENT
Start: 2022-10-16 | End: 2022-10-18 | Stop reason: HOSPADM

## 2022-10-16 RX ORDER — ENOXAPARIN SODIUM 100 MG/ML
30 INJECTION SUBCUTANEOUS EVERY 24 HOURS
Status: DISCONTINUED | OUTPATIENT
Start: 2022-10-17 | End: 2022-10-18 | Stop reason: HOSPADM

## 2022-10-16 RX ORDER — ATORVASTATIN CALCIUM 40 MG/1
80 TABLET, FILM COATED ORAL NIGHTLY
Status: DISCONTINUED | OUTPATIENT
Start: 2022-10-16 | End: 2022-10-18 | Stop reason: HOSPADM

## 2022-10-16 RX ORDER — ENOXAPARIN SODIUM 100 MG/ML
40 INJECTION SUBCUTANEOUS EVERY 24 HOURS
Status: DISCONTINUED | OUTPATIENT
Start: 2022-10-16 | End: 2022-10-16

## 2022-10-16 RX ORDER — FERROUS SULFATE, DRIED 160(50) MG
1 TABLET, EXTENDED RELEASE ORAL DAILY
Status: DISCONTINUED | OUTPATIENT
Start: 2022-10-17 | End: 2022-10-18 | Stop reason: HOSPADM

## 2022-10-16 RX ORDER — AMLODIPINE BESYLATE 5 MG/1
10 TABLET ORAL DAILY
Status: DISCONTINUED | OUTPATIENT
Start: 2022-10-17 | End: 2022-10-18 | Stop reason: HOSPADM

## 2022-10-16 RX ORDER — ISOSORBIDE DINITRATE 5 MG/1
5 TABLET ORAL 3 TIMES DAILY
Status: DISCONTINUED | OUTPATIENT
Start: 2022-10-17 | End: 2022-10-18 | Stop reason: HOSPADM

## 2022-10-16 RX ORDER — MONTELUKAST SODIUM 10 MG/1
10 TABLET ORAL NIGHTLY
Status: DISCONTINUED | OUTPATIENT
Start: 2022-10-16 | End: 2022-10-18 | Stop reason: HOSPADM

## 2022-10-16 RX ORDER — LANOLIN ALCOHOL/MO/W.PET/CERES
1000 CREAM (GRAM) TOPICAL DAILY
Status: DISCONTINUED | OUTPATIENT
Start: 2022-10-17 | End: 2022-10-18 | Stop reason: HOSPADM

## 2022-10-16 RX ORDER — PANTOPRAZOLE SODIUM 40 MG/1
40 TABLET, DELAYED RELEASE ORAL
Status: DISCONTINUED | OUTPATIENT
Start: 2022-10-17 | End: 2022-10-18 | Stop reason: HOSPADM

## 2022-10-16 RX ADMIN — CLONIDINE HYDROCHLORIDE 0.2 MG: 0.1 TABLET ORAL at 08:10

## 2022-10-16 RX ADMIN — HYDRALAZINE HYDROCHLORIDE 10 MG: 20 INJECTION INTRAMUSCULAR; INTRAVENOUS at 06:10

## 2022-10-16 RX ADMIN — IPRATROPIUM BROMIDE AND ALBUTEROL SULFATE 3 ML: .5; 3 SOLUTION RESPIRATORY (INHALATION) at 06:10

## 2022-10-16 RX ADMIN — MONTELUKAST 10 MG: 10 TABLET, FILM COATED ORAL at 11:10

## 2022-10-16 RX ADMIN — SODIUM CHLORIDE, SODIUM LACTATE, POTASSIUM CHLORIDE, AND CALCIUM CHLORIDE: .6; .31; .03; .02 INJECTION, SOLUTION INTRAVENOUS at 08:10

## 2022-10-16 RX ADMIN — ATORVASTATIN CALCIUM 80 MG: 40 TABLET, FILM COATED ORAL at 11:10

## 2022-10-16 RX ADMIN — LORAZEPAM 0.5 MG: 2 INJECTION INTRAMUSCULAR; INTRAVENOUS at 07:10

## 2022-10-16 NOTE — HPI
"57 year old female with past history of CVA, HTN, CAD, HLD, Anxiety, COPD, DAVID, Morbid Obesity presented to ED complaining of 1 day history central chest aching pain with rads towards neck,5/10 waxing and waning own accord--pain free since presentation to ED, and dry hacking cough with post nasal drip for the past "Two to three days". Has chronic 3 pillow orthopnea and pedal edema, but no PND. Has DAVID, but does not have CPAP with her. Her BP has been 220's/110's since presentation here. Has HA and feels "Dizzy", but no vision changes. No new LOP (has residual left sided weakness from prior CVA). Patient on multiple diuretics and ARB    In ED: labs reviewed and noted below: normal CBC, normal electrolytes with stage 4 renal dysfunction (was stage 3b May 2022); BNP normal troponin mildly elevated. Influenza and COVID negative. CXR reviewed: NAPD. EKG reviewed: sinus without acute segments.    Discussed with ED MD: Inpatient for Hypertensive with end organ (renal) injury, gentle hydration with LR; continue current regimen (holding diuretics and ARB) add iv hydralazine prn--if that is not sufficient, will change to clonidine prn; Nephrology and Cardiology consultations. Serial markers. ECHO. TSH with AM labs for review.  "

## 2022-10-16 NOTE — ED PROVIDER NOTES
Encounter Date: 10/16/2022       History     Chief Complaint   Patient presents with    Cough     X 1 WEEK     57-year-old female past medical history significant for asthma, COPD, CHF, hyperlipidemia, hypertension presents emergency room for 1 week of persistent cough.  It is nonproductive.  Not associated with fevers.  She denies chest pain or shortness of breath.  She states compliance with all medications.    Review of patient's allergies indicates:  No Known Allergies  Past Medical History:   Diagnosis Date    Anxiety     Arthritis     Asthma     CHF (congestive heart failure)     COPD (chronic obstructive pulmonary disease)     Hyperlipemia     Hypertension     Leaky heart valve     Obese     Obese     Obstructive sleep apnea     lost her CPAP    Pneumonia     Rheumatoid arthritis(714.0)     Stroke      Past Surgical History:   Procedure Laterality Date    CAROTID STENT      3 years ago    CHOLECYSTECTOMY      HYSTERECTOMY      SLEEVE GASTROPLASTY  02/21/2017     Family History   Problem Relation Age of Onset    Arthritis Mother     Cancer Mother     Diabetes Mother     Hyperlipidemia Mother     Hypertension Mother     Stroke Mother     Breast cancer Mother     Heart disease Father     Hypertension Father     Asthma Son     Hypertension Sister     Hypertension Sister     Kidney disease Neg Hx      Social History     Tobacco Use    Smoking status: Never    Smokeless tobacco: Never   Substance Use Topics    Alcohol use: Yes     Comment: rare, about once a month    Drug use: No     Review of Systems   Constitutional:  Negative for chills, fatigue and fever.   HENT:  Negative for congestion, hearing loss, sore throat and trouble swallowing.    Eyes:  Negative for visual disturbance.   Respiratory:  Positive for cough. Negative for chest tightness and shortness of breath.    Cardiovascular:  Negative for chest pain.   Gastrointestinal:  Negative for abdominal pain and nausea.   Endocrine: Negative for polyuria.    Genitourinary:  Negative for difficulty urinating.   Musculoskeletal:  Negative for arthralgias and myalgias.   Skin:  Negative for rash.   Neurological:  Negative for dizziness and headaches.   Psychiatric/Behavioral:  The patient is not nervous/anxious.      Physical Exam     Initial Vitals [10/16/22 1623]   BP Pulse Resp Temp SpO2   (!) 200/103 97 20 98.9 °F (37.2 °C) 100 %      MAP       --         Physical Exam    Nursing note and vitals reviewed.  Constitutional: She appears well-developed and well-nourished.   HENT:   Head: Normocephalic and atraumatic.   Eyes: Conjunctivae are normal. Pupils are equal, round, and reactive to light.   Neck: Neck supple.   Normal range of motion.  Cardiovascular:  Normal rate, regular rhythm and normal heart sounds.           Pulmonary/Chest: No respiratory distress.   Diffuse expiratory wheezing   Abdominal: Abdomen is soft. She exhibits no distension. There is no abdominal tenderness.   Musculoskeletal:         General: Normal range of motion.      Cervical back: Normal range of motion and neck supple.     Neurological: She is alert and oriented to person, place, and time. GCS score is 15. GCS eye subscore is 4. GCS verbal subscore is 5. GCS motor subscore is 6.   Skin: Skin is warm and dry. Capillary refill takes less than 2 seconds.   Psychiatric: She has a normal mood and affect. Her behavior is normal. Judgment and thought content normal.       ED Course   Procedures  Labs Reviewed   COMPREHENSIVE METABOLIC PANEL - Abnormal; Notable for the following components:       Result Value    BUN 41 (*)     Creatinine 2.9 (*)     eGFR 18.2 (*)     All other components within normal limits   TROPONIN I - Abnormal; Notable for the following components:    Troponin I 0.073 (*)     All other components within normal limits   SARS-COV-2 RNA AMPLIFICATION, QUAL   CBC W/ AUTO DIFFERENTIAL   PROTIME-INR   INFLUENZA A AND B ANTIGEN    Narrative:     Specimen Source->Nasopharyngeal Swab    B-TYPE NATRIURETIC PEPTIDE          Imaging Results              X-Ray Chest PA And Lateral (Final result)  Result time 10/16/22 16:52:59      Final result by Alfred Frye MD (10/16/22 16:52:59)                   Narrative:    XR CHEST 2 VIEWS    CLINICAL HISTORY:  57 years Female cough    COMPARISON: February 22, 2020    FINDINGS: Cardiac silhouette size is stable compared to prior. No airspace consolidation. No pleural effusion or pneumothorax. No acute osseous abnormality.    IMPRESSION:    No acute pulmonary pathology.    Electronically signed by:  Alfred Frye MD  10/16/2022 4:52 PM CDT Workstation: [id number]                                     Medications   albuterol-ipratropium 2.5 mg-0.5 mg/3 mL nebulizer solution 3 mL (3 mLs Nebulization Given 10/16/22 1815)   hydrALAZINE injection 10 mg (10 mg Intravenous Given 10/16/22 1836)     Medical Decision Making:   Initial Assessment:   Nontoxic, well-appearing and in no acute distress.  Patient does have some mild diffuse expiratory wheezing.  She is hypertensive to 220s over 100s  ED Management:  57-year-old female presents emergency room for evaluation of cough x1 week.  Given significant cardiac history, labs were drawn.  Notable for elevated troponin to 0.071.  No chest pain or shortness of breath.  In the setting of a blood pressure of 220/93, this is concerning for hypertensive emergency.  No other significant abnormalities noted on labs.  Patient was given 10 mg hydralazine.  She was also given DuoNebs x3 for wheezing.  Chest x-ray without acute cardiopulmonary problems.  Consult Hospital Medicine for admission.    Disposition:  Stable, admit.    I discuss this patient case with the cosigning physician, who agrees with diagnosis and plan of care. This note was written using the assistance of a dictation program and may contain grammatical errors.            ED Course as of 10/16/22 1850   Sun Oct 16, 2022   1835 Troponin I(!): 0.073 [AN]      ED  Course User Index  [AN] Yossi Dean PA-C                 Clinical Impression:   Final diagnoses:  [R05.9] Cough  [R07.9] Chest pain  [R77.8] Troponin level elevated (Primary)  [I16.1] Hypertensive emergency        ED Disposition Condition    Admit Stable                Yossi Dean PA-C  10/16/22 5216

## 2022-10-17 ENCOUNTER — CLINICAL SUPPORT (OUTPATIENT)
Dept: CARDIOLOGY | Facility: HOSPITAL | Age: 57
DRG: 304 | End: 2022-10-17
Attending: INTERNAL MEDICINE
Payer: MEDICARE

## 2022-10-17 VITALS — HEIGHT: 62 IN | WEIGHT: 195 LBS | BODY MASS INDEX: 35.88 KG/M2

## 2022-10-17 LAB
ANION GAP SERPL CALC-SCNC: 7 MMOL/L (ref 8–16)
AORTIC ROOT ANNULUS: 3.11 CM
AV INDEX (PROSTH): 0.82
AV MEAN GRADIENT: 4 MMHG
AV VALVE AREA: 2.77 CM2
BACTERIA #/AREA URNS HPF: NEGATIVE /HPF
BASOPHILS # BLD AUTO: 0.02 K/UL (ref 0–0.2)
BASOPHILS NFR BLD: 0.3 % (ref 0–1.9)
BILIRUB UR QL STRIP: NEGATIVE
BSA FOR ECHO PROCEDURE: 1.97 M2
BUN SERPL-MCNC: 40 MG/DL (ref 6–20)
CALCIUM SERPL-MCNC: 8.8 MG/DL (ref 8.7–10.5)
CHLORIDE SERPL-SCNC: 107 MMOL/L (ref 95–110)
CLARITY UR: CLEAR
CO2 SERPL-SCNC: 24 MMOL/L (ref 23–29)
COLOR UR: COLORLESS
CREAT SERPL-MCNC: 2.9 MG/DL (ref 0.5–1.4)
CV ECHO LV RWT: 0.6 CM
DIFFERENTIAL METHOD: ABNORMAL
DOP CALC AO VTI: 27.13 CM
DOP CALC LVOT AREA: 3.4 CM2
DOP CALC LVOT DIAMETER: 2.08 CM
DOP CALC LVOT PEAK VEL: 101.69 M/S
DOP CALC LVOT STROKE VOLUME: 75.16 CM3
DOP CALCLVOT PEAK VEL VTI: 22.13 CM
E WAVE DECELERATION TIME: 277.64 MSEC
E/A RATIO: 0.75
E/E' RATIO: 9.29 M/S
ECHO LV POSTERIOR WALL: 1.39 CM (ref 0.6–1.1)
EJECTION FRACTION: 60 %
EOSINOPHIL # BLD AUTO: 0.1 K/UL (ref 0–0.5)
EOSINOPHIL NFR BLD: 1.5 % (ref 0–8)
ERYTHROCYTE [DISTWIDTH] IN BLOOD BY AUTOMATED COUNT: 14.6 % (ref 11.5–14.5)
EST. GFR  (NO RACE VARIABLE): 18.2 ML/MIN/1.73 M^2
FRACTIONAL SHORTENING: 33 % (ref 28–44)
GLUCOSE SERPL-MCNC: 102 MG/DL (ref 70–110)
GLUCOSE UR QL STRIP: NEGATIVE
HCT VFR BLD AUTO: 31.6 % (ref 37–48.5)
HGB BLD-MCNC: 10.2 G/DL (ref 12–16)
HGB UR QL STRIP: NEGATIVE
HYALINE CASTS #/AREA URNS LPF: 0 /LPF
IMM GRANULOCYTES # BLD AUTO: 0.02 K/UL (ref 0–0.04)
IMM GRANULOCYTES NFR BLD AUTO: 0.3 % (ref 0–0.5)
INTERVENTRICULAR SEPTUM: 1.62 CM (ref 0.6–1.1)
KETONES UR QL STRIP: NEGATIVE
LEFT ATRIUM SIZE: 4.54 CM
LEFT ATRIUM VOLUME INDEX MOD: 38.2 ML/M2
LEFT ATRIUM VOLUME MOD: 72.25 CM3
LEFT INTERNAL DIMENSION IN SYSTOLE: 3.13 CM (ref 2.1–4)
LEFT VENTRICLE DIASTOLIC VOLUME INDEX: 53.35 ML/M2
LEFT VENTRICLE DIASTOLIC VOLUME: 100.83 ML
LEFT VENTRICLE MASS INDEX: 154 G/M2
LEFT VENTRICLE SYSTOLIC VOLUME INDEX: 16.2 ML/M2
LEFT VENTRICLE SYSTOLIC VOLUME: 30.53 ML
LEFT VENTRICULAR INTERNAL DIMENSION IN DIASTOLE: 4.65 CM (ref 3.5–6)
LEFT VENTRICULAR MASS: 290.89 G
LEUKOCYTE ESTERASE UR QL STRIP: NEGATIVE
LV LATERAL E/E' RATIO: 9.88 M/S
LV SEPTAL E/E' RATIO: 8.78 M/S
LYMPHOCYTES # BLD AUTO: 1.3 K/UL (ref 1–4.8)
LYMPHOCYTES NFR BLD: 22.3 % (ref 18–48)
MCH RBC QN AUTO: 29.5 PG (ref 27–31)
MCHC RBC AUTO-ENTMCNC: 32.3 G/DL (ref 32–36)
MCV RBC AUTO: 91 FL (ref 82–98)
MICROSCOPIC COMMENT: ABNORMAL
MONOCYTES # BLD AUTO: 0.6 K/UL (ref 0.3–1)
MONOCYTES NFR BLD: 9.4 % (ref 4–15)
MV PEAK A VEL: 1.06 M/S
MV PEAK E VEL: 0.79 M/S
NEUTROPHILS # BLD AUTO: 3.9 K/UL (ref 1.8–7.7)
NEUTROPHILS NFR BLD: 66.2 % (ref 38–73)
NITRITE UR QL STRIP: NEGATIVE
NRBC BLD-RTO: 0 /100 WBC
PH UR STRIP: 7 [PH] (ref 5–8)
PISA MRMAX VEL: 449.78 M/S
PISA TR MAX VEL: 2.57 M/S
PLATELET # BLD AUTO: 241 K/UL (ref 150–450)
PMV BLD AUTO: 10 FL (ref 9.2–12.9)
POTASSIUM SERPL-SCNC: 4.6 MMOL/L (ref 3.5–5.1)
PROT UR QL STRIP: ABNORMAL
RBC # BLD AUTO: 3.46 M/UL (ref 4–5.4)
RBC #/AREA URNS HPF: 1 /HPF (ref 0–4)
RIGHT VENTRICULAR END-DIASTOLIC DIMENSION: 3.22 CM
SODIUM SERPL-SCNC: 138 MMOL/L (ref 136–145)
SP GR UR STRIP: 1.01 (ref 1–1.03)
SQUAMOUS #/AREA URNS HPF: 0 /HPF
TDI LATERAL: 0.08 M/S
TDI SEPTAL: 0.09 M/S
TDI: 0.09 M/S
TR MAX PG: 26 MMHG
TRICUSPID ANNULAR PLANE SYSTOLIC EXCURSION: 2.27 CM
TROPONIN I SERPL DL<=0.01 NG/ML-MCNC: 0.04 NG/ML
TROPONIN I SERPL DL<=0.01 NG/ML-MCNC: 0.06 NG/ML
URN SPEC COLLECT METH UR: ABNORMAL
UROBILINOGEN UR STRIP-ACNC: NEGATIVE EU/DL
WBC # BLD AUTO: 5.83 K/UL (ref 3.9–12.7)
WBC #/AREA URNS HPF: 1 /HPF (ref 0–5)

## 2022-10-17 PROCEDURE — 99900035 HC TECH TIME PER 15 MIN (STAT)

## 2022-10-17 PROCEDURE — 94640 AIRWAY INHALATION TREATMENT: CPT | Mod: 76

## 2022-10-17 PROCEDURE — 80048 BASIC METABOLIC PNL TOTAL CA: CPT | Performed by: INTERNAL MEDICINE

## 2022-10-17 PROCEDURE — 25000003 PHARM REV CODE 250: Performed by: INTERNAL MEDICINE

## 2022-10-17 PROCEDURE — 12000002 HC ACUTE/MED SURGE SEMI-PRIVATE ROOM

## 2022-10-17 PROCEDURE — 25000242 PHARM REV CODE 250 ALT 637 W/ HCPCS: Performed by: INTERNAL MEDICINE

## 2022-10-17 PROCEDURE — 25000003 PHARM REV CODE 250: Performed by: STUDENT IN AN ORGANIZED HEALTH CARE EDUCATION/TRAINING PROGRAM

## 2022-10-17 PROCEDURE — 85025 COMPLETE CBC W/AUTO DIFF WBC: CPT | Performed by: INTERNAL MEDICINE

## 2022-10-17 PROCEDURE — 84484 ASSAY OF TROPONIN QUANT: CPT | Performed by: INTERNAL MEDICINE

## 2022-10-17 PROCEDURE — 99233 SBSQ HOSP IP/OBS HIGH 50: CPT | Mod: 25,,, | Performed by: INTERNAL MEDICINE

## 2022-10-17 PROCEDURE — 94761 N-INVAS EAR/PLS OXIMETRY MLT: CPT

## 2022-10-17 PROCEDURE — 63600175 PHARM REV CODE 636 W HCPCS: Performed by: INTERNAL MEDICINE

## 2022-10-17 PROCEDURE — 81001 URINALYSIS AUTO W/SCOPE: CPT | Performed by: INTERNAL MEDICINE

## 2022-10-17 PROCEDURE — 99233 PR SUBSEQUENT HOSPITAL CARE,LEVL III: ICD-10-PCS | Mod: 25,,, | Performed by: INTERNAL MEDICINE

## 2022-10-17 PROCEDURE — 25000003 PHARM REV CODE 250

## 2022-10-17 PROCEDURE — 94799 UNLISTED PULMONARY SVC/PX: CPT

## 2022-10-17 PROCEDURE — 93306 TTE W/DOPPLER COMPLETE: CPT

## 2022-10-17 PROCEDURE — 99900031 HC PATIENT EDUCATION (STAT)

## 2022-10-17 PROCEDURE — 93306 TTE W/DOPPLER COMPLETE: CPT | Mod: 26,,, | Performed by: INTERNAL MEDICINE

## 2022-10-17 PROCEDURE — 93306 ECHO (CUPID ONLY): ICD-10-PCS | Mod: 26,,, | Performed by: INTERNAL MEDICINE

## 2022-10-17 RX ORDER — MUPIROCIN 20 MG/G
OINTMENT TOPICAL 2 TIMES DAILY
Status: DISCONTINUED | OUTPATIENT
Start: 2022-10-17 | End: 2022-10-18 | Stop reason: HOSPADM

## 2022-10-17 RX ORDER — GUAIFENESIN/DEXTROMETHORPHAN 100-10MG/5
5 SYRUP ORAL EVERY 4 HOURS PRN
Status: DISCONTINUED | OUTPATIENT
Start: 2022-10-17 | End: 2022-10-18 | Stop reason: HOSPADM

## 2022-10-17 RX ORDER — CHLORHEXIDINE GLUCONATE ORAL RINSE 1.2 MG/ML
15 SOLUTION DENTAL 2 TIMES DAILY
Status: DISCONTINUED | OUTPATIENT
Start: 2022-10-17 | End: 2022-10-18 | Stop reason: HOSPADM

## 2022-10-17 RX ORDER — HYDRALAZINE HYDROCHLORIDE 25 MG/1
25 TABLET, FILM COATED ORAL EVERY 8 HOURS PRN
Status: DISCONTINUED | OUTPATIENT
Start: 2022-10-17 | End: 2022-10-18 | Stop reason: HOSPADM

## 2022-10-17 RX ADMIN — CALCIUM CARBONATE-VITAMIN D TAB 500 MG-200 UNIT 1 TABLET: 500-200 TAB at 08:10

## 2022-10-17 RX ADMIN — ISOSORBIDE DINITRATE 5 MG: 5 TABLET ORAL at 09:10

## 2022-10-17 RX ADMIN — BUDESONIDE 0.5 MG: 0.5 INHALANT RESPIRATORY (INHALATION) at 07:10

## 2022-10-17 RX ADMIN — HYDRALAZINE HYDROCHLORIDE 50 MG: 25 TABLET ORAL at 01:10

## 2022-10-17 RX ADMIN — HYDRALAZINE HYDROCHLORIDE 50 MG: 25 TABLET ORAL at 05:10

## 2022-10-17 RX ADMIN — ATORVASTATIN CALCIUM 80 MG: 40 TABLET, FILM COATED ORAL at 09:10

## 2022-10-17 RX ADMIN — ISOSORBIDE DINITRATE 5 MG: 5 TABLET ORAL at 08:10

## 2022-10-17 RX ADMIN — MONTELUKAST 10 MG: 10 TABLET, FILM COATED ORAL at 09:10

## 2022-10-17 RX ADMIN — ENOXAPARIN SODIUM 30 MG: 100 INJECTION SUBCUTANEOUS at 05:10

## 2022-10-17 RX ADMIN — FLUTICASONE PROPIONATE 100 MCG: 50 SPRAY, METERED NASAL at 08:10

## 2022-10-17 RX ADMIN — HYDRALAZINE HYDROCHLORIDE 50 MG: 25 TABLET ORAL at 09:10

## 2022-10-17 RX ADMIN — MUPIROCIN 1 G: 20 OINTMENT TOPICAL at 09:10

## 2022-10-17 RX ADMIN — GUAIFENESIN AND DEXTROMETHORPHAN 5 ML: 100; 10 SYRUP ORAL at 07:10

## 2022-10-17 RX ADMIN — ESCITALOPRAM OXALATE 10 MG: 10 TABLET ORAL at 08:10

## 2022-10-17 RX ADMIN — METOPROLOL SUCCINATE 50 MG: 50 TABLET, FILM COATED, EXTENDED RELEASE ORAL at 08:10

## 2022-10-17 RX ADMIN — GUAIFENESIN AND DEXTROMETHORPHAN 5 ML: 100; 10 SYRUP ORAL at 08:10

## 2022-10-17 RX ADMIN — CYANOCOBALAMIN TAB 1000 MCG 1000 MCG: 1000 TAB at 08:10

## 2022-10-17 RX ADMIN — PANTOPRAZOLE SODIUM 40 MG: 40 TABLET, DELAYED RELEASE ORAL at 05:10

## 2022-10-17 RX ADMIN — ISOSORBIDE DINITRATE 5 MG: 5 TABLET ORAL at 01:10

## 2022-10-17 RX ADMIN — FERROUS SULFATE TAB 325 MG (65 MG ELEMENTAL FE) 1 EACH: 325 (65 FE) TAB at 08:10

## 2022-10-17 RX ADMIN — CHLORHEXIDINE GLUCONATE 15 ML: 1.2 RINSE ORAL at 09:10

## 2022-10-17 RX ADMIN — AMLODIPINE BESYLATE 10 MG: 5 TABLET ORAL at 08:10

## 2022-10-17 RX ADMIN — THERA TABS 1 TABLET: TAB at 08:10

## 2022-10-17 NOTE — H&P
"On license of [facility] - Emergency Dept  Hospital Medicine  History & Physical    Patient Name: Faustina Rae  MRN: [id number]  Patient Class: IP- Inpatient  Admission Date: 10/16/2022  Attending Physician: Ralph Ford MD  Primary Care Provider: Primary Doctor No         Patient information was obtained from patient, past medical records, ER records and ED MD.     Subjective:     Principal Problem:Hypertensive urgency    Chief Complaint:   Chief Complaint   Patient presents with    Cough     X 1 WEEK        HPI: 57 year old female with past history of CVA, HTN, CAD, HLD, Anxiety, COPD, DAVID, Morbid Obesity presented to ED complaining of 1 day history central chest aching pain with rads towards neck,5/10 waxing and waning own accord--pain free since presentation to ED, and dry hacking cough with post nasal drip for the past "Two to three days". Has chronic 3 pillow orthopnea and pedal edema, but no PND. Has DAVID, but does not have CPAP with her. Her BP has been 220's/110's since presentation here. Has HA and feels "Dizzy", but no vision changes. No new LOP (has residual left sided weakness from prior CVA). Patient on multiple diuretics and ARB    In ED: labs reviewed and noted below: normal CBC, normal electrolytes with stage 4 renal dysfunction (was stage 3b May 2022); BNP normal troponin mildly elevated. Influenza and COVID negative. CXR reviewed: NAPD. EKG reviewed: sinus without acute segments.    Discussed with ED MD: Inpatient for Hypertensive with end organ (renal) injury, gentle hydration with LR; continue current regimen (holding diuretics and ARB) add iv hydralazine prn--if that is not sufficient, will change to clonidine prn; Nephrology and Cardiology consultations. Serial markers. ECHO. TSH with AM labs for review.      Past Medical History:   Diagnosis Date    Anxiety     Arthritis     Asthma     CHF (congestive heart failure)     COPD (chronic obstructive pulmonary disease)     " Hyperlipemia     Hypertension     Leaky heart valve     Obese     Obese     Obstructive sleep apnea     lost her CPAP    Pneumonia     Rheumatoid arthritis(714.0)     Stroke        Past Surgical History:   Procedure Laterality Date    CAROTID STENT      3 years ago    CHOLECYSTECTOMY      HYSTERECTOMY      SLEEVE GASTROPLASTY  02/21/2017       Review of patient's allergies indicates:  No Known Allergies    No current facility-administered medications on file prior to encounter.     Current Outpatient Medications on File Prior to Encounter   Medication Sig    albuterol (PROVENTIL/VENTOLIN HFA) 90 mcg/actuation inhaler INHALE 2 PUFFS INTO THE LUNGS FOUR TIMES DAILY    albuterol-ipratropium (DUO-NEB) 2.5 mg-0.5 mg/3 mL nebulizer solution Take 3 mLs by nebulization every 6 (six) hours as needed for Wheezing or Shortness of Breath. Rescue    amLODIPine (NORVASC) 10 MG tablet Take 1 tablet (10 mg total) by mouth once daily.    atorvastatin (LIPITOR) 80 MG tablet Take 1 tablet (80 mg total) by mouth every evening.    b complex vitamins tablet Take 1 tablet by mouth once daily.    calcium citrate-vitamin D3 315-200 mg (CITRACAL+D) 315-200 mg-unit per tablet Take 1 tablet by mouth once daily.     chlorthalidone (HYGROTEN) 25 MG Tab Take 0.5 tablets (12.5 mg total) by mouth once daily.    cyanocobalamin (VITAMIN B-12) 1000 MCG tablet Take 1 tablet (1,000 mcg total) by mouth once daily.    EScitalopram oxalate (LEXAPRO) 10 MG tablet Take 1 tablet (10 mg total) by mouth once daily.    ferrous sulfate 325 (65 FE) MG EC tablet Take 1 tablet (325 mg total) by mouth once daily.    fluticasone propionate (FLONASE) 50 mcg/actuation nasal spray 2 sprays (100 mcg total) by Each Nostril route once daily.    fluticasone propionate (FLOVENT HFA) 110 mcg/actuation inhaler Inhale 1 puff into the lungs 2 (two) times daily. Controller. Rinse mouth with water and spit out after use.    hydrALAZINE (APRESOLINE) 50 MG  tablet Take 1 tablet (50 mg total) by mouth every 8 (eight) hours.    HYDROcodone-acetaminophen (NORCO) 5-325 mg per tablet Take 1 tablet by mouth every 4 (four) hours as needed for Pain.    isosorbide dinitrate (ISORDIL) 5 MG Tab TAKE 1 TABLET(5 MG) BY MOUTH THREE TIMES DAILY    LIDOcaine (LIDODERM) 5 % 1 patch remove after 12 hours    meclizine (ANTIVERT) 25 mg tablet Take 1 tablet (25 mg total) by mouth 3 (three) times daily as needed for Dizziness.    metOLazone (ZAROXOLYN) 2.5 MG tablet Take 1 tablet (2.5 mg total) by mouth once daily.    metoprolol succinate (TOPROL-XL) 25 MG 24 hr tablet TAKE 2 TABLETS(50 MG) BY MOUTH EVERY DAY    montelukast (SINGULAIR) 10 mg tablet Take 1 tablet (10 mg total) by mouth every evening.    multivitamin (THERAGRAN) per tablet Take 1 tablet by mouth once daily.    nystatin (MYCOSTATIN) powder Apply topically 2 (two) times daily.    traMADoL (ULTRAM) 50 mg tablet Take 1 tablet by mouth every 4 to 6 hours as needed.    traZODone (DESYREL) 50 MG tablet 1 tab po qhs prn for insomnia.    valsartan (DIOVAN) 80 MG tablet Take 1 tablet (80 mg total) by mouth once daily.    clopidogreL (PLAVIX) 75 mg tablet Take 1 tablet (75 mg total) by mouth once daily.    nitrofurantoin, macrocrystal-monohydrate, (MACROBID) 100 MG capsule Take 1 capsule (100 mg total) by mouth 2 (two) times daily.    pantoprazole (PROTONIX) 40 MG tablet Take 1 tablet (40 mg total) by mouth once daily.    spironolactone (ALDACTONE) 25 MG tablet Take 1 tablet (25 mg total) by mouth once daily.    sucralfate (CARAFATE) 1 gram tablet TAKE 1 TABLET(1 GRAM) BY MOUTH FOUR TIMES DAILY    tramadol-acetaminophen 37.5-325 mg (ULTRACET) 37.5-325 mg Tab Take 1 tablet by mouth every 4 (four) hours.    tramadol-acetaminophen 37.5-325 mg (ULTRACET) 37.5-325 mg Tab Take 2 tablets by mouth every 6 (six) hours.     Family History       Problem Relation (Age of Onset)    Arthritis Mother    Asthma Son    Breast cancer  Mother    Cancer Mother    Diabetes Mother    Heart disease Father    Hyperlipidemia Mother    Hypertension Mother, Father, Sister, Sister    Stroke Mother          Tobacco Use    Smoking status: Never    Smokeless tobacco: Never   Substance and Sexual Activity    Alcohol use: Yes     Comment: rare, about once a month    Drug use: No    Sexual activity: Yes     Partners: Male     Review of Systems   Constitutional:  Positive for fatigue.   HENT: Negative.     Eyes: Negative.    Respiratory:  Positive for cough, chest tightness and shortness of breath.    Cardiovascular:  Positive for chest pain.   Gastrointestinal: Negative.    Endocrine: Negative.    Genitourinary: Negative.    Musculoskeletal: Negative.    Skin: Negative.    Allergic/Immunologic: Negative.    Neurological: Negative.    Hematological: Negative.    All other systems reviewed and are negative.  Objective:     Vital Signs (Most Recent):  Temp: 98.9 °F (37.2 °C) (10/16/22 1623)  Pulse: 105 (10/16/22 1901)  Resp: 18 (10/16/22 1811)  BP: (!) 208/89 (10/16/22 1901)  SpO2: 97 % (10/16/22 1901)   Vital Signs (24h Range):  Temp:  [98.9 °F (37.2 °C)] 98.9 °F (37.2 °C)  Pulse:  [] 105  Resp:  [18-20] 18  SpO2:  [95 %-100 %] 97 %  BP: (179-221)/() 208/89     Weight: 90.7 kg (200 lb)  Body mass index is 36.58 kg/m².    Physical Exam  Vitals and nursing note reviewed.   Constitutional:       Appearance: She is well-developed.   HENT:      Head: Normocephalic and atraumatic.      Right Ear: External ear normal.      Left Ear: External ear normal.      Nose: Nose normal.   Eyes:      Conjunctiva/sclera: Conjunctivae normal.      Pupils: Pupils are equal, round, and reactive to light.   Cardiovascular:      Rate and Rhythm: Normal rate and regular rhythm.      Heart sounds: Normal heart sounds.   Pulmonary:      Effort: Pulmonary effort is normal.      Breath sounds: Normal breath sounds.      Comments: Decreased entry without adventitious  sounds  Abdominal:      General: Bowel sounds are normal.      Palpations: Abdomen is soft.   Musculoskeletal:         General: Normal range of motion.      Cervical back: Normal range of motion and neck supple.   Skin:     General: Skin is warm and dry.      Capillary Refill: Capillary refill takes less than 2 seconds.   Neurological:      Mental Status: She is alert and oriented to person, place, and time.      Comments: Left 4/5 prox to distal   Psychiatric:         Behavior: Behavior normal.         Thought Content: Thought content normal.         Judgment: Judgment normal.         CRANIAL NERVES     CN III, IV, VI   Pupils are equal, round, and reactive to light.     Significant Labs: All pertinent labs within the past 24 hours have been reviewed.  CBC:   Recent Labs   Lab 10/16/22  1740   WBC 7.14   HGB 12.0   HCT 37.3        CMP:   Recent Labs   Lab 10/16/22  1740      K 4.6      CO2 23   GLU 94   BUN 41*   CREATININE 2.9*   CALCIUM 9.5   PROT 8.0   ALBUMIN 3.7   BILITOT 0.5   ALKPHOS 61   AST 15   ALT 10   ANIONGAP 10     Cardiac Markers:   Recent Labs   Lab 10/16/22  1740   BNP 53     Troponin:   Recent Labs   Lab 10/16/22  1740   TROPONINI 0.073*       Significant Imaging: I have reviewed all pertinent imaging results/findings within the past 24 hours.    Assessment/Plan:     * Hypertensive urgency  Patient has a current diagnosis of Hypertensive emergency with end organ damage evidenced by acute kidney injury which is uncontrolled.  Latest blood pressure and vitals reviewed-   Temp:  [98.9 °F (37.2 °C)]   Pulse:  []   Resp:  [18-21]   BP: (179-221)/()   SpO2:  [95 %-100 %] .   Patient currently on IV antihypertensives.   Home meds for hypertension were reviewed and noted below.   Hypertension Medications             amLODIPine (NORVASC) 10 MG tablet Take 1 tablet (10 mg total) by mouth once daily.    chlorthalidone (HYGROTEN) 25 MG Tab Take 0.5 tablets (12.5 mg total) by  mouth once daily.    hydrALAZINE (APRESOLINE) 50 MG tablet Take 1 tablet (50 mg total) by mouth every 8 (eight) hours.    isosorbide dinitrate (ISORDIL) 5 MG Tab TAKE 1 TABLET(5 MG) BY MOUTH THREE TIMES DAILY    metOLazone (ZAROXOLYN) 2.5 MG tablet Take 1 tablet (2.5 mg total) by mouth once daily.    metoprolol succinate (TOPROL-XL) 25 MG 24 hr tablet TAKE 2 TABLETS(50 MG) BY MOUTH EVERY DAY    spironolactone (ALDACTONE) 25 MG tablet Take 1 tablet (25 mg total) by mouth once daily.    valsartan (DIOVAN) 80 MG tablet Take 1 tablet (80 mg total) by mouth once daily.          Medication adjustment for hospital antihypertensives is as follows- hydralazine    Will aim for controlled BP reduction by medications noted above. Monitor and mitigate end organ damage as indicated    Inpatient for Hypertensive with end organ (renal) injury, gentle hydration with LR; continue current regimen (holding diuretics and ARB) add iv hydralazine prn--if that is not sufficient, will change to clonidine prn; Nephrology and Cardiology consultations. Serial markers. ECHO. TSH with AM labs for review.    MAKENNA (acute kidney injury)  Patient with acute kidney injury likely due to uncontrolled hypertension MAKENNA is currently worsening. Labs reviewed- Renal function/electrolytes with Estimated Creatinine Clearance: 22.1 mL/min (A) (based on SCr of 2.9 mg/dL (H)). according to latest data. Monitor urine output and serial BMP and adjust therapy as needed. Avoid nephrotoxins and renally dose meds for GFR listed above.     Inpatient for Hypertensive with end organ (renal) injury, gentle hydration with LR; continue current regimen (holding diuretics and ARB) add iv hydralazine prn--if that is not sufficient, will change to clonidine prn; Nephrology and Cardiology consultations. Serial markers. ECHO. TSH with AM labs for review.        VTE Risk Mitigation (From admission, onward)    None             Ralph Ford MD  Department of Hospital Medicine    [facility] [place] - Emergency Dept

## 2022-10-17 NOTE — CONSULTS
"Nephrology Consult Note        Patient Name: Faustina Rae  MRN: [id number]    Patient Class: IP- Inpatient   Admission Date: 10/16/2022  Length of Stay: 1 days  Date of Service: 10/17/2022    Attending Physician: Garrett Leonard, *  Primary Care Provider: Primary Doctor No    Reason for Consult: chest pain/anemia/htn emergency/daron/ckd3/    SUBJECTIVE:     HPI: 57AAF with CVA, HTN, CAD, HLD, Anxiety, COPD, DAVID, Morbid Obesity presented to ED complaining of 1 day history central chest pain with rads towards neck, 5/10, waxing and waning. Pain free since presentation to ED, and dry hacking cough with post nasal drip for the past two to three days. Has chronic 3 pillow orthopnea and pedal edema, but no PND. Has DAVID, but does not have CPAP with her. Her BP has been 220's/110's since presentation here. Has HA and feels "dizzy", but no vision changes. No new LOP (has residual left sided weakness from prior CVA). Patient on multiple diuretics and ARB.     In ED: normal CBC, normal electrolytes, elevated sCr (was stage 3b May 2022); BNP normal troponin mildly elevated. Influenza and COVID negative. CXR reviewed: NAPD. EKG reviewed: sinus without acute segments.     Admitted for hypertensive emergency with end organ (renal) injury, gentle hydration, holding diuretics and ARB, Nephrology and Cardiology consultations. Serial markers. ECHO.    Past Medical History:   Diagnosis Date    Anxiety     Arthritis     Asthma     CHF (congestive heart failure)     COPD (chronic obstructive pulmonary disease)     Hyperlipemia     Hypertension     Leaky heart valve     Obese     Obese     Obstructive sleep apnea     lost her CPAP    Pneumonia     Rheumatoid arthritis(714.0)     Stroke      Past Surgical History:   Procedure Laterality Date    CAROTID STENT      3 years ago    CHOLECYSTECTOMY      HYSTERECTOMY      SLEEVE GASTROPLASTY  02/21/2017     Family History   Problem Relation Age of Onset    Arthritis Mother     Cancer " Mother     Diabetes Mother     Hyperlipidemia Mother     Hypertension Mother     Stroke Mother     Breast cancer Mother     Heart disease Father     Hypertension Father     Asthma Son     Hypertension Sister     Hypertension Sister     Kidney disease Neg Hx      Social History     Tobacco Use    Smoking status: Never    Smokeless tobacco: Never   Substance Use Topics    Alcohol use: Yes     Comment: rare, about once a month    Drug use: No       Review of patient's allergies indicates:  No Known Allergies    Outpatient meds:  No current facility-administered medications on file prior to encounter.     Current Outpatient Medications on File Prior to Encounter   Medication Sig Dispense Refill    albuterol (PROVENTIL/VENTOLIN HFA) 90 mcg/actuation inhaler INHALE 2 PUFFS INTO THE LUNGS FOUR TIMES DAILY 8.5 g 0    albuterol-ipratropium (DUO-NEB) 2.5 mg-0.5 mg/3 mL nebulizer solution Take 3 mLs by nebulization every 6 (six) hours as needed for Wheezing or Shortness of Breath. Rescue 75 mL 0    amLODIPine (NORVASC) 10 MG tablet Take 1 tablet (10 mg total) by mouth once daily. 30 tablet 11    atorvastatin (LIPITOR) 80 MG tablet Take 1 tablet (80 mg total) by mouth every evening. 90 tablet 1    b complex vitamins tablet Take 1 tablet by mouth once daily.      calcium citrate-vitamin D3 315-200 mg (CITRACAL+D) 315-200 mg-unit per tablet Take 1 tablet by mouth once daily.       chlorthalidone (HYGROTEN) 25 MG Tab Take 0.5 tablets (12.5 mg total) by mouth once daily. 45 tablet 1    cyanocobalamin (VITAMIN B-12) 1000 MCG tablet Take 1 tablet (1,000 mcg total) by mouth once daily. 90 tablet 3    EScitalopram oxalate (LEXAPRO) 10 MG tablet Take 1 tablet (10 mg total) by mouth once daily. 90 tablet 1    ferrous sulfate 325 (65 FE) MG EC tablet Take 1 tablet (325 mg total) by mouth once daily. 90 tablet 3    fluticasone propionate (FLONASE) 50 mcg/actuation nasal spray 2 sprays (100 mcg total) by Each Nostril route once daily. 16 g  11    fluticasone propionate (FLOVENT HFA) 110 mcg/actuation inhaler Inhale 1 puff into the lungs 2 (two) times daily. Controller. Rinse mouth with water and spit out after use. 12 g 11    hydrALAZINE (APRESOLINE) 50 MG tablet Take 1 tablet (50 mg total) by mouth every 8 (eight) hours. 270 tablet 3    HYDROcodone-acetaminophen (NORCO) 5-325 mg per tablet Take 1 tablet by mouth every 4 (four) hours as needed for Pain. 18 tablet 0    isosorbide dinitrate (ISORDIL) 5 MG Tab TAKE 1 TABLET(5 MG) BY MOUTH THREE TIMES DAILY 90 tablet 0    meclizine (ANTIVERT) 25 mg tablet Take 1 tablet (25 mg total) by mouth 3 (three) times daily as needed for Dizziness. 20 tablet 0    metOLazone (ZAROXOLYN) 2.5 MG tablet Take 1 tablet (2.5 mg total) by mouth once daily. 30 tablet 11    metoprolol succinate (TOPROL-XL) 25 MG 24 hr tablet TAKE 2 TABLETS(50 MG) BY MOUTH EVERY  tablet 0    montelukast (SINGULAIR) 10 mg tablet Take 1 tablet (10 mg total) by mouth every evening. 90 tablet 3    multivitamin (THERAGRAN) per tablet Take 1 tablet by mouth once daily. 90 tablet 3    nystatin (MYCOSTATIN) powder Apply topically 2 (two) times daily. 60 g 2    traZODone (DESYREL) 50 MG tablet 1 tab po qhs prn for insomnia. 90 tablet 1    valsartan (DIOVAN) 80 MG tablet Take 1 tablet (80 mg total) by mouth once daily. 90 tablet 3    clopidogreL (PLAVIX) 75 mg tablet Take 1 tablet (75 mg total) by mouth once daily. 30 tablet 11    pantoprazole (PROTONIX) 40 MG tablet Take 1 tablet (40 mg total) by mouth once daily. 90 tablet 1    spironolactone (ALDACTONE) 25 MG tablet Take 1 tablet (25 mg total) by mouth once daily. 90 tablet 1    tramadol-acetaminophen 37.5-325 mg (ULTRACET) 37.5-325 mg Tab Take 1 tablet by mouth every 4 (four) hours. 20 tablet 0       Scheduled meds:   amLODIPine  10 mg Oral Daily    atorvastatin  80 mg Oral QHS    budesonide  0.5 mg Nebulization Q12H    calcium-vitamin D3  1 tablet Oral Daily    cyanocobalamin  1,000 mcg Oral  Daily    enoxaparin  30 mg Subcutaneous Daily    EScitalopram oxalate  10 mg Oral Daily    ferrous sulfate  1 tablet Oral Daily    fluticasone propionate  2 spray Each Nostril Daily    hydrALAZINE  50 mg Oral Q8H    isosorbide dinitrate  5 mg Oral TID    metoprolol succinate  50 mg Oral Daily    montelukast  10 mg Oral QHS    multivitamin  1 tablet Oral Daily    pantoprazole  40 mg Oral Before breakfast       Infusions:   lactated ringers 100 mL/hr at 10/17/22 0612       PRN meds:  acetaminophen, albuterol-ipratropium, cloNIDine, dextromethorphan-guaiFENesin  mg/5 ml, HYDROcodone-acetaminophen, lorazepam, magnesium oxide, magnesium oxide, melatonin, potassium bicarbonate, potassium bicarbonate, potassium bicarbonate, potassium, sodium phosphates, potassium, sodium phosphates, potassium, sodium phosphates, traZODone    Review of Systems:  Review of Systems   Constitutional:  Negative for chills, fever, malaise/fatigue and weight loss.   HENT:  Negative for hearing loss and nosebleeds.    Eyes:  Negative for blurred vision, double vision and photophobia.   Respiratory:  Negative for cough, shortness of breath and wheezing.    Cardiovascular:  Negative for chest pain, palpitations and leg swelling.   Gastrointestinal:  Negative for abdominal pain, constipation, diarrhea, heartburn, nausea and vomiting.   Genitourinary:  Negative for dysuria, frequency and urgency.   Musculoskeletal:  Negative for falls, joint pain and myalgias.   Skin:  Negative for itching and rash.   Neurological:  Negative for dizziness, speech change, focal weakness, loss of consciousness and headaches.   Endo/Heme/Allergies:  Does not bruise/bleed easily.   Psychiatric/Behavioral:  Negative for depression and substance abuse. The patient is not nervous/anxious.      OBJECTIVE:     Vital Signs and IO:  Temp:  [97.9 °F (36.6 °C)-98.9 °F (37.2 °C)]   Pulse:  []   Resp:  [12-27]   BP: (127-223)/()   SpO2:  [93 %-100 %]   I/O last 3  completed shifts:  In: 600 [I.V.:600]  Out: 300 [Urine:300]  Wt Readings from Last 5 Encounters:   10/17/22 88.5 kg (195 lb 1.7 oz)   10/03/22 103.9 kg (229 lb 2.7 oz)   04/18/22 113.4 kg (250 lb)   03/31/22 107.1 kg (236 lb 1.8 oz)   03/24/22 107.7 kg (237 lb 7 oz)     Body mass index is 35.69 kg/m².    Physical Exam  Constitutional:       General: She is not in acute distress.     Appearance: She is well-developed. She is not diaphoretic.   HENT:      Head: Normocephalic and atraumatic.      Mouth/Throat:      Mouth: Mucous membranes are moist.   Eyes:      General: No scleral icterus.     Pupils: Pupils are equal, round, and reactive to light.   Cardiovascular:      Rate and Rhythm: Normal rate and regular rhythm.   Pulmonary:      Effort: Pulmonary effort is normal. No respiratory distress.      Breath sounds: No stridor.   Abdominal:      General: There is no distension.      Palpations: Abdomen is soft.   Musculoskeletal:         General: No deformity. Normal range of motion.      Cervical back: Neck supple.   Skin:     General: Skin is warm and dry.      Findings: No erythema or rash.   Neurological:      Mental Status: She is alert and oriented to person, place, and time.      Cranial Nerves: No cranial nerve deficit.   Psychiatric:         Behavior: Behavior normal.       Laboratory:  Recent Labs   Lab 10/16/22  1740 10/17/22  0514    138   K 4.6 4.6    107   CO2 23 24   BUN 41* 40*   CREATININE 2.9* 2.9*   GLU 94 102       Recent Labs   Lab 10/16/22  1740 10/17/22  0514   CALCIUM 9.5 8.8   ALBUMIN 3.7  --              No results for input(s): POCTGLUCOSE in the last 168 hours.    Recent Labs   Lab 01/12/22  0945 05/09/22  0937   Hemoglobin A1C 5.4 5.2       Recent Labs   Lab 10/16/22  1740 10/17/22  0514   WBC 7.14 5.83   HGB 12.0 10.2*   HCT 37.3 31.6*    241   MCV 91 91   MCHC 32.2 32.3   MONO 8.1  0.6 9.4  0.6       Recent Labs   Lab 10/16/22  1740   BILITOT 0.5   PROT 8.0   ALBUMIN  3.7   ALKPHOS 61   ALT 10   AST 15       Recent Labs   Lab 05/13/20  0934 09/14/20  1200 04/01/22  1644   Color, UA yellow Yellow Yellow   Appearance, UA  --   --  Clear   pH, UA 5 5 6.0   Specific Gravity, UA  --   --  1.025   Spec Grav UA 1.025 1.020  --    Protein, UA  --   --  1+ A   Glucose, UA  --   --  Negative   Ketones, UA neg NEG Negative   Urobilinogen, UA normal NORMAL 1.0   Bilirubin (UA)  --   --  Negative   Occult Blood UA  --   --  Negative   Nitrite, UA positive NEG Positive A   RBC, UA  --   --  0   WBC, UA  --   --  35 H   Bacteria  --   --  Many A   Hyaline Casts, UA  --   --  0             Microbiology Results (last 7 days)       ** No results found for the last 168 hours. **            ASSESSMENT/PLAN:     MAKENNA  CKD stage 3  Hypertensive emergency with chest pain and MAKENNA  No NSAIDs, ACEI/ARB, IV contrast or other nephrotoxins.  Dose meds for GFR < 30 ml/min.  Renal diet - low K, low phos.  Check renal US and UA.  Treat hypertension. Tolerate asymptomatic HTN up to -160.  Low sodium diet.  Stop IVF.  Agree with cards eval and echo.    Anemia of CKD  Hgb and HCT are acceptable. Monitor for now.    Thank you for allowing us to participate in the care of your patient!   We will follow the patient and provide recommendations as needed.    Patient care time was spent personally by me on the following activities:     Obtaining a history.  Examination of patient.  Providing medical care at the patients bedside.  Developing a treatment plan with patient or surrogate and bedside caregivers.  Ordering and reviewing laboratory studies, radiographic studies, pulse oximetry.  Ordering and performing treatments and interventions.  Evaluation of patient's response to treatment.  Discussions with consultants while on the unit and immediately available to the patient.  Re-evaluation of the patient's condition.  Documentation in the medical record.     Kory Kaplan MD    [facility]  [address] [address]    [phone] - tel  [phone] - fax    10/17/2022

## 2022-10-17 NOTE — PROGRESS NOTES
Pharmacist Renal Dose Adjustment Note    Faustina Rae is a 57 y.o. female being treated with the medication Lovenox    Patient Data:    Vital Signs (Most Recent):  Temp: 98.9 °F (37.2 °C) (10/16/22 1623)  Pulse: 88 (10/16/22 2200)  Resp: 18 (10/16/22 2215)  BP: 132/64 (10/16/22 2200)  SpO2: (!) 94 % (10/16/22 2200)   Vital Signs (72h Range):  Temp:  [98.9 °F (37.2 °C)]   Pulse:  []   Resp:  [18-21]   BP: (132-223)/()   SpO2:  [94 %-100 %]      Recent Labs   Lab 10/16/22  1740   CREATININE 2.9*     Serum creatinine: 2.9 mg/dL (H) 10/16/22 1740  Estimated creatinine clearance: 22.1 mL/min (A)    Medication:Lovenox dose: 40 mg frequency Q24H will be changed to medication:Lovenox dose:30 mg frequency:Q24H    Pharmacist's Name: Manuel Orr  Pharmacist's Extension: 2019

## 2022-10-17 NOTE — PROGRESS NOTES
[facility] Medicine  Progress Note    Patient name: Faustina Rae  MRN: [id number]  Admit Date: 10/16/2022   LOS: 1 day     SUBJECTIVE:     Principal problem: Hypertensive urgency    Interval History:  Patient feels fine, pain free.  Sbp 160.  No chest pain or shortness of breath.  No infusion for bp control needed.  Nephro consulted for MAKENNA on ?CKD.  Ok to step down from ICU    Scheduled Meds:   amLODIPine  10 mg Oral Daily    atorvastatin  80 mg Oral QHS    budesonide  0.5 mg Nebulization Q12H    calcium-vitamin D3  1 tablet Oral Daily    chlorhexidine  15 mL Mouth/Throat BID    cyanocobalamin  1,000 mcg Oral Daily    enoxaparin  30 mg Subcutaneous Daily    EScitalopram oxalate  10 mg Oral Daily    ferrous sulfate  1 tablet Oral Daily    fluticasone propionate  2 spray Each Nostril Daily    hydrALAZINE  50 mg Oral Q8H    isosorbide dinitrate  5 mg Oral TID    metoprolol succinate  50 mg Oral Daily    montelukast  10 mg Oral QHS    multivitamin  1 tablet Oral Daily    mupirocin   Nasal BID    pantoprazole  40 mg Oral Before breakfast     Continuous Infusions:  PRN Meds:acetaminophen, albuterol-ipratropium, cloNIDine, dextromethorphan-guaiFENesin  mg/5 ml, hydrALAZINE, HYDROcodone-acetaminophen, lorazepam, magnesium oxide, magnesium oxide, melatonin, potassium bicarbonate, potassium bicarbonate, potassium bicarbonate, potassium, sodium phosphates, potassium, sodium phosphates, potassium, sodium phosphates, traZODone    Review of patient's allergies indicates:  No Known Allergies    Review of Systems: As per interval history    OBJECTIVE:     Vital Signs (Most Recent)  Temp: 98.2 °F (36.8 °C) (10/17/22 1101)  Pulse: 74 (10/17/22 1301)  Resp: (!) 24 (10/17/22 1301)  BP: (!) 184/86 (10/17/22 1301)  SpO2: 96 % (10/17/22 1301)    Vital Signs Range (Last 24H):  Temp:  [97.9 °F (36.6 °C)-98.9 °F (37.2 °C)]   Pulse:  []   Resp:  [12-30]   BP: (127-223)/()   SpO2:  [93 %-100 %]      I & O (Last 24H):  Intake/Output Summary (Last 24 hours) at 10/17/2022 1401  Last data filed at 10/17/2022 1359  Gross per 24 hour   Intake 1620 ml   Output 1100 ml   Net 520 ml       Physical Exam:  General: Patient resting comfortably in no acute distress. Appears as stated age. Calm  Eyes: No conjunctival injection. No scleral icterus.  ENT: Hearing grossly intact. No discharge from ears. No nasal discharge.   Neck: Supple, trachea midline. No JVD  CVS: RRR. No LE edema BL  Lungs:  No tachypnea or accessory muscle use.  Clear to auscultation bilaterally  Abdomen:  Soft, nontender and nondistended.  No organomegaly  Neuro: AOx3. Moves all extremities. Follows commands. Responds appropriately   Skin:  No rash or erythema noted    Laboratory:  I have reviewed all pertinent lab results within the past 24 hours.  CBC:   Recent Labs   Lab 10/17/22  0514   WBC 5.83   RBC 3.46*   HGB 10.2*   HCT 31.6*      MCV 91   MCH 29.5   MCHC 32.3     CMP:   Recent Labs   Lab 10/16/22  1740 10/17/22  0514   GLU 94 102   CALCIUM 9.5 8.8   ALBUMIN 3.7  --    PROT 8.0  --     138   K 4.6 4.6   CO2 23 24    107   BUN 41* 40*   CREATININE 2.9* 2.9*   ALKPHOS 61  --    ALT 10  --    AST 15  --    BILITOT 0.5  --      Cardiac markers:   Recent Labs   Lab 10/17/22  0514   TROPONINI 0.042*       Diagnostic Results:  Labs: Reviewed  ECG: Reviewed  X-Ray: Reviewed      ASSESSMENT/PLAN:     57 year old female with past history of CVA, HTN, CAD, HLD, Anxiety, COPD, DAVID, Morbid Obesity presented to ED with complaint of cough for one week.  She was noted to have chest pain in ER, on exam she has no chest pain except with cough. Patient was hypertensive to 200/100 and found to have MAKENNA/CKD although patient is unaware of a CKD diagnosis.      Active Hospital Problems    Diagnosis  POA    *Hypertensive urgency [I16.0]  Yes    MAKENNA (acute kidney injury) [N17.9]  Yes      Resolved Hospital Problems   No resolved problems to  display.         Plan:     Hypertensive urgency  Patient has a current diagnosis of Hypertensive emergency with end organ damage evidenced by acute kidney injury which is uncontrolled.   Received IV prn antihypertensives in ER  Restart home meds, hold ace-I/ARBs and diuretics   Currently /70s  Hydralazine prn  Echo  Cardiology consulted        MAKENNA (acute kidney injury)  Patient with acute kidney injury likely due to uncontrolled hypertension MAKENNA is currently worsening. Labs reviewed- Renal function/electrolytes with Estimated Creatinine Clearance: 22.1 mL/min (A) (based on SCr of 2.9 mg/dL (H)). according to latest data. Monitor urine output and serial BMP and adjust therapy as needed. Avoid nephrotoxins and renally dose meds for GFR listed above.        On review of EMR, creat baseline was 1.5 four years ago       Creat now 2.9, and was 2.8 two weeks ago  Nephrology consult.      VTE Risk Mitigation (From admission, onward)           Ordered     enoxaparin injection 30 mg  Daily         10/16/22 2332     IP VTE HIGH RISK PATIENT  Once         10/16/22 2328     Place sequential compression device  Until discontinued         10/16/22 2328                        Department Hospital Medicine  [facility]  Garrett Leonard MD  Date of service: 10/17/2022

## 2022-10-17 NOTE — NURSING
Patient transferred to [facility] by RN. Report given over phone to ERVIN Bartlett. All new updates on patient given to next nurse at bedside. All VSS upon departure, all due meds given in ICU before leaving. No acute events today.

## 2022-10-17 NOTE — CARE UPDATE
Patient reported that she have a new doctor with [facility] she's never seen the doctor and can't recall the doc name.

## 2022-10-17 NOTE — SUBJECTIVE & OBJECTIVE
Past Medical History:   Diagnosis Date    Anxiety     Arthritis     Asthma     CHF (congestive heart failure)     COPD (chronic obstructive pulmonary disease)     Hyperlipemia     Hypertension     Leaky heart valve     Obese     Obese     Obstructive sleep apnea     lost her CPAP    Pneumonia     Rheumatoid arthritis(714.0)     Stroke        Past Surgical History:   Procedure Laterality Date    CAROTID STENT      3 years ago    CHOLECYSTECTOMY      HYSTERECTOMY      SLEEVE GASTROPLASTY  02/21/2017       Review of patient's allergies indicates:  No Known Allergies    No current facility-administered medications on file prior to encounter.     Current Outpatient Medications on File Prior to Encounter   Medication Sig    albuterol (PROVENTIL/VENTOLIN HFA) 90 mcg/actuation inhaler INHALE 2 PUFFS INTO THE LUNGS FOUR TIMES DAILY    albuterol-ipratropium (DUO-NEB) 2.5 mg-0.5 mg/3 mL nebulizer solution Take 3 mLs by nebulization every 6 (six) hours as needed for Wheezing or Shortness of Breath. Rescue    amLODIPine (NORVASC) 10 MG tablet Take 1 tablet (10 mg total) by mouth once daily.    atorvastatin (LIPITOR) 80 MG tablet Take 1 tablet (80 mg total) by mouth every evening.    b complex vitamins tablet Take 1 tablet by mouth once daily.    calcium citrate-vitamin D3 315-200 mg (CITRACAL+D) 315-200 mg-unit per tablet Take 1 tablet by mouth once daily.     chlorthalidone (HYGROTEN) 25 MG Tab Take 0.5 tablets (12.5 mg total) by mouth once daily.    cyanocobalamin (VITAMIN B-12) 1000 MCG tablet Take 1 tablet (1,000 mcg total) by mouth once daily.    EScitalopram oxalate (LEXAPRO) 10 MG tablet Take 1 tablet (10 mg total) by mouth once daily.    ferrous sulfate 325 (65 FE) MG EC tablet Take 1 tablet (325 mg total) by mouth once daily.    fluticasone propionate (FLONASE) 50 mcg/actuation nasal spray 2 sprays (100 mcg total) by Each Nostril route once daily.    fluticasone propionate (FLOVENT HFA) 110 mcg/actuation inhaler Inhale  1 puff into the lungs 2 (two) times daily. Controller. Rinse mouth with water and spit out after use.    hydrALAZINE (APRESOLINE) 50 MG tablet Take 1 tablet (50 mg total) by mouth every 8 (eight) hours.    HYDROcodone-acetaminophen (NORCO) 5-325 mg per tablet Take 1 tablet by mouth every 4 (four) hours as needed for Pain.    isosorbide dinitrate (ISORDIL) 5 MG Tab TAKE 1 TABLET(5 MG) BY MOUTH THREE TIMES DAILY    LIDOcaine (LIDODERM) 5 % 1 patch remove after 12 hours    meclizine (ANTIVERT) 25 mg tablet Take 1 tablet (25 mg total) by mouth 3 (three) times daily as needed for Dizziness.    metOLazone (ZAROXOLYN) 2.5 MG tablet Take 1 tablet (2.5 mg total) by mouth once daily.    metoprolol succinate (TOPROL-XL) 25 MG 24 hr tablet TAKE 2 TABLETS(50 MG) BY MOUTH EVERY DAY    montelukast (SINGULAIR) 10 mg tablet Take 1 tablet (10 mg total) by mouth every evening.    multivitamin (THERAGRAN) per tablet Take 1 tablet by mouth once daily.    nystatin (MYCOSTATIN) powder Apply topically 2 (two) times daily.    traMADoL (ULTRAM) 50 mg tablet Take 1 tablet by mouth every 4 to 6 hours as needed.    traZODone (DESYREL) 50 MG tablet 1 tab po qhs prn for insomnia.    valsartan (DIOVAN) 80 MG tablet Take 1 tablet (80 mg total) by mouth once daily.    clopidogreL (PLAVIX) 75 mg tablet Take 1 tablet (75 mg total) by mouth once daily.    nitrofurantoin, macrocrystal-monohydrate, (MACROBID) 100 MG capsule Take 1 capsule (100 mg total) by mouth 2 (two) times daily.    pantoprazole (PROTONIX) 40 MG tablet Take 1 tablet (40 mg total) by mouth once daily.    spironolactone (ALDACTONE) 25 MG tablet Take 1 tablet (25 mg total) by mouth once daily.    sucralfate (CARAFATE) 1 gram tablet TAKE 1 TABLET(1 GRAM) BY MOUTH FOUR TIMES DAILY    tramadol-acetaminophen 37.5-325 mg (ULTRACET) 37.5-325 mg Tab Take 1 tablet by mouth every 4 (four) hours.    tramadol-acetaminophen 37.5-325 mg (ULTRACET) 37.5-325 mg Tab Take 2 tablets by mouth every 6 (six)  hours.     Family History       Problem Relation (Age of Onset)    Arthritis Mother    Asthma Son    Breast cancer Mother    Cancer Mother    Diabetes Mother    Heart disease Father    Hyperlipidemia Mother    Hypertension Mother, Father, Sister, Sister    Stroke Mother          Tobacco Use    Smoking status: Never    Smokeless tobacco: Never   Substance and Sexual Activity    Alcohol use: Yes     Comment: rare, about once a month    Drug use: No    Sexual activity: Yes     Partners: Male     Review of Systems   Constitutional:  Positive for fatigue.   HENT: Negative.     Eyes: Negative.    Respiratory:  Positive for cough, chest tightness and shortness of breath.    Cardiovascular:  Positive for chest pain.   Gastrointestinal: Negative.    Endocrine: Negative.    Genitourinary: Negative.    Musculoskeletal: Negative.    Skin: Negative.    Allergic/Immunologic: Negative.    Neurological: Negative.    Hematological: Negative.    All other systems reviewed and are negative.  Objective:     Vital Signs (Most Recent):  Temp: 98.9 °F (37.2 °C) (10/16/22 1623)  Pulse: 105 (10/16/22 1901)  Resp: 18 (10/16/22 1811)  BP: (!) 208/89 (10/16/22 1901)  SpO2: 97 % (10/16/22 1901)   Vital Signs (24h Range):  Temp:  [98.9 °F (37.2 °C)] 98.9 °F (37.2 °C)  Pulse:  [] 105  Resp:  [18-20] 18  SpO2:  [95 %-100 %] 97 %  BP: (179-221)/() 208/89     Weight: 90.7 kg (200 lb)  Body mass index is 36.58 kg/m².    Physical Exam  Vitals and nursing note reviewed.   Constitutional:       Appearance: She is well-developed.   HENT:      Head: Normocephalic and atraumatic.      Right Ear: External ear normal.      Left Ear: External ear normal.      Nose: Nose normal.   Eyes:      Conjunctiva/sclera: Conjunctivae normal.      Pupils: Pupils are equal, round, and reactive to light.   Cardiovascular:      Rate and Rhythm: Normal rate and regular rhythm.      Heart sounds: Normal heart sounds.   Pulmonary:      Effort: Pulmonary effort is  normal.      Breath sounds: Normal breath sounds.      Comments: Decreased entry without adventitious sounds  Abdominal:      General: Bowel sounds are normal.      Palpations: Abdomen is soft.   Musculoskeletal:         General: Normal range of motion.      Cervical back: Normal range of motion and neck supple.   Skin:     General: Skin is warm and dry.      Capillary Refill: Capillary refill takes less than 2 seconds.   Neurological:      Mental Status: She is alert and oriented to person, place, and time.      Comments: Left 4/5 prox to distal   Psychiatric:         Behavior: Behavior normal.         Thought Content: Thought content normal.         Judgment: Judgment normal.         CRANIAL NERVES     CN III, IV, VI   Pupils are equal, round, and reactive to light.     Significant Labs: All pertinent labs within the past 24 hours have been reviewed.  CBC:   Recent Labs   Lab 10/16/22  1740   WBC 7.14   HGB 12.0   HCT 37.3        CMP:   Recent Labs   Lab 10/16/22  1740      K 4.6      CO2 23   GLU 94   BUN 41*   CREATININE 2.9*   CALCIUM 9.5   PROT 8.0   ALBUMIN 3.7   BILITOT 0.5   ALKPHOS 61   AST 15   ALT 10   ANIONGAP 10     Cardiac Markers:   Recent Labs   Lab 10/16/22  1740   BNP 53     Troponin:   Recent Labs   Lab 10/16/22  1740   TROPONINI 0.073*       Significant Imaging: I have reviewed all pertinent imaging results/findings within the past 24 hours.

## 2022-10-17 NOTE — PROGRESS NOTES
Automatic Inhaler to Nebulizer Interchange    Fluticasone furoate (Arnuity Ellipta) 100 mcg changed to Budesonide 0.5 mg BID per [facility] Automatic Therapeutic Sustitutions Protocol.    Please contact pharmacy at extension [phone] with any questions.     Thank you,   Manuel Orr
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
## 2022-10-17 NOTE — ASSESSMENT & PLAN NOTE
Patient with acute kidney injury likely due to uncontrolled hypertension MAKENNA is currently worsening. Labs reviewed- Renal function/electrolytes with Estimated Creatinine Clearance: 22.1 mL/min (A) (based on SCr of 2.9 mg/dL (H)). according to latest data. Monitor urine output and serial BMP and adjust therapy as needed. Avoid nephrotoxins and renally dose meds for GFR listed above.     Inpatient for Hypertensive with end organ (renal) injury, gentle hydration with LR; continue current regimen (holding diuretics and ARB) add iv hydralazine prn--if that is not sufficient, will change to clonidine prn; Nephrology and Cardiology consultations. Serial markers. ECHO. TSH with AM labs for review.

## 2022-10-17 NOTE — NURSING
1430: received from ICU 3, report from Saumya MUELLER  1800: uneventful since coming to floor, family visited, no complaints or distress.

## 2022-10-17 NOTE — ASSESSMENT & PLAN NOTE
Patient has a current diagnosis of Hypertensive emergency with end organ damage evidenced by acute kidney injury which is uncontrolled.  Latest blood pressure and vitals reviewed-   Temp:  [98.9 °F (37.2 °C)]   Pulse:  []   Resp:  [18-21]   BP: (179-221)/()   SpO2:  [95 %-100 %] .   Patient currently on IV antihypertensives.   Home meds for hypertension were reviewed and noted below.   Hypertension Medications             amLODIPine (NORVASC) 10 MG tablet Take 1 tablet (10 mg total) by mouth once daily.    chlorthalidone (HYGROTEN) 25 MG Tab Take 0.5 tablets (12.5 mg total) by mouth once daily.    hydrALAZINE (APRESOLINE) 50 MG tablet Take 1 tablet (50 mg total) by mouth every 8 (eight) hours.    isosorbide dinitrate (ISORDIL) 5 MG Tab TAKE 1 TABLET(5 MG) BY MOUTH THREE TIMES DAILY    metOLazone (ZAROXOLYN) 2.5 MG tablet Take 1 tablet (2.5 mg total) by mouth once daily.    metoprolol succinate (TOPROL-XL) 25 MG 24 hr tablet TAKE 2 TABLETS(50 MG) BY MOUTH EVERY DAY    spironolactone (ALDACTONE) 25 MG tablet Take 1 tablet (25 mg total) by mouth once daily.    valsartan (DIOVAN) 80 MG tablet Take 1 tablet (80 mg total) by mouth once daily.          Medication adjustment for hospital antihypertensives is as follows- hydralazine    Will aim for controlled BP reduction by medications noted above. Monitor and mitigate end organ damage as indicated    Inpatient for Hypertensive with end organ (renal) injury, gentle hydration with LR; continue current regimen (holding diuretics and ARB) add iv hydralazine prn--if that is not sufficient, will change to clonidine prn; Nephrology and Cardiology consultations. Serial markers. ECHO. TSH with AM labs for review.

## 2022-10-17 NOTE — CARE UPDATE
10/17/22 0731   Patient Assessment/Suction   Level of Consciousness (AVPU) alert   Respiratory Effort Unlabored   Expansion/Accessory Muscles/Retractions expansion symmetric   All Lung Fields Breath Sounds Anterior:;coarse   Cough Frequency frequent   Cough Type nonproductive   PRE-TX-O2   O2 Device (Oxygen Therapy) room air   SpO2 98 %   Pulse 88   Resp (!) 22   Aerosol Therapy   $ Aerosol Therapy Charges Aerosol Treatment   Daily Review of Necessity (SVN) completed   Respiratory Treatment Status (SVN) given   Treatment Route (SVN) mask   Patient Position (SVN) HOB elevated   Post Treatment Assessment (SVN) breath sounds unchanged   Signs of Intolerance (SVN) none   Education   $ Education Bronchodilator;15 min   Respiratory Evaluation   $ Care Plan Tech Time 15 min   $ Eval/Re-eval Charges Evaluation

## 2022-10-18 VITALS
WEIGHT: 195.13 LBS | SYSTOLIC BLOOD PRESSURE: 126 MMHG | OXYGEN SATURATION: 98 % | HEART RATE: 68 BPM | RESPIRATION RATE: 18 BRPM | DIASTOLIC BLOOD PRESSURE: 71 MMHG | HEIGHT: 62 IN | TEMPERATURE: 98 F | BODY MASS INDEX: 35.91 KG/M2

## 2022-10-18 PROBLEM — N17.9 AKI (ACUTE KIDNEY INJURY): Status: RESOLVED | Noted: 2017-02-21 | Resolved: 2022-10-18

## 2022-10-18 LAB
ALBUMIN SERPL BCP-MCNC: 3.3 G/DL (ref 3.5–5.2)
ANION GAP SERPL CALC-SCNC: 7 MMOL/L (ref 8–16)
BASOPHILS # BLD AUTO: 0.02 K/UL (ref 0–0.2)
BASOPHILS NFR BLD: 0.3 % (ref 0–1.9)
BUN SERPL-MCNC: 44 MG/DL (ref 6–20)
CALCIUM SERPL-MCNC: 9.3 MG/DL (ref 8.7–10.5)
CHLORIDE SERPL-SCNC: 105 MMOL/L (ref 95–110)
CO2 SERPL-SCNC: 26 MMOL/L (ref 23–29)
CREAT SERPL-MCNC: 2.7 MG/DL (ref 0.5–1.4)
DIFFERENTIAL METHOD: ABNORMAL
EOSINOPHIL # BLD AUTO: 0.2 K/UL (ref 0–0.5)
EOSINOPHIL NFR BLD: 2.6 % (ref 0–8)
ERYTHROCYTE [DISTWIDTH] IN BLOOD BY AUTOMATED COUNT: 14.4 % (ref 11.5–14.5)
EST. GFR  (NO RACE VARIABLE): 19.8 ML/MIN/1.73 M^2
GLUCOSE SERPL-MCNC: 95 MG/DL (ref 70–110)
HCT VFR BLD AUTO: 37 % (ref 37–48.5)
HGB BLD-MCNC: 11.8 G/DL (ref 12–16)
IMM GRANULOCYTES # BLD AUTO: 0.02 K/UL (ref 0–0.04)
IMM GRANULOCYTES NFR BLD AUTO: 0.3 % (ref 0–0.5)
LYMPHOCYTES # BLD AUTO: 1.4 K/UL (ref 1–4.8)
LYMPHOCYTES NFR BLD: 22.1 % (ref 18–48)
MCH RBC QN AUTO: 29.4 PG (ref 27–31)
MCHC RBC AUTO-ENTMCNC: 31.9 G/DL (ref 32–36)
MCV RBC AUTO: 92 FL (ref 82–98)
MONOCYTES # BLD AUTO: 0.7 K/UL (ref 0.3–1)
MONOCYTES NFR BLD: 10.5 % (ref 4–15)
NEUTROPHILS # BLD AUTO: 4 K/UL (ref 1.8–7.7)
NEUTROPHILS NFR BLD: 64.2 % (ref 38–73)
NRBC BLD-RTO: 0 /100 WBC
PHOSPHATE SERPL-MCNC: 3.5 MG/DL (ref 2.7–4.5)
PLATELET # BLD AUTO: 262 K/UL (ref 150–450)
PMV BLD AUTO: 10 FL (ref 9.2–12.9)
POTASSIUM SERPL-SCNC: 4.9 MMOL/L (ref 3.5–5.1)
RBC # BLD AUTO: 4.02 M/UL (ref 4–5.4)
SODIUM SERPL-SCNC: 138 MMOL/L (ref 136–145)
WBC # BLD AUTO: 6.19 K/UL (ref 3.9–12.7)

## 2022-10-18 PROCEDURE — 94640 AIRWAY INHALATION TREATMENT: CPT

## 2022-10-18 PROCEDURE — 99232 PR SUBSEQUENT HOSPITAL CARE,LEVL II: ICD-10-PCS | Mod: ,,, | Performed by: INTERNAL MEDICINE

## 2022-10-18 PROCEDURE — 80069 RENAL FUNCTION PANEL: CPT | Performed by: INTERNAL MEDICINE

## 2022-10-18 PROCEDURE — 25000003 PHARM REV CODE 250

## 2022-10-18 PROCEDURE — 99900035 HC TECH TIME PER 15 MIN (STAT)

## 2022-10-18 PROCEDURE — 36415 COLL VENOUS BLD VENIPUNCTURE: CPT | Performed by: INTERNAL MEDICINE

## 2022-10-18 PROCEDURE — 99232 SBSQ HOSP IP/OBS MODERATE 35: CPT | Mod: ,,, | Performed by: INTERNAL MEDICINE

## 2022-10-18 PROCEDURE — 99900031 HC PATIENT EDUCATION (STAT)

## 2022-10-18 PROCEDURE — 25000242 PHARM REV CODE 250 ALT 637 W/ HCPCS: Performed by: INTERNAL MEDICINE

## 2022-10-18 PROCEDURE — 85025 COMPLETE CBC W/AUTO DIFF WBC: CPT | Performed by: INTERNAL MEDICINE

## 2022-10-18 PROCEDURE — 25000003 PHARM REV CODE 250: Performed by: INTERNAL MEDICINE

## 2022-10-18 PROCEDURE — 25000003 PHARM REV CODE 250: Performed by: STUDENT IN AN ORGANIZED HEALTH CARE EDUCATION/TRAINING PROGRAM

## 2022-10-18 PROCEDURE — 94761 N-INVAS EAR/PLS OXIMETRY MLT: CPT

## 2022-10-18 PROCEDURE — 94799 UNLISTED PULMONARY SVC/PX: CPT

## 2022-10-18 RX ORDER — AMLODIPINE BESYLATE 10 MG/1
10 TABLET ORAL DAILY
Qty: 90 TABLET | Refills: 0 | Status: SHIPPED | OUTPATIENT
Start: 2022-10-18 | End: 2023-03-06 | Stop reason: SDUPTHER

## 2022-10-18 RX ORDER — ATORVASTATIN CALCIUM 80 MG/1
80 TABLET, FILM COATED ORAL NIGHTLY
Qty: 90 TABLET | Refills: 0 | Status: SHIPPED | OUTPATIENT
Start: 2022-10-18 | End: 2023-03-06 | Stop reason: SDUPTHER

## 2022-10-18 RX ORDER — METOPROLOL SUCCINATE 25 MG/1
25 TABLET, EXTENDED RELEASE ORAL DAILY
Qty: 30 TABLET | Refills: 0 | Status: SHIPPED | OUTPATIENT
Start: 2022-10-18 | End: 2023-02-27 | Stop reason: SDUPTHER

## 2022-10-18 RX ORDER — ISOSORBIDE DINITRATE 5 MG/1
5 TABLET ORAL 3 TIMES DAILY
Qty: 90 TABLET | Refills: 0 | Status: SHIPPED | OUTPATIENT
Start: 2022-10-18 | End: 2023-02-27 | Stop reason: SDUPTHER

## 2022-10-18 RX ADMIN — GUAIFENESIN AND DEXTROMETHORPHAN 5 ML: 100; 10 SYRUP ORAL at 11:10

## 2022-10-18 RX ADMIN — METOPROLOL SUCCINATE 50 MG: 50 TABLET, FILM COATED, EXTENDED RELEASE ORAL at 09:10

## 2022-10-18 RX ADMIN — CYANOCOBALAMIN TAB 1000 MCG 1000 MCG: 1000 TAB at 09:10

## 2022-10-18 RX ADMIN — BUDESONIDE 0.5 MG: 0.5 INHALANT RESPIRATORY (INHALATION) at 07:10

## 2022-10-18 RX ADMIN — ESCITALOPRAM OXALATE 10 MG: 10 TABLET ORAL at 09:10

## 2022-10-18 RX ADMIN — THERA TABS 1 TABLET: TAB at 09:10

## 2022-10-18 RX ADMIN — CLONIDINE HYDROCHLORIDE 0.2 MG: 0.1 TABLET ORAL at 10:10

## 2022-10-18 RX ADMIN — HYDRALAZINE HYDROCHLORIDE 50 MG: 25 TABLET ORAL at 05:10

## 2022-10-18 RX ADMIN — FERROUS SULFATE TAB 325 MG (65 MG ELEMENTAL FE) 1 EACH: 325 (65 FE) TAB at 09:10

## 2022-10-18 RX ADMIN — CHLORHEXIDINE GLUCONATE 15 ML: 1.2 RINSE ORAL at 09:10

## 2022-10-18 RX ADMIN — AMLODIPINE BESYLATE 10 MG: 5 TABLET ORAL at 09:10

## 2022-10-18 RX ADMIN — CALCIUM CARBONATE-VITAMIN D TAB 500 MG-200 UNIT 1 TABLET: 500-200 TAB at 09:10

## 2022-10-18 RX ADMIN — ISOSORBIDE DINITRATE 5 MG: 5 TABLET ORAL at 10:10

## 2022-10-18 RX ADMIN — PANTOPRAZOLE SODIUM 40 MG: 40 TABLET, DELAYED RELEASE ORAL at 05:10

## 2022-10-18 RX ADMIN — FLUTICASONE PROPIONATE 100 MCG: 50 SPRAY, METERED NASAL at 09:10

## 2022-10-18 RX ADMIN — MUPIROCIN 1 G: 20 OINTMENT TOPICAL at 09:10

## 2022-10-18 NOTE — PROGRESS NOTES
"[facility]  Department of Cardiology  Progress Note    PATIENT NAME: Faustina Rae  MRN: [id number]  TODAY'S DATE: 10/18/2022  ADMIT DATE: 10/16/2022    SUBJECTIVE     PRINCIPLE PROBLEM: Hypertensive urgency    INTERVAL HISTORY:    10/18/2022  Pt sitting up in bed; states she is feeling great and wants to go home; Denies any recent chest pain, chest tightness, shortness of breath, extremities swelling; states her BP has been better overnight; has not had any headaches, dizziness.     HPI: 57 year old female with past history of CVA, HTN, CAD, HLD, Anxiety, COPD, DAVID, Morbid Obesity presented to ED complaining of 1 day history central chest aching pain with rads towards neck,5/10 waxing and waning own accord--pain free since presentation to ED, and dry hacking cough with post nasal drip for the past "Two to three days". Has chronic 3 pillow orthopnea and pedal edema, but no PND. Has DAVID, but does not have CPAP with her. Her BP has been 220's/110's since presentation here. Has HA and feels "Dizzy", but no vision changes. No new LOP (has residual left sided weakness from prior CVA). Patient on multiple diuretics and ARB     In ED: labs reviewed and noted below: normal CBC, normal electrolytes with stage 4 renal dysfunction (was stage 3b May 2022); BNP normal troponin mildly elevated. Influenza and COVID negative. CXR reviewed: NAPD. EKG reviewed: sinus without acute segments.        HPI:  Pt is 56 yo female with c/o chest "tightness" that lasted approximately 10 minutes yesterday. Pt states she has not had any further pain since this episode. Pt also states she has been coughing (nonproductive) and "my breath is short"; Endorses history of asthma; Pt denies loss of consciousness;      Pt has seen Dr Rosa in the past, has had stent 4 years ago.           Review of patient's allergies indicates:  No Known Allergies    REVIEW OF SYSTEMS  CARDIOVASCULAR: No recent chest pain, palpitations, arm, neck, or " jaw pain  RESPIRATORY: No recent fever, cough chills, SOB or congestion  : No blood in the urine  GI: No Nausea, vomiting, constipation, diarrhea, blood, or reflux.  MUSCULOSKELETAL: No myalgias  NEURO: No lightheadedness or dizziness  EYES: No  headache     OBJECTIVE     VITAL SIGNS (Most Recent)  Temp: 98.3 °F (36.8 °C) (10/18/22 0359)  Pulse: 63 (10/18/22 0705)  Resp: 18 (10/18/22 0705)  BP: 130/79 (10/18/22 0359)  SpO2: 100 % (10/18/22 0705)    VENTILATION STATUS  Resp: 18 (10/18/22 0705)  SpO2: 100 % (10/18/22 0705)       I & O (Last 24H):  Intake/Output Summary (Last 24 hours) at 10/18/2022 0827  Last data filed at 10/18/2022 0601  Gross per 24 hour   Intake 970 ml   Output 800 ml   Net 170 ml       WEIGHTS  Wt Readings from Last 3 Encounters:   10/17/22 0005 88.5 kg (195 lb 1.7 oz)   10/16/22 1623 90.7 kg (200 lb)   10/17/22 1050 88.5 kg (195 lb)   10/03/22 1108 103.9 kg (229 lb 2.7 oz)       PHYSICAL EXAM  CONSTITUTIONAL: Obese, middle aged female; Well built, well nourished in no apparent distress  NECK: no carotid bruit, no JVD  LUNGS: CTA  CHEST WALL: no tenderness  HEART: regular rate and rhythm, S1, S2 normal, no murmur, click, rub or gallop   ABDOMEN: soft, non-tender; bowel sounds normal; no masses,  no organomegaly  EXTREMITIES: Extremities normal, no edema  NEURO: AAO X 3  Telemetry: Sinus rhythm 59-60  SCHEDULED MEDS:   amLODIPine  10 mg Oral Daily    atorvastatin  80 mg Oral QHS    budesonide  0.5 mg Nebulization Q12H    calcium-vitamin D3  1 tablet Oral Daily    chlorhexidine  15 mL Mouth/Throat BID    cyanocobalamin  1,000 mcg Oral Daily    enoxaparin  30 mg Subcutaneous Daily    EScitalopram oxalate  10 mg Oral Daily    ferrous sulfate  1 tablet Oral Daily    fluticasone propionate  2 spray Each Nostril Daily    hydrALAZINE  50 mg Oral Q8H    isosorbide dinitrate  5 mg Oral TID    metoprolol succinate  50 mg Oral Daily    montelukast  10 mg Oral QHS    multivitamin  1 tablet Oral Daily     mupirocin   Nasal BID    pantoprazole  40 mg Oral Before breakfast       CONTINUOUS INFUSIONS:    PRN MEDS:acetaminophen, albuterol-ipratropium, cloNIDine, dextromethorphan-guaiFENesin  mg/5 ml, hydrALAZINE, HYDROcodone-acetaminophen, lorazepam, magnesium oxide, magnesium oxide, melatonin, potassium bicarbonate, potassium bicarbonate, potassium bicarbonate, potassium, sodium phosphates, potassium, sodium phosphates, potassium, sodium phosphates, traZODone    LABS AND DIAGNOSTICS     CBC LAST 3 DAYS  Recent Labs   Lab 10/16/22  1740 10/17/22  0514 10/18/22  0604   WBC 7.14 5.83 6.19   RBC 4.10 3.46* 4.02   HGB 12.0 10.2* 11.8*   HCT 37.3 31.6* 37.0   MCV 91 91 92   MCH 29.3 29.5 29.4   MCHC 32.2 32.3 31.9*   RDW 14.5 14.6* 14.4    241 262   MPV 9.8 10.0 10.0   GRAN 68.4  4.9 66.2  3.9 64.2  4.0   LYMPH 21.4  1.5 22.3  1.3 22.1  1.4   MONO 8.1  0.6 9.4  0.6 10.5  0.7   BASO 0.02 0.02 0.02   NRBC 0 0 0       COAGULATION LAST 3 DAYS  Recent Labs   Lab 10/16/22  1740   LABPT 12.7   INR 1.0       CHEMISTRY LAST 3 DAYS  Recent Labs   Lab 10/16/22  1740 10/17/22  0514 10/18/22  0604    138 138   K 4.6 4.6 4.9    107 105   CO2 23 24 26   ANIONGAP 10 7* 7*   BUN 41* 40* 44*   CREATININE 2.9* 2.9* 2.7*   GLU 94 102 95   CALCIUM 9.5 8.8 9.3   ALBUMIN 3.7  --  3.3*   PROT 8.0  --   --    ALKPHOS 61  --   --    ALT 10  --   --    AST 15  --   --    BILITOT 0.5  --   --        CARDIAC PROFILE LAST 3 DAYS  Recent Labs   Lab 10/16/22  1740 10/16/22  2344 10/17/22  0514   BNP 53  --   --    TROPONINI 0.073* 0.062* 0.042*       ENDOCRINE LAST 3 DAYS  No results for input(s): TSH, PROCAL in the last 168 hours.    LAST ARTERIAL BLOOD GAS  ABG  No results for input(s): PH, PO2, PCO2, HCO3, BE in the last 168 hours.    LAST 7 DAYS MICROBIOLOGY   Microbiology Results (last 7 days)       ** No results found for the last 168 hours. **            MOST RECENT IMAGING  Echo  · The left ventricle is normal in  size with moderate concentric   hypertrophy and normal systolic function.  · The estimated ejection fraction is 60%.  · Normal left ventricular diastolic function.  · Normal right ventricular size with normal right ventricular systolic   function.  · Mild left atrial enlargement.  · Eccentric Moderate mitral regurgitation.         [facility]  Results for orders placed during the hospital encounter of 10/16/22    Echo    Interpretation Summary  · The left ventricle is normal in size with moderate concentric hypertrophy and normal systolic function.  · The estimated ejection fraction is 60%.  · Normal left ventricular diastolic function.  · Normal right ventricular size with normal right ventricular systolic function.  · Mild left atrial enlargement.  · Eccentric Moderate mitral regurgitation.      CURRENT/PREVIOUS VISIT EKG  Results for orders placed or performed during the hospital encounter of 10/16/22   EKG 12-lead    Collection Time: 10/16/22  5:51 PM    Narrative    Test Reason : R07.9,    Vent. Rate : 088 BPM     Atrial Rate : 088 BPM     P-R Int : 168 ms          QRS Dur : 088 ms      QT Int : 386 ms       P-R-T Axes : 067 -08 029 degrees     QTc Int : 467 ms    Normal sinus rhythm  Possible Left atrial enlargement  LVH  Abnormal ECG  When compared with ECG of 31-MAR-2022 16:26,  Criteria for Septal infarct are no longer Present    Referred By: [email or web address]   SELF           Confirmed By:        ASSESSMENT/PLAN:     Active Hospital Problems    Diagnosis    *Hypertensive urgency    MAKENNA (acute kidney injury)       ASSESSMENT & PLAN:   Hypertensive urgency  MAKENNA- Nephrology consulted      RECOMMENDATIONS:  Patient is cleared for discharge from cardiology standpoint. Blood pressure has been well controlled overnight. Patient will need to go home with Amlodipine 10mg po daily, Isosorbide dinitrate 5mg po TID, Toprol-XL 50mg po daily and Hydralazine 50mg po TID. Patient may need increased hydralazine dosage to keep  SBP<150. She will need follow up in office in 2-3 weeks. Patient and her daughter have been advised that patient will need stress test on outpatient basis.   Creatinine improved slightly overnight. Advise to avoid nephrotoxic agents, adhere to renal diet, and keep blood pressure controlled.    Cata Young NP  [facility]  Department of Cardiology  Date of Service: 10/18/2022      I have personally interviewed and examined the patient.  I have reviewed all the Nurse Practitioner's documentation, and agree with the plan.     1.  Patient's blood pressure is well controlled.  And as above.  2.  Patient has known history of coronary artery disease and would need further workup including a stress myocardial perfusion study to evaluate for ischemia.  3.  Patient had already eaten her breakfast this morning and is very anxious to go home wants further cardiac testing to be done as an outpatient.  4.  Discussed with patient's daughter in regards with her care and also stressed on the importance of having further cardiac evaluation done including stress myocardial perfusion study.    Dr.Cyril Endy JACOBS  [facility]  Department of Cardiology  Date of Service: 10/18/2022  8:27 AM

## 2022-10-18 NOTE — CARE UPDATE
CM spoke with patient at 4:40pm regarding her PCP appointment.  Patient reported that she just called [facility] and was told to call back on tomorrow for an appointment.

## 2022-10-18 NOTE — CARE UPDATE
10/17/22 1935   Patient Assessment/Suction   Level of Consciousness (AVPU) alert   Respiratory Effort Normal;Unlabored   Expansion/Accessory Muscles/Retractions no use of accessory muscles   All Lung Fields Breath Sounds Anterior:;Lateral:;coarse   PRIMO Breath Sounds coarse   RUL Breath Sounds coarse   Cough Frequency frequent   Cough Type loose   PRE-TX-O2   O2 Device (Oxygen Therapy) room air   SpO2 (!) 94 %   Pulse Oximetry Type Intermittent   $ Pulse Oximetry - Multiple Charge Pulse Oximetry - Multiple   Pulse 88   Resp 18   Aerosol Therapy   $ Aerosol Therapy Charges Aerosol Treatment   Daily Review of Necessity (SVN) completed   Respiratory Treatment Status (SVN) given   Treatment Route (SVN) mask;oxygen   Patient Position (SVN) HOB elevated   Post Treatment Assessment (SVN) breath sounds unchanged   Signs of Intolerance (SVN) none   Breath Sounds Post-Respiratory Treatment   Post-treatment Heart Rate (beats/min) 90   Post-treatment Resp Rate (breaths/min) 20   Education   $ Education Bronchodilator;15 min   Respiratory Evaluation   $ Care Plan Tech Time 15 min

## 2022-10-18 NOTE — CARE UPDATE
10/18/22 0705   Patient Assessment/Suction   Level of Consciousness (AVPU) alert   Respiratory Effort Normal   Expansion/Accessory Muscles/Retractions no use of accessory muscles   All Lung Fields Breath Sounds diminished   Rhythm/Pattern, Respiratory unlabored   Cough Frequency infrequent   Cough Type dry   PRE-TX-O2   O2 Device (Oxygen Therapy) room air   SpO2 100 %   Pulse Oximetry Type Intermittent   $ Pulse Oximetry - Multiple Charge Pulse Oximetry - Multiple   Pulse 63   Resp 18   Aerosol Therapy   $ Aerosol Therapy Charges Aerosol Treatment   Daily Review of Necessity (SVN) completed   Respiratory Treatment Status (SVN) given   Treatment Route (SVN) mask;oxygen   Patient Position (SVN) HOB elevated   Post Treatment Assessment (SVN) breath sounds unchanged   Signs of Intolerance (SVN) none   Education   $ Education Bronchodilator;15 min   Respiratory Evaluation   $ Care Plan Tech Time 15 min   $ Eval/Re-eval Charges Re-evaluation

## 2022-10-18 NOTE — PLAN OF CARE
Patient cleared to discharge by case management.  Patient discharging home self care no needs.       10/18/22 164   Final Note   Assessment Type Final Discharge Note   Anticipated Discharge Disposition Home   Post-Acute Status   Discharge Delays None known at this time

## 2022-10-18 NOTE — PROGRESS NOTES
"Nephrology Consult Note        Patient Name: Faustina Rae  MRN: [id number]    Patient Class: IP- Inpatient   Admission Date: 10/16/2022  Length of Stay: 2 days  Date of Service: 10/18/2022    Attending Physician: Jen Loera MD  Primary Care Provider: Primary Doctor No    Reason for Consult: chest pain/anemia/htn emergency/daron/ckd3/    SUBJECTIVE:     HPI: 57AAF with CVA, HTN, CAD, HLD, Anxiety, COPD, DAVID, Morbid Obesity presented to ED complaining of 1 day history central chest pain with rads towards neck, 5/10, waxing and waning. Pain free since presentation to ED, and dry hacking cough with post nasal drip for the past two to three days. Has chronic 3 pillow orthopnea and pedal edema, but no PND. Has DAVID, but does not have CPAP with her. Her BP has been 220's/110's since presentation here. Has HA and feels "dizzy", but no vision changes. No new LOP (has residual left sided weakness from prior CVA). Patient on multiple diuretics and ARB.     In ED: normal CBC, normal electrolytes, elevated sCr (was stage 3b May 2022); BNP normal troponin mildly elevated. Influenza and COVID negative. CXR reviewed: NAPD. EKG reviewed: sinus without acute segments.     Admitted for hypertensive emergency with end organ (renal) injury, gentle hydration, holding diuretics and ARB, Nephrology and Cardiology consultations. Serial markers. ECHO.    10/18 VSS, sCr better but not at baseline. Leonela US report pending. UA clear with some proteinuria. Appreciate cards input.    Past Medical History:   Diagnosis Date    Anxiety     Arthritis     Asthma     CHF (congestive heart failure)     COPD (chronic obstructive pulmonary disease)     Hyperlipemia     Hypertension     Leaky heart valve     Obese     Obese     Obstructive sleep apnea     lost her CPAP    Pneumonia     Rheumatoid arthritis(714.0)     Stroke      Past Surgical History:   Procedure Laterality Date    CAROTID STENT      3 years ago    CHOLECYSTECTOMY      HYSTERECTOMY   "    SLEEVE GASTROPLASTY  02/21/2017     Family History   Problem Relation Age of Onset    Arthritis Mother     Cancer Mother     Diabetes Mother     Hyperlipidemia Mother     Hypertension Mother     Stroke Mother     Breast cancer Mother     Heart disease Father     Hypertension Father     Asthma Son     Hypertension Sister     Hypertension Sister     Kidney disease Neg Hx      Social History     Tobacco Use    Smoking status: Never    Smokeless tobacco: Never   Substance Use Topics    Alcohol use: Yes     Comment: rare, about once a month    Drug use: No       Review of patient's allergies indicates:  No Known Allergies    Outpatient meds:  No current facility-administered medications on file prior to encounter.     Current Outpatient Medications on File Prior to Encounter   Medication Sig Dispense Refill    albuterol (PROVENTIL/VENTOLIN HFA) 90 mcg/actuation inhaler INHALE 2 PUFFS INTO THE LUNGS FOUR TIMES DAILY 8.5 g 0    albuterol-ipratropium (DUO-NEB) 2.5 mg-0.5 mg/3 mL nebulizer solution Take 3 mLs by nebulization every 6 (six) hours as needed for Wheezing or Shortness of Breath. Rescue 75 mL 0    amLODIPine (NORVASC) 10 MG tablet Take 1 tablet (10 mg total) by mouth once daily. 30 tablet 11    atorvastatin (LIPITOR) 80 MG tablet Take 1 tablet (80 mg total) by mouth every evening. 90 tablet 1    b complex vitamins tablet Take 1 tablet by mouth once daily.      calcium citrate-vitamin D3 315-200 mg (CITRACAL+D) 315-200 mg-unit per tablet Take 1 tablet by mouth once daily.       chlorthalidone (HYGROTEN) 25 MG Tab Take 0.5 tablets (12.5 mg total) by mouth once daily. 45 tablet 1    cyanocobalamin (VITAMIN B-12) 1000 MCG tablet Take 1 tablet (1,000 mcg total) by mouth once daily. 90 tablet 3    EScitalopram oxalate (LEXAPRO) 10 MG tablet Take 1 tablet (10 mg total) by mouth once daily. 90 tablet 1    ferrous sulfate 325 (65 FE) MG EC tablet Take 1 tablet (325 mg total) by mouth once daily. 90 tablet 3     fluticasone propionate (FLONASE) 50 mcg/actuation nasal spray 2 sprays (100 mcg total) by Each Nostril route once daily. 16 g 11    fluticasone propionate (FLOVENT HFA) 110 mcg/actuation inhaler Inhale 1 puff into the lungs 2 (two) times daily. Controller. Rinse mouth with water and spit out after use. 12 g 11    hydrALAZINE (APRESOLINE) 50 MG tablet Take 1 tablet (50 mg total) by mouth every 8 (eight) hours. 270 tablet 3    HYDROcodone-acetaminophen (NORCO) 5-325 mg per tablet Take 1 tablet by mouth every 4 (four) hours as needed for Pain. 18 tablet 0    isosorbide dinitrate (ISORDIL) 5 MG Tab TAKE 1 TABLET(5 MG) BY MOUTH THREE TIMES DAILY 90 tablet 0    meclizine (ANTIVERT) 25 mg tablet Take 1 tablet (25 mg total) by mouth 3 (three) times daily as needed for Dizziness. 20 tablet 0    metOLazone (ZAROXOLYN) 2.5 MG tablet Take 1 tablet (2.5 mg total) by mouth once daily. 30 tablet 11    metoprolol succinate (TOPROL-XL) 25 MG 24 hr tablet TAKE 2 TABLETS(50 MG) BY MOUTH EVERY  tablet 0    montelukast (SINGULAIR) 10 mg tablet Take 1 tablet (10 mg total) by mouth every evening. 90 tablet 3    multivitamin (THERAGRAN) per tablet Take 1 tablet by mouth once daily. 90 tablet 3    nystatin (MYCOSTATIN) powder Apply topically 2 (two) times daily. 60 g 2    traZODone (DESYREL) 50 MG tablet 1 tab po qhs prn for insomnia. 90 tablet 1    valsartan (DIOVAN) 80 MG tablet Take 1 tablet (80 mg total) by mouth once daily. 90 tablet 3    clopidogreL (PLAVIX) 75 mg tablet Take 1 tablet (75 mg total) by mouth once daily. 30 tablet 11    pantoprazole (PROTONIX) 40 MG tablet Take 1 tablet (40 mg total) by mouth once daily. 90 tablet 1    spironolactone (ALDACTONE) 25 MG tablet Take 1 tablet (25 mg total) by mouth once daily. 90 tablet 1    tramadol-acetaminophen 37.5-325 mg (ULTRACET) 37.5-325 mg Tab Take 1 tablet by mouth every 4 (four) hours. 20 tablet 0       Scheduled meds:   amLODIPine  10 mg Oral Daily    atorvastatin  80 mg  Oral QHS    budesonide  0.5 mg Nebulization Q12H    calcium-vitamin D3  1 tablet Oral Daily    chlorhexidine  15 mL Mouth/Throat BID    cyanocobalamin  1,000 mcg Oral Daily    enoxaparin  30 mg Subcutaneous Daily    EScitalopram oxalate  10 mg Oral Daily    ferrous sulfate  1 tablet Oral Daily    fluticasone propionate  2 spray Each Nostril Daily    hydrALAZINE  50 mg Oral Q8H    isosorbide dinitrate  5 mg Oral TID    metoprolol succinate  50 mg Oral Daily    montelukast  10 mg Oral QHS    multivitamin  1 tablet Oral Daily    mupirocin   Nasal BID    pantoprazole  40 mg Oral Before breakfast       Infusions:        PRN meds:  acetaminophen, albuterol-ipratropium, cloNIDine, dextromethorphan-guaiFENesin  mg/5 ml, hydrALAZINE, HYDROcodone-acetaminophen, lorazepam, magnesium oxide, magnesium oxide, melatonin, potassium bicarbonate, potassium bicarbonate, potassium bicarbonate, potassium, sodium phosphates, potassium, sodium phosphates, potassium, sodium phosphates, traZODone    Review of Systems:      OBJECTIVE:     Vital Signs and IO:  Temp:  [97.5 °F (36.4 °C)-98.6 °F (37 °C)]   Pulse:  [62-88]   Resp:  [16-24]   BP: (130-195)/()   SpO2:  [84 %-100 %]   I/O last 3 completed shifts:  In: 1920 [P.O.:650; I.V.:1270]  Out: 1100 [Urine:1100]  Wt Readings from Last 5 Encounters:   10/17/22 88.5 kg (195 lb 1.7 oz)   10/17/22 88.5 kg (195 lb)   10/03/22 103.9 kg (229 lb 2.7 oz)   04/18/22 113.4 kg (250 lb)   03/31/22 107.1 kg (236 lb 1.8 oz)     Body mass index is 35.69 kg/m².    Physical Exam  Constitutional:       General: She is not in acute distress.     Appearance: She is well-developed. She is not diaphoretic.   HENT:      Head: Normocephalic and atraumatic.      Mouth/Throat:      Mouth: Mucous membranes are moist.   Eyes:      General: No scleral icterus.     Pupils: Pupils are equal, round, and reactive to light.   Cardiovascular:      Rate and Rhythm: Normal rate and regular rhythm.   Pulmonary:       Effort: Pulmonary effort is normal. No respiratory distress.      Breath sounds: No stridor.   Abdominal:      General: There is no distension.      Palpations: Abdomen is soft.   Musculoskeletal:         General: No deformity. Normal range of motion.      Cervical back: Neck supple.   Skin:     General: Skin is warm and dry.      Findings: No erythema or rash.   Neurological:      Mental Status: She is alert and oriented to person, place, and time.      Cranial Nerves: No cranial nerve deficit.   Psychiatric:         Behavior: Behavior normal.     Laboratory:  Recent Labs   Lab 10/16/22  1740 10/17/22  0514 10/18/22  0604    138 138   K 4.6 4.6 4.9    107 105   CO2 23 24 26   BUN 41* 40* 44*   CREATININE 2.9* 2.9* 2.7*   GLU 94 102 95         Recent Labs   Lab 10/16/22  1740 10/17/22  0514 10/18/22  0604   CALCIUM 9.5 8.8 9.3   ALBUMIN 3.7  --  3.3*   PHOS  --   --  3.5               No results for input(s): POCTGLUCOSE in the last 168 hours.    Recent Labs   Lab 01/12/22  0945 05/09/22  0937   Hemoglobin A1C 5.4 5.2         Recent Labs   Lab 10/16/22  1740 10/17/22  0514 10/18/22  0604   WBC 7.14 5.83 6.19   HGB 12.0 10.2* 11.8*   HCT 37.3 31.6* 37.0    241 262   MCV 91 91 92   MCHC 32.2 32.3 31.9*   MONO 8.1  0.6 9.4  0.6 10.5  0.7         Recent Labs   Lab 10/16/22  1740 10/18/22  0604   BILITOT 0.5  --    PROT 8.0  --    ALBUMIN 3.7 3.3*   ALKPHOS 61  --    ALT 10  --    AST 15  --          Recent Labs   Lab 09/14/20  1200 04/01/22  1644 10/17/22  2315   Color, UA Yellow Yellow Colorless A   Appearance, UA  --  Clear Clear   pH, UA 5 6.0 7.0   Specific Gravity, UA  --  1.025 1.010   Spec Grav UA 1.020  --   --    Protein, UA  --  1+ A 1+ A   Glucose, UA  --  Negative Negative   Ketones, UA NEG Negative Negative   Urobilinogen, UA NORMAL 1.0 Negative   Bilirubin (UA)  --  Negative Negative   Occult Blood UA  --  Negative Negative   Nitrite, UA NEG Positive A Negative   RBC, UA  --  0 1    WBC, UA  --  35 H 1   Bacteria  --  Many A Negative   Hyaline Casts, UA  --  0 0.00 A               Microbiology Results (last 7 days)       ** No results found for the last 168 hours. **            ASSESSMENT/PLAN:     MAKENNA, likely ATN 2/2 HTN emergency?  CKD stage 3, sCr 1.6 in 5/2022  Hypertensive emergency with chest pain and MAKENNA  No NSAIDs, ACEI/ARB, IV contrast or other nephrotoxins.  Dose meds for GFR < 30 ml/min.  Renal diet - low K, low phos.  Renal US pending and UA is clear with some proteinuria.  ECHO not very abnormal.  Treat hypertension. Tolerate asymptomatic HTN up to -160.  Low sodium diet.  Appreciate cards input.    Anemia of CKD  Hgb and HCT are acceptable. Monitor for now.    Thank you for allowing us to participate in the care of your patient!   We will follow the patient and provide recommendations as needed.    Patient care time was spent personally by me on the following activities:     Obtaining a history.  Examination of patient.  Providing medical care at the patients bedside.  Developing a treatment plan with patient or surrogate and bedside caregivers.  Ordering and reviewing laboratory studies, radiographic studies, pulse oximetry.  Ordering and performing treatments and interventions.  Evaluation of patient's response to treatment.  Discussions with consultants while on the unit and immediately available to the patient.  Re-evaluation of the patient's condition.  Documentation in the medical record.     Kory Kaplan MD    [facility]  [address] [place] [address]  [place] [address]    [phone] - tel  [phone] - fax    10/18/2022

## 2022-10-19 NOTE — DISCHARGE SUMMARY
"[facility] Medicine  Discharge Summary      Patient Name: Faustina Rae  MRN: [id number]  Patient Class: IP- Inpatient  Admission Date: 10/16/2022  Hospital Length of Stay: 2 days  Discharge Date and Time:  10/18/2022 7:20 PM  Attending Physician: No att. providers found   Discharging Provider: Jen Loera MD  Primary Care Provider: Primary Doctor No      HPI:   57 year old female with past history of CVA, HTN, CAD, HLD, Anxiety, COPD, DAVID, Morbid Obesity presented to ED complaining of 1 day history central chest aching pain with rads towards neck,5/10 waxing and waning own accord--pain free since presentation to ED, and dry hacking cough with post nasal drip for the past "Two to three days". Has chronic 3 pillow orthopnea and pedal edema, but no PND. Has DAVID, but does not have CPAP with her. Her BP has been 220's/110's since presentation here. Has HA and feels "Dizzy", but no vision changes. No new LOP (has residual left sided weakness from prior CVA). Patient on multiple diuretics and ARB    In ED: labs reviewed and noted below: normal CBC, normal electrolytes with stage 4 renal dysfunction (was stage 3b May 2022); BNP normal troponin mildly elevated. Influenza and COVID negative. CXR reviewed: NAPD. EKG reviewed: sinus without acute segments.    Discussed with ED MD: Inpatient for Hypertensive with end organ (renal) injury, gentle hydration with LR; continue current regimen (holding diuretics and ARB) add iv hydralazine prn--if that is not sufficient, will change to clonidine prn; Nephrology and Cardiology consultations. Serial markers. ECHO. TSH with AM labs for review.      * No surgery found *      Hospital Course:   57 year old female with past history of CVA, HTN, CAD, HLD, Anxiety, COPD, DAVID, Morbid Obesity presented to ED with complaint of cough for one week.  She was treated for hypertensive crisis likely due to poor compliance with home medications. Patient was " hypertensive to 200/100 and found to have MAKENNA/CKD although patient is unaware of a CKD diagnosis.  She improved markedly with help of Nephrology and Cardiology teams.  She was cleared for discharge by consultants.  Before discharge we made her walk in the hallway and she did not have any symptoms, blood pressure was much better.  She was discharged home in hemodynamically stable condition, I renewed all important prescriptions and explained discharge plan to her daughter.     I have seen the patient on the day of discharge and reviewed the discharge instructions as outlined below. Patient verbalized understanding and is aware to contact primary care physician or return to ED if new or worsening symptoms.        Goals of Care Treatment Preferences:  Code Status: Full Code      Consults:   Consults (From admission, onward)        Status Ordering Provider     Inpatient consult to Cardiology  Once        Provider:  Twin Schumacher MD    Completed REY FORD     Inpatient consult to Nephrology  Once        Provider:  Dhaval Serrano MD    Completed REY FORD     Inpatient consult to Hospitalist  Once        Provider:  Rey Ford MD    Acknowledged REY FORD          No new Assessment & Plan notes have been filed under this hospital service since the last note was generated.  Service: Hospital Medicine    Final Active Diagnoses:      Problems Resolved During this Admission:    Diagnosis Date Noted Date Resolved POA    PRINCIPAL PROBLEM:  Hypertensive urgency [I16.0] 06/10/2012 10/18/2022 Yes    MAKENNA (acute kidney injury) [N17.9] 02/21/2017 10/18/2022 Yes       Discharged Condition: good    Disposition: Home or Self Care    Follow Up:   Follow-up Information     Kory Kaplan MD Follow up in 2 week(s).    Specialty: Nephrology  Contact information:  [address] [place] [address] [facility]  [facility] [address]  [phone]             Joe Schumacher MD Follow up in 2 week(s).     Specialties: Interventional Cardiology, Cardiology, Cardiovascular Disease  Contact information:  [facility]  [address]  [facility] [address]  [phone]             Primary Doctor No Follow up.                     Patient Instructions:      Ambulatory referral/consult to Outpatient Case Management   Referral Priority: Routine Referral Type: Consultation   Referral Reason: Specialty Services Required   Number of Visits Requested: 1     Diet renal     Notify your health care provider if you experience any of the following:  difficulty breathing or increased cough     Notify your health care provider if you experience any of the following:  severe persistent headache     Activity as tolerated       Significant Diagnostic Studies: Labs: All labs within the past 24 hours have been reviewed    Pending Diagnostic Studies:     None         Medications:  Reconciled Home Medications:      Medication List      CHANGE how you take these medications    metoprolol succinate 25 MG 24 hr tablet  Commonly known as: TOPROL-XL  Take 1 tablet (25 mg total) by mouth once daily.  What changed: how much to take        CONTINUE taking these medications    albuterol 90 mcg/actuation inhaler  Commonly known as: PROVENTIL/VENTOLIN HFA  INHALE 2 PUFFS INTO THE LUNGS FOUR TIMES DAILY     albuterol-ipratropium 2.5 mg-0.5 mg/3 mL nebulizer solution  Commonly known as: DUO-NEB  Take 3 mLs by nebulization every 6 (six) hours as needed for Wheezing or Shortness of Breath. Rescue     amLODIPine 10 MG tablet  Commonly known as: NORVASC  Take 1 tablet (10 mg total) by mouth once daily.     atorvastatin 80 MG tablet  Commonly known as: LIPITOR  Take 1 tablet (80 mg total) by mouth every evening.     b complex vitamins tablet  Take 1 tablet by mouth once daily.     calcium citrate-vitamin D3 315-200 mg 315 mg-5 mcg (200 unit) per tablet  Commonly known as: CITRACAL+D  Take 1 tablet by mouth once daily.     clopidogreL 75 mg tablet  Commonly known  as: PLAVIX  Take 1 tablet (75 mg total) by mouth once daily.     cyanocobalamin 1000 MCG tablet  Commonly known as: VITAMIN B-12  Take 1 tablet (1,000 mcg total) by mouth once daily.     EScitalopram oxalate 10 MG tablet  Commonly known as: LEXAPRO  Take 1 tablet (10 mg total) by mouth once daily.     ferrous sulfate 325 (65 FE) MG EC tablet  Take 1 tablet (325 mg total) by mouth once daily.     * fluticasone propionate 110 mcg/actuation inhaler  Commonly known as: FLOVENT HFA  Inhale 1 puff into the lungs 2 (two) times daily. Controller. Rinse mouth with water and spit out after use.     * fluticasone propionate 50 mcg/actuation nasal spray  Commonly known as: FLONASE  2 sprays (100 mcg total) by Each Nostril route once daily.     hydrALAZINE 50 MG tablet  Commonly known as: APRESOLINE  Take 1 tablet (50 mg total) by mouth every 8 (eight) hours.     HYDROcodone-acetaminophen 5-325 mg per tablet  Commonly known as: NORCO  Take 1 tablet by mouth every 4 (four) hours as needed for Pain.     isosorbide dinitrate 5 MG Tab  Commonly known as: ISORDIL  Take 1 tablet (5 mg total) by mouth 3 (three) times daily.     meclizine 25 mg tablet  Commonly known as: ANTIVERT  Take 1 tablet (25 mg total) by mouth 3 (three) times daily as needed for Dizziness.     montelukast 10 mg tablet  Commonly known as: SINGULAIR  Take 1 tablet (10 mg total) by mouth every evening.     multivitamin per tablet  Commonly known as: THERAGRAN  Take 1 tablet by mouth once daily.     nystatin powder  Commonly known as: MYCOSTATIN  Apply topically 2 (two) times daily.     pantoprazole 40 MG tablet  Commonly known as: PROTONIX  Take 1 tablet (40 mg total) by mouth once daily.     tramadol-acetaminophen 37.5-325 mg 37.5-325 mg Tab  Commonly known as: ULTRACET  Take 1 tablet by mouth every 4 (four) hours.     traZODone 50 MG tablet  Commonly known as: DESYREL  1 tab po qhs prn for insomnia.         * This list has 2 medication(s) that are the same as  other medications prescribed for you. Read the directions carefully, and ask your doctor or other care provider to review them with you.            STOP taking these medications    chlorthalidone 25 MG Tab  Commonly known as: HYGROTEN     metOLazone 2.5 MG tablet  Commonly known as: ZAROXOLYN     spironolactone 25 MG tablet  Commonly known as: ALDACTONE     valsartan 80 MG tablet  Commonly known as: DIOVAN            Indwelling Lines/Drains at time of discharge:   Lines/Drains/Airways     None                 Time spent on the discharge of patient: 35 minutes         Jen Loera MD  Department of Hospital Medicine  [facility]

## 2022-10-19 NOTE — NURSING
Discharge  instructions given to pt and daughter. Both verbalized understanding of instructions.   Discharged to home via ambulation to car with nurse and dtg. at her side.

## 2022-10-19 NOTE — HOSPITAL COURSE
57 year old female with past history of CVA, HTN, CAD, HLD, Anxiety, COPD, DAVID, Morbid Obesity presented to ED with complaint of cough for one week.  She was treated for hypertensive crisis likely due to poor compliance with home medications. Patient was hypertensive to 200/100 and found to have MAKENNA/CKD although patient is unaware of a CKD diagnosis.  She improved markedly with help of Nephrology and Cardiology teams.  She was cleared for discharge by consultants.  Before discharge we made her walk in the hallway and she did not have any symptoms, blood pressure was much better.  She was discharged home in hemodynamically stable condition, I renewed all important prescriptions and explained discharge plan to her daughter.     I have seen the patient on the day of discharge and reviewed the discharge instructions as outlined below. Patient verbalized understanding and is aware to contact primary care physician or return to ED if new or worsening symptoms.

## 2022-10-20 ENCOUNTER — OUTPATIENT CASE MANAGEMENT (OUTPATIENT)
Dept: ADMINISTRATIVE | Facility: OTHER | Age: 57
End: 2022-10-20
Payer: MEDICARE

## 2022-10-20 NOTE — LETTER
October 24, 2022    Faustina Rae  [address]  [address]  [place] [address]             [facility] [address] [place] [address] Dear Ms Rae:    I work with [facility] Outpatient Case Management Department. We received a referral to call you to discuss your medical history.These services are free of charge and are offered to [facility] patients who have recently been discharged from any of our facilities or who have complex medical conditions that may require the skill of a nurse to assist with management.         .      The Outpatient Case Management Department can be reached at [phone] from 8:00AM to 4:30 PM on Monday thru Friday. [facility] also has a program where a nurse is available 24/7 to answer questions or provide medical advice, their number is [phone].    Thanks,  Margarita Young RN [facility]   Outpatient Care Management  [phone]

## 2022-10-21 NOTE — PROGRESS NOTES
10/21/22-Attempt assessment with patient for outpatient case management. No answer. Left message requesting call back. RN OPCM 1'st assessment attempt.   10/24/22- Gave her phone numbers for her to set up appts for PCP, Dr. Serrano, Dr. Schumacher. No further needs. Will send letter with my contact info. OPCM Case Closure.

## 2022-10-24 ENCOUNTER — PES CALL (OUTPATIENT)
Dept: ADMINISTRATIVE | Facility: OTHER | Age: 57
End: 2022-10-24
Payer: MEDICARE

## 2022-10-26 ENCOUNTER — TELEPHONE (OUTPATIENT)
Dept: FAMILY MEDICINE | Facility: CLINIC | Age: 57
End: 2022-10-26
Payer: MEDICARE

## 2022-10-26 NOTE — TELEPHONE ENCOUNTER
I spoke with pts daughter. She states that she was calling to schedule a hospital f/u. Per pts daughter she states that she will need her kidney enzymes re-checked as they were elevated in the hospital. Will see Mrs. Watts on 11/3/2022.     ----- Message from Carolee Escamilla sent at 10/26/2022  4:02 PM CDT -----  Regarding: pt called  Name of Who is Calling: FIOR CHILDRESS [[id number]]      What is the request in detail: pt is requesting an appt to be seen for right ear hearing loss. Please advise       Can the clinic reply by [facility]: No      [facility] [phone] Venessa (daughter)

## 2022-11-03 ENCOUNTER — OFFICE VISIT (OUTPATIENT)
Dept: FAMILY MEDICINE | Facility: CLINIC | Age: 57
End: 2022-11-03
Payer: MEDICARE

## 2022-11-03 VITALS
DIASTOLIC BLOOD PRESSURE: 70 MMHG | SYSTOLIC BLOOD PRESSURE: 132 MMHG | WEIGHT: 228.38 LBS | HEIGHT: 62 IN | OXYGEN SATURATION: 95 % | HEART RATE: 76 BPM | TEMPERATURE: 98 F | BODY MASS INDEX: 42.03 KG/M2

## 2022-11-03 DIAGNOSIS — Z23 FLU VACCINE NEED: ICD-10-CM

## 2022-11-03 DIAGNOSIS — F32.5 MAJOR DEPRESSIVE DISORDER, SINGLE EPISODE, IN FULL REMISSION: ICD-10-CM

## 2022-11-03 DIAGNOSIS — I69.354 HEMIPARESIS AFFECTING LEFT SIDE AS LATE EFFECT OF CEREBROVASCULAR ACCIDENT: ICD-10-CM

## 2022-11-03 DIAGNOSIS — E66.01 OBESITY, MORBID, BMI 40.0-49.9: ICD-10-CM

## 2022-11-03 DIAGNOSIS — R05.1 ACUTE COUGH: ICD-10-CM

## 2022-11-03 DIAGNOSIS — Z12.31 ENCOUNTER FOR SCREENING MAMMOGRAM FOR MALIGNANT NEOPLASM OF BREAST: ICD-10-CM

## 2022-11-03 DIAGNOSIS — Z23 ENCOUNTER FOR VACCINATION: ICD-10-CM

## 2022-11-03 DIAGNOSIS — J44.89 COPD WITH ASTHMA: ICD-10-CM

## 2022-11-03 DIAGNOSIS — Z86.73 HISTORY OF CVA (CEREBROVASCULAR ACCIDENT): ICD-10-CM

## 2022-11-03 DIAGNOSIS — I10 ESSENTIAL HYPERTENSION: ICD-10-CM

## 2022-11-03 DIAGNOSIS — Z12.11 COLON CANCER SCREENING: ICD-10-CM

## 2022-11-03 DIAGNOSIS — Z09 HOSPITAL DISCHARGE FOLLOW-UP: Primary | ICD-10-CM

## 2022-11-03 PROCEDURE — 99214 PR OFFICE/OUTPT VISIT, EST, LEVL IV, 30-39 MIN: ICD-10-PCS | Mod: 25,S$GLB,, | Performed by: NURSE PRACTITIONER

## 2022-11-03 PROCEDURE — G0009 ADMIN PNEUMOCOCCAL VACCINE: HCPCS | Mod: S$GLB,,, | Performed by: NURSE PRACTITIONER

## 2022-11-03 PROCEDURE — 4010F ACE/ARB THERAPY RXD/TAKEN: CPT | Mod: CPTII,S$GLB,, | Performed by: NURSE PRACTITIONER

## 2022-11-03 PROCEDURE — 90686 FLU VACCINE (QUAD) GREATER THAN OR EQUAL TO 3YO PRESERVATIVE FREE IM: ICD-10-PCS | Mod: S$GLB,,, | Performed by: NURSE PRACTITIONER

## 2022-11-03 PROCEDURE — 3044F PR MOST RECENT HEMOGLOBIN A1C LEVEL <7.0%: ICD-10-PCS | Mod: CPTII,S$GLB,, | Performed by: NURSE PRACTITIONER

## 2022-11-03 PROCEDURE — G0008 ADMIN INFLUENZA VIRUS VAC: HCPCS | Mod: S$GLB,,, | Performed by: NURSE PRACTITIONER

## 2022-11-03 PROCEDURE — 99999 PR PBB SHADOW E&M-EST. PATIENT-LVL V: CPT | Mod: PBBFAC,,, | Performed by: NURSE PRACTITIONER

## 2022-11-03 PROCEDURE — 90686 IIV4 VACC NO PRSV 0.5 ML IM: CPT | Mod: S$GLB,,, | Performed by: NURSE PRACTITIONER

## 2022-11-03 PROCEDURE — 1159F PR MEDICATION LIST DOCUMENTED IN MEDICAL RECORD: ICD-10-PCS | Mod: CPTII,S$GLB,, | Performed by: NURSE PRACTITIONER

## 2022-11-03 PROCEDURE — 3078F DIAST BP <80 MM HG: CPT | Mod: CPTII,S$GLB,, | Performed by: NURSE PRACTITIONER

## 2022-11-03 PROCEDURE — 3008F PR BODY MASS INDEX (BMI) DOCUMENTED: ICD-10-PCS | Mod: CPTII,S$GLB,, | Performed by: NURSE PRACTITIONER

## 2022-11-03 PROCEDURE — 3008F BODY MASS INDEX DOCD: CPT | Mod: CPTII,S$GLB,, | Performed by: NURSE PRACTITIONER

## 2022-11-03 PROCEDURE — G0008 FLU VACCINE (QUAD) GREATER THAN OR EQUAL TO 3YO PRESERVATIVE FREE IM: ICD-10-PCS | Mod: S$GLB,,, | Performed by: NURSE PRACTITIONER

## 2022-11-03 PROCEDURE — 99214 OFFICE O/P EST MOD 30 MIN: CPT | Mod: 25,S$GLB,, | Performed by: NURSE PRACTITIONER

## 2022-11-03 PROCEDURE — 1111F PR DISCHARGE MEDS RECONCILED W/ CURRENT OUTPATIENT MED LIST: ICD-10-PCS | Mod: CPTII,S$GLB,, | Performed by: NURSE PRACTITIONER

## 2022-11-03 PROCEDURE — 3044F HG A1C LEVEL LT 7.0%: CPT | Mod: CPTII,S$GLB,, | Performed by: NURSE PRACTITIONER

## 2022-11-03 PROCEDURE — 1160F PR REVIEW ALL MEDS BY PRESCRIBER/CLIN PHARMACIST DOCUMENTED: ICD-10-PCS | Mod: CPTII,S$GLB,, | Performed by: NURSE PRACTITIONER

## 2022-11-03 PROCEDURE — 1111F DSCHRG MED/CURRENT MED MERGE: CPT | Mod: CPTII,S$GLB,, | Performed by: NURSE PRACTITIONER

## 2022-11-03 PROCEDURE — 1160F RVW MEDS BY RX/DR IN RCRD: CPT | Mod: CPTII,S$GLB,, | Performed by: NURSE PRACTITIONER

## 2022-11-03 PROCEDURE — 90677 PNEUMOCOCCAL CONJUGATE VACCINE 20-VALENT: ICD-10-PCS | Mod: S$GLB,,, | Performed by: NURSE PRACTITIONER

## 2022-11-03 PROCEDURE — 1159F MED LIST DOCD IN RCRD: CPT | Mod: CPTII,S$GLB,, | Performed by: NURSE PRACTITIONER

## 2022-11-03 PROCEDURE — 3075F SYST BP GE 130 - 139MM HG: CPT | Mod: CPTII,S$GLB,, | Performed by: NURSE PRACTITIONER

## 2022-11-03 PROCEDURE — 3075F PR MOST RECENT SYSTOLIC BLOOD PRESS GE 130-139MM HG: ICD-10-PCS | Mod: CPTII,S$GLB,, | Performed by: NURSE PRACTITIONER

## 2022-11-03 PROCEDURE — 3078F PR MOST RECENT DIASTOLIC BLOOD PRESSURE < 80 MM HG: ICD-10-PCS | Mod: CPTII,S$GLB,, | Performed by: NURSE PRACTITIONER

## 2022-11-03 PROCEDURE — 99999 PR PBB SHADOW E&M-EST. PATIENT-LVL V: ICD-10-PCS | Mod: PBBFAC,,, | Performed by: NURSE PRACTITIONER

## 2022-11-03 PROCEDURE — G0009 PNEUMOCOCCAL CONJUGATE VACCINE 20-VALENT: ICD-10-PCS | Mod: S$GLB,,, | Performed by: NURSE PRACTITIONER

## 2022-11-03 PROCEDURE — 90677 PCV20 VACCINE IM: CPT | Mod: S$GLB,,, | Performed by: NURSE PRACTITIONER

## 2022-11-03 PROCEDURE — 4010F PR ACE/ARB THEARPY RXD/TAKEN: ICD-10-PCS | Mod: CPTII,S$GLB,, | Performed by: NURSE PRACTITIONER

## 2022-11-03 RX ORDER — NITROFURANTOIN 25; 75 MG/1; MG/1
CAPSULE ORAL
COMMUNITY
End: 2023-02-03

## 2022-11-03 RX ORDER — PROMETHAZINE HYDROCHLORIDE AND DEXTROMETHORPHAN HYDROBROMIDE 6.25; 15 MG/5ML; MG/5ML
5 SYRUP ORAL 4 TIMES DAILY PRN
Qty: 118 ML | Refills: 1 | Status: SHIPPED | OUTPATIENT
Start: 2022-11-03 | End: 2022-11-13

## 2022-11-03 RX ORDER — HYDROCODONE BITARTRATE AND ACETAMINOPHEN 10; 325 MG/1; MG/1
TABLET ORAL
COMMUNITY
End: 2022-11-09

## 2022-11-03 RX ORDER — SUCRALFATE 1 G/1
TABLET ORAL
COMMUNITY
End: 2023-03-21 | Stop reason: ALTCHOICE

## 2022-11-03 RX ORDER — FERROUS SULFATE 325(65) MG
1 TABLET, DELAYED RELEASE (ENTERIC COATED) ORAL
COMMUNITY
End: 2023-05-09

## 2022-11-03 RX ORDER — CHLORTHALIDONE 25 MG/1
TABLET ORAL
COMMUNITY
End: 2023-03-06 | Stop reason: SDUPTHER

## 2022-11-03 RX ORDER — IPRATROPIUM BROMIDE AND ALBUTEROL SULFATE 2.5; .5 MG/3ML; MG/3ML
3 SOLUTION RESPIRATORY (INHALATION) EVERY 6 HOURS PRN
Qty: 75 ML | Refills: 3 | Status: SHIPPED | OUTPATIENT
Start: 2022-11-03 | End: 2023-03-06 | Stop reason: SDUPTHER

## 2022-11-03 RX ORDER — VALSARTAN 80 MG/1
1 TABLET ORAL
COMMUNITY
End: 2023-02-27 | Stop reason: SDUPTHER

## 2022-11-03 RX ORDER — METHOCARBAMOL 100 MG/ML
INJECTION, SOLUTION INTRAMUSCULAR; INTRAVENOUS
COMMUNITY
End: 2023-05-09

## 2022-11-03 RX ORDER — FLUTICASONE PROPIONATE 50 MCG
SPRAY, SUSPENSION (ML) NASAL
COMMUNITY
End: 2023-05-09

## 2022-11-03 RX ORDER — SPIRONOLACTONE 25 MG/1
1 TABLET ORAL
COMMUNITY
End: 2023-02-27 | Stop reason: SDUPTHER

## 2022-11-03 RX ORDER — IPRATROPIUM BROMIDE AND ALBUTEROL SULFATE 2.5; .5 MG/3ML; MG/3ML
SOLUTION RESPIRATORY (INHALATION)
COMMUNITY
End: 2023-03-21 | Stop reason: ALTCHOICE

## 2022-11-03 NOTE — PATIENT INSTRUCTIONS
José Manuel Weems,     If you are due for any health screening(s) below please notify me so we can arrange them to be ordered and scheduled to maintain your health. Most healthy patients complete it. Don't lose out on improving your health.     Tests to Keep You Healthy    Mammogram: ORDERED BUT NOT SCHEDULED  Colon Cancer Screening: ORDERED  Last Blood Pressure <= 139/89 (11/3/2022): Yes      Breast Cancer Screening    Breast cancer is the second most common cancer in women after skin cancer, and the second leading cause of death from cancer after lung cancer. Mammograms can detect breast cancer early, which significantly increases the chances of curing the cancer.      A screening mammogram is an x-ray image of the breasts used for early breast cancer detection. It can help reduce the number of deaths from breast cancer among women. To get a clear image, the breast is placed between two plastic plates to make it flat. How often a mammogram is needed depends on your age and your breast cancer risk.    Although you are still overdue for this important screening, due to the COVID-19 pandemic, we recommend scheduling it in the near future.  Colon Cancer Screening    Of cancers affecting both men and women, colorectal cancer is the third leading cancer killer in the [place]. But it doesnt have to be. Screening can prevent colorectal cancer or find it at an early stage when treatment often leads to a cure.    A colonoscopy is the preferred test for detecting colon cancer. It is needed only once every 10 years if results are negative. While sedated, a flexible, lighted tube with a tiny camera is inserted into the rectum and advanced through the colon to look for cancers. An alternative screening test that is used at home and returned to the lab may also be used. It detects hidden blood in bowel movements which could indicate cancer in the colon. If results are positive, you will need a colonoscopy to determine if the blood  is a sign of cancer. This type of follow up (diagnostic) colonoscopy usually requires additional copays as required by your insurance provider. Please contact your PCP if you have any questions.    Although you are still overdue for this important screening, due to the COVID-19 pandemic, we recommend scheduling it in the near future.

## 2022-11-03 NOTE — PROGRESS NOTES
Subjective:       Patient ID: Faustina Rae is a 57 y.o. female.    Chief Complaint: Follow-up (Hospital follow up/)    HPI  Admission Date: 10/16/2022  Hospital Length of Stay: 2 days  Discharge Date and Time:  10/18/2022 7:20 PM    Patient is a 57 year old female with past history of CVA, HTN, CAD, HLD, Anxiety, COPD, DAVID, Morbid Obesity presents today for hospital follow up. She is new to me. She went to ER with c/o central chest aching pain with rads towards neck, HTN, HA and dizziness      In ED: labs reviewed and noted below: normal CBC, normal electrolytes with stage 4 renal dysfunction (was stage 3b May 2022); BNP normal troponin mildly elevated. Influenza and COVID negative. CXR reviewed: NAPD. EKG reviewed: sinus without acute segments      She was treated for hypertensive crisis likely due to poor compliance with home medications. Patient was hypertensive to 200/100 and found to have MAKENNA/CKD although patient is unaware of a CKD diagnosis.  She improved markedly with help of Nephrology and Cardiology teams        Nephrology 12/5/22  Past Medical History:   Diagnosis Date    Anxiety     Arthritis     Asthma     CHF (congestive heart failure)     COPD (chronic obstructive pulmonary disease)     Hyperlipemia     Hypertension     Leaky heart valve     Obese     Obese     Obstructive sleep apnea     lost her CPAP    Pneumonia     Rheumatoid arthritis(714.0)     Stroke        Review of patient's allergies indicates:  No Known Allergies      Current Outpatient Medications:     albuterol (PROVENTIL/VENTOLIN HFA) 90 mcg/actuation inhaler, INHALE 2 PUFFS INTO THE LUNGS FOUR TIMES DAILY, Disp: 8.5 g, Rfl: 0    ALBUTEROL, BULK, MISC, , Disp: , Rfl:     albuterol-ipratropium (DUO-NEB) 2.5 mg-0.5 mg/3 mL nebulizer solution, , Disp: , Rfl:     albuterol-ipratropium (DUO-NEB) 2.5 mg-0.5 mg/3 mL nebulizer solution, Take 3 mLs by nebulization every 6 (six) hours as needed for Wheezing or Shortness of Breath. Rescue,  Disp: 75 mL, Rfl: 3    amLODIPine (NORVASC) 10 MG tablet, Take 1 tablet (10 mg total) by mouth once daily., Disp: 90 tablet, Rfl: 0    atorvastatin (LIPITOR) 80 MG tablet, Take 1 tablet (80 mg total) by mouth every evening., Disp: 90 tablet, Rfl: 0    b complex vitamins tablet, Take 1 tablet by mouth once daily., Disp: , Rfl:     calcium citrate-vitamin D3 315-200 mg (CITRACAL+D) 315-200 mg-unit per tablet, Take 1 tablet by mouth once daily. , Disp: , Rfl:     chlorthalidone (HYGROTEN) 25 MG Tab, 1 tablet in the morning with food, Disp: , Rfl:     cyanocobalamin (VITAMIN B-12) 1000 MCG tablet, Take 1 tablet (1,000 mcg total) by mouth once daily., Disp: 90 tablet, Rfl: 3    EScitalopram oxalate (LEXAPRO) 10 MG tablet, Take 1 tablet (10 mg total) by mouth once daily., Disp: 90 tablet, Rfl: 1    ferrous sulfate 325 (65 FE) MG EC tablet, Take 1 tablet (325 mg total) by mouth once daily., Disp: 90 tablet, Rfl: 3    ferrous sulfate 325 (65 FE) MG EC tablet, 1 tablet., Disp: , Rfl:     fluticasone propionate (FLONASE) 50 mcg/actuation nasal spray, 2 sprays (100 mcg total) by Each Nostril route once daily., Disp: 16 g, Rfl: 11    fluticasone propionate (FLONASE) 50 mcg/actuation nasal spray, , Disp: , Rfl:     fluticasone propionate (FLOVENT HFA) 110 mcg/actuation inhaler, Inhale 1 puff into the lungs 2 (two) times daily. Controller. Rinse mouth with water and spit out after use., Disp: 12 g, Rfl: 11    hydrALAZINE (APRESOLINE) 50 MG tablet, Take 1 tablet (50 mg total) by mouth every 8 (eight) hours., Disp: 270 tablet, Rfl: 3    HYDROcodone-acetaminophen (NORCO) 5-325 mg per tablet, Take 1 tablet by mouth every 4 (four) hours as needed for Pain., Disp: 18 tablet, Rfl: 0    isosorbide dinitrate (ISORDIL) 5 MG Tab, Take 1 tablet (5 mg total) by mouth 3 (three) times daily., Disp: 90 tablet, Rfl: 0    meclizine (ANTIVERT) 25 mg tablet, Take 1 tablet (25 mg total) by mouth 3 (three) times daily as needed for Dizziness., Disp:  20 tablet, Rfl: 0    methocarbamoL (ROBAXIN) 100 mg/mL injection, , Disp: , Rfl:     metolazone (ZAROXOLYN ORAL), , Disp: , Rfl:     metoprolol succinate (TOPROL-XL) 25 MG 24 hr tablet, Take 1 tablet (25 mg total) by mouth once daily., Disp: 30 tablet, Rfl: 0    montelukast (SINGULAIR) 10 mg tablet, Take 1 tablet (10 mg total) by mouth every evening., Disp: 90 tablet, Rfl: 3    multivitamin (THERAGRAN) per tablet, Take 1 tablet by mouth once daily., Disp: 90 tablet, Rfl: 3    nitrofurantoin, macrocrystal-monohydrate, (MACROBID) 100 MG capsule, 1 capsule at bedtime with food, Disp: , Rfl:     nystatin (MYCOSTATIN ORAL), , Disp: , Rfl:     pantoprazole (PROTONIX) 40 MG tablet, Take 1 tablet (40 mg total) by mouth once daily., Disp: 90 tablet, Rfl: 1    spironolactone (ALDACTONE) 25 MG tablet, 1 tablet., Disp: , Rfl:     sucralfate (CARAFATE) 1 gram tablet, 1 tablet on an empty stomach, Disp: , Rfl:     tramadol-acetaminophen 37.5-325 mg (ULTRACET) 37.5-325 mg Tab, Take 1 tablet by mouth every 4 (four) hours., Disp: 20 tablet, Rfl: 0    traZODone (DESYREL) 50 MG tablet, 1 tab po qhs prn for insomnia., Disp: 90 tablet, Rfl: 1    valsartan (DIOVAN) 80 MG tablet, 1 tablet., Disp: , Rfl:     clopidogreL (PLAVIX) 75 mg tablet, Take 1 tablet (75 mg total) by mouth once daily., Disp: 30 tablet, Rfl: 11    HYDROcodone-acetaminophen (NORCO)  mg per tablet, , Disp: , Rfl:     nystatin (MYCOSTATIN) powder, Apply topically 2 (two) times daily., Disp: 60 g, Rfl: 2    promethazine-dextromethorphan (PROMETHAZINE-DM) 6.25-15 mg/5 mL Syrp, Take 5 mLs by mouth 4 (four) times daily as needed., Disp: 118 mL, Rfl: 1    Review of Systems   Constitutional:  Negative for unexpected weight change.   HENT:  Negative for trouble swallowing.    Eyes:  Negative for visual disturbance.   Respiratory:  Negative for shortness of breath.    Cardiovascular:  Negative for chest pain, palpitations and leg swelling.   Gastrointestinal:  Negative for  "blood in stool.   Genitourinary:  Negative for hematuria.   Skin:  Negative for rash.   Allergic/Immunologic: Negative for immunocompromised state.   Neurological:  Negative for headaches.   Hematological:  Does not bruise/bleed easily.   Psychiatric/Behavioral:  Negative for agitation. The patient is not nervous/anxious.      Objective:      /70 (BP Location: Left arm, Patient Position: Sitting, BP Method: Large (Manual))   Pulse 76   Temp 98.1 °F (36.7 °C) (Oral)   Ht 5' 2" (1.575 m)   Wt 103.6 kg (228 lb 6.3 oz)   LMP  (LMP Unknown) Comment: Does not menstruate   SpO2 95%   BMI 41.77 kg/m²   Physical Exam  Constitutional:       Appearance: She is well-developed.   HENT:      Head: Normocephalic.   Cardiovascular:      Rate and Rhythm: Normal rate and regular rhythm.      Heart sounds: Normal heart sounds.   Pulmonary:      Effort: Pulmonary effort is normal.   Musculoskeletal:         General: Normal range of motion.   Skin:     General: Skin is warm and dry.   Neurological:      Mental Status: She is alert and oriented to person, place, and time.   Psychiatric:         Behavior: Behavior normal.         Thought Content: Thought content normal.         Judgment: Judgment normal.       Assessment:       1. Hospital discharge follow-up    2. Essential hypertension    3. History of CVA (cerebrovascular accident)    4. Encounter for screening mammogram for malignant neoplasm of breast    5. Encounter for vaccination    6. Flu vaccine need    7. Colon cancer screening    8. COPD with asthma    9. Acute cough    10. Major depressive disorder, single episode, in full remission    11. Hemiparesis affecting left side as late effect of cerebrovascular accident    12. Obesity, morbid, BMI 40.0-49.9        Plan:       Hospital discharge follow-up  -     COMPREHENSIVE METABOLIC PANEL; Future; Expected date: 11/17/2022  -     CBC W/ AUTO DIFFERENTIAL; Future; Expected date: 11/17/2022  -     TROPONIN I; Future; " "Expected date: 11/03/2022    Essential hypertension  -     CBC W/ AUTO DIFFERENTIAL; Future; Expected date: 11/17/2022  Stable, continue  condition  Low sodium diet  BP Readings from Last 3 Encounters:   11/03/22 132/70   10/18/22 126/71   10/03/22 (!) 166/85      History of CVA (cerebrovascular accident)  Stable, on Plavix  Encounter for screening mammogram for malignant neoplasm of breast  -     Mammo Digital Screening Bilat w/ Ramin; Future; Expected date: 11/03/2022    Encounter for vaccination  -     (In Office Administered) Pneumococcal Conjugate Vaccine (20 Valent) (IM)    Flu vaccine need  -     Influenza - Quadrivalent *Preferred* (6 months+) (PF)    Colon cancer screening  -     Fecal Immunochemical Test (iFOBT); Future; Expected date: 11/03/2022    COPD with asthma  -     albuterol-ipratropium (DUO-NEB) 2.5 mg-0.5 mg/3 mL nebulizer solution; Take 3 mLs by nebulization every 6 (six) hours as needed for Wheezing or Shortness of Breath. Rescue  Dispense: 75 mL; Refill: 3  Stable, continue medication  Acute cough  -     promethazine-dextromethorphan (PROMETHAZINE-DM) 6.25-15 mg/5 mL Syrp; Take 5 mLs by mouth 4 (four) times daily as needed.  Dispense: 118 mL; Refill: 1    Major depressive disorder, single episode, in full remission  Stable, continue medication  Hemiparesis affecting left side as late effect of cerebrovascular accident  Stable, has PT/OT  On Plavix  Obesity, morbid, BMI 40.0-49.9    Counseled patient on his ideal body weight, health consequences of being obese and current recommendations including weekly exercise and a heart healthy diet.  Current BMI is:Estimated body mass index is 41.77 kg/m² as calculated from the following:    Height as of this encounter: 5' 2" (1.575 m).    Weight as of this encounter: 103.6 kg (228 lb 6.3 oz)..  Patient is aware that ideal BMI < 25 or Weight in (lb) to have BMI = 25: 136.4.       Patient readiness: acceptance and barriers:none    During the course of the " visit the patient was educated and counseled about the following:     Hypertension:   Dietary sodium restriction.  Regular aerobic exercise.  Obesity:   General weight loss/lifestyle modification strategies discussed (elicit support from others; identify saboteurs; non-food rewards, etc).  Regular aerobic exercise program discussed.    Goals: Hypertension: Reduce Blood Pressure and Obesity: Reduce calorie intake and BMI    Did patient meet goals/outcomes: Yes    The following self management tools provided: declined    Patient Instructions (the written plan) was given to the patient/family.     Time spent with patient: 15 minutes    Barriers to medications present (no )    Adverse reactions to current medications (no)    Over the counter medications reviewed (Yes)

## 2022-11-17 ENCOUNTER — HOSPITAL ENCOUNTER (OUTPATIENT)
Dept: RADIOLOGY | Facility: CLINIC | Age: 57
Discharge: HOME | End: 2022-11-17
Attending: NURSE PRACTITIONER
Payer: MEDICARE

## 2022-11-17 DIAGNOSIS — Z12.31 ENCOUNTER FOR SCREENING MAMMOGRAM FOR MALIGNANT NEOPLASM OF BREAST: ICD-10-CM

## 2022-11-17 PROCEDURE — 77063 MAMMO DIGITAL SCREENING BILAT WITH TOMO: ICD-10-PCS | Mod: 26,,, | Performed by: RADIOLOGY

## 2022-11-17 PROCEDURE — 77067 MAMMO DIGITAL SCREENING BILAT WITH TOMO: ICD-10-PCS | Mod: 26,,, | Performed by: RADIOLOGY

## 2022-11-17 PROCEDURE — 77063 BREAST TOMOSYNTHESIS BI: CPT | Mod: TC,PO

## 2022-11-17 PROCEDURE — 77063 BREAST TOMOSYNTHESIS BI: CPT | Mod: 26,,, | Performed by: RADIOLOGY

## 2022-11-17 PROCEDURE — 77067 SCR MAMMO BI INCL CAD: CPT | Mod: 26,,, | Performed by: RADIOLOGY

## 2022-12-08 ENCOUNTER — OFFICE VISIT (OUTPATIENT)
Dept: FAMILY MEDICINE | Facility: CLINIC | Age: 57
End: 2022-12-08
Payer: MEDICARE

## 2022-12-08 VITALS
RESPIRATION RATE: 16 BRPM | OXYGEN SATURATION: 93 % | SYSTOLIC BLOOD PRESSURE: 122 MMHG | WEIGHT: 226.88 LBS | HEIGHT: 62 IN | BODY MASS INDEX: 41.75 KG/M2 | HEART RATE: 82 BPM | DIASTOLIC BLOOD PRESSURE: 78 MMHG | TEMPERATURE: 98 F

## 2022-12-08 DIAGNOSIS — H57.89 REDNESS AND DISCHARGE OF EYE: Primary | ICD-10-CM

## 2022-12-08 PROCEDURE — 99213 PR OFFICE/OUTPT VISIT, EST, LEVL III, 20-29 MIN: ICD-10-PCS | Mod: S$GLB,,, | Performed by: NURSE PRACTITIONER

## 2022-12-08 PROCEDURE — 1159F MED LIST DOCD IN RCRD: CPT | Mod: CPTII,S$GLB,, | Performed by: NURSE PRACTITIONER

## 2022-12-08 PROCEDURE — 1160F PR REVIEW ALL MEDS BY PRESCRIBER/CLIN PHARMACIST DOCUMENTED: ICD-10-PCS | Mod: CPTII,S$GLB,, | Performed by: NURSE PRACTITIONER

## 2022-12-08 PROCEDURE — 3074F PR MOST RECENT SYSTOLIC BLOOD PRESSURE < 130 MM HG: ICD-10-PCS | Mod: CPTII,S$GLB,, | Performed by: NURSE PRACTITIONER

## 2022-12-08 PROCEDURE — 3078F DIAST BP <80 MM HG: CPT | Mod: CPTII,S$GLB,, | Performed by: NURSE PRACTITIONER

## 2022-12-08 PROCEDURE — 3074F SYST BP LT 130 MM HG: CPT | Mod: CPTII,S$GLB,, | Performed by: NURSE PRACTITIONER

## 2022-12-08 PROCEDURE — 99999 PR PBB SHADOW E&M-EST. PATIENT-LVL V: ICD-10-PCS | Mod: PBBFAC,,, | Performed by: NURSE PRACTITIONER

## 2022-12-08 PROCEDURE — 4010F PR ACE/ARB THEARPY RXD/TAKEN: ICD-10-PCS | Mod: CPTII,S$GLB,, | Performed by: NURSE PRACTITIONER

## 2022-12-08 PROCEDURE — 3008F BODY MASS INDEX DOCD: CPT | Mod: CPTII,S$GLB,, | Performed by: NURSE PRACTITIONER

## 2022-12-08 PROCEDURE — 3078F PR MOST RECENT DIASTOLIC BLOOD PRESSURE < 80 MM HG: ICD-10-PCS | Mod: CPTII,S$GLB,, | Performed by: NURSE PRACTITIONER

## 2022-12-08 PROCEDURE — 3008F PR BODY MASS INDEX (BMI) DOCUMENTED: ICD-10-PCS | Mod: CPTII,S$GLB,, | Performed by: NURSE PRACTITIONER

## 2022-12-08 PROCEDURE — 4010F ACE/ARB THERAPY RXD/TAKEN: CPT | Mod: CPTII,S$GLB,, | Performed by: NURSE PRACTITIONER

## 2022-12-08 PROCEDURE — 3044F HG A1C LEVEL LT 7.0%: CPT | Mod: CPTII,S$GLB,, | Performed by: NURSE PRACTITIONER

## 2022-12-08 PROCEDURE — 99999 PR PBB SHADOW E&M-EST. PATIENT-LVL V: CPT | Mod: PBBFAC,,, | Performed by: NURSE PRACTITIONER

## 2022-12-08 PROCEDURE — 1160F RVW MEDS BY RX/DR IN RCRD: CPT | Mod: CPTII,S$GLB,, | Performed by: NURSE PRACTITIONER

## 2022-12-08 PROCEDURE — 1159F PR MEDICATION LIST DOCUMENTED IN MEDICAL RECORD: ICD-10-PCS | Mod: CPTII,S$GLB,, | Performed by: NURSE PRACTITIONER

## 2022-12-08 PROCEDURE — 99213 OFFICE O/P EST LOW 20 MIN: CPT | Mod: S$GLB,,, | Performed by: NURSE PRACTITIONER

## 2022-12-08 PROCEDURE — 3044F PR MOST RECENT HEMOGLOBIN A1C LEVEL <7.0%: ICD-10-PCS | Mod: CPTII,S$GLB,, | Performed by: NURSE PRACTITIONER

## 2022-12-08 RX ORDER — CIPROFLOXACIN HYDROCHLORIDE 3 MG/ML
1 SOLUTION/ DROPS OPHTHALMIC EVERY 4 HOURS
Qty: 2.1 ML | Refills: 0 | Status: SHIPPED | OUTPATIENT
Start: 2022-12-08 | End: 2022-12-15

## 2022-12-08 NOTE — PROGRESS NOTES
Subjective:       Patient ID: Faustina Rae is a 57 y.o. female.    Chief Complaint: Eye Drainage    Eye Problem   Both eyes are affected. This is a new problem. The current episode started in the past 7 days. The problem occurs constantly. The problem has been gradually improving. There was no injury mechanism. The pain is at a severity of 0/10. Associated symptoms include an eye discharge. Pertinent negatives include no double vision or photophobia.     Past Medical History:   Diagnosis Date    Anxiety     Arthritis     Asthma     CHF (congestive heart failure)     COPD (chronic obstructive pulmonary disease)     Hyperlipemia     Hypertension     Leaky heart valve     Obese     Obese     Obstructive sleep apnea     lost her CPAP    Pneumonia     Rheumatoid arthritis(714.0)     Stroke        Review of patient's allergies indicates:  No Known Allergies      Current Outpatient Medications:     albuterol (PROVENTIL/VENTOLIN HFA) 90 mcg/actuation inhaler, INHALE 2 PUFFS INTO THE LUNGS FOUR TIMES DAILY, Disp: 8.5 g, Rfl: 0    ALBUTEROL, BULK, MISC, , Disp: , Rfl:     albuterol-ipratropium (DUO-NEB) 2.5 mg-0.5 mg/3 mL nebulizer solution, , Disp: , Rfl:     albuterol-ipratropium (DUO-NEB) 2.5 mg-0.5 mg/3 mL nebulizer solution, Take 3 mLs by nebulization every 6 (six) hours as needed for Wheezing or Shortness of Breath. Rescue, Disp: 75 mL, Rfl: 3    amLODIPine (NORVASC) 10 MG tablet, Take 1 tablet (10 mg total) by mouth once daily., Disp: 90 tablet, Rfl: 0    atorvastatin (LIPITOR) 80 MG tablet, Take 1 tablet (80 mg total) by mouth every evening., Disp: 90 tablet, Rfl: 0    b complex vitamins tablet, Take 1 tablet by mouth once daily., Disp: , Rfl:     calcium citrate-vitamin D3 315-200 mg (CITRACAL+D) 315-200 mg-unit per tablet, Take 1 tablet by mouth once daily. , Disp: , Rfl:     chlorthalidone (HYGROTEN) 25 MG Tab, 1 tablet in the morning with food, Disp: , Rfl:     cyanocobalamin (VITAMIN B-12) 1000 MCG tablet,  Take 1 tablet (1,000 mcg total) by mouth once daily., Disp: 90 tablet, Rfl: 3    EScitalopram oxalate (LEXAPRO) 10 MG tablet, Take 1 tablet (10 mg total) by mouth once daily., Disp: 90 tablet, Rfl: 1    ferrous sulfate 325 (65 FE) MG EC tablet, Take 1 tablet (325 mg total) by mouth once daily., Disp: 90 tablet, Rfl: 3    ferrous sulfate 325 (65 FE) MG EC tablet, 1 tablet., Disp: , Rfl:     fluticasone propionate (FLONASE) 50 mcg/actuation nasal spray, 2 sprays (100 mcg total) by Each Nostril route once daily., Disp: 16 g, Rfl: 11    fluticasone propionate (FLONASE) 50 mcg/actuation nasal spray, , Disp: , Rfl:     fluticasone propionate (FLOVENT HFA) 110 mcg/actuation inhaler, Inhale 1 puff into the lungs 2 (two) times daily. Controller. Rinse mouth with water and spit out after use., Disp: 12 g, Rfl: 11    hydrALAZINE (APRESOLINE) 50 MG tablet, Take 1 tablet (50 mg total) by mouth every 8 (eight) hours., Disp: 270 tablet, Rfl: 3    HYDROcodone-acetaminophen (NORCO) 5-325 mg per tablet, Take 1 tablet by mouth every 4 (four) hours as needed for Pain., Disp: 18 tablet, Rfl: 0    meclizine (ANTIVERT) 25 mg tablet, Take 1 tablet (25 mg total) by mouth 3 (three) times daily as needed for Dizziness., Disp: 20 tablet, Rfl: 0    methocarbamoL (ROBAXIN) 100 mg/mL injection, , Disp: , Rfl:     metolazone (ZAROXOLYN ORAL), , Disp: , Rfl:     montelukast (SINGULAIR) 10 mg tablet, Take 1 tablet (10 mg total) by mouth every evening., Disp: 90 tablet, Rfl: 3    multivitamin (THERAGRAN) per tablet, Take 1 tablet by mouth once daily., Disp: 90 tablet, Rfl: 3    nitrofurantoin, macrocrystal-monohydrate, (MACROBID) 100 MG capsule, 1 capsule at bedtime with food, Disp: , Rfl:     nystatin (MYCOSTATIN ORAL), , Disp: , Rfl:     pantoprazole (PROTONIX) 40 MG tablet, Take 1 tablet (40 mg total) by mouth once daily., Disp: 90 tablet, Rfl: 1    spironolactone (ALDACTONE) 25 MG tablet, 1 tablet., Disp: , Rfl:     sucralfate (CARAFATE) 1 gram  "tablet, 1 tablet on an empty stomach, Disp: , Rfl:     tramadol-acetaminophen 37.5-325 mg (ULTRACET) 37.5-325 mg Tab, Take 1 tablet by mouth every 4 (four) hours., Disp: 20 tablet, Rfl: 0    traZODone (DESYREL) 50 MG tablet, 1 tab po qhs prn for insomnia., Disp: 90 tablet, Rfl: 1    valsartan (DIOVAN) 80 MG tablet, 1 tablet., Disp: , Rfl:     ciprofloxacin HCl (CILOXAN) 0.3 % ophthalmic solution, Place 1 drop into both eyes every 4 (four) hours. for 7 days, Disp: 2.1 mL, Rfl: 0    clopidogreL (PLAVIX) 75 mg tablet, Take 1 tablet (75 mg total) by mouth once daily., Disp: 30 tablet, Rfl: 11    isosorbide dinitrate (ISORDIL) 5 MG Tab, Take 1 tablet (5 mg total) by mouth 3 (three) times daily., Disp: 90 tablet, Rfl: 0    metoprolol succinate (TOPROL-XL) 25 MG 24 hr tablet, Take 1 tablet (25 mg total) by mouth once daily., Disp: 30 tablet, Rfl: 0    Review of Systems   Eyes:  Positive for discharge. Negative for double vision and photophobia.     Objective:      /78 (BP Location: Left arm, Patient Position: Sitting, BP Method: Large (Manual))   Pulse 82   Temp 98.2 °F (36.8 °C) (Oral)   Resp 16   Ht 5' 2" (1.575 m)   Wt 102.9 kg (226 lb 13.7 oz)   LMP  (LMP Unknown) Comment: Does not menstruate   SpO2 (!) 93%   BMI 41.49 kg/m²   Physical Exam  Constitutional:       Appearance: She is well-developed.   HENT:      Head: Normocephalic.   Eyes:      Conjunctiva/sclera:      Right eye: Right conjunctiva is injected.     Cardiovascular:      Rate and Rhythm: Normal rate.   Pulmonary:      Effort: Pulmonary effort is normal.   Neurological:      Mental Status: She is alert and oriented to person, place, and time.   Psychiatric:         Behavior: Behavior normal.         Thought Content: Thought content normal.         Judgment: Judgment normal.       Assessment:       1. Redness and discharge of eye        Plan:       Redness and discharge of eye  -     ciprofloxacin HCl (CILOXAN) 0.3 % ophthalmic solution; Place " 1 drop into both eyes every 4 (four) hours. for 7 days  Dispense: 2.1 mL; Refill: 0      Time spent with patient: 20 minutes    Patient with be reevaluated in 4 weeks or sooner prn    Greater than 50% of this visit was spent counseling as described in above documentation:Yes

## 2022-12-13 ENCOUNTER — HOSPITAL ENCOUNTER (OUTPATIENT)
Dept: RADIOLOGY | Facility: HOSPITAL | Age: 57
Discharge: HOME | End: 2022-12-13
Attending: NURSE PRACTITIONER
Payer: MEDICARE

## 2022-12-13 DIAGNOSIS — R92.8 ABNORMAL MAMMOGRAM OF LEFT BREAST: ICD-10-CM

## 2022-12-13 PROCEDURE — 77065 DX MAMMO INCL CAD UNI: CPT | Mod: 26,LT,, | Performed by: RADIOLOGY

## 2022-12-13 PROCEDURE — 76642 US BREAST LEFT LIMITED: ICD-10-PCS | Mod: 26,LT,, | Performed by: RADIOLOGY

## 2022-12-13 PROCEDURE — 77065 DX MAMMO INCL CAD UNI: CPT | Mod: TC,LT

## 2022-12-13 PROCEDURE — 77065 MAMMO DIGITAL DIAGNOSTIC LEFT WITH TOMO: ICD-10-PCS | Mod: 26,LT,, | Performed by: RADIOLOGY

## 2022-12-13 PROCEDURE — 77061 BREAST TOMOSYNTHESIS UNI: CPT | Mod: 26,LT,, | Performed by: RADIOLOGY

## 2022-12-13 PROCEDURE — 76642 ULTRASOUND BREAST LIMITED: CPT | Mod: TC,LT

## 2022-12-13 PROCEDURE — 76642 ULTRASOUND BREAST LIMITED: CPT | Mod: 26,LT,, | Performed by: RADIOLOGY

## 2022-12-13 PROCEDURE — 77061 MAMMO DIGITAL DIAGNOSTIC LEFT WITH TOMO: ICD-10-PCS | Mod: 26,LT,, | Performed by: RADIOLOGY

## 2023-01-05 ENCOUNTER — LAB VISIT (OUTPATIENT)
Dept: LAB | Facility: HOSPITAL | Age: 58
End: 2023-01-05
Attending: INTERNAL MEDICINE
Payer: MEDICAID

## 2023-01-05 DIAGNOSIS — N25.81 SECONDARY HYPERPARATHYROIDISM OF RENAL ORIGIN: ICD-10-CM

## 2023-01-05 DIAGNOSIS — R80.9 PROTEINURIA: ICD-10-CM

## 2023-01-05 DIAGNOSIS — N18.4 CHRONIC KIDNEY DISEASE, STAGE IV (SEVERE): Primary | ICD-10-CM

## 2023-01-05 DIAGNOSIS — I10 HYPERTENSION, ESSENTIAL: ICD-10-CM

## 2023-01-05 LAB
ALBUMIN SERPL BCP-MCNC: 3.1 G/DL (ref 3.5–5.2)
ANION GAP SERPL CALC-SCNC: 7 MMOL/L (ref 8–16)
BASOPHILS # BLD AUTO: 0.01 K/UL (ref 0–0.2)
BASOPHILS NFR BLD: 0.2 % (ref 0–1.9)
BUN SERPL-MCNC: 33 MG/DL (ref 6–20)
CALCIUM SERPL-MCNC: 9.4 MG/DL (ref 8.7–10.5)
CHLORIDE SERPL-SCNC: 111 MMOL/L (ref 95–110)
CO2 SERPL-SCNC: 23 MMOL/L (ref 23–29)
CREAT SERPL-MCNC: 2.9 MG/DL (ref 0.5–1.4)
DIFFERENTIAL METHOD: ABNORMAL
EOSINOPHIL # BLD AUTO: 0.1 K/UL (ref 0–0.5)
EOSINOPHIL NFR BLD: 0.9 % (ref 0–8)
ERYTHROCYTE [DISTWIDTH] IN BLOOD BY AUTOMATED COUNT: 13.8 % (ref 11.5–14.5)
EST. GFR  (NO RACE VARIABLE): 18.1 ML/MIN/1.73 M^2
GLUCOSE SERPL-MCNC: 72 MG/DL (ref 70–110)
HCT VFR BLD AUTO: 35.4 % (ref 37–48.5)
HGB BLD-MCNC: 11 G/DL (ref 12–16)
IMM GRANULOCYTES # BLD AUTO: 0.02 K/UL (ref 0–0.04)
IMM GRANULOCYTES NFR BLD AUTO: 0.4 % (ref 0–0.5)
LYMPHOCYTES # BLD AUTO: 1.4 K/UL (ref 1–4.8)
LYMPHOCYTES NFR BLD: 25.2 % (ref 18–48)
MAGNESIUM SERPL-MCNC: 1.8 MG/DL (ref 1.6–2.6)
MCH RBC QN AUTO: 30.1 PG (ref 27–31)
MCHC RBC AUTO-ENTMCNC: 31.1 G/DL (ref 32–36)
MCV RBC AUTO: 97 FL (ref 82–98)
MONOCYTES # BLD AUTO: 0.4 K/UL (ref 0.3–1)
MONOCYTES NFR BLD: 6.7 % (ref 4–15)
NEUTROPHILS # BLD AUTO: 3.6 K/UL (ref 1.8–7.7)
NEUTROPHILS NFR BLD: 66.6 % (ref 38–73)
NRBC BLD-RTO: 0 /100 WBC
PHOSPHATE SERPL-MCNC: 2.5 MG/DL (ref 2.7–4.5)
PLATELET # BLD AUTO: 262 K/UL (ref 150–450)
PMV BLD AUTO: 10.6 FL (ref 9.2–12.9)
POTASSIUM SERPL-SCNC: 4.4 MMOL/L (ref 3.5–5.1)
RBC # BLD AUTO: 3.66 M/UL (ref 4–5.4)
SODIUM SERPL-SCNC: 141 MMOL/L (ref 136–145)
URATE SERPL-MCNC: 7.9 MG/DL (ref 2.4–5.7)
WBC # BLD AUTO: 5.35 K/UL (ref 3.9–12.7)

## 2023-01-05 PROCEDURE — 83970 ASSAY OF PARATHORMONE: CPT | Mod: HCNC | Performed by: INTERNAL MEDICINE

## 2023-01-05 PROCEDURE — 82306 VITAMIN D 25 HYDROXY: CPT | Mod: HCNC | Performed by: INTERNAL MEDICINE

## 2023-01-05 PROCEDURE — 80069 RENAL FUNCTION PANEL: CPT | Mod: HCNC | Performed by: INTERNAL MEDICINE

## 2023-01-05 PROCEDURE — 84550 ASSAY OF BLOOD/URIC ACID: CPT | Mod: HCNC | Performed by: INTERNAL MEDICINE

## 2023-01-05 PROCEDURE — 85025 COMPLETE CBC W/AUTO DIFF WBC: CPT | Mod: HCNC | Performed by: INTERNAL MEDICINE

## 2023-01-05 PROCEDURE — 83735 ASSAY OF MAGNESIUM: CPT | Mod: HCNC | Performed by: INTERNAL MEDICINE

## 2023-01-06 LAB
25(OH)D3+25(OH)D2 SERPL-MCNC: 24 NG/ML (ref 30–96)
PTH-INTACT SERPL-MCNC: 257.9 PG/ML (ref 9–77)

## 2023-02-03 ENCOUNTER — OFFICE VISIT (OUTPATIENT)
Dept: FAMILY MEDICINE | Facility: CLINIC | Age: 58
End: 2023-02-03
Payer: MEDICARE

## 2023-02-03 VITALS
TEMPERATURE: 98 F | DIASTOLIC BLOOD PRESSURE: 78 MMHG | HEART RATE: 96 BPM | HEIGHT: 62 IN | WEIGHT: 228.19 LBS | BODY MASS INDEX: 41.99 KG/M2 | SYSTOLIC BLOOD PRESSURE: 140 MMHG | OXYGEN SATURATION: 96 %

## 2023-02-03 DIAGNOSIS — R30.0 DYSURIA: Primary | ICD-10-CM

## 2023-02-03 PROCEDURE — 1159F PR MEDICATION LIST DOCUMENTED IN MEDICAL RECORD: ICD-10-PCS | Mod: HCNC,CPTII,S$GLB, | Performed by: NURSE PRACTITIONER

## 2023-02-03 PROCEDURE — 99999 PR PBB SHADOW E&M-EST. PATIENT-LVL V: CPT | Mod: PBBFAC,HCNC,, | Performed by: NURSE PRACTITIONER

## 2023-02-03 PROCEDURE — 1159F MED LIST DOCD IN RCRD: CPT | Mod: HCNC,CPTII,S$GLB, | Performed by: NURSE PRACTITIONER

## 2023-02-03 PROCEDURE — 99213 OFFICE O/P EST LOW 20 MIN: CPT | Mod: HCNC,S$GLB,, | Performed by: NURSE PRACTITIONER

## 2023-02-03 PROCEDURE — 99213 PR OFFICE/OUTPT VISIT, EST, LEVL III, 20-29 MIN: ICD-10-PCS | Mod: HCNC,S$GLB,, | Performed by: NURSE PRACTITIONER

## 2023-02-03 PROCEDURE — 3008F BODY MASS INDEX DOCD: CPT | Mod: HCNC,CPTII,S$GLB, | Performed by: NURSE PRACTITIONER

## 2023-02-03 PROCEDURE — 1160F RVW MEDS BY RX/DR IN RCRD: CPT | Mod: HCNC,CPTII,S$GLB, | Performed by: NURSE PRACTITIONER

## 2023-02-03 PROCEDURE — 3077F SYST BP >= 140 MM HG: CPT | Mod: HCNC,CPTII,S$GLB, | Performed by: NURSE PRACTITIONER

## 2023-02-03 PROCEDURE — 99999 PR PBB SHADOW E&M-EST. PATIENT-LVL V: ICD-10-PCS | Mod: PBBFAC,HCNC,, | Performed by: NURSE PRACTITIONER

## 2023-02-03 PROCEDURE — 87086 URINE CULTURE/COLONY COUNT: CPT | Mod: HCNC | Performed by: NURSE PRACTITIONER

## 2023-02-03 PROCEDURE — 3008F PR BODY MASS INDEX (BMI) DOCUMENTED: ICD-10-PCS | Mod: HCNC,CPTII,S$GLB, | Performed by: NURSE PRACTITIONER

## 2023-02-03 PROCEDURE — 3078F DIAST BP <80 MM HG: CPT | Mod: HCNC,CPTII,S$GLB, | Performed by: NURSE PRACTITIONER

## 2023-02-03 PROCEDURE — 3078F PR MOST RECENT DIASTOLIC BLOOD PRESSURE < 80 MM HG: ICD-10-PCS | Mod: HCNC,CPTII,S$GLB, | Performed by: NURSE PRACTITIONER

## 2023-02-03 PROCEDURE — 1160F PR REVIEW ALL MEDS BY PRESCRIBER/CLIN PHARMACIST DOCUMENTED: ICD-10-PCS | Mod: HCNC,CPTII,S$GLB, | Performed by: NURSE PRACTITIONER

## 2023-02-03 PROCEDURE — 81001 URINALYSIS AUTO W/SCOPE: CPT | Mod: HCNC | Performed by: NURSE PRACTITIONER

## 2023-02-03 PROCEDURE — 3077F PR MOST RECENT SYSTOLIC BLOOD PRESSURE >= 140 MM HG: ICD-10-PCS | Mod: HCNC,CPTII,S$GLB, | Performed by: NURSE PRACTITIONER

## 2023-02-03 RX ORDER — NITROFURANTOIN 25; 75 MG/1; MG/1
100 CAPSULE ORAL 2 TIMES DAILY
Qty: 14 CAPSULE | Refills: 0 | Status: SHIPPED | OUTPATIENT
Start: 2023-02-03 | End: 2023-02-10

## 2023-02-03 NOTE — PROGRESS NOTES
Subjective:       Patient ID: Faustina Rae is a 58 y.o. female.    Chief Complaint: Urinary Tract Infection    Dysuria   This is a new problem. The current episode started 1 to 4 weeks ago. The problem occurs intermittently. The patient is experiencing no pain. There has been no fever. Associated symptoms include frequency and urgency. Pertinent negatives include no hematuria, nausea or rash. Associated symptoms comments: Malodorous urine.     Past Medical History:   Diagnosis Date    Anxiety     Arthritis     Asthma     CHF (congestive heart failure)     COPD (chronic obstructive pulmonary disease)     Hyperlipemia     Hypertension     Leaky heart valve     Obese     Obese     Obstructive sleep apnea     lost her CPAP    Pneumonia     Rheumatoid arthritis(714.0)     Stroke        Review of patient's allergies indicates:  No Known Allergies      Current Outpatient Medications:     albuterol (PROVENTIL/VENTOLIN HFA) 90 mcg/actuation inhaler, INHALE 2 PUFFS INTO THE LUNGS FOUR TIMES DAILY, Disp: 8.5 g, Rfl: 0    ALBUTEROL, BULK, MISC, , Disp: , Rfl:     albuterol-ipratropium (DUO-NEB) 2.5 mg-0.5 mg/3 mL nebulizer solution, , Disp: , Rfl:     albuterol-ipratropium (DUO-NEB) 2.5 mg-0.5 mg/3 mL nebulizer solution, Take 3 mLs by nebulization every 6 (six) hours as needed for Wheezing or Shortness of Breath. Rescue, Disp: 75 mL, Rfl: 3    b complex vitamins tablet, Take 1 tablet by mouth once daily., Disp: , Rfl:     calcium citrate-vitamin D3 315-200 mg (CITRACAL+D) 315-200 mg-unit per tablet, Take 1 tablet by mouth once daily. , Disp: , Rfl:     chlorthalidone (HYGROTEN) 25 MG Tab, 1 tablet in the morning with food, Disp: , Rfl:     cyanocobalamin (VITAMIN B-12) 1000 MCG tablet, Take 1 tablet (1,000 mcg total) by mouth once daily., Disp: 90 tablet, Rfl: 3    EScitalopram oxalate (LEXAPRO) 10 MG tablet, Take 1 tablet (10 mg total) by mouth once daily., Disp: 90 tablet, Rfl: 1    ferrous sulfate 325 (65 FE) MG EC  tablet, Take 1 tablet (325 mg total) by mouth once daily., Disp: 90 tablet, Rfl: 3    ferrous sulfate 325 (65 FE) MG EC tablet, 1 tablet., Disp: , Rfl:     fluticasone propionate (FLONASE) 50 mcg/actuation nasal spray, 2 sprays (100 mcg total) by Each Nostril route once daily., Disp: 16 g, Rfl: 11    fluticasone propionate (FLONASE) 50 mcg/actuation nasal spray, , Disp: , Rfl:     fluticasone propionate (FLOVENT HFA) 110 mcg/actuation inhaler, Inhale 1 puff into the lungs 2 (two) times daily. Controller. Rinse mouth with water and spit out after use., Disp: 12 g, Rfl: 11    HYDROcodone-acetaminophen (NORCO) 5-325 mg per tablet, Take 1 tablet by mouth every 4 (four) hours as needed for Pain., Disp: 18 tablet, Rfl: 0    meclizine (ANTIVERT) 25 mg tablet, Take 1 tablet (25 mg total) by mouth 3 (three) times daily as needed for Dizziness., Disp: 20 tablet, Rfl: 0    methocarbamoL (ROBAXIN) 100 mg/mL injection, , Disp: , Rfl:     metolazone (ZAROXOLYN ORAL), , Disp: , Rfl:     montelukast (SINGULAIR) 10 mg tablet, Take 1 tablet (10 mg total) by mouth every evening., Disp: 90 tablet, Rfl: 3    multivitamin (THERAGRAN) per tablet, Take 1 tablet by mouth once daily., Disp: 90 tablet, Rfl: 3    nystatin (MYCOSTATIN ORAL), , Disp: , Rfl:     pantoprazole (PROTONIX) 40 MG tablet, Take 1 tablet (40 mg total) by mouth once daily., Disp: 90 tablet, Rfl: 1    spironolactone (ALDACTONE) 25 MG tablet, 1 tablet., Disp: , Rfl:     sucralfate (CARAFATE) 1 gram tablet, 1 tablet on an empty stomach, Disp: , Rfl:     tramadol-acetaminophen 37.5-325 mg (ULTRACET) 37.5-325 mg Tab, Take 1 tablet by mouth every 4 (four) hours., Disp: 20 tablet, Rfl: 0    traZODone (DESYREL) 50 MG tablet, 1 tab po qhs prn for insomnia., Disp: 90 tablet, Rfl: 1    valsartan (DIOVAN) 80 MG tablet, 1 tablet., Disp: , Rfl:     amLODIPine (NORVASC) 10 MG tablet, Take 1 tablet (10 mg total) by mouth once daily., Disp: 90 tablet, Rfl: 0    atorvastatin (LIPITOR) 80  "MG tablet, Take 1 tablet (80 mg total) by mouth every evening., Disp: 90 tablet, Rfl: 0    clopidogreL (PLAVIX) 75 mg tablet, Take 1 tablet (75 mg total) by mouth once daily., Disp: 30 tablet, Rfl: 11    hydrALAZINE (APRESOLINE) 50 MG tablet, Take 1 tablet (50 mg total) by mouth every 8 (eight) hours., Disp: 270 tablet, Rfl: 3    isosorbide dinitrate (ISORDIL) 5 MG Tab, Take 1 tablet (5 mg total) by mouth 3 (three) times daily., Disp: 90 tablet, Rfl: 0    metoprolol succinate (TOPROL-XL) 25 MG 24 hr tablet, Take 1 tablet (25 mg total) by mouth once daily., Disp: 30 tablet, Rfl: 0    nitrofurantoin, macrocrystal-monohydrate, (MACROBID) 100 MG capsule, Take 1 capsule (100 mg total) by mouth 2 (two) times daily. for 7 days, Disp: 14 capsule, Rfl: 0    Review of Systems   Constitutional:  Negative for unexpected weight change.   HENT:  Negative for trouble swallowing.    Eyes:  Negative for visual disturbance.   Respiratory:  Negative for shortness of breath.    Cardiovascular:  Negative for chest pain, palpitations and leg swelling.   Gastrointestinal:  Negative for blood in stool and nausea.   Genitourinary:  Positive for dysuria, frequency and urgency. Negative for hematuria.   Skin:  Negative for rash.   Allergic/Immunologic: Negative for immunocompromised state.   Neurological:  Negative for headaches.   Hematological:  Does not bruise/bleed easily.   Psychiatric/Behavioral:  Negative for agitation. The patient is not nervous/anxious.      Objective:      BP (!) 140/78 (BP Location: Right arm, Patient Position: Sitting, BP Method: Small (Manual))   Pulse 96   Temp 98.4 °F (36.9 °C) (Oral)   Ht 5' 2" (1.575 m)   Wt 103.5 kg (228 lb 2.8 oz)   LMP  (LMP Unknown) Comment: Does not menstruate   SpO2 96%   BMI 41.73 kg/m²   Physical Exam  Constitutional:       Appearance: She is well-developed.   HENT:      Head: Normocephalic.   Pulmonary:      Effort: Pulmonary effort is normal.   Musculoskeletal:         " General: Normal range of motion.   Skin:     General: Skin is warm and dry.   Neurological:      Mental Status: She is alert and oriented to person, place, and time.   Psychiatric:         Behavior: Behavior normal.         Thought Content: Thought content normal.         Judgment: Judgment normal.       Assessment:       1. Dysuria        Plan:       Dysuria  -     Urinalysis  -     Urine culture  -     nitrofurantoin, macrocrystal-monohydrate, (MACROBID) 100 MG capsule; Take 1 capsule (100 mg total) by mouth 2 (two) times daily. for 7 days  Dispense: 14 capsule; Refill: 0      Time spent with patient: 20 minutes    Patient with be reevaluated in 4 weeks or sooner prn    Greater than 50% of this visit was spent counseling as described in above documentation:Yes

## 2023-02-03 NOTE — PROGRESS NOTES
Patient, Faustina Rae (MRN #[id number]), presented with a recorded BMI of 41.73 kg/m^2 consistent with the definition of morbid obesity (ICD-10 E66.01). The patient's morbid obesity was monitored, evaluated, addressed and/or treated. This addendum to the medical record is made on 02/03/2023.

## 2023-02-04 LAB
BACTERIA #/AREA URNS AUTO: NORMAL /HPF
BILIRUB UR QL STRIP: NEGATIVE
CLARITY UR REFRACT.AUTO: ABNORMAL
COLOR UR AUTO: YELLOW
GLUCOSE UR QL STRIP: NEGATIVE
HGB UR QL STRIP: NEGATIVE
HYALINE CASTS UR QL AUTO: 1 /LPF
KETONES UR QL STRIP: NEGATIVE
LEUKOCYTE ESTERASE UR QL STRIP: NEGATIVE
MICROSCOPIC COMMENT: NORMAL
NITRITE UR QL STRIP: NEGATIVE
PH UR STRIP: 5 [PH] (ref 5–8)
PROT UR QL STRIP: ABNORMAL
RBC #/AREA URNS AUTO: 1 /HPF (ref 0–4)
SP GR UR STRIP: 1.01 (ref 1–1.03)
SQUAMOUS #/AREA URNS AUTO: 7 /HPF
URN SPEC COLLECT METH UR: ABNORMAL
WBC #/AREA URNS AUTO: 4 /HPF (ref 0–5)

## 2023-02-05 LAB
BACTERIA UR CULT: NORMAL
BACTERIA UR CULT: NORMAL

## 2023-02-06 ENCOUNTER — TELEPHONE (OUTPATIENT)
Dept: FAMILY MEDICINE | Facility: CLINIC | Age: 58
End: 2023-02-06
Payer: MEDICAID

## 2023-02-24 DIAGNOSIS — J44.89 COPD WITH ASTHMA: ICD-10-CM

## 2023-02-24 DIAGNOSIS — F32.A ANXIETY AND DEPRESSION: ICD-10-CM

## 2023-02-24 DIAGNOSIS — R10.9 ABDOMINAL PAIN, UNSPECIFIED ABDOMINAL LOCATION: ICD-10-CM

## 2023-02-24 DIAGNOSIS — I20.89 ANGINA AT REST: ICD-10-CM

## 2023-02-24 DIAGNOSIS — G47.00 INSOMNIA, UNSPECIFIED TYPE: ICD-10-CM

## 2023-02-24 DIAGNOSIS — F41.9 ANXIETY AND DEPRESSION: ICD-10-CM

## 2023-02-24 DIAGNOSIS — K29.70 GASTRITIS, PRESENCE OF BLEEDING UNSPECIFIED, UNSPECIFIED CHRONICITY, UNSPECIFIED GASTRITIS TYPE: ICD-10-CM

## 2023-02-24 DIAGNOSIS — I10 ESSENTIAL HYPERTENSION: ICD-10-CM

## 2023-02-24 RX ORDER — HYDRALAZINE HYDROCHLORIDE 50 MG/1
50 TABLET, FILM COATED ORAL EVERY 8 HOURS
Qty: 270 TABLET | Refills: 3 | Status: CANCELLED | OUTPATIENT
Start: 2023-02-24 | End: 2024-02-24

## 2023-02-24 RX ORDER — ESCITALOPRAM OXALATE 10 MG/1
10 TABLET ORAL DAILY
Qty: 90 TABLET | Refills: 1 | Status: CANCELLED | OUTPATIENT
Start: 2023-02-24

## 2023-02-24 RX ORDER — TRAZODONE HYDROCHLORIDE 50 MG/1
TABLET ORAL
Qty: 90 TABLET | Refills: 1 | Status: CANCELLED | OUTPATIENT
Start: 2023-02-24

## 2023-02-24 RX ORDER — ISOSORBIDE DINITRATE 5 MG/1
5 TABLET ORAL 3 TIMES DAILY
Qty: 90 TABLET | Refills: 0 | Status: CANCELLED | OUTPATIENT
Start: 2023-02-24 | End: 2023-03-26

## 2023-02-24 RX ORDER — IPRATROPIUM BROMIDE AND ALBUTEROL SULFATE 2.5; .5 MG/3ML; MG/3ML
3 SOLUTION RESPIRATORY (INHALATION) EVERY 6 HOURS PRN
Qty: 75 ML | Refills: 3 | Status: CANCELLED | OUTPATIENT
Start: 2023-02-24 | End: 2024-02-24

## 2023-02-24 NOTE — TELEPHONE ENCOUNTER
----- Message from Jamilah Arora sent at 2/24/2023 12:04 PM CST -----  Regarding: rx refill  Who Called:[facility]     Refill or New Rx.:Refill  EScitalopram oxalate (LEXAPRO) 10 MG tablet  albuterol-ipratropium (DUO-NEB) 2.5 mg-0.5 mg/3 mL nebulizer solution  hydrALAZINE (APRESOLINE) 50 MG tablet  traZODone (DESYREL) 50 MG tablettraZODone (DESYREL) 50 MG tablet  metoprolol succinate (TOPROL-XL) 25 MG 24 hr tablet  spironolactone (ALDACTONE) 25 MG tablet  pantoprazole (PROTONIX) 40 MG tablet  isosorbide dinitrate (ISORDIL) 5 MG Tab  valsartan (DIOVAN) 80 MG tablet         Preferred Pharmacy with phone number:[facility]  fax [phone]    Phone number : [phone]          Additional Information:

## 2023-02-27 RX ORDER — SPIRONOLACTONE 25 MG/1
25 TABLET ORAL DAILY
Qty: 90 TABLET | Refills: 1 | Status: SHIPPED | OUTPATIENT
Start: 2023-02-27 | End: 2023-03-06 | Stop reason: SDUPTHER

## 2023-02-27 RX ORDER — METOPROLOL SUCCINATE 25 MG/1
25 TABLET, EXTENDED RELEASE ORAL DAILY
Qty: 90 TABLET | Refills: 1 | Status: SHIPPED | OUTPATIENT
Start: 2023-02-27 | End: 2023-03-06 | Stop reason: SDUPTHER

## 2023-02-27 RX ORDER — PANTOPRAZOLE SODIUM 40 MG/1
40 TABLET, DELAYED RELEASE ORAL DAILY
Qty: 90 TABLET | Refills: 1 | Status: SHIPPED | OUTPATIENT
Start: 2023-02-27 | End: 2023-03-06 | Stop reason: SDUPTHER

## 2023-02-27 RX ORDER — HYDRALAZINE HYDROCHLORIDE 50 MG/1
50 TABLET, FILM COATED ORAL EVERY 8 HOURS
Qty: 270 TABLET | Refills: 1 | Status: SHIPPED | OUTPATIENT
Start: 2023-02-27 | End: 2023-03-06 | Stop reason: SDUPTHER

## 2023-02-27 RX ORDER — VALSARTAN 80 MG/1
80 TABLET ORAL DAILY
Qty: 90 TABLET | Refills: 1 | Status: SHIPPED | OUTPATIENT
Start: 2023-02-27 | End: 2023-03-06 | Stop reason: SDUPTHER

## 2023-02-27 RX ORDER — ALBUTEROL SULFATE 90 UG/1
AEROSOL, METERED RESPIRATORY (INHALATION)
Qty: 8.5 G | Refills: 1 | Status: SHIPPED | OUTPATIENT
Start: 2023-02-27 | End: 2023-03-06 | Stop reason: SDUPTHER

## 2023-02-27 RX ORDER — TRAZODONE HYDROCHLORIDE 50 MG/1
TABLET ORAL
Qty: 90 TABLET | Refills: 1 | Status: SHIPPED | OUTPATIENT
Start: 2023-02-27 | End: 2023-03-06 | Stop reason: SDUPTHER

## 2023-02-27 RX ORDER — ISOSORBIDE DINITRATE 5 MG/1
5 TABLET ORAL 3 TIMES DAILY
Qty: 270 TABLET | Refills: 1 | Status: SHIPPED | OUTPATIENT
Start: 2023-02-27 | End: 2023-03-06 | Stop reason: SDUPTHER

## 2023-02-27 RX ORDER — ESCITALOPRAM OXALATE 10 MG/1
10 TABLET ORAL DAILY
Qty: 90 TABLET | Refills: 1 | Status: SHIPPED | OUTPATIENT
Start: 2023-02-27 | End: 2023-03-21 | Stop reason: SDUPTHER

## 2023-02-27 NOTE — TELEPHONE ENCOUNTER
Will refill at this time. Patient needs a follow up with an MD at the clinic to establish care prior to any future refills. This appointment is important to assess chronic medical conditions and review all medications in addition to having a doctor that is designated as a PCP.

## 2023-03-06 DIAGNOSIS — G47.00 INSOMNIA, UNSPECIFIED TYPE: ICD-10-CM

## 2023-03-06 DIAGNOSIS — K90.9 IMPAIRED INTESTINAL ABSORPTION: ICD-10-CM

## 2023-03-06 DIAGNOSIS — I20.89 ANGINA AT REST: ICD-10-CM

## 2023-03-06 DIAGNOSIS — R10.9 ABDOMINAL PAIN, UNSPECIFIED ABDOMINAL LOCATION: ICD-10-CM

## 2023-03-06 DIAGNOSIS — J44.89 COPD WITH ASTHMA: ICD-10-CM

## 2023-03-06 DIAGNOSIS — J30.1 ACUTE SEASONAL ALLERGIC RHINITIS DUE TO POLLEN: ICD-10-CM

## 2023-03-06 DIAGNOSIS — I10 ESSENTIAL HYPERTENSION: ICD-10-CM

## 2023-03-06 DIAGNOSIS — Z86.73 HISTORY OF CVA (CEREBROVASCULAR ACCIDENT): ICD-10-CM

## 2023-03-06 DIAGNOSIS — K29.70 GASTRITIS, PRESENCE OF BLEEDING UNSPECIFIED, UNSPECIFIED CHRONICITY, UNSPECIFIED GASTRITIS TYPE: ICD-10-CM

## 2023-03-06 DIAGNOSIS — F32.A ANXIETY AND DEPRESSION: ICD-10-CM

## 2023-03-06 DIAGNOSIS — Z98.890 STATUS POST GASTROPLASTY: ICD-10-CM

## 2023-03-06 DIAGNOSIS — I10 ACCELERATED HYPERTENSION: ICD-10-CM

## 2023-03-06 DIAGNOSIS — E78.5 HYPERLIPIDEMIA, UNSPECIFIED HYPERLIPIDEMIA TYPE: ICD-10-CM

## 2023-03-06 DIAGNOSIS — F41.9 ANXIETY AND DEPRESSION: ICD-10-CM

## 2023-03-09 RX ORDER — FERROUS SULFATE 325(65) MG
325 TABLET, DELAYED RELEASE (ENTERIC COATED) ORAL DAILY
Qty: 90 TABLET | Refills: 3 | Status: SHIPPED | OUTPATIENT
Start: 2023-03-09 | End: 2023-11-22 | Stop reason: SDUPTHER

## 2023-03-09 RX ORDER — VALSARTAN 80 MG/1
80 TABLET ORAL DAILY
Qty: 90 TABLET | Refills: 1 | Status: SHIPPED | OUTPATIENT
Start: 2023-03-09 | End: 2023-03-21 | Stop reason: SDUPTHER

## 2023-03-09 RX ORDER — ALBUTEROL SULFATE 90 UG/1
AEROSOL, METERED RESPIRATORY (INHALATION)
Qty: 8.5 G | Refills: 1 | Status: SHIPPED | OUTPATIENT
Start: 2023-03-09 | End: 2023-03-21 | Stop reason: SDUPTHER

## 2023-03-09 RX ORDER — IPRATROPIUM BROMIDE AND ALBUTEROL SULFATE 2.5; .5 MG/3ML; MG/3ML
3 SOLUTION RESPIRATORY (INHALATION) EVERY 6 HOURS PRN
Qty: 75 ML | Refills: 3 | Status: SHIPPED | OUTPATIENT
Start: 2023-03-09 | End: 2023-04-05

## 2023-03-09 RX ORDER — LANOLIN ALCOHOL/MO/W.PET/CERES
1000 CREAM (GRAM) TOPICAL DAILY
Qty: 90 TABLET | Refills: 3 | Status: SHIPPED | OUTPATIENT
Start: 2023-03-09 | End: 2023-05-11

## 2023-03-09 RX ORDER — FLUTICASONE PROPIONATE 50 MCG
2 SPRAY, SUSPENSION (ML) NASAL DAILY
Qty: 16 G | Refills: 11 | Status: SHIPPED | OUTPATIENT
Start: 2023-03-09 | End: 2023-11-22 | Stop reason: SDUPTHER

## 2023-03-09 RX ORDER — ISOSORBIDE DINITRATE 5 MG/1
5 TABLET ORAL 3 TIMES DAILY
Qty: 270 TABLET | Refills: 1 | Status: SHIPPED | OUTPATIENT
Start: 2023-03-09 | End: 2023-03-21 | Stop reason: SDUPTHER

## 2023-03-09 RX ORDER — ATORVASTATIN CALCIUM 80 MG/1
80 TABLET, FILM COATED ORAL NIGHTLY
Qty: 90 TABLET | Refills: 0 | Status: SHIPPED | OUTPATIENT
Start: 2023-03-09 | End: 2023-03-21 | Stop reason: SDUPTHER

## 2023-03-09 RX ORDER — HYDRALAZINE HYDROCHLORIDE 50 MG/1
50 TABLET, FILM COATED ORAL EVERY 8 HOURS
Qty: 270 TABLET | Refills: 1 | Status: SHIPPED | OUTPATIENT
Start: 2023-03-09 | End: 2023-03-21 | Stop reason: SDUPTHER

## 2023-03-09 RX ORDER — TRAZODONE HYDROCHLORIDE 50 MG/1
TABLET ORAL
Qty: 90 TABLET | Refills: 1 | Status: SHIPPED | OUTPATIENT
Start: 2023-03-09 | End: 2023-03-21 | Stop reason: SDUPTHER

## 2023-03-09 RX ORDER — METOPROLOL SUCCINATE 25 MG/1
25 TABLET, EXTENDED RELEASE ORAL DAILY
Qty: 90 TABLET | Refills: 1 | Status: SHIPPED | OUTPATIENT
Start: 2023-03-09 | End: 2023-04-04 | Stop reason: SDUPTHER

## 2023-03-09 RX ORDER — SPIRONOLACTONE 25 MG/1
25 TABLET ORAL DAILY
Qty: 90 TABLET | Refills: 1 | Status: SHIPPED | OUTPATIENT
Start: 2023-03-09 | End: 2023-03-21 | Stop reason: SDUPTHER

## 2023-03-09 RX ORDER — CHLORTHALIDONE 25 MG/1
TABLET ORAL
Qty: 90 TABLET | Refills: 3 | Status: SHIPPED | OUTPATIENT
Start: 2023-03-09 | End: 2023-03-21 | Stop reason: SDUPTHER

## 2023-03-09 RX ORDER — CLOPIDOGREL BISULFATE 75 MG/1
75 TABLET ORAL DAILY
Qty: 30 TABLET | Refills: 11 | Status: SHIPPED | OUTPATIENT
Start: 2023-03-09 | End: 2023-03-21 | Stop reason: SDUPTHER

## 2023-03-09 RX ORDER — AMLODIPINE BESYLATE 10 MG/1
10 TABLET ORAL DAILY
Qty: 90 TABLET | Refills: 0 | Status: SHIPPED | OUTPATIENT
Start: 2023-03-09 | End: 2023-03-21 | Stop reason: SDUPTHER

## 2023-03-09 RX ORDER — PANTOPRAZOLE SODIUM 40 MG/1
40 TABLET, DELAYED RELEASE ORAL DAILY
Qty: 90 TABLET | Refills: 1 | Status: SHIPPED | OUTPATIENT
Start: 2023-03-09 | End: 2023-03-21 | Stop reason: SDUPTHER

## 2023-03-09 RX ORDER — FLUTICASONE PROPIONATE 110 UG/1
1 AEROSOL, METERED RESPIRATORY (INHALATION) 2 TIMES DAILY
Qty: 12 G | Refills: 11 | Status: SHIPPED | OUTPATIENT
Start: 2023-03-09 | End: 2023-05-09

## 2023-03-21 DIAGNOSIS — J44.89 COPD WITH ASTHMA: ICD-10-CM

## 2023-03-21 DIAGNOSIS — Z86.73 HISTORY OF CVA (CEREBROVASCULAR ACCIDENT): ICD-10-CM

## 2023-03-21 DIAGNOSIS — I20.89 ANGINA AT REST: ICD-10-CM

## 2023-03-21 DIAGNOSIS — F32.A ANXIETY AND DEPRESSION: ICD-10-CM

## 2023-03-21 DIAGNOSIS — R10.9 ABDOMINAL PAIN, UNSPECIFIED ABDOMINAL LOCATION: ICD-10-CM

## 2023-03-21 DIAGNOSIS — F41.9 ANXIETY AND DEPRESSION: ICD-10-CM

## 2023-03-21 DIAGNOSIS — I10 ACCELERATED HYPERTENSION: ICD-10-CM

## 2023-03-21 DIAGNOSIS — G47.00 INSOMNIA, UNSPECIFIED TYPE: ICD-10-CM

## 2023-03-21 DIAGNOSIS — E78.5 HYPERLIPIDEMIA, UNSPECIFIED HYPERLIPIDEMIA TYPE: ICD-10-CM

## 2023-03-21 DIAGNOSIS — K29.70 GASTRITIS, PRESENCE OF BLEEDING UNSPECIFIED, UNSPECIFIED CHRONICITY, UNSPECIFIED GASTRITIS TYPE: ICD-10-CM

## 2023-03-21 DIAGNOSIS — I10 ESSENTIAL HYPERTENSION: ICD-10-CM

## 2023-03-21 RX ORDER — PANTOPRAZOLE SODIUM 40 MG/1
40 TABLET, DELAYED RELEASE ORAL DAILY
Qty: 90 TABLET | Refills: 1 | Status: SHIPPED | OUTPATIENT
Start: 2023-03-21 | End: 2023-04-04 | Stop reason: SDUPTHER

## 2023-03-21 RX ORDER — ISOSORBIDE DINITRATE 5 MG/1
5 TABLET ORAL 3 TIMES DAILY
Qty: 270 TABLET | Refills: 1 | Status: SHIPPED | OUTPATIENT
Start: 2023-03-21 | End: 2023-04-04 | Stop reason: SDUPTHER

## 2023-03-21 RX ORDER — HYDRALAZINE HYDROCHLORIDE 50 MG/1
50 TABLET, FILM COATED ORAL EVERY 8 HOURS
Qty: 270 TABLET | Refills: 1 | Status: SHIPPED | OUTPATIENT
Start: 2023-03-21 | End: 2023-04-04 | Stop reason: SDUPTHER

## 2023-03-21 RX ORDER — VALSARTAN 80 MG/1
80 TABLET ORAL DAILY
Qty: 90 TABLET | Refills: 1 | Status: SHIPPED | OUTPATIENT
Start: 2023-03-21 | End: 2023-06-28 | Stop reason: SDUPTHER

## 2023-03-21 RX ORDER — ATORVASTATIN CALCIUM 80 MG/1
80 TABLET, FILM COATED ORAL NIGHTLY
Qty: 90 TABLET | Refills: 0 | Status: SHIPPED | OUTPATIENT
Start: 2023-03-21 | End: 2023-04-04 | Stop reason: SDUPTHER

## 2023-03-21 RX ORDER — AMLODIPINE BESYLATE 10 MG/1
10 TABLET ORAL DAILY
Qty: 90 TABLET | Refills: 0 | Status: SHIPPED | OUTPATIENT
Start: 2023-03-21 | End: 2023-04-04 | Stop reason: SDUPTHER

## 2023-03-21 RX ORDER — CHLORTHALIDONE 25 MG/1
TABLET ORAL
Qty: 90 TABLET | Refills: 3 | Status: SHIPPED | OUTPATIENT
Start: 2023-03-21 | End: 2023-04-05

## 2023-03-21 RX ORDER — TRAZODONE HYDROCHLORIDE 50 MG/1
TABLET ORAL
Qty: 90 TABLET | Refills: 1 | Status: SHIPPED | OUTPATIENT
Start: 2023-03-21 | End: 2023-04-04 | Stop reason: SDUPTHER

## 2023-03-21 RX ORDER — ALBUTEROL SULFATE 90 UG/1
AEROSOL, METERED RESPIRATORY (INHALATION)
Qty: 8.5 G | Refills: 1 | Status: SHIPPED | OUTPATIENT
Start: 2023-03-21 | End: 2023-04-04 | Stop reason: SDUPTHER

## 2023-03-21 RX ORDER — ESCITALOPRAM OXALATE 10 MG/1
10 TABLET ORAL DAILY
Qty: 90 TABLET | Refills: 1 | Status: SHIPPED | OUTPATIENT
Start: 2023-03-21 | End: 2023-04-04 | Stop reason: SDUPTHER

## 2023-03-21 RX ORDER — CLOPIDOGREL BISULFATE 75 MG/1
75 TABLET ORAL DAILY
Qty: 30 TABLET | Refills: 11 | Status: ON HOLD | OUTPATIENT
Start: 2023-03-21 | End: 2023-07-14 | Stop reason: SDUPTHER

## 2023-03-21 RX ORDER — SPIRONOLACTONE 25 MG/1
25 TABLET ORAL DAILY
Qty: 90 TABLET | Refills: 1 | Status: SHIPPED | OUTPATIENT
Start: 2023-03-21 | End: 2023-04-04 | Stop reason: SDUPTHER

## 2023-03-21 NOTE — TELEPHONE ENCOUNTER
Rx refills were approved on 3/9/23 but sent to wrong pharmacy.  Please resend to [facility].

## 2023-03-28 ENCOUNTER — TELEPHONE (OUTPATIENT)
Dept: ADMINISTRATIVE | Facility: CLINIC | Age: 58
End: 2023-03-28
Payer: MEDICARE

## 2023-03-30 ENCOUNTER — PES CALL (OUTPATIENT)
Dept: ADMINISTRATIVE | Facility: CLINIC | Age: 58
End: 2023-03-30
Payer: MEDICARE

## 2023-04-04 DIAGNOSIS — R10.13 EPIGASTRIC PAIN: ICD-10-CM

## 2023-04-04 DIAGNOSIS — F32.A ANXIETY AND DEPRESSION: ICD-10-CM

## 2023-04-04 DIAGNOSIS — I10 ACCELERATED HYPERTENSION: ICD-10-CM

## 2023-04-04 DIAGNOSIS — I10 ESSENTIAL HYPERTENSION: ICD-10-CM

## 2023-04-04 DIAGNOSIS — G47.00 INSOMNIA, UNSPECIFIED TYPE: ICD-10-CM

## 2023-04-04 DIAGNOSIS — K29.70 GASTRITIS, PRESENCE OF BLEEDING UNSPECIFIED, UNSPECIFIED CHRONICITY, UNSPECIFIED GASTRITIS TYPE: ICD-10-CM

## 2023-04-04 DIAGNOSIS — E78.5 HYPERLIPIDEMIA, UNSPECIFIED HYPERLIPIDEMIA TYPE: ICD-10-CM

## 2023-04-04 DIAGNOSIS — I20.89 ANGINA AT REST: ICD-10-CM

## 2023-04-04 DIAGNOSIS — R10.9 ABDOMINAL PAIN, UNSPECIFIED ABDOMINAL LOCATION: ICD-10-CM

## 2023-04-04 DIAGNOSIS — Z86.73 HISTORY OF CVA (CEREBROVASCULAR ACCIDENT): ICD-10-CM

## 2023-04-04 DIAGNOSIS — J44.89 COPD WITH ASTHMA: ICD-10-CM

## 2023-04-04 DIAGNOSIS — F41.9 ANXIETY AND DEPRESSION: ICD-10-CM

## 2023-04-04 NOTE — TELEPHONE ENCOUNTER
Please sign for this patient's refills again. Pharmacy was incorrect x2.   I was able to locate the correct [facility] in the system this time.

## 2023-04-05 ENCOUNTER — PES CALL (OUTPATIENT)
Dept: ADMINISTRATIVE | Facility: CLINIC | Age: 58
End: 2023-04-05
Payer: MEDICARE

## 2023-04-05 RX ORDER — ESCITALOPRAM OXALATE 10 MG/1
10 TABLET ORAL DAILY
Qty: 90 TABLET | Refills: 1 | Status: SHIPPED | OUTPATIENT
Start: 2023-04-05 | End: 2023-05-31 | Stop reason: SDUPTHER

## 2023-04-05 RX ORDER — PANTOPRAZOLE SODIUM 40 MG/1
40 TABLET, DELAYED RELEASE ORAL DAILY
Qty: 90 TABLET | Refills: 1 | Status: SHIPPED | OUTPATIENT
Start: 2023-04-05 | End: 2023-05-17 | Stop reason: SDUPTHER

## 2023-04-05 RX ORDER — ATORVASTATIN CALCIUM 80 MG/1
80 TABLET, FILM COATED ORAL NIGHTLY
Qty: 90 TABLET | Refills: 1 | Status: ON HOLD | OUTPATIENT
Start: 2023-04-05 | End: 2023-07-14

## 2023-04-05 RX ORDER — ALBUTEROL SULFATE 90 UG/1
AEROSOL, METERED RESPIRATORY (INHALATION)
Qty: 8.5 G | Refills: 1 | Status: SHIPPED | OUTPATIENT
Start: 2023-04-05 | End: 2023-05-18 | Stop reason: SDUPTHER

## 2023-04-05 RX ORDER — HYDRALAZINE HYDROCHLORIDE 50 MG/1
50 TABLET, FILM COATED ORAL EVERY 8 HOURS
Qty: 270 TABLET | Refills: 1 | Status: SHIPPED | OUTPATIENT
Start: 2023-04-05 | End: 2023-05-09

## 2023-04-05 RX ORDER — ISOSORBIDE DINITRATE 5 MG/1
5 TABLET ORAL 3 TIMES DAILY
Qty: 270 TABLET | Refills: 1 | Status: SHIPPED | OUTPATIENT
Start: 2023-04-05 | End: 2023-10-17 | Stop reason: SDUPTHER

## 2023-04-05 RX ORDER — SPIRONOLACTONE 25 MG/1
25 TABLET ORAL DAILY
Qty: 90 TABLET | Refills: 1 | Status: SHIPPED | OUTPATIENT
Start: 2023-04-05 | End: 2023-10-17 | Stop reason: SDUPTHER

## 2023-04-05 RX ORDER — AMLODIPINE BESYLATE 10 MG/1
10 TABLET ORAL DAILY
Qty: 90 TABLET | Refills: 1 | Status: SHIPPED | OUTPATIENT
Start: 2023-04-05 | End: 2023-10-17 | Stop reason: SDUPTHER

## 2023-04-05 RX ORDER — TRAZODONE HYDROCHLORIDE 50 MG/1
TABLET ORAL
Qty: 90 TABLET | Refills: 1 | Status: SHIPPED | OUTPATIENT
Start: 2023-04-05 | End: 2023-05-31 | Stop reason: SDUPTHER

## 2023-04-05 RX ORDER — METOPROLOL SUCCINATE 25 MG/1
25 TABLET, EXTENDED RELEASE ORAL DAILY
Qty: 90 TABLET | Refills: 1 | Status: SHIPPED | OUTPATIENT
Start: 2023-04-05 | End: 2023-05-09 | Stop reason: SDUPTHER

## 2023-04-13 ENCOUNTER — TELEPHONE (OUTPATIENT)
Dept: FAMILY MEDICINE | Facility: CLINIC | Age: 58
End: 2023-04-13
Payer: MEDICARE

## 2023-04-13 NOTE — TELEPHONE ENCOUNTER
Called patient in regards to needing to be seen for cough. No answer , left voicemail to return call.

## 2023-04-13 NOTE — TELEPHONE ENCOUNTER
----- Message from Isael Hardy sent at 4/13/2023 10:30 AM CDT -----  Regarding: Appointment  Contact: Patient  Type:  Sooner Apoointment Request    Caller is requesting a sooner appointment.  Caller declined first available appointment listed below.  Caller will not accept being placed on the waitlist and is requesting a message be sent to doctor.  Name of Caller:Patient  When is the first available appointment?06/01/23  Symptoms:Cough/ sooner appointment  Would the patient rather a call back or a response via [facility]? call  Best Call Back Number:[phone]  Additional Information: Please call patient to advise.

## 2023-05-02 ENCOUNTER — TELEPHONE (OUTPATIENT)
Dept: FAMILY MEDICINE | Facility: CLINIC | Age: 58
End: 2023-05-02
Payer: MEDICARE

## 2023-05-02 NOTE — TELEPHONE ENCOUNTER
Called patient to advise the soonest appointment we have is the one she has scheduled. Placed appointment on the wait list in case someone cancels. No answer , left voicemail to return call.

## 2023-05-02 NOTE — TELEPHONE ENCOUNTER
----- Message from Tracy Johnson sent at 5/2/2023  9:43 AM CDT -----  Contact: pt    Type:  Sooner Appointment Request    Caller is requesting a sooner appointment.  Caller declined first available appointment listed below.  Caller will not accept being placed on the waitlist and is requesting a message be sent to doctor.    Name of Caller:  pt  When is the first available appointment?  NA  Symptoms:  bad cough  Best Call Back Number:  [phone]    Additional Information:  Patient is calling the office for a sooner appt for bad cough she has been having. Please call back and advise.

## 2023-05-09 ENCOUNTER — TELEPHONE (OUTPATIENT)
Dept: FAMILY MEDICINE | Facility: CLINIC | Age: 58
End: 2023-05-09

## 2023-05-09 ENCOUNTER — E-CONSULT (OUTPATIENT)
Dept: RHEUMATOLOGY | Facility: CLINIC | Age: 58
End: 2023-05-09
Payer: MEDICARE

## 2023-05-09 ENCOUNTER — OFFICE VISIT (OUTPATIENT)
Dept: FAMILY MEDICINE | Facility: CLINIC | Age: 58
End: 2023-05-09
Payer: MEDICARE

## 2023-05-09 VITALS
OXYGEN SATURATION: 98 % | SYSTOLIC BLOOD PRESSURE: 150 MMHG | TEMPERATURE: 99 F | RESPIRATION RATE: 16 BRPM | HEART RATE: 73 BPM | WEIGHT: 224.88 LBS | BODY MASS INDEX: 41.38 KG/M2 | HEIGHT: 62 IN | DIASTOLIC BLOOD PRESSURE: 80 MMHG

## 2023-05-09 DIAGNOSIS — I10 ESSENTIAL HYPERTENSION: ICD-10-CM

## 2023-05-09 DIAGNOSIS — J44.9 CHRONIC OBSTRUCTIVE PULMONARY DISEASE, UNSPECIFIED COPD TYPE: ICD-10-CM

## 2023-05-09 DIAGNOSIS — E78.5 HYPERLIPIDEMIA, UNSPECIFIED HYPERLIPIDEMIA TYPE: ICD-10-CM

## 2023-05-09 DIAGNOSIS — M10.9 GOUT, UNSPECIFIED CAUSE, UNSPECIFIED CHRONICITY, UNSPECIFIED SITE: ICD-10-CM

## 2023-05-09 DIAGNOSIS — M10.9 GOUT, UNSPECIFIED CAUSE, UNSPECIFIED CHRONICITY, UNSPECIFIED SITE: Primary | ICD-10-CM

## 2023-05-09 DIAGNOSIS — E53.8 B12 DEFICIENCY: Primary | ICD-10-CM

## 2023-05-09 DIAGNOSIS — D64.9 NORMOCYTIC ANEMIA: ICD-10-CM

## 2023-05-09 DIAGNOSIS — Z00.00 HEALTHCARE MAINTENANCE: Primary | ICD-10-CM

## 2023-05-09 DIAGNOSIS — E66.01 OBESITY, MORBID, BMI 40.0-49.9: ICD-10-CM

## 2023-05-09 DIAGNOSIS — N18.32 STAGE 3B CHRONIC KIDNEY DISEASE: ICD-10-CM

## 2023-05-09 DIAGNOSIS — N25.81 SECONDARY HYPERPARATHYROIDISM OF RENAL ORIGIN: ICD-10-CM

## 2023-05-09 DIAGNOSIS — I50.9 CONGESTIVE HEART FAILURE, UNSPECIFIED HF CHRONICITY, UNSPECIFIED HEART FAILURE TYPE: ICD-10-CM

## 2023-05-09 DIAGNOSIS — M10.071 ACUTE IDIOPATHIC GOUT INVOLVING TOE OF RIGHT FOOT: Primary | ICD-10-CM

## 2023-05-09 DIAGNOSIS — R79.9 ABNORMAL FINDING OF BLOOD CHEMISTRY, UNSPECIFIED: ICD-10-CM

## 2023-05-09 DIAGNOSIS — F32.5 MAJOR DEPRESSIVE DISORDER, SINGLE EPISODE, IN FULL REMISSION: ICD-10-CM

## 2023-05-09 DIAGNOSIS — I69.354 HEMIPARESIS AFFECTING LEFT SIDE AS LATE EFFECT OF CEREBROVASCULAR ACCIDENT: ICD-10-CM

## 2023-05-09 DIAGNOSIS — J45.20 INTERMITTENT ASTHMA, UNSPECIFIED ASTHMA SEVERITY, UNSPECIFIED WHETHER COMPLICATED: ICD-10-CM

## 2023-05-09 DIAGNOSIS — N18.4 CHRONIC KIDNEY DISEASE, STAGE 4 (SEVERE): ICD-10-CM

## 2023-05-09 DIAGNOSIS — R05.3 CHRONIC COUGH: ICD-10-CM

## 2023-05-09 DIAGNOSIS — Z12.11 COLON CANCER SCREENING: ICD-10-CM

## 2023-05-09 PROBLEM — I25.118 ATHEROSCLEROSIS OF NATIVE CORONARY ARTERY WITH STABLE ANGINA PECTORIS: Status: RESOLVED | Noted: 2018-04-30 | Resolved: 2023-05-09

## 2023-05-09 PROCEDURE — 99446 PR INTERPROF, PHONE/INTERNET/EHR, CONSULT, 5-10 MINS: ICD-10-PCS | Mod: ,,, | Performed by: INTERNAL MEDICINE

## 2023-05-09 PROCEDURE — 3008F PR BODY MASS INDEX (BMI) DOCUMENTED: ICD-10-PCS | Mod: CPTII,S$GLB,, | Performed by: STUDENT IN AN ORGANIZED HEALTH CARE EDUCATION/TRAINING PROGRAM

## 2023-05-09 PROCEDURE — 3079F DIAST BP 80-89 MM HG: CPT | Mod: CPTII,S$GLB,, | Performed by: STUDENT IN AN ORGANIZED HEALTH CARE EDUCATION/TRAINING PROGRAM

## 2023-05-09 PROCEDURE — 99999 PR PBB SHADOW E&M-EST. PATIENT-LVL V: ICD-10-PCS | Mod: PBBFAC,,, | Performed by: STUDENT IN AN ORGANIZED HEALTH CARE EDUCATION/TRAINING PROGRAM

## 2023-05-09 PROCEDURE — 99215 PR OFFICE/OUTPT VISIT, EST, LEVL V, 40-54 MIN: ICD-10-PCS | Mod: 25,S$GLB,, | Performed by: STUDENT IN AN ORGANIZED HEALTH CARE EDUCATION/TRAINING PROGRAM

## 2023-05-09 PROCEDURE — 99446 NTRPROF PH1/NTRNET/EHR 5-10: CPT | Mod: ,,, | Performed by: INTERNAL MEDICINE

## 2023-05-09 PROCEDURE — 4010F PR ACE/ARB THEARPY RXD/TAKEN: ICD-10-PCS | Mod: CPTII,S$GLB,, | Performed by: STUDENT IN AN ORGANIZED HEALTH CARE EDUCATION/TRAINING PROGRAM

## 2023-05-09 PROCEDURE — 1159F MED LIST DOCD IN RCRD: CPT | Mod: CPTII,S$GLB,, | Performed by: STUDENT IN AN ORGANIZED HEALTH CARE EDUCATION/TRAINING PROGRAM

## 2023-05-09 PROCEDURE — 99452 E-CONSULT TO RHEUMATOLOGY: ICD-10-PCS | Mod: S$GLB,,, | Performed by: STUDENT IN AN ORGANIZED HEALTH CARE EDUCATION/TRAINING PROGRAM

## 2023-05-09 PROCEDURE — 3079F PR MOST RECENT DIASTOLIC BLOOD PRESSURE 80-89 MM HG: ICD-10-PCS | Mod: CPTII,S$GLB,, | Performed by: STUDENT IN AN ORGANIZED HEALTH CARE EDUCATION/TRAINING PROGRAM

## 2023-05-09 PROCEDURE — 3077F SYST BP >= 140 MM HG: CPT | Mod: CPTII,S$GLB,, | Performed by: STUDENT IN AN ORGANIZED HEALTH CARE EDUCATION/TRAINING PROGRAM

## 2023-05-09 PROCEDURE — 99999 PR PBB SHADOW E&M-EST. PATIENT-LVL V: CPT | Mod: PBBFAC,,, | Performed by: STUDENT IN AN ORGANIZED HEALTH CARE EDUCATION/TRAINING PROGRAM

## 2023-05-09 PROCEDURE — 99215 OFFICE O/P EST HI 40 MIN: CPT | Mod: 25,S$GLB,, | Performed by: STUDENT IN AN ORGANIZED HEALTH CARE EDUCATION/TRAINING PROGRAM

## 2023-05-09 PROCEDURE — 1159F PR MEDICATION LIST DOCUMENTED IN MEDICAL RECORD: ICD-10-PCS | Mod: CPTII,S$GLB,, | Performed by: STUDENT IN AN ORGANIZED HEALTH CARE EDUCATION/TRAINING PROGRAM

## 2023-05-09 PROCEDURE — 4010F ACE/ARB THERAPY RXD/TAKEN: CPT | Mod: CPTII,S$GLB,, | Performed by: STUDENT IN AN ORGANIZED HEALTH CARE EDUCATION/TRAINING PROGRAM

## 2023-05-09 PROCEDURE — 99499 RISK ADDL DX/OHS AUDIT: ICD-10-PCS | Mod: S$GLB,,, | Performed by: STUDENT IN AN ORGANIZED HEALTH CARE EDUCATION/TRAINING PROGRAM

## 2023-05-09 PROCEDURE — 3077F PR MOST RECENT SYSTOLIC BLOOD PRESSURE >= 140 MM HG: ICD-10-PCS | Mod: CPTII,S$GLB,, | Performed by: STUDENT IN AN ORGANIZED HEALTH CARE EDUCATION/TRAINING PROGRAM

## 2023-05-09 PROCEDURE — 99499 UNLISTED E&M SERVICE: CPT | Mod: S$GLB,,, | Performed by: STUDENT IN AN ORGANIZED HEALTH CARE EDUCATION/TRAINING PROGRAM

## 2023-05-09 PROCEDURE — 3008F BODY MASS INDEX DOCD: CPT | Mod: CPTII,S$GLB,, | Performed by: STUDENT IN AN ORGANIZED HEALTH CARE EDUCATION/TRAINING PROGRAM

## 2023-05-09 PROCEDURE — 99452 NTRPROF PH1/NTRNET/EHR RFRL: CPT | Mod: S$GLB,,, | Performed by: STUDENT IN AN ORGANIZED HEALTH CARE EDUCATION/TRAINING PROGRAM

## 2023-05-09 RX ORDER — METOPROLOL SUCCINATE 25 MG/1
25 TABLET, EXTENDED RELEASE ORAL DAILY
Qty: 90 TABLET | Refills: 3 | Status: SHIPPED | OUTPATIENT
Start: 2023-05-09 | End: 2023-05-31 | Stop reason: SDUPTHER

## 2023-05-09 RX ORDER — HYDRALAZINE HYDROCHLORIDE 50 MG/1
50 TABLET, FILM COATED ORAL EVERY 12 HOURS
Qty: 270 TABLET | Refills: 1
Start: 2023-05-09 | End: 2023-05-31 | Stop reason: SDUPTHER

## 2023-05-09 RX ORDER — FLUTICASONE FUROATE AND VILANTEROL 200; 25 UG/1; UG/1
1 POWDER RESPIRATORY (INHALATION) DAILY
Qty: 60 EACH | Refills: 3 | Status: SHIPPED | OUTPATIENT
Start: 2023-05-09 | End: 2023-10-13

## 2023-05-09 RX ORDER — COLCHICINE 0.6 MG/1
0.6 TABLET ORAL
Qty: 10 TABLET | Refills: 3 | Status: SHIPPED | OUTPATIENT
Start: 2023-05-09 | End: 2023-12-26 | Stop reason: SDUPTHER

## 2023-05-09 NOTE — PROGRESS NOTES
[facility] Note    Subjective:    The HPI and pertinent ROS is included in the Diagnostic Impression Remarks section at the end of the note. Please see below for further details. Chief complaint is at end of note.     Faustina is a pleasant intelligent patient who is here for evaluation.     Modified Medications    No medications on file       Data reviewed 274}  Previous medical records reviewed and summarized in plan section at end of note.      If you are due for any health screening(s) below please notify me so we can arrange them to be ordered and scheduled. Most healthy patients at your age complete them, but you are free to accept or refuse. If you can't do it, I'll definitely understand. If you can, I'd certainly appreciate it!     Tests to Keep You Healthy    Mammogram: Met on 12/13/2022  Colon Cancer Screening: ORDERED  Last Blood Pressure <= 139/89 (5/9/2023): NO      The following portions of the patient's history were reviewed and updated as appropriate: allergies, current medications, past family history, past medical history, past social history, past surgical history and problem list.    She  has a past medical history of Anxiety, Arthritis, Asthma, CHF (congestive heart failure), COPD (chronic obstructive pulmonary disease), Hyperlipemia, Hypertension, Leaky heart valve, Obese, Obese, Obstructive sleep apnea, Pneumonia, Rheumatoid arthritis(714.0), and Stroke.  She  has a past surgical history that includes Hysterectomy; Cholecystectomy; Sleeve Gastroplasty (02/21/2017); and Carotid stent.    She  reports that she has never smoked. She has never been exposed to tobacco smoke. She has never used smokeless tobacco. She reports current alcohol use. She reports that she does not use drugs.  She family history includes Arthritis in her mother; Asthma in her son; Breast cancer in her mother; Cancer in her mother; Diabetes in her mother; Heart disease in her father; Hyperlipidemia in her  "mother; Hypertension in her father, mother, sister, and sister; Stroke in her mother.    Review of patient's allergies indicates:  No Known Allergies    Tobacco Use: Low Risk     Smoking Tobacco Use: Never    Smokeless Tobacco Use: Never    Passive Exposure: Never     Physical Examination  General appearance: alert, cooperative, no distress  Neck: no thyromegaly, no neck stiffness  Lungs: clear to auscultation, no wheezes, rales or rhonchi, symmetric air entry  Heart: normal rate, regular rhythm, normal S1, S2, no murmurs, rubs, clicks or gallops  Abdomen: soft, nontender, nondistended, no rigidity, rebound, or guarding.   Back: no point tenderness over spine  Extremities: peripheral pulses normal, no unilateral leg swelling or calf tenderness   Neurological:alert, oriented, normal speech, no new focal findings or movement disorder     BP Readings from Last 3 Encounters:   05/09/23 (!) 150/80   02/03/23 (!) 140/78   12/08/22 122/78     Wt Readings from Last 3 Encounters:   05/09/23 102 kg (224 lb 13.9 oz)   02/03/23 103.5 kg (228 lb 2.8 oz)   12/08/22 102.9 kg (226 lb 13.7 oz)     BP (!) 150/80 (BP Location: Left arm, Patient Position: Sitting, BP Method: Small (Manual)) Comment (BP Method): wrist  Pulse 73   Temp 98.5 °F (36.9 °C) (Oral)   Resp 16   Ht 5' 2" (1.575 m)   Wt 102 kg (224 lb 13.9 oz)   LMP  (LMP Unknown) Comment: Does not menstruate   SpO2 98%   BMI 41.13 kg/m²    274}  Laboratory: I have reviewed old labs below:    274}    Lab Results   Component Value Date    WBC 5.35 01/05/2023    HGB 11.0 (L) 01/05/2023    HCT 35.4 (L) 01/05/2023    MCV 97 01/05/2023     01/05/2023     01/05/2023    K 4.4 01/05/2023     (H) 01/05/2023    CALCIUM 9.4 01/05/2023    PHOS 2.5 (L) 01/05/2023    CO2 23 01/05/2023    GLU 72 01/05/2023    BUN 33 (H) 01/05/2023    CREATININE 2.9 (H) 01/05/2023    ANIONGAP 7 (L) 01/05/2023    PROT 7.6 11/17/2022    ALBUMIN 3.1 (L) 01/05/2023    BILITOT 0.3 " "11/17/2022    ALKPHOS 64 11/17/2022    ALT 7 (L) 11/17/2022    AST 11 11/17/2022    INR 1.0 10/16/2022    CHOL 170 05/09/2022    TRIG 71 05/09/2022    HDL 47 05/09/2022    LDLCALC 108.8 05/09/2022    TSH 1.663 05/09/2022    HGBA1C 5.2 05/09/2022     Lab reviewed by me: Particular labs of significance that I will monitor, workup, or treat to improve are mentioned below in diagnostic impression remarks.    Imaging/EKG: I have reviewed the pertinent results and my findings are noted in remarks.  274}    CC:   Chief Complaint   Patient presents with    Annual Exam    Establish Care    Cough    Gout        274}    Assessment/Plan  Faustina Rae is a 58 y.o. female who presents to clinic with:  1. Healthcare maintenance    2. Essential hypertension    3. Chronic obstructive pulmonary disease, unspecified COPD type    4. Hyperlipidemia, unspecified hyperlipidemia type    5. Stage 3b chronic kidney disease    6. Major depressive disorder, single episode, in full remission    7. Secondary hyperparathyroidism of renal origin    8. Chronic kidney disease, stage 4 (severe)    9. Obesity, morbid, BMI 40.0-49.9    10. Congestive heart failure, unspecified HF chronicity, unspecified heart failure type    11. Hemiparesis affecting left side as late effect of cerebrovascular accident       274}  Diagnostic Impression Remarks + HPI     Documentation entered by me for this encounter may have been done in part using speech-recognition technology. Although I have made an effort to ensure accuracy, "sound like" errors may exist and should be interpreted in context.     Hypertension-needs improvement is only taking hydralazine once per day recommend increase to twice per day continue amlodipine valsartan Aldactone and metoprolol.  Will check electrolytes increase hydralazine as tolerated will avoid increasing Arb inhibitor to CKD do do potassium     Gout-patient reports she is had left foot pain last seen for 2 days and reports this " is a gout flare she reports heavy seafood with eating seafood every day.  No fever chills recommend to cut down on seafood use and can not use NSAIDs due to low GFR she does not want to try steroids will possibly consider colchicine but will get E consult with Rheumatology.  Recommend diet modifications follow-up on levels  Cough-patient reports a chronic cough for the last 6 months dry cough no fever chills night sweats no travel no exposure to sick contacts no chest pain or shortness of breath.  Does have history of asthma and COPD.  No heartburn is using Flovent only intermittently recommend a controller medication daily send in Breo no wheezing appreciated recommended see pulmonology as well will rule out TB no unilateral leg swelling or calf tenderness     CKD-stable continue Arb inhibitor needs improved blood pressure control GFR is too low for SGOT 2 inhibitor.  Will check home blood pressure logs     Anemia will check iron disease will check iron lvls  have some fatigue  anemia-likely   Anemia chronic   Depression-stable cont current meds monitor no SI/plan   Hyperparathyroidism-stable monitor lytes vit d levels f/u with endo   CHF-stable continue current meds euvolemic no dyspnea monitor  Hemiparesis of the left side secondary to CVA-stable no worsening of deficits  Morbid obesity-recommend low glycemic index diet stay active monitor A1c and lipid levels.  TSH within normal limits.  The patient's chronic kidney disease stage 3 was monitored, evaluated, addressed and/or treated. Recommend to avoid NSAIDs and renally dose medications. ACE/ARB inhibitor if indicated and optimize blood pressure and A1c to goal.     Health maintenance-recommend colon cancer screening no family history wants to do mail-in kit will send in [facility]     Altogether 41 minutes of total time spent on the encounter, which includes face to face time and non-face to face time preparing to see the patient (eg, review of tests), Obtaining  and/or reviewing separately obtained history, Documenting clinical information in the electronic or other health record, Independently interpreting results (not separately reported) and communicating results to the patient/family/caregiver, or Care coordination (not separately reported). I also counseled her on common and the most usual side effect of prescribed medications. Risk and benefits of the medication were also discussed. I reviewed previous notes to better coordinate patient's care. She test results and lifestyle changes were also discussed. All questions were answered, and patient voiced understanding.     This is the extent of this pleasant patient's concerns at this present time. She did not feel chest pain upon exertion, dyspnea, nausea, vomiting, diaphoresis, or syncope. No pleuritic chest pain, unilateral leg swelling, calf tenderness, or calf pain. Negative for unintentional weight loss night sweats and fevers. Faustina will return to clinic in a few months for further workup and reassessment or sooner as needed. She was instructed to call the clinic or go to the emergency department or urgent care immediately if her symptoms do not improve, worsens, or if any new symptoms develop. As we discussed that symptoms could worsen over the next 24 hours she was advised that if any increased swelling, pain, or numbness arise to go immediately to the ED. Patient knows to call any time if an emergency arises. Shared decision making occurred and she verbalized understanding in agreement with this plan. I discussed imaging findings, diagnosis, possibilities, treatment options, medications, risks, and benefits. She had many questions regarding the options and long-term effects. All questions were answered. She expressed understanding after counseling regarding the diagnosis and recommendations. She was capable and demonstrated competence with understanding of these options. Shared decision making was performed  resulting in her choosing the current treatment plan. Patient handout was given with instructions and recommendations. Advised the patient that if they become pregnant to alert us immediately to assess for medication changes. I also discussed the importance of close follow up to discuss labs, change or modify her medications if needed, monitor side effects, and further evaluation of medical problems.     Additional workup planned: see labs ordered below.    See below for labs and meds ordered with associated diagnosis      1. Healthcare maintenance    2. Essential hypertension    3. Chronic obstructive pulmonary disease, unspecified COPD type    4. Hyperlipidemia, unspecified hyperlipidemia type    5. Stage 3b chronic kidney disease    6. Major depressive disorder, single episode, in full remission    7. Secondary hyperparathyroidism of renal origin    8. Chronic kidney disease, stage 4 (severe)    9. Obesity, morbid, BMI 40.0-49.9    10. Congestive heart failure, unspecified HF chronicity, unspecified heart failure type    11. Hemiparesis affecting left side as late effect of cerebrovascular accident      Zoran Cuevas MD   274}    If you are due for any health screening(s) below please notify me so we can arrange them to be ordered and scheduled. Most healthy patients at your age complete them, but you are free to accept or refuse.     If you can't do it, I'll definitely understand. If you can, I'd certainly appreciate it!   Tests to Keep You Healthy    Mammogram: Met on 12/13/2022  Colon Cancer Screening: ORDERED  Last Blood Pressure <= 139/89 (5/9/2023): NO

## 2023-05-09 NOTE — TELEPHONE ENCOUNTER
----- Message from Zoran Cuevas MD sent at 5/9/2023  1:32 PM CDT -----  Regarding: gout medicine  Please let pt know that rheum rec she take colchicine 0.6 mg every 72 hours for around a week. If she she starts experiencing any muscle weakness or cramps she should stop medicine.

## 2023-05-09 NOTE — TELEPHONE ENCOUNTER
Spoke with patient.  Results given per provider review.  Verbalized understanding.  Pt stated she cannot take B12 otc and is interested in B12 injections. Please advise.

## 2023-05-09 NOTE — CONSULTS
[facility]  Response for E-Consult     Patient Name: Faustina Rae  MRN: [id number]  Primary Care Provider: Zoran Cuevas MD   Requesting Provider: Zoran Cuevas MD  Consults    Findings:  Acute gout attack.  NSAIDs contraindicated because of chronic kidney disease.  Patient would like to avoid corticosteroids.  Yes she can take renally dosed colchicine-not more than 0.6 mg every 72 hours.  Even if she is on the lowest dose, I would recommend to be cautious about colchicine induced myonecrosis/rhabdomyolysis.  Advise to stop colchicine if she starts experiencing any muscle weakness or cramps.  Please consider starting Uloric 40 mg once daily to reduce uric acid level in the context of chronic kidney disease which contraindicated use of allopurinol.  If gout flares worsen, she may be a candidate for anakinra injections.    I did not speak to the requesting provider verbally about this.     Total time of Consultation: 10 minute    Percentage of time spent on written/verbal discussion: 50%     *This eConsult is based on the clinical data available to me and is furnished without benefit of a physical examination. The eConsult will need to be interpreted in light of any clinical issues or changes in patient status not available to me at the time of filing this eConsults. Significant changes in patient condition or level of acuity should result in immediate formal consultation and reevaluation. Please alert me if you have further questions.    Thank you for your consult.     Alec Brown MD  [facility] [place] [facility]

## 2023-05-11 ENCOUNTER — PATIENT MESSAGE (OUTPATIENT)
Dept: FAMILY MEDICINE | Facility: CLINIC | Age: 58
End: 2023-05-11
Payer: MEDICARE

## 2023-05-11 ENCOUNTER — LAB VISIT (OUTPATIENT)
Dept: LAB | Facility: HOSPITAL | Age: 58
End: 2023-05-11
Attending: STUDENT IN AN ORGANIZED HEALTH CARE EDUCATION/TRAINING PROGRAM
Payer: MEDICARE

## 2023-05-11 DIAGNOSIS — M10.9 GOUT, UNSPECIFIED CAUSE, UNSPECIFIED CHRONICITY, UNSPECIFIED SITE: Primary | ICD-10-CM

## 2023-05-11 DIAGNOSIS — D64.9 NORMOCYTIC ANEMIA: ICD-10-CM

## 2023-05-11 DIAGNOSIS — M10.9 GOUT, UNSPECIFIED CAUSE, UNSPECIFIED CHRONICITY, UNSPECIFIED SITE: ICD-10-CM

## 2023-05-11 DIAGNOSIS — R79.9 ABNORMAL FINDING OF BLOOD CHEMISTRY, UNSPECIFIED: ICD-10-CM

## 2023-05-11 DIAGNOSIS — I10 ESSENTIAL HYPERTENSION: ICD-10-CM

## 2023-05-11 LAB
ALBUMIN SERPL BCP-MCNC: 3.2 G/DL (ref 3.5–5.2)
ALP SERPL-CCNC: 66 U/L (ref 55–135)
ALT SERPL W/O P-5'-P-CCNC: 9 U/L (ref 10–44)
ANION GAP SERPL CALC-SCNC: 8 MMOL/L (ref 8–16)
AST SERPL-CCNC: 10 U/L (ref 10–40)
BASOPHILS # BLD AUTO: 0.02 K/UL (ref 0–0.2)
BASOPHILS NFR BLD: 0.3 % (ref 0–1.9)
BILIRUB SERPL-MCNC: 0.3 MG/DL (ref 0.1–1)
BUN SERPL-MCNC: 46 MG/DL (ref 6–20)
CALCIUM SERPL-MCNC: 9.6 MG/DL (ref 8.7–10.5)
CHLORIDE SERPL-SCNC: 110 MMOL/L (ref 95–110)
CO2 SERPL-SCNC: 20 MMOL/L (ref 23–29)
CREAT SERPL-MCNC: 2.9 MG/DL (ref 0.5–1.4)
DIFFERENTIAL METHOD: ABNORMAL
EOSINOPHIL # BLD AUTO: 0.1 K/UL (ref 0–0.5)
EOSINOPHIL NFR BLD: 0.7 % (ref 0–8)
ERYTHROCYTE [DISTWIDTH] IN BLOOD BY AUTOMATED COUNT: 13.8 % (ref 11.5–14.5)
EST. GFR  (NO RACE VARIABLE): 18.1 ML/MIN/1.73 M^2
ESTIMATED AVG GLUCOSE: 105 MG/DL (ref 68–131)
FERRITIN SERPL-MCNC: 112 NG/ML (ref 20–300)
GLUCOSE SERPL-MCNC: 115 MG/DL (ref 70–110)
HBA1C MFR BLD: 5.3 % (ref 4–5.6)
HCT VFR BLD AUTO: 33.3 % (ref 37–48.5)
HGB BLD-MCNC: 10.4 G/DL (ref 12–16)
IMM GRANULOCYTES # BLD AUTO: 0.02 K/UL (ref 0–0.04)
IMM GRANULOCYTES NFR BLD AUTO: 0.3 % (ref 0–0.5)
IRON SERPL-MCNC: 52 UG/DL (ref 30–160)
LYMPHOCYTES # BLD AUTO: 1.3 K/UL (ref 1–4.8)
LYMPHOCYTES NFR BLD: 18.4 % (ref 18–48)
MCH RBC QN AUTO: 29.1 PG (ref 27–31)
MCHC RBC AUTO-ENTMCNC: 31.2 G/DL (ref 32–36)
MCV RBC AUTO: 93 FL (ref 82–98)
MONOCYTES # BLD AUTO: 0.4 K/UL (ref 0.3–1)
MONOCYTES NFR BLD: 5.2 % (ref 4–15)
NEUTROPHILS # BLD AUTO: 5.5 K/UL (ref 1.8–7.7)
NEUTROPHILS NFR BLD: 75.1 % (ref 38–73)
NRBC BLD-RTO: 0 /100 WBC
PLATELET # BLD AUTO: 272 K/UL (ref 150–450)
PMV BLD AUTO: 10.3 FL (ref 9.2–12.9)
POTASSIUM SERPL-SCNC: 5.1 MMOL/L (ref 3.5–5.1)
PROT SERPL-MCNC: 7 G/DL (ref 6–8.4)
RBC # BLD AUTO: 3.57 M/UL (ref 4–5.4)
SATURATED IRON: 18 % (ref 20–50)
SODIUM SERPL-SCNC: 138 MMOL/L (ref 136–145)
TOTAL IRON BINDING CAPACITY: 289 UG/DL (ref 250–450)
TRANSFERRIN SERPL-MCNC: 195 MG/DL (ref 200–375)
URATE SERPL-MCNC: 9.1 MG/DL (ref 2.4–5.7)
WBC # BLD AUTO: 7.27 K/UL (ref 3.9–12.7)

## 2023-05-11 PROCEDURE — 85025 COMPLETE CBC W/AUTO DIFF WBC: CPT | Performed by: STUDENT IN AN ORGANIZED HEALTH CARE EDUCATION/TRAINING PROGRAM

## 2023-05-11 PROCEDURE — 80053 COMPREHEN METABOLIC PANEL: CPT | Performed by: STUDENT IN AN ORGANIZED HEALTH CARE EDUCATION/TRAINING PROGRAM

## 2023-05-11 PROCEDURE — 36415 COLL VENOUS BLD VENIPUNCTURE: CPT | Mod: PO | Performed by: STUDENT IN AN ORGANIZED HEALTH CARE EDUCATION/TRAINING PROGRAM

## 2023-05-11 PROCEDURE — 84466 ASSAY OF TRANSFERRIN: CPT | Performed by: STUDENT IN AN ORGANIZED HEALTH CARE EDUCATION/TRAINING PROGRAM

## 2023-05-11 PROCEDURE — 83036 HEMOGLOBIN GLYCOSYLATED A1C: CPT | Performed by: STUDENT IN AN ORGANIZED HEALTH CARE EDUCATION/TRAINING PROGRAM

## 2023-05-11 PROCEDURE — 84550 ASSAY OF BLOOD/URIC ACID: CPT | Performed by: STUDENT IN AN ORGANIZED HEALTH CARE EDUCATION/TRAINING PROGRAM

## 2023-05-11 PROCEDURE — 82728 ASSAY OF FERRITIN: CPT | Performed by: STUDENT IN AN ORGANIZED HEALTH CARE EDUCATION/TRAINING PROGRAM

## 2023-05-11 RX ORDER — CYANOCOBALAMIN 1000 UG/ML
1000 INJECTION, SOLUTION INTRAMUSCULAR; SUBCUTANEOUS
Status: SHIPPED | OUTPATIENT
Start: 2023-05-11 | End: 2024-05-05

## 2023-05-11 NOTE — TELEPHONE ENCOUNTER
Spoke with pt. Pt stated she wants to come into the clinic to receive injections. Please place order.

## 2023-05-16 ENCOUNTER — TELEPHONE (OUTPATIENT)
Dept: FAMILY MEDICINE | Facility: CLINIC | Age: 58
End: 2023-05-16
Payer: MEDICARE

## 2023-05-16 NOTE — TELEPHONE ENCOUNTER
Returned patients call in regards to needing to be seen in clinic. No answer , left voicemail to return call.

## 2023-05-16 NOTE — TELEPHONE ENCOUNTER
----- Message from Cassie Child sent at 5/16/2023  1:58 PM CDT -----  Contact: pt [phone]  Type:  Sooner Appointment Request    Caller is requesting a sooner appointment.  Caller declined first available appointment listed below.  Caller will not accept being placed on the waitlist and is requesting a message be sent to doctor.    Name of Caller:  Pt   When is the first available appointment?  June   Symptoms:  Stomach pain every time she eats   Best Call Back Number:  [phone]    Additional Information:  Pls call back and advise

## 2023-05-17 ENCOUNTER — TELEPHONE (OUTPATIENT)
Dept: FAMILY MEDICINE | Facility: CLINIC | Age: 58
End: 2023-05-17
Payer: MEDICARE

## 2023-05-17 ENCOUNTER — OFFICE VISIT (OUTPATIENT)
Dept: FAMILY MEDICINE | Facility: CLINIC | Age: 58
End: 2023-05-17
Payer: MEDICARE

## 2023-05-17 VITALS
OXYGEN SATURATION: 98 % | RESPIRATION RATE: 16 BRPM | HEIGHT: 62 IN | HEART RATE: 88 BPM | WEIGHT: 225.5 LBS | BODY MASS INDEX: 41.49 KG/M2 | TEMPERATURE: 98 F | DIASTOLIC BLOOD PRESSURE: 80 MMHG | SYSTOLIC BLOOD PRESSURE: 124 MMHG

## 2023-05-17 DIAGNOSIS — K29.70 GASTRITIS, PRESENCE OF BLEEDING UNSPECIFIED, UNSPECIFIED CHRONICITY, UNSPECIFIED GASTRITIS TYPE: ICD-10-CM

## 2023-05-17 DIAGNOSIS — R10.13 EPIGASTRIC PAIN: ICD-10-CM

## 2023-05-17 PROCEDURE — 3044F PR MOST RECENT HEMOGLOBIN A1C LEVEL <7.0%: ICD-10-PCS | Mod: CPTII,,, | Performed by: FAMILY MEDICINE

## 2023-05-17 PROCEDURE — 1159F PR MEDICATION LIST DOCUMENTED IN MEDICAL RECORD: ICD-10-PCS | Mod: CPTII,,, | Performed by: FAMILY MEDICINE

## 2023-05-17 PROCEDURE — 99999 PR PBB SHADOW E&M-EST. PATIENT-LVL III: ICD-10-PCS | Mod: PBBFAC,,, | Performed by: FAMILY MEDICINE

## 2023-05-17 PROCEDURE — 4010F PR ACE/ARB THEARPY RXD/TAKEN: ICD-10-PCS | Mod: CPTII,,, | Performed by: FAMILY MEDICINE

## 2023-05-17 PROCEDURE — 3074F SYST BP LT 130 MM HG: CPT | Mod: CPTII,,, | Performed by: FAMILY MEDICINE

## 2023-05-17 PROCEDURE — 3079F PR MOST RECENT DIASTOLIC BLOOD PRESSURE 80-89 MM HG: ICD-10-PCS | Mod: CPTII,,, | Performed by: FAMILY MEDICINE

## 2023-05-17 PROCEDURE — 1160F RVW MEDS BY RX/DR IN RCRD: CPT | Mod: CPTII,,, | Performed by: FAMILY MEDICINE

## 2023-05-17 PROCEDURE — 1160F PR REVIEW ALL MEDS BY PRESCRIBER/CLIN PHARMACIST DOCUMENTED: ICD-10-PCS | Mod: CPTII,,, | Performed by: FAMILY MEDICINE

## 2023-05-17 PROCEDURE — 99213 PR OFFICE/OUTPT VISIT, EST, LEVL III, 20-29 MIN: ICD-10-PCS | Mod: ,,, | Performed by: FAMILY MEDICINE

## 2023-05-17 PROCEDURE — 3079F DIAST BP 80-89 MM HG: CPT | Mod: CPTII,,, | Performed by: FAMILY MEDICINE

## 2023-05-17 PROCEDURE — 99213 OFFICE O/P EST LOW 20 MIN: CPT | Mod: ,,, | Performed by: FAMILY MEDICINE

## 2023-05-17 PROCEDURE — 3008F PR BODY MASS INDEX (BMI) DOCUMENTED: ICD-10-PCS | Mod: CPTII,,, | Performed by: FAMILY MEDICINE

## 2023-05-17 PROCEDURE — 3008F BODY MASS INDEX DOCD: CPT | Mod: CPTII,,, | Performed by: FAMILY MEDICINE

## 2023-05-17 PROCEDURE — 4010F ACE/ARB THERAPY RXD/TAKEN: CPT | Mod: CPTII,,, | Performed by: FAMILY MEDICINE

## 2023-05-17 PROCEDURE — 3044F HG A1C LEVEL LT 7.0%: CPT | Mod: CPTII,,, | Performed by: FAMILY MEDICINE

## 2023-05-17 PROCEDURE — 1159F MED LIST DOCD IN RCRD: CPT | Mod: CPTII,,, | Performed by: FAMILY MEDICINE

## 2023-05-17 PROCEDURE — 99999 PR PBB SHADOW E&M-EST. PATIENT-LVL III: CPT | Mod: PBBFAC,,, | Performed by: FAMILY MEDICINE

## 2023-05-17 PROCEDURE — 3074F PR MOST RECENT SYSTOLIC BLOOD PRESSURE < 130 MM HG: ICD-10-PCS | Mod: CPTII,,, | Performed by: FAMILY MEDICINE

## 2023-05-17 RX ORDER — PANTOPRAZOLE SODIUM 40 MG/1
40 TABLET, DELAYED RELEASE ORAL DAILY
Qty: 90 TABLET | Refills: 1 | Status: SHIPPED | OUTPATIENT
Start: 2023-05-17 | End: 2023-05-31 | Stop reason: SDUPTHER

## 2023-05-17 NOTE — TELEPHONE ENCOUNTER
----- Message from Koffi Landon sent at 5/17/2023  9:06 AM CDT -----  Regarding: ret call  Contact: Faustina at [phone]  Type:  Patient Returning Call    Who Called:  Faustina    Who Left Message for Patient:  Bob     Does the patient know what this is regarding?:  no    Best Call Back Number:  [phone]    Additional Information:

## 2023-05-17 NOTE — PROGRESS NOTES
"Subjective:       Patient ID: Faustina Rae is a 58 y.o. female.    Chief Complaint: No chief complaint on file.    New to me patient here for UC visit.  Onset x 2 weeks of abd pain after eating; early satiety; in epigastric area.  No fever, D/C/melana or urinary symptoms.  Did have N and V x1 - no blood or coffee grinds.  She has been off Protonix for "a long time".  S/p Gastric sleeve and no ASA or NASID's.  Positive reflux - daily.    Review of Systems   Constitutional:  Negative for fever.   Respiratory:  Negative for shortness of breath.    Cardiovascular:  Negative for chest pain.   Gastrointestinal:  Positive for abdominal pain and nausea.   Skin:  Negative for rash.   All other systems reviewed and are negative.    Objective:      Physical Exam  Vitals reviewed.   Constitutional:       Appearance: She is well-developed. She is obese. She is not ill-appearing.   Eyes:      General: No scleral icterus.     Pupils: Pupils are equal, round, and reactive to light.   Cardiovascular:      Rate and Rhythm: Normal rate and regular rhythm.      Heart sounds: No murmur heard.  Pulmonary:      Effort: Pulmonary effort is normal.      Breath sounds: Normal breath sounds.   Abdominal:      General: Bowel sounds are normal.      Palpations: Abdomen is soft.      Tenderness: There is no abdominal tenderness.   Musculoskeletal:         General: No tenderness.      Cervical back: Neck supple.      Right lower leg: No edema.      Left lower leg: No edema.   Lymphadenopathy:      Cervical: No cervical adenopathy.   Skin:     General: Skin is warm and dry.   Neurological:      Mental Status: She is alert.       Assessment:       1. Epigastric pain    2. Gastritis, presence of bleeding unspecified, unspecified chronicity, unspecified gastritis type        Plan:       Epigastric pain  -     pantoprazole (PROTONIX) 40 MG tablet; Take 1 tablet (40 mg total) by mouth once daily.  Dispense: 90 tablet; Refill: 1    Gastritis, " presence of bleeding unspecified, unspecified chronicity, unspecified gastritis type  -     pantoprazole (PROTONIX) 40 MG tablet; Take 1 tablet (40 mg total) by mouth once daily.  Dispense: 90 tablet; Refill: 1      Patient Instructions   Chewable antacid tablets - take 2 of these four times a day - after mealtimes and at bedtime

## 2023-05-17 NOTE — TELEPHONE ENCOUNTER
Called patient and advised an appointment has been scheduled for today. Verbalized understanding.

## 2023-05-18 DIAGNOSIS — J44.89 COPD WITH ASTHMA: ICD-10-CM

## 2023-05-18 RX ORDER — ALBUTEROL SULFATE 90 UG/1
AEROSOL, METERED RESPIRATORY (INHALATION)
Qty: 8.5 G | Refills: 11 | Status: SHIPPED | OUTPATIENT
Start: 2023-05-18 | End: 2023-11-22 | Stop reason: SDUPTHER

## 2023-05-18 NOTE — TELEPHONE ENCOUNTER
No care due was identified.  [facility] Embedded Care Due Messages. Reference number: [id number].   5/18/2023 2:36:42 PM CDT

## 2023-05-18 NOTE — TELEPHONE ENCOUNTER
Received fax from [facility] requesting refill of Albuterol Inhaler.  Please advise.     LR--4-5-23  LOV--5-17-23  (Dr. Padgett)   FOV--11-28-23  (Dr. Cuevas)

## 2023-05-25 ENCOUNTER — TELEPHONE (OUTPATIENT)
Dept: FAMILY MEDICINE | Facility: CLINIC | Age: 58
End: 2023-05-25
Payer: MEDICARE

## 2023-05-25 NOTE — TELEPHONE ENCOUNTER
----- Message from Sarah Dominguez sent at 5/25/2023  2:48 PM CDT -----  Type:  Sooner Appointment Request    Caller is requesting a sooner appointment.  Caller declined first available appointment listed below.  Caller will not accept being placed on the waitlist and is requesting a message be sent to doctor.    Name of Caller:  pt  When is the first available appointment?  none  Symptoms:  fatigue  Best Call Back Number:  [phone] (home)     Additional Information:  please call and advise--thank you

## 2023-05-25 NOTE — TELEPHONE ENCOUNTER
----- Message from Sarah Dominguez sent at 5/25/2023  2:48 PM CDT -----  Type:  Sooner Appointment Request    Caller is requesting a sooner appointment.  Caller declined first available appointment listed below.  Caller will not accept being placed on the waitlist and is requesting a message be sent to doctor.    Name of Caller:  pt  When is the first available appointment?  none  Symptoms:  fatigue  Best Call Back Number:  [phone] (home)     Additional Information:  please call and advise--thank you

## 2023-05-31 DIAGNOSIS — I10 ESSENTIAL HYPERTENSION: ICD-10-CM

## 2023-05-31 DIAGNOSIS — G47.00 INSOMNIA, UNSPECIFIED TYPE: ICD-10-CM

## 2023-05-31 DIAGNOSIS — K29.70 GASTRITIS, PRESENCE OF BLEEDING UNSPECIFIED, UNSPECIFIED CHRONICITY, UNSPECIFIED GASTRITIS TYPE: ICD-10-CM

## 2023-05-31 DIAGNOSIS — F41.9 ANXIETY AND DEPRESSION: ICD-10-CM

## 2023-05-31 DIAGNOSIS — F32.A ANXIETY AND DEPRESSION: ICD-10-CM

## 2023-05-31 DIAGNOSIS — R10.13 EPIGASTRIC PAIN: ICD-10-CM

## 2023-05-31 RX ORDER — TRAZODONE HYDROCHLORIDE 50 MG/1
TABLET ORAL
Qty: 90 TABLET | Refills: 1 | Status: ON HOLD | OUTPATIENT
Start: 2023-05-31 | End: 2023-07-14 | Stop reason: HOSPADM

## 2023-05-31 RX ORDER — HYDRALAZINE HYDROCHLORIDE 50 MG/1
50 TABLET, FILM COATED ORAL EVERY 12 HOURS
Qty: 270 TABLET | Refills: 1
Start: 2023-05-31 | End: 2023-10-17 | Stop reason: SDUPTHER

## 2023-05-31 RX ORDER — PANTOPRAZOLE SODIUM 40 MG/1
40 TABLET, DELAYED RELEASE ORAL DAILY
Qty: 90 TABLET | Refills: 1 | Status: SHIPPED | OUTPATIENT
Start: 2023-05-31 | End: 2023-11-30 | Stop reason: SDUPTHER

## 2023-05-31 RX ORDER — ESCITALOPRAM OXALATE 10 MG/1
10 TABLET ORAL DAILY
Qty: 90 TABLET | Refills: 1 | Status: SHIPPED | OUTPATIENT
Start: 2023-05-31 | End: 2023-10-17 | Stop reason: SDUPTHER

## 2023-05-31 RX ORDER — METOPROLOL SUCCINATE 25 MG/1
25 TABLET, EXTENDED RELEASE ORAL DAILY
Qty: 90 TABLET | Refills: 3 | Status: SHIPPED | OUTPATIENT
Start: 2023-05-31 | End: 2024-05-30

## 2023-05-31 NOTE — TELEPHONE ENCOUNTER
----- Message from Tracy Johnson sent at 5/30/2023  4:39 PM CDT -----  Contact: [employer]  Type: Needs Medical Advice  Who Called:  [facility]  Ryan Call Back Number: [phone]    Additional Information: Ranjit is call the office to get EScitalopram oxalate,metoprolol succinate (TOPROL-XL) 25 MG 24 hr tablet,traZODone (DESYREL) 50 MG tablet,hydrALAZINE (APRESOLINE) 50 MG tablet,pantoprazole (PROTONIX) 40 MG tablet filled for pt

## 2023-05-31 NOTE — TELEPHONE ENCOUNTER
No care due was identified.  [facility] Embedded Care Due Messages. Reference number: [id number].   5/31/2023 8:16:54 AM CDT

## 2023-06-28 DIAGNOSIS — I10 ESSENTIAL HYPERTENSION: Primary | ICD-10-CM

## 2023-06-28 RX ORDER — VALSARTAN 80 MG/1
80 TABLET ORAL DAILY
Qty: 90 TABLET | Refills: 1 | Status: SHIPPED | OUTPATIENT
Start: 2023-06-28

## 2023-06-28 NOTE — TELEPHONE ENCOUNTER
No care due was identified.  [facility] Embedded Care Due Messages. Reference number: [id number].   6/28/2023 9:54:19 AM CDT

## 2023-06-28 NOTE — TELEPHONE ENCOUNTER
----- Message from Francia Gordillo sent at 6/28/2023  9:43 AM CDT -----  Contact: cassandra w/[facility]  Type:  RX Refill Request    Who Called:  [employer]  Refill or New Rx:  refill  RX Name and Strength: valsartan (DIOVAN) 80 MG tablet   How is the patient currently taking it? (ex. 1XDay):  as directed  Is this a 30 day or 90 day RX:  90  Preferred Pharmacy with phone number:        [id number] [address] & [address] [address]  Phone: [phone] Fax: [phone]     Local or Mail Order:  local  Ordering Provider:  jan Davis Call Back Number:  [phone]   Additional Information:

## 2023-07-12 ENCOUNTER — HOSPITAL ENCOUNTER (INPATIENT)
Facility: HOSPITAL | Age: 58
LOS: 1 days | Discharge: HOME HEALTH | DRG: 066 | End: 2023-07-14
Attending: EMERGENCY MEDICINE | Admitting: INTERNAL MEDICINE
Payer: MEDICARE

## 2023-07-12 DIAGNOSIS — I10 HYPERTENSION: ICD-10-CM

## 2023-07-12 DIAGNOSIS — Z86.73 HISTORY OF CVA (CEREBROVASCULAR ACCIDENT): ICD-10-CM

## 2023-07-12 DIAGNOSIS — I63.9 CEREBROVASCULAR ACCIDENT (CVA), UNSPECIFIED MECHANISM: Primary | ICD-10-CM

## 2023-07-12 DIAGNOSIS — I63.9 STROKE: ICD-10-CM

## 2023-07-12 DIAGNOSIS — E78.5 HYPERLIPIDEMIA, UNSPECIFIED HYPERLIPIDEMIA TYPE: ICD-10-CM

## 2023-07-12 PROBLEM — R90.89 ABNORMAL MRA, BRAIN: Status: ACTIVE | Noted: 2023-07-12

## 2023-07-12 LAB
ALBUMIN SERPL BCP-MCNC: 3.8 G/DL (ref 3.5–5.2)
ALP SERPL-CCNC: 61 U/L (ref 55–135)
ALT SERPL W/O P-5'-P-CCNC: 9 U/L (ref 10–44)
ANION GAP SERPL CALC-SCNC: 4 MMOL/L (ref 8–16)
AST SERPL-CCNC: 13 U/L (ref 10–40)
BASOPHILS # BLD AUTO: 0.01 K/UL (ref 0–0.2)
BASOPHILS NFR BLD: 0.1 % (ref 0–1.9)
BILIRUB SERPL-MCNC: 0.4 MG/DL (ref 0.1–1)
BUN SERPL-MCNC: 29 MG/DL (ref 6–20)
CALCIUM SERPL-MCNC: 9.4 MG/DL (ref 8.7–10.5)
CHLORIDE SERPL-SCNC: 112 MMOL/L (ref 95–110)
CHOLEST SERPL-MCNC: 192 MG/DL (ref 120–199)
CHOLEST/HDLC SERPL: 4 {RATIO} (ref 2–5)
CO2 SERPL-SCNC: 22 MMOL/L (ref 23–29)
CREAT SERPL-MCNC: 2.6 MG/DL (ref 0.5–1.4)
CREAT SERPL-MCNC: 3 MG/DL (ref 0.5–1.4)
DIFFERENTIAL METHOD: ABNORMAL
EOSINOPHIL # BLD AUTO: 0.1 K/UL (ref 0–0.5)
EOSINOPHIL NFR BLD: 1.3 % (ref 0–8)
ERYTHROCYTE [DISTWIDTH] IN BLOOD BY AUTOMATED COUNT: 13.4 % (ref 11.5–14.5)
EST. GFR  (NO RACE VARIABLE): 20.6 ML/MIN/1.73 M^2
GLUCOSE SERPL-MCNC: 93 MG/DL (ref 70–110)
GLUCOSE SERPL-MCNC: 95 MG/DL (ref 70–110)
HCT VFR BLD AUTO: 39.2 % (ref 37–48.5)
HDLC SERPL-MCNC: 48 MG/DL (ref 40–75)
HDLC SERPL: 25 % (ref 20–50)
HGB BLD-MCNC: 12.2 G/DL (ref 12–16)
IMM GRANULOCYTES # BLD AUTO: 0.03 K/UL (ref 0–0.04)
IMM GRANULOCYTES NFR BLD AUTO: 0.4 % (ref 0–0.5)
INR PPP: 0.9 (ref 0.8–1.2)
LDLC SERPL CALC-MCNC: 124.2 MG/DL (ref 63–159)
LYMPHOCYTES # BLD AUTO: 1.7 K/UL (ref 1–4.8)
LYMPHOCYTES NFR BLD: 19.6 % (ref 18–48)
MCH RBC QN AUTO: 29.4 PG (ref 27–31)
MCHC RBC AUTO-ENTMCNC: 31.1 G/DL (ref 32–36)
MCV RBC AUTO: 95 FL (ref 82–98)
MONOCYTES # BLD AUTO: 0.4 K/UL (ref 0.3–1)
MONOCYTES NFR BLD: 5.2 % (ref 4–15)
NEUTROPHILS # BLD AUTO: 6.2 K/UL (ref 1.8–7.7)
NEUTROPHILS NFR BLD: 73.4 % (ref 38–73)
NONHDLC SERPL-MCNC: 144 MG/DL
NRBC BLD-RTO: 0 /100 WBC
PLATELET # BLD AUTO: 279 K/UL (ref 150–450)
PMV BLD AUTO: 9.8 FL (ref 9.2–12.9)
POTASSIUM SERPL-SCNC: 4.4 MMOL/L (ref 3.5–5.1)
PROT SERPL-MCNC: 7.7 G/DL (ref 6–8.4)
PROTHROMBIN TIME: 10 SEC (ref 9–12.5)
RBC # BLD AUTO: 4.15 M/UL (ref 4–5.4)
SAMPLE: ABNORMAL
SODIUM SERPL-SCNC: 138 MMOL/L (ref 136–145)
TRIGL SERPL-MCNC: 99 MG/DL (ref 30–150)
TROPONIN I SERPL HS-MCNC: 13.9 PG/ML (ref 0–14.9)
TSH SERPL DL<=0.005 MIU/L-ACNC: 2.11 UIU/ML (ref 0.34–5.6)
WBC # BLD AUTO: 8.4 K/UL (ref 3.9–12.7)

## 2023-07-12 PROCEDURE — 63600175 PHARM REV CODE 636 W HCPCS: Performed by: EMERGENCY MEDICINE

## 2023-07-12 PROCEDURE — 36415 COLL VENOUS BLD VENIPUNCTURE: CPT | Performed by: EMERGENCY MEDICINE

## 2023-07-12 PROCEDURE — 93010 EKG 12-LEAD: ICD-10-PCS | Mod: ,,, | Performed by: INTERNAL MEDICINE

## 2023-07-12 PROCEDURE — 82962 GLUCOSE BLOOD TEST: CPT

## 2023-07-12 PROCEDURE — 63600175 PHARM REV CODE 636 W HCPCS: Performed by: NURSE PRACTITIONER

## 2023-07-12 PROCEDURE — G0378 HOSPITAL OBSERVATION PER HR: HCPCS

## 2023-07-12 PROCEDURE — 84484 ASSAY OF TROPONIN QUANT: CPT | Performed by: EMERGENCY MEDICINE

## 2023-07-12 PROCEDURE — 84443 ASSAY THYROID STIM HORMONE: CPT | Performed by: EMERGENCY MEDICINE

## 2023-07-12 PROCEDURE — 93005 ELECTROCARDIOGRAM TRACING: CPT | Performed by: INTERNAL MEDICINE

## 2023-07-12 PROCEDURE — 25000003 PHARM REV CODE 250: Performed by: NURSE PRACTITIONER

## 2023-07-12 PROCEDURE — 82565 ASSAY OF CREATININE: CPT

## 2023-07-12 PROCEDURE — 99291 CRITICAL CARE FIRST HOUR: CPT

## 2023-07-12 PROCEDURE — 80061 LIPID PANEL: CPT | Performed by: EMERGENCY MEDICINE

## 2023-07-12 PROCEDURE — 85025 COMPLETE CBC W/AUTO DIFF WBC: CPT | Performed by: EMERGENCY MEDICINE

## 2023-07-12 PROCEDURE — G0425 PR INPT TELEHEALTH CONSULT 30M: ICD-10-PCS | Mod: 95,,, | Performed by: PSYCHIATRY & NEUROLOGY

## 2023-07-12 PROCEDURE — 96376 TX/PRO/DX INJ SAME DRUG ADON: CPT

## 2023-07-12 PROCEDURE — 80053 COMPREHEN METABOLIC PANEL: CPT | Performed by: EMERGENCY MEDICINE

## 2023-07-12 PROCEDURE — 85610 PROTHROMBIN TIME: CPT | Performed by: EMERGENCY MEDICINE

## 2023-07-12 PROCEDURE — 93010 ELECTROCARDIOGRAM REPORT: CPT | Mod: ,,, | Performed by: INTERNAL MEDICINE

## 2023-07-12 PROCEDURE — 96374 THER/PROPH/DIAG INJ IV PUSH: CPT

## 2023-07-12 PROCEDURE — G0425 INPT/ED TELECONSULT30: HCPCS | Mod: 95,,, | Performed by: PSYCHIATRY & NEUROLOGY

## 2023-07-12 PROCEDURE — 25000003 PHARM REV CODE 250: Performed by: EMERGENCY MEDICINE

## 2023-07-12 RX ORDER — CLOPIDOGREL BISULFATE 75 MG/1
75 TABLET ORAL DAILY
Status: DISCONTINUED | OUTPATIENT
Start: 2023-07-13 | End: 2023-07-12

## 2023-07-12 RX ORDER — ATORVASTATIN CALCIUM 40 MG/1
80 TABLET, FILM COATED ORAL NIGHTLY
Status: DISCONTINUED | OUTPATIENT
Start: 2023-07-12 | End: 2023-07-14 | Stop reason: HOSPADM

## 2023-07-12 RX ORDER — CLOPIDOGREL BISULFATE 75 MG/1
300 TABLET ORAL ONCE
Status: COMPLETED | OUTPATIENT
Start: 2023-07-12 | End: 2023-07-12

## 2023-07-12 RX ORDER — CHLORTHALIDONE 25 MG/1
25 TABLET ORAL DAILY
COMMUNITY
End: 2023-11-22 | Stop reason: SDUPTHER

## 2023-07-12 RX ORDER — SODIUM CHLORIDE 0.9 % (FLUSH) 0.9 %
10 SYRINGE (ML) INJECTION
Status: DISCONTINUED | OUTPATIENT
Start: 2023-07-12 | End: 2023-07-14 | Stop reason: HOSPADM

## 2023-07-12 RX ORDER — TALC
9 POWDER (GRAM) TOPICAL NIGHTLY PRN
Status: DISCONTINUED | OUTPATIENT
Start: 2023-07-13 | End: 2023-07-14 | Stop reason: HOSPADM

## 2023-07-12 RX ORDER — HYDRALAZINE HYDROCHLORIDE 20 MG/ML
10 INJECTION INTRAMUSCULAR; INTRAVENOUS EVERY 8 HOURS PRN
Status: DISCONTINUED | OUTPATIENT
Start: 2023-07-12 | End: 2023-07-14 | Stop reason: HOSPADM

## 2023-07-12 RX ORDER — ATORVASTATIN CALCIUM 40 MG/1
40 TABLET, FILM COATED ORAL DAILY
Status: DISCONTINUED | OUTPATIENT
Start: 2023-07-13 | End: 2023-07-12

## 2023-07-12 RX ORDER — ASPIRIN 81 MG/1
81 TABLET ORAL DAILY
Status: DISCONTINUED | OUTPATIENT
Start: 2023-07-13 | End: 2023-07-12

## 2023-07-12 RX ORDER — CLOPIDOGREL BISULFATE 75 MG/1
75 TABLET ORAL DAILY
Status: DISCONTINUED | OUTPATIENT
Start: 2023-07-13 | End: 2023-07-14 | Stop reason: HOSPADM

## 2023-07-12 RX ORDER — ASPIRIN 325 MG
325 TABLET ORAL
Status: COMPLETED | OUTPATIENT
Start: 2023-07-12 | End: 2023-07-12

## 2023-07-12 RX ORDER — HYDRALAZINE HYDROCHLORIDE 20 MG/ML
10 INJECTION INTRAMUSCULAR; INTRAVENOUS
Status: COMPLETED | OUTPATIENT
Start: 2023-07-12 | End: 2023-07-12

## 2023-07-12 RX ORDER — ASPIRIN 81 MG/1
81 TABLET ORAL DAILY
Status: DISCONTINUED | OUTPATIENT
Start: 2023-07-13 | End: 2023-07-14 | Stop reason: HOSPADM

## 2023-07-12 RX ADMIN — CLOPIDOGREL BISULFATE 300 MG: 75 TABLET, FILM COATED ORAL at 09:07

## 2023-07-12 RX ADMIN — HYDRALAZINE HYDROCHLORIDE 10 MG: 20 INJECTION INTRAMUSCULAR; INTRAVENOUS at 08:07

## 2023-07-12 RX ADMIN — ASPIRIN 325 MG: 325 TABLET ORAL at 06:07

## 2023-07-12 RX ADMIN — Medication 9 MG: at 11:07

## 2023-07-12 RX ADMIN — ATORVASTATIN CALCIUM 80 MG: 40 TABLET, FILM COATED ORAL at 09:07

## 2023-07-12 RX ADMIN — HYDRALAZINE HYDROCHLORIDE 10 MG: 20 INJECTION INTRAMUSCULAR; INTRAVENOUS at 04:07

## 2023-07-12 NOTE — Clinical Note
Diagnosis: Stroke [[id number]]   Future Attending Provider: CHARBEL MENSAH [[id number]]   Place in Observation: [facility] [[id number]]   Admitting Provider:: CHARBEL MENSAH [[id number]]

## 2023-07-12 NOTE — FIRST PROVIDER EVALUATION
"Medical screening examination initiated.  I have conducted a focused provider triage encounter, findings are as follows:    Brief history of present illness:  fall     Vitals:    07/12/23 1614   BP: (!) 207/121   BP Location: Left arm   Patient Position: Sitting   Pulse: 71   Resp: 18   Temp: 98.3 °F (36.8 °C)   TempSrc: Oral   SpO2: 98%   Weight: 90.7 kg (200 lb)   Height: 5' 2" (1.575 m)       Pertinent physical exam:  Patient here with past medical history of stroke, reports has some mild residual of left-sided facial droop, and mild weakness to the left lower extremity however she states that this morning when she bumped she attempted to get out of her bed and ambulate she states that her legs are not work functioning reports that every time she attempts to walk she falls.  Patient is on blood thinners    Brief workup plan:  labs ct head MRI     Preliminary workup initiated; this workup will be continued and followed by the physician or advanced practice provider that is assigned to the patient when roomed.  "

## 2023-07-12 NOTE — SUBJECTIVE & OBJECTIVE
"  Woke up with symptoms?: no    Recent bleeding noted: no  Does the patient take any Blood Thinners? no  Medications: Antiplatelets:  aspirin      Past Medical History: hypertension, diabetes, CHF, stroke and carotid disease s/p stent, DAVID    Past Surgical History: no major surgeries within the last 2 weeks    Family History: no relevant history    Social History: no smoking, no drinking, no drugs    Allergies: No Known Allergies     Review of Systems   Constitutional: Negative for diaphoresis and fever.   HENT: Negative for hearing loss, tinnitus and trouble swallowing.    Eyes: Negative for visual disturbance.   Respiratory: Negative for shortness of breath.    Cardiovascular: Negative for chest pain and palpitations.   Gastrointestinal: Negative for blood in stool and vomiting.   Endocrine: Negative for cold intolerance.   Genitourinary: Negative for hematuria.   Musculoskeletal: Positive for gait problem. Negative for neck pain.   Skin: Negative for rash.   Allergic/Immunologic: Negative for immunocompromised state.   Neurological: Positive for speech difficulty. Negative for dizziness, weakness, numbness and headaches.   Hematological: Does not bruise/bleed easily.   Psychiatric/Behavioral: Negative for agitation, behavioral problems and confusion.     Objective:   Vitals: Blood pressure (!) 207/121, pulse 71, temperature 98.3 °F (36.8 °C), temperature source Oral, resp. rate 18, height 5' 2" (1.575 m), weight 90.7 kg (200 lb), SpO2 98 %.     CT READ: Yes  No hemmorhage. No mass effect. No early infarct signs.     Physical Exam  Vitals and nursing note reviewed.   Constitutional:       General: She is not in acute distress.     Appearance: Normal appearance. She is obese.   HENT:      Head: Normocephalic and atraumatic.      Nose: Nose normal.   Eyes:      Extraocular Movements: Extraocular movements intact.   Cardiovascular:      Rate and Rhythm: Normal rate and regular rhythm.   Pulmonary:      Effort: No " respiratory distress.   Abdominal:      General: There is no distension.   Genitourinary:     Comments: NA  Musculoskeletal:      Cervical back: Normal range of motion.      Right lower leg: No edema.      Left lower leg: No edema.   Skin:     Findings: No erythema or rash.   Neurological:      Mental Status: She is alert and oriented to person, place, and time.      Sensory: No sensory deficit.      Motor: No weakness.      Coordination: Coordination normal.   Psychiatric:         Mood and Affect: Mood normal.         Behavior: Behavior normal.

## 2023-07-12 NOTE — CONSULTS
[facility]  Vascular Neurology  Comprehensive Stroke Center  TeleVascular Neurology Acute Consultation Note      Consults    Consulting Provider: JEREMÍAS FOSTER  Current Providers  No providers found    Patient Location:  [facility] EMERGENCY DEPARTMENT Emergency Department  Spoke hospital nurse at bedside with patient assisting consultant.     Patient information was obtained from patient, past medical records, and primary team.         Assessment/Plan:   57 y/o with HTN, HLD, CHF, DAVID, obesity, R MCA infarct without residual deficits, carotid disease s/p stenting, s/p gastric sleeve surgery, presents with complains of imbalance,falls, and slurred speech since this morning.  NIHSS 0, CT head without acute abnormality.  No ostensible focal neurological deficits on exam but multiple risk factors for stroke, thus can not entirely exclude the possibility of a small acute posterior circulation stroke.  No a candidate for treatment with TNK or MT.  Recommended MRI brain, MRA brain and neck to better elucidate her symptoms.  Can add ASA to Plavix if MRI confirms new stroke.  Neurology, PT consults.       Diagnoses: imbalance. Falls. Slurred speech.  No new Assessment & Plan notes have been filed under this hospital service since the last note was generated.  Service: Vascular Neurology      STROKE DOCUMENTATION     Acute Stroke Times:   Acute Stroke Times   Last Known Normal Date: 07/12/23  Last Known Normal Time: 0900  Symptom Onset Date: 07/12/23  Symptom Onset Time: 0900  Stroke Team Called Date: 07/12/23  Stroke Team Called Time: 1633  Stroke Team Arrival Date: 07/12/23  Stroke Team Arrival Time: 1638  CT Interpretation Time: 1638  Thrombolytic Therapy Recommended: No  Thrombectomy Recommended: No    NIH Scale:  Interval: baseline  1a. Level of Consciousness: 0-->Alert, keenly responsive  1b. LOC Questions: 0-->Answers both questions correctly  1c. LOC Commands: 0-->Performs both  "tasks correctly  2. Best Gaze: 0-->Normal  3. Visual: 0-->No visual loss  4. Facial Palsy: 0-->Normal symmetrical movements  5a. Motor Arm, Left: 0-->No drift, limb holds 90 (or 45) degrees for full 10 secs  5b. Motor Arm, Right: 0-->No drift, limb holds 90 (or 45) degrees for full 10 secs  6a. Motor Leg, Left: 0-->No drift, leg holds 30 degree position for full 5 secs  6b. Motor Leg, Right: 0-->No drift, leg holds 30 degree position for full 5 secs  7. Limb Ataxia: 0-->Absent  8. Sensory: 0-->Normal, no sensory loss  9. Best Language: 0-->No aphasia, normal  10. Dysarthria: 0-->Normal  11. Extinction and Inattention (formerly Neglect): 0-->No abnormality  Total (NIH Stroke Scale): 0     Modified Caroline Score: 1  Nelly Coma Scale:15   ABCD2 Score:    [id number] Score:   HAS -BLED Score:   ICH Score:   Hunt & Daigle Classification:       Blood pressure (!) 207/121, pulse 71, temperature 98.3 °F (36.8 °C), temperature source Oral, resp. rate 18, height 5' 2" (1.575 m), weight 90.7 kg (200 lb), SpO2 98 %.  Eligible for thrombolytic therapy?: No  Thrombolytic therapy recomended: Thrombolytic therapy not recommended due to Outside of treatment window  and Mild Non-Disabling Symptoms  Possible Interventional Revascularization Candidate? No; at this time symptoms not suggestive of large vessel occlusion    Disposition Recommendation: pending further studies    Subjective:     History of Present Illness: 59 y/o with HTN, HLD, CHF, DAVID, obesity, R MCA infarct without residual deficits, carotid disease s/p stenting, s/p gastric sleeve surgery, presents with complains of imbalance,falls, and slurred speech since this morning.          No notes on file      Woke up with symptoms?: no    Recent bleeding noted: no  Does the patient take any Blood Thinners? no  Medications: Antiplatelets:  aspirin      Past Medical History: hypertension, diabetes, CHF, stroke and carotid disease s/p stent, DAVID    Past Surgical History: no major " "surgeries within the last 2 weeks    Family History: no relevant history    Social History: no smoking, no drinking, no drugs    Allergies: No Known Allergies     Review of Systems   Constitutional: Negative for diaphoresis and fever.   HENT: Negative for hearing loss, tinnitus and trouble swallowing.    Eyes: Negative for visual disturbance.   Respiratory: Negative for shortness of breath.    Cardiovascular: Negative for chest pain and palpitations.   Gastrointestinal: Negative for blood in stool and vomiting.   Endocrine: Negative for cold intolerance.   Genitourinary: Negative for hematuria.   Musculoskeletal: Positive for gait problem. Negative for neck pain.   Skin: Negative for rash.   Allergic/Immunologic: Negative for immunocompromised state.   Neurological: Positive for speech difficulty. Negative for dizziness, weakness, numbness and headaches.   Hematological: Does not bruise/bleed easily.   Psychiatric/Behavioral: Negative for agitation, behavioral problems and confusion.     Objective:   Vitals: Blood pressure (!) 207/121, pulse 71, temperature 98.3 °F (36.8 °C), temperature source Oral, resp. rate 18, height 5' 2" (1.575 m), weight 90.7 kg (200 lb), SpO2 98 %.     CT READ: Yes  No hemmorhage. No mass effect. No early infarct signs.     Physical Exam  Vitals and nursing note reviewed.   Constitutional:       General: She is not in acute distress.     Appearance: Normal appearance. She is obese.   HENT:      Head: Normocephalic and atraumatic.      Nose: Nose normal.   Eyes:      Extraocular Movements: Extraocular movements intact.   Cardiovascular:      Rate and Rhythm: Normal rate and regular rhythm.   Pulmonary:      Effort: No respiratory distress.   Abdominal:      General: There is no distension.   Genitourinary:     Comments: NA  Musculoskeletal:      Cervical back: Normal range of motion.      Right lower leg: No edema.      Left lower leg: No edema.   Skin:     Findings: No erythema or rash. "   Neurological:      Mental Status: She is alert and oriented to person, place, and time.      Sensory: No sensory deficit.      Motor: No weakness.      Coordination: Coordination normal.   Psychiatric:         Mood and Affect: Mood normal.         Behavior: Behavior normal.             Recommended the emergency room physician to have a brief discussion with the patient and/or family if available regarding the  risks and benefits of treatment, and to briefly document the occurrence of that discussion in his clinical encounter note.     The attending portion of this evaluation, treatment, and documentation was performed per Fabian Cat MD via audiovisual.    Billing code:  (non-intervention mild to moderate stroke, TIA, some mimics)      This patient has a critical neurological condition/illness, with some potential for high morbidity and mortality.  There is a moderate probability for acute neurological change leading to clinical and possibly life-threatening deterioration requiring highest level of physician preparedness for urgent intervention.  Care was coordinated with other physicians involved in the patient's care.  Radiologic studies and laboratory data were reviewed and interpreted, and plan of care was re-assessed based on the results.  Diagnosis, treatment options and prognosis may have been discussed with the patient and/or family members or caregiver.    In your opinion, this was a: Tier 2 Van Negative    Consult End Time: 4:52 PM     Fabian Cat MD  [facility]  Vascular Neurology   [facility]

## 2023-07-12 NOTE — ED PROVIDER NOTES
Encounter Date: 7/12/2023       History     Chief Complaint   Patient presents with    Extremity Weakness     BILAT LOWER LEG WEAKNESS      Fall     TODAY. NO INJURY    Dizziness     X 1 DAY     Patient presents complaining of dizziness that started yesterday between 6 and 7:00 p.m..  Patient awoke this morning from bed and was off balance and fell.  She then tried to walk again and again fell.  Patient has continued to be off balance and dizzy.  Patient has a history of multiple strokes in the past.    Review of patient's allergies indicates:  No Known Allergies  Past Medical History:   Diagnosis Date    Anxiety     Arthritis     Asthma     CHF (congestive heart failure)     COPD (chronic obstructive pulmonary disease)     Hyperlipemia     Hypertension     Leaky heart valve     Obese     Obese     Obstructive sleep apnea     lost her CPAP    Pneumonia     Rheumatoid arthritis(714.0)     Stroke      Past Surgical History:   Procedure Laterality Date    CAROTID STENT      3 years ago    CHOLECYSTECTOMY      HYSTERECTOMY      SLEEVE GASTROPLASTY  02/21/2017     Family History   Problem Relation Age of Onset    Arthritis Mother     Cancer Mother     Diabetes Mother     Hyperlipidemia Mother     Hypertension Mother     Stroke Mother     Breast cancer Mother     Heart disease Father     Hypertension Father     Asthma Son     Hypertension Sister     Hypertension Sister     Kidney disease Neg Hx      Social History     Tobacco Use    Smoking status: Never     Passive exposure: Never    Smokeless tobacco: Never   Substance Use Topics    Alcohol use: Yes     Comment: rare, about once a month    Drug use: No     Review of Systems   All other systems reviewed and are negative.    Physical Exam     Initial Vitals [07/12/23 1614]   BP Pulse Resp Temp SpO2   (!) 207/121 71 18 98.3 °F (36.8 °C) 98 %      MAP       --         Physical Exam    Nursing note and vitals reviewed.  Constitutional: She appears well-developed and  well-nourished.   Pleasant, polite, obese   HENT:   Head: Normocephalic and atraumatic.   Eyes: EOM are normal.   Neck: Neck supple.   Normal range of motion.  Cardiovascular:  Normal rate, regular rhythm, normal heart sounds and intact distal pulses.           Pulmonary/Chest: Breath sounds normal. No respiratory distress.   Abdominal: Abdomen is soft.   Musculoskeletal:         General: Normal range of motion.      Cervical back: Normal range of motion and neck supple.     Neurological: She is alert and oriented to person, place, and time. She has normal strength.   There is no pronator drift.  Patient has good strength to all extremities.  Normal finger-to-nose.  Patient has significant difficulty with any ambulation and is off balance.   Skin: Skin is warm and dry. Capillary refill takes less than 2 seconds.   Psychiatric: She has a normal mood and affect. Her behavior is normal. Judgment and thought content normal.       ED Course   Procedures  Labs Reviewed   CBC W/ AUTO DIFFERENTIAL - Abnormal; Notable for the following components:       Result Value    MCHC 31.1 (*)     Gran % 73.4 (*)     All other components within normal limits   COMPREHENSIVE METABOLIC PANEL - Abnormal; Notable for the following components:    Chloride 112 (*)     CO2 22 (*)     BUN 29 (*)     Creatinine 2.6 (*)     ALT 9 (*)     eGFR 20.6 (*)     Anion Gap 4 (*)     All other components within normal limits   ISTAT CREATININE - Abnormal; Notable for the following components:    POC Creatinine 3.0 (*)     All other components within normal limits   PROTIME-INR   TSH   LIPID PANEL   TROPONIN I HIGH SENSITIVITY   TROPONIN I HIGH SENSITIVITY   POCT GLUCOSE   POCT GLUCOSE MONITORING CONTINUOUS   POCT CREATININE          Imaging Results              MRI Brain Without Contrast (Final result)  Result time 07/12/23 18:22:39      Final result by Alejandra Lyn MD (07/12/23 18:22:39)                   Narrative:    EXAM:  MR Head Without  Intravenous Contrast    CLINICAL HISTORY:  The patient is 58 years old and is Female; Dizziness, non-specific Extremity weakness, dizziness    TECHNIQUE:  Magnetic resonance images of the head/brain without intravenous contrast in multiple planes.    COMPARISON:  None.    FINDINGS:  BRAIN AND EXTRA-AXIAL SPACES:  Acute focus of restricted diffusion in the left caudate body measuring 0.8 x 0.7 cm. Subtle focus of restricted diffusion in the left posterior frontal white matter (series 2, images 45-47). Stable right temporal and occipital lobe encephalomalacia. Stable right cerebellar encephalomalacia.  Moderate periventricular and subcortical white matter T2 FLAIR hyperintensities are nonspecific but likely secondary to chronic small vessel ischemic disease.  No hemorrhage.  BONES/JOINTS:  Unremarkable.  SINUSES:  Unremarkable as visualized.  No acute sinusitis.  MASTOID AIR CELLS:  Patchy opacification of the left mastoid air cells.  ORBITS:  Unremarkable as visualized.    IMPRESSION:  Acute infarct in the left caudate body and subtle acute infarct in left posterior frontal white matter.    Electronically signed by:  Alejandra Armando MD  7/12/2023 6:22 PM CDT Workstation: [id number]                                     MRA Brain without contrast (Final result)  Result time 07/12/23 18:14:13      Final result by Alejandra Lyn MD (07/12/23 18:14:13)                   Narrative:    EXAM:  MR Angiography Head Without Intravenous Contrast    CLINICAL HISTORY:  The patient is 58 years old and is Female; Dizziness, persistent/recurrent, cardiac or vascular cause suspected Extremity weakness, dizziness    TECHNIQUE:  Magnetic resonance angiography images of the head without intravenous contrast.    COMPARISON:  MRA brain 10/6/2019    FINDINGS:  RIGHT INTERNAL CAROTID ARTERY:  No acute findings.  Intracranial segment is patent with no significant stenosis.  No aneurysm.  RIGHT ANTERIOR CEREBRAL ARTERY:  Stable  hypoplastic right A1.  No occlusion or significant stenosis.  No aneurysm.  RIGHT MIDDLE CEREBRAL ARTERY:  Unremarkable.  No occlusion or significant stenosis.  No aneurysm.  RIGHT POSTERIOR CEREBRAL ARTERY:  Focus of absent flow-related enhancement in the right P2 (series 4, image 71).  RIGHT VERTEBRAL ARTERY:  Unremarkable as visualized.    LEFT INTERNAL CAROTID ARTERY:  No acute findings.  Intracranial segment is patent with no significant stenosis.  No aneurysm.  LEFT ANTERIOR CEREBRAL ARTERY:  Unremarkable.  No occlusion or significant stenosis.  No aneurysm.  LEFT MIDDLE CEREBRAL ARTERY:  Unremarkable.  No occlusion or significant stenosis.  No aneurysm.  LEFT POSTERIOR CEREBRAL ARTERY:  Mild irregularity of the left P2.  No occlusion.  No aneurysm.  LEFT VERTEBRAL ARTERY:  Unremarkable as visualized.    BASILAR ARTERY:  Unremarkable.  No occlusion or significant stenosis.  No aneurysm.    IMPRESSION:    1. Focus of absent flow-related enhancement in the right P2 may represent a focus of occlusion with distal reconstitution versus artifact. CTA can be performed for further evaluation.  2. Mild irregularity of the left P2 likely secondary to atherosclerotic plaque.    Electronically signed by:  Alejandra Armando MD  7/12/2023 6:14 PM CDT Workstation: [id number]                                     X-Ray Chest AP Portable (Final result)  Result time 07/12/23 17:14:42      Final result by Alejandra Lyn MD (07/12/23 17:14:42)                   Narrative:    EXAM:  XR Chest, 1 View    CLINICAL HISTORY:  The patient is 58 years old and is Female;     Extremity Weakness; Fall; Dizziness; Hypertension    TECHNIQUE:  Frontal view of the chest.    COMPARISON:  Chest radiograph 10/16/2022    FINDINGS:  LUNGS AND PLEURAL SPACES:  Mild left midlung scarring. Otherwise, clear lungs. No pleural effusions. No pneumothorax.  HEART:  Cardiomegaly.  MEDIASTINUM:  Unremarkable.  BONES/JOINTS:  Unremarkable.    IMPRESSION:  No  acute findings in the chest.    Electronically signed by:  Alejandra Armando MD  7/12/2023 5:14 PM CDT Workstation: [id number]                                     CT HEAD FOR STROKE (Final result)  Result time 07/12/23 16:45:20   Procedure changed from CT Head Without Contrast     Final result by Wander Byrne MD (07/12/23 16:45:20)                   Narrative:    CMS MANDATED QUALITY DATA - CT RADIATION 436    All CT scans at this facility utilize dose modulation, iterative reconstruction, and/or weight based dosing when appropriate to reduce radiation dose to as low as reasonably achievable.    CLINICAL HISTORY:  58 years (1965) Female WEAKNESS Stroke protocol    TECHNIQUE:  CT HEAD WITHOUT IV CONTRAST. Axial CT of the brain without contrast using soft tissue and bone algorithm. Please note in the acute setting if there is a clinical concern for an acute stroke MRI would be more sensitive/specific for evaluation of ischemia.    COMPARISON:  CT from October 3, 2022.    FINDINGS:  No acute intracranial hemorrhage, hydrocephalus, herniation or midline shift and the basal and suprasellar cisterns are patent. No acute skull fracture is identified.    Mild diffuse cerebral atrophy for age with periventricular deep cerebral white matter low attenuation, a nonspecific finding in this age group which can be seen in any diffuse white matter process but which is most commonly associated with chronic microvascular ischemic disease.    There is a moderate size wedge-shaped defect involving a majority of the right parietal lobe compatible with a remote infarct, similar to the previous exam (image 36). There is an additional smaller defect in the posterior left temporal lobe compatible with a small infarct (image 30). There is a small wedge-shaped defect in the superior aspect of the right cerebellar hemisphere (image 18) in keeping with an additional small infarct. Punctate rounded defects are seen in both basal ganglia  (image 31) in keeping with lacunar infarcts.    Orbital contents appear within normal limits. External auditory canals are unremarkable. The visualized paranasal sinuses and mastoid air cells are essentially clear.    IMPRESSION:  1. No acute intracranial process.  2. Multiple prior wedge-shaped infarcts, most notably in the right parietal lobe, right cerebellar hemisphere, and posterior superior left temporal lobe.  3. Small bilateral basal ganglia lacunar infarcts.                  RESULT NOTIFICATION: These observations were discussed by the dictating physician, by phone and acknowledged by the ordering provider KATERINA HOLGUIN at 7/12/2023 4:41 PM CDT      Electronically signed by:  Wander Byrne MD  7/12/2023 4:45 PM CDT Workstation: [id number]                                     Medications   hydrALAZINE injection 10 mg (10 mg Intravenous Given 7/12/23 1655)   aspirin tablet 325 mg (325 mg Oral Given 7/12/23 6820)     Medical Decision Making:   Initial Assessment:   Patient appears in no acute distress  Differential Diagnosis:   Stroke, peripheral vertigo, electrolyte abnormalities, dehydration, acute kidney injury  Clinical Tests:   Lab Tests: Ordered and Reviewed  Radiological Study: Reviewed and Ordered  Medical Tests: Reviewed and Ordered  ED Management:  MDM    Patient presents for emergent evaluation of acute dizziness that poses a possible threat to life and/or bodily function.    In the ED patient found to have acute stroke.   I ordered labs and personally reviewed them.  Labs significant for chronic kidney disease.    I ordered X-rays and personally reviewed them and reviewed the radiologist interpretation.  Xray significant for no acute cardiopulmonary process.    I ordered EKG and personally reviewed it.  EKG significant for regular rate and rhythm without ST elevation.    I ordered MRI scan and personally reviewed it and reviewed the radiologist interpretation.  MRI significant for acute infarct.       Admission MDM  I discussed the patient presentation labs, ekg, X-rays, MRI findings with the consultant(s) tele neurology,    Patient was managed in the ED with IV hydralazine, aspirin.    The response to treatment was improved.  Patient indeed with acute infarcts.  Patient outside of window for thrombolytics as she is greater than 24 hours out from symptomatology.  Patient not a candidate for large vessel occlusion intervention as she is 24 hours out from injury.  Patient required emergent consultation to hospitalist for admission.    Attending Critical Care:   Critical Care Times:   Direct Patient Care (initial evaluation, reassessments, and time considering the case)................................................................10 minutes.   Additional History from reviewing old medical records or taking additional history from the family, EMS, PCP, etc.......................10 minutes.   Ordering, Reviewing, and Interpreting Diagnostic Studies...............................................................................................................10 minutes.   Documentation..................................................................................................................................................................................5 minutes.   ==============================================================  · Total Critical Care Time - exclusive of procedural time: 35 minutes.  ==============================================================  Critical care was necessary to treat or prevent imminent or life-threatening deterioration of the following conditions:  Stroke.   Critical care was time spent personally by me on the following activities: obtaining history from patient or relative, examination of patient, review of x-rays / CT sent with the patient, ordering lab, x-rays, and/or EKG, development of treatment plan with patient or relative, ordering and performing treatments  and interventions, interpretation of cardiac measurements and re-evaluation of patient's conition.   Critical Care Condition: potentially life-threatening                         Clinical Impression:   Final diagnoses:  [I63.9] Stroke  [I10] Hypertension               Alex French MD  07/12/23 1221

## 2023-07-13 ENCOUNTER — CLINICAL SUPPORT (OUTPATIENT)
Dept: CARDIOLOGY | Facility: HOSPITAL | Age: 58
DRG: 066 | End: 2023-07-13
Attending: EMERGENCY MEDICINE
Payer: MEDICARE

## 2023-07-13 ENCOUNTER — TELEPHONE (OUTPATIENT)
Dept: FAMILY MEDICINE | Facility: CLINIC | Age: 58
End: 2023-07-13
Payer: MEDICARE

## 2023-07-13 VITALS — BODY MASS INDEX: 36.8 KG/M2 | WEIGHT: 200 LBS | HEIGHT: 62 IN

## 2023-07-13 LAB
ALBUMIN SERPL BCP-MCNC: 3.7 G/DL (ref 3.5–5.2)
ALP SERPL-CCNC: 61 U/L (ref 55–135)
ALT SERPL W/O P-5'-P-CCNC: 10 U/L (ref 10–44)
ANION GAP SERPL CALC-SCNC: 6 MMOL/L (ref 8–16)
AORTIC ROOT ANNULUS: 2.8 CM
AORTIC VALVE CUSP SEPERATION: 2 CM
APTT PPP: 25.1 SEC (ref 21–32)
ASCENDING AORTA: 3.3 CM
AST SERPL-CCNC: 13 U/L (ref 10–40)
AV INDEX (PROSTH): 0.74
AV MEAN GRADIENT: 5 MMHG
AV PEAK GRADIENT: 9 MMHG
AV VALVE AREA: 2.56 CM2
AV VELOCITY RATIO: 0.75
BACTERIA #/AREA URNS HPF: NEGATIVE /HPF
BACTERIA #/AREA URNS HPF: NEGATIVE /HPF
BASOPHILS # BLD AUTO: 0.03 K/UL (ref 0–0.2)
BASOPHILS NFR BLD: 0.3 % (ref 0–1.9)
BILIRUB SERPL-MCNC: 0.6 MG/DL (ref 0.1–1)
BILIRUB UR QL STRIP: NEGATIVE
BILIRUB UR QL STRIP: NEGATIVE
BSA FOR ECHO PROCEDURE: 1.99 M2
BUN SERPL-MCNC: 30 MG/DL (ref 6–20)
CALCIUM SERPL-MCNC: 9.6 MG/DL (ref 8.7–10.5)
CHLORIDE SERPL-SCNC: 108 MMOL/L (ref 95–110)
CK MB SERPL-MCNC: 1.5 NG/ML (ref 0.1–6.5)
CLARITY UR: CLEAR
CLARITY UR: CLEAR
CO2 SERPL-SCNC: 25 MMOL/L (ref 23–29)
COLOR UR: COLORLESS
COLOR UR: COLORLESS
CREAT SERPL-MCNC: 2.6 MG/DL (ref 0.5–1.4)
CV ECHO LV RWT: 0.72 CM
DIFFERENTIAL METHOD: ABNORMAL
DOP CALC AO PEAK VEL: 1.51 M/S
DOP CALC AO VTI: 30.3 CM
DOP CALC LVOT AREA: 3.5 CM2
DOP CALC LVOT DIAMETER: 2.1 CM
DOP CALC LVOT PEAK VEL: 1.14 M/S
DOP CALC LVOT STROKE VOLUME: 77.55 CM3
DOP CALC MV VTI: 28.8 CM
DOP CALCLVOT PEAK VEL VTI: 22.4 CM
E WAVE DECELERATION TIME: 264 MSEC
E/A RATIO: 0.63
E/E' RATIO: 15.27 M/S
ECHO LV POSTERIOR WALL: 1.52 CM (ref 0.6–1.1)
EJECTION FRACTION: 65 %
EOSINOPHIL # BLD AUTO: 0.1 K/UL (ref 0–0.5)
EOSINOPHIL NFR BLD: 0.9 % (ref 0–8)
ERYTHROCYTE [DISTWIDTH] IN BLOOD BY AUTOMATED COUNT: 13.4 % (ref 11.5–14.5)
EST. GFR  (NO RACE VARIABLE): 20.6 ML/MIN/1.73 M^2
ESTIMATED AVG GLUCOSE: 100 MG/DL (ref 68–131)
FRACTIONAL SHORTENING: 35 % (ref 28–44)
GLUCOSE SERPL-MCNC: 96 MG/DL (ref 70–110)
GLUCOSE UR QL STRIP: NEGATIVE
GLUCOSE UR QL STRIP: NEGATIVE
HBA1C MFR BLD: 5.1 % (ref 4.5–6.2)
HCT VFR BLD AUTO: 39.6 % (ref 37–48.5)
HGB BLD-MCNC: 12.2 G/DL (ref 12–16)
HGB UR QL STRIP: NEGATIVE
HGB UR QL STRIP: NEGATIVE
HYALINE CASTS #/AREA URNS LPF: 1 /LPF
HYALINE CASTS #/AREA URNS LPF: 2 /LPF
IMM GRANULOCYTES # BLD AUTO: 0.04 K/UL (ref 0–0.04)
IMM GRANULOCYTES NFR BLD AUTO: 0.5 % (ref 0–0.5)
INR PPP: 0.9 (ref 0.8–1.2)
INTERVENTRICULAR SEPTUM: 1.88 CM (ref 0.6–1.1)
KETONES UR QL STRIP: NEGATIVE
KETONES UR QL STRIP: NEGATIVE
LEFT ATRIUM VOLUME INDEX MOD: 30.2 ML/M2
LEFT ATRIUM VOLUME MOD: 57.7 CM3
LEFT INTERNAL DIMENSION IN SYSTOLE: 2.78 CM (ref 2.1–4)
LEFT VENTRICLE DIASTOLIC VOLUME INDEX: 42.3 ML/M2
LEFT VENTRICLE DIASTOLIC VOLUME: 80.8 ML
LEFT VENTRICLE MASS INDEX: 162 G/M2
LEFT VENTRICLE SYSTOLIC VOLUME INDEX: 15.2 ML/M2
LEFT VENTRICLE SYSTOLIC VOLUME: 29 ML
LEFT VENTRICULAR INTERNAL DIMENSION IN DIASTOLE: 4.25 CM (ref 3.5–6)
LEFT VENTRICULAR MASS: 309.22 G
LEUKOCYTE ESTERASE UR QL STRIP: NEGATIVE
LEUKOCYTE ESTERASE UR QL STRIP: NEGATIVE
LV LATERAL E/E' RATIO: 16.8 M/S
LV SEPTAL E/E' RATIO: 14 M/S
LVOT MG: 3 MMHG
LVOT MV: 0.73 CM/S
LYMPHOCYTES # BLD AUTO: 1.8 K/UL (ref 1–4.8)
LYMPHOCYTES NFR BLD: 21 % (ref 18–48)
MAGNESIUM SERPL-MCNC: 1.9 MG/DL (ref 1.6–2.6)
MCH RBC QN AUTO: 29.2 PG (ref 27–31)
MCHC RBC AUTO-ENTMCNC: 30.8 G/DL (ref 32–36)
MCV RBC AUTO: 95 FL (ref 82–98)
MICROSCOPIC COMMENT: ABNORMAL
MICROSCOPIC COMMENT: NORMAL
MONOCYTES # BLD AUTO: 0.5 K/UL (ref 0.3–1)
MONOCYTES NFR BLD: 5.4 % (ref 4–15)
MV MEAN GRADIENT: 2 MMHG
MV PEAK A VEL: 1.34 M/S
MV PEAK E VEL: 0.84 M/S
MV PEAK GRADIENT: 7 MMHG
MV STENOSIS PRESSURE HALF TIME: 109 MS
MV VALVE AREA BY CONTINUITY EQUATION: 2.69 CM2
MV VALVE AREA P 1/2 METHOD: 2.02 CM2
NEUTROPHILS # BLD AUTO: 6.2 K/UL (ref 1.8–7.7)
NEUTROPHILS NFR BLD: 71.9 % (ref 38–73)
NITRITE UR QL STRIP: NEGATIVE
NITRITE UR QL STRIP: NEGATIVE
NRBC BLD-RTO: 0 /100 WBC
OHS LV EJECTION FRACTION SIMPSONS BIPLANE MOD: 6 %
PH UR STRIP: 7 [PH] (ref 5–8)
PH UR STRIP: 8 [PH] (ref 5–8)
PHOSPHATE SERPL-MCNC: 3.6 MG/DL (ref 2.7–4.5)
PLATELET # BLD AUTO: 280 K/UL (ref 150–450)
PMV BLD AUTO: 9.8 FL (ref 9.2–12.9)
POTASSIUM SERPL-SCNC: 3.9 MMOL/L (ref 3.5–5.1)
PROT SERPL-MCNC: 7.8 G/DL (ref 6–8.4)
PROT UR QL STRIP: ABNORMAL
PROT UR QL STRIP: ABNORMAL
PROTHROMBIN TIME: 10.6 SEC (ref 9–12.5)
PV MV: 0.72 M/S
PV PEAK VELOCITY: 1.09 CM/S
RBC # BLD AUTO: 4.18 M/UL (ref 4–5.4)
RBC #/AREA URNS HPF: 1 /HPF (ref 0–4)
RBC #/AREA URNS HPF: 1 /HPF (ref 0–4)
RV TISSUE DOPPLER FREE WALL SYSTOLIC VELOCITY 1 (APICAL 4 CHAMBER VIEW): 14.7 CM/S
SINUS: 2.65 CM
SODIUM SERPL-SCNC: 139 MMOL/L (ref 136–145)
SP GR UR STRIP: 1.01 (ref 1–1.03)
SP GR UR STRIP: 1.01 (ref 1–1.03)
SQUAMOUS #/AREA URNS HPF: 2 /HPF
SQUAMOUS #/AREA URNS HPF: 3 /HPF
STJ: 2.83 CM
TDI LATERAL: 0.05 M/S
TDI SEPTAL: 0.06 M/S
TDI: 0.06 M/S
TRICUSPID ANNULAR PLANE SYSTOLIC EXCURSION: 2.19 CM
TROPONIN I SERPL HS-MCNC: 13.1 PG/ML (ref 0–14.9)
URN SPEC COLLECT METH UR: ABNORMAL
URN SPEC COLLECT METH UR: ABNORMAL
UROBILINOGEN UR STRIP-ACNC: NEGATIVE EU/DL
UROBILINOGEN UR STRIP-ACNC: NEGATIVE EU/DL
WBC # BLD AUTO: 8.58 K/UL (ref 3.9–12.7)
WBC #/AREA URNS HPF: 1 /HPF (ref 0–5)
WBC #/AREA URNS HPF: 2 /HPF (ref 0–5)

## 2023-07-13 PROCEDURE — 85025 COMPLETE CBC W/AUTO DIFF WBC: CPT | Performed by: NURSE PRACTITIONER

## 2023-07-13 PROCEDURE — 85730 THROMBOPLASTIN TIME PARTIAL: CPT | Performed by: NURSE PRACTITIONER

## 2023-07-13 PROCEDURE — 97535 SELF CARE MNGMENT TRAINING: CPT

## 2023-07-13 PROCEDURE — 63600175 PHARM REV CODE 636 W HCPCS: Performed by: NURSE PRACTITIONER

## 2023-07-13 PROCEDURE — 82553 CREATINE MB FRACTION: CPT | Performed by: NURSE PRACTITIONER

## 2023-07-13 PROCEDURE — 96376 TX/PRO/DX INJ SAME DRUG ADON: CPT

## 2023-07-13 PROCEDURE — 93306 TTE W/DOPPLER COMPLETE: CPT | Mod: 26,,, | Performed by: INTERNAL MEDICINE

## 2023-07-13 PROCEDURE — 85610 PROTHROMBIN TIME: CPT | Performed by: NURSE PRACTITIONER

## 2023-07-13 PROCEDURE — 97165 OT EVAL LOW COMPLEX 30 MIN: CPT

## 2023-07-13 PROCEDURE — 84100 ASSAY OF PHOSPHORUS: CPT | Performed by: NURSE PRACTITIONER

## 2023-07-13 PROCEDURE — 99900031 HC PATIENT EDUCATION (STAT)

## 2023-07-13 PROCEDURE — 80053 COMPREHEN METABOLIC PANEL: CPT | Performed by: NURSE PRACTITIONER

## 2023-07-13 PROCEDURE — 92610 EVALUATE SWALLOWING FUNCTION: CPT

## 2023-07-13 PROCEDURE — 93306 TTE W/DOPPLER COMPLETE: CPT

## 2023-07-13 PROCEDURE — 25000003 PHARM REV CODE 250: Performed by: NURSE PRACTITIONER

## 2023-07-13 PROCEDURE — 97161 PT EVAL LOW COMPLEX 20 MIN: CPT

## 2023-07-13 PROCEDURE — 83735 ASSAY OF MAGNESIUM: CPT | Performed by: NURSE PRACTITIONER

## 2023-07-13 PROCEDURE — 93306 ECHO (CUPID ONLY): ICD-10-PCS | Mod: 26,,, | Performed by: INTERNAL MEDICINE

## 2023-07-13 PROCEDURE — 81001 URINALYSIS AUTO W/SCOPE: CPT | Performed by: NURSE PRACTITIONER

## 2023-07-13 PROCEDURE — 83036 HEMOGLOBIN GLYCOSYLATED A1C: CPT | Performed by: NURSE PRACTITIONER

## 2023-07-13 PROCEDURE — 92523 SPEECH SOUND LANG COMPREHEN: CPT

## 2023-07-13 PROCEDURE — 84484 ASSAY OF TROPONIN QUANT: CPT | Performed by: NURSE PRACTITIONER

## 2023-07-13 PROCEDURE — 21400001 HC TELEMETRY ROOM

## 2023-07-13 PROCEDURE — 94761 N-INVAS EAR/PLS OXIMETRY MLT: CPT

## 2023-07-13 RX ADMIN — ASPIRIN 81 MG: 81 TABLET, COATED ORAL at 08:07

## 2023-07-13 RX ADMIN — HYDRALAZINE HYDROCHLORIDE 10 MG: 20 INJECTION INTRAMUSCULAR; INTRAVENOUS at 05:07

## 2023-07-13 RX ADMIN — CLOPIDOGREL BISULFATE 75 MG: 75 TABLET, FILM COATED ORAL at 08:07

## 2023-07-13 RX ADMIN — HYDRALAZINE HYDROCHLORIDE 10 MG: 20 INJECTION INTRAMUSCULAR; INTRAVENOUS at 06:07

## 2023-07-13 RX ADMIN — ATORVASTATIN CALCIUM 80 MG: 40 TABLET, FILM COATED ORAL at 08:07

## 2023-07-13 NOTE — NURSING
Nurses Note -- 4 Eyes      7/13/2023   6:19 PM      Skin assessed during: Admit      [x] No Altered Skin Integrity Present    []Prevention Measures Documented      [] Yes- Altered Skin Integrity Present or Discovered   [] LDA Added if Not in Epic (Describe Wound)   [] New Altered Skin Integrity was Present on Admit and Documented in LDA   [] Wound Image Taken    Wound Care Consulted? No    Attending Nurse:  Chantale Rudd LPN     Second RN/Staff Member:  [id number]

## 2023-07-13 NOTE — SUBJECTIVE & OBJECTIVE
Past Medical History:   Diagnosis Date    Anxiety     Arthritis     Asthma     CHF (congestive heart failure)     COPD (chronic obstructive pulmonary disease)     Hyperlipemia     Hypertension     Leaky heart valve     Obese     Obese     Obstructive sleep apnea     lost her CPAP    Pneumonia     Rheumatoid arthritis(714.0)     Stroke        Past Surgical History:   Procedure Laterality Date    CAROTID STENT      3 years ago    CHOLECYSTECTOMY      HYSTERECTOMY      SLEEVE GASTROPLASTY  02/21/2017       Review of patient's allergies indicates:  No Known Allergies    Current Facility-Administered Medications on File Prior to Encounter   Medication    cyanocobalamin injection 1,000 mcg     Current Outpatient Medications on File Prior to Encounter   Medication Sig    albuterol (PROVENTIL/VENTOLIN HFA) 90 mcg/actuation inhaler INHALE 2 PUFFS INTO THE LUNGS FOUR TIMES DAILY    ALBUTEROL, BULK, MISC     amLODIPine (NORVASC) 10 MG tablet Take 1 tablet (10 mg total) by mouth once daily.    atorvastatin (LIPITOR) 80 MG tablet Take 1 tablet (80 mg total) by mouth every evening.    b complex vitamins tablet Take 1 tablet by mouth once daily.    calcium citrate-vitamin D3 315-200 mg (CITRACAL+D) 315-200 mg-unit per tablet Take 1 tablet by mouth once daily.     clopidogreL (PLAVIX) 75 mg tablet Take 1 tablet (75 mg total) by mouth once daily. (Patient not taking: Reported on 5/17/2023)    colchicine (COLCRYS) 0.6 mg tablet Take 1 tablet (0.6 mg total) by mouth every 72 hours. Take at onset of gout and take for 10 days for 10 days    EScitalopram oxalate (LEXAPRO) 10 MG tablet Take 1 tablet (10 mg total) by mouth once daily.    ferrous sulfate 325 (65 FE) MG EC tablet Take 1 tablet (325 mg total) by mouth once daily.    fluticasone furoate-vilanteroL (BREO ELLIPTA) 200-25 mcg/dose DsDv diskus inhaler Inhale 1 puff into the lungs once daily. Controller    fluticasone propionate (FLONASE) 50 mcg/actuation nasal spray 2 sprays (100  mcg total) by Each Nostril route once daily.    hydrALAZINE (APRESOLINE) 50 MG tablet Take 1 tablet (50 mg total) by mouth every 12 (twelve) hours.    HYDROcodone-acetaminophen (NORCO) 5-325 mg per tablet Take 1 tablet by mouth every 4 (four) hours as needed for Pain.    isosorbide dinitrate (ISORDIL) 5 MG Tab Take 1 tablet (5 mg total) by mouth 3 (three) times daily.    meclizine (ANTIVERT) 25 mg tablet Take 1 tablet (25 mg total) by mouth 3 (three) times daily as needed for Dizziness.    metoprolol succinate (TOPROL-XL) 25 MG 24 hr tablet Take 1 tablet (25 mg total) by mouth once daily.    montelukast (SINGULAIR) 10 mg tablet Take 1 tablet (10 mg total) by mouth every evening.    multivitamin (THERAGRAN) per tablet Take 1 tablet by mouth once daily.    nystatin (MYCOSTATIN ORAL)     pantoprazole (PROTONIX) 40 MG tablet Take 1 tablet (40 mg total) by mouth once daily.    spironolactone (ALDACTONE) 25 MG tablet Take 1 tablet (25 mg total) by mouth once daily.    traZODone (DESYREL) 50 MG tablet 1 tab po qhs prn for insomnia.    valsartan (DIOVAN) 80 MG tablet Take 1 tablet (80 mg total) by mouth once daily.     Family History       Problem Relation (Age of Onset)    Arthritis Mother    Asthma Son    Breast cancer Mother    Cancer Mother    Diabetes Mother    Heart disease Father    Hyperlipidemia Mother    Hypertension Mother, Father, Sister, Sister    Stroke Mother          Tobacco Use    Smoking status: Never     Passive exposure: Never    Smokeless tobacco: Never   Substance and Sexual Activity    Alcohol use: Yes     Comment: rare, about once a month    Drug use: No    Sexual activity: Yes     Partners: Male     Review of Systems   Constitutional:  Positive for activity change. Negative for appetite change, chills, diaphoresis and fever.   HENT:  Negative for congestion, hearing loss, sore throat, tinnitus and trouble swallowing.    Eyes:  Negative for photophobia, discharge, itching and visual disturbance.    Respiratory:  Negative for apnea, cough, wheezing and stridor.    Cardiovascular:  Negative for chest pain, palpitations and leg swelling.   Gastrointestinal:  Negative for abdominal distention, abdominal pain, blood in stool, constipation, diarrhea and nausea.   Endocrine: Negative for polydipsia, polyphagia and polyuria.   Genitourinary:  Negative for difficulty urinating, dysuria, flank pain and frequency.   Musculoskeletal:  Positive for gait problem. Negative for arthralgias, joint swelling and neck stiffness.   Skin:  Negative for color change, rash and wound.   Neurological:  Positive for weakness. Negative for dizziness, tremors, seizures, light-headedness, numbness and headaches.   Hematological:  Negative for adenopathy.   Psychiatric/Behavioral:  Negative for hallucinations and self-injury.    Objective:     Vital Signs (Most Recent):  Temp: 98.3 °F (36.8 °C) (07/12/23 1614)  Pulse: 68 (07/12/23 1809)  Resp: 18 (07/12/23 1614)  BP: (!) 169/84 (07/12/23 1809)  SpO2: 96 % (07/12/23 1809) Vital Signs (24h Range):  Temp:  [98.3 °F (36.8 °C)] 98.3 °F (36.8 °C)  Pulse:  [67-71] 68  Resp:  [18] 18  SpO2:  [96 %-98 %] 96 %  BP: (169-207)/() 169/84     Weight: 90.7 kg (200 lb)  Body mass index is 36.58 kg/m².     Physical Exam  Constitutional:       Appearance: She is well-developed.   HENT:      Head: Normocephalic and atraumatic.   Eyes:      General: Lids are normal.      Conjunctiva/sclera: Conjunctivae normal.   Neck:      Thyroid: No thyroid mass.      Vascular: No JVD.   Cardiovascular:      Heart sounds: S1 normal and S2 normal. No murmur heard.  Pulmonary:      Effort: Pulmonary effort is normal.   Abdominal:      General: Bowel sounds are normal. There is no distension or abdominal bruit.      Palpations: Abdomen is soft. There is no hepatomegaly or splenomegaly.      Tenderness: There is no abdominal tenderness.   Musculoskeletal:      Cervical back: Full passive range of motion without pain and  neck supple. No edema.   Lymphadenopathy:      Cervical: No cervical adenopathy.   Skin:     General: Skin is warm and dry.   Neurological:      Mental Status: She is alert and oriented to person, place, and time.      Motor: No tremor or seizure activity.      Deep Tendon Reflexes: Reflexes are normal and symmetric.      Comments: Expressive aphasia (stutter/residual prior stroke)   Psychiatric:         Speech: Speech normal.         Behavior: Behavior is cooperative.         Thought Content: Thought content normal.              Significant Labs: All pertinent labs within the past 24 hours have been reviewed.  CBC:   Recent Labs   Lab 07/12/23  1629   WBC 8.40   HGB 12.2   HCT 39.2        CMP:   Recent Labs   Lab 07/12/23  1629      K 4.4   *   CO2 22*   GLU 93   BUN 29*   CREATININE 2.6*   CALCIUM 9.4   PROT 7.7   ALBUMIN 3.8   BILITOT 0.4   ALKPHOS 61   AST 13   ALT 9*   ANIONGAP 4*     Lipid Panel:   Recent Labs   Lab 07/12/23  1629   CHOL 192   HDL 48   LDLCALC 124.2   TRIG 99   CHOLHDL 25.0     TSH:   Recent Labs   Lab 07/12/23  1629   TSH 2.110         Significant Imaging:   Imaging Results              MRI Brain Without Contrast (Final result)  Result time 07/12/23 18:22:39      Final result by Alejandra Lyn MD (07/12/23 18:22:39)                   Narrative:    EXAM:  MR Head Without Intravenous Contrast    CLINICAL HISTORY:  The patient is 58 years old and is Female; Dizziness, non-specific Extremity weakness, dizziness    TECHNIQUE:  Magnetic resonance images of the head/brain without intravenous contrast in multiple planes.    COMPARISON:  None.    FINDINGS:  BRAIN AND EXTRA-AXIAL SPACES:  Acute focus of restricted diffusion in the left caudate body measuring 0.8 x 0.7 cm. Subtle focus of restricted diffusion in the left posterior frontal white matter (series 2, images 45-47). Stable right temporal and occipital lobe encephalomalacia. Stable right cerebellar encephalomalacia.   Moderate periventricular and subcortical white matter T2 FLAIR hyperintensities are nonspecific but likely secondary to chronic small vessel ischemic disease.  No hemorrhage.  BONES/JOINTS:  Unremarkable.  SINUSES:  Unremarkable as visualized.  No acute sinusitis.  MASTOID AIR CELLS:  Patchy opacification of the left mastoid air cells.  ORBITS:  Unremarkable as visualized.    IMPRESSION:  Acute infarct in the left caudate body and subtle acute infarct in left posterior frontal white matter.    Electronically signed by:  Alejandra Armando MD  7/12/2023 6:22 PM CDT Workstation: [id number]                                     MRA Brain without contrast (Final result)  Result time 07/12/23 18:14:13      Final result by Alejandra Lyn MD (07/12/23 18:14:13)                   Narrative:    EXAM:  MR Angiography Head Without Intravenous Contrast    CLINICAL HISTORY:  The patient is 58 years old and is Female; Dizziness, persistent/recurrent, cardiac or vascular cause suspected Extremity weakness, dizziness    TECHNIQUE:  Magnetic resonance angiography images of the head without intravenous contrast.    COMPARISON:  MRA brain 10/6/2019    FINDINGS:  RIGHT INTERNAL CAROTID ARTERY:  No acute findings.  Intracranial segment is patent with no significant stenosis.  No aneurysm.  RIGHT ANTERIOR CEREBRAL ARTERY:  Stable hypoplastic right A1.  No occlusion or significant stenosis.  No aneurysm.  RIGHT MIDDLE CEREBRAL ARTERY:  Unremarkable.  No occlusion or significant stenosis.  No aneurysm.  RIGHT POSTERIOR CEREBRAL ARTERY:  Focus of absent flow-related enhancement in the right P2 (series 4, image 71).  RIGHT VERTEBRAL ARTERY:  Unremarkable as visualized.    LEFT INTERNAL CAROTID ARTERY:  No acute findings.  Intracranial segment is patent with no significant stenosis.  No aneurysm.  LEFT ANTERIOR CEREBRAL ARTERY:  Unremarkable.  No occlusion or significant stenosis.  No aneurysm.  LEFT MIDDLE CEREBRAL ARTERY:  Unremarkable.   No occlusion or significant stenosis.  No aneurysm.  LEFT POSTERIOR CEREBRAL ARTERY:  Mild irregularity of the left P2.  No occlusion.  No aneurysm.  LEFT VERTEBRAL ARTERY:  Unremarkable as visualized.    BASILAR ARTERY:  Unremarkable.  No occlusion or significant stenosis.  No aneurysm.    IMPRESSION:    1. Focus of absent flow-related enhancement in the right P2 may represent a focus of occlusion with distal reconstitution versus artifact. CTA can be performed for further evaluation.  2. Mild irregularity of the left P2 likely secondary to atherosclerotic plaque.    Electronically signed by:  Alejandra Armando MD  7/12/2023 6:14 PM CDT Workstation: [id number]                                     X-Ray Chest AP Portable (Final result)  Result time 07/12/23 17:14:42      Final result by Alejandra Lyn MD (07/12/23 17:14:42)                   Narrative:    EXAM:  XR Chest, 1 View    CLINICAL HISTORY:  The patient is 58 years old and is Female;     Extremity Weakness; Fall; Dizziness; Hypertension    TECHNIQUE:  Frontal view of the chest.    COMPARISON:  Chest radiograph 10/16/2022    FINDINGS:  LUNGS AND PLEURAL SPACES:  Mild left midlung scarring. Otherwise, clear lungs. No pleural effusions. No pneumothorax.  HEART:  Cardiomegaly.  MEDIASTINUM:  Unremarkable.  BONES/JOINTS:  Unremarkable.    IMPRESSION:  No acute findings in the chest.    Electronically signed by:  Alejandra Armando MD  7/12/2023 5:14 PM CDT Workstation: [id number]                                     CT HEAD FOR STROKE (Final result)  Result time 07/12/23 16:45:20   Procedure changed from CT Head Without Contrast     Final result by Wander Byrne MD (07/12/23 16:45:20)                   Narrative:    CMS MANDATED QUALITY DATA - CT RADIATION 436    All CT scans at this facility utilize dose modulation, iterative reconstruction, and/or weight based dosing when appropriate to reduce radiation dose to as low as reasonably achievable.    CLINICAL  HISTORY:  58 years (1965) Female WEAKNESS Stroke protocol    TECHNIQUE:  CT HEAD WITHOUT IV CONTRAST. Axial CT of the brain without contrast using soft tissue and bone algorithm. Please note in the acute setting if there is a clinical concern for an acute stroke MRI would be more sensitive/specific for evaluation of ischemia.    COMPARISON:  CT from October 3, 2022.    FINDINGS:  No acute intracranial hemorrhage, hydrocephalus, herniation or midline shift and the basal and suprasellar cisterns are patent. No acute skull fracture is identified.    Mild diffuse cerebral atrophy for age with periventricular deep cerebral white matter low attenuation, a nonspecific finding in this age group which can be seen in any diffuse white matter process but which is most commonly associated with chronic microvascular ischemic disease.    There is a moderate size wedge-shaped defect involving a majority of the right parietal lobe compatible with a remote infarct, similar to the previous exam (image 36). There is an additional smaller defect in the posterior left temporal lobe compatible with a small infarct (image 30). There is a small wedge-shaped defect in the superior aspect of the right cerebellar hemisphere (image 18) in keeping with an additional small infarct. Punctate rounded defects are seen in both basal ganglia (image 31) in keeping with lacunar infarcts.    Orbital contents appear within normal limits. External auditory canals are unremarkable. The visualized paranasal sinuses and mastoid air cells are essentially clear.    IMPRESSION:  1. No acute intracranial process.  2. Multiple prior wedge-shaped infarcts, most notably in the right parietal lobe, right cerebellar hemisphere, and posterior superior left temporal lobe.  3. Small bilateral basal ganglia lacunar infarcts.                  RESULT NOTIFICATION: These observations were discussed by the dictating physician, by phone and acknowledged by the ordering  provider KATERINA HOLGUIN at 7/12/2023 4:41 PM CDT      Electronically signed by:  Wander Byrne MD  7/12/2023 4:45 PM CDT Workstation: [id number]

## 2023-07-13 NOTE — ASSESSMENT & PLAN NOTE
Body mass index is 36.58 kg/m². Morbid obesity complicates all aspects of disease management from diagnostic modalities to treatment. Weight loss encouraged and health benefits explained to patient.   -Dash diet and portion control

## 2023-07-13 NOTE — CONSULTS
[facility]  Department of Neurology  Neurology Consultation Note        PATIENT NAME: Faustina Rae  MRN: [id number]  CSN: [id number]      TODAY'S DATE: 07/13/2023  ADMIT DATE: 7/12/2023                            CONSULTING PROVIDER: Hung Chapman MD  CONSULT REQUESTED BY: Luis Araujo MD      Reason for consult: CVA       History obtained from chart review and the patient.    HPI per EMR: Faustina Rae is a 58 y.o. female with a history of CVA (expressive aphasia), HTN, CAD, HLD, Anxiety, COPD, DAVID, gastric sleeve, Morbid Obesity & gout. She presents for evaluation after falls in one day whereas it felt like her legs gave out. She was found to have acute stroke per MRI and hypertensive urgency with /121 at the time of presentation. She endorse that she has been non-compliant with her blood pressure medicines as well as her aspirin and Plavix that she was supposed to be taken at home.  She continues to have impaired mobility and weight-bearing.  MRI brain showed acute infarct in the left caudate body and septal acute infarct in left posterior frontal white matter.  No she was not a candidate for tPA and note from vascular Neurology in ED. she received 10 mg IV hydralazine in ED with blood pressure now 169 systolic.  She denies headache, chest pain, numbness or tingling, upper respiratory symptoms, dysuria, nausea vomiting, diarrhea.       Neurology consult:  Patient was seen and examined by me.  She states that she presented with difficulty ambulating due to intermittent weakness in her right lower extremity.  She also had falls for which he presented to the hospital.  In the ER she was noted to be hypertensive with systolic blood pressure in 200s.    She was evaluated by tele stroke and not a candidate for tPA.  Admitted for further stroke workup and management.    PREVIOUS MEDICAL HISTORY:  Past Medical History:   Diagnosis Date    Anxiety     Arthritis     Asthma     CHF (congestive  heart failure)     COPD (chronic obstructive pulmonary disease)     Hyperlipemia     Hypertension     Leaky heart valve     Obese     Obese     Obstructive sleep apnea     lost her CPAP    Pneumonia     Rheumatoid arthritis(714.0)     Stroke      PREVIOUS SURGICAL HISTORY:  Past Surgical History:   Procedure Laterality Date    CAROTID STENT      3 years ago    CHOLECYSTECTOMY      HYSTERECTOMY      SLEEVE GASTROPLASTY  02/21/2017     FAMILY MEDICAL HISTORY:  Family History   Problem Relation Age of Onset    Arthritis Mother     Cancer Mother     Diabetes Mother     Hyperlipidemia Mother     Hypertension Mother     Stroke Mother     Breast cancer Mother     Heart disease Father     Hypertension Father     Asthma Son     Hypertension Sister     Hypertension Sister     Kidney disease Neg Hx      SOCIAL HISTORY:  Social History     Tobacco Use    Smoking status: Never     Passive exposure: Never    Smokeless tobacco: Never   Substance Use Topics    Alcohol use: Yes     Comment: rare, about once a month    Drug use: No     ALLERGIES:  Review of patient's allergies indicates:  No Known Allergies  HOME MEDICATIONS:  Prior to Admission medications    Medication Sig Start Date End Date Taking? Authorizing Provider   albuterol (PROVENTIL/VENTOLIN HFA) 90 mcg/actuation inhaler INHALE 2 PUFFS INTO THE LUNGS FOUR TIMES DAILY  Patient taking differently: Inhale 2 puffs into the lungs every 6 (six) hours as needed. INHALE 2 PUFFS INTO THE LUNGS FOUR TIMES DAILY 5/18/23  Yes Zoran Cuevas MD   amLODIPine (NORVASC) 10 MG tablet Take 1 tablet (10 mg total) by mouth once daily. 4/5/23 7/12/23 Yes Karlee Watts NP   atorvastatin (LIPITOR) 80 MG tablet Take 1 tablet (80 mg total) by mouth every evening. 4/5/23 7/12/23 Yes Karlee Watts NP   b complex vitamins tablet Take 1 tablet by mouth once daily.   Yes Historical Provider   calcium citrate-vitamin D3 315-200 mg (CITRACAL+D) 315-200 mg-unit per tablet Take 1  tablet by mouth once daily.    Yes Historical Provider   chlorthalidone (HYGROTEN) 25 MG Tab Take 25 mg by mouth once daily.   Yes Historical Provider   colchicine (COLCRYS) 0.6 mg tablet Take 1 tablet (0.6 mg total) by mouth every 72 hours. Take at onset of gout and take for 10 days for 10 days  Patient taking differently: Take 0.6 mg by mouth once daily. Take at onset of gout and take for 10 days 5/9/23 7/12/23 Yes Zoran Cuevas MD   ferrous sulfate 325 (65 FE) MG EC tablet Take 1 tablet (325 mg total) by mouth once daily. 3/9/23  Yes Karlee Watts NP   fluticasone furoate-vilanteroL (BREO ELLIPTA) 200-25 mcg/dose DsDv diskus inhaler Inhale 1 puff into the lungs once daily. Controller 5/9/23 8/7/23 Yes Zoran Cuevas MD   fluticasone propionate (FLONASE) 50 mcg/actuation nasal spray 2 sprays (100 mcg total) by Each Nostril route once daily. 3/9/23  Yes Karlee Watts NP   hydrALAZINE (APRESOLINE) 50 MG tablet Take 1 tablet (50 mg total) by mouth every 12 (twelve) hours. 5/31/23 5/30/24 Yes Zoran Cuevas MD   isosorbide dinitrate (ISORDIL) 5 MG Tab Take 1 tablet (5 mg total) by mouth 3 (three) times daily. 4/5/23 7/12/23 Yes Karlee Watts NP   metoprolol succinate (TOPROL-XL) 25 MG 24 hr tablet Take 1 tablet (25 mg total) by mouth once daily. 5/31/23 5/30/24 Yes Zoran Cuevas MD   montelukast (SINGULAIR) 10 mg tablet Take 1 tablet (10 mg total) by mouth every evening. 1/21/20  Yes NANDO Prescott   multivitamin (THERAGRAN) per tablet Take 1 tablet by mouth once daily. 3/24/22  Yes Jenna Almanza PA-C   nystatin (MYCOSTATIN ORAL) Take 1 tablet by mouth Daily.   Yes Historical Provider   pantoprazole (PROTONIX) 40 MG tablet Take 1 tablet (40 mg total) by mouth once daily. 5/31/23  Yes Zoran Cuevas MD   spironolactone (ALDACTONE) 25 MG tablet Take 1 tablet (25 mg total) by mouth once daily. 4/5/23  Yes Karlee Watts NP   valsartan (DIOVAN) 80 MG  tablet Take 1 tablet (80 mg total) by mouth once daily. 6/28/23  Yes Karlee Watts NP   clopidogreL (PLAVIX) 75 mg tablet Take 1 tablet (75 mg total) by mouth once daily.  Patient not taking: Reported on 7/12/2023 3/21/23 3/20/24  Karlee Watts NP   EScitalopram oxalate (LEXAPRO) 10 MG tablet Take 1 tablet (10 mg total) by mouth once daily.  Patient not taking: Reported on 7/12/2023 5/31/23   Zoran Cuevas MD   HYDROcodone-acetaminophen (NORCO) 5-325 mg per tablet Take 1 tablet by mouth every 4 (four) hours as needed for Pain.  Patient not taking: Reported on 7/12/2023 4/18/22   Sherine Bhardwaj PA-C   traZODone (DESYREL) 50 MG tablet 1 tab po qhs prn for insomnia.  Patient not taking: Reported on 7/12/2023 5/31/23   Zoran Cuevas MD     CURRENT SCHEDULED MEDICATIONS:   aspirin  81 mg Oral Daily    atorvastatin  80 mg Oral QHS    clopidogreL  75 mg Oral Daily    cyanocobalamin  1,000 mcg Intramuscular Q30 Days     CURRENT INFUSIONS:    CURRENT PRN MEDICATIONS:  hydrALAZINE, melatonin, sodium chloride 0.9%    REVIEW OF SYSTEMS:  Please refer to the HPI for all pertinent positive and negative findings. A comprehensive review of all other systems was negative.       PHYSICAL EXAM:  Patient Vitals for the past 24 hrs:   BP Temp Temp src Pulse Resp SpO2 Height Weight   07/13/23 0659 (!) 219/101 -- -- 70 20 98 % -- --   07/13/23 0654 -- -- -- 67 17 95 % -- --   07/13/23 0649 -- -- -- 66 19 96 % -- --   07/13/23 0644 -- -- -- 67 20 95 % -- --   07/13/23 0629 (!) 202/93 -- -- 65 18 95 % -- --   07/13/23 0559 (!) 180/84 -- -- 67 18 95 % -- --   07/13/23 0529 (!) 198/86 -- -- 70 (!) 30 98 % -- --   07/13/23 0459 (!) 217/110 -- -- 66 (!) 22 97 % -- --   07/13/23 0439 -- -- -- 67 20 96 % -- --   07/13/23 0414 (!) 211/100 -- -- 70 (!) 24 96 % -- --   07/13/23 0328 (!) 200/92 97.9 °F (36.6 °C) Oral 70 17 96 % -- --   07/13/23 0200 (!) 181/91 -- -- 72 -- 97 % -- --   07/13/23 0129 (!)  "187/92 -- -- 68 -- 97 % -- --   07/13/23 0059 (!) 220/99 -- -- 75 -- 98 % -- --   07/12/23 2359 (!) 193/94 -- -- 67 -- 97 % -- --   07/12/23 2338 (!) 187/97 -- -- 72 -- 96 % -- --   07/12/23 2329 (!) 200/97 -- -- 67 -- 95 % -- --   07/12/23 2259 (!) 183/89 -- -- -- -- -- -- --   07/12/23 2229 (!) 175/84 -- -- 83 -- 98 % -- --   07/12/23 2159 (!) 167/74 -- -- 86 -- 96 % -- --   07/12/23 2129 (!) 184/96 -- -- 92 -- 96 % -- --   07/12/23 2059 (!) 215/106 -- -- 86 -- 95 % -- --   07/12/23 2029 (!) 194/84 -- -- 79 -- 96 % -- --   07/12/23 2017 (!) 224/105 -- -- -- -- -- -- --   07/12/23 2014 (!) 224/105 -- -- 71 -- -- -- --   07/12/23 1959 (!) 249/125 -- -- 67 -- 96 % -- --   07/12/23 1809 (!) 169/84 -- -- 68 -- 96 % -- --   07/12/23 1704 (!) 189/86 -- -- -- -- -- -- --   07/12/23 1659 (!) 201/95 -- -- 67 -- -- -- --   07/12/23 1655 (!) 203/92 -- -- -- -- -- -- --   07/12/23 1614 (!) 207/121 98.3 °F (36.8 °C) Oral 71 18 98 % 5' 2" (1.575 m) 90.7 kg (200 lb)       GENERAL APPEARANCE: Alert, well-developed, well-nourished female in no acute distress.  HEENT: Normocephalic and atraumatic. PERRL. Oropharynx unremarkable.  PULM: Normal respiratory effort. No accessory muscle use.  CV: RRR.  ABDOMEN: Soft, nontender.  EXTREMITIES: No obvious signs of vascular compromise. Pulses present. No cyanosis, clubbing or edema.  SKIN: Clear; no rashes, lesions or skin breaks in exposed areas.    NEURO:  MENTAL STATUS: Patient awake and oriented to time, place, and person, recent/remote memory normal, attention span/concentration normal, speech fluent without paraphasic errors, and good comprehension with appropriate thought content.  Affect euthymic.    CRANIAL NERVES:  CN I: Not tested.  CN II: Fundoscopic exam deferred.  CN III, IV, VI: Pupils equal, round and reactive to light.  Extraocular movements full and intact.  CN V: Facial sensation normal.  CN VII: Facial asymmetry absent.  CN VIII: Hearing grossly normal and equal " bilaterally.  No skew deviation or pathologic nystagmus.  CN IX, X: Palate elevates symmetrically. Speech/articulation is clear without dysarthria.  CN XI: Shoulder shrug and chin rotation equal with good strength.  CN XII: Tongue protrusion midline.    MOTOR:  Bulk normal. Tone normal and symmetric throughout.  Abnormal movements absent.  Tremor: none present.  Strength  5/5 in LUE and LLE. 4+/5 in RUE and RLE .    REFLEXES:  DTRs 1+ throughout.  Plantar response 5/5 in left upper and lower.  SENSATION: grossly intact throughout.  COORDINATION: normal finger-to-nose.  STATION: not tested.  GAIT: not tested.      NIHSS:  1a      Level of Consciousnes:  Alert 0  1b.     Level of Consciousness Questions (month, age): 0= able to answer both questions  1c.     Level of Consciousness Commands (open, close eyes, make fist, let go):  0=Answers both tasks correctly  2.      Best Gaze (eyes open - patient follows examiner's finger or face):      0=normal  3.      Visual (introduce visual stimulus/threat to patient's visual field quadrants):  0=No visual loss  4.      Facial Palsy (show teeth, raise eyebrows and squeeze eyes shut):        0=Normal symmetric movement  5a.     Motor Arm - Left (elevate extremity 90 degrees and score drift/movement):       0=No drift, limb holds 90 (or 45) degrees for full 10 seconds  5b.     Motor Arm - Right (elevate extremity 90 degrees and score drift/movement):      0=No drift, limb holds 90 (or 45) degrees for full 10 seconds  6a.     Motor Leg - Left (elevate extremity 30 degrees and score drift/movement):       0=No drift, limb holds 90 (or 45) degrees for full 10 seconds  6b      Motor Leg - Right (elevate extremity 30 degrees and score drift/movement):      0=No drift, limb holds 90 (or 45) degrees for full 10 seconds  7.      Limb Ataxia (finger-nose, heel down shin):      0=Absent  8.      Sensory (pin prick to face, arm, trunk, and leg - compare side to side):        0=Normal; no  sensory loss  9.      Best Language (name items, describe a picture and read sentences):      0=No aphasia, normal  10.     Dysarthria (evaluate speech clarity by patient repeating listed words): 0=Normal  11.     Extinction and Inattention (use prior testing to identify neglect or double simultaneous stimuli testing):      0=No abnormality          NIH Stroke Scale Total:         0      Labs:  Recent Labs   Lab 07/12/23  1629 07/13/23  0410    139   K 4.4 3.9   * 108   CO2 22* 25   BUN 29* 30*   CREATININE 2.6* 2.6*   GLU 93 96   CALCIUM 9.4 9.6   PHOS  --  3.6   MG  --  1.9     Recent Labs   Lab 07/12/23 1629 07/13/23  0410   WBC 8.40 8.58   HGB 12.2 12.2   HCT 39.2 39.6    280     Recent Labs   Lab 07/12/23 1629 07/13/23  0410   ALBUMIN 3.8 3.7   PROT 7.7 7.8   BILITOT 0.4 0.6   ALKPHOS 61 61   ALT 9* 10   AST 13 13     Lab Results   Component Value Date    INR 0.9 07/13/2023     Lab Results   Component Value Date    TRIG 99 07/12/2023    HDL 48 07/12/2023    CHOLHDL 25.0 07/12/2023     Lab Results   Component Value Date    HGBA1C 5.1 07/13/2023     No results found for: PROTEINCSF, GLUCCSF, WBCCSF    Imaging:  I have reviewed and interpreted the pertinent imaging and lab results.      MRA Brain without contrast   ADDENDUM #1     Impression discussed with Dr. Alex French by Dr. Lyn at 6:35 PM CST on 7/12/2023.    Electronically signed by:  Alejandra Armando MD  7/12/2023 8:07 PM CDT Workstation: [id number]     ORIGINAL REPORT     EXAM:  MR Angiography Head Without Intravenous Contrast    CLINICAL HISTORY:  The patient is 58 years old and is Female; Dizziness, persistent/recurrent, cardiac or vascular cause suspected Extremity weakness, dizziness    TECHNIQUE:  Magnetic resonance angiography images of the head without intravenous contrast.    COMPARISON:  MRA brain 10/6/2019    FINDINGS:  RIGHT INTERNAL CAROTID ARTERY:  No acute findings.  Intracranial segment is patent with no  significant stenosis.  No aneurysm.  RIGHT ANTERIOR CEREBRAL ARTERY:  Stable hypoplastic right A1.  No occlusion or significant stenosis.  No aneurysm.  RIGHT MIDDLE CEREBRAL ARTERY:  Unremarkable.  No occlusion or significant stenosis.  No aneurysm.  RIGHT POSTERIOR CEREBRAL ARTERY:  Focus of absent flow-related enhancement in the right P2 (series 4, image 71).  RIGHT VERTEBRAL ARTERY:  Unremarkable as visualized.    LEFT INTERNAL CAROTID ARTERY:  No acute findings.  Intracranial segment is patent with no significant stenosis.  No aneurysm.  LEFT ANTERIOR CEREBRAL ARTERY:  Unremarkable.  No occlusion or significant stenosis.  No aneurysm.  LEFT MIDDLE CEREBRAL ARTERY:  Unremarkable.  No occlusion or significant stenosis.  No aneurysm.  LEFT POSTERIOR CEREBRAL ARTERY:  Mild irregularity of the left P2.  No occlusion.  No aneurysm.  LEFT VERTEBRAL ARTERY:  Unremarkable as visualized.    BASILAR ARTERY:  Unremarkable.  No occlusion or significant stenosis.  No aneurysm.    IMPRESSION:    1. Focus of absent flow-related enhancement in the right P2 may represent a focus of occlusion with distal reconstitution versus artifact. CTA can be performed for further evaluation.  2. Mild irregularity of the left P2 likely secondary to atherosclerotic plaque.    Electronically signed by:  Alejandra Armando MD  7/12/2023 6:14 PM CDT Workstation: [id number]  MRI Brain Without Contrast   ADDENDUM #1     Impression discussed with Dr. Alex French by Dr. Lyn at 6:35 PM CST on 7/12/2023.    Electronically signed by:  Alejandra Armando MD  7/12/2023 8:07 PM CDT Workstation: [id number]     ORIGINAL REPORT     EXAM:  MR Head Without Intravenous Contrast    CLINICAL HISTORY:  The patient is 58 years old and is Female; Dizziness, non-specific Extremity weakness, dizziness    TECHNIQUE:  Magnetic resonance images of the head/brain without intravenous contrast in multiple planes.    COMPARISON:  None.    FINDINGS:  BRAIN AND EXTRA-AXIAL  SPACES:  Acute focus of restricted diffusion in the left caudate body measuring 0.8 x 0.7 cm. Subtle focus of restricted diffusion in the left posterior frontal white matter (series 2, images 45-47). Stable right temporal and occipital lobe encephalomalacia. Stable right cerebellar encephalomalacia.  Moderate periventricular and subcortical white matter T2 FLAIR hyperintensities are nonspecific but likely secondary to chronic small vessel ischemic disease.  No hemorrhage.  BONES/JOINTS:  Unremarkable.  SINUSES:  Unremarkable as visualized.  No acute sinusitis.  MASTOID AIR CELLS:  Patchy opacification of the left mastoid air cells.  ORBITS:  Unremarkable as visualized.    IMPRESSION:  Acute infarct in the left caudate body and subtle acute infarct in left posterior frontal white matter.    Electronically signed by:  Alejandra Armando MD  7/12/2023 6:22 PM CDT Workstation: [id number]  X-Ray Chest AP Portable  EXAM:  XR Chest, 1 View    CLINICAL HISTORY:  The patient is 58 years old and is Female;     Extremity Weakness; Fall; Dizziness; Hypertension    TECHNIQUE:  Frontal view of the chest.    COMPARISON:  Chest radiograph 10/16/2022    FINDINGS:  LUNGS AND PLEURAL SPACES:  Mild left midlung scarring. Otherwise, clear lungs. No pleural effusions. No pneumothorax.  HEART:  Cardiomegaly.  MEDIASTINUM:  Unremarkable.  BONES/JOINTS:  Unremarkable.    IMPRESSION:  No acute findings in the chest.    Electronically signed by:  Alejandra Armando MD  7/12/2023 5:14 PM CDT Workstation: [id number]  CT HEAD FOR STROKE  CMS MANDATED QUALITY DATA - CT RADIATION 436    All CT scans at this facility utilize dose modulation, iterative reconstruction, and/or weight based dosing when appropriate to reduce radiation dose to as low as reasonably achievable.    CLINICAL HISTORY:  58 years (1965) Female WEAKNESS Stroke protocol    TECHNIQUE:  CT HEAD WITHOUT IV CONTRAST. Axial CT of the brain without contrast using soft tissue and bone  algorithm. Please note in the acute setting if there is a clinical concern for an acute stroke MRI would be more sensitive/specific for evaluation of ischemia.    COMPARISON:  CT from October 3, 2022.    FINDINGS:  No acute intracranial hemorrhage, hydrocephalus, herniation or midline shift and the basal and suprasellar cisterns are patent. No acute skull fracture is identified.    Mild diffuse cerebral atrophy for age with periventricular deep cerebral white matter low attenuation, a nonspecific finding in this age group which can be seen in any diffuse white matter process but which is most commonly associated with chronic microvascular ischemic disease.    There is a moderate size wedge-shaped defect involving a majority of the right parietal lobe compatible with a remote infarct, similar to the previous exam (image 36). There is an additional smaller defect in the posterior left temporal lobe compatible with a small infarct (image 30). There is a small wedge-shaped defect in the superior aspect of the right cerebellar hemisphere (image 18) in keeping with an additional small infarct. Punctate rounded defects are seen in both basal ganglia (image 31) in keeping with lacunar infarcts.    Orbital contents appear within normal limits. External auditory canals are unremarkable. The visualized paranasal sinuses and mastoid air cells are essentially clear.    IMPRESSION:  1. No acute intracranial process.  2. Multiple prior wedge-shaped infarcts, most notably in the right parietal lobe, right cerebellar hemisphere, and posterior superior left temporal lobe.  3. Small bilateral basal ganglia lacunar infarcts.    RESULT NOTIFICATION: These observations were discussed by the dictating physician, by phone and acknowledged by the ordering provider KATERINA HOLGUIN at 7/12/2023 4:41 PM CDT    Electronically signed by:  Wander Byrne MD  7/12/2023 4:45 PM CDT Workstation: [id number]         ASSESSMENT & PLAN:    Acute  ischemic stroke in the left coronary radiata  Hypertension    Workup  CTH: No acute intracranial abnormality . Multiple prior wedge-shaped infarcts, most notably in the right parietal lobe, right cerebellar hemisphere, and posterior superior left temporal lobe  MRI brain:  Acute infarct in the left coronary radiata.   MRA head:  Decreased opacification of the right P2 segment.  Mild atherosclerotic narrowing of left P2.  MRA neck: pending  ECHO:  pending   LDL: 124  HbA1c: 5.1       Plan  Admitted for further stroke workup.  Etiology of stroke could likely be small-vessel disease however given her multiple infarcts in the past which look embolic, concern for a central pathology is also high.  Start with Aspirin 81 mg, plavix 75mg and Lipitor 80mg.  Dual antiplatelet therapy for 3 weeks followed by monotherapy with Plavix alone.  Permissive BP to 220 systolic for 24 hrs from symptom onset and after that normalize BP  Outpatient Holter monitor for 3-4 weeks.  PT OT  Speech therapy  DVT prophylaxis with chemo/SCD prophylaxis  Discussed lifestyle modifications as prophylactic measures for stroke prevention including adequate blood pressure management, healthy diet and regular exercise.         Thank you kindly for including us in the care of this patient. Please do not hesitate to contact us with any questions.             Hung Chapman MD  Neurology/vascular Neurology  Date of Service: 07/13/2023  10:12 AM    --------------------------------------------------------------------------------------------------------------------------------------------------------------------------------------------------------------------------------------------------------------  Please note: This note was transcribed using voice recognition software. Because of this technology there are often uinintended grammatical, spelling, and other transcription errors. Please disregard these errors.

## 2023-07-13 NOTE — HPI
Faustina Rae is a 58 y.o. female with history of CVA (expressive aphasia), HTN, CAD, HLD, Anxiety, COPD, DAVID, gastric sleeve, Morbid Obesity & gout. She presents for evaluation after falls in one day whereas it felt like her legs gave out. She was found to have acute stroke per MRI and hypertensive urgency with /121 at the time of presentation. She endorse that she has been non-compliant with her blood pressure medicines as well as her aspirin and Plavix that she was supposed to be taken at home.  She continues to have impaired mobility and weight-bearing.  MRI brain showed acute infarct in the left caudate body and septal acute infarct in left posterior frontal white matter.  No she was not a candidate for tPA and note from vascular Neurology in ED. she received 10 mg IV hydralazine in ED with blood pressure now 169 systolic.  She denies headache, chest pain, numbness or tingling, upper respiratory symptoms, dysuria, nausea vomiting, diarrhea.    In ED CBC essentially normal, lipid panel shows control however LDL is not within goal = 124 for her risk, TSH normal chemistry shows CKD4 with BUN/creatinine/GFR = 29/2.6/20.6    PLAN: permissive HTN, Hydralazine for SBP >210, ASA/plavix/statin, consult to neurology, PT/OT

## 2023-07-13 NOTE — ASSESSMENT & PLAN NOTE
"Vascular neurology suspects that the MRA finding of "focus of occlusion with distal reconstitution versus artifact." is likely incidental and irrelevant because the small acute infarcts are in the LEFT anterior circulation and the reported MRA finding is in a different vascular territory in the opposite side. He does not believe that the patient needs any intervention besides loading with  mg which she has gotten and Plavix 300 mg which is orderd, and high intensity statin.  - Consult to neurology  -ASA/plavix daily  - Atorvastatin 80 mg daily    "

## 2023-07-13 NOTE — ASSESSMENT & PLAN NOTE
MRI: Acute infarct in the left caudate body and subtle acute infarct in left posterior frontal white matter    MRA: Focus of absent flow-related enhancement in the right P2 may represent a focus of occlusion with distal reconstitution versus artifact. CTA     Antithrombotics for secondary stroke prevention: Antiplatelets: Aspirin: 81 mg daily  Clopidogrel: 75 mg daily    Statins for secondary stroke prevention and hyperlipidemia, if present:   Statins: Atorvastatin- 80 mg daily    Aggressive risk factor modification: HTN, HLD, Exercise, Obesity     Rehab efforts: The patient has been evaluated by a stroke team provider and the therapy needs have been fully considered based off the presenting complaints and exam findings. The following therapy evaluations are needed: PT evaluate and treat, OT evaluate and treat, SLP evaluate and treat    VTE prophylaxis: Enoxaparin (CrCl < 30 ml/min) 30 mg SQ every 24 hours    BP parameters: Infarct: No intervention, SBP <220

## 2023-07-13 NOTE — ASSESSMENT & PLAN NOTE
Permissive hypertension for now   -consult neurology  -when safe reintroduce home blood pressure meds  -2D echo with bubble study

## 2023-07-13 NOTE — H&P
[facility] Emergency Dept  Hospital Medicine  History & Physical    Patient Name: Faustina Rae  MRN: [id number]  Patient Class: OP- Observation  Admission Date: 7/12/2023  Attending Physician: Gorge Aguayo MD   Primary Care Provider: Zoran Cuevas MD         Patient information was obtained from patient and ER records.     Subjective:     Principal Problem:CVA (cerebral vascular accident)    Chief Complaint:   Chief Complaint   Patient presents with    Extremity Weakness     BILAT LOWER LEG WEAKNESS      Fall     TODAY. NO INJURY    Dizziness     X 1 DAY        HPI: Faustina Rae is a 58 y.o. female with history of CVA (expressive aphasia), HTN, CAD, HLD, Anxiety, COPD, DAVID, gastric sleeve, Morbid Obesity & gout. She presents for evaluation after falls in one day whereas it felt like her legs gave out. She was found to have acute stroke per MRI and hypertensive urgency with /121 at the time of presentation. She endorse that she has been non-compliant with her blood pressure medicines as well as her aspirin and Plavix that she was supposed to be taken at home.  She continues to have impaired mobility and weight-bearing.  MRI brain showed acute infarct in the left caudate body and septal acute infarct in left posterior frontal white matter.  No she was not a candidate for tPA and note from vascular Neurology in ED. she received 10 mg IV hydralazine in ED with blood pressure now 169 systolic.  She denies headache, chest pain, numbness or tingling, upper respiratory symptoms, dysuria, nausea vomiting, diarrhea.    In ED CBC essentially normal, lipid panel shows control however LDL is not within goal = 124 for her risk, TSH normal chemistry shows CKD4 with BUN/creatinine/GFR = 29/2.6/20.6    PLAN: permissive HTN, Hydralazine for SBP >210, ASA/plavix/statin, consult to neurology, PT/OT      Past Medical History:   Diagnosis Date    Anxiety     Arthritis     Asthma     CHF  (congestive heart failure)     COPD (chronic obstructive pulmonary disease)     Hyperlipemia     Hypertension     Leaky heart valve     Obese     Obese     Obstructive sleep apnea     lost her CPAP    Pneumonia     Rheumatoid arthritis(714.0)     Stroke        Past Surgical History:   Procedure Laterality Date    CAROTID STENT      3 years ago    CHOLECYSTECTOMY      HYSTERECTOMY      SLEEVE GASTROPLASTY  02/21/2017       Review of patient's allergies indicates:  No Known Allergies    Current Facility-Administered Medications on File Prior to Encounter   Medication    cyanocobalamin injection 1,000 mcg     Current Outpatient Medications on File Prior to Encounter   Medication Sig    albuterol (PROVENTIL/VENTOLIN HFA) 90 mcg/actuation inhaler INHALE 2 PUFFS INTO THE LUNGS FOUR TIMES DAILY    ALBUTEROL, BULK, MISC     amLODIPine (NORVASC) 10 MG tablet Take 1 tablet (10 mg total) by mouth once daily.    atorvastatin (LIPITOR) 80 MG tablet Take 1 tablet (80 mg total) by mouth every evening.    b complex vitamins tablet Take 1 tablet by mouth once daily.    calcium citrate-vitamin D3 315-200 mg (CITRACAL+D) 315-200 mg-unit per tablet Take 1 tablet by mouth once daily.     clopidogreL (PLAVIX) 75 mg tablet Take 1 tablet (75 mg total) by mouth once daily. (Patient not taking: Reported on 5/17/2023)    colchicine (COLCRYS) 0.6 mg tablet Take 1 tablet (0.6 mg total) by mouth every 72 hours. Take at onset of gout and take for 10 days for 10 days    EScitalopram oxalate (LEXAPRO) 10 MG tablet Take 1 tablet (10 mg total) by mouth once daily.    ferrous sulfate 325 (65 FE) MG EC tablet Take 1 tablet (325 mg total) by mouth once daily.    fluticasone furoate-vilanteroL (BREO ELLIPTA) 200-25 mcg/dose DsDv diskus inhaler Inhale 1 puff into the lungs once daily. Controller    fluticasone propionate (FLONASE) 50 mcg/actuation nasal spray 2 sprays (100 mcg total) by Each Nostril route once daily.    hydrALAZINE (APRESOLINE) 50 MG  tablet Take 1 tablet (50 mg total) by mouth every 12 (twelve) hours.    HYDROcodone-acetaminophen (NORCO) 5-325 mg per tablet Take 1 tablet by mouth every 4 (four) hours as needed for Pain.    isosorbide dinitrate (ISORDIL) 5 MG Tab Take 1 tablet (5 mg total) by mouth 3 (three) times daily.    meclizine (ANTIVERT) 25 mg tablet Take 1 tablet (25 mg total) by mouth 3 (three) times daily as needed for Dizziness.    metoprolol succinate (TOPROL-XL) 25 MG 24 hr tablet Take 1 tablet (25 mg total) by mouth once daily.    montelukast (SINGULAIR) 10 mg tablet Take 1 tablet (10 mg total) by mouth every evening.    multivitamin (THERAGRAN) per tablet Take 1 tablet by mouth once daily.    nystatin (MYCOSTATIN ORAL)     pantoprazole (PROTONIX) 40 MG tablet Take 1 tablet (40 mg total) by mouth once daily.    spironolactone (ALDACTONE) 25 MG tablet Take 1 tablet (25 mg total) by mouth once daily.    traZODone (DESYREL) 50 MG tablet 1 tab po qhs prn for insomnia.    valsartan (DIOVAN) 80 MG tablet Take 1 tablet (80 mg total) by mouth once daily.     Family History       Problem Relation (Age of Onset)    Arthritis Mother    Asthma Son    Breast cancer Mother    Cancer Mother    Diabetes Mother    Heart disease Father    Hyperlipidemia Mother    Hypertension Mother, Father, Sister, Sister    Stroke Mother          Tobacco Use    Smoking status: Never     Passive exposure: Never    Smokeless tobacco: Never   Substance and Sexual Activity    Alcohol use: Yes     Comment: rare, about once a month    Drug use: No    Sexual activity: Yes     Partners: Male     Review of Systems   Constitutional:  Positive for activity change. Negative for appetite change, chills, diaphoresis and fever.   HENT:  Negative for congestion, hearing loss, sore throat, tinnitus and trouble swallowing.    Eyes:  Negative for photophobia, discharge, itching and visual disturbance.   Respiratory:  Negative for apnea, cough, wheezing and stridor.    Cardiovascular:   Negative for chest pain, palpitations and leg swelling.   Gastrointestinal:  Negative for abdominal distention, abdominal pain, blood in stool, constipation, diarrhea and nausea.   Endocrine: Negative for polydipsia, polyphagia and polyuria.   Genitourinary:  Negative for difficulty urinating, dysuria, flank pain and frequency.   Musculoskeletal:  Positive for gait problem. Negative for arthralgias, joint swelling and neck stiffness.   Skin:  Negative for color change, rash and wound.   Neurological:  Positive for weakness. Negative for dizziness, tremors, seizures, light-headedness, numbness and headaches.   Hematological:  Negative for adenopathy.   Psychiatric/Behavioral:  Negative for hallucinations and self-injury.    Objective:     Vital Signs (Most Recent):  Temp: 98.3 °F (36.8 °C) (07/12/23 1614)  Pulse: 68 (07/12/23 1809)  Resp: 18 (07/12/23 1614)  BP: (!) 169/84 (07/12/23 1809)  SpO2: 96 % (07/12/23 1809) Vital Signs (24h Range):  Temp:  [98.3 °F (36.8 °C)] 98.3 °F (36.8 °C)  Pulse:  [67-71] 68  Resp:  [18] 18  SpO2:  [96 %-98 %] 96 %  BP: (169-207)/() 169/84     Weight: 90.7 kg (200 lb)  Body mass index is 36.58 kg/m².     Physical Exam  Constitutional:       Appearance: She is well-developed.   HENT:      Head: Normocephalic and atraumatic.   Eyes:      General: Lids are normal.      Conjunctiva/sclera: Conjunctivae normal.   Neck:      Thyroid: No thyroid mass.      Vascular: No JVD.   Cardiovascular:      Heart sounds: S1 normal and S2 normal. No murmur heard.  Pulmonary:      Effort: Pulmonary effort is normal.   Abdominal:      General: Bowel sounds are normal. There is no distension or abdominal bruit.      Palpations: Abdomen is soft. There is no hepatomegaly or splenomegaly.      Tenderness: There is no abdominal tenderness.   Musculoskeletal:      Cervical back: Full passive range of motion without pain and neck supple. No edema.   Lymphadenopathy:      Cervical: No cervical adenopathy.    Skin:     General: Skin is warm and dry.   Neurological:      Mental Status: She is alert and oriented to person, place, and time.      Motor: No tremor or seizure activity.      Deep Tendon Reflexes: Reflexes are normal and symmetric.      Comments: Expressive aphasia (stutter/residual prior stroke)   Psychiatric:         Speech: Speech normal.         Behavior: Behavior is cooperative.         Thought Content: Thought content normal.              Significant Labs: All pertinent labs within the past 24 hours have been reviewed.  CBC:   Recent Labs   Lab 07/12/23  1629   WBC 8.40   HGB 12.2   HCT 39.2        CMP:   Recent Labs   Lab 07/12/23  1629      K 4.4   *   CO2 22*   GLU 93   BUN 29*   CREATININE 2.6*   CALCIUM 9.4   PROT 7.7   ALBUMIN 3.8   BILITOT 0.4   ALKPHOS 61   AST 13   ALT 9*   ANIONGAP 4*     Lipid Panel:   Recent Labs   Lab 07/12/23  1629   CHOL 192   HDL 48   LDLCALC 124.2   TRIG 99   CHOLHDL 25.0     TSH:   Recent Labs   Lab 07/12/23  1629   TSH 2.110         Significant Imaging:   Imaging Results              MRI Brain Without Contrast (Final result)  Result time 07/12/23 18:22:39      Final result by Alejandra Lyn MD (07/12/23 18:22:39)                   Narrative:    EXAM:  MR Head Without Intravenous Contrast    CLINICAL HISTORY:  The patient is 58 years old and is Female; Dizziness, non-specific Extremity weakness, dizziness    TECHNIQUE:  Magnetic resonance images of the head/brain without intravenous contrast in multiple planes.    COMPARISON:  None.    FINDINGS:  BRAIN AND EXTRA-AXIAL SPACES:  Acute focus of restricted diffusion in the left caudate body measuring 0.8 x 0.7 cm. Subtle focus of restricted diffusion in the left posterior frontal white matter (series 2, images 45-47). Stable right temporal and occipital lobe encephalomalacia. Stable right cerebellar encephalomalacia.  Moderate periventricular and subcortical white matter T2 FLAIR hyperintensities  are nonspecific but likely secondary to chronic small vessel ischemic disease.  No hemorrhage.  BONES/JOINTS:  Unremarkable.  SINUSES:  Unremarkable as visualized.  No acute sinusitis.  MASTOID AIR CELLS:  Patchy opacification of the left mastoid air cells.  ORBITS:  Unremarkable as visualized.    IMPRESSION:  Acute infarct in the left caudate body and subtle acute infarct in left posterior frontal white matter.    Electronically signed by:  Alejandra Armando MD  7/12/2023 6:22 PM CDT Workstation: [id number]                                     MRA Brain without contrast (Final result)  Result time 07/12/23 18:14:13      Final result by Alejandra Lyn MD (07/12/23 18:14:13)                   Narrative:    EXAM:  MR Angiography Head Without Intravenous Contrast    CLINICAL HISTORY:  The patient is 58 years old and is Female; Dizziness, persistent/recurrent, cardiac or vascular cause suspected Extremity weakness, dizziness    TECHNIQUE:  Magnetic resonance angiography images of the head without intravenous contrast.    COMPARISON:  MRA brain 10/6/2019    FINDINGS:  RIGHT INTERNAL CAROTID ARTERY:  No acute findings.  Intracranial segment is patent with no significant stenosis.  No aneurysm.  RIGHT ANTERIOR CEREBRAL ARTERY:  Stable hypoplastic right A1.  No occlusion or significant stenosis.  No aneurysm.  RIGHT MIDDLE CEREBRAL ARTERY:  Unremarkable.  No occlusion or significant stenosis.  No aneurysm.  RIGHT POSTERIOR CEREBRAL ARTERY:  Focus of absent flow-related enhancement in the right P2 (series 4, image 71).  RIGHT VERTEBRAL ARTERY:  Unremarkable as visualized.    LEFT INTERNAL CAROTID ARTERY:  No acute findings.  Intracranial segment is patent with no significant stenosis.  No aneurysm.  LEFT ANTERIOR CEREBRAL ARTERY:  Unremarkable.  No occlusion or significant stenosis.  No aneurysm.  LEFT MIDDLE CEREBRAL ARTERY:  Unremarkable.  No occlusion or significant stenosis.  No aneurysm.  LEFT POSTERIOR CEREBRAL  ARTERY:  Mild irregularity of the left P2.  No occlusion.  No aneurysm.  LEFT VERTEBRAL ARTERY:  Unremarkable as visualized.    BASILAR ARTERY:  Unremarkable.  No occlusion or significant stenosis.  No aneurysm.    IMPRESSION:    1. Focus of absent flow-related enhancement in the right P2 may represent a focus of occlusion with distal reconstitution versus artifact. CTA can be performed for further evaluation.  2. Mild irregularity of the left P2 likely secondary to atherosclerotic plaque.    Electronically signed by:  Alejandra Armando MD  7/12/2023 6:14 PM CDT Workstation: [id number]                                     X-Ray Chest AP Portable (Final result)  Result time 07/12/23 17:14:42      Final result by Alejandra Lyn MD (07/12/23 17:14:42)                   Narrative:    EXAM:  XR Chest, 1 View    CLINICAL HISTORY:  The patient is 58 years old and is Female;     Extremity Weakness; Fall; Dizziness; Hypertension    TECHNIQUE:  Frontal view of the chest.    COMPARISON:  Chest radiograph 10/16/2022    FINDINGS:  LUNGS AND PLEURAL SPACES:  Mild left midlung scarring. Otherwise, clear lungs. No pleural effusions. No pneumothorax.  HEART:  Cardiomegaly.  MEDIASTINUM:  Unremarkable.  BONES/JOINTS:  Unremarkable.    IMPRESSION:  No acute findings in the chest.    Electronically signed by:  Alejandra Armando MD  7/12/2023 5:14 PM CDT Workstation: [id number]                                     CT HEAD FOR STROKE (Final result)  Result time 07/12/23 16:45:20   Procedure changed from CT Head Without Contrast     Final result by Wander Byrne MD (07/12/23 16:45:20)                   Narrative:    CMS MANDATED QUALITY DATA - CT RADIATION 436    All CT scans at this facility utilize dose modulation, iterative reconstruction, and/or weight based dosing when appropriate to reduce radiation dose to as low as reasonably achievable.    CLINICAL HISTORY:  58 years (1965) Female WEAKNESS Stroke  protocol    TECHNIQUE:  CT HEAD WITHOUT IV CONTRAST. Axial CT of the brain without contrast using soft tissue and bone algorithm. Please note in the acute setting if there is a clinical concern for an acute stroke MRI would be more sensitive/specific for evaluation of ischemia.    COMPARISON:  CT from October 3, 2022.    FINDINGS:  No acute intracranial hemorrhage, hydrocephalus, herniation or midline shift and the basal and suprasellar cisterns are patent. No acute skull fracture is identified.    Mild diffuse cerebral atrophy for age with periventricular deep cerebral white matter low attenuation, a nonspecific finding in this age group which can be seen in any diffuse white matter process but which is most commonly associated with chronic microvascular ischemic disease.    There is a moderate size wedge-shaped defect involving a majority of the right parietal lobe compatible with a remote infarct, similar to the previous exam (image 36). There is an additional smaller defect in the posterior left temporal lobe compatible with a small infarct (image 30). There is a small wedge-shaped defect in the superior aspect of the right cerebellar hemisphere (image 18) in keeping with an additional small infarct. Punctate rounded defects are seen in both basal ganglia (image 31) in keeping with lacunar infarcts.    Orbital contents appear within normal limits. External auditory canals are unremarkable. The visualized paranasal sinuses and mastoid air cells are essentially clear.    IMPRESSION:  1. No acute intracranial process.  2. Multiple prior wedge-shaped infarcts, most notably in the right parietal lobe, right cerebellar hemisphere, and posterior superior left temporal lobe.  3. Small bilateral basal ganglia lacunar infarcts.                  RESULT NOTIFICATION: These observations were discussed by the dictating physician, by phone and acknowledged by the ordering provider KATERINA HOLGUIN at 7/12/2023 4:41 PM  "CDT      Electronically signed by:  Wander Byrne MD  7/12/2023 4:45 PM CDT Workstation: [id number]                                      Assessment/Plan:     * CVA (cerebral vascular accident)  MRI: Acute infarct in the left caudate body and subtle acute infarct in left posterior frontal white matter    MRA: Focus of absent flow-related enhancement in the right P2 may represent a focus of occlusion with distal reconstitution versus artifact. CTA     Antithrombotics for secondary stroke prevention: Antiplatelets: Aspirin: 81 mg daily  Clopidogrel: 75 mg daily    Statins for secondary stroke prevention and hyperlipidemia, if present:   Statins: Atorvastatin- 80 mg daily    Aggressive risk factor modification: HTN, HLD, Exercise, Obesity     Rehab efforts: The patient has been evaluated by a stroke team provider and the therapy needs have been fully considered based off the presenting complaints and exam findings. The following therapy evaluations are needed: PT evaluate and treat, OT evaluate and treat, SLP evaluate and treat    VTE prophylaxis: Enoxaparin (CrCl < 30 ml/min) 30 mg SQ every 24 hours    BP parameters: Infarct: No intervention, SBP <220      Abnormal MRA, brain  Vascular neurology suspects that the MRA finding of "focus of occlusion with distal reconstitution versus artifact." is likely incidental and irrelevant because the small acute infarcts are in the LEFT anterior circulation and the reported MRA finding is in a different vascular territory in the opposite side. He does not believe that the patient needs any intervention besides loading with  mg which she has gotten and Plavix 300 mg which is orderd, and high intensity statin.  - Consult to neurology  -ASA/plavix daily  - Atorvastatin 80 mg daily      Hyperlipidemia, unspecified  Goal LDL less than 70 = 124 at this time.  -  Has been noncompliant at home.  -  Aggressive secondary prevention add atorvastatin 80 mg      Essential " hypertension  Permissive hypertension for now   -consult neurology  -when safe reintroduce home blood pressure meds  -2D echo with bubble study      Morbid obesity with BMI of 40.0-44.9, adult  Body mass index is 36.58 kg/m². Morbid obesity complicates all aspects of disease management from diagnostic modalities to treatment. Weight loss encouraged and health benefits explained to patient.   -Dash diet and portion control          VTE Risk Mitigation (From admission, onward)           Ordered     Place SOL hose  Until discontinued         07/12/23 2009     IP VTE HIGH RISK PATIENT  Once         07/12/23 2009     Place sequential compression device  Until discontinued         07/12/23 2009                         On 07/12/2023, patient should be placed in hospital observation services under my care in collaboration with Dr Darling.      Rosey Drake, CATALINA  Department of Hospital Medicine  Formerly [facility] - Emergency Dept

## 2023-07-13 NOTE — TELEPHONE ENCOUNTER
Left message on voicemail advising that Hany will not be in the office for her scheduled appointment today, however she can do a virtual visit with him.

## 2023-07-13 NOTE — ED NOTES
Mild dysarthria noted and reported by patient to be normal for her.  Ataxia in RUE / difficulty completing finger-to-nose test reported to be new since waking Wednesday.

## 2023-07-13 NOTE — PT/OT/SLP EVAL
Occupational Therapy   Evaluation and Discharge Note    Name: Faustina Rae  MRN: [id number]  Admitting Diagnosis: CVA (cerebral vascular accident)  Recent Surgery: * No surgery found *      Recommendations:     Discharge Recommendations: home health OT  Discharge Equipment Recommendations: none  Barriers to discharge:  None    Assessment:     Faustina Rae is a 58 y.o. female with a medical diagnosis of CVA (cerebral vascular accident). At this time, patient is modified independent with ADLs. Patient endorsed minimal BLE weakness when standing at sink to perform grooming tasks. No significant LOB with mobility. Patient does not require further acute OT services.     Plan:     During this hospitalization, patient does not require further acute OT services.  Please re-consult if situation changes.    Plan of Care Reviewed with: patient    Subjective     Chief Complaint: minimal BLE weakness  Patient/Family Comments/goals:  none    Occupational Profile:  Living Environment: Patient lives alone in a 1st floor apt.   Previous level of function: Patient was independent with ADLs and mobility  Roles and Routines: Patient still drives, but does not work.   Equipment Used at home: none  Assistance upon Discharge: Patient will not require further acute OT needs post discharge.     Pain/Comfort:  Pain Rating 1: 0/10  Pain Rating Post-Intervention 1: 0/10    Patients cultural, spiritual, Religion conflicts given the current situation: no    Objective:     Communicated with: nurse prior to session.  Patient found HOB elevated with   upon OT entry to room.    General Precautions: Standard, aphasia  Orthopedic Precautions: N/A  Braces: N/A  Respiratory Status: Room air     Occupational Performance:    Bed Mobility:    Patient completed Scooting/Bridging with modified independence  Patient completed Supine to Sit with modified independence  Patient completed Sit to Supine with modified independence    Functional  Mobility/Transfers:  Patient completed Sit <> Stand Transfer with supervision  with  rolling walker   Functional Mobility: Patient stood from bed and took ~6 steps to sink requiring SBA using RW.     Activities of Daily Living:  Grooming: modified independence with oral and facial hygiene while standing at sink  Lower Body Dressing: modified independence to don/doff socks while seated EOB  Toileting: Purewick in place    Cognitive/Visual Perceptual:  Cognitive/Psychosocial Skills:     -       Oriented to: x4   -       Follows Commands/attention:Follows multistep  commands  -       Communication: clear/fluent  -       Safety awareness/insight to disability: intact   -       Mood/Affect/Coping skills/emotional control: Appropriate to situation and Cooperative  Visual/Perceptual:      -Intact     Physical Exam:  Postural examination/scapula alignment:    -       Rounded shoulders  -       Forward head  Upper Extremity Range of Motion:     -       Right Upper Extremity: WFL  -       Left Upper Extremity: WFL  Upper Extremity Strength:    -       Right Upper Extremity: 4+/5  -       Left Upper Extremity: 4+/5   Strength:    -       Right Upper Extremity: 4+/5  -       Left Upper Extremity: 4+/5  Fine Motor Coordination:    -       Intact  Gross motor coordination:   WFL in BUE    AMPAC 6 Click ADL:  AMPAC Total Score: 24    Treatment & Education:  OT ed pt on OT role & POC as well as discharge recommendations.  OT ed patient on safety with walker use for functional mobility with cues for hand placement & sequencing.       Patient left HOB elevated with all lines intact, call button in reach, and nurse notified    GOALS:   Multidisciplinary Problems       Occupational Therapy Goals       Not on file                    History:     Past Medical History:   Diagnosis Date    Anxiety     Arthritis     Asthma     CHF (congestive heart failure)     COPD (chronic obstructive pulmonary disease)     Hyperlipemia      Hypertension     Leaky heart valve     Obese     Obese     Obstructive sleep apnea     lost her CPAP    Pneumonia     Rheumatoid arthritis(714.0)     Stroke          Past Surgical History:   Procedure Laterality Date    CAROTID STENT      3 years ago    CHOLECYSTECTOMY      HYSTERECTOMY      SLEEVE GASTROPLASTY  02/21/2017       Time Tracking:     OT Date of Treatment: 07/13/23  OT Start Time: 0943  OT Stop Time: 1007  OT Total Time (min): 24 min    Billable Minutes:Evaluation 10  Self Care/Home Management 14    7/13/2023

## 2023-07-13 NOTE — PLAN OF CARE
Problem: SLP  Goal: SLP Goal  Description: 1. Pt will complete simple and complex 2 step directives given mod cuing w/ 75% accuracy   2. Pt will complete naming tasks (confrontational, abstract, divergent/convergent) w/ 75% given mod level cuing  3. Pt will complete delayed recall tasks w/ 75% accuracy given mod level cuing  Outcome: Ongoing, Progressing     CSE and cognitive-linguistic evaluation completed. Swallowing WFL; rec regular diet w/ thin liquids. Cognitive-linguistic deficits noted, including receptive and expressive language and memory. Rec pt receive ST during admit and as outpatient once discharged to address cognitive-linguistic deficits.

## 2023-07-13 NOTE — PT/OT/SLP EVAL
"Speech Language Pathology Evaluation  Cognitive/Bedside Swallow    Patient Name:  Faustina Rae   MRN:  [id number]  Admitting Diagnosis: CVA (cerebral vascular accident)    Recommendations:                  General Recommendations:  Speech/language therapy and Cognitive-linguistic therapy  Diet recommendations:  Regular Diet - IDDSI Level 7, Thin liquids - IDDSI Level 0   Aspiration Precautions: Standard aspiration precautions   General Precautions: Standard, aphasia  Communication strategies:  provide increased time to answer    Assessment:     Faustina Rae is a 58 y.o. female with an SLP diagnosis of Aphasia and Cognitive-Linguistic Impairment.  She presents with impaired receptive language, expressive language, attention, and memory. Swallowing WFL; no overt s/s aspiration or oropharyngeal dysphagia. Rec ST during admit and as outpatient once discharged to address aphasia and cognitive deficits.    History:   Per H&P:  "HPI: Faustina Rae is a 58 y.o. female with history of CVA (expressive aphasia), HTN, CAD, HLD, Anxiety, COPD, DAVID, gastric sleeve, Morbid Obesity & gout. She presents for evaluation after falls in one day whereas it felt like her legs gave out. She was found to have acute stroke per MRI and hypertensive urgency with /121 at the time of presentation. She endorse that she has been non-compliant with her blood pressure medicines as well as her aspirin and Plavix that she was supposed to be taken at home.  She continues to have impaired mobility and weight-bearing.  MRI brain showed acute infarct in the left caudate body and septal acute infarct in left posterior frontal white matter.  No she was not a candidate for tPA and note from vascular Neurology in ED. she received 10 mg IV hydralazine in ED with blood pressure now 169 systolic.  She denies headache, chest pain, numbness or tingling, upper respiratory symptoms, dysuria, nausea vomiting, diarrhea."  Past Medical History: "   Diagnosis Date    Anxiety     Arthritis     Asthma     CHF (congestive heart failure)     COPD (chronic obstructive pulmonary disease)     Hyperlipemia     Hypertension     Leaky heart valve     Obese     Obese     Obstructive sleep apnea     lost her CPAP    Pneumonia     Rheumatoid arthritis(714.0)     Stroke        Past Surgical History:   Procedure Laterality Date    CAROTID STENT      3 years ago    CHOLECYSTECTOMY      HYSTERECTOMY      SLEEVE GASTROPLASTY  02/21/2017       Social History: Patient lives with her friend.    Prior Intubation HX:  none this admit    Modified Barium Swallow: none found in epic or reported by pt       Imaging Results              MRI Brain Without Contrast (Edited Result - FINAL)  Result time 07/12/23 20:07:05      Edited Result - FINAL by Alejandra Lyn MD (07/12/23 20:07:05)                   Narrative:         ADDENDUM #1       Impression discussed with Dr. Alex French by Dr. Lyn at 6:35 PM CST on 7/12/2023.    Electronically signed by:  Alejandra Armando MD  7/12/2023 8:07 PM CDT Workstation: [id number]     ORIGINAL REPORT       EXAM:  MR Head Without Intravenous Contrast    CLINICAL HISTORY:  The patient is 58 years old and is Female; Dizziness, non-specific Extremity weakness, dizziness    TECHNIQUE:  Magnetic resonance images of the head/brain without intravenous contrast in multiple planes.    COMPARISON:  None.    FINDINGS:  BRAIN AND EXTRA-AXIAL SPACES:  Acute focus of restricted diffusion in the left caudate body measuring 0.8 x 0.7 cm. Subtle focus of restricted diffusion in the left posterior frontal white matter (series 2, images 45-47). Stable right temporal and occipital lobe encephalomalacia. Stable right cerebellar encephalomalacia.  Moderate periventricular and subcortical white matter T2 FLAIR hyperintensities are nonspecific but likely secondary to chronic small vessel ischemic disease.  No hemorrhage.  BONES/JOINTS:  Unremarkable.  SINUSES:   Unremarkable as visualized.  No acute sinusitis.  MASTOID AIR CELLS:  Patchy opacification of the left mastoid air cells.  ORBITS:  Unremarkable as visualized.    IMPRESSION:  Acute infarct in the left caudate body and subtle acute infarct in left posterior frontal white matter.    Electronically signed by:  Alejandra Armando MD  7/12/2023 6:22 PM CDT Workstation: [id number]                      Final result by Alejandra Lyn MD (07/12/23 18:22:39)                   Narrative:    EXAM:  MR Head Without Intravenous Contrast    CLINICAL HISTORY:  The patient is 58 years old and is Female; Dizziness, non-specific Extremity weakness, dizziness    TECHNIQUE:  Magnetic resonance images of the head/brain without intravenous contrast in multiple planes.    COMPARISON:  None.    FINDINGS:  BRAIN AND EXTRA-AXIAL SPACES:  Acute focus of restricted diffusion in the left caudate body measuring 0.8 x 0.7 cm. Subtle focus of restricted diffusion in the left posterior frontal white matter (series 2, images 45-47). Stable right temporal and occipital lobe encephalomalacia. Stable right cerebellar encephalomalacia.  Moderate periventricular and subcortical white matter T2 FLAIR hyperintensities are nonspecific but likely secondary to chronic small vessel ischemic disease.  No hemorrhage.  BONES/JOINTS:  Unremarkable.  SINUSES:  Unremarkable as visualized.  No acute sinusitis.  MASTOID AIR CELLS:  Patchy opacification of the left mastoid air cells.  ORBITS:  Unremarkable as visualized.    IMPRESSION:  Acute infarct in the left caudate body and subtle acute infarct in left posterior frontal white matter.    Electronically signed by:  Alejandra Armando MD  7/12/2023 6:22 PM CDT Workstation: [id number]                                     MRA Brain without contrast (Edited Result - FINAL)  Result time 07/12/23 20:07:27      Edited Result - FINAL by Alejandra Lyn MD (07/12/23 20:07:27)                   Narrative:         ADDENDUM  #1       Impression discussed with Dr. Alex French by Dr. Lyn at 6:35 PM CST on 7/12/2023.    Electronically signed by:  Alejandra Armando MD  7/12/2023 8:07 PM CDT Workstation: [id number]     ORIGINAL REPORT       EXAM:  MR Angiography Head Without Intravenous Contrast    CLINICAL HISTORY:  The patient is 58 years old and is Female; Dizziness, persistent/recurrent, cardiac or vascular cause suspected Extremity weakness, dizziness    TECHNIQUE:  Magnetic resonance angiography images of the head without intravenous contrast.    COMPARISON:  MRA brain 10/6/2019    FINDINGS:  RIGHT INTERNAL CAROTID ARTERY:  No acute findings.  Intracranial segment is patent with no significant stenosis.  No aneurysm.  RIGHT ANTERIOR CEREBRAL ARTERY:  Stable hypoplastic right A1.  No occlusion or significant stenosis.  No aneurysm.  RIGHT MIDDLE CEREBRAL ARTERY:  Unremarkable.  No occlusion or significant stenosis.  No aneurysm.  RIGHT POSTERIOR CEREBRAL ARTERY:  Focus of absent flow-related enhancement in the right P2 (series 4, image 71).  RIGHT VERTEBRAL ARTERY:  Unremarkable as visualized.    LEFT INTERNAL CAROTID ARTERY:  No acute findings.  Intracranial segment is patent with no significant stenosis.  No aneurysm.  LEFT ANTERIOR CEREBRAL ARTERY:  Unremarkable.  No occlusion or significant stenosis.  No aneurysm.  LEFT MIDDLE CEREBRAL ARTERY:  Unremarkable.  No occlusion or significant stenosis.  No aneurysm.  LEFT POSTERIOR CEREBRAL ARTERY:  Mild irregularity of the left P2.  No occlusion.  No aneurysm.  LEFT VERTEBRAL ARTERY:  Unremarkable as visualized.    BASILAR ARTERY:  Unremarkable.  No occlusion or significant stenosis.  No aneurysm.    IMPRESSION:    1. Focus of absent flow-related enhancement in the right P2 may represent a focus of occlusion with distal reconstitution versus artifact. CTA can be performed for further evaluation.  2. Mild irregularity of the left P2 likely secondary to atherosclerotic  plaque.    Electronically signed by:  Alejandra Armando MD  7/12/2023 6:14 PM CDT Workstation: [id number]                      Final result by Alejandra Lyn MD (07/12/23 18:14:13)                   Narrative:    EXAM:  MR Angiography Head Without Intravenous Contrast    CLINICAL HISTORY:  The patient is 58 years old and is Female; Dizziness, persistent/recurrent, cardiac or vascular cause suspected Extremity weakness, dizziness    TECHNIQUE:  Magnetic resonance angiography images of the head without intravenous contrast.    COMPARISON:  MRA brain 10/6/2019    FINDINGS:  RIGHT INTERNAL CAROTID ARTERY:  No acute findings.  Intracranial segment is patent with no significant stenosis.  No aneurysm.  RIGHT ANTERIOR CEREBRAL ARTERY:  Stable hypoplastic right A1.  No occlusion or significant stenosis.  No aneurysm.  RIGHT MIDDLE CEREBRAL ARTERY:  Unremarkable.  No occlusion or significant stenosis.  No aneurysm.  RIGHT POSTERIOR CEREBRAL ARTERY:  Focus of absent flow-related enhancement in the right P2 (series 4, image 71).  RIGHT VERTEBRAL ARTERY:  Unremarkable as visualized.    LEFT INTERNAL CAROTID ARTERY:  No acute findings.  Intracranial segment is patent with no significant stenosis.  No aneurysm.  LEFT ANTERIOR CEREBRAL ARTERY:  Unremarkable.  No occlusion or significant stenosis.  No aneurysm.  LEFT MIDDLE CEREBRAL ARTERY:  Unremarkable.  No occlusion or significant stenosis.  No aneurysm.  LEFT POSTERIOR CEREBRAL ARTERY:  Mild irregularity of the left P2.  No occlusion.  No aneurysm.  LEFT VERTEBRAL ARTERY:  Unremarkable as visualized.    BASILAR ARTERY:  Unremarkable.  No occlusion or significant stenosis.  No aneurysm.    IMPRESSION:    1. Focus of absent flow-related enhancement in the right P2 may represent a focus of occlusion with distal reconstitution versus artifact. CTA can be performed for further evaluation.  2. Mild irregularity of the left P2 likely secondary to atherosclerotic  plaque.    Electronically signed by:  Alejandra Armando MD  7/12/2023 6:14 PM CDT Workstation: [id number]                                     X-Ray Chest AP Portable (Final result)  Result time 07/12/23 17:14:42      Final result by Alejandra Lyn MD (07/12/23 17:14:42)                   Narrative:    EXAM:  XR Chest, 1 View    CLINICAL HISTORY:  The patient is 58 years old and is Female;     Extremity Weakness; Fall; Dizziness; Hypertension    TECHNIQUE:  Frontal view of the chest.    COMPARISON:  Chest radiograph 10/16/2022    FINDINGS:  LUNGS AND PLEURAL SPACES:  Mild left midlung scarring. Otherwise, clear lungs. No pleural effusions. No pneumothorax.  HEART:  Cardiomegaly.  MEDIASTINUM:  Unremarkable.  BONES/JOINTS:  Unremarkable.    IMPRESSION:  No acute findings in the chest.    Electronically signed by:  Alejandra Armando MD  7/12/2023 5:14 PM CDT Workstation: [id number]                                     CT HEAD FOR STROKE (Final result)  Result time 07/12/23 16:45:20   Procedure changed from CT Head Without Contrast     Final result by Wander Byrne MD (07/12/23 16:45:20)                   Narrative:    CMS MANDATED QUALITY DATA - CT RADIATION 436    All CT scans at this facility utilize dose modulation, iterative reconstruction, and/or weight based dosing when appropriate to reduce radiation dose to as low as reasonably achievable.    CLINICAL HISTORY:  58 years (1965) Female WEAKNESS Stroke protocol    TECHNIQUE:  CT HEAD WITHOUT IV CONTRAST. Axial CT of the brain without contrast using soft tissue and bone algorithm. Please note in the acute setting if there is a clinical concern for an acute stroke MRI would be more sensitive/specific for evaluation of ischemia.    COMPARISON:  CT from October 3, 2022.    FINDINGS:  No acute intracranial hemorrhage, hydrocephalus, herniation or midline shift and the basal and suprasellar cisterns are patent. No acute skull fracture is identified.    Mild  "diffuse cerebral atrophy for age with periventricular deep cerebral white matter low attenuation, a nonspecific finding in this age group which can be seen in any diffuse white matter process but which is most commonly associated with chronic microvascular ischemic disease.    There is a moderate size wedge-shaped defect involving a majority of the right parietal lobe compatible with a remote infarct, similar to the previous exam (image 36). There is an additional smaller defect in the posterior left temporal lobe compatible with a small infarct (image 30). There is a small wedge-shaped defect in the superior aspect of the right cerebellar hemisphere (image 18) in keeping with an additional small infarct. Punctate rounded defects are seen in both basal ganglia (image 31) in keeping with lacunar infarcts.    Orbital contents appear within normal limits. External auditory canals are unremarkable. The visualized paranasal sinuses and mastoid air cells are essentially clear.    IMPRESSION:  1. No acute intracranial process.  2. Multiple prior wedge-shaped infarcts, most notably in the right parietal lobe, right cerebellar hemisphere, and posterior superior left temporal lobe.  3. Small bilateral basal ganglia lacunar infarcts.                  RESULT NOTIFICATION: These observations were discussed by the dictating physician, by phone and acknowledged by the ordering provider KATERINA HOLGUIN at 7/12/2023 4:41 PM CDT      Electronically signed by:  Wander Byrne MD  7/12/2023 4:45 PM CDT Workstation: [id number]                                      Prior diet: regular, thin.    Occupation/hobbies/homemaking: None stated.    Subjective     Pt awake laying in bed. Agreeable to eval.  Patient goals: "I hope I get to a real room soon"     Pain/Comfort:       Respiratory Status: Room air    Objective:     Cognitive Status:      MoCA (Version 8.1) administered with pt achieving a score of 13/30 suggesting severe " "cognitive-linguistic impairment. Pt earned 2 of 5 points on visuospatial/executive functions, 2 of 3 points on naming, 1 of 6 points for attention, 0 of 3 points for language, 0 of 2 points for abstraction, 2 of 5 points for delayed recall and 5 of 6 points for orientation. 1 point added to total score for level of education. Pt w/ hx of aphasia from previous CVA; reports she received ST after CVA and was discharged. She reports she continues w/ anomia and paraphasias, though she feels both have been more frequent lately. She does not feel there have been any remaining changes in cognitive-linguistic status.        Receptive Language:   Comprehension:      Increased complexity caused difficulty; pt unable to complete majority of tasks during MoCA assessment due to decreased understanding of complex tasks; however, conversational receptive language and simple verbal commands pt demonstrated adequate understanding.    Pragmatics:    appropriate    Expressive Language:  Verbal:    Anomia and Paraphasias noted during conversational speech, when answering questions, and naming      Motor Speech:  WFL    Voice:     Adequate for age, sex, size.    Visual-Spatial:  WFL    Reading:   Sentence directives: 3/4, mildly impaired-WFL; need more info      Written Expression:   Impaired: able to write name; not able to write prompted sentence: "the dog is read" for prompted "the dog is running"    Oral Musculature Evaluation  Oral Musculature: facial asymmetry present (R sided upper and lower facial asymmetry at rest)  Dentition: present and adequate, partials (upper partials; does not use for eating)  Secretion Management: adequate  Mucosal Quality: adequate  Mandibular Strength and Mobility: impaired  Oral Labial Strength and Mobility: impaired retraction, impaired coordination, functional pursing, functional seal  Lingual Strength and Mobility: impaired strength, functional protrusion, functional anterior elevation, functional " lateral movement  Velar Elevation: WFL  Buccal Strength and Mobility: WFL  Volitional Cough: elicited; adequate  Volitional Swallow: palpated; adequate laryngeal elevation/excursion  Voice Prior to PO Intake: clear    Bedside Swallow Eval:   Consistencies Assessed:  Thin liquids water via cup edge and straw  Puree tsp bites pudding  Mixed consistencies tsp bites diced peaches in thin juice  Solids dry cracker      Oral Phase:   WFL    Pharyngeal Phase:   no overt clinical signs/symptoms of aspiration  no overt clinical signs/symptoms of pharyngeal dysphagia    Compensatory Strategies  None    Treatment: Pt educated re purpose of evaluation, role of SLP, results of evaluation, and recs. Pt expressed understanding of recs. Recs shared w/ nursing.      Goals:   Multidisciplinary Problems       SLP Goals          Problem: SLP    Goal Priority Disciplines Outcome   SLP Goal     SLP Ongoing, Progressing   Description: 1. Pt will complete simple and complex 2 step directives given mod cuing w/ 75% accuracy   2. Pt will complete naming tasks (confrontational, abstract, divergent/convergent) w/ 75% given mod level cuing  3. Pt will complete delayed recall tasks w/ 75% accuracy given mod level cuing                       Plan:     Patient to be seen:  3 x/week   Plan of Care expires:     Plan of Care reviewed with:  patient, other (see comments) (nursing)   SLP Follow-Up:  Yes       Discharge recommendations:  Discharge Facility/Level of Care Needs: outpatient speech therapy   Barriers to Discharge:  None    Time Tracking:     SLP Treatment Date:   07/13/23  Speech Start Time:  1055  Speech Stop Time:  1125     Speech Total Time (min):  30 min    Billable Minutes: Eval Cognitive-linguistic/Speech 15 min  and Eval Swallow and Oral Function 15 min    07/13/2023

## 2023-07-13 NOTE — PHARMACY MED REC
"Admission Medication History     The home medication history was taken by Darwin Gee.    You may go to "Admission" then "Reconcile Home Medications" tabs to review and/or act upon these items.     The home medication list has been updated by the Pharmacy department.   Please read ALL comments highlighted in yellow.   Please address this information as you see fit.    Feel free to contact us if you have any questions or require assistance.      The medications listed below were removed from the home medication list. Please reorder if appropriate:  Patient reports no longer taking the following medication(s):  Meclizine 25 mg  Albuterol Bulk      Medications listed below were obtained from: Patient/family and Analytic software- [employer]  Current Facility-Administered Medications on File Prior to Encounter   Medication Dose Route Frequency Provider Last Rate Last Admin    cyanocobalamin injection 1,000 mcg  1,000 mcg Intramuscular Q30 Days Zoran Cuevas MD         Current Outpatient Medications on File Prior to Encounter   Medication Sig Dispense Refill    albuterol (PROVENTIL/VENTOLIN HFA) 90 mcg/actuation inhaler INHALE 2 PUFFS INTO THE LUNGS FOUR TIMES DAILY (Patient taking differently: Inhale 2 puffs into the lungs every 6 (six) hours as needed. INHALE 2 PUFFS INTO THE LUNGS FOUR TIMES DAILY) 8.5 g 11    amLODIPine (NORVASC) 10 MG tablet Take 1 tablet (10 mg total) by mouth once daily. 90 tablet 1    atorvastatin (LIPITOR) 80 MG tablet Take 1 tablet (80 mg total) by mouth every evening. 90 tablet 1    b complex vitamins tablet Take 1 tablet by mouth once daily.      calcium citrate-vitamin D3 315-200 mg (CITRACAL+D) 315-200 mg-unit per tablet Take 1 tablet by mouth once daily.       colchicine (COLCRYS) 0.6 mg tablet Take 1 tablet (0.6 mg total) by mouth every 72 hours. Take at onset of gout and take for 10 days for 10 days (Patient taking differently: Take 0.6 mg by mouth once daily. Take at onset of " gout and take for 10 days) 10 tablet 3    ferrous sulfate 325 (65 FE) MG EC tablet Take 1 tablet (325 mg total) by mouth once daily. 90 tablet 3    fluticasone furoate-vilanteroL (BREO ELLIPTA) 200-25 mcg/dose DsDv diskus inhaler Inhale 1 puff into the lungs once daily. Controller 60 each 3    fluticasone propionate (FLONASE) 50 mcg/actuation nasal spray 2 sprays (100 mcg total) by Each Nostril route once daily. 16 g 11    hydrALAZINE (APRESOLINE) 50 MG tablet Take 1 tablet (50 mg total) by mouth every 12 (twelve) hours. 270 tablet 1    isosorbide dinitrate (ISORDIL) 5 MG Tab Take 1 tablet (5 mg total) by mouth 3 (three) times daily. 270 tablet 1    metoprolol succinate (TOPROL-XL) 25 MG 24 hr tablet Take 1 tablet (25 mg total) by mouth once daily. 90 tablet 3    montelukast (SINGULAIR) 10 mg tablet Take 1 tablet (10 mg total) by mouth every evening. 90 tablet 3    multivitamin (THERAGRAN) per tablet Take 1 tablet by mouth once daily. 90 tablet 3    nystatin (MYCOSTATIN ORAL) Take 1 tablet by mouth Daily.      pantoprazole (PROTONIX) 40 MG tablet Take 1 tablet (40 mg total) by mouth once daily. 90 tablet 1    spironolactone (ALDACTONE) 25 MG tablet Take 1 tablet (25 mg total) by mouth once daily. 90 tablet 1    valsartan (DIOVAN) 80 MG tablet Take 1 tablet (80 mg total) by mouth once daily. 90 tablet 1    clopidogreL (PLAVIX) 75 mg tablet Take 1 tablet (75 mg total) by mouth once daily. (Patient not taking: Reported on 7/12/2023) 30 tablet 11    EScitalopram oxalate (LEXAPRO) 10 MG tablet Take 1 tablet (10 mg total) by mouth once daily. (Patient not taking: Reported on 7/12/2023) 90 tablet 1    HYDROcodone-acetaminophen (NORCO) 5-325 mg per tablet Take 1 tablet by mouth every 4 (four) hours as needed for Pain. (Patient not taking: Reported on 7/12/2023) 18 tablet 0    traZODone (DESYREL) 50 MG tablet 1 tab po qhs prn for insomnia. (Patient not taking: Reported on 7/12/2023) 90 tablet 1    [DISCONTINUED] ALBUTEROL,  BULK, MISC       [DISCONTINUED] meclizine (ANTIVERT) 25 mg tablet Take 1 tablet (25 mg total) by mouth 3 (three) times daily as needed for Dizziness. 20 tablet 0           Darwin Gee  EXT 1925                 .

## 2023-07-13 NOTE — PT/OT/SLP EVAL
Physical Therapy Evaluation    Patient Name:  Faustina Rae   MRN:  [id number]    Recommendations:     Discharge Recommendations: home health PT   Discharge Equipment Recommendations: none   Barriers to discharge: None    Assessment:     Faustina Rae is a 58 y.o. female admitted with a medical diagnosis of CVA (cerebral vascular accident).  She presents with the following impairments/functional limitations: weakness, decreased lower extremity function, gait instability, impaired cardiopulmonary response to activity. Pt found HOB elevated and agreeable to PT visit. PT session limited due to elevated BP. BP prior to session in bed HOB elevated 173/107. Pt's BP sitting EOB was 187/111. Pt instructed to take side steps toward HOB using no AD, pt returned to supine. Pt presents with mild R sided weakness at baseline due to previous stroke.    Rehab Prognosis: Fair; patient would benefit from acute skilled PT services to address these deficits and reach maximum level of function.    Recent Surgery: * No surgery found *      Plan:     During this hospitalization, patient to be seen 6 x/week to address the identified rehab impairments via gait training, therapeutic activities, therapeutic exercises, neuromuscular re-education and progress toward the following goals:    Plan of Care Expires:  08/13/23    Subjective     Chief Complaint: none stated- reported BP tends to run high   Patient/Family Comments/goals: get better  Pain/Comfort:  Pain Rating 1: 0/10  Pain Rating Post-Intervention 1: 0/10    Patients cultural, spiritual, Zoroastrianism conflicts given the current situation: no    Living Environment:  Pt lives alone and takes care of her three grandkids. Pt is not working and drives.   Prior to admission, patients level of function was independent.  Equipment used at home: none.  DME owned (not currently used): none.  Upon discharge, patient will have assistance from TBD.    Objective:     Communicated with RN prior  to session.  Patient found HOB elevated with blood pressure cuff, bed alarm, PureWick, pulse ox (continuous), telemetry  upon PT entry to room.    General Precautions: Standard,    Orthopedic Precautions:N/A   Braces: N/A  Respiratory Status: Room air    Exams:  Gross Motor Coordination:  WFL  Sensation:    -       Intact  RLE ROM: WFL  RLE Strength: hip flexion 4+/5, knee extension 4+/5, dorsiflexion 5/5  LLE ROM: WFL  LLE Strength: WFL    Functional Mobility:  Bed Mobility:     Scooting: supervision  Supine to Sit: supervision  Sit to Supine: supervision  Transfers:     Sit to Stand:  supervision with no AD  Gait: side steps toward HOB using no AD      AM-PAC 6 CLICK MOBILITY  Total Score:20       Treatment & Education:  Pt educated on POC, discharge recommendation, need for assist with mobility, use of call bell to seek assistance as needed and fall prevention    Patient left supine with all lines intact, call button in reach, and bed alarm on.    GOALS:   Multidisciplinary Problems       Physical Therapy Goals          Problem: Physical Therapy    Goal Priority Disciplines Outcome Goal Variances Interventions   Physical Therapy Goal     PT, PT/OT      Description: Goals to be met by: 23     Patient will increase functional independence with mobility by performin. Supine to sit with Supervision  2. Sit to stand transfer with Supervision  3. Bed to chair transfer with Supervision using no AD.  4. Gait  x 150 feet with Supervision using no AD.                              History:     Past Medical History:   Diagnosis Date    Anxiety     Arthritis     Asthma     CHF (congestive heart failure)     COPD (chronic obstructive pulmonary disease)     Hyperlipemia     Hypertension     Leaky heart valve     Obese     Obese     Obstructive sleep apnea     lost her CPAP    Pneumonia     Rheumatoid arthritis(714.0)     Stroke        Past Surgical History:   Procedure Laterality Date    CAROTID STENT      3 years  ago    CHOLECYSTECTOMY      HYSTERECTOMY      SLEEVE GASTROPLASTY  02/21/2017       Time Tracking:     PT Received On: 07/13/23  PT Start Time: 1016     PT Stop Time: 1026  PT Total Time (min): 10 min     Billable Minutes: Evaluation 10      07/13/2023

## 2023-07-13 NOTE — ASSESSMENT & PLAN NOTE
Goal LDL less than 70 = 124 at this time.  -  Has been noncompliant at home.  -  Aggressive secondary prevention add atorvastatin 80 mg

## 2023-07-14 VITALS
RESPIRATION RATE: 16 BRPM | WEIGHT: 213.5 LBS | SYSTOLIC BLOOD PRESSURE: 144 MMHG | BODY MASS INDEX: 39.29 KG/M2 | HEART RATE: 81 BPM | DIASTOLIC BLOOD PRESSURE: 94 MMHG | TEMPERATURE: 98 F | OXYGEN SATURATION: 98 % | HEIGHT: 62 IN

## 2023-07-14 PROBLEM — I63.9 CVA (CEREBRAL VASCULAR ACCIDENT): Status: RESOLVED | Noted: 2019-04-23 | Resolved: 2023-07-14

## 2023-07-14 LAB
ALBUMIN SERPL BCP-MCNC: 3.7 G/DL (ref 3.5–5.2)
ALP SERPL-CCNC: 61 U/L (ref 55–135)
ALT SERPL W/O P-5'-P-CCNC: 8 U/L (ref 10–44)
ANION GAP SERPL CALC-SCNC: 5 MMOL/L (ref 8–16)
AST SERPL-CCNC: 11 U/L (ref 10–40)
BASOPHILS # BLD AUTO: 0.03 K/UL (ref 0–0.2)
BASOPHILS NFR BLD: 0.4 % (ref 0–1.9)
BILIRUB SERPL-MCNC: 0.7 MG/DL (ref 0.1–1)
BUN SERPL-MCNC: 36 MG/DL (ref 6–20)
CALCIUM SERPL-MCNC: 9.4 MG/DL (ref 8.7–10.5)
CHLORIDE SERPL-SCNC: 108 MMOL/L (ref 95–110)
CO2 SERPL-SCNC: 22 MMOL/L (ref 23–29)
CREAT SERPL-MCNC: 2.8 MG/DL (ref 0.5–1.4)
DIFFERENTIAL METHOD: ABNORMAL
EOSINOPHIL # BLD AUTO: 0.1 K/UL (ref 0–0.5)
EOSINOPHIL NFR BLD: 1.5 % (ref 0–8)
ERYTHROCYTE [DISTWIDTH] IN BLOOD BY AUTOMATED COUNT: 13.5 % (ref 11.5–14.5)
EST. GFR  (NO RACE VARIABLE): 18.9 ML/MIN/1.73 M^2
GLUCOSE SERPL-MCNC: 93 MG/DL (ref 70–110)
HCT VFR BLD AUTO: 40.1 % (ref 37–48.5)
HGB BLD-MCNC: 12.8 G/DL (ref 12–16)
IMM GRANULOCYTES # BLD AUTO: 0.02 K/UL (ref 0–0.04)
IMM GRANULOCYTES NFR BLD AUTO: 0.3 % (ref 0–0.5)
LYMPHOCYTES # BLD AUTO: 1.6 K/UL (ref 1–4.8)
LYMPHOCYTES NFR BLD: 22.5 % (ref 18–48)
MCH RBC QN AUTO: 29.4 PG (ref 27–31)
MCHC RBC AUTO-ENTMCNC: 31.9 G/DL (ref 32–36)
MCV RBC AUTO: 92 FL (ref 82–98)
MONOCYTES # BLD AUTO: 0.5 K/UL (ref 0.3–1)
MONOCYTES NFR BLD: 6.8 % (ref 4–15)
NEUTROPHILS # BLD AUTO: 5 K/UL (ref 1.8–7.7)
NEUTROPHILS NFR BLD: 68.5 % (ref 38–73)
NRBC BLD-RTO: 0 /100 WBC
PLATELET # BLD AUTO: 281 K/UL (ref 150–450)
PMV BLD AUTO: 9.9 FL (ref 9.2–12.9)
POTASSIUM SERPL-SCNC: 4.2 MMOL/L (ref 3.5–5.1)
PROT SERPL-MCNC: 7.6 G/DL (ref 6–8.4)
RBC # BLD AUTO: 4.36 M/UL (ref 4–5.4)
SODIUM SERPL-SCNC: 135 MMOL/L (ref 136–145)
WBC # BLD AUTO: 7.24 K/UL (ref 3.9–12.7)

## 2023-07-14 PROCEDURE — 97530 THERAPEUTIC ACTIVITIES: CPT | Mod: CQ

## 2023-07-14 PROCEDURE — 80053 COMPREHEN METABOLIC PANEL: CPT | Performed by: NURSE PRACTITIONER

## 2023-07-14 PROCEDURE — 85025 COMPLETE CBC W/AUTO DIFF WBC: CPT | Performed by: NURSE PRACTITIONER

## 2023-07-14 PROCEDURE — 36415 COLL VENOUS BLD VENIPUNCTURE: CPT | Performed by: NURSE PRACTITIONER

## 2023-07-14 PROCEDURE — 97116 GAIT TRAINING THERAPY: CPT | Mod: CQ

## 2023-07-14 PROCEDURE — 25000003 PHARM REV CODE 250: Performed by: NURSE PRACTITIONER

## 2023-07-14 PROCEDURE — 94761 N-INVAS EAR/PLS OXIMETRY MLT: CPT

## 2023-07-14 PROCEDURE — 99900035 HC TECH TIME PER 15 MIN (STAT)

## 2023-07-14 RX ORDER — ASPIRIN 81 MG/1
81 TABLET ORAL DAILY
Qty: 21 TABLET | Refills: 0 | Status: SHIPPED | OUTPATIENT
Start: 2023-07-15 | End: 2023-11-22 | Stop reason: SDUPTHER

## 2023-07-14 RX ORDER — CLOPIDOGREL BISULFATE 75 MG/1
75 TABLET ORAL DAILY
Qty: 30 TABLET | Refills: 1 | Status: SHIPPED | OUTPATIENT
Start: 2023-07-14 | End: 2023-11-22

## 2023-07-14 RX ORDER — ATORVASTATIN CALCIUM 80 MG/1
80 TABLET, FILM COATED ORAL NIGHTLY
Qty: 90 TABLET | Refills: 1 | Status: SHIPPED | OUTPATIENT
Start: 2023-07-14 | End: 2023-10-17 | Stop reason: SDUPTHER

## 2023-07-14 RX ADMIN — CLOPIDOGREL BISULFATE 75 MG: 75 TABLET, FILM COATED ORAL at 08:07

## 2023-07-14 RX ADMIN — ASPIRIN 81 MG: 81 TABLET, COATED ORAL at 08:07

## 2023-07-14 NOTE — HOSPITAL COURSE
Pt got admitted with acute CVA  MRI showed Acute infarct in the left caudate body and subtle acute infarct in left posterior frontal white matter  Pt is terribly noncompliant with her usual home  medications  This was confirmed by her Father in law/Daughter  Education was provided and pt was discharged to home with HH

## 2023-07-14 NOTE — PT/OT/SLP PROGRESS
Check LFT's Physical Therapy Treatment    Patient Name:  Faustina Rae   MRN:  [id number]    Recommendations:     Discharge Recommendations: home health PT  Discharge Equipment Recommendations: walker, rolling  Barriers to discharge:  none    Assessment:     Faustina Rae is a 58 y.o. female admitted with a medical diagnosis of CVA (cerebral vascular accident).  She presents with the following impairments/functional limitations: weakness, decreased lower extremity function, gait instability, impaired cardiopulmonary response to activity.    Pt agreeable to visit. Pt reports needing to be cleaned up. Pt required supervision for bed mobility while therapist assisted with hygiene. Pt required contact guard for sit to stand transfer with no AD. Pt noted to be somewhat unsteady without AD and agreeable to use RW until balance improves. Pt ambulated 20' with RW and stand by assist. Pt's /86 after mobility. Pt agreeable to sit up in chair and required contact guard assist for stand pivot transfer to chair.    Rehab Prognosis: Fair; patient would benefit from acute skilled PT services to address these deficits and reach maximum level of function.    Recent Surgery: * No surgery found *      Plan:     During this hospitalization, patient to be seen 6 x/week to address the identified rehab impairments via gait training, therapeutic activities, therapeutic exercises, neuromuscular re-education and progress toward the following goals:    Plan of Care Expires:  08/13/23    Subjective     Chief Complaint: pt reports needing to be cleaned up  Patient/Family Comments/goals: to get better  Pain/Comfort:  Pain Rating Post-Intervention 1: 0/10      Objective:     Communicated with RN prior to session.  Patient found HOB elevated with bed alarm, PureWick, telemetry upon PT entry to room.     General Precautions: Standard,    Orthopedic Precautions: N/A  Braces: N/A  Respiratory Status: Room air     Functional Mobility:  Bed Mobility:      Rolling Left:  supervision  Rolling Right: supervision  Scooting: supervision  Supine to Sit: supervision  Transfers:     Sit to Stand:  contact guard assistance with no AD  Gait: x 20' with RW and SBA      AM-PAC 6 CLICK MOBILITY          Treatment & Education:  Pt educated on importance of time OOB, importance of intermittent mobility, safe techniques for transfers/ambulation, discharge recommendations/options, and use of call light for assistance and fall prevention.      Patient left up in chair with all lines intact, call button in reach, and RN notified and entering pt's room..    GOALS:   Multidisciplinary Problems       Physical Therapy Goals       Not on file              Multidisciplinary Problems (Resolved)          Problem: Physical Therapy    Goal Priority Disciplines Outcome Goal Variances Interventions   Physical Therapy Goal   (Resolved)     PT, PT/OT Met     Description: Goals to be met by: 23     Patient will increase functional independence with mobility by performin. Supine to sit with Supervision  2. Sit to stand transfer with Supervision  3. Bed to chair transfer with Supervision using no AD.  4. Gait  x 150 feet with Supervision using no AD.                              Time Tracking:     PT Received On: 23  PT Start Time: 1019     PT Stop Time: 1042  PT Total Time (min): 23 min     Billable Minutes: Gait Training 8 and Therapeutic Activity 15    Treatment Type: Treatment  PT/PTA: PTA     Number of PTA visits since last PT visit: 2023

## 2023-07-14 NOTE — ASSESSMENT & PLAN NOTE
Body mass index is 39.05 kg/m². Morbid obesity complicates all aspects of disease management from diagnostic modalities to treatment.

## 2023-07-14 NOTE — CARE UPDATE
07/13/23 2050   Patient Assessment/Suction   Level of Consciousness (AVPU) alert   Respiratory Effort Normal;Unlabored   PRE-TX-O2   Device (Oxygen Therapy) room air   SpO2 96 %   Pulse Oximetry Type Intermittent   $ Pulse Oximetry - Multiple Charge Pulse Oximetry - Multiple   Education   $ Education 15 min

## 2023-07-14 NOTE — DISCHARGE SUMMARY
[facility] Medicine  Discharge Summary      Patient Name: Faustina Rae  MRN: [id number]  KAROLINE: [id number]  Patient Class: IP- Inpatient  Admission Date: 7/12/2023  Hospital Length of Stay: 1 days  Discharge Date and Time:  07/14/2023 5:33 PM  Attending Physician: No att. providers found   Discharging Provider: Luis Araujo MD  Primary Care Provider: Zoran Cuevas MD    Primary Care Team: Networked reference to record PCT     HPI:   Faustina Rae is a 58 y.o. female with history of CVA (expressive aphasia), HTN, CAD, HLD, Anxiety, COPD, DAVID, gastric sleeve, Morbid Obesity & gout. She presents for evaluation after falls in one day whereas it felt like her legs gave out. She was found to have acute stroke per MRI and hypertensive urgency with /121 at the time of presentation. She endorse that she has been non-compliant with her blood pressure medicines as well as her aspirin and Plavix that she was supposed to be taken at home.  She continues to have impaired mobility and weight-bearing.  MRI brain showed acute infarct in the left caudate body and septal acute infarct in left posterior frontal white matter.  No she was not a candidate for tPA and note from vascular Neurology in ED. she received 10 mg IV hydralazine in ED with blood pressure now 169 systolic.  She denies headache, chest pain, numbness or tingling, upper respiratory symptoms, dysuria, nausea vomiting, diarrhea.    In ED CBC essentially normal, lipid panel shows control however LDL is not within goal = 124 for her risk, TSH normal chemistry shows CKD4 with BUN/creatinine/GFR = 29/2.6/20.6    PLAN: permissive HTN, Hydralazine for SBP >210, ASA/plavix/statin, consult to neurology, PT/OT      * No surgery found *      Hospital Course:   Pt got admitted with acute CVA  MRI showed Acute infarct in the left caudate body and subtle acute infarct in left posterior frontal white matter  Pt is terribly noncompliant with  "her usual home  medications  This was confirmed by her Father in law/Daughter  Education was provided and pt was discharged to home with         Goals of Care Treatment Preferences:  Code Status: Full Code      Consults:   Consults (From admission, onward)        Status Ordering Provider     Inpatient consult to neurology  Once        Provider:  Hung Chapman MD    Completed DAVIN RUTH          No new Assessment & Plan notes have been filed under this hospital service since the last note was generated.  Service: Hospital Medicine    Final Active Diagnoses:    Diagnosis Date Noted POA    Hyperlipidemia, unspecified [E78.5] 01/21/2020 Yes    Essential hypertension [I10] 02/21/2017 Yes    Morbid obesity with BMI of 40.0-44.9, adult [E66.01, Z68.41] 06/10/2012 Not Applicable      Problems Resolved During this Admission:    Diagnosis Date Noted Date Resolved POA    PRINCIPAL PROBLEM:  CVA (cerebral vascular accident) [I63.9] 04/23/2019 07/14/2023 Unknown       Discharged Condition: good    [place] [facility] [place]    Follow Up:   Follow-up Information     Hung Chapman MD Follow up in 1 week(s).    Specialty: Neurology  Contact information:  [address] [facility] [address]  [phone]             Zoran Cuevas MD. Go on 7/21/2023.    Specialty: Family Medicine  Why: @11:30 am with Pleschetta Jackson, NP  Contact information:  [address] [address]  [phone]             [facility] Follow up.    Specialties: Home Health Services, Home Therapy Services, Home Living Aide Services  Contact information:  [address] [address]  [phone]                     Patient Instructions:      WALKER FOR HOME USE     Order Specific Question Answer Comments   Type of Walker: Adult (5'4"-6'6") rolling walker   With wheels? Yes    Height: 5' 2" (1.575 m)    Weight: 96.8 kg (213 lb 8 oz)    Length of need (1-99 months): 3    Does patient " have medical equipment at home? none    Please check all that apply: Patient's condition impairs ambulation.    Please check all that apply: Patient is unable to safely ambulate without equipment.      Ambulatory referral/consult to Outpatient Case Management   Referral Priority: Routine Referral Type: Consultation   Referral Reason: Specialty Services Required   Number of Visits Requested: 1     Ambulatory referral/consult to Home Health   Standing Status: Future   Referral Priority: Routine Referral Type: Home Health Care   Referral Reason: Specialty Services Required   Requested Specialty: Home Health Services   Number of Visits Requested: 1       Significant Diagnostic Studies: Labs:   CMP   Recent Labs   Lab 07/13/23 0410 07/14/23 0418    135*   K 3.9 4.2    108   CO2 25 22*   GLU 96 93   BUN 30* 36*   CREATININE 2.6* 2.8*   CALCIUM 9.6 9.4   PROT 7.8 7.6   ALBUMIN 3.7 3.7   BILITOT 0.6 0.7   ALKPHOS 61 61   AST 13 11   ALT 10 8*   ANIONGAP 6* 5*    and CBC   Recent Labs   Lab 07/13/23 0410 07/14/23 0418   WBC 8.58 7.24   HGB 12.2 12.8   HCT 39.6 40.1    281       Pending Diagnostic Studies:     None         Medications:  Reconciled Home Medications:      Medication List      START taking these medications    aspirin 81 MG EC tablet  Commonly known as: ECOTRIN  Take 1 tablet (81 mg total) by mouth once daily. for 21 days  Start taking on: July 15, 2023     EScitalopram oxalate 10 MG tablet  Commonly known as: LEXAPRO  Take 1 tablet (10 mg total) by mouth once daily.        CHANGE how you take these medications    albuterol 90 mcg/actuation inhaler  Commonly known as: PROVENTIL/VENTOLIN HFA  INHALE 2 PUFFS INTO THE LUNGS FOUR TIMES DAILY  What changed:   · how much to take  · how to take this  · when to take this  · reasons to take this     colchicine 0.6 mg tablet  Commonly known as: COLCRYS  Take 1 tablet (0.6 mg total) by mouth every 72 hours. Take at onset of gout and take for 10 days  for 10 days  What changed: when to take this        CONTINUE taking these medications    amLODIPine 10 MG tablet  Commonly known as: NORVASC  Take 1 tablet (10 mg total) by mouth once daily.     atorvastatin 80 MG tablet  Commonly known as: LIPITOR  Take 1 tablet (80 mg total) by mouth every evening.     b complex vitamins tablet  Take 1 tablet by mouth once daily.     calcium citrate-vitamin D3 315-200 mg 315 mg-5 mcg (200 unit) per tablet  Commonly known as: CITRACAL+D  Take 1 tablet by mouth once daily.     chlorthalidone 25 MG Tab  Commonly known as: HYGROTEN  Take 25 mg by mouth once daily.     clopidogreL 75 mg tablet  Commonly known as: PLAVIX  Take 1 tablet (75 mg total) by mouth once daily.     ferrous sulfate 325 (65 FE) MG EC tablet  Take 1 tablet (325 mg total) by mouth once daily.     fluticasone furoate-vilanteroL 200-25 mcg/dose Dsdv diskus inhaler  Commonly known as: BREO ELLIPTA  Inhale 1 puff into the lungs once daily. Controller     fluticasone propionate 50 mcg/actuation nasal spray  Commonly known as: FLONASE  2 sprays (100 mcg total) by Each Nostril route once daily.     hydrALAZINE 50 MG tablet  Commonly known as: APRESOLINE  Take 1 tablet (50 mg total) by mouth every 12 (twelve) hours.     isosorbide dinitrate 5 MG Tab  Commonly known as: ISORDIL  Take 1 tablet (5 mg total) by mouth 3 (three) times daily.     metoprolol succinate 25 MG 24 hr tablet  Commonly known as: TOPROL-XL  Take 1 tablet (25 mg total) by mouth once daily.     montelukast 10 mg tablet  Commonly known as: SINGULAIR  Take 1 tablet (10 mg total) by mouth every evening.     multivitamin per tablet  Commonly known as: THERAGRAN  Take 1 tablet by mouth once daily.     MYCOSTATIN ORAL  Take 1 tablet by mouth Daily.     pantoprazole 40 MG tablet  Commonly known as: PROTONIX  Take 1 tablet (40 mg total) by mouth once daily.     spironolactone 25 MG tablet  Commonly known as: ALDACTONE  Take 1 tablet (25 mg total) by mouth once  daily.     valsartan 80 MG tablet  Commonly known as: DIOVAN  Take 1 tablet (80 mg total) by mouth once daily.        STOP taking these medications    HYDROcodone-acetaminophen 5-325 mg per tablet  Commonly known as: NORCO     traZODone 50 MG tablet  Commonly known as: DESYREL            Indwelling Lines/Drains at time of discharge:   Lines/Drains/Airways     None               Physical Exam   Cardiovascular: Normal rate.   Neurological: She is alert.     Time spent on the discharge of patient: 45 minutes         Luis Araujo MD  Department of Hospital Medicine  [facility]

## 2023-07-14 NOTE — PROGRESS NOTES
[facility]  Department of Neurology  Progress Note  Date: 2023 1:42 PM          Patient Name: Faustina Rae   MRN: [id number]   : 1965    AGE: 58 y.o.    LOS: 1 days Hospital Day: 3  Admit date: 2023  4:07 PM     HPI per EMR: Faustina Rae is a 58 y.o. female with a history of CVA (expressive aphasia), HTN, CAD, HLD, Anxiety, COPD, DAVID, gastric sleeve, Morbid Obesity & gout. She presents for evaluation after falls in one day whereas it felt like her legs gave out. She was found to have acute stroke per MRI and hypertensive urgency with /121 at the time of presentation. She endorse that she has been non-compliant with her blood pressure medicines as well as her aspirin and Plavix that she was supposed to be taken at home.  She continues to have impaired mobility and weight-bearing.  MRI brain showed acute infarct in the left caudate body and septal acute infarct in left posterior frontal white matter.  No she was not a candidate for tPA and note from vascular Neurology in ED. she received 10 mg IV hydralazine in ED with blood pressure now 169 systolic.  She denies headache, chest pain, numbness or tingling, upper respiratory symptoms, dysuria, nausea vomiting, diarrhea.        Neurology consult:  Patient was seen and examined by me.  She states that she presented with difficulty ambulating due to intermittent weakness in her right lower extremity.  She also had falls for which he presented to the hospital.  In the ER she was noted to be hypertensive with systolic blood pressure in 200s.     She was evaluated by tele stroke and not a candidate for tPA.  Admitted for further stroke workup and management.       2023: No acute events overnight. Patient was seen and examined by me this morning. Neuro exam is normal.  She feels back to baseline.  Denies any new symptoms.       Vitals:  Patient Vitals for the past 24 hrs:   BP Temp Temp src Pulse Resp SpO2 Height Weight  "  07/14/23 1114 (!) 144/94 97.8 °F (36.6 °C) Oral 81 16 98 % -- --   07/14/23 0829 -- -- -- 75 15 (!) 93 % -- --   07/14/23 0734 (!) 191/91 98 °F (36.7 °C) Oral 79 18 95 % -- --   07/14/23 0300 (!) 160/90 98.7 °F (37.1 °C) Oral 87 18 97 % -- --   07/14/23 0006 (!) 165/101 98.2 °F (36.8 °C) Oral 89 18 100 % -- --   07/13/23 2050 -- -- -- -- -- 96 % -- --   07/13/23 1933 (!) 191/100 -- -- 73 -- -- -- --   07/13/23 1930 (!) 207/102 98.2 °F (36.8 °C) Oral 75 18 96 % -- --   07/13/23 1829 -- -- -- -- -- -- -- 96.8 kg (213 lb 8 oz)   07/13/23 1809 (!) 221/127 97.7 °F (36.5 °C) Oral 84 19 97 % -- --   07/13/23 1752 -- -- -- -- -- -- 5' 2" (1.575 m) --   07/13/23 1459 (!) 186/88 -- -- 70 20 98 % -- --     PHYSICAL EXAM:     GENERAL APPEARANCE: Well-developed, well-nourished female in no acute distress.  HEENT: Normocephalic and atraumatic. PERRL. Oropharynx unremarkable.  PULM: Comfortable on room air.  CV: RRR.  ABDOMEN: Soft, nontender.  EXTREMITIES: No signs of vascular compromise. Pulses present. No cyanosis, clubbing or edema.  SKIN: Clear; no rashes, lesions or skin breaks in exposed areas.      NEURO:   MENTAL STATUS: Patient awake and oriented to time, place, and person. Affect normal.  CRANIAL NERVES II-XII: Pupils equal, round and reactive to light. Extraocular movements full and intact. No facial asymmetry.  MOTOR: Normal bulk. Tone normal and symmetrical throughout.  No abnormal movements. No tremor.   Strength 5/5 throughout unless specified below.  REFLEXES: DTRs 2+; normal and symmetric throughout.   SENSATION: Sensation grossly intact to fine touch.  COORDINATION: Finger-to-nose normal for age and symmetric.  STATION: Romberg deferred.  GAIT: Deferred.    CURRENT SCHEDULED MEDICATIONS:   aspirin  81 mg Oral Daily    atorvastatin  80 mg Oral QHS    clopidogreL  75 mg Oral Daily    cyanocobalamin  1,000 mcg Intramuscular Q30 Days     CURRENT INFUSIONS:    DATA:  Recent Labs   Lab 07/12/23  1629 07/13/23  0410 " 07/14/23 0418    139 135*   K 4.4 3.9 4.2   * 108 108   CO2 22* 25 22*   BUN 29* 30* 36*   CREATININE 2.6* 2.6* 2.8*   GLU 93 96 93   CALCIUM 9.4 9.6 9.4   PHOS  --  3.6  --    MG  --  1.9  --    AST 13 13 11   ALT 9* 10 8*     Recent Labs   Lab 07/12/23  1629 07/13/23 0410 07/14/23 0418   WBC 8.40 8.58 7.24   HGB 12.2 12.2 12.8   HCT 39.2 39.6 40.1    280 281     No results found for: PROTEINCSF, GLUCCSF, WBCCSF, RBCCSF, PMNCSF  Hemoglobin A1C   Date Value Ref Range Status   07/13/2023 5.1 4.5 - 6.2 % Final     Comment:     According to ADA guidelines, hemoglobin A1C <7.0% represents  optimal control in non-pregnant diabetic patients.  Different  metrics may apply to specific populations.   Standards of Medical Care in Diabetes - 2016.    For the purpose of screening for the presence of diabetes:  <5.7%     Consistent with the absence of diabetes  5.7-6.4%  Consistent with increasing risk for diabetes   (prediabetes)  >or=6.5%  Consistent with diabetes    Currently no consensus exists for use of hemoglobin A1C  for diagnosis of diabetes for children.     05/11/2023 5.3 4.0 - 5.6 % Final     Comment:     ADA Screening Guidelines:  5.7-6.4%  Consistent with prediabetes  >or=6.5%  Consistent with diabetes    High levels of fetal hemoglobin interfere with the HbA1C  assay. Heterozygous hemoglobin variants (HbS, HgC, etc)do  not significantly interfere with this assay.   However, presence of multiple variants may affect accuracy.     05/09/2022 5.2 4.0 - 5.6 % Final     Comment:     ADA Screening Guidelines:  5.7-6.4%  Consistent with prediabetes  >or=6.5%  Consistent with diabetes    High levels of fetal hemoglobin interfere with the HbA1C  assay. Heterozygous hemoglobin variants (HbS, HgC, etc)do  not significantly interfere with this assay.   However, presence of multiple variants may affect accuracy.              I have personally reviewed and interpreted the pertinent imaging and lab  results.  Imaging Results              MRA Neck without contrast (Final result)  Result time 07/13/23 20:06:35      Final result by Octavio Marcos MD (07/13/23 20:06:35)                   Narrative:    INDICATION: CVA    EXAMINATION/TECHNIQUE: MR angiography of the neck performed using time-of-flight technique. Multiplanar and three-dimensional renderings were generated.    COMPARISON: None.  ____________________________________________    FINDINGS:  Aortic arch is normal in caliber. Origin of the left common carotid artery is widely patent. Proximal right common carotid artery is not visualized due to horizontal course and in plane saturation.    The common carotid arteries, internal carotid arteries, and external carotid arteries are widely patent. There is no stenosis.    Antegrade flow is seen within both vertebral arteries. Proximalmost vertebral arteries are obscured by artifact at the thoracic inlet.    IMPRESSION:  Normal MR angiogram of the neck. No evidence of cervical carotid artery lstenosis. Vertebral arteries are antegrade    Electronically signed by:  Octavio Marcos MD  7/13/2023 8:06 PM CDT Workstation: [id number]                                     MRI Brain Without Contrast (Edited Result - FINAL)  Result time 07/12/23 20:07:05      Edited Result - FINAL by Alejandra Lyn MD (07/12/23 20:07:05)                   Narrative:         ADDENDUM #1       Impression discussed with Dr. Alex French by Dr. Lyn at 6:35 PM CST on 7/12/2023.    Electronically signed by:  Alejandra Armando MD  7/12/2023 8:07 PM CDT Workstation: [id number]     ORIGINAL REPORT       EXAM:  MR Head Without Intravenous Contrast    CLINICAL HISTORY:  The patient is 58 years old and is Female; Dizziness, non-specific Extremity weakness, dizziness    TECHNIQUE:  Magnetic resonance images of the head/brain without intravenous contrast in multiple planes.    COMPARISON:  None.    FINDINGS:  BRAIN AND EXTRA-AXIAL SPACES:  Acute  focus of restricted diffusion in the left caudate body measuring 0.8 x 0.7 cm. Subtle focus of restricted diffusion in the left posterior frontal white matter (series 2, images 45-47). Stable right temporal and occipital lobe encephalomalacia. Stable right cerebellar encephalomalacia.  Moderate periventricular and subcortical white matter T2 FLAIR hyperintensities are nonspecific but likely secondary to chronic small vessel ischemic disease.  No hemorrhage.  BONES/JOINTS:  Unremarkable.  SINUSES:  Unremarkable as visualized.  No acute sinusitis.  MASTOID AIR CELLS:  Patchy opacification of the left mastoid air cells.  ORBITS:  Unremarkable as visualized.    IMPRESSION:  Acute infarct in the left caudate body and subtle acute infarct in left posterior frontal white matter.    Electronically signed by:  Alejandra Armando MD  7/12/2023 6:22 PM CDT Workstation: [id number]                      Final result by Alejandra Lyn MD (07/12/23 18:22:39)                   Narrative:    EXAM:  MR Head Without Intravenous Contrast    CLINICAL HISTORY:  The patient is 58 years old and is Female; Dizziness, non-specific Extremity weakness, dizziness    TECHNIQUE:  Magnetic resonance images of the head/brain without intravenous contrast in multiple planes.    COMPARISON:  None.    FINDINGS:  BRAIN AND EXTRA-AXIAL SPACES:  Acute focus of restricted diffusion in the left caudate body measuring 0.8 x 0.7 cm. Subtle focus of restricted diffusion in the left posterior frontal white matter (series 2, images 45-47). Stable right temporal and occipital lobe encephalomalacia. Stable right cerebellar encephalomalacia.  Moderate periventricular and subcortical white matter T2 FLAIR hyperintensities are nonspecific but likely secondary to chronic small vessel ischemic disease.  No hemorrhage.  BONES/JOINTS:  Unremarkable.  SINUSES:  Unremarkable as visualized.  No acute sinusitis.  MASTOID AIR CELLS:  Patchy opacification of the left mastoid  air cells.  ORBITS:  Unremarkable as visualized.    IMPRESSION:  Acute infarct in the left caudate body and subtle acute infarct in left posterior frontal white matter.    Electronically signed by:  Alejandra Armando MD  7/12/2023 6:22 PM CDT Workstation: [id number]                                     MRA Brain without contrast (Edited Result - FINAL)  Result time 07/12/23 20:07:27      Edited Result - FINAL by Alejandra Lyn MD (07/12/23 20:07:27)                   Narrative:         ADDENDUM #1       Impression discussed with Dr. Alex French by Dr. Lyn at 6:35 PM CST on 7/12/2023.    Electronically signed by:  Alejandra Amrando MD  7/12/2023 8:07 PM CDT Workstation: [id number]     ORIGINAL REPORT       EXAM:  MR Angiography Head Without Intravenous Contrast    CLINICAL HISTORY:  The patient is 58 years old and is Female; Dizziness, persistent/recurrent, cardiac or vascular cause suspected Extremity weakness, dizziness    TECHNIQUE:  Magnetic resonance angiography images of the head without intravenous contrast.    COMPARISON:  MRA brain 10/6/2019    FINDINGS:  RIGHT INTERNAL CAROTID ARTERY:  No acute findings.  Intracranial segment is patent with no significant stenosis.  No aneurysm.  RIGHT ANTERIOR CEREBRAL ARTERY:  Stable hypoplastic right A1.  No occlusion or significant stenosis.  No aneurysm.  RIGHT MIDDLE CEREBRAL ARTERY:  Unremarkable.  No occlusion or significant stenosis.  No aneurysm.  RIGHT POSTERIOR CEREBRAL ARTERY:  Focus of absent flow-related enhancement in the right P2 (series 4, image 71).  RIGHT VERTEBRAL ARTERY:  Unremarkable as visualized.    LEFT INTERNAL CAROTID ARTERY:  No acute findings.  Intracranial segment is patent with no significant stenosis.  No aneurysm.  LEFT ANTERIOR CEREBRAL ARTERY:  Unremarkable.  No occlusion or significant stenosis.  No aneurysm.  LEFT MIDDLE CEREBRAL ARTERY:  Unremarkable.  No occlusion or significant stenosis.  No aneurysm.  LEFT POSTERIOR  CEREBRAL ARTERY:  Mild irregularity of the left P2.  No occlusion.  No aneurysm.  LEFT VERTEBRAL ARTERY:  Unremarkable as visualized.    BASILAR ARTERY:  Unremarkable.  No occlusion or significant stenosis.  No aneurysm.    IMPRESSION:    1. Focus of absent flow-related enhancement in the right P2 may represent a focus of occlusion with distal reconstitution versus artifact. CTA can be performed for further evaluation.  2. Mild irregularity of the left P2 likely secondary to atherosclerotic plaque.    Electronically signed by:  Alejandra Armando MD  7/12/2023 6:14 PM CDT Workstation: [id number]                      Final result by Alejandra Lyn MD (07/12/23 18:14:13)                   Narrative:    EXAM:  MR Angiography Head Without Intravenous Contrast    CLINICAL HISTORY:  The patient is 58 years old and is Female; Dizziness, persistent/recurrent, cardiac or vascular cause suspected Extremity weakness, dizziness    TECHNIQUE:  Magnetic resonance angiography images of the head without intravenous contrast.    COMPARISON:  MRA brain 10/6/2019    FINDINGS:  RIGHT INTERNAL CAROTID ARTERY:  No acute findings.  Intracranial segment is patent with no significant stenosis.  No aneurysm.  RIGHT ANTERIOR CEREBRAL ARTERY:  Stable hypoplastic right A1.  No occlusion or significant stenosis.  No aneurysm.  RIGHT MIDDLE CEREBRAL ARTERY:  Unremarkable.  No occlusion or significant stenosis.  No aneurysm.  RIGHT POSTERIOR CEREBRAL ARTERY:  Focus of absent flow-related enhancement in the right P2 (series 4, image 71).  RIGHT VERTEBRAL ARTERY:  Unremarkable as visualized.    LEFT INTERNAL CAROTID ARTERY:  No acute findings.  Intracranial segment is patent with no significant stenosis.  No aneurysm.  LEFT ANTERIOR CEREBRAL ARTERY:  Unremarkable.  No occlusion or significant stenosis.  No aneurysm.  LEFT MIDDLE CEREBRAL ARTERY:  Unremarkable.  No occlusion or significant stenosis.  No aneurysm.  LEFT POSTERIOR CEREBRAL ARTERY:   Mild irregularity of the left P2.  No occlusion.  No aneurysm.  LEFT VERTEBRAL ARTERY:  Unremarkable as visualized.    BASILAR ARTERY:  Unremarkable.  No occlusion or significant stenosis.  No aneurysm.    IMPRESSION:    1. Focus of absent flow-related enhancement in the right P2 may represent a focus of occlusion with distal reconstitution versus artifact. CTA can be performed for further evaluation.  2. Mild irregularity of the left P2 likely secondary to atherosclerotic plaque.    Electronically signed by:  Alejandra Armando MD  7/12/2023 6:14 PM CDT Workstation: [id number]                                     X-Ray Chest AP Portable (Final result)  Result time 07/12/23 17:14:42      Final result by Alejandra Lyn MD (07/12/23 17:14:42)                   Narrative:    EXAM:  XR Chest, 1 View    CLINICAL HISTORY:  The patient is 58 years old and is Female;     Extremity Weakness; Fall; Dizziness; Hypertension    TECHNIQUE:  Frontal view of the chest.    COMPARISON:  Chest radiograph 10/16/2022    FINDINGS:  LUNGS AND PLEURAL SPACES:  Mild left midlung scarring. Otherwise, clear lungs. No pleural effusions. No pneumothorax.  HEART:  Cardiomegaly.  MEDIASTINUM:  Unremarkable.  BONES/JOINTS:  Unremarkable.    IMPRESSION:  No acute findings in the chest.    Electronically signed by:  Alejandra Armando MD  7/12/2023 5:14 PM CDT Workstation: [id number]                                     CT HEAD FOR STROKE (Final result)  Result time 07/12/23 16:45:20   Procedure changed from CT Head Without Contrast     Final result by Wander Byrne MD (07/12/23 16:45:20)                   Narrative:    CMS MANDATED QUALITY DATA - CT RADIATION 436    All CT scans at this facility utilize dose modulation, iterative reconstruction, and/or weight based dosing when appropriate to reduce radiation dose to as low as reasonably achievable.    CLINICAL HISTORY:  58 years (1965) Female WEAKNESS Stroke protocol    TECHNIQUE:  CT HEAD  WITHOUT IV CONTRAST. Axial CT of the brain without contrast using soft tissue and bone algorithm. Please note in the acute setting if there is a clinical concern for an acute stroke MRI would be more sensitive/specific for evaluation of ischemia.    COMPARISON:  CT from October 3, 2022.    FINDINGS:  No acute intracranial hemorrhage, hydrocephalus, herniation or midline shift and the basal and suprasellar cisterns are patent. No acute skull fracture is identified.    Mild diffuse cerebral atrophy for age with periventricular deep cerebral white matter low attenuation, a nonspecific finding in this age group which can be seen in any diffuse white matter process but which is most commonly associated with chronic microvascular ischemic disease.    There is a moderate size wedge-shaped defect involving a majority of the right parietal lobe compatible with a remote infarct, similar to the previous exam (image 36). There is an additional smaller defect in the posterior left temporal lobe compatible with a small infarct (image 30). There is a small wedge-shaped defect in the superior aspect of the right cerebellar hemisphere (image 18) in keeping with an additional small infarct. Punctate rounded defects are seen in both basal ganglia (image 31) in keeping with lacunar infarcts.    Orbital contents appear within normal limits. External auditory canals are unremarkable. The visualized paranasal sinuses and mastoid air cells are essentially clear.    IMPRESSION:  1. No acute intracranial process.  2. Multiple prior wedge-shaped infarcts, most notably in the right parietal lobe, right cerebellar hemisphere, and posterior superior left temporal lobe.  3. Small bilateral basal ganglia lacunar infarcts.                  RESULT NOTIFICATION: These observations were discussed by the dictating physician, by phone and acknowledged by the ordering provider KATERINA HOLGUIN at 7/12/2023 4:41 PM CDT      Electronically signed by:   Wander Byrne MD  7/12/2023 4:45 PM CDT Workstation: [id number]                                            ASSESSMENT AND PLAN:        Acute ischemic stroke in the left coronary radiata  Hypertension     Workup  CTH: No acute intracranial abnormality . Multiple prior wedge-shaped infarcts, most notably in the right parietal lobe, right cerebellar hemisphere, and posterior superior left temporal lobe  MRI brain:  Acute infarct in the left coronary radiata.   MRA head:  Decreased opacification of the right P2 segment.  Mild atherosclerotic narrowing of left P2.  MRA neck:  No evidence of cervical carotid stenosis.  Vertebral arteries are patent.  ECHO:  EF 65%, no intracardiac shunting, normal left atrium  LDL: 124  HbA1c: 5.1         Plan  Admitted for further stroke workup.  Etiology of stroke could likely be small-vessel disease however given her multiple infarcts in the past which look embolic, concern for a central etiology is also high.  Start with Aspirin 81 mg, plavix 75mg and Lipitor 80mg.  Dual antiplatelet therapy for 3 weeks followed by monotherapy with Plavix alone.  Permissive BP to 220 systolic for 24 hrs from symptom onset and after that normalize BP  Outpatient Holter monitor for 3-4 weeks.  PT OT  Speech therapy  DVT prophylaxis with chemo/SCD prophylaxis  Discussed lifestyle modifications as prophylactic measures for stroke prevention including adequate blood pressure management, healthy diet and regular exercise.       [phone] within 4 weeks from discharge.     Stroke education was provided including stroke risk factors modification and any acute neurological changes including weakness, confusion, visual changes to come straight to the ER.     All questions were answered.                                     Hung Chapman MD  Neurology/vascular Neurology  Date of Service: 07/14/2023  1:42 PM    Please note: This note was transcribed  using voice recognition software. Because of this technology there are often uinintended grammatical, spelling, and other transcription errors. Please disregard these errors.

## 2023-07-14 NOTE — PLAN OF CARE
[facility]  Initial Discharge Assessment       Primary Care Provider: Zoran Cuevas MD    Admission Diagnosis: Stroke [I63.9]    Admission Date: 7/12/2023  Expected Discharge Date: 7/14/2023  Met with patient at bedside. Information on face sheet was verified. No HH, dialysis or Coumadin. No DME. Patient's daughter Venessa Tellez/daughter [phone] will bring patient home upon discharge.    Transition of Care Barriers: None    Payor: [employer] MEDICARE / Plan: [employer] MEDICARE HMO / Product Type: Capitation /     Extended Emergency Contact Information  Primary Emergency Contact: Venessa Tellez  Address: [address] [place] [address]           [address] [place] [id number] [place]  Mobile Phone: [phone]  Relation: Daughter  Preferred language: English   needed? No  Secondary Emergency Contact: TUNDE CHILDRESS  Mobile Phone: [phone]  Relation: Daughter  Preferred language: English   needed? No    Discharge Plan A: Home  Discharge Plan B: Home      [facility] [place] - [address] [address]  Phone: [phone] Fax: [phone]    [id number] [address] AT [facility] [address] & [address] [address]  Phone: [phone] Fax: [phone]      Initial Assessment (most recent)       Adult Discharge Assessment - 07/14/23 1035          Discharge Assessment    Assessment Type Discharge Planning Assessment     Confirmed/corrected address, phone number and insurance Yes     Confirmed Demographics Correct on Facesheet     Source of Information patient     When was your last doctors appointment? --   sees PCP every three months    Communicated TARIK with patient/caregiver Yes     Reason For Admission CVA     People in Home alone     Facility Arrived From: home     Do you expect to return to your current living situation? Yes     Do you have help at  home or someone to help you manage your care at home? Yes     Who are your caregiver(s) and their phone number(s)? Venessa Tellez/daughter [phone]     Prior to hospitilization cognitive status: Alert/Oriented     Current cognitive status: Alert/Oriented     Equipment Currently Used at Home none     Readmission within 30 days? No     Patient currently being followed by outpatient case management? No     Do you currently have service(s) that help you manage your care at home? No     Do you take prescription medications? Yes     Do you have prescription coverage? Yes     Coverage [employer]     Do you have any problems affording any of your prescribed medications? No     Is the patient taking medications as prescribed? yes     Who is going to help you get home at discharge? Venessa Tellez/daughter [phone]     How do you get to doctors appointments? family or friend will provide     Are you on dialysis? No     Do you take coumadin? No     Discharge Plan A Home     Discharge Plan B Home     DME Needed Upon Discharge  none     Discharge Plan discussed with: Patient     Transition of Care Barriers None        Physical Activity    On average, how many days per week do you engage in moderate to strenuous exercise (like a brisk walk)? 7 days     On average, how many minutes do you engage in exercise at this level? 20 min        Financial Resource Strain    How hard is it for you to pay for the very basics like food, housing, medical care, and heating? Not hard at all        Housing Stability    In the last 12 months, was there a time when you were not able to pay the mortgage or rent on time? No     In the last 12 months, was there a time when you did not have a steady place to sleep or slept in a shelter (including now)? No        Transportation Needs    In the past 12 months, has lack of transportation kept you from medical appointments or from getting medications? No     In the past 12 months, has lack of  transportation kept you from meetings, work, or from getting things needed for daily living? No        Food Insecurity    Within the past 12 months, you worried that your food would run out before you got the money to buy more. Never true     Within the past 12 months, the food you bought just didn't last and you didn't have money to get more. Never true        Stress    Do you feel stress - tense, restless, nervous, or anxious, or unable to sleep at night because your mind is troubled all the time - these days? Only a little        Social Connections    How often do you get together with friends or relatives? More than three times a week     How often do you attend Faith or Scientologist services? More than 4 times per year     Do you belong to any clubs or organizations such as Faith groups, unions, fraternal or athletic groups, or school groups? No     How often do you attend meetings of the clubs or organizations you belong to? Never     Are you , , , , never , or living with a partner?         Alcohol Use    Q1: How often do you have a drink containing alcohol? Never     Q2: How many drinks containing alcohol do you have on a typical day when you are drinking? Patient does not drink     Q3: How often do you have six or more drinks on one occasion? Never

## 2023-07-14 NOTE — CARE UPDATE
07/14/23 0829   PRE-TX-O2   Device (Oxygen Therapy) room air   SpO2 (!) 93 %   Pulse Oximetry Type Intermittent   $ Pulse Oximetry - Multiple Charge Pulse Oximetry - Multiple   Pulse 75   Resp 15   Respiratory Evaluation   $ Care Plan Tech Time 15 min

## 2023-07-14 NOTE — PLAN OF CARE
Patient cleared for discharge from case management standpoint.    Follow up appointments scheduled and added to AVS.    CM sent discharge orders to [facility] home health, SOC planned for 7/15/23.    Rolling walker approved per Yun Moffett. CM delivered walker and obtained signed delivery and rental agreement.    Chart and discharge orders reviewed.  Patient discharged home with [facility] home health no further case management needs.       07/14/23 1321   Final Note   Assessment Type Final Discharge Note   Anticipated Discharge Disposition [facility] Resources/Appts/Education Provided Provided patient/caregiver with written discharge plan information;Appointments scheduled and added to AVS   Post-Acute Status   Post-Acute Authorization Home Health;HME   HME Status Set-up Complete/Auth obtained   Home Health Status Set-up Complete/Auth obtained   Discharge Delays None known at this time

## 2023-07-14 NOTE — PROGRESS NOTES
[facility] Medicine  Progress Note    Patient Name: Faustina Rae  MRN: [id number]  Patient Class: IP- Inpatient   Admission Date: 7/12/2023  Length of Stay: 0 days  Attending Physician: Luis Araujo MD  Primary Care Provider: Zoran Cuevas MD        Subjective:     Principal Problem:CVA (cerebral vascular accident)        HPI:  Faustina Rae is a 58 y.o. female with history of CVA (expressive aphasia), HTN, CAD, HLD, Anxiety, COPD, DAVID, gastric sleeve, Morbid Obesity & gout. She presents for evaluation after falls in one day whereas it felt like her legs gave out. She was found to have acute stroke per MRI and hypertensive urgency with /121 at the time of presentation. She endorse that she has been non-compliant with her blood pressure medicines as well as her aspirin and Plavix that she was supposed to be taken at home.  She continues to have impaired mobility and weight-bearing.  MRI brain showed acute infarct in the left caudate body and septal acute infarct in left posterior frontal white matter.  No she was not a candidate for tPA and note from vascular Neurology in ED. she received 10 mg IV hydralazine in ED with blood pressure now 169 systolic.  She denies headache, chest pain, numbness or tingling, upper respiratory symptoms, dysuria, nausea vomiting, diarrhea.    In ED CBC essentially normal, lipid panel shows control however LDL is not within goal = 124 for her risk, TSH normal chemistry shows CKD4 with BUN/creatinine/GFR = 29/2.6/20.6    PLAN: permissive HTN, Hydralazine for SBP >210, ASA/plavix/statin, consult to neurology, PT/OT      Overview/Hospital Course:  07/13  No new issues  BP levels under control      Interval History:         Review of Systems   Constitutional:  Positive for fatigue. Negative for activity change and appetite change.   HENT:  Negative for congestion and dental problem.    Eyes:  Negative for discharge and itching.   Respiratory:   Negative for shortness of breath.    Cardiovascular:  Negative for chest pain.   Gastrointestinal:  Negative for abdominal distention and abdominal pain.   Endocrine: Negative for cold intolerance.   Genitourinary:  Negative for difficulty urinating and dysuria.   Musculoskeletal:  Negative for arthralgias and back pain.   Skin:  Negative for color change.   Neurological:  Negative for dizziness and facial asymmetry.   Hematological:  Negative for adenopathy.   Psychiatric/Behavioral:  Negative for agitation and behavioral problems.    Objective:     Vital Signs (Most Recent):  Temp: (P) 98.2 °F (36.8 °C) (07/13/23 1930)  Pulse: (P) 73 (07/13/23 1933)  Resp: (P) 18 (07/13/23 1930)  BP: (!) (P) 191/100 (07/13/23 1933)  SpO2: (P) 96 % (07/13/23 1930) Vital Signs (24h Range):  Temp:  [97.7 °F (36.5 °C)-98.2 °F (36.8 °C)] (P) 98.2 °F (36.8 °C)  Pulse:  [65-84] (P) 73  Resp:  [17-30] (P) 18  SpO2:  [95 %-98 %] (P) 96 %  BP: (175-221)/() (P) 191/100     Weight: 96.8 kg (213 lb 8 oz)  Body mass index is 39.05 kg/m².  No intake or output data in the 24 hours ending 07/13/23 2209      Physical Exam  Vitals and nursing note reviewed.   Constitutional:       General: She is not in acute distress.  HENT:      Head: Atraumatic.      Right Ear: External ear normal.      Left Ear: External ear normal.      Nose: Nose normal.      Mouth/Throat:      Mouth: Mucous membranes are moist.   Cardiovascular:      Rate and Rhythm: Normal rate.   Pulmonary:      Effort: Pulmonary effort is normal.   Abdominal:      Palpations: Abdomen is soft.   Musculoskeletal:         General: Normal range of motion.      Cervical back: Normal range of motion.   Skin:     General: Skin is warm.   Neurological:      Mental Status: She is alert and oriented to person, place, and time.   Psychiatric:         Behavior: Behavior normal.           Significant Labs: All pertinent labs within the past 24 hours have been reviewed.  CBC:   Recent Labs   Lab  07/12/23  1629 07/13/23  0410   WBC 8.40 8.58   HGB 12.2 12.2   HCT 39.2 39.6    280     CMP:   Recent Labs   Lab 07/12/23  1629 07/13/23  0410    139   K 4.4 3.9   * 108   CO2 22* 25   GLU 93 96   BUN 29* 30*   CREATININE 2.6* 2.6*   CALCIUM 9.4 9.6   PROT 7.7 7.8   ALBUMIN 3.8 3.7   BILITOT 0.4 0.6   ALKPHOS 61 61   AST 13 13   ALT 9* 10   ANIONGAP 4* 6*       Significant Imaging: I have reviewed all pertinent imaging results/findings within the past 24 hours.      Assessment/Plan:      * CVA (cerebral vascular accident)  Admitted with acute CVA  Maintain DAPT regime  Home with HH Later      Hyperlipidemia, unspecified  Aggressive secondary prevention add atorvastatin 80 mg      Essential hypertension  Permissive hypertension for now         Morbid obesity with BMI of 40.0-44.9, adult  Body mass index is 39.05 kg/m². Morbid obesity complicates all aspects of disease management from diagnostic modalities to treatment.         VTE Risk Mitigation (From admission, onward)         Ordered     Place SOL hose  Until discontinued         07/12/23 2009     IP VTE HIGH RISK PATIENT  Once         07/12/23 2009     Place sequential compression device  Until discontinued         07/12/23 2009                Discharge Planning   TARIK:      Code Status: Full Code   Is the patient medically ready for discharge?:     Reason for patient still in hospital (select all that apply): Treatment                     Luis Araujo MD  Department of Hospital Medicine   [facility]

## 2023-07-14 NOTE — NURSING
Discharge instructions given to patient. Patient verbalized understanding of follow up appointments and medication changes PIV removed from left AC without difficulty, catheter tip intact. Tele removed and returned. Patient to discharge home via private vehicle once cleared by case management.

## 2023-07-14 NOTE — PLAN OF CARE
SW met with Pt at bedside at to discuss HH orders.  Discussed HH referral process, and Pt verbalized understanding and agreement with plan.  [facility]  Referral sent to HH via [facility], and will await response.      07/14/23 1405   Post-Acute Status   Post-Acute Authorization Home Health   Home Health Status Set-up Complete/Auth obtained   Patient choice form signed by patient/caregiver List from [employer]

## 2023-07-14 NOTE — SUBJECTIVE & OBJECTIVE
Interval History:         Review of Systems   Constitutional:  Positive for fatigue. Negative for activity change and appetite change.   HENT:  Negative for congestion and dental problem.    Eyes:  Negative for discharge and itching.   Respiratory:  Negative for shortness of breath.    Cardiovascular:  Negative for chest pain.   Gastrointestinal:  Negative for abdominal distention and abdominal pain.   Endocrine: Negative for cold intolerance.   Genitourinary:  Negative for difficulty urinating and dysuria.   Musculoskeletal:  Negative for arthralgias and back pain.   Skin:  Negative for color change.   Neurological:  Negative for dizziness and facial asymmetry.   Hematological:  Negative for adenopathy.   Psychiatric/Behavioral:  Negative for agitation and behavioral problems.    Objective:     Vital Signs (Most Recent):  Temp: (P) 98.2 °F (36.8 °C) (07/13/23 1930)  Pulse: (P) 73 (07/13/23 1933)  Resp: (P) 18 (07/13/23 1930)  BP: (!) (P) 191/100 (07/13/23 1933)  SpO2: (P) 96 % (07/13/23 1930) Vital Signs (24h Range):  Temp:  [97.7 °F (36.5 °C)-98.2 °F (36.8 °C)] (P) 98.2 °F (36.8 °C)  Pulse:  [65-84] (P) 73  Resp:  [17-30] (P) 18  SpO2:  [95 %-98 %] (P) 96 %  BP: (175-221)/() (P) 191/100     Weight: 96.8 kg (213 lb 8 oz)  Body mass index is 39.05 kg/m².  No intake or output data in the 24 hours ending 07/13/23 2209      Physical Exam  Vitals and nursing note reviewed.   Constitutional:       General: She is not in acute distress.  HENT:      Head: Atraumatic.      Right Ear: External ear normal.      Left Ear: External ear normal.      Nose: Nose normal.      Mouth/Throat:      Mouth: Mucous membranes are moist.   Cardiovascular:      Rate and Rhythm: Normal rate.   Pulmonary:      Effort: Pulmonary effort is normal.   Abdominal:      Palpations: Abdomen is soft.   Musculoskeletal:         General: Normal range of motion.      Cervical back: Normal range of motion.   Skin:     General: Skin is warm.    Neurological:      Mental Status: She is alert and oriented to person, place, and time.   Psychiatric:         Behavior: Behavior normal.           Significant Labs: All pertinent labs within the past 24 hours have been reviewed.  CBC:   Recent Labs   Lab 07/12/23  1629 07/13/23  0410   WBC 8.40 8.58   HGB 12.2 12.2   HCT 39.2 39.6    280     CMP:   Recent Labs   Lab 07/12/23  1629 07/13/23  0410    139   K 4.4 3.9   * 108   CO2 22* 25   GLU 93 96   BUN 29* 30*   CREATININE 2.6* 2.6*   CALCIUM 9.4 9.6   PROT 7.7 7.8   ALBUMIN 3.8 3.7   BILITOT 0.4 0.6   ALKPHOS 61 61   AST 13 13   ALT 9* 10   ANIONGAP 4* 6*       Significant Imaging: I have reviewed all pertinent imaging results/findings within the past 24 hours.

## 2023-07-17 PROCEDURE — G0180 PR HOME HEALTH MD CERTIFICATION: ICD-10-PCS | Mod: ,,, | Performed by: STUDENT IN AN ORGANIZED HEALTH CARE EDUCATION/TRAINING PROGRAM

## 2023-07-17 PROCEDURE — G0180 MD CERTIFICATION HHA PATIENT: HCPCS | Mod: ,,, | Performed by: STUDENT IN AN ORGANIZED HEALTH CARE EDUCATION/TRAINING PROGRAM

## 2023-07-21 ENCOUNTER — LAB VISIT (OUTPATIENT)
Dept: LAB | Facility: HOSPITAL | Age: 58
End: 2023-07-21
Attending: NURSE PRACTITIONER
Payer: MEDICARE

## 2023-07-21 ENCOUNTER — OFFICE VISIT (OUTPATIENT)
Dept: FAMILY MEDICINE | Facility: CLINIC | Age: 58
End: 2023-07-21
Payer: MEDICARE

## 2023-07-21 VITALS
DIASTOLIC BLOOD PRESSURE: 72 MMHG | BODY MASS INDEX: 40.85 KG/M2 | HEIGHT: 62 IN | OXYGEN SATURATION: 97 % | TEMPERATURE: 98 F | WEIGHT: 222 LBS | HEART RATE: 70 BPM | SYSTOLIC BLOOD PRESSURE: 130 MMHG | RESPIRATION RATE: 16 BRPM

## 2023-07-21 DIAGNOSIS — Z86.73 HISTORY OF CVA (CEREBROVASCULAR ACCIDENT): ICD-10-CM

## 2023-07-21 DIAGNOSIS — J44.9 CHRONIC OBSTRUCTIVE PULMONARY DISEASE, UNSPECIFIED COPD TYPE: ICD-10-CM

## 2023-07-21 DIAGNOSIS — N18.32 STAGE 3B CHRONIC KIDNEY DISEASE: ICD-10-CM

## 2023-07-21 DIAGNOSIS — I10 ESSENTIAL HYPERTENSION: ICD-10-CM

## 2023-07-21 DIAGNOSIS — N18.4 CHRONIC KIDNEY DISEASE, STAGE 4 (SEVERE): ICD-10-CM

## 2023-07-21 DIAGNOSIS — I63.9 CEREBROVASCULAR ACCIDENT (CVA), UNSPECIFIED MECHANISM: ICD-10-CM

## 2023-07-21 DIAGNOSIS — Z09 HOSPITAL DISCHARGE FOLLOW-UP: Primary | ICD-10-CM

## 2023-07-21 DIAGNOSIS — Z09 HOSPITAL DISCHARGE FOLLOW-UP: ICD-10-CM

## 2023-07-21 DIAGNOSIS — E78.5 HYPERLIPIDEMIA, UNSPECIFIED HYPERLIPIDEMIA TYPE: ICD-10-CM

## 2023-07-21 DIAGNOSIS — E66.01 MORBID OBESITY WITH BMI OF 40.0-44.9, ADULT: ICD-10-CM

## 2023-07-21 LAB
BACTERIA #/AREA URNS AUTO: ABNORMAL /HPF
BILIRUB UR QL STRIP: NEGATIVE
CLARITY UR REFRACT.AUTO: CLEAR
COLOR UR AUTO: YELLOW
GLUCOSE UR QL STRIP: NEGATIVE
HGB UR QL STRIP: NEGATIVE
HYALINE CASTS UR QL AUTO: 0 /LPF
KETONES UR QL STRIP: NEGATIVE
LEUKOCYTE ESTERASE UR QL STRIP: NEGATIVE
MICROSCOPIC COMMENT: ABNORMAL
NITRITE UR QL STRIP: NEGATIVE
PH UR STRIP: 6 [PH] (ref 5–8)
PROT UR QL STRIP: ABNORMAL
RBC #/AREA URNS AUTO: 10 /HPF (ref 0–4)
SP GR UR STRIP: 1.01 (ref 1–1.03)
SQUAMOUS #/AREA URNS AUTO: 10 /HPF
URN SPEC COLLECT METH UR: ABNORMAL
WBC #/AREA URNS AUTO: 6 /HPF (ref 0–5)

## 2023-07-21 PROCEDURE — 99999 PR PBB SHADOW E&M-EST. PATIENT-LVL V: ICD-10-PCS | Mod: PBBFAC,,, | Performed by: NURSE PRACTITIONER

## 2023-07-21 PROCEDURE — 1160F RVW MEDS BY RX/DR IN RCRD: CPT | Mod: CPTII,S$GLB,, | Performed by: NURSE PRACTITIONER

## 2023-07-21 PROCEDURE — 99999 PR PBB SHADOW E&M-EST. PATIENT-LVL V: CPT | Mod: PBBFAC,,, | Performed by: NURSE PRACTITIONER

## 2023-07-21 PROCEDURE — 81001 URINALYSIS AUTO W/SCOPE: CPT | Performed by: NURSE PRACTITIONER

## 2023-07-21 PROCEDURE — 99214 OFFICE O/P EST MOD 30 MIN: CPT | Mod: S$GLB,,, | Performed by: NURSE PRACTITIONER

## 2023-07-21 PROCEDURE — 99214 PR OFFICE/OUTPT VISIT, EST, LEVL IV, 30-39 MIN: ICD-10-PCS | Mod: S$GLB,,, | Performed by: NURSE PRACTITIONER

## 2023-07-21 PROCEDURE — 87086 URINE CULTURE/COLONY COUNT: CPT | Performed by: NURSE PRACTITIONER

## 2023-07-21 PROCEDURE — 3078F DIAST BP <80 MM HG: CPT | Mod: CPTII,S$GLB,, | Performed by: NURSE PRACTITIONER

## 2023-07-21 PROCEDURE — 1111F PR DISCHARGE MEDS RECONCILED W/ CURRENT OUTPATIENT MED LIST: ICD-10-PCS | Mod: CPTII,S$GLB,, | Performed by: NURSE PRACTITIONER

## 2023-07-21 PROCEDURE — 3078F PR MOST RECENT DIASTOLIC BLOOD PRESSURE < 80 MM HG: ICD-10-PCS | Mod: CPTII,S$GLB,, | Performed by: NURSE PRACTITIONER

## 2023-07-21 PROCEDURE — 4010F PR ACE/ARB THEARPY RXD/TAKEN: ICD-10-PCS | Mod: CPTII,S$GLB,, | Performed by: NURSE PRACTITIONER

## 2023-07-21 PROCEDURE — 3075F PR MOST RECENT SYSTOLIC BLOOD PRESS GE 130-139MM HG: ICD-10-PCS | Mod: CPTII,S$GLB,, | Performed by: NURSE PRACTITIONER

## 2023-07-21 PROCEDURE — 3008F PR BODY MASS INDEX (BMI) DOCUMENTED: ICD-10-PCS | Mod: CPTII,S$GLB,, | Performed by: NURSE PRACTITIONER

## 2023-07-21 PROCEDURE — 1159F MED LIST DOCD IN RCRD: CPT | Mod: CPTII,S$GLB,, | Performed by: NURSE PRACTITIONER

## 2023-07-21 PROCEDURE — 3008F BODY MASS INDEX DOCD: CPT | Mod: CPTII,S$GLB,, | Performed by: NURSE PRACTITIONER

## 2023-07-21 PROCEDURE — 1159F PR MEDICATION LIST DOCUMENTED IN MEDICAL RECORD: ICD-10-PCS | Mod: CPTII,S$GLB,, | Performed by: NURSE PRACTITIONER

## 2023-07-21 PROCEDURE — 3044F PR MOST RECENT HEMOGLOBIN A1C LEVEL <7.0%: ICD-10-PCS | Mod: CPTII,S$GLB,, | Performed by: NURSE PRACTITIONER

## 2023-07-21 PROCEDURE — 3075F SYST BP GE 130 - 139MM HG: CPT | Mod: CPTII,S$GLB,, | Performed by: NURSE PRACTITIONER

## 2023-07-21 PROCEDURE — 1111F DSCHRG MED/CURRENT MED MERGE: CPT | Mod: CPTII,S$GLB,, | Performed by: NURSE PRACTITIONER

## 2023-07-21 PROCEDURE — 3044F HG A1C LEVEL LT 7.0%: CPT | Mod: CPTII,S$GLB,, | Performed by: NURSE PRACTITIONER

## 2023-07-21 PROCEDURE — 4010F ACE/ARB THERAPY RXD/TAKEN: CPT | Mod: CPTII,S$GLB,, | Performed by: NURSE PRACTITIONER

## 2023-07-21 PROCEDURE — 1160F PR REVIEW ALL MEDS BY PRESCRIBER/CLIN PHARMACIST DOCUMENTED: ICD-10-PCS | Mod: CPTII,S$GLB,, | Performed by: NURSE PRACTITIONER

## 2023-07-21 NOTE — PROGRESS NOTES
Subjective:       Patient ID: Faustina Rae is a 58 y.o. female.    Chief Complaint: No chief complaint on file.    HPI    Patient Class: IP- Inpatient  Admission Date: 7/12/2023  Hospital Length of Stay: 1 days  Discharge Date and Time:  07/14/2023 5:33 PM        Patient present today for hospital follow up.  history of CVA (expressive aphasia), HTN, CAD, HLD, Anxiety, COPD, DAVID, gastric sleeve, Morbid Obesity & gout. She was admitted with CVA-stroke per MRI and hypertensive urgency with /121. Right side weakness    MRI brain showed acute infarct in the left caudate body and septal acute infarct in left posterior frontal white matter.  No she was not a candidate for tPA and note from vascular Neurology in ED. she received 10 mg IV hydralazine in ED     Home health      Past Medical History:   Diagnosis Date    Anxiety     Arthritis     Asthma     CHF (congestive heart failure)     COPD (chronic obstructive pulmonary disease)     Hyperlipemia     Hypertension     Leaky heart valve     Obese     Obese     Obstructive sleep apnea     lost her CPAP    Pneumonia     Rheumatoid arthritis(714.0)     Stroke        Review of patient's allergies indicates:  No Known Allergies      Current Outpatient Medications:     albuterol (PROVENTIL/VENTOLIN HFA) 90 mcg/actuation inhaler, INHALE 2 PUFFS INTO THE LUNGS FOUR TIMES DAILY (Patient taking differently: Inhale 2 puffs into the lungs every 6 (six) hours as needed. INHALE 2 PUFFS INTO THE LUNGS FOUR TIMES DAILY), Disp: 8.5 g, Rfl: 11    aspirin (ECOTRIN) 81 MG EC tablet, Take 1 tablet (81 mg total) by mouth once daily. for 21 days, Disp: 21 tablet, Rfl: 0    atorvastatin (LIPITOR) 80 MG tablet, Take 1 tablet (80 mg total) by mouth every evening., Disp: 90 tablet, Rfl: 1    b complex vitamins tablet, Take 1 tablet by mouth once daily., Disp: , Rfl:     calcium citrate-vitamin D3 315-200 mg (CITRACAL+D) 315-200 mg-unit per tablet, Take 1 tablet by mouth once daily. ,  Disp: , Rfl:     chlorthalidone (HYGROTEN) 25 MG Tab, Take 25 mg by mouth once daily., Disp: , Rfl:     clopidogreL (PLAVIX) 75 mg tablet, Take 1 tablet (75 mg total) by mouth once daily., Disp: 30 tablet, Rfl: 1    EScitalopram oxalate (LEXAPRO) 10 MG tablet, Take 1 tablet (10 mg total) by mouth once daily., Disp: 90 tablet, Rfl: 1    ferrous sulfate 325 (65 FE) MG EC tablet, Take 1 tablet (325 mg total) by mouth once daily., Disp: 90 tablet, Rfl: 3    fluticasone furoate-vilanteroL (BREO ELLIPTA) 200-25 mcg/dose DsDv diskus inhaler, Inhale 1 puff into the lungs once daily. Controller, Disp: 60 each, Rfl: 3    fluticasone propionate (FLONASE) 50 mcg/actuation nasal spray, 2 sprays (100 mcg total) by Each Nostril route once daily., Disp: 16 g, Rfl: 11    hydrALAZINE (APRESOLINE) 50 MG tablet, Take 1 tablet (50 mg total) by mouth every 12 (twelve) hours., Disp: 270 tablet, Rfl: 1    metoprolol succinate (TOPROL-XL) 25 MG 24 hr tablet, Take 1 tablet (25 mg total) by mouth once daily., Disp: 90 tablet, Rfl: 3    montelukast (SINGULAIR) 10 mg tablet, Take 1 tablet (10 mg total) by mouth every evening., Disp: 90 tablet, Rfl: 3    multivitamin (THERAGRAN) per tablet, Take 1 tablet by mouth once daily., Disp: 90 tablet, Rfl: 3    nystatin (MYCOSTATIN ORAL), Take 1 tablet by mouth Daily., Disp: , Rfl:     pantoprazole (PROTONIX) 40 MG tablet, Take 1 tablet (40 mg total) by mouth once daily., Disp: 90 tablet, Rfl: 1    spironolactone (ALDACTONE) 25 MG tablet, Take 1 tablet (25 mg total) by mouth once daily., Disp: 90 tablet, Rfl: 1    valsartan (DIOVAN) 80 MG tablet, Take 1 tablet (80 mg total) by mouth once daily., Disp: 90 tablet, Rfl: 1    amLODIPine (NORVASC) 10 MG tablet, Take 1 tablet (10 mg total) by mouth once daily., Disp: 90 tablet, Rfl: 1    colchicine (COLCRYS) 0.6 mg tablet, Take 1 tablet (0.6 mg total) by mouth every 72 hours. Take at onset of gout and take for 10 days for 10 days (Patient taking differently:  "Take 0.6 mg by mouth once daily. Take at onset of gout and take for 10 days), Disp: 10 tablet, Rfl: 3    isosorbide dinitrate (ISORDIL) 5 MG Tab, Take 1 tablet (5 mg total) by mouth 3 (three) times daily., Disp: 270 tablet, Rfl: 1    Current Facility-Administered Medications:     cyanocobalamin injection 1,000 mcg, 1,000 mcg, Intramuscular, Q30 Days, Zoran Cuevas MD    Review of Systems   Constitutional:  Negative for unexpected weight change.   HENT:  Negative for trouble swallowing.    Eyes:  Negative for visual disturbance.   Respiratory:  Negative for shortness of breath.    Cardiovascular:  Negative for chest pain, palpitations and leg swelling.   Gastrointestinal:  Negative for blood in stool.   Genitourinary:  Negative for hematuria.   Skin:  Negative for rash.   Allergic/Immunologic: Negative for immunocompromised state.   Neurological:  Negative for headaches.   Hematological:  Does not bruise/bleed easily.   Psychiatric/Behavioral:  Negative for agitation. The patient is not nervous/anxious.        Objective:      /72 (BP Location: Right arm, Patient Position: Sitting, BP Method: Medium (Manual))   Pulse 70   Temp 98 °F (36.7 °C) (Oral)   Resp 16   Ht 5' 2" (1.575 m)   Wt 100.7 kg (222 lb 0.1 oz)   LMP  (LMP Unknown) Comment: Does not menstruate   SpO2 97%   BMI 40.60 kg/m²   Physical Exam  Constitutional:       Appearance: She is well-developed. She is obese.   HENT:      Head: Normocephalic.   Cardiovascular:      Rate and Rhythm: Normal rate and regular rhythm.      Heart sounds: Normal heart sounds.   Pulmonary:      Effort: Pulmonary effort is normal.   Musculoskeletal:         General: Normal range of motion.      Comments: Ambulates with a  walker   Skin:     General: Skin is warm and dry.   Neurological:      Mental Status: She is alert and oriented to person, place, and time.   Psychiatric:         Behavior: Behavior normal.         Thought Content: Thought content normal.    " "     Judgment: Judgment normal.         Assessment:       1. Hospital discharge follow-up    2. Cerebrovascular accident (CVA), unspecified mechanism    3. Chronic kidney disease, stage 4 (severe)    4. Essential hypertension    5. Stage 3b chronic kidney disease    6. Hyperlipidemia, unspecified hyperlipidemia type    7. Chronic obstructive pulmonary disease, unspecified COPD type    8. History of CVA (cerebrovascular accident)    9. Morbid obesity with BMI of 40.0-44.9, adult        Plan:       Hospital discharge follow-up  -     CBC W/ AUTO DIFFERENTIAL; Future; Expected date: 07/21/2023  -     COMPREHENSIVE METABOLIC PANEL; Future; Expected date: 07/21/2023  -     Urine culture; Future; Expected date: 07/21/2023  -     Urinalysis; Future; Expected date: 07/21/2023    Cerebrovascular accident (CVA), unspecified mechanism  On aspirin, and Plavix  Continue home health  Chronic kidney disease, stage 4 (severe)  -     CBC W/ AUTO DIFFERENTIAL; Future; Expected date: 07/21/2023  -     COMPREHENSIVE METABOLIC PANEL; Future; Expected date: 07/21/2023  Stable and chronic.  Will continue to monitor q3-6 months and control chronic conditions as optimally as possible to preserve function.   Essential hypertension  Stable, continue management  Low sodium diet  BP Readings from Last 3 Encounters:   07/21/23 130/72   07/14/23 (!) 144/94   05/17/23 124/80      Hyperlipidemia, unspecified hyperlipidemia type  Stable, on Lipitor  Chronic obstructive pulmonary disease, unspecified COPD type  Stable, continue resp inhaler  History of CVA (cerebrovascular accident)  On aspirin, and Plavix  Morbid obesity with BMI of 40.0-44.9, adult  Counseled patient on his ideal body weight, health consequences of being obese and current recommendations including weekly exercise and a heart healthy diet.  Current BMI is:Estimated body mass index is 40.6 kg/m² as calculated from the following:    Height as of this encounter: 5' 2" (1.575 m).    " Weight as of this encounter: 100.7 kg (222 lb 0.1 oz)..  Patient is aware that ideal BMI < 25 or Weight in (lb) to have BMI = 25: 136.4.           Patient readiness: acceptance and barriers:none    During the course of the visit the patient was educated and counseled about the following:     Hypertension:   Dietary sodium restriction.  Regular aerobic exercise.  Obesity:   General weight loss/lifestyle modification strategies discussed (elicit support from others; identify saboteurs; non-food rewards, etc).  Regular aerobic exercise program discussed.    Goals: Hypertension: Reduce Blood Pressure and Obesity: Reduce calorie intake and BMI    Did patient meet goals/outcomes: Yes    The following self management tools provided: declined    Patient Instructions (the written plan) was given to the patient/family.     Time spent with patient: 30 minutes    Barriers to medications present (no )    Adverse reactions to current medications (no)    Over the counter medications reviewed (Yes)

## 2023-07-21 NOTE — PATIENT INSTRUCTIONS
-- DO NOT REPLY / DO NOT REPLY ALL --  -- Message is from the [facility]--    General Patient Message      Reason for Call: New Conversation +   Contacting Dr. Patrick Gamboa  Callback Number   Caller Name   Requested Physician   Patient First Name   Patient Last Name   Patient Date of Birth     Patient PCP   If Rx, Pharmacy #   Reason PHI    PHI  Do not include Protected (Private) Health Information in these fields.  SEND     Hospital  Contacts  0\" Tyson Ortiz           Sending messages on behalf of:   [facility]     Filter Results: 1   Type   All   Groups   Team Alerts   Favorites   Specialty Clear All   -1\"   -1\" Adolescent Medicine   -1\" Allergy and Immunology   -1\" Anesthesiology   -1\" Cardiac Electrophysiology   -1\" Cardiology   -1\" Cardiovascular Surgery   -1\" Child & Adolescent Psychiatry   -1\" Colon & Rectal Surgery   10 && !f.IsHidden\"   Show more   Name   Specialty   Practice   Favorite   Aron Barker MD   [facility]   © 2016, [employer] Version 1.5.4      Caller Information       Type Contact Phone    11/20/2021 10:04 AM CST Phone (Incoming) KORI LARKIN (Mother) [phone]          Alternative phone number: None        Did the caller agree that this message can wait until the office reopens on Monday (or after the holiday)? NO - The Message Cannot Wait Until Morning      Select the reason for this message: OTHER      SEND A [employer] PAGE.  Copy the patient’s message into a [employer] page to the provider. Route this message to the provider on call provider that you paged.     Inform the caller:    “I will send a page to the provider on call.”     José Manuel Weems,     If you are due for any health screening(s) below please notify me so we can arrange them to be ordered and scheduled to maintain your health. Most healthy patients complete it. Don't lose out on improving your health.     Tests to Keep You Healthy    Mammogram: Met on 12/13/2022  Colon Cancer Screening: ORDERED  Last Blood Pressure <= 139/89 (7/21/2023): Yes         Colon Cancer Screening    Of cancers affecting both men and women, colorectal cancer is the third leading cancer killer in the [place]. But it doesnt have to be. Screening can prevent colorectal cancer or find it at an early stage when treatment often leads to a cure.    A colonoscopy is the preferred test for detecting colon cancer. It is needed only once every 10 years if results are negative. While sedated, a flexible, lighted tube with a tiny camera is inserted into the rectum and advanced through the colon to look for cancers. An alternative screening test that is used at home and returned to the lab may also be used. It detects hidden blood in bowel movements which could indicate cancer in the colon. If results are positive, you will need a colonoscopy to determine if the blood is a sign of cancer. This type of follow up (diagnostic) colonoscopy usually requires additional copays as required by your insurance provider. Please contact your PCP if you have any questions.

## 2023-07-23 LAB
BACTERIA UR CULT: NORMAL
BACTERIA UR CULT: NORMAL

## 2023-07-27 ENCOUNTER — TELEPHONE (OUTPATIENT)
Dept: FAMILY MEDICINE | Facility: CLINIC | Age: 58
End: 2023-07-27
Payer: MEDICARE

## 2023-07-27 DIAGNOSIS — I69.354 HEMIPARESIS AFFECTING LEFT SIDE AS LATE EFFECT OF CEREBROVASCULAR ACCIDENT: ICD-10-CM

## 2023-07-27 DIAGNOSIS — N18.4 CHRONIC KIDNEY DISEASE, STAGE 4 (SEVERE): Primary | ICD-10-CM

## 2023-07-27 NOTE — TELEPHONE ENCOUNTER
----- Message from Beau Benedict sent at 7/27/2023  1:48 PM CDT -----  Contact: self  Type: Needs Medical Advice  Who Called: Patient   Best Call Back Number: [phone]  Additional Information: Pt states she would like an order to be put n for a cane for her. Plz call pt today to verify if this can be done. Pt also states on 7/2/23 she couldn't be stuck twice with the  needle to she only got her urine labs done pt states the CBC W/ AUTO DIFFERENTIAL [[id number]]  COMPREHENSIVE METABOLIC PANEL [LAB17] needs to be put back in her chart so she can do them again. Thanks

## 2023-07-28 DIAGNOSIS — R30.0 DYSURIA: Primary | ICD-10-CM

## 2023-07-28 NOTE — TELEPHONE ENCOUNTER
I called [facility] and they stated its a 4 point cane the pt needs.

## 2023-07-28 NOTE — TELEPHONE ENCOUNTER
Patient currently has home PT/OT-please call [facility] home [facility] to see what kind of cane PT recommends for me order.

## 2023-08-01 ENCOUNTER — EXTERNAL HOME HEALTH (OUTPATIENT)
Dept: HOME HEALTH SERVICES | Facility: HOSPITAL | Age: 58
End: 2023-08-01
Payer: MEDICARE

## 2023-08-07 ENCOUNTER — OUTPATIENT CASE MANAGEMENT (OUTPATIENT)
Dept: ADMINISTRATIVE | Facility: OTHER | Age: 58
End: 2023-08-07
Payer: MEDICARE

## 2023-08-07 ENCOUNTER — TELEPHONE (OUTPATIENT)
Dept: FAMILY MEDICINE | Facility: CLINIC | Age: 58
End: 2023-08-07
Payer: MEDICARE

## 2023-08-07 NOTE — PROGRESS NOTES
Outpatient Care Management  Initial Patient Assessment    Patient: Faustina Rae  MRN: [id number]  Date of Service: 08/07/2023  Completed by: Yadi Muñoz RN  Referral Date: 07/14/2023  Program: High Risk  Status: Ongoing  Effective Dates: 8/7/2023 - present  Responsible Staff: No information to display        Reason for Visit   Patient presents with    OPCM Enrollment Call    Nursing Assessment       Brief Summary:  Faustina Rae was referred by Dr. Araujo for stroke. The patient stated that she does still have R sided weakness and is using a walker for ambulation. Patient qualifies for program based on high risk score of 82%.   Active problem list, medical, surgical and social history reviewed.  Areas of need identified by patient include food assistance and strength.   Med rec done. No needs identified at this time.   Referred to  for assistance.   Sent message to PCP to reach out and schedule appt for urine culture. Per patient.   Order for can was placed on 7/31/23.   F/u on receipt of cane during next outreach.   F/u discussed with patient on or around 8/21/23.    Disability Status  Is the patient alert and oriented (person, place, time, and situation)?: Alert and oriented x 4  Hearing Difficulty or Deaf: no  Visual Difficulty or Blind: no  Visual and Hearing Needs Conclusion: -- (The pt wears glasses for reading)  Difficulty Concentrating, Remembering or Making Decisions: no  Communication Difficulty: no  Eating/Swallowing Difficulty: no  Walking or Climbing Stairs Difficulty: yes  Walking or Climbing Stairs: ambulation difficulty, requires equipment; stair climbing difficulty, requires equipment  Dressing/Bathing Difficulty: no  Toileting : Independent  Continence : Continence - Not a problem  Difficulty Managing Errands Independently: no  Equipment Currently Used at Home: none  ADL Conclusion Statement: -- (The pt is needs to walk with a cane or walker)  Change in Functional Status Since Onset of  Current Illness/Injury: yes        Spiritual Beliefs  Spiritual, Cultural Beliefs, Baptist Practices, Values that Affect Care: no      Social History     Socioeconomic History    Marital status:     Number of children: 4    Years of education: 11    Highest education level: 11th grade   Occupational History    Occupation: Disabled day care worker   Tobacco Use    Smoking status: Never     Passive exposure: Never    Smokeless tobacco: Never   Substance and Sexual Activity    Alcohol use: Yes     Comment: rare, about once a month    Drug use: No    Sexual activity: Yes     Partners: Male     Social Determinants of Health     Financial Resource Strain: Low Risk  (7/14/2023)    Overall Financial Resource Strain (CARDIA)     Difficulty of Paying Living Expenses: Not hard at all   Food Insecurity: No Food Insecurity (7/14/2023)    Hunger Vital Sign     Worried About Running Out of Food in the Last Year: Never true     Ran Out of Food in the Last Year: Never true   Transportation Needs: No Transportation Needs (7/14/2023)    PRAPARE - Transportation     Lack of Transportation (Medical): No     Lack of Transportation (Non-Medical): No   Physical Activity: Insufficiently Active (7/14/2023)    Exercise Vital Sign     Days of Exercise per Week: 7 days     Minutes of Exercise per Session: 20 min   Stress: No Stress Concern Present (7/14/2023)    [employer] [place] [employer] - Occupational Stress Questionnaire     Feeling of Stress : Only a little   Social Connections: Moderately Isolated (7/14/2023)    Social Connection and Isolation Panel [NHANES]     Frequency of Social Gatherings with Friends and Family: More than three times a week     Attends Baptist Services: More than 4 times per year     Active Member of Clubs or Organizations: No     Attends Club or Organization Meetings: Never     Marital Status:    Housing Stability: Unknown (7/14/2023)    Housing Stability Vital Sign     Unable to Pay  for Housing in the Last Year: No     Unstable Housing in the Last Year: No       Roles and Relationships  Primary Source of Support/Comfort: child(kemar)  Name of Support/Comfort Primary Source: -- (Venessa)      Advance Directives (For Healthcare)  Advance Directive  (If Adv Dir status is received, view document under Adv Dir in header or Chart Review Media tab): Patient does not have Advance Directive, declines information.        Patient Reported Insurance  Verified current insurance plan:: [employer] Medicare Advantage  [employer] benefits discussed:: Well [employer]ine; OTC Prescription Discounts        Depression Patient Health Questionnaire 2/25/2021 8/30/2019 9/20/2016 11/11/2014 9/5/2014 2/26/2014 10/31/2013   Over the last two weeks how often have you been bothered by little interest or pleasure in doing things Not at all Not at all Not at all Several days Several days Not at all Not at all   Over the last two weeks how often have you been bothered by feeling down, depressed or hopeless Not at all Not at all Not at all Several days Not at all Not at all Several days   PHQ-2 Total Score 0 0 0 2 1 - -   Over the last two weeks how often have you been bothered by trouble falling or staying asleep, or sleeping too much - - - Several days Several days Not at all Several days   Over the last two weeks how often have you been bothered by feeling tired or having little energy - - - Several days Several days Not at all Several days   Over the last two weeks how often have you been bothered by a poor appetite or overeating - - - Several days Several days Several days Not at all   Over the last two weeks how often have you been bothered by feeling bad about yourself - or that you are a failure or have let yourself or your family down - - - Not at all Not at all Several days Not at all   Over the last two weeks how often have you been bothered by trouble concentrating on things, such as reading the newspaper or watching television - - -  Not at all Not at all Several days Several days   Over the last two weeks how often have you been bothered by moving or speaking so slowly that other people could have noticed. Or the opposite - being so fidgety or restless that you have been moving around a lot more than usual. - - - Not at all Not at all Not at all Not at all   Over the last two weeks how often have you been bothered by thoughts that you would be better off dead, or of hurting yourself - - - Not at all Not at all Not at all Not at all   If you checked off any problems, how difficult have these problems made it for you to do your work, take care of things at home or get along with other people? - - - Not difficult at all Not difficult at all Not difficult at all Somewhat difficult   Total Score - - - 5 4 3 4       Learning Assessment       07/13/2023 1751 [facility] (7/12/2023 - 7/14/2023)   Created by Chantale Mohamud LPN - Licensed Natalia (Nurse) Status: Complete                 PRIMARY LEARNER     Primary Learner Name:  Faustina Rae  - 07/13/2023 1751    Relationship:  Patient  - 07/13/2023 1751    Does the primary learner have any barriers to learning?:  No Barriers  - 07/13/2023 1751    What is the preferred language of the primary learner?:  English  - 07/13/2023 1751    How does the primary learner prefer to learn new concepts?:  Listening  - 07/13/2023 1751    How often do you need to have someone help you read instructions, pamphlets, or written material from your doctor or pharmacy?:  Never  - 07/13/2023 1751        CO-LEARNER #1     No question answered        CO-LEARNER #2     No question answered        SPECIAL TOPICS     No question answered        ANSWERED BY:     No question answered        Edit History       Chantale Mohamud LPN - Licensed Natalia (Nurse)   07/13/2023 1751

## 2023-08-07 NOTE — TELEPHONE ENCOUNTER
Called patient to schedule lab visit for urine specimen. No answer , left voicemail to return call.

## 2023-08-07 NOTE — LETTER
Faustina Rae  [address]  [address]  [place] [address]    Dear Faustina Rae,     Welcome to [facility]s Outpatient Care Management Program. We are here to assist patients with multiple long-term (chronic) conditions who often need more personalized healthcare.    It was a pleasure talking with you today. My name is Yadi Muñoz RN. I look forward to working with you as your Care Manager. I will be contacting you by telephone routinely to help coordinate care and resolve issues.    My goal is to help you function at the healthiest and highest level possible. You can contact me directly at [phone] .    As an [facility] patient with [employer], some of the services we provide, at no cost to you, include:     Development of an individualized care plan with a Registered Nurse   Connection with a   Assistance from a Community Health Worker  Connection with available resources and services    Coordinate communication among your care team members   Provide coaching and education   Help you understand your doctors treatment plan  Help you obtain information about your insurance coverage.     All services provided by [facility]s Outpatient Care Managers and other care team members are coordinated with and communicated to your primary care team.      As part of your enrollment, you will be receiving education materials and more information about these services in your [facility] account, by phone, or through the mail. If you do not wish to participate or receive information, you can Opt Out by contacting our office at [phone].      Sincerely,        Yadi Muñoz RN  [facility]   Outpatient Care Management

## 2023-08-07 NOTE — TELEPHONE ENCOUNTER
----- Message from Yadi Muñoz RN sent at 8/7/2023 11:57 AM CDT -----  Good morning,     I spoke with this patient today. She is needing to go and give her sample for a urine culture but is needing help to schedule lab appointment. Unfortunately I am not able to schedule appointments and I was not sure if she needing one to drop a sample off. If someone could reach out to this patient that would be helpful.     Thank you for your assistance,   Yadi Muñoz RN  Outpatient Complex Care Management  [phone]

## 2023-08-08 ENCOUNTER — OUTPATIENT CASE MANAGEMENT (OUTPATIENT)
Dept: ADMINISTRATIVE | Facility: OTHER | Age: 58
End: 2023-08-08
Payer: MEDICARE

## 2023-08-08 NOTE — LETTER
Faustina Rae  [address]  [address]  [place] [address]    Dear Faustina Rae,     Welcome to [facility] Outpatient Care Management Program. We are here to assist patients with multiple long-term (chronic) conditions who often need more personalized healthcare.    It was a pleasure talking with you today. My name is Lucrecia Kerr LCSW. I look forward to working with you as your Care Manager. I will be contacting you by telephone routinely to help coordinate care and resolve issues.    My goal is to help you function at the healthiest and highest level possible. You can contact me directly at 8/8/2023.    As an [facility] patient with [employer], some of the services we provide, at no cost to you, include:     Development of an individualized care plan with a Registered Nurse   Connection with a   Assistance from a Community Health Worker  Connection with available resources and services    Coordinate communication among your care team members   Provide coaching and education   Help you understand your doctors treatment plan  Help you obtain information about your insurance coverage.     All services provided by [facility] Outpatient Care Managers and other care team members are coordinated with and communicated to your primary care team.      As part of your enrollment, you will be receiving education materials and more information about these services in your [facility] account, by phone, or through the mail. If you do not wish to participate or receive information, you can Opt Out by contacting our office at [phone].      Sincerely,        Lucrecia Kerr LCSW  [facility]   Outpatient Care Management

## 2023-08-08 NOTE — PROGRESS NOTES
Outpatient Care Management   - High Risk Patient Assessment    Patient: Faustina Rae  MRN:  [id number]  Date of Service:  8/8/2023  Completed by:  Lucrecia Kerr LCSW  Referral Date: 07/14/2023    Reason for Visit   Patient presents with    Social Work Assessment - High Risk     8/8/2023  Brief Summary:  received a referral from OPCM ERVIN Muñoz for the following patient identified psycho-social needs food assistance. Care plan was created in collaboration with patient/caregiver input.  completed the SDOH questionnaire. SW will follow up in one week or PRN.

## 2023-08-18 ENCOUNTER — OUTPATIENT CASE MANAGEMENT (OUTPATIENT)
Dept: ADMINISTRATIVE | Facility: OTHER | Age: 58
End: 2023-08-18
Payer: MEDICARE

## 2023-08-18 NOTE — PROGRESS NOTES
Outpatient Care Management   - Care Plan Follow Up    Patient: Faustina Rae  MRN:  [id number]  Date of Service:  8/18/2023  Completed by:  Lucrecia Kerr LCSW  Referral Date: 07/14/2023    Reason for Visit   Patient presents with    [facility] SW Follow Up Call     8/18/2023  Brief Summary: SW telephoned the pt for a follow up. Pt has not yet received the materials that were mailed by the [facility] SW. SW will follow up in two weeks or PRN.     Complex Care Plan     Care plan was discussed and completed today with input from patient and/or caregiver.    Patient Instructions     No follow-ups on file.

## 2023-08-21 ENCOUNTER — OUTPATIENT CASE MANAGEMENT (OUTPATIENT)
Dept: ADMINISTRATIVE | Facility: OTHER | Age: 58
End: 2023-08-21
Payer: MEDICARE

## 2023-08-21 NOTE — PROGRESS NOTES
Outpatient Care Management  Plan of Care Follow Up Visit    Patient: Faustina Rae  MRN: [id number]  Date of Service: 08/21/2023  Completed by: Yadi Muñoz RN  Referral Date: 07/14/2023    Reason for Visit   Patient presents with    OPCM RN Follow Up Call       Brief Summary: Phone contact made with Mrs. Weems today. We discussed her care plan. No new needs identified at this time. Will continue to follow for education and management.

## 2023-08-22 ENCOUNTER — LAB VISIT (OUTPATIENT)
Dept: LAB | Facility: HOSPITAL | Age: 58
End: 2023-08-22
Attending: NURSE PRACTITIONER
Payer: MEDICARE

## 2023-08-22 ENCOUNTER — PATIENT OUTREACH (OUTPATIENT)
Dept: ADMINISTRATIVE | Facility: OTHER | Age: 58
End: 2023-08-22
Payer: MEDICARE

## 2023-08-22 DIAGNOSIS — R30.0 DYSURIA: ICD-10-CM

## 2023-08-22 PROCEDURE — 87088 URINE BACTERIA CULTURE: CPT | Performed by: NURSE PRACTITIONER

## 2023-08-22 PROCEDURE — 87077 CULTURE AEROBIC IDENTIFY: CPT | Performed by: NURSE PRACTITIONER

## 2023-08-22 PROCEDURE — 87086 URINE CULTURE/COLONY COUNT: CPT | Performed by: NURSE PRACTITIONER

## 2023-08-22 PROCEDURE — 87186 SC STD MICRODIL/AGAR DIL: CPT | Performed by: NURSE PRACTITIONER

## 2023-08-22 NOTE — PROGRESS NOTES
Community Health Worker assistance requested from Esperanza Kerr LCSW to provide support calls as needed for this patient.  Spoke to: Patient, Faustina Rae   Pt reported needs:Ms. Rae confirmed that she does receive SNAP benefits in the amount of 23 dollars. She informed CHW that her net income is 1400 dollars and lives alone. CHW verified that her net income is higher than allotment from SNAP web site which could be the cause of the amount she is receiving.   Resources/Support provided: CHW created patients SNAP portal and linked her SNAP case. Ms. Rae was unable to declare if any income changes has happen to submit an update to her applications. [facility] informed of three food kaiser in her zip code and will contact patient regarding SNAP update.   Follow up required: yes     SNAP portal : [email or web address]                         Password: [email or web address]                         PIN: [email or web address]

## 2023-08-23 ENCOUNTER — PATIENT MESSAGE (OUTPATIENT)
Dept: FAMILY MEDICINE | Facility: CLINIC | Age: 58
End: 2023-08-23
Payer: MEDICARE

## 2023-08-24 DIAGNOSIS — E87.5 SERUM POTASSIUM ELEVATED: Primary | ICD-10-CM

## 2023-08-25 ENCOUNTER — TELEPHONE (OUTPATIENT)
Dept: FAMILY MEDICINE | Facility: CLINIC | Age: 58
End: 2023-08-25
Payer: MEDICARE

## 2023-08-25 DIAGNOSIS — N30.00 ACUTE CYSTITIS WITHOUT HEMATURIA: Primary | ICD-10-CM

## 2023-08-25 LAB — BACTERIA UR CULT: ABNORMAL

## 2023-08-25 RX ORDER — CIPROFLOXACIN 500 MG/1
500 TABLET ORAL EVERY 12 HOURS
Qty: 14 TABLET | Refills: 0 | Status: SHIPPED | OUTPATIENT
Start: 2023-08-25 | End: 2023-08-29 | Stop reason: SDUPTHER

## 2023-08-25 NOTE — TELEPHONE ENCOUNTER
----- Message from Koffi Landon sent at 8/24/2023  4:40 PM CDT -----  Regarding: ret call  Contact: faustina at [phone]  Type:  Patient Returning Call    Who Called:  Faustina    Who Left Message for Patient:  office    Does the patient know what this is regarding?:  no    Best Call Back Number:  [phone]    Additional Information:

## 2023-08-28 DIAGNOSIS — E53.8 B12 DEFICIENCY: Primary | ICD-10-CM

## 2023-08-29 ENCOUNTER — TELEPHONE (OUTPATIENT)
Dept: FAMILY MEDICINE | Facility: CLINIC | Age: 58
End: 2023-08-29
Payer: MEDICARE

## 2023-08-29 ENCOUNTER — PATIENT OUTREACH (OUTPATIENT)
Dept: ADMINISTRATIVE | Facility: OTHER | Age: 58
End: 2023-08-29
Payer: MEDICARE

## 2023-08-29 ENCOUNTER — LAB VISIT (OUTPATIENT)
Dept: LAB | Facility: HOSPITAL | Age: 58
End: 2023-08-29
Attending: NURSE PRACTITIONER
Payer: MEDICARE

## 2023-08-29 DIAGNOSIS — E53.8 B12 DEFICIENCY: ICD-10-CM

## 2023-08-29 DIAGNOSIS — N30.00 ACUTE CYSTITIS WITHOUT HEMATURIA: ICD-10-CM

## 2023-08-29 DIAGNOSIS — E87.5 SERUM POTASSIUM ELEVATED: ICD-10-CM

## 2023-08-29 LAB
POTASSIUM SERPL-SCNC: 4.5 MMOL/L (ref 3.5–5.1)
VIT B12 SERPL-MCNC: 543 PG/ML (ref 210–950)

## 2023-08-29 PROCEDURE — 82607 VITAMIN B-12: CPT | Performed by: NURSE PRACTITIONER

## 2023-08-29 PROCEDURE — 84132 ASSAY OF SERUM POTASSIUM: CPT | Performed by: NURSE PRACTITIONER

## 2023-08-29 PROCEDURE — 36415 COLL VENOUS BLD VENIPUNCTURE: CPT | Mod: PO | Performed by: NURSE PRACTITIONER

## 2023-08-29 RX ORDER — CIPROFLOXACIN 500 MG/1
500 TABLET ORAL EVERY 12 HOURS
Qty: 14 TABLET | Refills: 0 | Status: SHIPPED | OUTPATIENT
Start: 2023-08-29 | End: 2023-09-05

## 2023-08-29 NOTE — PROGRESS NOTES
CHW - Case Closure    This Community Health Worker spoke to patient via telephone today.   Pt reported: Ms. Rae has visit a food kaiser and received food. She was informed of the log in for her SNAP portal created by CHW. CHW informed of the date that she has completed her SNAP application and she confirmed that her income has not changed nor her bills expect her rent has gone up 50 dollars. CHW informed patient could complete a new application is she would like to report her difference or wait til her sim plied report. Patient has declined completing a new applications and will wait for her report. She stated she will get her niece to check and submit on her portal. Patient was thankful for assistance and CHW informed to contact CHW if she would like to complete an application.   Pt denied any additional needs at this time and agrees with episode closure at this time.  Provided patient with Community Health Worker's contact information and encouraged him/her to contact this Community Health Worker if additional needs arise.

## 2023-09-01 ENCOUNTER — OUTPATIENT CASE MANAGEMENT (OUTPATIENT)
Dept: ADMINISTRATIVE | Facility: OTHER | Age: 58
End: 2023-09-01
Payer: MEDICARE

## 2023-09-01 NOTE — LETTER
Faustina Rae  [address]  [address]  [place] [address]    Dear Faustina Rae,     Welcome to [facility] Outpatient Care Management Program. We are here to assist patients with multiple long-term (chronic) conditions who often need more personalized healthcare.    It was a pleasure talking with you today. My name is Lucrecia Kerr LCSW. I look forward to working with you as your Care Manager. I will be contacting you by telephone routinely to help coordinate care and resolve issues.    My goal is to help you function at the healthiest and highest level possible. You can contact me directly at [phone].    As an [facility] patient with [employer], some of the services we provide, at no cost to you, include:     Development of an individualized care plan with a Registered Nurse   Connection with a   Assistance from a Community Health Worker  Connection with available resources and services    Coordinate communication among your care team members   Provide coaching and education   Help you understand your doctors treatment plan  Help you obtain information about your insurance coverage.     All services provided by [facility] Outpatient Care Managers and other care team members are coordinated with and communicated to your primary care team.      As part of your enrollment, you will be receiving education materials and more information about these services in your [facility] account, by phone, or through the mail. If you do not wish to participate or receive information, you can Opt Out by contacting our office at [phone].      Sincerely,        Lucrecia Kerr LCSW  [facility]   Outpatient Care Management

## 2023-09-01 NOTE — LETTER
Faustina Rae  [address]  [address]  [place] [address]      Dear Faustina Rae,     I am writing from the Outpatient Care Management Department at [facility]. I have been unsuccessful at reaching you since we spoke on 8/18/2023.  I hope you found the assistance previously provided to you helpful.     Please contact me at [phone] from 8:00AM to 4:30 PM on Monday thru Friday.     As you know, [facility] also has a program with a nurse available 24/7 to answer questions or provide medical advice.  [facility] can be reached at [phone].    Sincerely,       Lucrecia Kerr, [facility]  Outpatient Care Management

## 2023-09-07 ENCOUNTER — OUTPATIENT CASE MANAGEMENT (OUTPATIENT)
Dept: ADMINISTRATIVE | Facility: OTHER | Age: 58
End: 2023-09-07
Payer: MEDICARE

## 2023-09-07 NOTE — PROGRESS NOTES
Outpatient Care Management  Plan of Care Follow Up Visit    Patient: Faustina Rae  MRN: [id number]  Date of Service: 09/07/2023  Completed by: Yadi Muñoz RN  Referral Date: 07/14/2023    Reason for Visit   Patient presents with    OPCM RN Follow Up Call       Brief Summary: Phone contact made with Ms. Rae today. We discussed her care plan. No new needs identified at this time. Will continue to follow for education and management.

## 2023-09-11 NOTE — PROGRESS NOTES
9/15/2023      3rd Attempt to complete SW follow-up for Outpatient Care Management;  left message with contact information requesting a return call.  LCSW will close case and notify OPCM RN.

## 2023-10-13 DIAGNOSIS — J44.9 CHRONIC OBSTRUCTIVE PULMONARY DISEASE, UNSPECIFIED COPD TYPE: ICD-10-CM

## 2023-10-13 DIAGNOSIS — J45.20 INTERMITTENT ASTHMA, UNSPECIFIED ASTHMA SEVERITY, UNSPECIFIED WHETHER COMPLICATED: ICD-10-CM

## 2023-10-13 RX ORDER — FLUTICASONE FUROATE AND VILANTEROL TRIFENATATE 200; 25 UG/1; UG/1
1 POWDER RESPIRATORY (INHALATION)
Qty: 60 EACH | Refills: 3 | Status: SHIPPED | OUTPATIENT
Start: 2023-10-13

## 2023-10-13 NOTE — TELEPHONE ENCOUNTER
No care due was identified.  [facility] Embedded Care Due Messages. Reference number: [id number].   10/13/2023 11:45:31 AM CDT

## 2023-10-17 DIAGNOSIS — E78.5 HYPERLIPIDEMIA, UNSPECIFIED HYPERLIPIDEMIA TYPE: ICD-10-CM

## 2023-10-17 DIAGNOSIS — I10 ESSENTIAL HYPERTENSION: ICD-10-CM

## 2023-10-17 DIAGNOSIS — Z86.73 HISTORY OF CVA (CEREBROVASCULAR ACCIDENT): ICD-10-CM

## 2023-10-17 DIAGNOSIS — I20.89 ANGINA AT REST: ICD-10-CM

## 2023-10-17 DIAGNOSIS — F32.A ANXIETY AND DEPRESSION: ICD-10-CM

## 2023-10-17 DIAGNOSIS — I10 ACCELERATED HYPERTENSION: ICD-10-CM

## 2023-10-17 DIAGNOSIS — F41.9 ANXIETY AND DEPRESSION: ICD-10-CM

## 2023-10-17 RX ORDER — ISOSORBIDE DINITRATE 5 MG/1
5 TABLET ORAL 3 TIMES DAILY
Qty: 270 TABLET | Refills: 3 | Status: SHIPPED | OUTPATIENT
Start: 2023-10-17 | End: 2024-01-15

## 2023-10-17 RX ORDER — HYDRALAZINE HYDROCHLORIDE 50 MG/1
50 TABLET, FILM COATED ORAL EVERY 12 HOURS
Qty: 270 TABLET | Refills: 3 | Status: SHIPPED | OUTPATIENT
Start: 2023-10-17 | End: 2023-11-22 | Stop reason: SDUPTHER

## 2023-10-17 RX ORDER — ESCITALOPRAM OXALATE 10 MG/1
10 TABLET ORAL DAILY
Qty: 90 TABLET | Refills: 3 | Status: SHIPPED | OUTPATIENT
Start: 2023-10-17

## 2023-10-17 RX ORDER — SPIRONOLACTONE 25 MG/1
25 TABLET ORAL DAILY
Qty: 90 TABLET | Refills: 3 | Status: SHIPPED | OUTPATIENT
Start: 2023-10-17 | End: 2023-11-22 | Stop reason: SDUPTHER

## 2023-10-17 RX ORDER — AMLODIPINE BESYLATE 10 MG/1
10 TABLET ORAL DAILY
Qty: 90 TABLET | Refills: 3 | Status: SHIPPED | OUTPATIENT
Start: 2023-10-17 | End: 2023-11-22 | Stop reason: SDUPTHER

## 2023-10-17 RX ORDER — ATORVASTATIN CALCIUM 80 MG/1
80 TABLET, FILM COATED ORAL NIGHTLY
Qty: 90 TABLET | Refills: 3 | Status: SHIPPED | OUTPATIENT
Start: 2023-10-17 | End: 2023-11-22 | Stop reason: SDUPTHER

## 2023-10-17 NOTE — TELEPHONE ENCOUNTER
No care due was identified.  [facility] Embedded Care Due Messages. Reference number: [id number].   10/17/2023 10:13:23 AM CDT

## 2023-11-03 ENCOUNTER — TELEPHONE (OUTPATIENT)
Dept: FAMILY MEDICINE | Facility: CLINIC | Age: 58
End: 2023-11-03
Payer: MEDICARE

## 2023-11-03 NOTE — TELEPHONE ENCOUNTER
----- Message from Sarahi Levi sent at 11/3/2023  2:15 PM CDT -----  Type:  Needs Medical Advice    Who Called:  Pt    Symptoms (please be specific):  left leg pain     How long has patient had these symptoms:  since last night    Pharmacy name and phone #:     [id number] [address] [address]  Phone: [phone] Fax: [phone]    Would the patient rather a call back or a response via [facility]?  Call back    Best Call Back Number:  [phone]    Additional Information:  Pt is asking to speak with KYREE Watts or with a nurse.  Please call back to advise. Thanks!

## 2023-11-22 ENCOUNTER — OFFICE VISIT (OUTPATIENT)
Dept: FAMILY MEDICINE | Facility: CLINIC | Age: 58
End: 2023-11-22
Payer: MEDICARE

## 2023-11-22 ENCOUNTER — HOSPITAL ENCOUNTER (OUTPATIENT)
Dept: RADIOLOGY | Facility: CLINIC | Age: 58
Discharge: HOME | End: 2023-11-22
Attending: STUDENT IN AN ORGANIZED HEALTH CARE EDUCATION/TRAINING PROGRAM
Payer: MEDICARE

## 2023-11-22 VITALS
WEIGHT: 227.06 LBS | HEART RATE: 60 BPM | BODY MASS INDEX: 41.78 KG/M2 | SYSTOLIC BLOOD PRESSURE: 136 MMHG | DIASTOLIC BLOOD PRESSURE: 86 MMHG | HEIGHT: 62 IN | RESPIRATION RATE: 16 BRPM | TEMPERATURE: 98 F | OXYGEN SATURATION: 99 %

## 2023-11-22 DIAGNOSIS — J44.89 COPD WITH ASTHMA: ICD-10-CM

## 2023-11-22 DIAGNOSIS — Z12.31 ENCOUNTER FOR SCREENING MAMMOGRAM FOR MALIGNANT NEOPLASM OF BREAST: ICD-10-CM

## 2023-11-22 DIAGNOSIS — K90.9 IMPAIRED INTESTINAL ABSORPTION: ICD-10-CM

## 2023-11-22 DIAGNOSIS — I10 ACCELERATED HYPERTENSION: ICD-10-CM

## 2023-11-22 DIAGNOSIS — Z00.00 HEALTHCARE MAINTENANCE: Primary | ICD-10-CM

## 2023-11-22 DIAGNOSIS — R05.9 COUGH, UNSPECIFIED TYPE: ICD-10-CM

## 2023-11-22 DIAGNOSIS — I10 ESSENTIAL HYPERTENSION: ICD-10-CM

## 2023-11-22 DIAGNOSIS — R19.7 DIARRHEA, UNSPECIFIED TYPE: ICD-10-CM

## 2023-11-22 DIAGNOSIS — Z98.890 STATUS POST GASTROPLASTY: ICD-10-CM

## 2023-11-22 DIAGNOSIS — J30.1 ACUTE SEASONAL ALLERGIC RHINITIS DUE TO POLLEN: ICD-10-CM

## 2023-11-22 DIAGNOSIS — Z86.73 HISTORY OF CVA (CEREBROVASCULAR ACCIDENT): ICD-10-CM

## 2023-11-22 DIAGNOSIS — E78.5 HYPERLIPIDEMIA, UNSPECIFIED HYPERLIPIDEMIA TYPE: ICD-10-CM

## 2023-11-22 PROCEDURE — 3075F SYST BP GE 130 - 139MM HG: CPT | Mod: CPTII,S$GLB,, | Performed by: STUDENT IN AN ORGANIZED HEALTH CARE EDUCATION/TRAINING PROGRAM

## 2023-11-22 PROCEDURE — 4010F ACE/ARB THERAPY RXD/TAKEN: CPT | Mod: CPTII,S$GLB,, | Performed by: STUDENT IN AN ORGANIZED HEALTH CARE EDUCATION/TRAINING PROGRAM

## 2023-11-22 PROCEDURE — 1159F MED LIST DOCD IN RCRD: CPT | Mod: CPTII,S$GLB,, | Performed by: STUDENT IN AN ORGANIZED HEALTH CARE EDUCATION/TRAINING PROGRAM

## 2023-11-22 PROCEDURE — 71046 X-RAY EXAM CHEST 2 VIEWS: CPT | Mod: 26,,, | Performed by: RADIOLOGY

## 2023-11-22 PROCEDURE — 99214 OFFICE O/P EST MOD 30 MIN: CPT | Mod: S$GLB,,, | Performed by: STUDENT IN AN ORGANIZED HEALTH CARE EDUCATION/TRAINING PROGRAM

## 2023-11-22 PROCEDURE — 3079F DIAST BP 80-89 MM HG: CPT | Mod: CPTII,S$GLB,, | Performed by: STUDENT IN AN ORGANIZED HEALTH CARE EDUCATION/TRAINING PROGRAM

## 2023-11-22 PROCEDURE — 3008F BODY MASS INDEX DOCD: CPT | Mod: CPTII,S$GLB,, | Performed by: STUDENT IN AN ORGANIZED HEALTH CARE EDUCATION/TRAINING PROGRAM

## 2023-11-22 PROCEDURE — 71046 X-RAY EXAM CHEST 2 VIEWS: CPT | Mod: TC,FY,PO

## 2023-11-22 PROCEDURE — 3075F PR MOST RECENT SYSTOLIC BLOOD PRESS GE 130-139MM HG: ICD-10-PCS | Mod: CPTII,S$GLB,, | Performed by: STUDENT IN AN ORGANIZED HEALTH CARE EDUCATION/TRAINING PROGRAM

## 2023-11-22 PROCEDURE — 71046 XR CHEST PA AND LATERAL: ICD-10-PCS | Mod: 26,,, | Performed by: RADIOLOGY

## 2023-11-22 PROCEDURE — 99214 PR OFFICE/OUTPT VISIT, EST, LEVL IV, 30-39 MIN: ICD-10-PCS | Mod: S$GLB,,, | Performed by: STUDENT IN AN ORGANIZED HEALTH CARE EDUCATION/TRAINING PROGRAM

## 2023-11-22 PROCEDURE — 3079F PR MOST RECENT DIASTOLIC BLOOD PRESSURE 80-89 MM HG: ICD-10-PCS | Mod: CPTII,S$GLB,, | Performed by: STUDENT IN AN ORGANIZED HEALTH CARE EDUCATION/TRAINING PROGRAM

## 2023-11-22 PROCEDURE — 3044F PR MOST RECENT HEMOGLOBIN A1C LEVEL <7.0%: ICD-10-PCS | Mod: CPTII,S$GLB,, | Performed by: STUDENT IN AN ORGANIZED HEALTH CARE EDUCATION/TRAINING PROGRAM

## 2023-11-22 PROCEDURE — 3044F HG A1C LEVEL LT 7.0%: CPT | Mod: CPTII,S$GLB,, | Performed by: STUDENT IN AN ORGANIZED HEALTH CARE EDUCATION/TRAINING PROGRAM

## 2023-11-22 PROCEDURE — 4010F PR ACE/ARB THEARPY RXD/TAKEN: ICD-10-PCS | Mod: CPTII,S$GLB,, | Performed by: STUDENT IN AN ORGANIZED HEALTH CARE EDUCATION/TRAINING PROGRAM

## 2023-11-22 PROCEDURE — 99999 PR PBB SHADOW E&M-EST. PATIENT-LVL V: CPT | Mod: PBBFAC,,, | Performed by: STUDENT IN AN ORGANIZED HEALTH CARE EDUCATION/TRAINING PROGRAM

## 2023-11-22 PROCEDURE — 1159F PR MEDICATION LIST DOCUMENTED IN MEDICAL RECORD: ICD-10-PCS | Mod: CPTII,S$GLB,, | Performed by: STUDENT IN AN ORGANIZED HEALTH CARE EDUCATION/TRAINING PROGRAM

## 2023-11-22 PROCEDURE — 99999 PR PBB SHADOW E&M-EST. PATIENT-LVL V: ICD-10-PCS | Mod: PBBFAC,,, | Performed by: STUDENT IN AN ORGANIZED HEALTH CARE EDUCATION/TRAINING PROGRAM

## 2023-11-22 PROCEDURE — 3008F PR BODY MASS INDEX (BMI) DOCUMENTED: ICD-10-PCS | Mod: CPTII,S$GLB,, | Performed by: STUDENT IN AN ORGANIZED HEALTH CARE EDUCATION/TRAINING PROGRAM

## 2023-11-22 RX ORDER — CHLORTHALIDONE 25 MG/1
25 TABLET ORAL DAILY
Qty: 90 TABLET | Refills: 3 | Status: SHIPPED | OUTPATIENT
Start: 2023-11-22 | End: 2024-11-21

## 2023-11-22 RX ORDER — FERROUS SULFATE 325(65) MG
325 TABLET, DELAYED RELEASE (ENTERIC COATED) ORAL DAILY
Qty: 90 TABLET | Refills: 3 | Status: SHIPPED | OUTPATIENT
Start: 2023-11-22

## 2023-11-22 RX ORDER — HYDRALAZINE HYDROCHLORIDE 50 MG/1
50 TABLET, FILM COATED ORAL EVERY 12 HOURS
Qty: 180 TABLET | Refills: 3 | Status: SHIPPED | OUTPATIENT
Start: 2023-11-22 | End: 2024-11-21

## 2023-11-22 RX ORDER — SPIRONOLACTONE 25 MG/1
25 TABLET ORAL DAILY
Qty: 90 TABLET | Refills: 3 | Status: SHIPPED | OUTPATIENT
Start: 2023-11-22

## 2023-11-22 RX ORDER — ALBUTEROL SULFATE 90 UG/1
AEROSOL, METERED RESPIRATORY (INHALATION)
Qty: 8.5 G | Refills: 11 | Status: SHIPPED | OUTPATIENT
Start: 2023-11-22

## 2023-11-22 RX ORDER — MONTELUKAST SODIUM 10 MG/1
10 TABLET ORAL NIGHTLY
Qty: 90 TABLET | Refills: 3 | Status: SHIPPED | OUTPATIENT
Start: 2023-11-22

## 2023-11-22 RX ORDER — ASPIRIN 81 MG/1
81 TABLET ORAL DAILY
Qty: 90 TABLET | Refills: 3 | Status: SHIPPED | OUTPATIENT
Start: 2023-11-22 | End: 2024-11-21

## 2023-11-22 RX ORDER — FLUTICASONE PROPIONATE 50 MCG
2 SPRAY, SUSPENSION (ML) NASAL DAILY
Qty: 16 G | Refills: 11 | Status: SHIPPED | OUTPATIENT
Start: 2023-11-22

## 2023-11-22 RX ORDER — AMLODIPINE BESYLATE 10 MG/1
10 TABLET ORAL DAILY
Qty: 90 TABLET | Refills: 3 | Status: SHIPPED | OUTPATIENT
Start: 2023-11-22 | End: 2024-11-16

## 2023-11-22 RX ORDER — ATORVASTATIN CALCIUM 80 MG/1
80 TABLET, FILM COATED ORAL NIGHTLY
Qty: 90 TABLET | Refills: 3 | Status: SHIPPED | OUTPATIENT
Start: 2023-11-22 | End: 2024-11-16

## 2023-11-22 NOTE — PROGRESS NOTES
[facility] Note    Subjective:    The HPI and pertinent ROS is included in the Diagnostic Impression Remarks section at the end of the note. Please see below for further details. Chief complaint is at end of note.     Faustina is a pleasant intelligent patient who is here for evaluation.     Modified Medications    No medications on file       Data reviewed 274}  Previous medical records reviewed and summarized in plan section at end of note.      If you are due for any health screening(s) below please notify me so we can arrange them to be ordered and scheduled. Most healthy patients at your age complete them, but you are free to accept or refuse. If you can't do it, I'll definitely understand. If you can, I'd certainly appreciate it!     Tests to Keep You Healthy    Mammogram: Met on 12/13/2022  Colon Cancer Screening: ORDERED  Last Blood Pressure <= 139/89 (11/22/2023): Yes      The following portions of the patient's history were reviewed and updated as appropriate: allergies, current medications, past family history, past medical history, past social history, past surgical history and problem list.    She  has a past medical history of Anxiety, Arthritis, Asthma, CHF (congestive heart failure), COPD (chronic obstructive pulmonary disease), Hyperlipemia, Hypertension, Leaky heart valve, Obese, Obese, Obstructive sleep apnea, Pneumonia, Rheumatoid arthritis(714.0), and Stroke.  She  has a past surgical history that includes Hysterectomy; Cholecystectomy; Sleeve Gastroplasty (02/21/2017); and Carotid stent.    She  reports that she has never smoked. She has never been exposed to tobacco smoke. She has never used smokeless tobacco. She reports current alcohol use. She reports that she does not use drugs.  She family history includes Arthritis in her mother; Asthma in her son; Breast cancer in her mother; Cancer in her mother; Diabetes in her mother; Heart disease in her father; Hyperlipidemia in her  "mother; Hypertension in her father, mother, sister, and sister; Stroke in her mother.    Review of patient's allergies indicates:  No Known Allergies    Tobacco Use: Low Risk  (11/22/2023)    Patient History     Smoking Tobacco Use: Never     Smokeless Tobacco Use: Never     Passive Exposure: Never     Physical Examination  General appearance: alert, cooperative, no distress  Neck: no thyromegaly, no neck stiffness  Lungs: clear to auscultation, no wheezes, rales or rhonchi, symmetric air entry  Heart: normal rate, regular rhythm, normal S1, S2, no murmurs, rubs, clicks or gallops  Abdomen: soft, nontender, nondistended, no rigidity, rebound, or guarding.   Back: no point tenderness over spine  Extremities: peripheral pulses normal, no unilateral leg swelling or calf tenderness   Neurological:alert, oriented, normal speech, no new focal findings or movement disorder noted from baseline    BP Readings from Last 3 Encounters:   11/22/23 136/86   07/21/23 130/72   07/14/23 (!) 144/94     Wt Readings from Last 3 Encounters:   11/22/23 103 kg (227 lb 1.2 oz)   07/21/23 100.7 kg (222 lb 0.1 oz)   07/13/23 96.8 kg (213 lb 8 oz)     /86 (BP Location: Right arm, Patient Position: Sitting, BP Method: Large (Manual))   Pulse 60   Temp 98.4 °F (36.9 °C) (Oral)   Resp 16   Ht 5' 2" (1.575 m)   Wt 103 kg (227 lb 1.2 oz)   LMP  (LMP Unknown) Comment: Does not menstruate   SpO2 99%   BMI 41.53 kg/m²    274}  Laboratory: I have reviewed old labs below:    274}    Lab Results   Component Value Date    WBC 5.61 08/22/2023    HGB 11.7 (L) 08/22/2023    HCT 38.3 08/22/2023    MCV 96 08/22/2023     08/22/2023     08/22/2023    K 4.5 08/29/2023     (H) 08/22/2023    CALCIUM 9.8 08/22/2023    PHOS 3.6 07/13/2023    CO2 19 (L) 08/22/2023    GLU 76 08/22/2023    BUN 38 (H) 08/22/2023    CREATININE 2.5 (H) 08/22/2023    EGFRNORACEVR 21.6 (A) 08/22/2023    ANIONGAP 9 08/22/2023    PROT 7.5 08/22/2023    ALBUMIN " "3.4 (L) 08/22/2023    BILITOT 0.3 08/22/2023    ALKPHOS 73 08/22/2023    ALT 8 (L) 08/22/2023    AST 11 08/22/2023    INR 0.9 07/13/2023    CHOL 192 07/12/2023    TRIG 99 07/12/2023    HDL 48 07/12/2023    LDLCALC 124.2 07/12/2023    TSH 2.110 07/12/2023    HGBA1C 5.1 07/13/2023      Lab reviewed by me: Particular labs of significance that I will monitor, workup, or treat to improve are mentioned below in diagnostic impression remarks.    Imaging/EKG: I have reviewed the pertinent results and my findings are noted in remarks.  274}    CC:   Chief Complaint   Patient presents with    Follow-up    Cough    Hypertension        274}    Assessment/Plan  Faustina Rae is a 58 y.o. female who presents to clinic with:  1. COPD with asthma    2. Accelerated hypertension    3. History of CVA (cerebrovascular accident)    4. Hyperlipidemia, unspecified hyperlipidemia type    5. Impaired intestinal absorption    6. Status post gastroplasty    7. Acute seasonal allergic rhinitis due to pollen    8. Essential hypertension       274}  Diagnostic Impression Remarks + HPI     Documentation entered by me for this encounter may have been done in part using speech-recognition technology. Although I have made an effort to ensure accuracy, "sound like" errors may exist and should be interpreted in context.     Health maintenance-recommend colon cancer screening will have her return the Cologuard kit   Hx of CVA-recommend continue statin control blood pressure new antiplatelets recheck lipid panels in the future       COPD-stable continue current inhalers cough will get an x-ray no fever chills or productive cough no travel will set up with pulmonology  Hypertension stable continue current meds monitor no headache or chest pain f/u blood work   CKD-will recheck blood work continue current meds bicarb was low if remains less than 22 consider oral bicarb replacement will monitor follow-up with Nephrology        This is the extent of " this pleasant patient's concerns at this present time. She did not feel chest pain upon exertion, dyspnea, nausea, vomiting, diaphoresis, or syncope. No pleuritic chest pain, unilateral leg swelling, calf tenderness, or calf pain. Negative for unintentional weight loss night sweats, hematuria, and fevers. Faustina will return to clinic in a few months for further workup and reassessment or sooner as needed. She was instructed to call the clinic or go to the emergency department or urgent care immediately if her symptoms do not improve, worsens, or if any new symptoms develop. As we discussed that symptoms could worsen over the next 24 hours she was advised that if any increased swelling, pain, or numbness arise to go immediately to the ED. Patient knows to call any time if an emergency arises. Shared decision making occurred and she verbalized understanding in agreement with this plan. I discussed imaging findings, diagnosis, possibilities, treatment options, medications, risks, and benefits. She had many questions regarding the options and long-term effects. All questions were answered. She expressed understanding after counseling regarding the diagnosis and recommendations. She was capable and demonstrated competence with understanding of these options. Shared decision making was performed resulting in her choosing the current treatment plan. Patient handout was given with instructions and recommendations. Advised the patient that if they become pregnant to alert us immediately to assess for medication changes. Having a healthy weight can decrease the risk of 13 cancers and is an important goal. I also discussed the importance of close follow up to discuss labs, change or modify her medications if needed, monitor side effects, and further evaluation of medical problems.   Discussed chronic PPI use, and increased risk of fractures, pneumonia, Clostridium difficile diarrhea, hypomagnesemia, vitamin B12 deficiency, and  chronic kidney disease. While PPIs do carry risk of these complications, I feel that because of the severe daily discomfort from her reflux, that the benefits exceed the risk in this patient. After understanding of the risks and shared decision making, the patient decided to pursue taking a PPI to treat her reflux.     Additional workup planned: see labs ordered below.    See below for labs and meds ordered with associated diagnosis      1. COPD with asthma    2. Accelerated hypertension    3. History of CVA (cerebrovascular accident)    4. Hyperlipidemia, unspecified hyperlipidemia type    5. Impaired intestinal absorption    6. Status post gastroplasty    7. Acute seasonal allergic rhinitis due to pollen    8. Essential hypertension      Zoran Cuevas MD   274}    If you are due for any health screening(s) below please notify me so we can arrange them to be ordered and scheduled. Most healthy patients at your age complete them, but you are free to accept or refuse.     If you can't do it, I'll definitely understand. If you can, I'd certainly appreciate it!   Tests to Keep You Healthy    Mammogram: Met on 12/13/2022  Colon Cancer Screening: ORDERED  Last Blood Pressure <= 139/89 (11/22/2023): Yes

## 2023-11-28 ENCOUNTER — TELEPHONE (OUTPATIENT)
Dept: FAMILY MEDICINE | Facility: CLINIC | Age: 58
End: 2023-11-28
Payer: MEDICARE

## 2023-11-28 NOTE — TELEPHONE ENCOUNTER
"-- Message is from the [facility]--    Reason for Call: Patient is calling in regards to a port he is to have inserted before his appointment with Dr. Chelita Hassan  On Wednesday, 1/9/19,  Please call patient asap/.    Caller Information       Type Contact Phone    12/08/2018 10:34 AM Phone (Incoming) Nancy Romero (Self) [phone] (H)    01/03/2019 02:23 PM Phone (Incoming) Gonzalowang Epps (Self) [phone] (H)          Alternative phone number: n/a      Turnaround time given to caller: ""This message will be sent to [facility] Provider's name]. The clinical team will fulfill your request as soon as they review your message. \""    " ----- Message from Tracy Johnson sent at 11/28/2023 10:51 AM CST -----  Contact: pt  Type: Needs Medical Advice  Who Called:  pt  Best Call Back Number: [phone]    Additional Information: Pt is calling the office trying to reschedule injection.Please call back and advise.

## 2023-11-29 ENCOUNTER — TELEPHONE (OUTPATIENT)
Dept: PULMONOLOGY | Facility: CLINIC | Age: 58
End: 2023-11-29
Payer: MEDICARE

## 2023-11-29 NOTE — TELEPHONE ENCOUNTER
"Tried calling pt - recording "number not taking calls at this time"   dr out in am  checking to see if pt can come at 1 pm      Lmv mandeep, Carlita's number  "

## 2023-11-30 ENCOUNTER — PATIENT MESSAGE (OUTPATIENT)
Dept: FAMILY MEDICINE | Facility: CLINIC | Age: 58
End: 2023-11-30
Payer: MEDICARE

## 2023-11-30 DIAGNOSIS — A04.8 H. PYLORI INFECTION: Primary | ICD-10-CM

## 2023-11-30 DIAGNOSIS — K29.70 GASTRITIS, PRESENCE OF BLEEDING UNSPECIFIED, UNSPECIFIED CHRONICITY, UNSPECIFIED GASTRITIS TYPE: ICD-10-CM

## 2023-11-30 DIAGNOSIS — R10.13 EPIGASTRIC PAIN: ICD-10-CM

## 2023-11-30 RX ORDER — BISMUTH SUBSALICYLATE 262 MG/1
2 TABLET ORAL
Qty: 112 TABLET | Refills: 0 | Status: SHIPPED | OUTPATIENT
Start: 2023-11-30 | End: 2023-12-14

## 2023-11-30 RX ORDER — METRONIDAZOLE 500 MG/1
500 TABLET ORAL 4 TIMES DAILY
Qty: 56 TABLET | Refills: 0 | Status: SHIPPED | OUTPATIENT
Start: 2023-11-30 | End: 2023-12-14

## 2023-11-30 RX ORDER — DOXYCYCLINE HYCLATE 100 MG
100 TABLET ORAL 2 TIMES DAILY
Qty: 28 TABLET | Refills: 0 | Status: SHIPPED | OUTPATIENT
Start: 2023-11-30 | End: 2023-12-14

## 2023-11-30 RX ORDER — PANTOPRAZOLE SODIUM 40 MG/1
40 TABLET, DELAYED RELEASE ORAL 2 TIMES DAILY
Qty: 28 TABLET | Refills: 0 | Status: SHIPPED | OUTPATIENT
Start: 2023-11-30 | End: 2024-02-06 | Stop reason: SDUPTHER

## 2023-12-01 ENCOUNTER — TELEPHONE (OUTPATIENT)
Dept: FAMILY MEDICINE | Facility: CLINIC | Age: 58
End: 2023-12-01
Payer: MEDICARE

## 2023-12-01 NOTE — TELEPHONE ENCOUNTER
----- Message from Josselyn Moralez sent at 12/1/2023  1:33 PM CST -----  Type:  Sooner Appointment Request    Caller is requesting a sooner appointment.  Caller declined first available appointment listed below.  Caller will not accept being placed on the waitlist and is requesting a message be sent to doctor.    Name of Caller:  pt   When is the first available appointment?  N/a   Symptoms:  injection   Would the patient rather a call back or a response via [facility]? Call   Best Call Back Number:  [phone] (home)     Additional Information:  please advise

## 2023-12-06 ENCOUNTER — TELEPHONE (OUTPATIENT)
Dept: PULMONOLOGY | Facility: CLINIC | Age: 58
End: 2023-12-06
Payer: MEDICARE

## 2023-12-06 ENCOUNTER — TELEPHONE (OUTPATIENT)
Dept: ADMINISTRATIVE | Facility: CLINIC | Age: 58
End: 2023-12-06
Payer: MEDICARE

## 2023-12-08 ENCOUNTER — CLINICAL SUPPORT (OUTPATIENT)
Dept: FAMILY MEDICINE | Facility: CLINIC | Age: 58
End: 2023-12-08
Payer: MEDICARE

## 2023-12-08 DIAGNOSIS — E53.8 B12 DEFICIENCY: Primary | ICD-10-CM

## 2023-12-08 PROCEDURE — 99499 NO LOS: ICD-10-PCS | Mod: S$GLB,,, | Performed by: STUDENT IN AN ORGANIZED HEALTH CARE EDUCATION/TRAINING PROGRAM

## 2023-12-08 PROCEDURE — 99499 UNLISTED E&M SERVICE: CPT | Mod: S$GLB,,, | Performed by: STUDENT IN AN ORGANIZED HEALTH CARE EDUCATION/TRAINING PROGRAM

## 2023-12-08 PROCEDURE — 96372 PR INJECTION,THERAP/PROPH/DIAG2ST, IM OR SUBCUT: ICD-10-PCS | Mod: S$GLB,,, | Performed by: STUDENT IN AN ORGANIZED HEALTH CARE EDUCATION/TRAINING PROGRAM

## 2023-12-08 PROCEDURE — 96372 THER/PROPH/DIAG INJ SC/IM: CPT | Mod: S$GLB,,, | Performed by: STUDENT IN AN ORGANIZED HEALTH CARE EDUCATION/TRAINING PROGRAM

## 2023-12-08 RX ADMIN — CYANOCOBALAMIN 1000 MCG: 1000 INJECTION, SOLUTION INTRAMUSCULAR; SUBCUTANEOUS at 11:12

## 2023-12-08 NOTE — PROGRESS NOTES
ID patient by name and date of birth.  Allergies confirmed.  B-12 given as per orders , using aseptic technique.  Patient tolerated well.  Information sheet reviewed and given to patient.

## 2023-12-26 ENCOUNTER — OFFICE VISIT (OUTPATIENT)
Dept: PULMONOLOGY | Facility: CLINIC | Age: 58
End: 2023-12-26
Payer: MEDICARE

## 2023-12-26 VITALS
HEART RATE: 68 BPM | HEIGHT: 62 IN | DIASTOLIC BLOOD PRESSURE: 86 MMHG | WEIGHT: 232.06 LBS | OXYGEN SATURATION: 98 % | BODY MASS INDEX: 42.7 KG/M2 | SYSTOLIC BLOOD PRESSURE: 152 MMHG

## 2023-12-26 DIAGNOSIS — M10.9 GOUT, UNSPECIFIED CAUSE, UNSPECIFIED CHRONICITY, UNSPECIFIED SITE: ICD-10-CM

## 2023-12-26 DIAGNOSIS — J45.50 SEVERE PERSISTENT ASTHMA, UNCOMPLICATED: Primary | ICD-10-CM

## 2023-12-26 DIAGNOSIS — E04.1 THYROID NODULE: ICD-10-CM

## 2023-12-26 DIAGNOSIS — J45.991 COUGH VARIANT ASTHMA: ICD-10-CM

## 2023-12-26 DIAGNOSIS — J44.89 COPD WITH ASTHMA: ICD-10-CM

## 2023-12-26 PROCEDURE — 3079F DIAST BP 80-89 MM HG: CPT | Mod: CPTII,S$GLB,, | Performed by: INTERNAL MEDICINE

## 2023-12-26 PROCEDURE — 3008F BODY MASS INDEX DOCD: CPT | Mod: CPTII,S$GLB,, | Performed by: INTERNAL MEDICINE

## 2023-12-26 PROCEDURE — 99999 PR PBB SHADOW E&M-EST. PATIENT-LVL V: CPT | Mod: PBBFAC,,, | Performed by: INTERNAL MEDICINE

## 2023-12-26 PROCEDURE — 3077F SYST BP >= 140 MM HG: CPT | Mod: CPTII,S$GLB,, | Performed by: INTERNAL MEDICINE

## 2023-12-26 PROCEDURE — 4010F ACE/ARB THERAPY RXD/TAKEN: CPT | Mod: CPTII,S$GLB,, | Performed by: INTERNAL MEDICINE

## 2023-12-26 PROCEDURE — 3044F PR MOST RECENT HEMOGLOBIN A1C LEVEL <7.0%: ICD-10-PCS | Mod: CPTII,S$GLB,, | Performed by: INTERNAL MEDICINE

## 2023-12-26 PROCEDURE — 1159F PR MEDICATION LIST DOCUMENTED IN MEDICAL RECORD: ICD-10-PCS | Mod: CPTII,S$GLB,, | Performed by: INTERNAL MEDICINE

## 2023-12-26 PROCEDURE — 99999 PR PBB SHADOW E&M-EST. PATIENT-LVL V: ICD-10-PCS | Mod: PBBFAC,,, | Performed by: INTERNAL MEDICINE

## 2023-12-26 PROCEDURE — 3077F PR MOST RECENT SYSTOLIC BLOOD PRESSURE >= 140 MM HG: ICD-10-PCS | Mod: CPTII,S$GLB,, | Performed by: INTERNAL MEDICINE

## 2023-12-26 PROCEDURE — 99204 OFFICE O/P NEW MOD 45 MIN: CPT | Mod: S$GLB,,, | Performed by: INTERNAL MEDICINE

## 2023-12-26 PROCEDURE — 3044F HG A1C LEVEL LT 7.0%: CPT | Mod: CPTII,S$GLB,, | Performed by: INTERNAL MEDICINE

## 2023-12-26 PROCEDURE — 3079F PR MOST RECENT DIASTOLIC BLOOD PRESSURE 80-89 MM HG: ICD-10-PCS | Mod: CPTII,S$GLB,, | Performed by: INTERNAL MEDICINE

## 2023-12-26 PROCEDURE — 3008F PR BODY MASS INDEX (BMI) DOCUMENTED: ICD-10-PCS | Mod: CPTII,S$GLB,, | Performed by: INTERNAL MEDICINE

## 2023-12-26 PROCEDURE — 1159F MED LIST DOCD IN RCRD: CPT | Mod: CPTII,S$GLB,, | Performed by: INTERNAL MEDICINE

## 2023-12-26 PROCEDURE — 4010F PR ACE/ARB THEARPY RXD/TAKEN: ICD-10-PCS | Mod: CPTII,S$GLB,, | Performed by: INTERNAL MEDICINE

## 2023-12-26 PROCEDURE — 99204 PR OFFICE/OUTPT VISIT, NEW, LEVL IV, 45-59 MIN: ICD-10-PCS | Mod: S$GLB,,, | Performed by: INTERNAL MEDICINE

## 2023-12-26 RX ORDER — COLCHICINE 0.6 MG/1
0.6 TABLET ORAL
Qty: 10 TABLET | Refills: 3 | Status: SHIPPED | OUTPATIENT
Start: 2023-12-26

## 2023-12-26 RX ORDER — FLUTICASONE FUROATE, UMECLIDINIUM BROMIDE AND VILANTEROL TRIFENATATE 200; 62.5; 25 UG/1; UG/1; UG/1
1 POWDER RESPIRATORY (INHALATION) DAILY
Qty: 60 EACH | Refills: 11 | Status: SHIPPED | OUTPATIENT
Start: 2023-12-26

## 2023-12-26 RX ORDER — PREDNISONE 20 MG/1
TABLET ORAL
Qty: 36 TABLET | Refills: 1 | Status: SHIPPED | OUTPATIENT
Start: 2023-12-26 | End: 2024-03-26 | Stop reason: SDUPTHER

## 2023-12-26 RX ORDER — TRAZODONE HYDROCHLORIDE 50 MG/1
50 TABLET ORAL NIGHTLY PRN
COMMUNITY
Start: 2023-12-04

## 2023-12-26 RX ORDER — PATIROMER 8.4 G/1
POWDER, FOR SUSPENSION ORAL
COMMUNITY
Start: 2023-11-23

## 2023-12-26 NOTE — PATIENT INSTRUCTIONS
Breo daily is good controller, trelegy may be better (has breo and another medication)-- continue daily    Use albuterol as needed-- 2- 4 puffs up to every 4 hrs as needed.    Would recommend  singulair -- effective for asthma 8/10- not all patients benefit.  Stop if no help.      Action plan-- prednisone 1 daily for 3 days maybe enough-- try now, repeat if flares but use lowest dose able and get off quick.  Breo/Trelegy has prednisone to keep clear...      May consider asthma shots???  May need breathing test..      Ct abd 2022 viewed and good lower lungs.  Chest xray In past good.      Thyroid nodule right suspected-- defer to Dr Cuevas-- we discuss and offer ultradound...

## 2023-12-26 NOTE — PROGRESS NOTES
2023    Faustina Rae  New Patient Consult    Chief Complaint   Patient presents with    Cough       HPI: 2023 pt had asthma as kid, outgrew and asthma returned.  Pt had stroke with left side paresis-- 3yrs -- good recovery. Pt worked .  Pt has loss  10 yrs ago -- he  massive mi.    Pt has household chores -- slow but able to do--- no limits.      Uses albuterol 2 times daily. No cough from albuterol.      Sleeps well -- no apnea in past-- had bariatric sleeve in past.       Pt cough -- violent with no fx, nor syncope nor incontinence but will have severe abd pains.  No recent admits nor steroids.    Cough induced by cold air, grass mowing...               PFSH:  Past Medical History:   Diagnosis Date    Anxiety     Arthritis     Asthma     CHF (congestive heart failure)     COPD (chronic obstructive pulmonary disease)     Hyperlipemia     Hypertension     Leaky heart valve     Obese     Obese     Obstructive sleep apnea     lost her CPAP    Pneumonia     Rheumatoid arthritis(714.0)     Stroke          Past Surgical History:   Procedure Laterality Date    CAROTID STENT      3 years ago    CHOLECYSTECTOMY      HYSTERECTOMY      SLEEVE GASTROPLASTY  2017     Social History     Tobacco Use    Smoking status: Never     Passive exposure: Never    Smokeless tobacco: Never   Substance Use Topics    Alcohol use: Yes     Comment: rare, about once a month    Drug use: No     Family History   Problem Relation Age of Onset    Arthritis Mother     Cancer Mother     Diabetes Mother     Hyperlipidemia Mother     Hypertension Mother     Stroke Mother     Breast cancer Mother     Heart disease Father     Hypertension Father     Asthma Son     Hypertension Sister     Hypertension Sister     Kidney disease Neg Hx      Review of patient's allergies indicates:  No Known Allergies       Review of Systems:  a review of eleven systems covering constitutional, Eye, HEENT, Psych, Respiratory, Cardiac,  "GI, , Musculoskeletal, Endocrine, Dermatologic was negative except for pertinent findings as listed ABOVE and below:  pertinent positives as above, rest good        Exam:Comprehensive exam done. BP (!) 152/86 (BP Location: Right arm, Patient Position: Sitting, BP Method: Small (Automatic))   Pulse 68   Ht 5' 2" (1.575 m)   Wt 105.3 kg (232 lb 0.6 oz)   LMP  (LMP Unknown) Comment: Does not menstruate   SpO2 98% Comment: on room air at rest  BMI 42.44 kg/m²   Exam included Vitals as listed, and patient's appearance and affect and alertness and mood, oral exam for yeast and hygiene and pharynx lesions and Mallapatti (M) score, neck with inspection for jvd and masses and thyroid abnormalities and lymph nodes (supraclavicular and infraclavicular nodes and axillary also examined and noted if abn), chest exam included symmetry and effort and fremitus and percussion and auscultation, cardiac exam included rhythm and gallops and murmur and rubs and jvd and edema, abdominal exam for mass and hepatosplenomegaly and tenderness and hernias and bowel sounds, Musculoskeletal exam with muscle tone and posture and mobility/gait and  strength, and skin for rashes and cyanosis and pallor and turgor, extremity for clubbing.  Findings were normal except for pertinent findings listed below:  M4, chest is symmetric, no distress, normal percussion, normal fremitus and good normal breath sounds  No edema    Right thyroid nodule suspected.         Radiographs (ct chest and cxr) reviewed: view by direct vision      Labs reviewed           PFT will be done and results to be reviewed       Plan:  Clinical impression is apparently straight forward and impression with management as below.     Faustina was seen today for cough.    Diagnoses and all orders for this visit:    Severe persistent asthma, uncomplicated  -     predniSONE (DELTASONE) 20 MG tablet; 3 for 3 days then 2 for 3 days then one for 3 days and repeat for breathing " problems  -     colchicine (COLCRYS) 0.6 mg tablet; Take 1 tablet (0.6 mg total) by mouth every 72 hours. Take at onset of gout and take for 10 days  -     fluticasone-umeclidin-vilanter (TRELEGY ELLIPTA) 200-62.5-25 mcg inhaler; Inhale 1 puff into the lungs once daily.    Cough variant asthma  -     predniSONE (DELTASONE) 20 MG tablet; 3 for 3 days then 2 for 3 days then one for 3 days and repeat for breathing problems  -     fluticasone-umeclidin-vilanter (TRELEGY ELLIPTA) 200-62.5-25 mcg inhaler; Inhale 1 puff into the lungs once daily.    COPD with asthma  -     Ambulatory referral/consult to Pulmonology    Gout, unspecified cause, unspecified chronicity, unspecified site  -     colchicine (COLCRYS) 0.6 mg tablet; Take 1 tablet (0.6 mg total) by mouth every 72 hours. Take at onset of gout and take for 10 days    Thyroid nodule        Follow up in about 3 months (around 3/26/2024), or if symptoms worsen or fail to improve.    Discussed with patient above for education the following:      Patient Instructions   Breo daily is good controller, trelegy may be better (has breo and another medication)-- continue daily    Use albuterol as needed-- 2- 4 puffs up to every 4 hrs as needed.    Would recommend  singulair -- effective for asthma 8/10- not all patients benefit.  Stop if no help.      Action plan-- prednisone 1 daily for 3 days maybe enough-- try now, repeat if flares but use lowest dose able and get off quick.  Breo/Trelegy has prednisone to keep clear...      May consider asthma shots???  May need breathing test..      Ct abd 2022 viewed and good lower lungs.  Chest xray In past good.      Thyroid nodule right suspected-- defer to Dr Cuevas-- we discuss and offer ultradound...

## 2023-12-28 ENCOUNTER — PATIENT MESSAGE (OUTPATIENT)
Dept: FAMILY MEDICINE | Facility: CLINIC | Age: 58
End: 2023-12-28
Payer: MEDICARE

## 2024-01-12 ENCOUNTER — TELEPHONE (OUTPATIENT)
Dept: FAMILY MEDICINE | Facility: CLINIC | Age: 59
End: 2024-01-12
Payer: MEDICARE

## 2024-01-12 NOTE — TELEPHONE ENCOUNTER
----- Message from Lizzette Jackson sent at 1/12/2024 10:19 AM CST -----  Contact: self  Patient missed her nurse inj  appt today and would like to come in later today so please call back at [phone] and thanks

## 2024-01-19 ENCOUNTER — HOSPITAL ENCOUNTER (OUTPATIENT)
Dept: RADIOLOGY | Facility: HOSPITAL | Age: 59
Discharge: HOME | End: 2024-01-19
Attending: STUDENT IN AN ORGANIZED HEALTH CARE EDUCATION/TRAINING PROGRAM
Payer: MEDICARE

## 2024-01-19 DIAGNOSIS — Z12.31 ENCOUNTER FOR SCREENING MAMMOGRAM FOR MALIGNANT NEOPLASM OF BREAST: ICD-10-CM

## 2024-01-19 PROCEDURE — 77067 SCR MAMMO BI INCL CAD: CPT | Mod: TC

## 2024-01-19 PROCEDURE — 77067 SCR MAMMO BI INCL CAD: CPT | Mod: 26,,, | Performed by: RADIOLOGY

## 2024-01-19 PROCEDURE — 77063 BREAST TOMOSYNTHESIS BI: CPT | Mod: 26,,, | Performed by: RADIOLOGY

## 2024-01-22 ENCOUNTER — CLINICAL SUPPORT (OUTPATIENT)
Dept: FAMILY MEDICINE | Facility: CLINIC | Age: 59
End: 2024-01-22
Payer: MEDICARE

## 2024-01-22 DIAGNOSIS — E53.8 B12 DEFICIENCY: Primary | ICD-10-CM

## 2024-01-22 PROCEDURE — 96372 THER/PROPH/DIAG INJ SC/IM: CPT | Mod: S$GLB,,, | Performed by: STUDENT IN AN ORGANIZED HEALTH CARE EDUCATION/TRAINING PROGRAM

## 2024-01-22 PROCEDURE — 99499 UNLISTED E&M SERVICE: CPT | Mod: S$GLB,,, | Performed by: STUDENT IN AN ORGANIZED HEALTH CARE EDUCATION/TRAINING PROGRAM

## 2024-01-22 RX ADMIN — CYANOCOBALAMIN 1000 MCG: 1000 INJECTION, SOLUTION INTRAMUSCULAR; SUBCUTANEOUS at 11:01

## 2024-01-22 NOTE — PROGRESS NOTES
ID patient by name and date of birth.  Allergies confirmed.  B 12 1,000 mcg given as per orders , using aseptic technique.  Patient tolerated well.

## 2024-02-06 ENCOUNTER — OFFICE VISIT (OUTPATIENT)
Dept: FAMILY MEDICINE | Facility: CLINIC | Age: 59
End: 2024-02-06
Payer: MEDICARE

## 2024-02-06 VITALS
WEIGHT: 233 LBS | BODY MASS INDEX: 42.88 KG/M2 | OXYGEN SATURATION: 98 % | TEMPERATURE: 98 F | HEART RATE: 76 BPM | HEIGHT: 62 IN | SYSTOLIC BLOOD PRESSURE: 162 MMHG | DIASTOLIC BLOOD PRESSURE: 88 MMHG

## 2024-02-06 DIAGNOSIS — A04.8 H. PYLORI INFECTION: ICD-10-CM

## 2024-02-06 DIAGNOSIS — K29.70 HELICOBACTER PYLORI GASTRITIS: ICD-10-CM

## 2024-02-06 DIAGNOSIS — R10.13 EPIGASTRIC PAIN: ICD-10-CM

## 2024-02-06 DIAGNOSIS — B96.81 HELICOBACTER PYLORI GASTRITIS: Primary | ICD-10-CM

## 2024-02-06 DIAGNOSIS — K29.70 GASTRITIS, PRESENCE OF BLEEDING UNSPECIFIED, UNSPECIFIED CHRONICITY, UNSPECIFIED GASTRITIS TYPE: ICD-10-CM

## 2024-02-06 DIAGNOSIS — B96.81 HELICOBACTER PYLORI GASTRITIS: ICD-10-CM

## 2024-02-06 DIAGNOSIS — K29.70 HELICOBACTER PYLORI GASTRITIS: Primary | ICD-10-CM

## 2024-02-06 PROCEDURE — 3079F DIAST BP 80-89 MM HG: CPT | Mod: CPTII,S$GLB,, | Performed by: FAMILY MEDICINE

## 2024-02-06 PROCEDURE — 3077F SYST BP >= 140 MM HG: CPT | Mod: CPTII,S$GLB,, | Performed by: FAMILY MEDICINE

## 2024-02-06 PROCEDURE — 1159F MED LIST DOCD IN RCRD: CPT | Mod: CPTII,S$GLB,, | Performed by: FAMILY MEDICINE

## 2024-02-06 PROCEDURE — 1160F RVW MEDS BY RX/DR IN RCRD: CPT | Mod: CPTII,S$GLB,, | Performed by: FAMILY MEDICINE

## 2024-02-06 PROCEDURE — 99999 PR PBB SHADOW E&M-EST. PATIENT-LVL V: CPT | Mod: PBBFAC,,, | Performed by: FAMILY MEDICINE

## 2024-02-06 PROCEDURE — 99213 OFFICE O/P EST LOW 20 MIN: CPT | Mod: S$GLB,,, | Performed by: FAMILY MEDICINE

## 2024-02-06 PROCEDURE — 3008F BODY MASS INDEX DOCD: CPT | Mod: CPTII,S$GLB,, | Performed by: FAMILY MEDICINE

## 2024-02-06 RX ORDER — PANTOPRAZOLE SODIUM 40 MG/1
40 TABLET, DELAYED RELEASE ORAL 2 TIMES DAILY
Qty: 60 TABLET | Refills: 0 | Status: SHIPPED | OUTPATIENT
Start: 2024-02-06

## 2024-02-06 RX ORDER — PANTOPRAZOLE SODIUM 40 MG/1
40 TABLET, DELAYED RELEASE ORAL DAILY
Qty: 30 TABLET | Refills: 5 | Status: SHIPPED | OUTPATIENT
Start: 2024-02-29

## 2024-02-06 RX ORDER — METRONIDAZOLE 500 MG/1
500 TABLET ORAL 3 TIMES DAILY
Qty: 42 TABLET | Refills: 0 | Status: SHIPPED | OUTPATIENT
Start: 2024-02-06

## 2024-02-06 RX ORDER — TETRACYCLINE HYDROCHLORIDE 500 MG/1
500 CAPSULE ORAL 4 TIMES DAILY
Qty: 56 CAPSULE | Refills: 0 | Status: SHIPPED | OUTPATIENT
Start: 2024-02-06

## 2024-02-06 RX ORDER — PANTOPRAZOLE SODIUM 40 MG/1
40 TABLET, DELAYED RELEASE ORAL 2 TIMES DAILY
Qty: 180 TABLET | OUTPATIENT
Start: 2024-02-06

## 2024-02-06 NOTE — Clinical Note
Advise pt that Tetracycline, the recommended antibiotic was REFUSED by her insurance, I sent Rx just now for alternative of Clarithromycin - twice a day x 14 days

## 2024-02-06 NOTE — PROGRESS NOTES
Subjective:       Patient ID: Faustina Rae is a 59 y.o. female.    Chief Complaint: No chief complaint on file.    Patient here for UC visit.  Flare of epigastric pain  past 2 weeks; burns; early satiety.  Pt reports not getting/taking abx at end 11/23 for H Pylori - she doesn't monitor myChart messages.No bloody or melanotic stool.      Review of Systems   Constitutional:  Negative for fever.   Respiratory:  Negative for shortness of breath.    Cardiovascular:  Negative for chest pain.   Gastrointestinal:  Positive for abdominal pain. Negative for blood in stool and nausea.   Skin:  Negative for rash.   All other systems reviewed and are negative.      Objective:      Physical Exam  Vitals reviewed.   Constitutional:       General: She is not in acute distress.     Appearance: She is well-developed. She is obese. She is not diaphoretic.   Cardiovascular:      Rate and Rhythm: Normal rate and regular rhythm.      Heart sounds: No murmur heard.  Pulmonary:      Effort: Pulmonary effort is normal.      Breath sounds: Normal breath sounds.   Abdominal:      General: Bowel sounds are normal.      Palpations: Abdomen is soft.      Tenderness: There is abdominal tenderness in the epigastric area.   Neurological:      Mental Status: She is alert.         Assessment:       1. Helicobacter pylori gastritis    2. H. pylori infection    3. Epigastric pain    4. Gastritis, presence of bleeding unspecified, unspecified chronicity, unspecified gastritis type        Plan:       Helicobacter pylori gastritis  -     tetracycline (ACHROMYCIN,SUMYCIN) 500 MG capsule; Take 1 capsule (500 mg total) by mouth 4 (four) times daily.  Dispense: 56 capsule; Refill: 0  -     metroNIDAZOLE (FLAGYL) 500 MG tablet; Take 1 tablet (500 mg total) by mouth 3 (three) times daily.  Dispense: 42 tablet; Refill: 0  -     pantoprazole (PROTONIX) 40 MG tablet; Take 1 tablet (40 mg total) by mouth 2 (two) times daily.  Dispense: 60 tablet; Refill: 0  -      pantoprazole (PROTONIX) 40 MG tablet; Take 1 tablet (40 mg total) by mouth once daily.  Dispense: 30 tablet; Refill: 5    H. pylori infection  -     pantoprazole (PROTONIX) 40 MG tablet; Take 1 tablet (40 mg total) by mouth 2 (two) times daily.  Dispense: 60 tablet; Refill: 0  -     pantoprazole (PROTONIX) 40 MG tablet; Take 1 tablet (40 mg total) by mouth once daily.  Dispense: 30 tablet; Refill: 5    Epigastric pain  -     pantoprazole (PROTONIX) 40 MG tablet; Take 1 tablet (40 mg total) by mouth 2 (two) times daily.  Dispense: 60 tablet; Refill: 0  -     pantoprazole (PROTONIX) 40 MG tablet; Take 1 tablet (40 mg total) by mouth once daily.  Dispense: 30 tablet; Refill: 5    Gastritis, presence of bleeding unspecified, unspecified chronicity, unspecified gastritis type  -     pantoprazole (PROTONIX) 40 MG tablet; Take 1 tablet (40 mg total) by mouth 2 (two) times daily.  Dispense: 60 tablet; Refill: 0  -     pantoprazole (PROTONIX) 40 MG tablet; Take 1 tablet (40 mg total) by mouth once daily.  Dispense: 30 tablet; Refill: 5      Patient Instructions   Do not ever take any NSAID's - Ibuprofen (Motrin) and/or Naproxen (Aleve).    PeptoBismol - 2 tablespoons - four times a day.    Contact your PCP if any worsening or for any new concerns as we discussed.

## 2024-02-06 NOTE — PATIENT INSTRUCTIONS
Do not ever take any NSAID's - Ibuprofen (Motrin) and/or Naproxen (Aleve).    PeptoBismol - 2 tablespoons - four times a day.    Contact your PCP if any worsening or for any new concerns as we discussed.

## 2024-02-08 ENCOUNTER — TELEPHONE (OUTPATIENT)
Dept: FAMILY MEDICINE | Facility: CLINIC | Age: 59
End: 2024-02-08
Payer: MEDICARE

## 2024-02-08 RX ORDER — CLARITHROMYCIN 500 MG/1
500 TABLET, FILM COATED ORAL 2 TIMES DAILY
Qty: 28 TABLET | Refills: 0 | Status: SHIPPED | OUTPATIENT
Start: 2024-02-08

## 2024-02-08 NOTE — TELEPHONE ENCOUNTER
----- Message from Shaquille Padgett MD sent at 2/8/2024 12:43 PM CST -----  Advise pt that Tetracycline, the recommended antibiotic was REFUSED by her insurance, I sent Rx just now for alternative of Clarithromycin - twice a day x 14 days

## 2024-02-08 NOTE — TELEPHONE ENCOUNTER
"Informed pt message from provider. Pt verbalized understanding.    "Advise pt that Tetracycline, the recommended antibiotic was REFUSED by her insurance, I sent Rx just now for alternative of Clarithromycin - twice a day x 14 days "  "

## 2024-02-15 ENCOUNTER — TELEPHONE (OUTPATIENT)
Dept: FAMILY MEDICINE | Facility: CLINIC | Age: 59
End: 2024-02-15
Payer: MEDICARE

## 2024-02-16 ENCOUNTER — TELEPHONE (OUTPATIENT)
Dept: FAMILY MEDICINE | Facility: CLINIC | Age: 59
End: 2024-02-16
Payer: MEDICARE

## 2024-02-16 DIAGNOSIS — I69.354 HEMIPARESIS AFFECTING LEFT SIDE AS LATE EFFECT OF CEREBROVASCULAR ACCIDENT: ICD-10-CM

## 2024-02-16 DIAGNOSIS — I63.81 ACUTE LACUNAR INFARCTION: ICD-10-CM

## 2024-02-16 DIAGNOSIS — Z86.73 HISTORY OF CVA (CEREBROVASCULAR ACCIDENT): ICD-10-CM

## 2024-02-16 DIAGNOSIS — I63.9 CEREBROVASCULAR ACCIDENT (CVA), UNSPECIFIED MECHANISM: ICD-10-CM

## 2024-02-16 DIAGNOSIS — R53.81 GENERAL PHYSICAL DETERIORATION: Primary | ICD-10-CM

## 2024-02-20 ENCOUNTER — TELEPHONE (OUTPATIENT)
Dept: FAMILY MEDICINE | Facility: CLINIC | Age: 59
End: 2024-02-20
Payer: MEDICARE

## 2024-02-20 NOTE — TELEPHONE ENCOUNTER
----- Message from Vickyrogerio Davis sent at 2/20/2024  3:17 PM CST -----  Regarding: Reschedule Appt  Reschedule Appointment Request      Caller is requesting to reschedule an appointment.      Name of Caller: Pt     When is the first available appointment? 2/21        Would the patient rather a call back or a response via [facility]? Call back    Best Call Back Number: [phone]      Additional Information: Sts wanting to know if she is able to reschedule her appt to Monday 2/26. Would like a call from the office.  Please Advise - Thank you

## 2024-03-13 ENCOUNTER — OUTPATIENT CASE MANAGEMENT (OUTPATIENT)
Dept: ADMINISTRATIVE | Facility: OTHER | Age: 59
End: 2024-03-13
Payer: MEDICARE

## 2024-03-15 ENCOUNTER — OFFICE VISIT (OUTPATIENT)
Dept: FAMILY MEDICINE | Facility: CLINIC | Age: 59
End: 2024-03-15
Payer: MEDICARE

## 2024-03-15 VITALS
TEMPERATURE: 98 F | HEIGHT: 62 IN | OXYGEN SATURATION: 99 % | DIASTOLIC BLOOD PRESSURE: 100 MMHG | HEART RATE: 84 BPM | BODY MASS INDEX: 43 KG/M2 | SYSTOLIC BLOOD PRESSURE: 160 MMHG | WEIGHT: 233.69 LBS

## 2024-03-15 DIAGNOSIS — Z86.73 HISTORY OF CVA (CEREBROVASCULAR ACCIDENT): Primary | ICD-10-CM

## 2024-03-15 PROCEDURE — 99999 PR PBB SHADOW E&M-EST. PATIENT-LVL V: CPT | Mod: PBBFAC,,, | Performed by: NURSE PRACTITIONER

## 2024-03-15 PROCEDURE — 3008F BODY MASS INDEX DOCD: CPT | Mod: CPTII,S$GLB,, | Performed by: NURSE PRACTITIONER

## 2024-03-15 PROCEDURE — 1160F RVW MEDS BY RX/DR IN RCRD: CPT | Mod: CPTII,S$GLB,, | Performed by: NURSE PRACTITIONER

## 2024-03-15 PROCEDURE — 99213 OFFICE O/P EST LOW 20 MIN: CPT | Mod: S$GLB,,, | Performed by: NURSE PRACTITIONER

## 2024-03-15 PROCEDURE — 3077F SYST BP >= 140 MM HG: CPT | Mod: CPTII,S$GLB,, | Performed by: NURSE PRACTITIONER

## 2024-03-15 PROCEDURE — 1159F MED LIST DOCD IN RCRD: CPT | Mod: CPTII,S$GLB,, | Performed by: NURSE PRACTITIONER

## 2024-03-15 PROCEDURE — 3080F DIAST BP >= 90 MM HG: CPT | Mod: CPTII,S$GLB,, | Performed by: NURSE PRACTITIONER

## 2024-03-15 NOTE — PROGRESS NOTES
Subjective:       Patient ID: Faustina Rae is a 59 y.o. female.    Chief Complaint: referral to physical therapy     HPI   60 y/o female patient with medical problems listed below presents to request PT referral. She states had stroke 4 months ago and had home PT which helped with left leg heaviness and likes to do PT. Denies recent trauma to the affected area or recent travel by plane. She denies headache, dizziness, vision changes, facial drooping or weakness, difficulty in speaking, cough, chest pain, sob, nausea, abdominal pain, urinary or bowel symptoms, fever, chills, generalized body ache or weakness, legs pain, claudication, tinging or numbness. She states has been adherent with medication regimen. Noted BP is 160/100. She states her BP machine was broken and so ordered new one which she is waiting for the delivery.   Denies smoking.     Labs reviewed from 8/2023    Patient Active Problem List   Diagnosis    Morbid obesity with BMI of 40.0-44.9, adult    DAVID (obstructive sleep apnea)    Asthma    COPD (chronic obstructive pulmonary disease)    Dysarthria    Chronic diastolic CHF (congestive heart failure)    Essential hypertension    S/P laparoscopic sleeve gastrectomy    History of CVA (cerebrovascular accident)    Stage 3b chronic kidney disease    Normocytic anemia    Hypoalbuminemia    Gastroesophageal reflux disease with esophagitis    Acute lacunar infarction    Dysphasia as late effect of cerebrovascular accident (CVA)    Insomnia    Hyperlipidemia, unspecified    Acute seasonal allergic rhinitis due to pollen    Hemiparesis affecting left side as late effect of cerebrovascular accident    Major depressive disorder, single episode, in full remission    Secondary hyperparathyroidism of renal origin    Chronic kidney disease, stage 4 (severe)    H. pylori infection    COPD with asthma    Severe persistent asthma, uncomplicated    Gout    Cough variant asthma    Thyroid nodule      Review of  patient's allergies indicates:  No Known Allergies    Past Surgical History:   Procedure Laterality Date    CAROTID STENT      3 years ago    CHOLECYSTECTOMY      HYSTERECTOMY      SLEEVE GASTROPLASTY  02/21/2017        Current Outpatient Medications:     albuterol (PROVENTIL/VENTOLIN HFA) 90 mcg/actuation inhaler, INHALE 2 PUFFS INTO THE LUNGS FOUR TIMES DAILY, Disp: 8.5 g, Rfl: 11    amLODIPine (NORVASC) 10 MG tablet, Take 1 tablet (10 mg total) by mouth once daily., Disp: 90 tablet, Rfl: 3    aspirin (ECOTRIN) 81 MG EC tablet, Take 1 tablet (81 mg total) by mouth once daily., Disp: 90 tablet, Rfl: 3    atorvastatin (LIPITOR) 80 MG tablet, Take 1 tablet (80 mg total) by mouth every evening., Disp: 90 tablet, Rfl: 3    b complex vitamins tablet, Take 1 tablet by mouth once daily., Disp: , Rfl:     BREO ELLIPTA 200-25 mcg/dose DsDv diskus inhaler, INHALE 1 PUFF INTO THE LUNGS EVERY DAY, Disp: 60 each, Rfl: 3    calcium citrate-vitamin D3 315-200 mg (CITRACAL+D) 315-200 mg-unit per tablet, Take 1 tablet by mouth once daily. , Disp: , Rfl:     chlorthalidone (HYGROTEN) 25 MG Tab, Take 1 tablet (25 mg total) by mouth once daily., Disp: 90 tablet, Rfl: 3    clarithromycin (BIAXIN) 500 MG tablet, Take 1 tablet (500 mg total) by mouth 2 (two) times a day., Disp: 28 tablet, Rfl: 0    colchicine (COLCRYS) 0.6 mg tablet, Take 1 tablet (0.6 mg total) by mouth every 72 hours. Take at onset of gout and take for 10 days, Disp: 10 tablet, Rfl: 3    EScitalopram oxalate (LEXAPRO) 10 MG tablet, Take 1 tablet (10 mg total) by mouth once daily., Disp: 90 tablet, Rfl: 3    ferrous sulfate 325 (65 FE) MG EC tablet, Take 1 tablet (325 mg total) by mouth once daily., Disp: 90 tablet, Rfl: 3    fluticasone propionate (FLONASE) 50 mcg/actuation nasal spray, 2 sprays (100 mcg total) by Each Nostril route once daily., Disp: 16 g, Rfl: 11    fluticasone-umeclidin-vilanter (TRELEGY ELLIPTA) 200-62.5-25 mcg inhaler, Inhale 1 puff into the lungs  once daily., Disp: 60 each, Rfl: 11    hydrALAZINE (APRESOLINE) 50 MG tablet, Take 1 tablet (50 mg total) by mouth every 12 (twelve) hours., Disp: 180 tablet, Rfl: 3    metoprolol succinate (TOPROL-XL) 25 MG 24 hr tablet, Take 1 tablet (25 mg total) by mouth once daily., Disp: 90 tablet, Rfl: 3    metroNIDAZOLE (FLAGYL) 500 MG tablet, Take 1 tablet (500 mg total) by mouth 3 (three) times daily., Disp: 42 tablet, Rfl: 0    montelukast (SINGULAIR) 10 mg tablet, Take 1 tablet (10 mg total) by mouth every evening., Disp: 90 tablet, Rfl: 3    multivitamin (THERAGRAN) per tablet, Take 1 tablet by mouth once daily., Disp: 90 tablet, Rfl: 3    nystatin (MYCOSTATIN ORAL), Take 1 tablet by mouth Daily., Disp: , Rfl:     pantoprazole (PROTONIX) 40 MG tablet, Take 1 tablet (40 mg total) by mouth 2 (two) times daily., Disp: 60 tablet, Rfl: 0    pantoprazole (PROTONIX) 40 MG tablet, Take 1 tablet (40 mg total) by mouth once daily., Disp: 30 tablet, Rfl: 5    predniSONE (DELTASONE) 20 MG tablet, 3 for 3 days then 2 for 3 days then one for 3 days and repeat for breathing problems, Disp: 36 tablet, Rfl: 1    spironolactone (ALDACTONE) 25 MG tablet, Take 1 tablet (25 mg total) by mouth once daily., Disp: 90 tablet, Rfl: 3    tetracycline (ACHROMYCIN,SUMYCIN) 500 MG capsule, Take 1 capsule (500 mg total) by mouth 4 (four) times daily., Disp: 56 capsule, Rfl: 0    traZODone (DESYREL) 50 MG tablet, Take 50 mg by mouth nightly as needed., Disp: , Rfl:     valsartan (DIOVAN) 80 MG tablet, Take 1 tablet (80 mg total) by mouth once daily., Disp: 90 tablet, Rfl: 1    VELTASSA 8.4 gram PwPk, Take by mouth., Disp: , Rfl:     clopidogreL (PLAVIX) 75 mg tablet, Take 1 tablet (75 mg total) by mouth once daily., Disp: 30 tablet, Rfl: 1    isosorbide dinitrate (ISORDIL) 5 MG Tab, Take 1 tablet (5 mg total) by mouth 3 (three) times daily., Disp: 270 tablet, Rfl: 3    Current Facility-Administered Medications:     cyanocobalamin injection 1,000 mcg,  "1,000 mcg, Intramuscular, Q30 Days, Zoran Cuevas MD, 1,000 mcg at 01/22/24 1151    Review of Systems   Constitutional:  Negative for chills and fever.   Respiratory:  Negative for cough, chest tightness and shortness of breath.    Cardiovascular:  Negative for chest pain and palpitations.   Gastrointestinal:  Negative for abdominal pain.   Neurological:  Negative for dizziness and headaches.       Objective:   BP (!) 160/100 (BP Location: Right arm, Patient Position: Sitting, BP Method: Large (Manual))   Pulse 84   Temp 97.8 °F (36.6 °C) (Temporal)   Ht 5' 2" (1.575 m)   Wt 106 kg (233 lb 11 oz)   LMP  (LMP Unknown) Comment: Does not menstruate   SpO2 99%   BMI 42.74 kg/m²         Physical Exam  Constitutional:       General: She is not in acute distress.     Appearance: Normal appearance.   HENT:      Head: Atraumatic.   Cardiovascular:      Rate and Rhythm: Normal rate and regular rhythm.      Pulses: Normal pulses.      Heart sounds: Normal heart sounds.   Pulmonary:      Effort: Pulmonary effort is normal.      Breath sounds: Normal breath sounds.   Abdominal:      General: Abdomen is flat. Bowel sounds are normal.      Palpations: Abdomen is soft.   Neurological:      General: No focal deficit present.      Mental Status: She is oriented to person, place, and time.      Cranial Nerves: No cranial nerve deficit.      Sensory: No sensory deficit.      Motor: No weakness.         Assessment:       1. History of CVA (cerebrovascular accident)        Plan:       1. History of CVA (cerebrovascular accident)  - No neuro focal deficit on exam  - Stressed the importance of medication adherence   - Patient is on aspirin, plavix and atorvastatin   - Ambulatory referral/consult to Physical/Occupational Therapy; Future   - Signs and symptoms discussed when to seek emergent care     Patient with be reevaluated in  follow up with pcp as scheduled  or sooner jes Godoy NP  "

## 2024-03-18 NOTE — PROGRESS NOTES
03/18/24 Attempt f/u with patient/caregiver. No answer. Left message requesting call back. RN OPCM first f/u attempt.

## 2024-03-26 ENCOUNTER — OFFICE VISIT (OUTPATIENT)
Dept: PULMONOLOGY | Facility: CLINIC | Age: 59
End: 2024-03-26
Payer: MEDICARE

## 2024-03-26 ENCOUNTER — PATIENT MESSAGE (OUTPATIENT)
Dept: FAMILY MEDICINE | Facility: CLINIC | Age: 59
End: 2024-03-26
Payer: MEDICARE

## 2024-03-26 VITALS
OXYGEN SATURATION: 100 % | HEART RATE: 86 BPM | HEIGHT: 62 IN | DIASTOLIC BLOOD PRESSURE: 116 MMHG | SYSTOLIC BLOOD PRESSURE: 184 MMHG | BODY MASS INDEX: 42.72 KG/M2 | WEIGHT: 232.13 LBS

## 2024-03-26 DIAGNOSIS — E66.01 MORBID OBESITY WITH BMI OF 40.0-44.9, ADULT: ICD-10-CM

## 2024-03-26 DIAGNOSIS — J45.991 COUGH VARIANT ASTHMA: ICD-10-CM

## 2024-03-26 DIAGNOSIS — J45.50 SEVERE PERSISTENT ASTHMA, UNCOMPLICATED: ICD-10-CM

## 2024-03-26 PROCEDURE — 3077F SYST BP >= 140 MM HG: CPT | Mod: CPTII,S$GLB,, | Performed by: INTERNAL MEDICINE

## 2024-03-26 PROCEDURE — 99213 OFFICE O/P EST LOW 20 MIN: CPT | Mod: S$GLB,,, | Performed by: INTERNAL MEDICINE

## 2024-03-26 PROCEDURE — 99999 PR PBB SHADOW E&M-EST. PATIENT-LVL V: CPT | Mod: PBBFAC,,, | Performed by: INTERNAL MEDICINE

## 2024-03-26 PROCEDURE — 1159F MED LIST DOCD IN RCRD: CPT | Mod: CPTII,S$GLB,, | Performed by: INTERNAL MEDICINE

## 2024-03-26 PROCEDURE — 3008F BODY MASS INDEX DOCD: CPT | Mod: CPTII,S$GLB,, | Performed by: INTERNAL MEDICINE

## 2024-03-26 PROCEDURE — 3080F DIAST BP >= 90 MM HG: CPT | Mod: CPTII,S$GLB,, | Performed by: INTERNAL MEDICINE

## 2024-03-26 RX ORDER — PREDNISONE 20 MG/1
TABLET ORAL
Qty: 36 TABLET | Refills: 1 | Status: SHIPPED | OUTPATIENT
Start: 2024-03-26

## 2024-03-26 NOTE — PATIENT INSTRUCTIONS
You have severe persistent asthma    Check lung capacity    You should take prednisone to stabilize asthma -- 20 mg daily for 3 days should clear out asthma..    Use trelegy daily    Use albuterol as needed...    Cxr November was good

## 2024-03-26 NOTE — PROGRESS NOTES
3/26/2024    Faustina Rae  New Patient Consult    Chief Complaint   Patient presents with    Follow-up       HPI: \    2024-- asthma still active -- will have chest tightness and sob with crowd exposure.  Bolden walking fast.  Use albuterol bid and breo daily.  No prednisone used..    Pt doesn't recall prednisone use.    Had sleep eval in  past and no sleepa apnea..    2023 pt had asthma as kid, outgrew and asthma returned.  Pt had stroke with left side paresis-- 3yrs -- good recovery. Pt worked .  Pt has loss  10 yrs ago -- he  massive mi.    Pt has household chores -- slow but able to do--- no limits.      Uses albuterol 2 times daily. No cough from albuterol.      Sleeps well -- no apnea in past-- had bariatric sleeve in past.       Pt cough -- violent with no fx, nor syncope nor incontinence but will have severe abd pains.  No recent admits nor steroids.    Cough induced by cold air, grass mowing...  Patient Instructions   Breo daily is good controller, trelegy may be better (has breo and another medication)-- continue daily    Use albuterol as needed-- 2- 4 puffs up to every 4 hrs as needed.    Would recommend  singulair -- effective for asthma 8/10- not all patients benefit.  Stop if no help.      Action plan-- prednisone 1 daily for 3 days maybe enough-- try now, repeat if flares but use lowest dose able and get off quick.  Breo/Trelegy has prednisone to keep clear...      May consider asthma shots???  May need breathing test..      Ct abd  viewed and good lower lungs.  Chest xray In past good.      Thyroid nodule right suspected-- defer to Dr Cuevas-- we discuss and offer ultradound...             PFSH:  Past Medical History:   Diagnosis Date    Anxiety     Arthritis     Asthma     CHF (congestive heart failure)     COPD (chronic obstructive pulmonary disease)     Hyperlipemia     Hypertension     Leaky heart valve     Obese     Obese     Obstructive sleep apnea      "lost her CPAP    Pneumonia     Rheumatoid arthritis(714.0)     Stroke          Past Surgical History:   Procedure Laterality Date    CAROTID STENT      3 years ago    CHOLECYSTECTOMY      HYSTERECTOMY      SLEEVE GASTROPLASTY  02/21/2017     Social History     Tobacco Use    Smoking status: Never     Passive exposure: Never    Smokeless tobacco: Never   Substance Use Topics    Alcohol use: Yes     Comment: rare, about once a month    Drug use: No     Family History   Problem Relation Age of Onset    Arthritis Mother     Cancer Mother     Diabetes Mother     Hyperlipidemia Mother     Hypertension Mother     Stroke Mother     Breast cancer Mother     Heart disease Father     Hypertension Father     Asthma Son     Hypertension Sister     Hypertension Sister     Kidney disease Neg Hx      Review of patient's allergies indicates:  No Known Allergies       Review of Systems:  a review of eleven systems covering constitutional, Eye, HEENT, Psych, Respiratory, Cardiac, GI, , Musculoskeletal, Endocrine, Dermatologic was negative except for pertinent findings as listed ABOVE and below:  pertinent positives as above, rest good        Exam:Comprehensive exam done. BP (!) 184/116 (BP Location: Right arm, Patient Position: Sitting, BP Method: Large (Automatic))   Pulse 86   Ht 5' 2" (1.575 m)   Wt 105.3 kg (232 lb 2.3 oz)   LMP  (LMP Unknown) Comment: Does not menstruate   SpO2 100% Comment: on room air at rest  BMI 42.46 kg/m²   Exam included Vitals as listed, and patient's appearance and affect and alertness and mood, oral exam for yeast and hygiene and pharynx lesions and Mallapatti (M) score, neck with inspection for jvd and masses and thyroid abnormalities and lymph nodes (supraclavicular and infraclavicular nodes and axillary also examined and noted if abn), chest exam included symmetry and effort and fremitus and percussion and auscultation, cardiac exam included rhythm and gallops and murmur and rubs and jvd and " edema, abdominal exam for mass and hepatosplenomegaly and tenderness and hernias and bowel sounds, Musculoskeletal exam with muscle tone and posture and mobility/gait and  strength, and skin for rashes and cyanosis and pallor and turgor, extremity for clubbing.  Findings were normal except for pertinent findings listed below:  M4, chest is symmetric, no distress, normal percussion, normal fremitus and good normal breath sounds  No edema    Right thyroid nodule suspected.         Radiographs (ct chest and cxr) reviewed: view by direct vision      Labs reviewed           PFT will be done and results to be reviewed       Plan:  Clinical impression is apparently straight forward and impression with management as below.     Faustina was seen today for follow-up.    Diagnoses and all orders for this visit:    Severe persistent asthma, uncomplicated  -     Spirometry with/without bronchodilator; Future  -     predniSONE (DELTASONE) 20 MG tablet; 3 for 3 days then 2 for 3 days then one for 3 days and repeat for breathing problems    Morbid obesity with BMI of 40.0-44.9, adult    Cough variant asthma  -     predniSONE (DELTASONE) 20 MG tablet; 3 for 3 days then 2 for 3 days then one for 3 days and repeat for breathing problems          Follow up in about 6 months (around 9/26/2024), or if symptoms worsen or fail to improve.    Discussed with patient above for education the following:      Patient Instructions   You have severe persistent asthma    Check lung capacity    You should take prednisone to stabilize asthma -- 20 mg daily for 3 days should clear out asthma..    Use trelegy daily    Use albuterol as needed...    Cxr November was good

## 2024-03-28 ENCOUNTER — OUTPATIENT CASE MANAGEMENT (OUTPATIENT)
Dept: ADMINISTRATIVE | Facility: OTHER | Age: 59
End: 2024-03-28
Payer: MEDICARE

## 2024-03-28 DIAGNOSIS — I10 ESSENTIAL HYPERTENSION: Primary | ICD-10-CM

## 2024-03-28 NOTE — PROGRESS NOTES
Outpatient Care Management  Plan of Care Follow Up Visit    Patient: Faustina Rae  MRN: [id number]  Date of Service: 03/28/2024  Completed by: Yadi Muñoz RN  Referral Date: 07/14/2023    Reason for Visit   Patient presents with    OPCM RN Follow Up Call       Brief Summary: Phone contact made with Ms. Rae today. We discussed her care plan. Referred to SW. Will continue to follow.

## 2024-04-08 ENCOUNTER — OUTPATIENT CASE MANAGEMENT (OUTPATIENT)
Dept: ADMINISTRATIVE | Facility: OTHER | Age: 59
End: 2024-04-08
Payer: MEDICARE

## 2024-04-08 DIAGNOSIS — I10 ESSENTIAL HYPERTENSION: Primary | ICD-10-CM

## 2024-04-15 DIAGNOSIS — J44.89 COPD WITH ASTHMA: ICD-10-CM

## 2024-04-15 RX ORDER — ALBUTEROL SULFATE 90 UG/1
AEROSOL, METERED RESPIRATORY (INHALATION)
Qty: 25.5 G | Refills: 2 | Status: SHIPPED | OUTPATIENT
Start: 2024-04-15

## 2024-04-15 NOTE — TELEPHONE ENCOUNTER
Provider Staff:  Action required for this patient    Requires labs      Please see care gap opportunities below in Care Due Message.    Thanks!  [facility]     Appointments      Date Provider   Last Visit   11/22/2023 Zoran Cuevas MD   Next Visit   Visit date not found Zoran Cuevas MD     Refill Decision Note   Faustina Rae  is requesting a refill authorization.  Brief Assessment and Rationale for Refill:  Approve     Medication Therapy Plan:         Comments:     Note composed:1:05 PM 04/15/2024

## 2024-04-15 NOTE — TELEPHONE ENCOUNTER
Care Due:                  Date            Visit Type   Department     Provider  --------------------------------------------------------------------------------                                SAME DAY -                              ESTABLISHED   [facility]  Last Visit: 02-      PATIENT      MEDICINE       Shaquille Padgett                              EP -                              PRIMARY      [facility]  Next Visit: 11-      CARE ([facility])   MEDICINE       Zoran Cuevas                                                            Last  Test          Frequency    Reason                     Performed    Due Date  --------------------------------------------------------------------------------    Lipid Panel.  12 months..  atorvastatin.............  07- 07-    [facility] Embedded Care Due Messages. Reference number: [id number].   4/15/2024 1:54:47 AM CDT

## 2024-04-17 ENCOUNTER — TELEPHONE (OUTPATIENT)
Dept: FAMILY MEDICINE | Facility: CLINIC | Age: 59
End: 2024-04-17
Payer: MEDICARE

## 2024-04-17 NOTE — TELEPHONE ENCOUNTER
Pt was informed to complete digital medicine program online. Pt states that she will do it once she gets home.

## 2024-04-19 ENCOUNTER — OUTPATIENT CASE MANAGEMENT (OUTPATIENT)
Dept: ADMINISTRATIVE | Facility: OTHER | Age: 59
End: 2024-04-19
Payer: MEDICARE

## 2024-04-19 NOTE — PROGRESS NOTES
Outpatient Care Management  Plan of Care Follow Up Visit    Patient: Faustina Rae  MRN: [id number]  Date of Service: 04/19/2024  Completed by: Yadi Muñoz RN  Referral Date: 07/14/2023    Reason for Visit   Patient presents with    OPCM RN Follow Up Call       Brief Summary: phone contact made with Ms. Rae today. We discussed her care plan. Will continue to follow.

## 2024-04-22 ENCOUNTER — HOSPITAL ENCOUNTER (EMERGENCY)
Facility: HOSPITAL | Age: 59
Discharge: HOME | End: 2024-04-23
Attending: EMERGENCY MEDICINE
Payer: MEDICARE
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
## 2024-04-22 DIAGNOSIS — S16.1XXA STRAIN OF NECK MUSCLE, INITIAL ENCOUNTER: ICD-10-CM

## 2024-04-22 DIAGNOSIS — S39.012A STRAIN OF LUMBAR REGION, INITIAL ENCOUNTER: ICD-10-CM

## 2024-04-22 PROCEDURE — 99285 EMERGENCY DEPT VISIT HI MDM: CPT | Mod: 25
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
## 2024-04-23 VITALS
HEART RATE: 71 BPM | DIASTOLIC BLOOD PRESSURE: 102 MMHG | HEIGHT: 62 IN | TEMPERATURE: 97 F | BODY MASS INDEX: 36.8 KG/M2 | RESPIRATION RATE: 20 BRPM | WEIGHT: 200 LBS | SYSTOLIC BLOOD PRESSURE: 226 MMHG | OXYGEN SATURATION: 99 %

## 2024-04-23 PROCEDURE — 25000003 PHARM REV CODE 250: Performed by: EMERGENCY MEDICINE

## 2024-04-23 RX ORDER — METHOCARBAMOL 500 MG/1
1000 TABLET, FILM COATED ORAL
Status: COMPLETED | OUTPATIENT
Start: 2024-04-23 | End: 2024-04-23

## 2024-04-23 RX ORDER — METHOCARBAMOL 500 MG/1
1000 TABLET, FILM COATED ORAL 3 TIMES DAILY
Qty: 30 TABLET | Refills: 0 | Status: SHIPPED | OUTPATIENT
Start: 2024-04-23 | End: 2024-04-28

## 2024-04-23 RX ORDER — HYDROCODONE BITARTRATE AND ACETAMINOPHEN 5; 325 MG/1; MG/1
1 TABLET ORAL
Status: COMPLETED | OUTPATIENT
Start: 2024-04-23 | End: 2024-04-23

## 2024-04-23 RX ADMIN — METHOCARBAMOL 1000 MG: 500 TABLET ORAL at 12:04

## 2024-04-23 RX ADMIN — HYDROCODONE BITARTRATE AND ACETAMINOPHEN 1 TABLET: 5; 325 TABLET ORAL at 12:04
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
## 2024-04-30 ENCOUNTER — LAB VISIT (OUTPATIENT)
Dept: LAB | Facility: HOSPITAL | Age: 59
End: 2024-04-30
Attending: NURSE PRACTITIONER
Payer: MEDICARE

## 2024-04-30 DIAGNOSIS — N18.4 CHRONIC KIDNEY DISEASE, STAGE IV (SEVERE): ICD-10-CM

## 2024-04-30 LAB
ALBUMIN SERPL BCP-MCNC: 3.4 G/DL (ref 3.5–5.2)
ANION GAP SERPL CALC-SCNC: 10 MMOL/L (ref 8–16)
BASOPHILS # BLD AUTO: 0.02 K/UL (ref 0–0.2)
BASOPHILS NFR BLD: 0.4 % (ref 0–1.9)
BUN SERPL-MCNC: 26 MG/DL (ref 6–20)
CALCIUM SERPL-MCNC: 9.7 MG/DL (ref 8.7–10.5)
CHLORIDE SERPL-SCNC: 111 MMOL/L (ref 95–110)
CHOLEST SERPL-MCNC: 216 MG/DL (ref 120–199)
CHOLEST/HDLC SERPL: 5 {RATIO} (ref 2–5)
CO2 SERPL-SCNC: 21 MMOL/L (ref 23–29)
CREAT SERPL-MCNC: 3.1 MG/DL (ref 0.5–1.4)
DIFFERENTIAL METHOD BLD: ABNORMAL
EOSINOPHIL # BLD AUTO: 0.1 K/UL (ref 0–0.5)
EOSINOPHIL NFR BLD: 2 % (ref 0–8)
ERYTHROCYTE [DISTWIDTH] IN BLOOD BY AUTOMATED COUNT: 14.2 % (ref 11.5–14.5)
EST. GFR  (NO RACE VARIABLE): 16.6 ML/MIN/1.73 M^2
GLUCOSE SERPL-MCNC: 76 MG/DL (ref 70–110)
HCT VFR BLD AUTO: 38.3 % (ref 37–48.5)
HDLC SERPL-MCNC: 43 MG/DL (ref 40–75)
HDLC SERPL: 19.9 % (ref 20–50)
HGB BLD-MCNC: 12 G/DL (ref 12–16)
IMM GRANULOCYTES # BLD AUTO: 0.02 K/UL (ref 0–0.04)
IMM GRANULOCYTES NFR BLD AUTO: 0.4 % (ref 0–0.5)
LDLC SERPL CALC-MCNC: 135.8 MG/DL (ref 63–159)
LYMPHOCYTES # BLD AUTO: 1.5 K/UL (ref 1–4.8)
LYMPHOCYTES NFR BLD: 29.9 % (ref 18–48)
MCH RBC QN AUTO: 29.5 PG (ref 27–31)
MCHC RBC AUTO-ENTMCNC: 31.3 G/DL (ref 32–36)
MCV RBC AUTO: 94 FL (ref 82–98)
MONOCYTES # BLD AUTO: 0.5 K/UL (ref 0.3–1)
MONOCYTES NFR BLD: 10 % (ref 4–15)
NEUTROPHILS # BLD AUTO: 2.8 K/UL (ref 1.8–7.7)
NEUTROPHILS NFR BLD: 57.3 % (ref 38–73)
NONHDLC SERPL-MCNC: 173 MG/DL
NRBC BLD-RTO: 0 /100 WBC
PHOSPHATE SERPL-MCNC: 3.4 MG/DL (ref 2.7–4.5)
PHOSPHATE SERPL-MCNC: 3.4 MG/DL (ref 2.7–4.5)
PLATELET # BLD AUTO: 281 K/UL (ref 150–450)
PMV BLD AUTO: 10.5 FL (ref 9.2–12.9)
POTASSIUM SERPL-SCNC: 4.5 MMOL/L (ref 3.5–5.1)
PTH-INTACT SERPL-MCNC: 317.4 PG/ML (ref 9–77)
RBC # BLD AUTO: 4.07 M/UL (ref 4–5.4)
SODIUM SERPL-SCNC: 142 MMOL/L (ref 136–145)
TRIGL SERPL-MCNC: 186 MG/DL (ref 30–150)
WBC # BLD AUTO: 4.92 K/UL (ref 3.9–12.7)

## 2024-04-30 PROCEDURE — 83970 ASSAY OF PARATHORMONE: CPT | Mod: HCNC | Performed by: NURSE PRACTITIONER

## 2024-04-30 PROCEDURE — 80061 LIPID PANEL: CPT | Mod: HCNC | Performed by: NURSE PRACTITIONER

## 2024-04-30 PROCEDURE — 36415 COLL VENOUS BLD VENIPUNCTURE: CPT | Mod: HCNC,PO | Performed by: NURSE PRACTITIONER

## 2024-04-30 PROCEDURE — 82306 VITAMIN D 25 HYDROXY: CPT | Mod: HCNC | Performed by: NURSE PRACTITIONER

## 2024-04-30 PROCEDURE — 80069 RENAL FUNCTION PANEL: CPT | Mod: HCNC | Performed by: NURSE PRACTITIONER

## 2024-04-30 PROCEDURE — 85025 COMPLETE CBC W/AUTO DIFF WBC: CPT | Mod: HCNC | Performed by: NURSE PRACTITIONER

## 2024-05-01 ENCOUNTER — OFFICE VISIT (OUTPATIENT)
Dept: FAMILY MEDICINE | Facility: CLINIC | Age: 59
End: 2024-05-01
Payer: MEDICARE

## 2024-05-01 VITALS
DIASTOLIC BLOOD PRESSURE: 82 MMHG | SYSTOLIC BLOOD PRESSURE: 138 MMHG | TEMPERATURE: 98 F | HEIGHT: 62 IN | OXYGEN SATURATION: 98 % | WEIGHT: 219.13 LBS | BODY MASS INDEX: 40.33 KG/M2 | HEART RATE: 86 BPM

## 2024-05-01 DIAGNOSIS — S39.012A STRAIN OF LUMBAR REGION, INITIAL ENCOUNTER: ICD-10-CM
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
## 2024-05-01 DIAGNOSIS — S16.1XXA STRAIN OF NECK MUSCLE, INITIAL ENCOUNTER: Primary | ICD-10-CM

## 2024-05-01 DIAGNOSIS — R42 VERTIGO: ICD-10-CM

## 2024-05-01 LAB — 25(OH)D3+25(OH)D2 SERPL-MCNC: 30 NG/ML (ref 30–96)

## 2024-05-01 PROCEDURE — 1159F MED LIST DOCD IN RCRD: CPT | Mod: HCNC,CPTII,S$GLB, | Performed by: FAMILY MEDICINE

## 2024-05-01 PROCEDURE — 3079F DIAST BP 80-89 MM HG: CPT | Mod: HCNC,CPTII,S$GLB, | Performed by: FAMILY MEDICINE

## 2024-05-01 PROCEDURE — 3008F BODY MASS INDEX DOCD: CPT | Mod: HCNC,CPTII,S$GLB, | Performed by: FAMILY MEDICINE

## 2024-05-01 PROCEDURE — 99999 PR PBB SHADOW E&M-EST. PATIENT-LVL V: CPT | Mod: PBBFAC,HCNC,, | Performed by: FAMILY MEDICINE

## 2024-05-01 PROCEDURE — 3075F SYST BP GE 130 - 139MM HG: CPT | Mod: HCNC,CPTII,S$GLB, | Performed by: FAMILY MEDICINE

## 2024-05-01 PROCEDURE — 99213 OFFICE O/P EST LOW 20 MIN: CPT | Mod: HCNC,S$GLB,, | Performed by: FAMILY MEDICINE

## 2024-05-01 RX ORDER — PREDNISONE 20 MG/1
TABLET ORAL
Qty: 10 TABLET | Refills: 0 | Status: SHIPPED | OUTPATIENT
Start: 2024-05-01

## 2024-05-01 RX ORDER — TRAMADOL HYDROCHLORIDE 50 MG/1
50 TABLET ORAL EVERY 6 HOURS PRN
Qty: 20 EACH | Refills: 0 | Status: SHIPPED | OUTPATIENT
Start: 2024-05-01

## 2024-05-01 RX ORDER — MECLIZINE HCL 12.5 MG 12.5 MG/1
12.5 TABLET ORAL 3 TIMES DAILY PRN
Qty: 30 TABLET | Refills: 0 | Status: SHIPPED | OUTPATIENT
Start: 2024-05-01
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
## 2024-05-01 NOTE — PATIENT INSTRUCTIONS
No Ibuprofen or Aleve.     Heat to neck and low back three times a day.    Rest mostly but can move around the house but nothing strenuous.    Contact your PCP if any worsening or for any new concerns as we discussed.    **Please arrive 15 minutes before all your appointment times**

## 2024-05-06 ENCOUNTER — TELEPHONE (OUTPATIENT)
Dept: FAMILY MEDICINE | Facility: CLINIC | Age: 59
End: 2024-05-06
Payer: MEDICARE

## 2024-05-06 NOTE — TELEPHONE ENCOUNTER
Pt informed that Dr. Padgett would like her to return this Wednesday to be seen. Pt advised to inquire about referral at appointment. Pt verbalized understanding.

## 2024-05-06 NOTE — TELEPHONE ENCOUNTER
----- Message from Ekaterina Saumya sent at 5/6/2024 10:16 AM CDT -----  Contact: Self  Type:  Patient Requesting Referral    Who Called:  Patient  Does the patient already have the specialty appointment scheduled?:  No  If yes, what is the date of that appointment?:  n/a  Referral to What Specialty:  Physical Therapy  Reason for Referral:  back and neck   Does the patient want the referral with a specific physician?:  No  [facility]  Patient Requesting a Call Back?:  Yes  Best Call Back Number:  [phone]  Additional Information:   Pt stated she saw Dr Lorin gonzalez back and thought he was putting in orders for this, can we please check on this and call back to advise. Thank You.

## 2024-05-08 ENCOUNTER — OFFICE VISIT (OUTPATIENT)
Dept: FAMILY MEDICINE | Facility: CLINIC | Age: 59
End: 2024-05-08
Payer: MEDICARE

## 2024-05-08 VITALS
HEIGHT: 62 IN | BODY MASS INDEX: 42.6 KG/M2 | WEIGHT: 231.5 LBS | DIASTOLIC BLOOD PRESSURE: 90 MMHG | SYSTOLIC BLOOD PRESSURE: 170 MMHG | OXYGEN SATURATION: 97 % | HEART RATE: 80 BPM | RESPIRATION RATE: 17 BRPM | TEMPERATURE: 98 F

## 2024-05-08 DIAGNOSIS — I69.354 HEMIPARESIS AFFECTING LEFT SIDE AS LATE EFFECT OF CEREBROVASCULAR ACCIDENT: ICD-10-CM

## 2024-05-08 DIAGNOSIS — I50.32 CHRONIC DIASTOLIC CHF (CONGESTIVE HEART FAILURE): ICD-10-CM

## 2024-05-08 DIAGNOSIS — Z12.11 COLON CANCER SCREENING: ICD-10-CM

## 2024-05-08 DIAGNOSIS — F32.5 MAJOR DEPRESSIVE DISORDER, SINGLE EPISODE, IN FULL REMISSION: ICD-10-CM

## 2024-05-08 DIAGNOSIS — N25.81 SECONDARY HYPERPARATHYROIDISM OF RENAL ORIGIN: ICD-10-CM

## 2024-05-08 DIAGNOSIS — M54.2 NECK PAIN: Primary | ICD-10-CM

## 2024-05-08 DIAGNOSIS — E66.01 MORBID OBESITY: ICD-10-CM

## 2024-05-08 DIAGNOSIS — N18.4 CHRONIC KIDNEY DISEASE, STAGE 4 (SEVERE): ICD-10-CM

## 2024-05-08 DIAGNOSIS — J44.89 COPD WITH ASTHMA: ICD-10-CM

## 2024-05-08 DIAGNOSIS — M54.9 BACK PAIN, UNSPECIFIED BACK LOCATION, UNSPECIFIED BACK PAIN LATERALITY, UNSPECIFIED CHRONICITY: ICD-10-CM

## 2024-05-08 DIAGNOSIS — I10 ESSENTIAL HYPERTENSION: ICD-10-CM

## 2024-05-08 PROCEDURE — G2211 COMPLEX E/M VISIT ADD ON: HCPCS | Mod: HCNC,S$GLB,, | Performed by: STUDENT IN AN ORGANIZED HEALTH CARE EDUCATION/TRAINING PROGRAM

## 2024-05-08 PROCEDURE — 99999 PR PBB SHADOW E&M-EST. PATIENT-LVL III: CPT | Mod: PBBFAC,HCNC,, | Performed by: STUDENT IN AN ORGANIZED HEALTH CARE EDUCATION/TRAINING PROGRAM

## 2024-05-08 PROCEDURE — 99214 OFFICE O/P EST MOD 30 MIN: CPT | Mod: HCNC,S$GLB,, | Performed by: STUDENT IN AN ORGANIZED HEALTH CARE EDUCATION/TRAINING PROGRAM

## 2024-05-08 PROCEDURE — 3080F DIAST BP >= 90 MM HG: CPT | Mod: HCNC,CPTII,S$GLB, | Performed by: STUDENT IN AN ORGANIZED HEALTH CARE EDUCATION/TRAINING PROGRAM

## 2024-05-08 PROCEDURE — 3008F BODY MASS INDEX DOCD: CPT | Mod: HCNC,CPTII,S$GLB, | Performed by: STUDENT IN AN ORGANIZED HEALTH CARE EDUCATION/TRAINING PROGRAM

## 2024-05-08 PROCEDURE — 3077F SYST BP >= 140 MM HG: CPT | Mod: HCNC,CPTII,S$GLB, | Performed by: STUDENT IN AN ORGANIZED HEALTH CARE EDUCATION/TRAINING PROGRAM

## 2024-05-08 RX ORDER — GABAPENTIN 300 MG/1
300 CAPSULE ORAL 3 TIMES DAILY
Qty: 90 CAPSULE | Refills: 11 | Status: SHIPPED | OUTPATIENT
Start: 2024-05-08 | End: 2025-05-08

## 2024-05-08 RX ORDER — SEMAGLUTIDE 0.25 MG/.5ML
0.25 INJECTION, SOLUTION SUBCUTANEOUS
Qty: 2 ML | Refills: 0 | Status: SHIPPED | OUTPATIENT
Start: 2024-05-08 | End: 2024-06-17 | Stop reason: SDUPTHER

## 2024-05-08 RX ORDER — METHYLPREDNISOLONE 4 MG/1
TABLET ORAL
Qty: 21 TABLET | Refills: 0 | Status: SHIPPED | OUTPATIENT
Start: 2024-05-08

## 2024-05-08 RX ORDER — LIDOCAINE 50 MG/G
1 PATCH TOPICAL DAILY
Qty: 30 PATCH | Refills: 2 | Status: SHIPPED | OUTPATIENT
Start: 2024-05-08 | End: 2024-06-07
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
## 2024-05-10 ENCOUNTER — OUTPATIENT CASE MANAGEMENT (OUTPATIENT)
Dept: ADMINISTRATIVE | Facility: OTHER | Age: 59
End: 2024-05-10
Payer: MEDICARE

## 2024-05-10 NOTE — PROGRESS NOTES
Outpatient Care Management  Plan of Care Follow Up Visit    Patient: Faustina Rae  MRN: [id number]  Date of Service: 05/10/2024  Completed by: Yadi Muñoz RN  Referral Date: 07/14/2023    Reason for Visit   Patient presents with    OPCM RN Follow Up Call       Brief Summary: Phone contact made with Ms. Rae today. We discussed her care plan. Will continue to follow for education and management.

## 2024-05-15 ENCOUNTER — OFFICE VISIT (OUTPATIENT)
Dept: FAMILY MEDICINE | Facility: CLINIC | Age: 59
End: 2024-05-15
Payer: MEDICARE

## 2024-05-15 ENCOUNTER — TELEPHONE (OUTPATIENT)
Dept: FAMILY MEDICINE | Facility: CLINIC | Age: 59
End: 2024-05-15
Payer: MEDICARE

## 2024-05-15 DIAGNOSIS — R51.9 SEVERE HEADACHE: Primary | ICD-10-CM

## 2024-05-15 DIAGNOSIS — I10 ESSENTIAL HYPERTENSION: ICD-10-CM

## 2024-05-15 DIAGNOSIS — I50.32 CHRONIC DIASTOLIC CHF (CONGESTIVE HEART FAILURE): ICD-10-CM

## 2024-05-15 DIAGNOSIS — J44.89 COPD WITH ASTHMA: ICD-10-CM

## 2024-05-15 DIAGNOSIS — E78.5 HYPERLIPIDEMIA, UNSPECIFIED HYPERLIPIDEMIA TYPE: ICD-10-CM

## 2024-05-15 DIAGNOSIS — R29.898 LEFT ARM WEAKNESS: ICD-10-CM

## 2024-05-15 PROCEDURE — 99215 OFFICE O/P EST HI 40 MIN: CPT | Mod: HCNC,S$GLB,, | Performed by: STUDENT IN AN ORGANIZED HEALTH CARE EDUCATION/TRAINING PROGRAM
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
## 2024-05-15 NOTE — TELEPHONE ENCOUNTER
----- Message from Lanny Montejo sent at 5/15/2024  8:47 AM CDT -----  Regarding: Needs Medical Advice  Contact: patient at [phone]  Type: Needs Medical Advice  Who Called:  patient at [phone]    Additional Information: patient's scheduled transportation has shown up to pick her up for nurse appointment at 10am today and she may need to reschedule. Please call and advise. Thank you

## 2024-05-16 ENCOUNTER — TELEPHONE (OUTPATIENT)
Dept: FAMILY MEDICINE | Facility: CLINIC | Age: 59
End: 2024-05-16
Payer: MEDICARE

## 2024-05-16 NOTE — TELEPHONE ENCOUNTER
"Pt was contacted to follow up on Dr. Cuevas's orders to go to the ER immediately after visit. Pt stated "I ain't want to go sit in there and wait all day." Pt was asked if she tried going to the ER in [facility]. Pt stated " No they would've had me waiting all long and all day." Pt was asked if she is still experiencing any of the following symptoms: headache, dizziness, blurred vision, or weakness in either of her arms. Pt denied having any of those symptoms. Pt was asked if she checked her BP this morning, Pt stated " I haven't but I will soon." Pt was advised to give us a call back after she checks her BP. Pt stated "I will." Pt informed of the importance of addressing her issues with her BP; pt verbalized understanding  "

## 2024-05-22 ENCOUNTER — OFFICE VISIT (OUTPATIENT)
Dept: FAMILY MEDICINE | Facility: CLINIC | Age: 59
End: 2024-05-22
Payer: MEDICARE

## 2024-05-22 VITALS
SYSTOLIC BLOOD PRESSURE: 160 MMHG | HEART RATE: 86 BPM | TEMPERATURE: 98 F | RESPIRATION RATE: 18 BRPM | WEIGHT: 229.5 LBS | DIASTOLIC BLOOD PRESSURE: 92 MMHG | OXYGEN SATURATION: 97 % | HEIGHT: 62 IN | BODY MASS INDEX: 42.23 KG/M2

## 2024-05-22 DIAGNOSIS — Z86.73 HISTORY OF CVA (CEREBROVASCULAR ACCIDENT): ICD-10-CM

## 2024-05-22 DIAGNOSIS — F32.5 MAJOR DEPRESSIVE DISORDER, SINGLE EPISODE, IN FULL REMISSION: ICD-10-CM

## 2024-05-22 DIAGNOSIS — I69.321 DYSPHASIA AS LATE EFFECT OF CEREBROVASCULAR ACCIDENT (CVA): ICD-10-CM

## 2024-05-22 DIAGNOSIS — I10 ESSENTIAL HYPERTENSION: Primary | ICD-10-CM

## 2024-05-22 DIAGNOSIS — N18.4 CHRONIC KIDNEY DISEASE, STAGE 4 (SEVERE): ICD-10-CM

## 2024-05-22 PROCEDURE — 1159F MED LIST DOCD IN RCRD: CPT | Mod: HCNC,CPTII,S$GLB, | Performed by: PHYSICIAN ASSISTANT

## 2024-05-22 PROCEDURE — 99999 PR PBB SHADOW E&M-EST. PATIENT-LVL V: CPT | Mod: PBBFAC,HCNC,, | Performed by: PHYSICIAN ASSISTANT

## 2024-05-22 PROCEDURE — 99214 OFFICE O/P EST MOD 30 MIN: CPT | Mod: HCNC,S$GLB,, | Performed by: PHYSICIAN ASSISTANT

## 2024-05-22 PROCEDURE — 3008F BODY MASS INDEX DOCD: CPT | Mod: HCNC,CPTII,S$GLB, | Performed by: PHYSICIAN ASSISTANT

## 2024-05-22 PROCEDURE — 4010F ACE/ARB THERAPY RXD/TAKEN: CPT | Mod: HCNC,CPTII,S$GLB, | Performed by: PHYSICIAN ASSISTANT

## 2024-05-22 PROCEDURE — 3080F DIAST BP >= 90 MM HG: CPT | Mod: HCNC,CPTII,S$GLB, | Performed by: PHYSICIAN ASSISTANT

## 2024-05-22 PROCEDURE — 1160F RVW MEDS BY RX/DR IN RCRD: CPT | Mod: HCNC,CPTII,S$GLB, | Performed by: PHYSICIAN ASSISTANT

## 2024-05-22 PROCEDURE — 3077F SYST BP >= 140 MM HG: CPT | Mod: HCNC,CPTII,S$GLB, | Performed by: PHYSICIAN ASSISTANT

## 2024-05-22 RX ORDER — VALSARTAN 160 MG/1
160 TABLET ORAL DAILY
Qty: 90 TABLET | Refills: 3 | Status: SHIPPED | OUTPATIENT
Start: 2024-05-22 | End: 2025-05-22

## 2024-05-22 RX ORDER — HYDRALAZINE HYDROCHLORIDE 50 MG/1
100 TABLET, FILM COATED ORAL EVERY 12 HOURS
Qty: 360 TABLET | Refills: 3 | Status: SHIPPED | OUTPATIENT
Start: 2024-05-22 | End: 2025-05-22

## 2024-05-22 NOTE — PATIENT INSTRUCTIONS
"José Manuel Weems,     If you are due for any health screening(s) below please notify me so we can arrange them to be ordered and scheduled to maintain your health. Most healthy patients complete it. Don't lose out on improving your health.     Tests to Keep You Healthy    Mammogram: Met on 1/19/2024  Colon Cancer Screening: ORDERED  Last Blood Pressure <= 139/89 (5/22/2024): NO         Colon Cancer Screening    Of cancers affecting both men and women, colorectal cancer is the third leading cancer killer in the [place]. But it doesnt have to be. Screening can prevent colorectal cancer or find it at an early stage when treatment often leads to a cure.    A colonoscopy is the preferred test for detecting colon cancer. It is needed only once every 10 years if results are negative. While sedated, a flexible, lighted tube with a tiny camera is inserted into the rectum and advanced through the colon to look for cancers. An alternative screening test that is used at home and returned to the lab may also be used. It detects hidden blood in bowel movements which could indicate cancer in the colon. If results are positive, you will need a colonoscopy to determine if the blood is a sign of cancer. This type of follow up (diagnostic) colonoscopy usually requires additional copays as required by your insurance provider. Please contact your PCP if you have any questions.                  Patient Education       Checking Your Blood Pressure at Home   The Basics   Written by the doctors and editors at [facility]   How is blood pressure measured? -- Blood pressure is usually measured with a device that goes around your upper arm. This is often done in a doctor's office. But some people also check their blood pressure themselves, at home or at work.  Blood pressure is explained with 2 numbers. For instance, your blood pressure might be "140 over 90." The first (top) number is the pressure inside your arteries when your heart is " "blake. The second (bottom) number is the pressure inside your arteries when your heart is relaxed. The table shows how doctors and nurses define high and normal blood pressure (table 1).  If your blood pressure gets too high, it puts you at risk for heart attack, stroke, and kidney disease. High blood pressure does not usually cause symptoms. But it can be serious.  What is a home blood pressure meter? -- A home blood pressure meter (or "monitor") is a device you can use to check your blood pressure yourself. It has a cuff that goes around your upper arm (figure 1). Some devices have a cuff that goes around your wrist instead. But doctors aren't sure if these work as well. The meter also has a small screen, or dial, that shows your blood pressure numbers.  There are also special meters you can wear for a day or 2. These are different because they automatically check your blood pressure throughout the day and night, even while you are sleeping. If your doctor thinks you should use one of these devices, they will talk to you about how to wear it.  Why do I need to check my blood pressure at home? -- If your doctor knows or suspects that you have high blood pressure, they might want you to check it at home. There are a few reasons for this. Your doctor might want to look at:  Whether your blood pressure measures the same at home as it did in the doctor's office  How well your blood pressure medicines are working  Changes in your blood pressure, for example, if it goes up and down  People who check their own blood pressure at home usually do better at keeping it low.  How do I choose a home blood pressure meter? -- When choosing a home blood pressure meter, you will probably want to think about:  Cost - Some devices cost more than others. You should also check to see if your insurance will help pay for your device.  Size - It's important to make sure the cuff fits your arm comfortably. Your doctor or nurse can " help you with this.  How easy it is to use - You should make sure you understand how to use the device. You also need to be able to read the numbers on the screen.  You do not need a prescription to buy a home blood pressure meter. You can buy them at most pharmacies or over the internet. Your doctor or nurse can help you choose the right device for you.  How do I check my blood pressure at home? -- Once you have a home blood pressure meter, your doctor or nurse should check it to make sure it fits you and works correctly.  When it's time to check your blood pressure:  Go to the bathroom and empty your bladder first. Having a full bladder can temporarily increase your blood pressure, making the results inaccurate.  Sit in a chair with your feet flat on the ground.  Try to breathe normally and stay calm.  Attach the cuff to your arm. Place the cuff directly on your skin, not over your clothing. The cuff should be tight enough to not slip down, but not uncomfortably tight.  Sit and relax for about 3 to 5 minutes with the cuff on.  Follow the directions that came with your device to start measuring your blood pressure. This might involve squeezing the bulb at the end of the tube to inflate the cuff (fill it with air). With some monitors, you just need to press a button to inflate the cuff. When the cuff fills with air, it feels like someone is squeezing your arm, but it should not hurt. Then you will slowly deflate the cuff (let the air out of it), or it will deflate by itself. The screen or dial will show your blood pressure numbers.  Stay seated and relax for 1 minute, then measure your blood pressure again.  How often should I check my blood pressure? -- It depends. Different people need to follow different schedules. Your doctor or nurse will tell you how often to check your blood pressure, and when. Some people need to check their blood pressure twice a day, in the morning and evening.  Your doctor or nurse will  probably tell you to keep track of your blood pressure for at least a few days (table 2). Then they will look at the numbers. The reason for this is that it's normal for your blood pressure to change a bit from day to day. For example, the numbers might change depending on whether you recently had caffeine, just exercised, or feel stressed. Checking your blood pressure over several days - or longer - will give your doctor or nurse a better idea of what is average for you.  How should I keep track of my blood pressure? -- Some blood pressure meters will record your numbers for you, or send them to your computer or smartphone. If yours does not do this, you will need to write them down. Your doctor or nurse can help you figure out the best way to keep track of the numbers.  What if my blood pressure is high? -- Your doctor or nurse will tell you what to do if your blood pressure is high when you check it at home. If you get a number that is higher than normal, measure it again to see if it is still high. If it is very high (above a certain number, which your doctor or nurse will tell you to watch out for), you should call your doctor right away.  If your blood pressure is only a little high, your doctor or nurse might tell you to keep checking it for a few more days or weeks, and then call if it does not go back down. Then they can help you decide what to do next.  All topics are updated as new evidence becomes available and our peer review process is complete.  This topic retrieved from [employer] on: Sep 21, 2021.  Topic 940545 Version 4.0  Release: 29.4.2 - C29.263  © 2021 UpToDate, Inc. and/or its affiliates. All rights reserved.  table 1: Definition of normal and high blood pressure  Level  Top number  Bottom number    High 130 or above 80 or above   Elevated 120 to 129 79 or below   Normal 119 or below 79 or below   These definitions are from the American College of Cardiology/American Heart Association. Other  "expert groups might use slightly different definitions.  "Elevated blood pressure" is a term doctor or nurses use as a warning. It means you do not yet have high blood pressure, but your blood pressure is not as low as it should be for good health.  Graphic 66189 Version 6.0  figure 1: Using a home blood pressure meter     This is an example of a person using a home blood pressure meter.  Graphic 887003 Version 1.0    table 2: 7-day diary for checking blood pressure at home  Day 1  Day 2  Day 3  Day 4  Day 5  Day 6  Day 7    Morning  1st read Morning  1st read Morning  1st read Morning  1st read Morning  1st read Morning  1st read Morning  1st read   Systolic: __________ Systolic: __________ Systolic: __________ Systolic: __________ Systolic: __________ Systolic: __________ Systolic: __________   Diastolic: __________ Diastolic: __________ Diastolic: __________ Diastolic: __________ Diastolic: __________ Diastolic: __________ Diastolic: __________   Pulse: __________ Pulse: __________ Pulse: __________ Pulse: __________ Pulse: __________ Pulse: __________ Pulse: __________   Morning  2nd read Morning  2nd read Morning  2nd read Morning  2nd read Morning  2nd read Morning  2nd read Morning  2nd read   Systolic: __________ Systolic: __________ Systolic: __________ Systolic: __________ Systolic: __________ Systolic: __________ Systolic: __________   Diastolic: __________ Diastolic: __________ Diastolic: __________ Diastolic: __________ Diastolic: __________ Diastolic: __________ Diastolic: __________   Pulse: __________ Pulse: __________ Pulse: __________ Pulse: __________ Pulse: __________ Pulse: __________ Pulse: __________   Evening  1st read Evening  1st read Evening  1st read Evening  1st read Evening  1st read Evening  1st read Evening  1st read   Systolic: __________ Systolic: __________ Systolic: __________ Systolic: __________ Systolic: __________ Systolic: __________ Systolic: __________   Diastolic: " __________ Diastolic: __________ Diastolic: __________ Diastolic: __________ Diastolic: __________ Diastolic: __________ Diastolic: __________   Pulse: __________ Pulse: __________ Pulse: __________ Pulse: __________ Pulse: __________ Pulse: __________ Pulse: __________   Evening  2nd read Evening  2nd read Evening  2nd read Evening  2nd read Evening  2nd read Evening  2nd read Evening  2nd read   Systolic: __________ Systolic: __________ Systolic: __________ Systolic: __________ Systolic: __________ Systolic: __________ Systolic: __________   Diastolic: __________ Diastolic: __________ Diastolic: __________ Diastolic: __________ Diastolic: __________ Diastolic: __________ Diastolic: __________   Pulse: __________ Pulse: __________ Pulse: __________ Pulse: __________ Pulse: __________ Pulse: __________ Pulse: __________   Notes    Notes    Notes    Notes    Notes    Notes    Notes      ____________________ ____________________ ____________________ ____________________ ____________________ ____________________ ____________________   ____________________ ____________________ ____________________ ____________________ ____________________ ____________________ ____________________   ____________________ ____________________ ____________________ ____________________ ____________________ ____________________ ____________________   Patient name: ______________________________     Patient ID: ________________________________    Primary care provider: _______________________    Average BP: _______________________________    Graphic 856826 Version 1.0  Consumer Information Use and Disclaimer   This information is not specific medical advice and does not replace information you receive from your health care provider. This is only a brief summary of general information. It does NOT include all information about conditions, illnesses, injuries, tests, procedures, treatments, therapies, discharge instructions or life-style choices  that may apply to you. You must talk with your health care provider for complete information about your health and treatment options. This information should not be used to decide whether or not to accept your health care provider's advice, instructions or recommendations. Only your health care provider has the knowledge and training to provide advice that is right for you. The use of this information is governed by the [employer] User License Agreement, available at [email or web address].The use of [employer] content is governed by the [employer] Terms of Use. ©2021 [employer] All rights reserved.  Copyright   © 2021 UpToDate, Inc. and/or its affiliates. All rights reserved.

## 2024-05-22 NOTE — PROGRESS NOTES
Subjective:       Patient ID: Faustina Rae is a 59 y.o. female.    Chief Complaint: Follow-up (6 month f/u)    Ms. Rae is a 59 year old female with HTN, CKD 4, and h/o CVA. She is here today for 6 month follow up. She was seen by PCP last week for severe headache. Was advised to go to ED but patient did not comply. The patient reports she is feeling better. She reports compliance with medications. The patient is followed by Dr. Gomez, nephrologist.       Review of patient's allergies indicates:  No Known Allergies      Current Outpatient Medications:     albuterol (PROVENTIL/VENTOLIN HFA) 90 mcg/actuation inhaler, INHALE TWO PUFFS INTO THE LUNGS FOUR TIMES DAILY (BULK), Disp: 25.5 g, Rfl: 2    amLODIPine (NORVASC) 10 MG tablet, Take 1 tablet (10 mg total) by mouth once daily., Disp: 90 tablet, Rfl: 3    aspirin (ECOTRIN) 81 MG EC tablet, Take 1 tablet (81 mg total) by mouth once daily., Disp: 90 tablet, Rfl: 3    atorvastatin (LIPITOR) 80 MG tablet, Take 1 tablet (80 mg total) by mouth every evening., Disp: 90 tablet, Rfl: 3    b complex vitamins tablet, Take 1 tablet by mouth once daily., Disp: , Rfl:     BREO ELLIPTA 200-25 mcg/dose DsDv diskus inhaler, INHALE 1 PUFF INTO THE LUNGS EVERY DAY, Disp: 60 each, Rfl: 3    calcium citrate-vitamin D3 315-200 mg (CITRACAL+D) 315-200 mg-unit per tablet, Take 1 tablet by mouth once daily. , Disp: , Rfl:     chlorthalidone (HYGROTEN) 25 MG Tab, Take 1 tablet (25 mg total) by mouth once daily., Disp: 90 tablet, Rfl: 3    clarithromycin (BIAXIN) 500 MG tablet, Take 1 tablet (500 mg total) by mouth 2 (two) times a day., Disp: 28 tablet, Rfl: 0    colchicine (COLCRYS) 0.6 mg tablet, Take 1 tablet (0.6 mg total) by mouth every 72 hours. Take at onset of gout and take for 10 days, Disp: 10 tablet, Rfl: 3    EScitalopram oxalate (LEXAPRO) 10 MG tablet, Take 1 tablet (10 mg total) by mouth once daily., Disp: 90 tablet, Rfl: 3    ferrous sulfate 325 (65 FE) MG EC tablet, Take  1 tablet (325 mg total) by mouth once daily., Disp: 90 tablet, Rfl: 3    fluticasone propionate (FLONASE) 50 mcg/actuation nasal spray, 2 sprays (100 mcg total) by Each Nostril route once daily., Disp: 16 g, Rfl: 11    fluticasone-umeclidin-vilanter (TRELEGY ELLIPTA) 200-62.5-25 mcg inhaler, Inhale 1 puff into the lungs once daily., Disp: 60 each, Rfl: 11    gabapentin (NEURONTIN) 300 MG capsule, Take 1 capsule (300 mg total) by mouth 3 (three) times daily., Disp: 90 capsule, Rfl: 11    LIDOcaine (LIDODERM) 5 %, Place 1 patch onto the skin once daily. Remove & Discard patch within 12 hours or as directed by MD, Disp: 30 patch, Rfl: 2    meclizine (ANTIVERT) 12.5 mg tablet, Take 1 tablet (12.5 mg total) by mouth 3 (three) times daily as needed for Dizziness., Disp: 30 tablet, Rfl: 0    methylPREDNISolone (MEDROL DOSEPACK) 4 mg tablet, follow package directions, Disp: 21 tablet, Rfl: 0    metoprolol succinate (TOPROL-XL) 25 MG 24 hr tablet, Take 1 tablet (25 mg total) by mouth once daily., Disp: 90 tablet, Rfl: 3    metroNIDAZOLE (FLAGYL) 500 MG tablet, Take 1 tablet (500 mg total) by mouth 3 (three) times daily., Disp: 42 tablet, Rfl: 0    montelukast (SINGULAIR) 10 mg tablet, Take 1 tablet (10 mg total) by mouth every evening., Disp: 90 tablet, Rfl: 3    multivitamin (THERAGRAN) per tablet, Take 1 tablet by mouth once daily., Disp: 90 tablet, Rfl: 3    nystatin (MYCOSTATIN ORAL), Take 1 tablet by mouth Daily., Disp: , Rfl:     pantoprazole (PROTONIX) 40 MG tablet, Take 1 tablet (40 mg total) by mouth 2 (two) times daily., Disp: 60 tablet, Rfl: 0    pantoprazole (PROTONIX) 40 MG tablet, Take 1 tablet (40 mg total) by mouth once daily., Disp: 30 tablet, Rfl: 5    predniSONE (DELTASONE) 20 MG tablet, 3 for 3 days then 2 for 3 days then one for 3 days and repeat for breathing problems, Disp: 36 tablet, Rfl: 1    predniSONE (DELTASONE) 20 MG tablet, One BID for 3 days then once a day orally, Disp: 10 tablet, Rfl: 0     semaglutide, weight loss, (WEGOVY) 0.25 mg/0.5 mL PnIj, Inject 0.25 mg into the skin every 7 days., Disp: 2 mL, Rfl: 0    spironolactone (ALDACTONE) 25 MG tablet, Take 1 tablet (25 mg total) by mouth once daily., Disp: 90 tablet, Rfl: 3    tetracycline (ACHROMYCIN,SUMYCIN) 500 MG capsule, Take 1 capsule (500 mg total) by mouth 4 (four) times daily., Disp: 56 capsule, Rfl: 0    traMADoL (ULTRAM) 50 mg tablet, Take 1 tablet (50 mg total) by mouth every 6 (six) hours as needed for Pain., Disp: 20 each, Rfl: 0    traZODone (DESYREL) 50 MG tablet, Take 50 mg by mouth nightly as needed., Disp: , Rfl:     VELTASSA 8.4 gram PwPk, Take by mouth., Disp: , Rfl:     clopidogreL (PLAVIX) 75 mg tablet, Take 1 tablet (75 mg total) by mouth once daily., Disp: 30 tablet, Rfl: 1    hydrALAZINE (APRESOLINE) 50 MG tablet, Take 2 tablets (100 mg total) by mouth every 12 (twelve) hours., Disp: 360 tablet, Rfl: 3    isosorbide dinitrate (ISORDIL) 5 MG Tab, Take 1 tablet (5 mg total) by mouth 3 (three) times daily., Disp: 270 tablet, Rfl: 3    valsartan (DIOVAN) 160 MG tablet, Take 1 tablet (160 mg total) by mouth once daily., Disp: 90 tablet, Rfl: 3    Lab Results   Component Value Date    WBC 4.92 04/30/2024    HGB 12.0 04/30/2024    HCT 38.3 04/30/2024     04/30/2024    CHOL 216 (H) 04/30/2024    TRIG 186 (H) 04/30/2024    HDL 43 04/30/2024    ALT 8 (L) 08/22/2023    AST 11 08/22/2023     04/30/2024    K 4.5 04/30/2024     (H) 04/30/2024    CREATININE 3.1 (H) 04/30/2024    BUN 26 (H) 04/30/2024    CO2 21 (L) 04/30/2024    TSH 2.110 07/12/2023    INR 0.9 07/13/2023    HGBA1C 5.1 07/13/2023       Review of Systems   Constitutional:  Negative for activity change, appetite change and fever.   HENT:  Negative for postnasal drip, rhinorrhea and sinus pressure.    Eyes:  Negative for visual disturbance.   Respiratory:  Negative for cough and shortness of breath.    Cardiovascular:  Negative for chest pain.    Gastrointestinal:  Negative for abdominal distention and abdominal pain.   Genitourinary:  Negative for difficulty urinating and dysuria.   Musculoskeletal:  Negative for arthralgias and myalgias.   Neurological:  Positive for weakness (chronic due to CVA). Negative for headaches.   Hematological:  Negative for adenopathy.   Psychiatric/Behavioral:  The patient is not nervous/anxious.        Objective:      Physical Exam  Constitutional:       Appearance: Normal appearance.   HENT:      Head: Normocephalic and atraumatic.   Eyes:      Conjunctiva/sclera: Conjunctivae normal.   Cardiovascular:      Rate and Rhythm: Normal rate and regular rhythm.   Pulmonary:      Effort: Pulmonary effort is normal. No respiratory distress.      Breath sounds: Normal breath sounds. No wheezing.   Abdominal:      General: There is no distension.      Palpations: There is no mass.      Tenderness: There is no abdominal tenderness.   Musculoskeletal:      Right lower leg: No edema.      Left lower leg: No edema.   Lymphadenopathy:      Cervical: No cervical adenopathy.   Skin:     Findings: No erythema.   Neurological:      Mental Status: She is alert and oriented to person, place, and time.   Psychiatric:         Behavior: Behavior normal.         Assessment:       1. Essential hypertension    2. History of CVA (cerebrovascular accident)    3. Chronic kidney disease, stage 4 (severe)    4. 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 Major depressive disorder, single episode, in full remission        Plan:       Faustina was seen today for follow-up.    Diagnoses and all orders for this visit:    Essential hypertension  -     valsartan (DIOVAN) 160 MG tablet; Take 1 tablet (160 mg total) by mouth once daily.  -     Microalbumin/creatinine urine ratio  -     hydrALAZINE (APRESOLINE) 50 MG tablet; Take 2 tablets (100 mg total) by mouth every 12 (twelve) hours.  Uncontrolled  Patient to follow up in 1 week for recheck.  Bring all meds at that time. Will also recheck potassium at time of recheck.   History of CVA (cerebrovascular accident)  Discussed importance of blood pressure control.  Chronic kidney disease, stage 4 (severe)  -     Microalbumin/creatinine urine ratio  -     Comprehensive Metabolic Panel; Future  -     CBC Auto Differential; Future    Dysphasia as late effect of cerebrovascular accident (CVA)  chronic  Major depressive disorder, single episode, in full remission  Chronic  Stable on current medication, lexapro

## 2024-05-30 ENCOUNTER — LAB VISIT (OUTPATIENT)
Dept: LAB | Facility: HOSPITAL | Age: 59
End: 2024-05-30
Attending: PHYSICIAN ASSISTANT
Payer: MEDICARE

## 2024-05-30 DIAGNOSIS — N18.4 CHRONIC KIDNEY DISEASE, STAGE 4 (SEVERE): ICD-10-CM

## 2024-05-30 LAB
ALBUMIN SERPL BCP-MCNC: 3.7 G/DL (ref 3.5–5.2)
ALP SERPL-CCNC: 67 U/L (ref 55–135)
ALT SERPL W/O P-5'-P-CCNC: 10 U/L (ref 10–44)
ANION GAP SERPL CALC-SCNC: 9 MMOL/L (ref 8–16)
AST SERPL-CCNC: 14 U/L (ref 10–40)
BASOPHILS # BLD AUTO: 0.02 K/UL (ref 0–0.2)
BASOPHILS NFR BLD: 0.3 % (ref 0–1.9)
BILIRUB SERPL-MCNC: 0.3 MG/DL (ref 0.1–1)
BUN SERPL-MCNC: 30 MG/DL (ref 6–20)
CALCIUM SERPL-MCNC: 10.1 MG/DL (ref 8.7–10.5)
CHLORIDE SERPL-SCNC: 109 MMOL/L (ref 95–110)
CO2 SERPL-SCNC: 20 MMOL/L (ref 23–29)
CREAT SERPL-MCNC: 2.9 MG/DL (ref 0.5–1.4)
DIFFERENTIAL METHOD BLD: ABNORMAL
EOSINOPHIL # BLD AUTO: 0.1 K/UL (ref 0–0.5)
EOSINOPHIL NFR BLD: 1.3 % (ref 0–8)
ERYTHROCYTE [DISTWIDTH] IN BLOOD BY AUTOMATED COUNT: 13.6 % (ref 11.5–14.5)
EST. GFR  (NO RACE VARIABLE): 18 ML/MIN/1.73 M^2
GLUCOSE SERPL-MCNC: 92 MG/DL (ref 70–110)
HCT VFR BLD AUTO: 42.5 % (ref 37–48.5)
HGB BLD-MCNC: 13 G/DL (ref 12–16)
IMM GRANULOCYTES # BLD AUTO: 0.02 K/UL (ref 0–0.04)
IMM GRANULOCYTES NFR BLD AUTO: 0.3 % (ref 0–0.5)
LYMPHOCYTES # BLD AUTO: 1.5 K/UL (ref 1–4.8)
LYMPHOCYTES NFR BLD: 24.5 % (ref 18–48)
MCH RBC QN AUTO: 29 PG (ref 27–31)
MCHC RBC AUTO-ENTMCNC: 30.6 G/DL (ref 32–36)
MCV RBC AUTO: 95 FL (ref 82–98)
MONOCYTES # BLD AUTO: 0.4 K/UL (ref 0.3–1)
MONOCYTES NFR BLD: 6.8 % (ref 4–15)
NEUTROPHILS # BLD AUTO: 4 K/UL (ref 1.8–7.7)
NEUTROPHILS NFR BLD: 66.8 % (ref 38–73)
NRBC BLD-RTO: 0 /100 WBC
PLATELET # BLD AUTO: 316 K/UL (ref 150–450)
PMV BLD AUTO: 10.6 FL (ref 9.2–12.9)
POTASSIUM SERPL-SCNC: 4.7 MMOL/L (ref 3.5–5.1)
PROT SERPL-MCNC: 7.8 G/DL (ref 6–8.4)
RBC # BLD AUTO: 4.49 M/UL (ref 4–5.4)
SODIUM SERPL-SCNC: 138 MMOL/L (ref 136–145)
WBC # BLD AUTO: 6.04 K/UL (ref 3.9–12.7)

## 2024-05-30 PROCEDURE — 36415 COLL VENOUS BLD VENIPUNCTURE: CPT | Mod: HCNC,PO | Performed by: PHYSICIAN ASSISTANT

## 2024-05-30 PROCEDURE — 85025 COMPLETE CBC W/AUTO DIFF WBC: CPT | Mod: HCNC | Performed by: PHYSICIAN ASSISTANT

## 2024-05-30 PROCEDURE — 80053 COMPREHEN METABOLIC PANEL: CPT | Mod: HCNC | Performed by: PHYSICIAN ASSISTANT

## 2024-06-10 ENCOUNTER — OUTPATIENT CASE MANAGEMENT (OUTPATIENT)
Dept: ADMINISTRATIVE | Facility: OTHER | Age: 59
End: 2024-06-10
Payer: MEDICARE

## 2024-06-10 NOTE — PROGRESS NOTES
Outpatient Care Management  Plan of Care Follow Up Visit    Patient: Faustina Rae  MRN: [id number]  Date of Service: 06/10/2024  Completed by: Yadi Muñoz RN  Referral Date: 07/14/2023    Reason for Visit   Patient presents with    OPCM RN Follow Up Call       Brief Summary: Phone contact made with Ms. Weems today. We discussed the patient's care plan. No new needs at this time. Will continue to follow for education and management.

## 2024-06-17 DIAGNOSIS — E66.01 MORBID OBESITY: ICD-10-CM

## 2024-06-17 DIAGNOSIS — N18.4 CHRONIC KIDNEY DISEASE, STAGE 4 (SEVERE): ICD-10-CM

## 2024-06-17 RX ORDER — SEMAGLUTIDE 0.25 MG/.5ML
0.25 INJECTION, SOLUTION SUBCUTANEOUS
Qty: 6 ML | Refills: 0 | Status: SHIPPED | OUTPATIENT
Start: 2024-06-17

## 2024-06-17 NOTE — TELEPHONE ENCOUNTER
No care due was identified.  [facility] Embedded Care Due Messages. Reference number: [id number].   6/17/2024 10:11:04 AM CDT

## 2024-06-17 NOTE — TELEPHONE ENCOUNTER
Refill Routing Note   Medication(s) are not appropriate for processing by [facility] for the following reason(s):        New or recently adjusted medication  Required labs outdated    [facility] action(s):  Defer               Appointments  past 12m or future 3m with PCP    Date Provider   Last Visit   5/15/2024 Zoran Cuevas MD   Next Visit   11/25/2024 Zoran Cuevas MD   ED visits in past 90 days: 1        Note composed:2:40 PM 06/17/2024

## 2024-06-21 ENCOUNTER — OFFICE VISIT (OUTPATIENT)
Dept: FAMILY MEDICINE | Facility: CLINIC | Age: 59
End: 2024-06-21
Payer: MEDICARE

## 2024-06-21 VITALS
WEIGHT: 209.19 LBS | DIASTOLIC BLOOD PRESSURE: 78 MMHG | TEMPERATURE: 99 F | SYSTOLIC BLOOD PRESSURE: 128 MMHG | OXYGEN SATURATION: 98 % | BODY MASS INDEX: 38.5 KG/M2 | HEART RATE: 91 BPM | RESPIRATION RATE: 18 BRPM | HEIGHT: 62 IN

## 2024-06-21 DIAGNOSIS — K21.00 GASTROESOPHAGEAL REFLUX DISEASE WITH ESOPHAGITIS WITHOUT HEMORRHAGE: ICD-10-CM

## 2024-06-21 DIAGNOSIS — E66.01 SEVERE OBESITY (BMI 35.0-39.9) WITH COMORBIDITY: ICD-10-CM

## 2024-06-21 DIAGNOSIS — I10 ESSENTIAL HYPERTENSION: Primary | ICD-10-CM

## 2024-06-21 DIAGNOSIS — N18.4 CHRONIC KIDNEY DISEASE, STAGE 4 (SEVERE): ICD-10-CM

## 2024-06-21 DIAGNOSIS — E78.5 HYPERLIPIDEMIA, UNSPECIFIED HYPERLIPIDEMIA TYPE: ICD-10-CM

## 2024-06-21 DIAGNOSIS — R42 VERTIGO: ICD-10-CM

## 2024-06-21 PROCEDURE — 99999 PR PBB SHADOW E&M-EST. PATIENT-LVL V: CPT | Mod: PBBFAC,HCNC,, | Performed by: PHYSICIAN ASSISTANT

## 2024-06-21 RX ORDER — MECLIZINE HCL 12.5 MG 12.5 MG/1
12.5 TABLET ORAL 3 TIMES DAILY PRN
Qty: 30 TABLET | Refills: 0 | Status: SHIPPED | OUTPATIENT
Start: 2024-06-21

## 2024-06-21 NOTE — PROGRESS NOTES
Subjective:       Patient ID: Faustina Rae is a 59 y.o. female.    Chief Complaint: Follow-up (1 week )    Ms. Rae is a 59 year old female with CKD 4, HTN, GERD. She is here for follow up of HTN. Diovan and hydralazine increased at last visit. She was originally scheduled to follow up one week after last appt but she cancelled the originally scheduled appt.  The patient has been compliant with her medications and diet since last visit and reports she is doing much better.       Review of patient's allergies indicates:  No Known Allergies      Current Outpatient Medications:     albuterol (PROVENTIL/VENTOLIN HFA) 90 mcg/actuation inhaler, INHALE TWO PUFFS INTO THE LUNGS FOUR TIMES DAILY (BULK), Disp: 25.5 g, Rfl: 2    amLODIPine (NORVASC) 10 MG tablet, Take 1 tablet (10 mg total) by mouth once daily., Disp: 90 tablet, Rfl: 3    aspirin (ECOTRIN) 81 MG EC tablet, Take 1 tablet (81 mg total) by mouth once daily., Disp: 90 tablet, Rfl: 3    atorvastatin (LIPITOR) 80 MG tablet, Take 1 tablet (80 mg total) by mouth every evening., Disp: 90 tablet, Rfl: 3    b complex vitamins tablet, Take 1 tablet by mouth once daily., Disp: , Rfl:     BREO ELLIPTA 200-25 mcg/dose DsDv diskus inhaler, INHALE 1 PUFF INTO THE LUNGS EVERY DAY, Disp: 60 each, Rfl: 3    calcium citrate-vitamin D3 315-200 mg (CITRACAL+D) 315-200 mg-unit per tablet, Take 1 tablet by mouth once daily. , Disp: , Rfl:     chlorthalidone (HYGROTEN) 25 MG Tab, Take 1 tablet (25 mg total) by mouth once daily., Disp: 90 tablet, Rfl: 3    clarithromycin (BIAXIN) 500 MG tablet, Take 1 tablet (500 mg total) by mouth 2 (two) times a day., Disp: 28 tablet, Rfl: 0    clopidogreL (PLAVIX) 75 mg tablet, Take 1 tablet (75 mg total) by mouth once daily., Disp: 30 tablet, Rfl: 1    colchicine (COLCRYS) 0.6 mg tablet, Take 1 tablet (0.6 mg total) by mouth every 72 hours. Take at onset of gout and take for 10 days, Disp: 10 tablet, Rfl: 3    EScitalopram oxalate (LEXAPRO) 10  MG tablet, Take 1 tablet (10 mg total) by mouth once daily., Disp: 90 tablet, Rfl: 3    ferrous sulfate 325 (65 FE) MG EC tablet, Take 1 tablet (325 mg total) by mouth once daily., Disp: 90 tablet, Rfl: 3    fluticasone propionate (FLONASE) 50 mcg/actuation nasal spray, 2 sprays (100 mcg total) by Each Nostril route once daily., Disp: 16 g, Rfl: 11    fluticasone-umeclidin-vilanter (TRELEGY ELLIPTA) 200-62.5-25 mcg inhaler, Inhale 1 puff into the lungs once daily., Disp: 60 each, Rfl: 11    gabapentin (NEURONTIN) 300 MG capsule, Take 1 capsule (300 mg total) by mouth 3 (three) times daily., Disp: 90 capsule, Rfl: 11    hydrALAZINE (APRESOLINE) 50 MG tablet, Take 2 tablets (100 mg total) by mouth every 12 (twelve) hours., Disp: 360 tablet, Rfl: 3    isosorbide dinitrate (ISORDIL) 5 MG Tab, Take 1 tablet (5 mg total) by mouth 3 (three) times daily., Disp: 270 tablet, Rfl: 3    meclizine (ANTIVERT) 12.5 mg tablet, Take 1 tablet (12.5 mg total) by mouth 3 (three) times daily as needed for Dizziness., Disp: 30 tablet, Rfl: 0    methylPREDNISolone (MEDROL DOSEPACK) 4 mg tablet, follow package directions, Disp: 21 tablet, Rfl: 0    metoprolol succinate (TOPROL-XL) 25 MG 24 hr tablet, Take 1 tablet (25 mg total) by mouth once daily., Disp: 90 tablet, Rfl: 3    metroNIDAZOLE (FLAGYL) 500 MG tablet, Take 1 tablet (500 mg total) by mouth 3 (three) times daily., Disp: 42 tablet, Rfl: 0    montelukast (SINGULAIR) 10 mg tablet, Take 1 tablet (10 mg total) by mouth every evening., Disp: 90 tablet, Rfl: 3    multivitamin (THERAGRAN) per tablet, Take 1 tablet by mouth once daily., Disp: 90 tablet, Rfl: 3    nystatin (MYCOSTATIN ORAL), Take 1 tablet by mouth Daily., Disp: , Rfl:     pantoprazole (PROTONIX) 40 MG tablet, Take 1 tablet (40 mg total) by mouth 2 (two) times daily., Disp: 60 tablet, Rfl: 0    pantoprazole (PROTONIX) 40 MG tablet, Take 1 tablet (40 mg total) by mouth once daily., Disp: 30 tablet, Rfl: 5    predniSONE  (DELTASONE) 20 MG tablet, 3 for 3 days then 2 for 3 days then one for 3 days and repeat for breathing problems, Disp: 36 tablet, Rfl: 1    predniSONE (DELTASONE) 20 MG tablet, One BID for 3 days then once a day orally, Disp: 10 tablet, Rfl: 0    semaglutide, weight loss, (WEGOVY) 0.25 mg/0.5 mL PnIj, Inject 0.25 mg into the skin every 7 days., Disp: 6 mL, Rfl: 0    spironolactone (ALDACTONE) 25 MG tablet, Take 1 tablet (25 mg total) by mouth once daily., Disp: 90 tablet, Rfl: 3    tetracycline (ACHROMYCIN,SUMYCIN) 500 MG capsule, Take 1 capsule (500 mg total) by mouth 4 (four) times daily., Disp: 56 capsule, Rfl: 0    traMADoL (ULTRAM) 50 mg tablet, Take 1 tablet (50 mg total) by mouth every 6 (six) hours as needed for Pain., Disp: 20 each, Rfl: 0    traZODone (DESYREL) 50 MG tablet, Take 50 mg by mouth nightly as needed., Disp: , Rfl:     valsartan (DIOVAN) 160 MG tablet, Take 1 tablet (160 mg total) by mouth once daily., Disp: 90 tablet, Rfl: 3    VELTASSA 8.4 gram PwPk, Take by mouth., Disp: , Rfl:     Lab Results   Component Value Date    WBC 6.04 05/30/2024    HGB 13.0 05/30/2024    HCT 42.5 05/30/2024     05/30/2024    CHOL 216 (H) 04/30/2024    TRIG 186 (H) 04/30/2024    HDL 43 04/30/2024    ALT 10 05/30/2024    AST 14 05/30/2024     05/30/2024    K 4.7 05/30/2024     05/30/2024    CREATININE 2.9 (H) 05/30/2024    BUN 30 (H) 05/30/2024    CO2 20 (L) 05/30/2024    TSH 2.110 07/12/2023    INR 0.9 07/13/2023    HGBA1C 5.1 07/13/2023       Review of Systems   Constitutional:  Negative for activity change, appetite change and fever.   HENT:  Negative for postnasal drip, rhinorrhea and sinus pressure.    Eyes:  Negative for visual disturbance.   Respiratory:  Negative for cough and shortness of breath.    Cardiovascular:  Negative for chest pain.   Gastrointestinal:  Negative for abdominal distention and abdominal pain.   Genitourinary:  Negative for difficulty urinating and dysuria.    Musculoskeletal:  Negative for arthralgias and myalgias.   Neurological:  Negative for headaches.   Hematological:  Negative for adenopathy.   Psychiatric/Behavioral:  The patient is not nervous/anxious.      Objective:      Physical Exam  Constitutional:       Appearance: Normal appearance.   HENT:      Head: Normocephalic and atraumatic.   Eyes:      Conjunctiva/sclera: Conjunctivae normal.   Cardiovascular:      Rate and Rhythm: Normal rate and regular rhythm.   Pulmonary:      Effort: Pulmonary effort is normal. No respiratory distress.      Breath sounds: Normal breath sounds. No wheezing.   Abdominal:      General: There is no distension.      Palpations: There is no mass.      Tenderness: There is no abdominal tenderness.   Musculoskeletal:      Right lower leg: No edema.      Left lower leg: No edema.   Lymphadenopathy:      Cervical: No cervical adenopathy.   Skin:     Findings: No erythema.   Neurological:      Mental Status: She is alert and oriented to person, place, and time.   Psychiatric:         Behavior: Behavior normal.         Assessment:       1. Essential hypertension    2. Gastroesophageal reflux disease with esophagitis without hemorrhage    3. Hyperlipidemia, unspecified hyperlipidemia type    4. Chronic kidney disease, stage 4 (severe)    5. Vertigo    6. Stage 3b chronic kidney disease    7. Severe obesity (BMI 35.0-39.9) with comorbidity        Plan:       Faustina was seen today for follow-up.    Diagnoses and all orders for this visit:    Essential hypertension  -     Comprehensive Metabolic Panel; Future  Controlled  Low salt diet  Continue current medication  Gastroesophageal reflux disease with esophagitis without hemorrhage  Avoid trigger foods  Continue pantoprazole  Hyperlipidemia, unspecified hyperlipidemia type  -     Comprehensive Metabolic Panel; Future    Chronic kidney disease, stage 4 (severe)  Stable  Labs today  Vertigo  -     meclizine (ANTIVERT) 12.5 mg tablet; Take 1  tablet (12.5 mg total) by mouth 3 (three) times daily as needed for Dizziness.  -     Ambulatory referral/consult to ENT; Future    Severe obesity (BMI 35.0-39.9) with comorbidity  Improving  Continue lifestyle efforts through diet and exercise    Follow up as scheduled with PCP in November    Patient will be notified when labs return and further recommendations will be given at that time.

## 2024-06-21 NOTE — PATIENT INSTRUCTIONS
José Manuel Weems,     If you are due for any health screening(s) below please notify me so we can arrange them to be ordered and scheduled to maintain your health. Most healthy patients complete it. Don't lose out on improving your health.     Tests to Keep You Healthy    Mammogram: Met on 1/19/2024  Colon Cancer Screening: ORDERED  Last Blood Pressure <= 139/89 (6/21/2024): NO         Colon Cancer Screening    Of cancers affecting both men and women, colorectal cancer is the third leading cancer killer in the [place]. But it doesnt have to be. Screening can prevent colorectal cancer or find it at an early stage when treatment often leads to a cure.    A colonoscopy is the preferred test for detecting colon cancer. It is needed only once every 10 years if results are negative. While sedated, a flexible, lighted tube with a tiny camera is inserted into the rectum and advanced through the colon to look for cancers. An alternative screening test that is used at home and returned to the lab may also be used. It detects hidden blood in bowel movements which could indicate cancer in the colon. If results are positive, you will need a colonoscopy to determine if the blood is a sign of cancer. This type of follow up (diagnostic) colonoscopy usually requires additional copays as required by your insurance provider. Please contact your PCP if you have any questions.

## 2024-06-24 ENCOUNTER — LAB VISIT (OUTPATIENT)
Dept: LAB | Facility: HOSPITAL | Age: 59
End: 2024-06-24
Attending: PHYSICIAN ASSISTANT
Payer: MEDICARE

## 2024-06-24 DIAGNOSIS — E78.5 HYPERLIPIDEMIA, UNSPECIFIED HYPERLIPIDEMIA TYPE: ICD-10-CM

## 2024-06-24 DIAGNOSIS — I10 ESSENTIAL HYPERTENSION: ICD-10-CM

## 2024-06-24 LAB
ALBUMIN SERPL BCP-MCNC: 3.7 G/DL (ref 3.5–5.2)
ALP SERPL-CCNC: 69 U/L (ref 55–135)
ALT SERPL W/O P-5'-P-CCNC: 6 U/L (ref 10–44)
ANION GAP SERPL CALC-SCNC: 9 MMOL/L (ref 8–16)
AST SERPL-CCNC: 12 U/L (ref 10–40)
BILIRUB SERPL-MCNC: 0.2 MG/DL (ref 0.1–1)
BUN SERPL-MCNC: 27 MG/DL (ref 6–20)
CALCIUM SERPL-MCNC: 9.8 MG/DL (ref 8.7–10.5)
CHLORIDE SERPL-SCNC: 110 MMOL/L (ref 95–110)
CO2 SERPL-SCNC: 23 MMOL/L (ref 23–29)
CREAT SERPL-MCNC: 2.7 MG/DL (ref 0.5–1.4)
EST. GFR  (NO RACE VARIABLE): 19.6 ML/MIN/1.73 M^2
GLUCOSE SERPL-MCNC: 82 MG/DL (ref 70–110)
POTASSIUM SERPL-SCNC: 4.5 MMOL/L (ref 3.5–5.1)
PROT SERPL-MCNC: 7.7 G/DL (ref 6–8.4)
SODIUM SERPL-SCNC: 142 MMOL/L (ref 136–145)

## 2024-06-24 PROCEDURE — 36415 COLL VENOUS BLD VENIPUNCTURE: CPT | Mod: HCNC,PO | Performed by: PHYSICIAN ASSISTANT

## 2024-06-24 PROCEDURE — 80053 COMPREHEN METABOLIC PANEL: CPT | Mod: HCNC | Performed by: PHYSICIAN ASSISTANT

## 2024-07-02 ENCOUNTER — OUTPATIENT CASE MANAGEMENT (OUTPATIENT)
Dept: ADMINISTRATIVE | Facility: OTHER | Age: 59
End: 2024-07-02
Payer: MEDICARE

## 2024-07-02 NOTE — PROGRESS NOTES
07/02/24 Phone contact made with Ms. Weems today and we finished discussing stroke care plan. She denies needing any more resources or education on zurita. No other issues identified at this time, contact information provided to Ms. Weems if she has any needs in the future. I will discharge Ms. Faustina long from [facility] at this time due to program graduation. Ms. Faustina long agrees with this plan.

## 2024-07-19 ENCOUNTER — OFFICE VISIT (OUTPATIENT)
Dept: OTOLARYNGOLOGY | Facility: CLINIC | Age: 59
End: 2024-07-19
Payer: MEDICARE

## 2024-07-19 VITALS — BODY MASS INDEX: 41.17 KG/M2 | WEIGHT: 223.75 LBS | HEIGHT: 62 IN

## 2024-07-19 DIAGNOSIS — R55 POSTURAL DIZZINESS WITH PRESYNCOPE: ICD-10-CM

## 2024-07-19 DIAGNOSIS — R42 POSTURAL DIZZINESS WITH PRESYNCOPE: ICD-10-CM

## 2024-07-19 PROCEDURE — 1159F MED LIST DOCD IN RCRD: CPT | Mod: HCNC,CPTII,S$GLB, | Performed by: OTOLARYNGOLOGY

## 2024-07-19 PROCEDURE — 4010F ACE/ARB THERAPY RXD/TAKEN: CPT | Mod: HCNC,CPTII,S$GLB, | Performed by: OTOLARYNGOLOGY

## 2024-07-19 PROCEDURE — 1160F RVW MEDS BY RX/DR IN RCRD: CPT | Mod: HCNC,CPTII,S$GLB, | Performed by: OTOLARYNGOLOGY

## 2024-07-19 PROCEDURE — 3008F BODY MASS INDEX DOCD: CPT | Mod: HCNC,CPTII,S$GLB, | Performed by: OTOLARYNGOLOGY

## 2024-07-19 PROCEDURE — 99203 OFFICE O/P NEW LOW 30 MIN: CPT | Mod: HCNC,S$GLB,, | Performed by: OTOLARYNGOLOGY

## 2024-07-19 PROCEDURE — 99999 PR PBB SHADOW E&M-EST. PATIENT-LVL IV: CPT | Mod: PBBFAC,HCNC,, | Performed by: OTOLARYNGOLOGY
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
## 2024-07-24 DIAGNOSIS — R42 VERTIGO: ICD-10-CM

## 2024-07-24 RX ORDER — MECLIZINE HCL 12.5 MG 12.5 MG/1
TABLET ORAL
Qty: 30 TABLET | Refills: 0 | Status: SHIPPED | OUTPATIENT
Start: 2024-07-24

## 2024-08-01 ENCOUNTER — OFFICE VISIT (OUTPATIENT)
Dept: FAMILY MEDICINE | Facility: CLINIC | Age: 59
End: 2024-08-01
Payer: MEDICARE

## 2024-08-01 VITALS
HEIGHT: 62 IN | RESPIRATION RATE: 16 BRPM | OXYGEN SATURATION: 98 % | DIASTOLIC BLOOD PRESSURE: 68 MMHG | SYSTOLIC BLOOD PRESSURE: 124 MMHG | WEIGHT: 221.31 LBS | BODY MASS INDEX: 40.72 KG/M2 | HEART RATE: 76 BPM

## 2024-08-01 DIAGNOSIS — F40.240 CLAUSTROPHOBIA: Primary | ICD-10-CM

## 2024-08-01 DIAGNOSIS — M54.2 CERVICAL PAIN: ICD-10-CM

## 2024-08-01 PROCEDURE — 3008F BODY MASS INDEX DOCD: CPT | Mod: HCNC,CPTII,S$GLB,

## 2024-08-01 PROCEDURE — 99999 PR PBB SHADOW E&M-EST. PATIENT-LVL V: CPT | Mod: PBBFAC,HCNC,,

## 2024-08-01 PROCEDURE — 1160F RVW MEDS BY RX/DR IN RCRD: CPT | Mod: HCNC,CPTII,S$GLB,

## 2024-08-01 PROCEDURE — 4010F ACE/ARB THERAPY RXD/TAKEN: CPT | Mod: HCNC,CPTII,S$GLB,

## 2024-08-01 PROCEDURE — 1159F MED LIST DOCD IN RCRD: CPT | Mod: HCNC,CPTII,S$GLB,

## 2024-08-01 PROCEDURE — 99213 OFFICE O/P EST LOW 20 MIN: CPT | Mod: HCNC,S$GLB,,

## 2024-08-01 PROCEDURE — 3074F SYST BP LT 130 MM HG: CPT | Mod: HCNC,CPTII,S$GLB,

## 2024-08-01 PROCEDURE — 3078F DIAST BP <80 MM HG: CPT | Mod: HCNC,CPTII,S$GLB,

## 2024-08-01 RX ORDER — DIAZEPAM 5 MG/1
5 TABLET ORAL DAILY PRN
Qty: 2 TABLET | Refills: 0 | Status: SHIPPED | OUTPATIENT
Start: 2024-08-01

## 2024-08-01 NOTE — PROGRESS NOTES
Subjective:       Patient ID: Faustina Rae is a 59 y.o. female.    Chief Complaint: Health Maintenance    Patient presents to the clinic with complaint of claustrophobia.    Patient Active Problem List:     Morbid obesity with BMI of 40.0-44.9, adult     DAVID (obstructive sleep apnea)     Asthma     Dysarthria     Chronic diastolic CHF (congestive heart failure)     Essential hypertension     S/P laparoscopic sleeve gastrectomy     History of CVA (cerebrovascular accident)     Stage 3b chronic kidney disease     Normocytic anemia     Hypoalbuminemia     Gastroesophageal reflux disease with esophagitis     Acute lacunar infarction     Dysphasia as late effect of cerebrovascular accident (CVA)     Insomnia     Hyperlipidemia, unspecified     Acute seasonal allergic rhinitis due to pollen     Hemiparesis affecting left side as late effect of cerebrovascular accident     Major depressive disorder, single episode, in full remission     Secondary hyperparathyroidism of renal origin     Chronic kidney disease, stage 4 (severe)     H. pylori infection     COPD with asthma     Severe persistent asthma, uncomplicated     Gout     Cough variant asthma     Thyroid nodule    She is getting a MRI today for back pain. She has a history of claustrophobia and would like something to help calm her down so she can complete the MRI. She has taken Valium in the past with good results.     Has no new complaints or concerns today.     Patient educated on plan of care, verbalized understanding.         Review of Systems   Constitutional:  Negative for activity change, appetite change, chills, diaphoresis and fever.   HENT:  Negative for congestion, ear pain, postnasal drip, sinus pressure, sneezing and sore throat.    Eyes:  Negative for pain, discharge, redness and itching.   Respiratory:  Negative for apnea, cough, chest tightness, shortness of breath and wheezing.    Cardiovascular:  Negative for chest pain and leg swelling.    Gastrointestinal:  Negative for abdominal distention, abdominal pain, constipation, diarrhea, nausea and vomiting.   Genitourinary:  Negative for difficulty urinating, dysuria, flank pain and frequency.   Musculoskeletal:  Positive for arthralgias, back pain and myalgias.   Skin:  Negative for color change, rash and wound.   Neurological:  Negative for dizziness.   All other systems reviewed and are negative.      Patient Active Problem List   Diagnosis    Morbid obesity with BMI of 40.0-44.9, adult    DAVID (obstructive sleep apnea)    Asthma    Dysarthria    Chronic diastolic CHF (congestive heart failure)    Essential hypertension    S/P laparoscopic sleeve gastrectomy    History of CVA (cerebrovascular accident)    Stage 3b chronic kidney disease    Normocytic anemia    Hypoalbuminemia    Gastroesophageal reflux disease with esophagitis    Acute lacunar infarction    Dysphasia as late effect of cerebrovascular accident (CVA)    Insomnia    Hyperlipidemia, unspecified    Acute seasonal allergic rhinitis due to pollen    Hemiparesis affecting left side as late effect of cerebrovascular accident    Major depressive disorder, single episode, in full remission    Secondary hyperparathyroidism of renal origin    Chronic kidney disease, stage 4 (severe)    H. pylori infection    COPD with asthma    Severe persistent asthma, uncomplicated    Gout    Cough variant asthma    Thyroid nodule       Objective:      Physical Exam  Vitals and nursing note reviewed.   Constitutional:       General: She is not in acute distress.     Appearance: Normal appearance. She is well-developed.   HENT:      Head: Normocephalic.      Nose: Nose normal.   Eyes:      Conjunctiva/sclera: Conjunctivae normal.      Pupils: Pupils are equal, round, and reactive to light.   Cardiovascular:      Rate and Rhythm: Normal rate.   Pulmonary:      Effort: Pulmonary effort is normal. No respiratory distress.   Musculoskeletal:      Cervical back: Normal  "range of motion.   Skin:     General: Skin is warm and dry.      Findings: No rash.   Neurological:      Mental Status: She is alert and oriented to person, place, and time.   Psychiatric:         Behavior: Behavior normal.         Lab Results   Component Value Date    WBC 6.04 05/30/2024    HGB 13.0 05/30/2024    HCT 42.5 05/30/2024     05/30/2024    CHOL 216 (H) 04/30/2024    TRIG 186 (H) 04/30/2024    HDL 43 04/30/2024    ALT 6 (L) 06/24/2024    AST 12 06/24/2024     06/24/2024    K 4.5 06/24/2024     06/24/2024    CREATININE 2.7 (H) 06/24/2024    BUN 27 (H) 06/24/2024    CO2 23 06/24/2024    TSH 2.110 07/12/2023    INR 0.9 07/13/2023    HGBA1C 5.1 07/13/2023     The ASCVD Risk score (Emmanuel DIXON, et al., 2019) failed to calculate for the following reasons:    The patient has a prior MI or stroke diagnosis  Visit Vitals  /68 (BP Location: Right arm, Patient Position: Sitting, BP Method: Large (Manual))   Pulse 76   Resp 16   Ht 5' 2" (1.575 m)   Wt 100.4 kg (221 lb 5.5 oz)   LMP  (LMP Unknown)   SpO2 98%   BMI 40.48 kg/m²      Assessment:       1. Claustrophobia    2. Cervical pain        Plan:       1. Claustrophobia/Cervical pain  -     diazePAM (VALIUM) 5 MG tablet; Take 1 tablet (5 mg total) by mouth daily as needed for Anxiety.  Dispense: 2 tablet; Refill: 0   - Prescription drug monitoring program has been reviewed and is consistent with patient's prescription history. There is no evidence of early fills or obtaining controlled rx's from multiple providers.    - Take 1 hour before MRI- Can repeat dose x 1 if needed     Follow up if symptoms worsen or fail to improve.      Future Appointments       Date Provider Specialty Appt Notes    9/26/2024 Aurelio Matias MD Pulmonology 6m f/u    11/25/2024 Zoran Cuevas MD Family Medicine 6 months f/u             "

## 2024-08-01 NOTE — PATIENT INSTRUCTIONS
Thank you for allowing me to be part of your healthcare team at [facility]. It is a pleasure to care for you today.   Please take all of your medications as instructed and follow all new instructions from your visit today.  If you received labs or medical tests today you should hear information about results or scheduling either by phone or mychart within approximately a week.   If you have any questions or concerns please do not hesitate to call. Have a blessed day and I hope to see you again soon.  CATALINA Parnell      WE STRIVE FOR 5'S!!!        We strive for exceptional care. Please fill out a survey if you received 5 star service.

## 2024-08-23 ENCOUNTER — TELEPHONE (OUTPATIENT)
Dept: FAMILY MEDICINE | Facility: CLINIC | Age: 59
End: 2024-08-23
Payer: MEDICARE

## 2024-08-23 NOTE — TELEPHONE ENCOUNTER
----- Message from Scotty Chaudhry sent at 8/23/2024  7:25 AM CDT -----  Regarding: Refill request  Type:  RX Refill Request    Who Called: Pt  Refill or New Rx:refill    RX Name and Strength:colchicine (COLCRYS) 0.6 mg tablet   How is the patient currently taking it? (ex. 1XDay):as directed  Is this a 30 day or 90 day RX:30    Preferred Pharmacy with phone number:  [id number] [address] & [address] [address]  Phone: [phone] Fax: [phone]    Local or Mail Order:local  Ordering Provider: Dr Matias    Would the patient rather a call back or a response via [facility]? Call back    Best Call Back Number:[phone]      Additional Information: Sts she needs a refill on her Gout medication.     Please advise -- Thank you

## 2024-08-23 NOTE — TELEPHONE ENCOUNTER
----- Message from Holly Chambers sent at 8/23/2024  1:29 PM CDT -----  Contact: pt  Type:  Patient Returning Call    Who Called:pt    Who Left Message for Patient: Charito     Does the patient know what this is regarding?: about her colchicine (COLCRYS) 0.6 mg tablet    Would the patient rather a call back or a response via [facility]? Call back\    Best Call Back Number:[phone]    Additional Information:  please call to advise    Thanks        [id number] [address] & [address] [address]  Phone: [phone] Fax: [phone]    Thanks

## 2024-08-26 ENCOUNTER — HOSPITAL ENCOUNTER (OUTPATIENT)
Dept: RADIOLOGY | Facility: CLINIC | Age: 59
Discharge: HOME | End: 2024-08-26
Attending: PHYSICIAN ASSISTANT
Payer: MEDICARE

## 2024-08-26 ENCOUNTER — OFFICE VISIT (OUTPATIENT)
Dept: FAMILY MEDICINE | Facility: CLINIC | Age: 59
End: 2024-08-26
Payer: MEDICARE

## 2024-08-26 VITALS
RESPIRATION RATE: 18 BRPM | OXYGEN SATURATION: 97 % | TEMPERATURE: 98 F | SYSTOLIC BLOOD PRESSURE: 132 MMHG | DIASTOLIC BLOOD PRESSURE: 78 MMHG | HEIGHT: 62 IN | WEIGHT: 221.13 LBS | BODY MASS INDEX: 40.69 KG/M2 | HEART RATE: 82 BPM

## 2024-08-26 DIAGNOSIS — N18.4 CHRONIC KIDNEY DISEASE, STAGE 4 (SEVERE): ICD-10-CM

## 2024-08-26 DIAGNOSIS — M79.672 LEFT FOOT PAIN: Primary | ICD-10-CM

## 2024-08-26 DIAGNOSIS — I10 ESSENTIAL HYPERTENSION: ICD-10-CM

## 2024-08-26 DIAGNOSIS — E78.5 HYPERLIPIDEMIA, UNSPECIFIED HYPERLIPIDEMIA TYPE: ICD-10-CM

## 2024-08-26 DIAGNOSIS — M79.672 LEFT FOOT PAIN: ICD-10-CM

## 2024-08-26 DIAGNOSIS — R79.9 ABNORMAL FINDING OF BLOOD CHEMISTRY, UNSPECIFIED: ICD-10-CM

## 2024-08-26 PROCEDURE — 3078F DIAST BP <80 MM HG: CPT | Mod: HCNC,CPTII,S$GLB, | Performed by: PHYSICIAN ASSISTANT

## 2024-08-26 PROCEDURE — 3075F SYST BP GE 130 - 139MM HG: CPT | Mod: HCNC,CPTII,S$GLB, | Performed by: PHYSICIAN ASSISTANT

## 2024-08-26 PROCEDURE — 3008F BODY MASS INDEX DOCD: CPT | Mod: HCNC,CPTII,S$GLB, | Performed by: PHYSICIAN ASSISTANT

## 2024-08-26 PROCEDURE — 73630 X-RAY EXAM OF FOOT: CPT | Mod: 26,HCNC,LT, | Performed by: RADIOLOGY

## 2024-08-26 PROCEDURE — 99999 PR PBB SHADOW E&M-EST. PATIENT-LVL V: CPT | Mod: PBBFAC,HCNC,, | Performed by: PHYSICIAN ASSISTANT

## 2024-08-26 PROCEDURE — 4010F ACE/ARB THERAPY RXD/TAKEN: CPT | Mod: HCNC,CPTII,S$GLB, | Performed by: PHYSICIAN ASSISTANT

## 2024-08-26 PROCEDURE — 99214 OFFICE O/P EST MOD 30 MIN: CPT | Mod: HCNC,S$GLB,, | Performed by: PHYSICIAN ASSISTANT

## 2024-08-26 PROCEDURE — 1159F MED LIST DOCD IN RCRD: CPT | Mod: HCNC,CPTII,S$GLB, | Performed by: PHYSICIAN ASSISTANT

## 2024-08-26 PROCEDURE — 73630 X-RAY EXAM OF FOOT: CPT | Mod: TC,HCNC,FY,PO,LT

## 2024-08-26 PROCEDURE — 1160F RVW MEDS BY RX/DR IN RCRD: CPT | Mod: HCNC,CPTII,S$GLB, | Performed by: PHYSICIAN ASSISTANT

## 2024-08-26 NOTE — PROGRESS NOTES
Subjective:       Patient ID: Faustina Rae is a 59 y.o. female.    Chief Complaint: Gout    Ms. Rae is a 59 year old female with HTN and HLD. The patient is here today with complaints of left foot pain. Reports no injury. Pain in on bottom of foot and heel. She has been wrapping foot with relief. The patient is due to update some labs at this time. Denies any other complaints and reports she has been feeling well otherwise.       Review of patient's allergies indicates:  No Known Allergies      Current Outpatient Medications:     albuterol (PROVENTIL/VENTOLIN HFA) 90 mcg/actuation inhaler, INHALE TWO PUFFS INTO THE LUNGS FOUR TIMES DAILY (BULK), Disp: 25.5 g, Rfl: 2    amLODIPine (NORVASC) 10 MG tablet, Take 1 tablet (10 mg total) by mouth once daily., Disp: 90 tablet, Rfl: 3    aspirin (ECOTRIN) 81 MG EC tablet, Take 1 tablet (81 mg total) by mouth once daily., Disp: 90 tablet, Rfl: 3    atorvastatin (LIPITOR) 80 MG tablet, Take 1 tablet (80 mg total) by mouth every evening., Disp: 90 tablet, Rfl: 3    b complex vitamins tablet, Take 1 tablet by mouth once daily., Disp: , Rfl:     BREO ELLIPTA 200-25 mcg/dose DsDv diskus inhaler, INHALE 1 PUFF INTO THE LUNGS EVERY DAY, Disp: 60 each, Rfl: 3    calcium citrate-vitamin D3 315-200 mg (CITRACAL+D) 315-200 mg-unit per tablet, Take 1 tablet by mouth once daily. , Disp: , Rfl:     chlorthalidone (HYGROTEN) 25 MG Tab, Take 1 tablet (25 mg total) by mouth once daily., Disp: 90 tablet, Rfl: 3    clarithromycin (BIAXIN) 500 MG tablet, Take 1 tablet (500 mg total) by mouth 2 (two) times a day., Disp: 28 tablet, Rfl: 0    colchicine (COLCRYS) 0.6 mg tablet, Take 1 tablet (0.6 mg total) by mouth every 72 hours. Take at onset of gout and take for 10 days, Disp: 10 tablet, Rfl: 3    diazePAM (VALIUM) 5 MG tablet, Take 1 tablet (5 mg total) by mouth daily as needed for Anxiety., Disp: 2 tablet, Rfl: 0    EScitalopram oxalate (LEXAPRO) 10 MG tablet, Take 1 tablet (10 mg  total) by mouth once daily., Disp: 90 tablet, Rfl: 3    ferrous sulfate 325 (65 FE) MG EC tablet, Take 1 tablet (325 mg total) by mouth once daily., Disp: 90 tablet, Rfl: 3    fluticasone propionate (FLONASE) 50 mcg/actuation nasal spray, 2 sprays (100 mcg total) by Each Nostril route once daily., Disp: 16 g, Rfl: 11    fluticasone-umeclidin-vilanter (TRELEGY ELLIPTA) 200-62.5-25 mcg inhaler, Inhale 1 puff into the lungs once daily., Disp: 60 each, Rfl: 11    gabapentin (NEURONTIN) 300 MG capsule, Take 1 capsule (300 mg total) by mouth 3 (three) times daily., Disp: 90 capsule, Rfl: 11    hydrALAZINE (APRESOLINE) 50 MG tablet, Take 2 tablets (100 mg total) by mouth every 12 (twelve) hours., Disp: 360 tablet, Rfl: 3    meclizine (ANTIVERT) 12.5 mg tablet, TAKE 1 TABLET(12.5 MG) BY MOUTH THREE TIMES DAILY AS NEEDED FOR DIZZINESS, Disp: 30 tablet, Rfl: 0    methylPREDNISolone (MEDROL DOSEPACK) 4 mg tablet, follow package directions, Disp: 21 tablet, Rfl: 0    metroNIDAZOLE (FLAGYL) 500 MG tablet, Take 1 tablet (500 mg total) by mouth 3 (three) times daily., Disp: 42 tablet, Rfl: 0    montelukast (SINGULAIR) 10 mg tablet, Take 1 tablet (10 mg total) by mouth every evening., Disp: 90 tablet, Rfl: 3    multivitamin (THERAGRAN) per tablet, Take 1 tablet by mouth once daily., Disp: 90 tablet, Rfl: 3    nystatin (MYCOSTATIN ORAL), Take 1 tablet by mouth Daily., Disp: , Rfl:     pantoprazole (PROTONIX) 40 MG tablet, Take 1 tablet (40 mg total) by mouth 2 (two) times daily., Disp: 60 tablet, Rfl: 0    pantoprazole (PROTONIX) 40 MG tablet, Take 1 tablet (40 mg total) by mouth once daily., Disp: 30 tablet, Rfl: 5    predniSONE (DELTASONE) 20 MG tablet, 3 for 3 days then 2 for 3 days then one for 3 days and repeat for breathing problems, Disp: 36 tablet, Rfl: 1    predniSONE (DELTASONE) 20 MG tablet, One BID for 3 days then once a day orally, Disp: 10 tablet, Rfl: 0    semaglutide, weight loss, (WEGOVY) 0.25 mg/0.5 mL PnIj, Inject  0.25 mg into the skin every 7 days., Disp: 6 mL, Rfl: 0    spironolactone (ALDACTONE) 25 MG tablet, Take 1 tablet (25 mg total) by mouth once daily., Disp: 90 tablet, Rfl: 3    tetracycline (ACHROMYCIN,SUMYCIN) 500 MG capsule, Take 1 capsule (500 mg total) by mouth 4 (four) times daily., Disp: 56 capsule, Rfl: 0    traMADoL (ULTRAM) 50 mg tablet, Take 1 tablet (50 mg total) by mouth every 6 (six) hours as needed for Pain., Disp: 20 each, Rfl: 0    traZODone (DESYREL) 50 MG tablet, Take 50 mg by mouth nightly as needed., Disp: , Rfl:     valsartan (DIOVAN) 160 MG tablet, Take 1 tablet (160 mg total) by mouth once daily., Disp: 90 tablet, Rfl: 3    VELTASSA 8.4 gram PwPk, Take by mouth., Disp: , Rfl:     clopidogreL (PLAVIX) 75 mg tablet, Take 1 tablet (75 mg total) by mouth once daily., Disp: 30 tablet, Rfl: 1    isosorbide dinitrate (ISORDIL) 5 MG Tab, Take 1 tablet (5 mg total) by mouth 3 (three) times daily., Disp: 270 tablet, Rfl: 3    metoprolol succinate (TOPROL-XL) 25 MG 24 hr tablet, Take 1 tablet (25 mg total) by mouth once daily., Disp: 90 tablet, Rfl: 3    Lab Results   Component Value Date    WBC 6.04 05/30/2024    HGB 13.0 05/30/2024    HCT 42.5 05/30/2024     05/30/2024    CHOL 216 (H) 04/30/2024    TRIG 186 (H) 04/30/2024    HDL 43 04/30/2024    ALT 6 (L) 06/24/2024    AST 12 06/24/2024     06/24/2024    K 4.5 06/24/2024     06/24/2024    CREATININE 2.7 (H) 06/24/2024    BUN 27 (H) 06/24/2024    CO2 23 06/24/2024    TSH 2.110 07/12/2023    INR 0.9 07/13/2023    HGBA1C 5.1 07/13/2023       Review of Systems   Constitutional:  Negative for activity change, appetite change and fever.   Eyes:  Negative for visual disturbance.   Respiratory:  Negative for cough and shortness of breath.    Cardiovascular:  Negative for chest pain.   Gastrointestinal:  Negative for abdominal distention and abdominal pain.   Genitourinary:  Negative for difficulty urinating and dysuria.   Musculoskeletal:   Positive for arthralgias and myalgias.   Neurological:  Negative for headaches.   Hematological:  Negative for adenopathy.   Psychiatric/Behavioral:  The patient is not nervous/anxious.        Objective:      Physical Exam  Constitutional:       Appearance: Normal appearance.   HENT:      Head: Normocephalic and atraumatic.   Eyes:      Conjunctiva/sclera: Conjunctivae normal.   Cardiovascular:      Rate and Rhythm: Normal rate and regular rhythm.   Pulmonary:      Effort: Pulmonary effort is normal. No respiratory distress.      Breath sounds: Normal breath sounds. No wheezing.   Abdominal:      General: There is no distension.      Palpations: There is no mass.      Tenderness: There is no abdominal tenderness.   Musculoskeletal:         General: Tenderness present.      Right lower leg: No edema.      Left lower leg: No edema.      Comments: Plantar surface of left foot TTP. No rash, no increased skin temperature   Lymphadenopathy:      Cervical: No cervical adenopathy.   Skin:     Findings: No erythema.   Neurological:      Mental Status: She is alert and oriented to person, place, and time.   Psychiatric:         Behavior: Behavior normal.         Assessment:       1. Left foot pain    2. Essential hypertension    3. Hyperlipidemia, unspecified hyperlipidemia type    4. Chronic kidney disease, stage 4 (severe)    5. Abnormal finding of blood chemistry, unspecified        Plan:       Faustina was seen today for gout.    Diagnoses and all orders for this visit:    Left foot pain  -     X-Ray Foot Complete Left; Future  -     Ambulatory referral/consult to Podiatry; Future    Essential hypertension  -     Comprehensive Metabolic Panel; Future  -     CBC Auto Differential; Future  -     URIC ACID; Future  -     Hemoglobin A1C; Future  -     Microalbumin/Creatinine Ratio, Urine; Future  Controlled  Low salt diet. Continue current meds.  Hyperlipidemia, unspecified hyperlipidemia type  -     Comprehensive Metabolic  Panel; Future  -     Hemoglobin A1C; Future  High fiber diet  Continue current medication  Chronic kidney disease, stage 4 (severe)  -     Comprehensive Metabolic Panel; Future  -     CBC Auto Differential; Future  -     Hemoglobin A1C; Future  -     Microalbumin/Creatinine Ratio, Urine; Future    Abnormal finding of blood chemistry, unspecified  -     Hemoglobin A1C; Future      Patient will be notified when labs return and further recommendations will be given at that time.

## 2024-08-26 NOTE — PATIENT INSTRUCTIONS
José Manuel Weems,     If you are due for any health screening(s) below please notify me so we can arrange them to be ordered and scheduled to maintain your health. Most healthy patients complete it. Don't lose out on improving your health.     Tests to Keep You Healthy    Mammogram: Met on 1/19/2024  Colon Cancer Screening: ORDERED  Last Blood Pressure <= 139/89 (8/26/2024): Yes         Colon Cancer Screening    Of cancers affecting both men and women, colorectal cancer is the third leading cancer killer in the [place]. But it doesnt have to be. Screening can prevent colorectal cancer or find it at an early stage when treatment often leads to a cure.    A colonoscopy is the preferred test for detecting colon cancer. It is needed only once every 10 years if results are negative. While sedated, a flexible, lighted tube with a tiny camera is inserted into the rectum and advanced through the colon to look for cancers. An alternative screening test that is used at home and returned to the lab may also be used. It detects hidden blood in bowel movements which could indicate cancer in the colon. If results are positive, you will need a colonoscopy to determine if the blood is a sign of cancer. This type of follow up (diagnostic) colonoscopy usually requires additional copays as required by your insurance provider. Please contact your PCP if you have any questions.

## 2024-08-29 ENCOUNTER — TELEPHONE (OUTPATIENT)
Dept: FAMILY MEDICINE | Facility: CLINIC | Age: 59
End: 2024-08-29
Payer: MEDICARE

## 2024-08-29 NOTE — TELEPHONE ENCOUNTER
----- Message from Tracy Johnson sent at 8/29/2024 11:43 AM CDT -----  Type: Needs Medical Advice  Who Called:  pt  Best Call Back Number: [phone]    Additional Information: Pt is calling the office returning call back.Please call back and advise.

## 2024-08-29 NOTE — TELEPHONE ENCOUNTER
Spoke to pt who states her kidney specialist is on [address] in [place] Dr. Gomez. Recent labs faxed

## 2024-08-29 NOTE — TELEPHONE ENCOUNTER
----- Message from Francia Kelly sent at 8/29/2024  1:34 PM CDT -----  Type:  Patient Returning Call    Who Called:pt  Who Left Message for Patient:office  Does the patient know what this is regarding?:   Would the patient rather a call back or a response via [employer]?  call  Best Call Back Number:[phone]   Additional Information:

## 2024-08-30 DIAGNOSIS — I20.89 ANGINA AT REST: ICD-10-CM

## 2024-08-30 DIAGNOSIS — F41.9 ANXIETY AND DEPRESSION: ICD-10-CM

## 2024-08-30 DIAGNOSIS — F32.A ANXIETY AND DEPRESSION: ICD-10-CM

## 2024-08-30 DIAGNOSIS — Z86.73 HISTORY OF CVA (CEREBROVASCULAR ACCIDENT): ICD-10-CM

## 2024-08-30 DIAGNOSIS — E78.5 HYPERLIPIDEMIA, UNSPECIFIED HYPERLIPIDEMIA TYPE: ICD-10-CM

## 2024-08-30 RX ORDER — ISOSORBIDE DINITRATE 5 MG/1
5 TABLET ORAL 3 TIMES DAILY
Qty: 270 TABLET | Refills: 2 | Status: SHIPPED | OUTPATIENT
Start: 2024-08-30

## 2024-08-30 RX ORDER — ATORVASTATIN CALCIUM 80 MG/1
TABLET, FILM COATED ORAL
Qty: 90 TABLET | Refills: 2 | Status: SHIPPED | OUTPATIENT
Start: 2024-08-30

## 2024-08-30 RX ORDER — ESCITALOPRAM OXALATE 10 MG/1
TABLET ORAL
Qty: 90 TABLET | Refills: 2 | Status: SHIPPED | OUTPATIENT
Start: 2024-08-30

## 2024-08-30 NOTE — TELEPHONE ENCOUNTER
No care due was identified.  Health [facility] Embedded Care Due Messages. Reference number: [id number].   8/30/2024 10:37:46 AM CDT

## 2024-08-31 NOTE — TELEPHONE ENCOUNTER
Refill Decision Note   Faustina Rae  is requesting a refill authorization.  Brief Assessment and Rationale for Refill:  Approve     Medication Therapy Plan:        Comments:     Note composed:8:21 PM 08/30/2024

## 2024-09-04 DIAGNOSIS — N18.4 CHRONIC KIDNEY DISEASE, STAGE 4 (SEVERE): ICD-10-CM

## 2024-09-04 DIAGNOSIS — E66.01 MORBID OBESITY: ICD-10-CM

## 2024-09-04 RX ORDER — SEMAGLUTIDE 0.25 MG/.5ML
0.25 INJECTION, SOLUTION SUBCUTANEOUS
Qty: 6 ML | Refills: 1 | Status: SHIPPED | OUTPATIENT
Start: 2024-09-04

## 2024-09-04 NOTE — TELEPHONE ENCOUNTER
Refill Decision Note   Faustina Butch  is requesting a refill authorization.  Brief Assessment and Rationale for Refill:  Approve     Medication Therapy Plan:         Pharmacist review requested: Yes   Comments:     Note composed:4:29 PM 09/04/2024

## 2024-09-04 NOTE — TELEPHONE ENCOUNTER
No care due was identified.  Health [facility] Embedded Care Due Messages. Reference number: [id number].   9/04/2024 8:11:28 AM CDT

## 2024-09-04 NOTE — TELEPHONE ENCOUNTER
Refill Routing Note   Medication(s) are not appropriate for processing by [facility] for the following reason(s):        Drug-disease interaction: WEGOVY and Major depressive disorder, single episode, in full remission; Anxiety and depression     [facility] action(s):  Defer             Pharmacist review requested: Yes     Appointments  past 12m or future 3m with PCP    Date Provider   Last Visit   5/15/2024 Zoran Cuevas MD   Next Visit   11/25/2024 Zoran Cuevas MD   ED visits in past 90 days: 0        Note composed:4:15 PM 09/04/2024

## 2024-09-09 ENCOUNTER — OFFICE VISIT (OUTPATIENT)
Dept: PODIATRY | Facility: CLINIC | Age: 59
End: 2024-09-09
Payer: MEDICARE

## 2024-09-09 VITALS — BODY MASS INDEX: 40.08 KG/M2 | WEIGHT: 217.81 LBS | HEIGHT: 62 IN

## 2024-09-09 DIAGNOSIS — M79.671 RIGHT FOOT PAIN: ICD-10-CM

## 2024-09-09 DIAGNOSIS — M79.672 LEFT FOOT PAIN: ICD-10-CM

## 2024-09-09 DIAGNOSIS — M72.2 PLANTAR FASCIITIS: Primary | ICD-10-CM

## 2024-09-09 PROCEDURE — 99203 OFFICE O/P NEW LOW 30 MIN: CPT | Mod: HCNC,S$GLB,, | Performed by: PODIATRIST

## 2024-09-09 PROCEDURE — 3044F HG A1C LEVEL LT 7.0%: CPT | Mod: HCNC,CPTII,S$GLB, | Performed by: PODIATRIST

## 2024-09-09 PROCEDURE — 1160F RVW MEDS BY RX/DR IN RCRD: CPT | Mod: HCNC,CPTII,S$GLB, | Performed by: PODIATRIST

## 2024-09-09 PROCEDURE — 3066F NEPHROPATHY DOC TX: CPT | Mod: HCNC,CPTII,S$GLB, | Performed by: PODIATRIST

## 2024-09-09 PROCEDURE — 3060F POS MICROALBUMINURIA REV: CPT | Mod: HCNC,CPTII,S$GLB, | Performed by: PODIATRIST

## 2024-09-09 PROCEDURE — 3008F BODY MASS INDEX DOCD: CPT | Mod: HCNC,CPTII,S$GLB, | Performed by: PODIATRIST

## 2024-09-09 PROCEDURE — 99999 PR PBB SHADOW E&M-EST. PATIENT-LVL V: CPT | Mod: PBBFAC,HCNC,, | Performed by: PODIATRIST

## 2024-09-09 PROCEDURE — 4010F ACE/ARB THERAPY RXD/TAKEN: CPT | Mod: HCNC,CPTII,S$GLB, | Performed by: PODIATRIST

## 2024-09-09 PROCEDURE — 1159F MED LIST DOCD IN RCRD: CPT | Mod: HCNC,CPTII,S$GLB, | Performed by: PODIATRIST

## 2024-09-09 RX ORDER — METHYLPREDNISOLONE 4 MG/1
TABLET ORAL
Qty: 21 EACH | Refills: 0 | Status: SHIPPED | OUTPATIENT
Start: 2024-09-09 | End: 2024-09-30

## 2024-09-09 NOTE — PROGRESS NOTES
"  [address]  [place] [address]  Phone: [phone]   [phone]    Patient's PCP:Zoran Cuevas MD  Referring Provider: Lorena Beck    Subjective:      Chief Complaint:: Foot Pain (BL top of foot pain )    Foot Pain  Pertinent negatives include no abdominal pain, arthralgias, chest pain, chills, coughing, fatigue, fever, headaches, joint swelling, myalgias, nausea, neck pain, numbness, rash or weakness.     Faustina Rae is a 59 y.o. female who presents today with a complaint of BL top of foot pain. The current episode started 1 month.  The symptoms include throbbing pain. Probable cause of complaint unknown.  The symptoms are aggravated by walking. The problem has worsened. Treatment to date have included roll ball under foot, wrapped foot which provided no relief. PT has a history of gout, pt has history of broken foot RT 3 years ago.      Vitals:    09/09/24 1405   Weight: 98.8 kg (217 lb 13 oz)   Height: 5' 2" (1.575 m)   PainSc:   5      Shoe Size: 7    Past Surgical History:   Procedure Laterality Date    CAROTID STENT      3 years ago    CHOLECYSTECTOMY      HYSTERECTOMY      SLEEVE GASTROPLASTY  02/21/2017     Past Medical History:   Diagnosis Date    Anxiety     Arthritis     Asthma     CHF (congestive heart failure)     COPD (chronic obstructive pulmonary disease)     Hyperlipemia     Hypertension     Leaky heart valve     Obese     Obese     Obstructive sleep apnea     lost her CPAP    Pneumonia     Rheumatoid arthritis(714.0)     Stroke      Family History   Problem Relation Name Age of Onset    Arthritis Mother      Cancer Mother      Diabetes Mother      Hyperlipidemia Mother      Hypertension Mother      Stroke Mother      Breast cancer Mother      Heart disease Father      Hypertension Father      Asthma Son      Hypertension Sister      Hypertension Sister      Kidney disease Neg Hx          Social History:   Marital Status:   Alcohol History:  " reports current alcohol use.  Tobacco History:  reports that she has never smoked. She has never been exposed to tobacco smoke. She has never used smokeless tobacco.  Drug History:  reports no history of drug use.    Review of patient's allergies indicates:  No Known Allergies    Current Outpatient Medications   Medication Sig Dispense Refill    albuterol (PROVENTIL/VENTOLIN HFA) 90 mcg/actuation inhaler INHALE TWO PUFFS INTO THE LUNGS FOUR TIMES DAILY (BULK) 25.5 g 2    amLODIPine (NORVASC) 10 MG tablet Take 1 tablet (10 mg total) by mouth once daily. 90 tablet 3    aspirin (ECOTRIN) 81 MG EC tablet Take 1 tablet (81 mg total) by mouth once daily. 90 tablet 3    atorvastatin (LIPITOR) 80 MG tablet TAKE ONE TABLET BY MOUTH DAILY AT 5 PM every evening. 90 tablet 2    b complex vitamins tablet Take 1 tablet by mouth once daily.      BREO ELLIPTA 200-25 mcg/dose DsDv diskus inhaler INHALE 1 PUFF INTO THE LUNGS EVERY DAY 60 each 3    calcium citrate-vitamin D3 315-200 mg (CITRACAL+D) 315-200 mg-unit per tablet Take 1 tablet by mouth once daily.       chlorthalidone (HYGROTEN) 25 MG Tab Take 1 tablet (25 mg total) by mouth once daily. 90 tablet 3    clarithromycin (BIAXIN) 500 MG tablet Take 1 tablet (500 mg total) by mouth 2 (two) times a day. 28 tablet 0    clopidogreL (PLAVIX) 75 mg tablet Take 1 tablet (75 mg total) by mouth once daily. 30 tablet 1    colchicine (COLCRYS) 0.6 mg tablet Take 1 tablet (0.6 mg total) by mouth every 72 hours. Take at onset of gout and take for 10 days 10 tablet 3    diazePAM (VALIUM) 5 MG tablet Take 1 tablet (5 mg total) by mouth daily as needed for Anxiety. 2 tablet 0    EScitalopram oxalate (LEXAPRO) 10 MG tablet TAKE ONE TABLET BY MOUTH DAILY AT 9 AM 90 tablet 2    ferrous sulfate 325 (65 FE) MG EC tablet Take 1 tablet (325 mg total) by mouth once daily. 90 tablet 3    fluticasone propionate (FLONASE) 50 mcg/actuation nasal spray 2 sprays (100 mcg total) by Each Nostril route once  daily. 16 g 11    fluticasone-umeclidin-vilanter (TRELEGY ELLIPTA) 200-62.5-25 mcg inhaler Inhale 1 puff into the lungs once daily. 60 each 11    gabapentin (NEURONTIN) 300 MG capsule Take 1 capsule (300 mg total) by mouth 3 (three) times daily. 90 capsule 11    hydrALAZINE (APRESOLINE) 50 MG tablet Take 2 tablets (100 mg total) by mouth every 12 (twelve) hours. 360 tablet 3    isosorbide dinitrate (ISORDIL) 5 MG Tab Take 1 tablet (5 mg total) by mouth 3 (three) times daily. 270 tablet 2    meclizine (ANTIVERT) 12.5 mg tablet TAKE 1 TABLET(12.5 MG) BY MOUTH THREE TIMES DAILY AS NEEDED FOR DIZZINESS 30 tablet 0    methylPREDNISolone (MEDROL DOSEPACK) 4 mg tablet use as directed 21 each 0    metoprolol succinate (TOPROL-XL) 25 MG 24 hr tablet Take 1 tablet (25 mg total) by mouth once daily. 90 tablet 3    metroNIDAZOLE (FLAGYL) 500 MG tablet Take 1 tablet (500 mg total) by mouth 3 (three) times daily. 42 tablet 0    montelukast (SINGULAIR) 10 mg tablet Take 1 tablet (10 mg total) by mouth every evening. 90 tablet 3    multivitamin (THERAGRAN) per tablet Take 1 tablet by mouth once daily. 90 tablet 3    nystatin (MYCOSTATIN ORAL) Take 1 tablet by mouth Daily.      pantoprazole (PROTONIX) 40 MG tablet Take 1 tablet (40 mg total) by mouth 2 (two) times daily. 60 tablet 0    pantoprazole (PROTONIX) 40 MG tablet Take 1 tablet (40 mg total) by mouth once daily. 30 tablet 5    predniSONE (DELTASONE) 20 MG tablet 3 for 3 days then 2 for 3 days then one for 3 days and repeat for breathing problems 36 tablet 1    predniSONE (DELTASONE) 20 MG tablet One BID for 3 days then once a day orally 10 tablet 0    semaglutide, weight loss, (WEGOVY) 0.25 mg/0.5 mL PnIj Inject 0.25 mg into the skin every 7 days. 6 mL 1    spironolactone (ALDACTONE) 25 MG tablet Take 1 tablet (25 mg total) by mouth once daily. 90 tablet 3    tetracycline (ACHROMYCIN,SUMYCIN) 500 MG capsule Take 1 capsule (500 mg total) by mouth 4 (four) times daily. 56  capsule 0    traMADoL (ULTRAM) 50 mg tablet Take 1 tablet (50 mg total) by mouth every 6 (six) hours as needed for Pain. 20 each 0    traZODone (DESYREL) 50 MG tablet Take 50 mg by mouth nightly as needed.      valsartan (DIOVAN) 160 MG tablet Take 1 tablet (160 mg total) by mouth once daily. 90 tablet 3    VELTASSA 8.4 gram PwPk Take by mouth.       No current facility-administered medications for this visit.       Review of Systems   Constitutional:  Negative for chills, fatigue, fever and unexpected weight change.   HENT:  Negative for hearing loss and trouble swallowing.    Eyes:  Negative for photophobia and visual disturbance.   Respiratory:  Negative for cough, shortness of breath and wheezing.    Cardiovascular:  Negative for chest pain, palpitations and leg swelling.   Gastrointestinal:  Negative for abdominal pain and nausea.   Genitourinary:  Negative for dysuria and frequency.   Musculoskeletal:  Negative for arthralgias, back pain, gait problem, joint swelling, myalgias and neck pain.   Skin:  Negative for rash and wound.   Neurological:  Negative for tremors, seizures, weakness, numbness and headaches.   Hematological:  Does not bruise/bleed easily.   Psychiatric/Behavioral:  Negative for hallucinations.          Objective:        Physical Exam:   Foot Exam    General  General Appearance: appears stated age and healthy   Orientation: alert and oriented to person, place, and time   Affect: appropriate   Gait: antalgic       Right Foot/Ankle     Inspection and Palpation  Ecchymosis: none  Tenderness: plantar fascia   Swelling: none   Arch: normal  Skin Exam: skin intact; no drainage, no ulcer and no erythema   Neurovascular  Dorsalis pedis: 2+  Posterior tibial: 2+  Capillary Refill: 2+  Varicose veins: not present  Saphenous nerve sensation: normal  Tibial nerve sensation: normal  Superficial peroneal nerve sensation: normal  Deep peroneal nerve sensation: normal  Sural nerve sensation:  normal    Edema  Type of edema: non-pitting    Muscle Strength  Ankle dorsiflexion: 5  Ankle plantar flexion: 5  Ankle inversion: 5  Ankle eversion: 5  Great toe extension: 5  Great toe flexion: 5    Range of Motion    Passive  Ankle dorsiflexion: 5      Tests  Anterior drawer: negative   Talar tilt: negative   PT Tinel's sign: negative    Paresthesia: negative  Comments  Pain on palpation of the mid arch . No pain present  with side to side compression of the calcaneus. Negative tinnel's sign  at the tarsal tunnel. Negative Alcala's nerve pain. Negative Calcaneal nerve pain. No soft tissue masses. Pain absent  with dorsiflexion of the ankle. No edema, erythema, or ecchymosis noted.     Left Foot/Ankle      Inspection and Palpation  Ecchymosis: none  Tenderness: plantar fascia   Swelling: none   Arch: normal  Skin Exam: skin intact; no drainage, no ulcer and no erythema   Neurovascular  Dorsalis pedis: 2+  Posterior tibial: 2+  Capillary refill: 2+  Varicose veins: not present  Saphenous nerve sensation: normal  Tibial nerve sensation: normal  Superficial peroneal nerve sensation: normal  Deep peroneal nerve sensation: normal  Sural nerve sensation: normal    Edema  Type of edema: non-pitting    Muscle Strength  Ankle dorsiflexion: 5  Ankle plantar flexion: 5  Ankle inversion: 5  Ankle eversion: 5  Great toe extension: 5  Great toe flexion: 5    Range of Motion    Passive  Ankle dorsiflexion: 5      Tests  Anterior drawer: negative   Talar tilt: negative   PT Tinel's sign: negative  Paresthesia: negative  Comments  Pain on palpation of the mid arch . No pain present  with side to side compression of the calcaneus. Negative tinnel's sign  at the tarsal tunnel. Negative Alcala's nerve pain. Negative Calcaneal nerve pain. No soft tissue masses. Pain absent  with dorsiflexion of the ankle. No edema, erythema, or ecchymosis noted.       Physical Exam  Cardiovascular:      Pulses:           Dorsalis pedis pulses are 2+ on  the right side and 2+ on the left side.        Posterior tibial pulses are 2+ on the right side and 2+ on the left side.   Feet:      Right foot:      Skin integrity: No ulcer or erythema.      Left foot:      Skin integrity: No ulcer or erythema.               Right Ankle/Foot Exam     Comments:  Pain on palpation of the mid arch . No pain present  with side to side compression of the calcaneus. Negative tinnel's sign  at the tarsal tunnel. Negative Alcala's nerve pain. Negative Calcaneal nerve pain. No soft tissue masses. Pain absent  with dorsiflexion of the ankle. No edema, erythema, or ecchymosis noted.     Left Ankle/Foot Exam     Comments:  Pain on palpation of the mid arch . No pain present  with side to side compression of the calcaneus. Negative tinnel's sign  at the tarsal tunnel. Negative Alcala's nerve pain. Negative Calcaneal nerve pain. No soft tissue masses. Pain absent  with dorsiflexion of the ankle. No edema, erythema, or ecchymosis noted.         Muscle Strength   Right Lower Extremity   Ankle Dorsiflexion:  5   Plantar flexion:  5/5  Left Lower Extremity   Ankle Dorsiflexion:  5   Plantar flexion:  5/5     Vascular Exam     Right Pulses  Dorsalis Pedis:      2+  Posterior Tibial:      2+        Left Pulses  Dorsalis Pedis:      2+  Posterior Tibial:      2+           Imaging: X-Ray Foot Complete Left  Narrative: EXAMINATION:  XR FOOT COMPLETE 3 VIEW LEFT    CLINICAL HISTORY:  .  Pain in left foot    TECHNIQUE:  AP, lateral and oblique views of the left foot were performed.    COMPARISON:  05/26/2013    FINDINGS:  There is no evidence fracture nor malalignment.  The adjacent soft tissues are unremarkable.  There are degenerative changes of the midfoot similar to what was seen on prior exam of 05/26/2013.  Impression: There is no evidence acute bony injury of the left foot.  There are degenerative changes of the left foot.    Electronically signed by: Citlaly Lott  MD  Date:    08/26/2024  Time:    09:04               Assessment:       1. Plantar fasciitis    2. Left foot pain    3. Right foot pain      Plan:   Plantar fasciitis  -     methylPREDNISolone (MEDROL DOSEPACK) 4 mg tablet; use as directed  Dispense: 21 each; Refill: 0    Left foot pain  -     Ambulatory referral/consult to Podiatry  -     methylPREDNISolone (MEDROL DOSEPACK) 4 mg tablet; use as directed  Dispense: 21 each; Refill: 0    Right foot pain  -     methylPREDNISolone (MEDROL DOSEPACK) 4 mg tablet; use as directed  Dispense: 21 each; Refill: 0      Follow up if symptoms worsen or fail to improve.    Procedures        Discussed different treatment options for heel pain. I gave written and verbal instructions on heel cord stretching and this was demonstrated for the patient. Patient expressed understanding. Discussed wearing appropriate shoe gear and avoiding flats, slippers, sandals, barefoot, and sockfeet. Recommended arch supports. My recommendation for OTC supports is Spenco polysorb replacement insoles or patient may elect more aggressive treatment with prescription arch supports. We also discussed cortisone injections and NSAID therapy.       Counseling:     I provided patient education verbally regarding:   Patient diagnosis, treatment options, as well as alternatives, risks, and benefits.     This note was created using Dragon voice recognition software that occasionally misinterpreted phrases or words.

## 2024-09-09 NOTE — PATIENT INSTRUCTIONS

## 2024-09-10 ENCOUNTER — TELEPHONE (OUTPATIENT)
Dept: FAMILY MEDICINE | Facility: CLINIC | Age: 59
End: 2024-09-10
Payer: MEDICARE

## 2024-09-10 NOTE — TELEPHONE ENCOUNTER
Spoke to patient she was given a new Rx for Valsartan on 05/24 for a 90 day supply with 3 additional refills

## 2024-09-10 NOTE — TELEPHONE ENCOUNTER
----- Message from Maranda Gates sent at 9/10/2024 10:13 AM CDT -----  Type: Needs Medical Advice  Who Called:  Earlene with [facility]  Best Call Back Number: [phone], [phone] Fax  Additional Information: Earlene is calling in regards needing to speak to someone in the office to confirm that the pt is still taking the Rx for  valsartan (DIOVAN) 160 MG tablet and to get an updated Rx sent in to the pharmacy if the pt is current on the Rx. Please call back and advise. Thanks!

## 2024-09-10 NOTE — TELEPHONE ENCOUNTER
----- Message from Maranda Gates sent at 9/10/2024 10:13 AM CDT -----  Type: Needs Medical Advice  Who Called:  Earlene with [facility]  Best Call Back Number: [phone], [phone] Fax  Additional Information: Earlene is calling in regards needing to speak to someone in the office to confirm that the pt is still taking the Rx for  valsartan (DIOVAN) 160 MG tablet and to get an updated Rx sent in to the pharmacy if the pt is current on the Rx. Please call back and advise. Thanks!

## 2024-09-25 DIAGNOSIS — Z00.00 ENCOUNTER FOR MEDICARE ANNUAL WELLNESS EXAM: ICD-10-CM

## 2024-09-26 ENCOUNTER — OFFICE VISIT (OUTPATIENT)
Dept: PULMONOLOGY | Facility: CLINIC | Age: 59
End: 2024-09-26
Payer: MEDICARE

## 2024-09-26 VITALS
WEIGHT: 221.69 LBS | DIASTOLIC BLOOD PRESSURE: 82 MMHG | BODY MASS INDEX: 40.8 KG/M2 | HEART RATE: 102 BPM | OXYGEN SATURATION: 96 % | HEIGHT: 62 IN | SYSTOLIC BLOOD PRESSURE: 145 MMHG

## 2024-09-26 DIAGNOSIS — S16.1XXA STRAIN OF NECK MUSCLE, INITIAL ENCOUNTER: ICD-10-CM

## 2024-09-26 DIAGNOSIS — J44.9 CHRONIC OBSTRUCTIVE PULMONARY DISEASE, UNSPECIFIED COPD TYPE: ICD-10-CM

## 2024-09-26 DIAGNOSIS — R53.82 CHRONIC FATIGUE: ICD-10-CM

## 2024-09-26 DIAGNOSIS — S39.012A STRAIN OF LUMBAR REGION, INITIAL ENCOUNTER: ICD-10-CM

## 2024-09-26 DIAGNOSIS — J45.20 INTERMITTENT ASTHMA, UNSPECIFIED ASTHMA SEVERITY, UNSPECIFIED WHETHER COMPLICATED: ICD-10-CM

## 2024-09-26 DIAGNOSIS — M10.9 GOUT, UNSPECIFIED CAUSE, UNSPECIFIED CHRONICITY, UNSPECIFIED SITE: ICD-10-CM

## 2024-09-26 DIAGNOSIS — J45.991 COUGH VARIANT ASTHMA: Primary | ICD-10-CM

## 2024-09-26 DIAGNOSIS — J45.51 SEVERE PERSISTENT ASTHMA WITH ACUTE EXACERBATION: ICD-10-CM

## 2024-09-26 DIAGNOSIS — E53.8 LOW SERUM VITAMIN B12: ICD-10-CM

## 2024-09-26 DIAGNOSIS — J44.89 COPD WITH ASTHMA: ICD-10-CM

## 2024-09-26 PROCEDURE — 3066F NEPHROPATHY DOC TX: CPT | Mod: HCNC,CPTII,S$GLB, | Performed by: INTERNAL MEDICINE

## 2024-09-26 PROCEDURE — 3060F POS MICROALBUMINURIA REV: CPT | Mod: HCNC,CPTII,S$GLB, | Performed by: INTERNAL MEDICINE

## 2024-09-26 PROCEDURE — 4010F ACE/ARB THERAPY RXD/TAKEN: CPT | Mod: HCNC,CPTII,S$GLB, | Performed by: INTERNAL MEDICINE

## 2024-09-26 PROCEDURE — 99213 OFFICE O/P EST LOW 20 MIN: CPT | Mod: HCNC,S$GLB,, | Performed by: INTERNAL MEDICINE

## 2024-09-26 PROCEDURE — 3008F BODY MASS INDEX DOCD: CPT | Mod: HCNC,CPTII,S$GLB, | Performed by: INTERNAL MEDICINE

## 2024-09-26 PROCEDURE — 3044F HG A1C LEVEL LT 7.0%: CPT | Mod: HCNC,CPTII,S$GLB, | Performed by: INTERNAL MEDICINE

## 2024-09-26 PROCEDURE — 3079F DIAST BP 80-89 MM HG: CPT | Mod: HCNC,CPTII,S$GLB, | Performed by: INTERNAL MEDICINE

## 2024-09-26 PROCEDURE — 99999 PR PBB SHADOW E&M-EST. PATIENT-LVL III: CPT | Mod: PBBFAC,HCNC,, | Performed by: INTERNAL MEDICINE

## 2024-09-26 PROCEDURE — 3077F SYST BP >= 140 MM HG: CPT | Mod: HCNC,CPTII,S$GLB, | Performed by: INTERNAL MEDICINE

## 2024-09-26 RX ORDER — ALBUTEROL SULFATE 0.83 MG/ML
2.5 SOLUTION RESPIRATORY (INHALATION) EVERY 6 HOURS PRN
Qty: 120 EACH | Refills: 11 | Status: SHIPPED | OUTPATIENT
Start: 2024-09-26 | End: 2025-09-26

## 2024-09-26 RX ORDER — PROMETHAZINE HYDROCHLORIDE AND DEXTROMETHORPHAN HYDROBROMIDE 6.25; 15 MG/5ML; MG/5ML
5 SYRUP ORAL EVERY 4 HOURS PRN
Qty: 473 ML | Refills: 1 | Status: SHIPPED | OUTPATIENT
Start: 2024-09-26

## 2024-09-26 RX ORDER — ALBUTEROL SULFATE 90 UG/1
2 INHALANT RESPIRATORY (INHALATION) EVERY 4 HOURS PRN
Qty: 25.5 G | Refills: 3 | Status: SHIPPED | OUTPATIENT
Start: 2024-09-26

## 2024-09-26 RX ORDER — AZITHROMYCIN 500 MG/1
TABLET, FILM COATED ORAL
Qty: 3 TABLET | Refills: 3 | Status: SHIPPED | OUTPATIENT
Start: 2024-09-26

## 2024-09-26 RX ORDER — PREDNISONE 20 MG/1
TABLET ORAL
Qty: 36 TABLET | Refills: 0 | Status: SHIPPED | OUTPATIENT
Start: 2024-09-26

## 2024-09-26 RX ORDER — FLUTICASONE FUROATE AND VILANTEROL 200; 25 UG/1; UG/1
1 POWDER RESPIRATORY (INHALATION) DAILY
Qty: 180 EACH | Refills: 3 | Status: SHIPPED | OUTPATIENT
Start: 2024-09-26

## 2024-09-26 RX ORDER — COLCHICINE 0.6 MG/1
0.6 TABLET ORAL
Qty: 10 TABLET | Refills: 3 | Status: SHIPPED | OUTPATIENT
Start: 2024-09-26

## 2024-09-26 NOTE — PROGRESS NOTES
2024    Faustina Rae  New Patient Consult    Chief Complaint   Patient presents with    Follow-up    Cough       HPI: \    2024 pt had cough and needed resp rx last pm only.  Watched 5 month old kid yesterday.  Had been doing well..      Pt uses breo daily , no prednisone since last visit..    Pt was on b12 shots -- stopped 4 months ago.   Pt did better on shots b12 wrt fatigue....        2024-- asthma still active -- will have chest tightness and sob with crowd exposure.  [place]olden walking fast.  Use albuterol bid and breo daily.  No prednisone used..    Pt doesn't recall prednisone use.    Had sleep eval in  past and no sleepa apnea..    Patient Instructions   You have severe persistent asthma    Check lung capacity    You should take prednisone to stabilize asthma -- 20 mg daily for 3 days should clear out asthma..    Use trelegy daily    Use albuterol as needed...    Cxr November was good  2023 pt had asthma as kid, outgrew and asthma returned.  Pt had stroke with left side paresis-- 3yrs -- good recovery. Pt worked .  Pt has loss  10 yrs ago -- he  massive mi.    Pt has household chores -- slow but able to do--- no limits.      Uses albuterol 2 times daily. No cough from albuterol.      Sleeps well -- no apnea in past-- had bariatric sleeve in past.       Pt cough -- violent with no fx, nor syncope nor incontinence but will have severe abd pains.  No recent admits nor steroids.    Cough induced by cold air, grass mowing...  Patient Instructions   Breo daily is good controller, trelegy may be better (has breo and another medication)-- continue daily    Use albuterol as needed-- 2- 4 puffs up to every 4 hrs as needed.    Would recommend  singulair -- effective for asthma 8/10- not all patients benefit.  Stop if no help.      Action plan-- prednisone 1 daily for 3 days maybe enough-- try now, repeat if flares but use lowest dose able and get off quick.  Breo/Trelegy has  "prednisone to keep clear...      May consider asthma shots???  May need breathing test..      Ct abd 2022 viewed and good lower lungs.  Chest xray In past good.      Thyroid nodule right suspected-- defer to Dr Cuevas-- we discuss and offer ultradound...             PFSH:  Past Medical History:   Diagnosis Date    Anxiety     Arthritis     Asthma     CHF (congestive heart failure)     COPD (chronic obstructive pulmonary disease)     Hyperlipemia     Hypertension     Leaky heart valve     Obese     Obese     Obstructive sleep apnea     lost her CPAP    Pneumonia     Rheumatoid arthritis(714.0)     Stroke          Past Surgical History:   Procedure Laterality Date    CAROTID STENT      3 years ago    CHOLECYSTECTOMY      HYSTERECTOMY      SLEEVE GASTROPLASTY  02/21/2017     Social History     Tobacco Use    Smoking status: Never     Passive exposure: Never    Smokeless tobacco: Never   Substance Use Topics    Alcohol use: Yes     Comment: rare, about once a month    Drug use: No     Family History   Problem Relation Name Age of Onset    Arthritis Mother      Cancer Mother      Diabetes Mother      Hyperlipidemia Mother      Hypertension Mother      Stroke Mother      Breast cancer Mother      Heart disease Father      Hypertension Father      Asthma Son      Hypertension Sister      Hypertension Sister      Kidney disease Neg Hx       Review of patient's allergies indicates:  No Known Allergies       Review of Systems:  a review of eleven systems covering constitutional, Eye, HEENT, Psych, Respiratory, Cardiac, GI, , Musculoskeletal, Endocrine, Dermatologic was negative except for pertinent findings as listed ABOVE and below:  pertinent positives as above, rest good        Exam:Comprehensive exam done. BP (!) 145/82 (BP Location: Left arm, Patient Position: Sitting, BP Method: Large (Automatic))   Pulse 102   Ht 5' 2" (1.575 m)   Wt 100.5 kg (221 lb 10.8 oz)   LMP  (LMP Unknown) Comment: Does not menstruate   " SpO2 96% Comment: on room air at rest  BMI 40.54 kg/m²   Exam included Vitals as listed, and patient's appearance and affect and alertness and mood, oral exam for yeast and hygiene and pharynx lesions and Mallapatti (M) score, neck with inspection for jvd and masses and thyroid abnormalities and lymph nodes (supraclavicular and infraclavicular nodes and axillary also examined and noted if abn), chest exam included symmetry and effort and fremitus and percussion and auscultation, cardiac exam included rhythm and gallops and murmur and rubs and jvd and edema, abdominal exam for mass and hepatosplenomegaly and tenderness and hernias and bowel sounds, Musculoskeletal exam with muscle tone and posture and mobility/gait and  strength, and skin for rashes and cyanosis and pallor and turgor, extremity for clubbing.  Findings were normal except for pertinent findings listed below:  M4, chest is symmetric, no distress, normal percussion, normal fremitus and good normal breath sounds  No edema    Right thyroid nodule suspected.         Radiographs (ct chest and cxr) reviewed: view by direct vision      Labs reviewed           PFT will be done and results to be reviewed       Plan:  Clinical impression is apparently straight forward and impression with management as below.     Faustina was seen today for follow-up and cough.    Diagnoses and all orders for this visit:    Cough variant asthma    Severe persistent asthma with acute exacerbation  -     albuterol (PROVENTIL) 2.5 mg /3 mL (0.083 %) nebulizer solution; Take 3 mLs (2.5 mg total) by nebulization every 6 (six) hours as needed.  -     albuterol (PROVENTIL/VENTOLIN HFA) 90 mcg/actuation inhaler; Inhale 2 puffs into the lungs every 4 (four) hours as needed for Wheezing. INHALE TWO PUFFS INTO THE LUNGS FOUR TIMES DAILY (BULK)  -     fluticasone furoate-vilanteroL (BREO ELLIPTA) 200-25 mcg/dose DsDv diskus inhaler; Inhale 1 puff into the lungs once daily.  -     predniSONE  (DELTASONE) 20 MG tablet; 3 for 3 days then 2 for 3 days then one for 3 days and repeat for breathing problems  -     promethazine-dextromethorphan (PROMETHAZINE-DM) 6.25-15 mg/5 mL Syrp; Take 5 mLs by mouth every 4 (four) hours as needed (cough).  -     azithromycin (ZITHROMAX) 500 MG tablet; One daily for yellow mucous, repeat if needed    Chronic fatigue  -     VITAMIN B12; Future    Strain of neck muscle, initial encounter    Strain of lumbar region, initial encounter    COPD with asthma  -     albuterol (PROVENTIL/VENTOLIN HFA) 90 mcg/actuation inhaler; Inhale 2 puffs into the lungs every 4 (four) hours as needed for Wheezing. INHALE TWO PUFFS INTO THE LUNGS FOUR TIMES DAILY (BULK)    Chronic obstructive pulmonary disease, unspecified COPD type  -     fluticasone furoate-vilanteroL (BREO ELLIPTA) 200-25 mcg/dose DsDv diskus inhaler; Inhale 1 puff into the lungs once daily.    Intermittent asthma, unspecified asthma severity, unspecified whether complicated  -     fluticasone furoate-vilanteroL (BREO ELLIPTA) 200-25 mcg/dose DsDv diskus inhaler; Inhale 1 puff into the lungs once daily.    Gout, unspecified cause, unspecified chronicity, unspecified site  -     colchicine (COLCRYS) 0.6 mg tablet; Take 1 tablet (0.6 mg total) by mouth every 72 hours. Take at onset of gout and take for 10 days    Low serum vitamin B12  -     VITAMIN B12; Future            Follow up in about 6 months (around 3/26/2025), or if symptoms worsen or fail to improve.    Discussed with patient above for education the following:      Patient Instructions   Asthma may be flaring  Use promethazine dm as needed for cough    Take azitromycin antibiotic now.    Prednisoen 20 mg daily for 3 days should clear asthma  Prednisone 60 mg dosing should improve gout     Podiatrist diagnosed plantar fascitis  causing  pain  and not gout..    If b12 low (was low in 2012)-- shots may be needed if b12 low.        Would consider special asthma shots if you  need prednisone again ---

## 2024-09-26 NOTE — PATIENT INSTRUCTIONS
Asthma may be flaring  Use promethazine dm as needed for cough    Take azitromycin antibiotic now.    Prednisoen 20 mg daily for 3 days should clear asthma  Prednisone 60 mg dosing should improve gout     Podiatrist diagnosed plantar fascitis  causing  pain  and not gout..    If b12 low (was low in 2012)-- shots may be needed if b12 low.        Would consider special asthma shots if you need prednisone again ---

## 2024-09-27 ENCOUNTER — TELEPHONE (OUTPATIENT)
Dept: PULMONOLOGY | Facility: CLINIC | Age: 59
End: 2024-09-27
Payer: MEDICARE

## 2024-09-27 DIAGNOSIS — J45.20 INTERMITTENT ASTHMA, UNSPECIFIED ASTHMA SEVERITY, UNSPECIFIED WHETHER COMPLICATED: ICD-10-CM

## 2024-09-27 DIAGNOSIS — J44.9 CHRONIC OBSTRUCTIVE PULMONARY DISEASE, UNSPECIFIED COPD TYPE: ICD-10-CM

## 2024-09-27 DIAGNOSIS — J45.51 SEVERE PERSISTENT ASTHMA WITH ACUTE EXACERBATION: ICD-10-CM

## 2024-09-27 NOTE — TELEPHONE ENCOUNTER
----- Message from Lanny Nikia sent at 9/27/2024  3:54 PM CDT -----  Regarding: RX Refill Request/prior authorization questions to be answered  Contact: Cody at [phone]  Type:  RX Refill Request/prior authorization questions to be answered    Who Called:  Cody at [phone] fax#[phone]    Preferred Pharmacy with phone number:    [id number] [address] & [address] [address]  Phone: [phone] Fax: [phone]    [facility] - [place], [place] - [address] [place] [address]  [address] [place] [id number]  Phone: [phone] Fax: [phone]    Additional Information:  Please call and advise. Thank you    fluticasone furoate-vilanteroL (BREO ELLIPTA) 200-25 mcg/dose DsDv diskus inhaler 180 each 3 9/26/2024 -   Sig - Route: Inhale 1 puff into the lungs once daily. - Inhalation   Sent to pharmacy as: fluticasone furoate-vilanteroL (BREO ELLIPTA) 200-25 mcg/dose DsDv diskus inhaler   E-Prescribing Status: Receipt confirmed by pharmacy (9/26/2024 10:54 AM CDT)   Prior authorization: Canceled - Other (Brand is covered)

## 2024-09-30 RX ORDER — FLUTICASONE FUROATE AND VILANTEROL TRIFENATATE 200; 25 UG/1; UG/1
1 POWDER RESPIRATORY (INHALATION) DAILY
Qty: 180 EACH | Refills: 3 | Status: SHIPPED | OUTPATIENT
Start: 2024-09-30

## 2024-10-09 ENCOUNTER — PATIENT MESSAGE (OUTPATIENT)
Dept: FAMILY MEDICINE | Facility: CLINIC | Age: 59
End: 2024-10-09
Payer: MEDICARE

## 2024-10-17 DIAGNOSIS — I10 ESSENTIAL HYPERTENSION: ICD-10-CM

## 2024-10-17 DIAGNOSIS — I10 ACCELERATED HYPERTENSION: ICD-10-CM

## 2024-10-17 RX ORDER — HYDRALAZINE HYDROCHLORIDE 50 MG/1
TABLET, FILM COATED ORAL
Qty: 270 TABLET | Refills: 0 | OUTPATIENT
Start: 2024-10-17

## 2024-10-17 RX ORDER — SPIRONOLACTONE 25 MG/1
25 TABLET ORAL
Qty: 90 TABLET | Refills: 0 | OUTPATIENT
Start: 2024-10-17

## 2024-10-17 RX ORDER — AMLODIPINE BESYLATE 10 MG/1
TABLET ORAL
Qty: 90 TABLET | Refills: 0 | OUTPATIENT
Start: 2024-10-17

## 2024-10-17 NOTE — TELEPHONE ENCOUNTER
Refill Routing Note   Medication(s) are not appropriate for processing by [facility] for the following reason(s):        Required vitals abnormal    [facility] action(s):  Defer             Appointments  past 12m or future 3m with PCP    Date Provider   Last Visit   5/15/2024 Zoran Cuevas MD   Next Visit   11/25/2024 Zoran Cuevas MD   ED visits in past 90 days: 0        Note composed:12:08 PM 10/17/2024

## 2024-10-17 NOTE — TELEPHONE ENCOUNTER
No care due was identified.  [facility] Embedded Care Due Messages. Reference number: [id number].   10/17/2024 4:18:36 AM CDT

## 2024-10-18 ENCOUNTER — CLINICAL SUPPORT (OUTPATIENT)
Dept: FAMILY MEDICINE | Facility: CLINIC | Age: 59
End: 2024-10-18
Payer: MEDICARE

## 2024-10-18 VITALS — DIASTOLIC BLOOD PRESSURE: 78 MMHG | SYSTOLIC BLOOD PRESSURE: 132 MMHG | HEART RATE: 76 BPM

## 2024-10-18 DIAGNOSIS — I10 ACCELERATED HYPERTENSION: Primary | ICD-10-CM

## 2024-10-18 PROCEDURE — 99999 PR PBB SHADOW E&M-EST. PATIENT-LVL I: CPT | Mod: PBBFAC,HCNC,,

## 2024-10-18 NOTE — PROGRESS NOTES
Faustina Rae 59 y.o. female is here today for Blood Pressure check.   History of HTN yes.    Review of patient's allergies indicates:  No Known Allergies  Creatinine   Date Value Ref Range Status   08/26/2024 2.6 (H) 0.5 - 1.4 mg/dL Final   08/10/2013 1.4 0.5 - 1.4 mg/dL Final     Sodium   Date Value Ref Range Status   08/26/2024 141 136 - 145 mmol/L Final     Potassium   Date Value Ref Range Status   08/26/2024 4.9 3.5 - 5.1 mmol/L Final   ]  Patient verifies taking blood pressure medications on a regular basis at the same time of the day.     Current Outpatient Medications:     albuterol (PROVENTIL) 2.5 mg /3 mL (0.083 %) nebulizer solution, Take 3 mLs (2.5 mg total) by nebulization every 6 (six) hours as needed., Disp: 120 each, Rfl: 11    albuterol (PROVENTIL/VENTOLIN HFA) 90 mcg/actuation inhaler, Inhale 2 puffs into the lungs every 4 (four) hours as needed for Wheezing. INHALE TWO PUFFS INTO THE LUNGS FOUR TIMES DAILY (BULK), Disp: 25.5 g, Rfl: 3    amLODIPine (NORVASC) 10 MG tablet, Take 1 tablet (10 mg total) by mouth once daily., Disp: 90 tablet, Rfl: 3    aspirin (ECOTRIN) 81 MG EC tablet, Take 1 tablet (81 mg total) by mouth once daily., Disp: 90 tablet, Rfl: 3    atorvastatin (LIPITOR) 80 MG tablet, TAKE ONE TABLET BY MOUTH DAILY AT 5 PM every evening., Disp: 90 tablet, Rfl: 2    azithromycin (ZITHROMAX) 500 MG tablet, One daily for yellow mucous, repeat if needed, Disp: 3 tablet, Rfl: 3    b complex vitamins tablet, Take 1 tablet by mouth once daily., Disp: , Rfl:     BREO ELLIPTA 200-25 mcg/dose DsDv diskus inhaler, Inhale 1 puff into the lungs once daily., Disp: 180 each, Rfl: 3    calcium citrate-vitamin D3 315-200 mg (CITRACAL+D) 315-200 mg-unit per tablet, Take 1 tablet by mouth once daily. , Disp: , Rfl:     chlorthalidone (HYGROTEN) 25 MG Tab, Take 1 tablet (25 mg total) by mouth once daily., Disp: 90 tablet, Rfl: 3    clarithromycin (BIAXIN) 500 MG tablet, Take 1 tablet (500 mg total) by  mouth 2 (two) times a day., Disp: 28 tablet, Rfl: 0    clopidogreL (PLAVIX) 75 mg tablet, Take 1 tablet (75 mg total) by mouth once daily., Disp: 30 tablet, Rfl: 1    colchicine (COLCRYS) 0.6 mg tablet, Take 1 tablet (0.6 mg total) by mouth every 72 hours. Take at onset of gout and take for 10 days, Disp: 10 tablet, Rfl: 3    diazePAM (VALIUM) 5 MG tablet, Take 1 tablet (5 mg total) by mouth daily as needed for Anxiety., Disp: 2 tablet, Rfl: 0    EScitalopram oxalate (LEXAPRO) 10 MG tablet, TAKE ONE TABLET BY MOUTH DAILY AT 9 AM, Disp: 90 tablet, Rfl: 2    ferrous sulfate 325 (65 FE) MG EC tablet, Take 1 tablet (325 mg total) by mouth once daily., Disp: 90 tablet, Rfl: 3    fluticasone propionate (FLONASE) 50 mcg/actuation nasal spray, 2 sprays (100 mcg total) by Each Nostril route once daily., Disp: 16 g, Rfl: 11    fluticasone-umeclidin-vilanter (TRELEGY ELLIPTA) 200-62.5-25 mcg inhaler, Inhale 1 puff into the lungs once daily., Disp: 60 each, Rfl: 11    gabapentin (NEURONTIN) 300 MG capsule, Take 1 capsule (300 mg total) by mouth 3 (three) times daily., Disp: 90 capsule, Rfl: 11    hydrALAZINE (APRESOLINE) 50 MG tablet, Take 2 tablets (100 mg total) by mouth every 12 (twelve) hours., Disp: 360 tablet, Rfl: 3    isosorbide dinitrate (ISORDIL) 5 MG Tab, Take 1 tablet (5 mg total) by mouth 3 (three) times daily., Disp: 270 tablet, Rfl: 2    meclizine (ANTIVERT) 12.5 mg tablet, TAKE 1 TABLET(12.5 MG) BY MOUTH THREE TIMES DAILY AS NEEDED FOR DIZZINESS, Disp: 30 tablet, Rfl: 0    metoprolol succinate (TOPROL-XL) 25 MG 24 hr tablet, Take 1 tablet (25 mg total) by mouth once daily., Disp: 90 tablet, Rfl: 3    metroNIDAZOLE (FLAGYL) 500 MG tablet, Take 1 tablet (500 mg total) by mouth 3 (three) times daily., Disp: 42 tablet, Rfl: 0    montelukast (SINGULAIR) 10 mg tablet, Take 1 tablet (10 mg total) by mouth every evening., Disp: 90 tablet, Rfl: 3    multivitamin (THERAGRAN) per tablet, Take 1 tablet by mouth once daily.,  Disp: 90 tablet, Rfl: 3    nystatin (MYCOSTATIN ORAL), Take 1 tablet by mouth Daily., Disp: , Rfl:     pantoprazole (PROTONIX) 40 MG tablet, Take 1 tablet (40 mg total) by mouth 2 (two) times daily., Disp: 60 tablet, Rfl: 0    pantoprazole (PROTONIX) 40 MG tablet, Take 1 tablet (40 mg total) by mouth once daily., Disp: 30 tablet, Rfl: 5    predniSONE (DELTASONE) 20 MG tablet, 3 for 3 days then 2 for 3 days then one for 3 days and repeat for breathing problems, Disp: 36 tablet, Rfl: 1    predniSONE (DELTASONE) 20 MG tablet, One BID for 3 days then once a day orally, Disp: 10 tablet, Rfl: 0    predniSONE (DELTASONE) 20 MG tablet, 3 for 3 days then 2 for 3 days then one for 3 days and repeat for breathing problems, Disp: 36 tablet, Rfl: 0    promethazine-dextromethorphan (PROMETHAZINE-DM) 6.25-15 mg/5 mL Syrp, Take 5 mLs by mouth every 4 (four) hours as needed (cough)., Disp: 473 mL, Rfl: 1    semaglutide, weight loss, (WEGOVY) 0.25 mg/0.5 mL PnIj, Inject 0.25 mg into the skin every 7 days., Disp: 6 mL, Rfl: 1    spironolactone (ALDACTONE) 25 MG tablet, Take 1 tablet (25 mg total) by mouth once daily., Disp: 90 tablet, Rfl: 3    tetracycline (ACHROMYCIN,SUMYCIN) 500 MG capsule, Take 1 capsule (500 mg total) by mouth 4 (four) times daily., Disp: 56 capsule, Rfl: 0    traMADoL (ULTRAM) 50 mg tablet, Take 1 tablet (50 mg total) by mouth every 6 (six) hours as needed for Pain., Disp: 20 each, Rfl: 0    traZODone (DESYREL) 50 MG tablet, Take 50 mg by mouth nightly as needed., Disp: , Rfl:     valsartan (DIOVAN) 160 MG tablet, Take 1 tablet (160 mg total) by mouth once daily., Disp: 90 tablet, Rfl: 3    VELTASSA 8.4 gram PwPk, Take by mouth., Disp: , Rfl:   Does patient have record of home blood pressure readings no. Readings have been averaging 130/70.   Last dose of blood pressure medication was taken at 8am 10/18/24.  Patient is asymptomatic.   Complains of none.    BP: 132/78 , Pulse: 76 .  Blood pressure reading after  15 minutes was 130/80, Pulse 64.   Lorena Wang notified.      Pt requesting to restart B12 injections and semaglutide

## 2024-10-20 DIAGNOSIS — J44.89 COPD WITH ASTHMA: ICD-10-CM

## 2024-10-20 DIAGNOSIS — J45.51 SEVERE PERSISTENT ASTHMA WITH ACUTE EXACERBATION: ICD-10-CM

## 2024-10-20 RX ORDER — ALBUTEROL SULFATE 90 UG/1
INHALANT RESPIRATORY (INHALATION)
Qty: 20.1 G | Refills: 1 | Status: SHIPPED | OUTPATIENT
Start: 2024-10-20

## 2024-10-20 NOTE — TELEPHONE ENCOUNTER
No care due was identified.  [facility] Embedded Care Due Messages. Reference number: [id number].   10/20/2024 7:03:24 AM CDT

## 2024-10-21 NOTE — TELEPHONE ENCOUNTER
Refill Decision Note   Faustina Rae  is requesting a refill authorization.  Brief Assessment and Rationale for Refill:  Approve     Medication Therapy Plan: PCP previously prescribed rx; Rx sent to requested pharmacy since listed as preferred location in chart      Comments:     Note composed:7:37 PM 10/20/2024

## 2024-10-29 DIAGNOSIS — I10 ACCELERATED HYPERTENSION: ICD-10-CM

## 2024-10-29 DIAGNOSIS — I10 ESSENTIAL HYPERTENSION: ICD-10-CM

## 2024-10-29 RX ORDER — HYDRALAZINE HYDROCHLORIDE 50 MG/1
TABLET, FILM COATED ORAL
Qty: 270 TABLET | Refills: 1 | Status: SHIPPED | OUTPATIENT
Start: 2024-10-29

## 2024-10-29 RX ORDER — AMLODIPINE BESYLATE 10 MG/1
TABLET ORAL
Qty: 90 TABLET | Refills: 1 | Status: SHIPPED | OUTPATIENT
Start: 2024-10-29

## 2024-10-29 RX ORDER — SPIRONOLACTONE 25 MG/1
25 TABLET ORAL
Qty: 90 TABLET | Refills: 1 | OUTPATIENT
Start: 2024-10-29

## 2024-11-25 ENCOUNTER — OFFICE VISIT (OUTPATIENT)
Dept: FAMILY MEDICINE | Facility: CLINIC | Age: 59
End: 2024-11-25
Payer: MEDICARE

## 2024-11-25 VITALS
RESPIRATION RATE: 16 BRPM | BODY MASS INDEX: 41.78 KG/M2 | HEART RATE: 81 BPM | TEMPERATURE: 99 F | DIASTOLIC BLOOD PRESSURE: 84 MMHG | WEIGHT: 227.06 LBS | OXYGEN SATURATION: 99 % | SYSTOLIC BLOOD PRESSURE: 134 MMHG | HEIGHT: 62 IN

## 2024-11-25 DIAGNOSIS — E53.8 B12 DEFICIENCY: ICD-10-CM

## 2024-11-25 DIAGNOSIS — Z00.00 HEALTHCARE MAINTENANCE: Primary | ICD-10-CM

## 2024-11-25 DIAGNOSIS — E66.01 MORBID OBESITY WITH BMI OF 40.0-44.9, ADULT: ICD-10-CM

## 2024-11-25 DIAGNOSIS — N18.4 CHRONIC KIDNEY DISEASE, STAGE 4 (SEVERE): ICD-10-CM

## 2024-11-25 DIAGNOSIS — J44.89 COPD WITH ASTHMA: ICD-10-CM

## 2024-11-25 DIAGNOSIS — I10 ESSENTIAL HYPERTENSION: ICD-10-CM

## 2024-11-25 DIAGNOSIS — E66.01 MORBID OBESITY: ICD-10-CM

## 2024-11-25 PROCEDURE — 3044F HG A1C LEVEL LT 7.0%: CPT | Mod: CPTII,S$GLB,, | Performed by: STUDENT IN AN ORGANIZED HEALTH CARE EDUCATION/TRAINING PROGRAM

## 2024-11-25 PROCEDURE — 4010F ACE/ARB THERAPY RXD/TAKEN: CPT | Mod: CPTII,S$GLB,, | Performed by: STUDENT IN AN ORGANIZED HEALTH CARE EDUCATION/TRAINING PROGRAM

## 2024-11-25 PROCEDURE — 1159F MED LIST DOCD IN RCRD: CPT | Mod: CPTII,S$GLB,, | Performed by: STUDENT IN AN ORGANIZED HEALTH CARE EDUCATION/TRAINING PROGRAM

## 2024-11-25 PROCEDURE — 3079F DIAST BP 80-89 MM HG: CPT | Mod: CPTII,S$GLB,, | Performed by: STUDENT IN AN ORGANIZED HEALTH CARE EDUCATION/TRAINING PROGRAM

## 2024-11-25 PROCEDURE — 99999 PR PBB SHADOW E&M-EST. PATIENT-LVL V: CPT | Mod: PBBFAC,,, | Performed by: STUDENT IN AN ORGANIZED HEALTH CARE EDUCATION/TRAINING PROGRAM

## 2024-11-25 PROCEDURE — 99214 OFFICE O/P EST MOD 30 MIN: CPT | Mod: S$GLB,,, | Performed by: STUDENT IN AN ORGANIZED HEALTH CARE EDUCATION/TRAINING PROGRAM

## 2024-11-25 PROCEDURE — 3008F BODY MASS INDEX DOCD: CPT | Mod: CPTII,S$GLB,, | Performed by: STUDENT IN AN ORGANIZED HEALTH CARE EDUCATION/TRAINING PROGRAM

## 2024-11-25 PROCEDURE — 3075F SYST BP GE 130 - 139MM HG: CPT | Mod: CPTII,S$GLB,, | Performed by: STUDENT IN AN ORGANIZED HEALTH CARE EDUCATION/TRAINING PROGRAM

## 2024-11-25 PROCEDURE — G2211 COMPLEX E/M VISIT ADD ON: HCPCS | Mod: S$GLB,,, | Performed by: STUDENT IN AN ORGANIZED HEALTH CARE EDUCATION/TRAINING PROGRAM

## 2024-11-25 PROCEDURE — 3060F POS MICROALBUMINURIA REV: CPT | Mod: CPTII,S$GLB,, | Performed by: STUDENT IN AN ORGANIZED HEALTH CARE EDUCATION/TRAINING PROGRAM

## 2024-11-25 PROCEDURE — 3066F NEPHROPATHY DOC TX: CPT | Mod: CPTII,S$GLB,, | Performed by: STUDENT IN AN ORGANIZED HEALTH CARE EDUCATION/TRAINING PROGRAM

## 2024-11-25 RX ORDER — SEMAGLUTIDE 0.25 MG/.5ML
0.25 INJECTION, SOLUTION SUBCUTANEOUS
Qty: 2 ML | Refills: 0 | Status: SHIPPED | OUTPATIENT
Start: 2024-11-25 | End: 2024-12-27

## 2024-11-25 RX ORDER — SEMAGLUTIDE 1 MG/.5ML
1 INJECTION, SOLUTION SUBCUTANEOUS
Qty: 2 ML | Refills: 0 | Status: SHIPPED | OUTPATIENT
Start: 2025-01-26 | End: 2025-02-25

## 2024-11-25 RX ORDER — CYANOCOBALAMIN 1000 UG/ML
1000 INJECTION, SOLUTION INTRAMUSCULAR; SUBCUTANEOUS
Status: SHIPPED | OUTPATIENT
Start: 2024-11-25 | End: 2025-10-21

## 2024-11-25 RX ORDER — SEMAGLUTIDE 0.5 MG/.5ML
0.5 INJECTION, SOLUTION SUBCUTANEOUS
Qty: 2 ML | Refills: 0 | Status: SHIPPED | OUTPATIENT
Start: 2024-12-26 | End: 2025-01-25

## 2024-11-25 NOTE — PATIENT INSTRUCTIONS
José Manuel Weems,     If you are due for any health screening(s) below please notify me so we can arrange them to be ordered and scheduled. Most healthy patients at your age complete them, but you are free to accept or refuse.     If you can't do it, I'll definitely understand. If you can, I'd certainly appreciate it!    Tests to Keep You Healthy    Mammogram: Met on 1/19/2024  Colon Cancer Screening: ORDERED  Last Blood Pressure <= 139/89 (11/25/2024): Yes      Its time for your colon cancer screening     Colorectal cancer is one of the leading causes of cancer death for men and women but it doesnt have to be. Screenings can prevent colorectal cancer or find it early enough to treat and cure the disease.     Our records indicate that you may be overdue for colon cancer screening. A colonoscopy or stool screening test can help identify patients at risk for developing colon cancer. Cancer screenings save lives, so schedule yours today to stay healthy.     A colonoscopy is the preferred test for detecting colon cancer. It is needed only once every 10 years if results are negative. While you are sedated, a flexible, lighted tube with a tiny camera is inserted into the rectum and advanced through the colon to look for cancers.     An alternative screening test that is used at home and returned to the lab may also be used. It detects hidden blood in bowel movements which could indicate cancer in the colon. If results are positive, you will need a colonoscopy to determine if the blood is a sign of cancer. This type of follow up (diagnostic) colonoscopy usually requires additional copays as required by your insurance provider.     If you recently had your colon cancer screening performed outside of [facility], please let your Health care team know so that they can update your health record. Please contact your PCP if you have any questions.

## 2024-11-25 NOTE — PROGRESS NOTES
[facility] Note    Subjective:    The HPI and pertinent ROS is included in the Diagnostic Impression Remarks section at the end of the note. Please see below for further details. Chief complaint is at end of note.     Faustina is a pleasant intelligent patient who is here for evaluation.     Modified Medications    Modified Medication Previous Medication    SEMAGLUTIDE, WEIGHT LOSS, (WEGOVY) 0.25 MG/0.5 ML PNIJ semaglutide, weight loss, (WEGOVY) 0.25 mg/0.5 mL PnIj       Inject 0.25 mg into the skin every 7 days.    Inject 0.25 mg into the skin every 7 days.       Data reviewed 274}  Previous medical records reviewed and summarized in plan section at end of note.      If you are due for any health screening(s) below please notify me so we can arrange them to be ordered and scheduled. Most healthy patients at your age complete them, but you are free to accept or refuse. If you can't do it, I'll definitely understand. If you can, I'd certainly appreciate it!     Tests to Keep You Healthy    Mammogram: Met on 1/19/2024  Colon Cancer Screening: ORDERED  Last Blood Pressure <= 139/89 (11/25/2024): Yes      The following portions of the patient's history were reviewed and updated as appropriate: allergies, current medications, past family history, past medical history, past social history, past surgical history and problem list.    She  has a past medical history of Anxiety, Arthritis, Asthma, CHF (congestive heart failure), COPD (chronic obstructive pulmonary disease), Hyperlipemia, Hypertension, Leaky heart valve, Obese, Obese, Obstructive sleep apnea, Pneumonia, Rheumatoid arthritis(714.0), and Stroke.  She  has a past surgical history that includes Hysterectomy; Cholecystectomy; Sleeve Gastroplasty (02/21/2017); and Carotid stent.    She  reports that she has never smoked. She has never been exposed to tobacco smoke. She has never used smokeless tobacco. She reports current alcohol use. She reports that  "she does not use drugs.  She family history includes Arthritis in her mother; Asthma in her son; Breast cancer in her mother; Cancer in her mother; Diabetes in her mother; Heart disease in her father; Hyperlipidemia in her mother; Hypertension in her father, mother, sister, and sister; Stroke in her mother.    Review of patient's allergies indicates:  No Known Allergies    Tobacco Use: Low Risk  (11/25/2024)    Patient History     Smoking Tobacco Use: Never     Smokeless Tobacco Use: Never     Passive Exposure: Never     Physical Examination  Physical Exam  Vital Signs  Blood pressure measures 134/84.     General appearance: alert, cooperative, no distress  Neck: no thyromegaly, no neck stiffness  Lungs: clear to auscultation, no wheezes, rales or rhonchi, symmetric air entry  Heart: normal rate, regular rhythm, normal S1, S2, no murmurs, rubs, clicks or gallops  Abdomen: soft, nontender, nondistended, no rigidity, rebound, or guarding.   Back: no point tenderness over spine  Extremities: peripheral pulses normal, no unilateral leg swelling or calf tenderness   Neurological:alert, oriented, normal speech, no new focal findings or movement disorder noted from baseline      BP Readings from Last 3 Encounters:   11/25/24 134/84   10/18/24 132/78   09/26/24 (!) 145/82     Wt Readings from Last 3 Encounters:   11/25/24 103 kg (227 lb 1.2 oz)   09/26/24 100.5 kg (221 lb 10.8 oz)   09/09/24 98.8 kg (217 lb 13 oz)     /84 (BP Location: Right arm, Patient Position: Sitting)   Pulse 81   Temp 98.5 °F (36.9 °C) (Oral)   Resp 16   Ht 5' 2" (1.575 m)   Wt 103 kg (227 lb 1.2 oz)   LMP  (LMP Unknown) Comment: Does not menstruate   SpO2 99%   BMI 41.53 kg/m²    274}  Laboratory: I have reviewed old labs below:    274}    Lab Results   Component Value Date    WBC 6.34 08/26/2024    HGB 11.7 (L) 08/26/2024    HCT 37.4 08/26/2024    MCV 93 08/26/2024     08/26/2024     08/26/2024    K 4.9 08/26/2024    CL " 111 (H) 08/26/2024    CALCIUM 10.0 08/26/2024    PHOS 3.4 04/30/2024    PHOS 3.4 04/30/2024    CO2 22 (L) 08/26/2024    GLU 93 08/26/2024    BUN 24 (H) 08/26/2024    CREATININE 2.6 (H) 08/26/2024    EGFRNORACEVR 20.5 (A) 08/26/2024    ANIONGAP 8 08/26/2024    PROT 7.5 08/26/2024    ALBUMIN 3.5 08/26/2024    BILITOT 0.3 08/26/2024    ALKPHOS 65 08/26/2024    ALT 9 (L) 08/26/2024    AST 15 08/26/2024    INR 0.9 07/13/2023    CHOL 216 (H) 04/30/2024    TRIG 186 (H) 04/30/2024    HDL 43 04/30/2024    LDLCALC 135.8 04/30/2024    TSH 2.110 07/12/2023    HGBA1C 5.3 08/26/2024      Lab reviewed by me: Particular labs of significance that I will monitor, workup, or treat to improve are mentioned below in diagnostic impression remarks.    Results  Laboratory Studies  Potassium level was 4.9.       Imaging/EKG: I have reviewed the pertinent results and my findings are noted in remarks.  274}    CC:   Chief Complaint   Patient presents with    Follow-up    Hypertension        274}    History of Present Illness  The patient is an 85-year-old female who is here for follow-up.    She reports that her blood pressure has been stable at home. She is currently under the care of a nephrologist and is on amlodipine 10 mg, valsartan, and hydralazine. She is not currently taking chlorthalidone.    Her respiratory function is satisfactory. She reports no heartburn or gastrointestinal issues.    She is also taking Wegovy and is interested in receiving B12 injections. Her last B12 injection was administered approximately 3 months ago, and she did not experience any adverse effects.    She had a consultation with her nephrologist about 2 months ago and underwent blood work at that time.       Assessment/Plan  Faustina Rae is a 59 y.o. female who presents to clinic with:  1. Healthcare maintenance    2. Essential hypertension    3. Chronic kidney disease, stage 4 (severe)    4. B12 deficiency    5. Morbid obesity    6. Morbid obesity with  BMI of 40.0-44.9, adult    7. COPD with asthma       274}    Assessment & Plan  1. Chronic Kidney Disease (CKD).  Blood work will be rechecked and monitored to ensure kidney function is stable. Current medications will be continued.     2. Hypertension.  Blood pressure is stable and controlled. Current medications, including amlodipine 10 mg, valsartan, and hydralazine, will be continued and monitored.    3. Morbid obesity.  Committed to weight loss through lifestyle therapy and medication. A prescription for Wegovy will be sent to the [facility] pharmacy. The initial dose will be 0.25 mg for 4 weeks, then increased to 0.5 mg for 30 days, and finally to 1 mg. Monitoring for side effects will be conducted.    4. B12 deficiency.  B12 injections will be initiated and monitored. The patient will receive the injections in the clinic.    5. Health Maintenance.  A flu shot will be administered during the next visit for B12 injections. The patient has a Cologuard kit at home and will complete the colon screening.       COPD-stable continue current inhalers no recent flares      This note was generated with the assistance of ambient listening technology. Verbal consent was obtained by the patient and accompanying visitor(s) for the recording of patient appointment to facilitate this note. I attest to having reviewed and edited the generated note for accuracy, though some syntax or spelling errors may persist. Please contact the author of this note for any clarification.      1. Healthcare maintenance    2. Essential hypertension  - Comprehensive Metabolic Panel; Future    3. Chronic kidney disease, stage 4 (severe)  - semaglutide, weight loss, (WEGOVY) 0.25 mg/0.5 mL PnIj; Inject 0.25 mg into the skin every 7 days.  Dispense: 2 mL; Refill: 0  - semaglutide, weight loss, (WEGOVY) 0.5 mg/0.5 mL PnIj; Inject 0.5 mg into the skin every 7 days.  Dispense: 2 mL; Refill: 0  - semaglutide, weight loss, (WEGOVY) 1 mg/0.5 mL PnIj; Inject 1  mg into the skin every 7 days.  Dispense: 2 mL; Refill: 0    4. B12 deficiency  - cyanocobalamin injection 1,000 mcg    5. Morbid obesity  - semaglutide, weight loss, (WEGOVY) 0.25 mg/0.5 mL PnIj; Inject 0.25 mg into the skin every 7 days.  Dispense: 2 mL; Refill: 0  - semaglutide, weight loss, (WEGOVY) 0.5 mg/0.5 mL PnIj; Inject 0.5 mg into the skin every 7 days.  Dispense: 2 mL; Refill: 0  - semaglutide, weight loss, (WEGOVY) 1 mg/0.5 mL PnIj; Inject 1 mg into the skin every 7 days.  Dispense: 2 mL; Refill: 0    6. Morbid obesity with BMI of 40.0-44.9, adult    7. COPD with asthma      Zoran Cuevas MD   274}    If you are due for any health screening(s) below please notify me so we can arrange them to be ordered and scheduled. Most healthy patients at your age complete them, but you are free to accept or refuse.     If you can't do it, I'll definitely understand. If you can, I'd certainly appreciate it!   Tests to Keep You Healthy    Mammogram: Met on 1/19/2024  Colon Cancer Screening: ORDERED  Last Blood Pressure <= 139/89 (11/25/2024): Yes

## 2024-11-27 NOTE — TELEPHONE ENCOUNTER
Care Due:                  Date            Visit Type   Department     Provider  --------------------------------------------------------------------------------                                EP -                              PRIMARY      [facility]  Last Visit: 11-      CARE ([facility])   VANESSA Cuevas                              EP -                              PRIMARY      [facility]  Next Visit: 12-      CARE ([facility])   MEDICINE       Zoran Cuevas                                                            Last  Test          Frequency    Reason                     Performed    Due Date  --------------------------------------------------------------------------------    HBA1C.......  6 months...  semaglutide,.............  08- 02-    [employer] Embedded Care Due Messages. Reference number: [id number].   11/27/2024 10:59:15 AM CST

## 2024-12-02 RX ORDER — SPIRONOLACTONE 25 MG/1
25 TABLET ORAL
Qty: 90 TABLET | Refills: 2 | Status: SHIPPED | OUTPATIENT
Start: 2024-12-02

## 2024-12-04 ENCOUNTER — LAB VISIT (OUTPATIENT)
Dept: LAB | Facility: HOSPITAL | Age: 59
End: 2024-12-04
Attending: INTERNAL MEDICINE
Payer: MEDICARE

## 2024-12-04 ENCOUNTER — CLINICAL SUPPORT (OUTPATIENT)
Dept: FAMILY MEDICINE | Facility: CLINIC | Age: 59
End: 2024-12-04
Payer: MEDICARE

## 2024-12-04 DIAGNOSIS — E53.8 LOW SERUM VITAMIN B12: ICD-10-CM

## 2024-12-04 DIAGNOSIS — I10 ESSENTIAL HYPERTENSION: ICD-10-CM

## 2024-12-04 DIAGNOSIS — Z23 NEED FOR VACCINATION: ICD-10-CM

## 2024-12-04 DIAGNOSIS — Z23 NEEDS FLU SHOT: ICD-10-CM

## 2024-12-04 DIAGNOSIS — R53.82 CHRONIC FATIGUE: ICD-10-CM

## 2024-12-04 DIAGNOSIS — E53.8 B12 DEFICIENCY: Primary | ICD-10-CM

## 2024-12-04 LAB
ALBUMIN SERPL BCP-MCNC: 3.4 G/DL (ref 3.5–5.2)
ALP SERPL-CCNC: 69 U/L (ref 40–150)
ALT SERPL W/O P-5'-P-CCNC: 10 U/L (ref 10–44)
ANION GAP SERPL CALC-SCNC: 10 MMOL/L (ref 8–16)
AST SERPL-CCNC: 14 U/L (ref 10–40)
BILIRUB SERPL-MCNC: 0.2 MG/DL (ref 0.1–1)
BUN SERPL-MCNC: 29 MG/DL (ref 6–20)
CALCIUM SERPL-MCNC: 9.4 MG/DL (ref 8.7–10.5)
CHLORIDE SERPL-SCNC: 112 MMOL/L (ref 95–110)
CO2 SERPL-SCNC: 19 MMOL/L (ref 23–29)
CREAT SERPL-MCNC: 2.4 MG/DL (ref 0.5–1.4)
EST. GFR  (NO RACE VARIABLE): 22.6 ML/MIN/1.73 M^2
GLUCOSE SERPL-MCNC: 90 MG/DL (ref 70–110)
POTASSIUM SERPL-SCNC: 4.2 MMOL/L (ref 3.5–5.1)
PROT SERPL-MCNC: 7.2 G/DL (ref 6–8.4)
SODIUM SERPL-SCNC: 141 MMOL/L (ref 136–145)
VIT B12 SERPL-MCNC: 458 PG/ML (ref 210–950)

## 2024-12-04 PROCEDURE — 96372 THER/PROPH/DIAG INJ SC/IM: CPT | Mod: S$GLB,,, | Performed by: STUDENT IN AN ORGANIZED HEALTH CARE EDUCATION/TRAINING PROGRAM

## 2024-12-04 PROCEDURE — 99499 UNLISTED E&M SERVICE: CPT | Mod: S$GLB,,, | Performed by: STUDENT IN AN ORGANIZED HEALTH CARE EDUCATION/TRAINING PROGRAM

## 2024-12-04 PROCEDURE — 82607 VITAMIN B-12: CPT | Performed by: INTERNAL MEDICINE

## 2024-12-04 PROCEDURE — G0008 ADMIN INFLUENZA VIRUS VAC: HCPCS | Mod: 59,S$GLB,, | Performed by: FAMILY MEDICINE

## 2024-12-04 PROCEDURE — 36415 COLL VENOUS BLD VENIPUNCTURE: CPT | Mod: PO | Performed by: INTERNAL MEDICINE

## 2024-12-04 PROCEDURE — 90656 IIV3 VACC NO PRSV 0.5 ML IM: CPT | Mod: S$GLB,,, | Performed by: FAMILY MEDICINE

## 2024-12-04 PROCEDURE — 80053 COMPREHEN METABOLIC PANEL: CPT | Performed by: STUDENT IN AN ORGANIZED HEALTH CARE EDUCATION/TRAINING PROGRAM

## 2024-12-04 RX ADMIN — CYANOCOBALAMIN 1000 MCG: 1000 INJECTION, SOLUTION INTRAMUSCULAR; SUBCUTANEOUS at 03:12

## 2024-12-04 NOTE — PROGRESS NOTES
Identified pts name and , Flu vaccine administered IM, pt tolerated well. Aseptic technique maintained. Pain scale 0 out of 10 on pain scale. Pt instructed to wait in clinic for 15 minutes after injection was given.    Identified pts name and , B12 1,000 mcg administered IM, pt tolerated well. Aseptic technique maintained. Pain scale 0 out of 10 on pain scale. Pt instructed to wait in clinic for 15 minutes after injection was given.

## 2024-12-05 ENCOUNTER — E-CONSULT (OUTPATIENT)
Dept: PHARMACY | Facility: CLINIC | Age: 59
End: 2024-12-05
Payer: MEDICAID

## 2024-12-05 ENCOUNTER — PATIENT MESSAGE (OUTPATIENT)
Dept: FAMILY MEDICINE | Facility: CLINIC | Age: 59
End: 2024-12-05
Payer: MEDICAID

## 2024-12-05 DIAGNOSIS — N18.32 STAGE 3B CHRONIC KIDNEY DISEASE: Primary | ICD-10-CM

## 2024-12-05 NOTE — CONSULTS
[place] - [facility]  Response for E-Consult     Patient Name: Faustina Rae  MRN: [id number]  Primary Care Provider: Zoran Cuevas MD   Requesting Provider: Zoran Cuevas MD  E-Consult to Pharmacy  Consult performed by: Caleb Gomez PharmD  Consult ordered by: Zoran Cuevas MD  Reason for consult: Pt has CKD with bicarb less than 22 is there strong evidence to start bicarb to keep bicarb at 22 in CKD to prevent further decline?  Assessment/Recommendations: Bicarb therapy has shown to be beneficial in progression of CKD, bone health, and nutritional status. Bicarbonate supplementation appears to slow the progression of CKD. A lower risk of ESRD ( 6.5% vs 33%) and lower annual declines in CrCl where shown in group supplemented with bicarb vs the control groups in studies.  Doses of 600mg tid was started and titrated up to achieve >23.  Patients in the bicarbonate group were more likely to develop edema and worsened hypertension requiring intensification of therapy, although this difference was not statistically significant.            Recommendation: Yes.    Contingency that warrants a repeat eConsult or referral: No    Total time of Consultation: 20 minute    I did not speak to the requesting provider verbally about this.     *This eConsult is based on the clinical data available to me and is furnished without benefit of a physical examination. The eConsult will need to be interpreted in light of any clinical issues or changes in patient status not available to me at the time of filing this eConsults. Significant changes in patient condition or level of acuity should result in immediate formal consultation and reevaluation. Please alert me if you have further questions.    Thank you for this eConsult referral.     Caleb Gomez PharmD  [facility]

## 2024-12-06 ENCOUNTER — TELEPHONE (OUTPATIENT)
Dept: FAMILY MEDICINE | Facility: CLINIC | Age: 59
End: 2024-12-06

## 2024-12-06 ENCOUNTER — PATIENT MESSAGE (OUTPATIENT)
Dept: FAMILY MEDICINE | Facility: CLINIC | Age: 59
End: 2024-12-06

## 2024-12-06 ENCOUNTER — OFFICE VISIT (OUTPATIENT)
Dept: FAMILY MEDICINE | Facility: CLINIC | Age: 59
End: 2024-12-06
Payer: MEDICARE

## 2024-12-06 ENCOUNTER — TELEPHONE (OUTPATIENT)
Dept: FAMILY MEDICINE | Facility: CLINIC | Age: 59
End: 2024-12-06
Payer: MEDICAID

## 2024-12-06 DIAGNOSIS — N18.4 CHRONIC KIDNEY DISEASE, STAGE 4 (SEVERE): Primary | ICD-10-CM

## 2024-12-06 DIAGNOSIS — E87.8 LOW BICARBONATE: ICD-10-CM

## 2024-12-06 DIAGNOSIS — I10 ESSENTIAL HYPERTENSION: ICD-10-CM

## 2024-12-06 DIAGNOSIS — J44.89 COPD WITH ASTHMA: ICD-10-CM

## 2024-12-06 RX ORDER — SODIUM BICARBONATE 650 MG/1
650 TABLET ORAL 2 TIMES DAILY
Qty: 60 TABLET | Refills: 11 | Status: SHIPPED | OUTPATIENT
Start: 2024-12-06 | End: 2025-12-06

## 2024-12-06 NOTE — PROGRESS NOTES
Established Patient - Audio Only Telehealth Visit       274}  The patient location is: [place]   The chief complaint leading to consultation is:   Chief Complaint   Patient presents with    Chronic Kidney Disease     Visit type: Virtual visit with audio only (telephone)  Total time spent with patient: 22 min        The reason for the audio only service rather than synchronous audio and video virtual visit was related to technical difficulties or patient preference/necessity.     Each patient to whom I provide medical services by telemedicine is:  (1) informed of the relationship between the physician and patient and the respective role of any other health care provider with respect to management of the patient; and (2) notified that they may decline to receive medical services by telemedicine and may withdraw from such care at any time. Patient verbally consented to receive this service via voice-only telephone call.     274}     HPI:  Had blood work recently with decreased bicarbonate and patient reports that breathing has been stable along with blood pressure she reports compliance with her medications       Lab Results   Component Value Date    WBC 6.34 08/26/2024    HGB 11.7 (L) 08/26/2024    HCT 37.4 08/26/2024    MCV 93 08/26/2024     08/26/2024     12/04/2024    K 4.2 12/04/2024     (H) 12/04/2024    CALCIUM 9.4 12/04/2024    PHOS 3.4 04/30/2024    PHOS 3.4 04/30/2024    CO2 19 (L) 12/04/2024    GLU 90 12/04/2024    BUN 29 (H) 12/04/2024    CREATININE 2.4 (H) 12/04/2024    EGFRNORACEVR 22.6 (A) 12/04/2024    ANIONGAP 10 12/04/2024    PROT 7.2 12/04/2024    ALBUMIN 3.4 (L) 12/04/2024    BILITOT 0.2 12/04/2024    ALKPHOS 69 12/04/2024    ALT 10 12/04/2024    AST 14 12/04/2024    INR 0.9 07/13/2023    CHOL 216 (H) 04/30/2024    TRIG 186 (H) 04/30/2024    HDL 43 04/30/2024    LDLCALC 135.8 04/30/2024    TSH 2.110 07/12/2023    HGBA1C 5.3 08/26/2024          Assessment and plan:  CKD-  bicarbonate less than 22 recommend to take oral bicarbonate sent this in during a different encounter this morning will have to recheck kidney function to make sure bicarbonate is 22 or higher place an E consult to pharmacy who agreed will monitor kidney function     Hypertension -stable continue current meds monitor no headache or chest pain f/u blood work       COPD-stable continue current inhalers no recent flares    Faustina was seen today for chronic kidney disease.    Diagnoses and all orders for this visit:    Chronic kidney disease, stage 4 (severe)  -     RENAL FUNCTION PANEL; Future    Essential hypertension    Low bicarbonate  -     RENAL FUNCTION PANEL; Future         This service was not originating from a related E/M service provided within the previous 7 days nor will  to an E/M service or procedure within the next 24 hours or my soonest available appointment.  Prevailing standard of care was able to be met in this audio-only visit.

## 2024-12-06 NOTE — TELEPHONE ENCOUNTER
"             [facility] Cardiology Office Follow Up Note    Candido Dawn  [id number]  2022    Primary Care Provider: Ernesto Avila MD    Chief Complaint: Routine follow-up    History of Present Illness:   Mr. Candido Dawn is a 68 y.o. male who presents to the Cardiology Clinic for routine follow-up. The patient has a past medical history significant for hyperlipidemia, sleep apnea, and depression.  He additionally has a remote history of tobacco use, cessation in .  He has a past cardiac history significant for paroxysmal atrial fibrillation with rapid ventricular rates.  Since his last appointment, the patient underwent outpatient cardiac monitoring showing an AF burden of 1% with episodes of RVR up to 144 bpm.  His atrial fibrillation remained asymptomatic.  His average heart rate was 51 bpm, with a minimum heart rate of 38 bpm.  Due to episodes of shortness of breath and chest discomfort, he was scheduled for an exercise stress echocardiogram which was nondiagnostic due to inability to reach target heart rate.  He presents today for routine follow-up.  Today, the patient reports his atrial fibrillation remains asymptomatic.  He denies any episodes of palpitations or sensation of tachycardia.  His bradycardia also remains asymptomatic, without presyncopal or syncopal events.  He does report occasional orthostatic symptoms, without syncope.  He does report occasional episodes of chest pain described as a \"sharp and stabbing\" discomfort located in his central chest.  The episodes tend to be worse with deep inspiration.  No associated nausea, vomiting, or diaphoresis.  He also reports occasional episodes of exertional dyspnea, with no clear underlying provoking factors.  Day he also reports worsening of his tinnitus after up titration of his aspirin.  No other specific complaints today.     Past Cardiac Testin. Last Coronary Angio: None  2. Prior Stress Testing:  Exercise " ----- Message from Zoran Cuevas MD sent at 12/6/2024  8:28 AM CST -----  Regarding: labs  Please set up renal panel in 2 week s   stress echocardiogram 11/21   1.  Nondiagnostic due to inability to reach target heart rate   3. Last Echo:  None  4. Prior Holter monitor: 10/21   1.  Paroxysmal atrial fibrillation with 1% burden, RVR up to 144 bpm   2.  The predominant rhythm is sinus bradycardia with an average heart rate 51 bpm, minimum 38 bpm   3.  Occasional supraventricular ectopy   4.  Rare ventricular ectopy    Review of Systems:   Review of Systems   Constitutional: Negative for activity change, appetite change, chills, diaphoresis, fatigue, fever, unexpected weight gain and unexpected weight loss.   Eyes: Negative for blurred vision and double vision.   Respiratory: Positive for shortness of breath. Negative for cough, chest tightness and wheezing.    Cardiovascular: Positive for chest pain. Negative for palpitations and leg swelling.   Gastrointestinal: Negative for abdominal pain, anal bleeding, blood in stool and GERD.   Endocrine: Negative for cold intolerance and heat intolerance.   Genitourinary: Negative for hematuria.   Neurological: Positive for dizziness and light-headedness. Negative for syncope and weakness.   Hematological: Does not bruise/bleed easily.   Psychiatric/Behavioral: Negative for depressed mood and stress. The patient is not nervous/anxious.        I have reviewed and/or updated the patient's past medical, past surgical, family, social history, problem list and allergies as appropriate.     Medications:     Current Outpatient Medications:   •  atorvastatin (LIPITOR) 20 MG tablet, TAKE 1 TABLET BY MOUTH EVERY DAY, Disp: 90 tablet, Rfl: 3  •  azelastine (ASTELIN) 0.1 % nasal spray, 1 spray into the nostril(s) as directed by provider 2 (Two) Times a Day. Use in each nostril as directed, Disp: 1 each, Rfl: 12  •  buPROPion XL (WELLBUTRIN XL) 300 MG 24 hr tablet, TAKE 1 TABLET EVERY MORNING, Disp: 90 tablet, Rfl: 2  •  Cholecalciferol (vitamin D3) 125 MCG (5000 UT) capsule capsule, Take 5,000 Units by mouth Daily.,  "Disp: , Rfl:   •  Coenzyme Q10 (COQ10 PO), Take 300 mg by mouth Daily., Disp: , Rfl:   •  escitalopram (LEXAPRO) 20 MG tablet, TAKE 1 TABLET DAILY, Disp: 90 tablet, Rfl: 2  •  Multiple Vitamins-Minerals (MULTIVITAMIN WITH MINERALS) tablet tablet, Take 1 tablet by mouth Daily., Disp: , Rfl:   •  Omega-3 Fatty Acids (FISH OIL) 1000 MG capsule capsule, Take 1,000 mg by mouth Daily With Breakfast., Disp: , Rfl:   •  apixaban (ELIQUIS) 5 MG tablet tablet, Take 1 tablet by mouth 2 (Two) Times a Day., Disp: 180 tablet, Rfl: 3  •  metoprolol succinate XL (TOPROL-XL) 25 MG 24 hr tablet, TAKE 1 TABLET BY MOUTH EVERY DAY, Disp: 30 tablet, Rfl: 2    Physical Exam:  Vital Signs:   Vitals:    11/30/21 1120   BP: 108/76   BP Location: Left arm   Patient Position: Sitting   Pulse: 51   Resp: 18   SpO2: 99%   Weight: 88.5 kg (195 lb)   Height: 188 cm (74\")       Physical Exam  Constitutional:       General: He is not in acute distress.     Appearance: Normal appearance. He is well-developed. He is not diaphoretic.   HENT:      Head: Normocephalic and atraumatic.   Eyes:      General: No scleral icterus.     Pupils: Pupils are equal, round, and reactive to light.   Neck:      Trachea: No tracheal deviation.   Cardiovascular:      Rate and Rhythm: Regular rhythm. Bradycardia present.      Heart sounds: Normal heart sounds. No murmur heard.  No friction rub. No gallop.       Comments: Normal JVD.  Pulmonary:      Effort: Pulmonary effort is normal. No respiratory distress.      Breath sounds: Normal breath sounds. No stridor. No wheezing or rales.   Chest:      Chest wall: No tenderness.   Abdominal:      General: Bowel sounds are normal. There is no distension.      Palpations: Abdomen is soft.      Tenderness: There is no abdominal tenderness. There is no guarding or rebound.   Musculoskeletal:         General: No swelling. Normal range of motion.      Cervical back: Neck supple. No tenderness.   Lymphadenopathy:      Cervical: No " cervical adenopathy.   Skin:     General: Skin is warm and dry.      Findings: No erythema.   Neurological:      General: No focal deficit present.      Mental Status: He is alert and oriented to person, place, and time.   Psychiatric:         Mood and Affect: Mood normal.         Behavior: Behavior normal.         Results Review:   I reviewed the patient's new clinical results.  I personally viewed and interpreted the patient's EKG/Telemetry data    Assessment / Plan:     1. Paroxysmal atrial fibrillation   --Recent outpatient cardiac monitoring showed 1% AF burden with RVR up to 144 bpm, asymptomatic  --Currently in sinus bradycardia today, average heart rate 51 bpm on outpatient monitoring  --Currently unable to tolerate beta-blockade due to orthostatic hypotension and bradycardia  --As the atrial fibrillation/RVR is asymptomatic and burden is low at 1%, will continue watch and wait strategy for now  --If burden of atrial fibrillation increases or if RVR becomes symptomatic, would then refer to electrophysiology to consider permanent pacemaker implantation to allow for AV gonzalez blockade to control RVR  --[id number] score is currently 1, given difficulty with worsening of tinnitus due to aspirin the patient has now elected for therapeutic anticoagulation for CVA prophylaxis.  Will start Eliquis  --Follow-up in 3 months to reevaluate symptoms and ventricular rate     2.  Chest pain  --Occasional episodes of chest pain, with several atypical features.  Some features more suggestive of pleuritic versus musculoskeletal pain  --Last EKG without evidence of acute or prior ischemia  --Attempted stress echocardiogram, however unable to achieve target heart rate and the study was therefore nondiagnostic  --Given ongoing episodes of chest pain with occasional exertional dyspnea, will proceed with pharmacologic MPS to further rule out ischemia contributing to current symptoms     3.  Obstructive sleep apnea  --Uses CPAP  nightly        Follow Up:   Return in about 3 months (around 2/28/2022).      Thank you for allowing me to participate in the care of your patient. Please to not hesitate to contact me with additional questions or concerns.     CELESTE Hudson MD  Interventional Cardiology   11/30/2021  11:27 EST

## 2024-12-09 ENCOUNTER — TELEPHONE (OUTPATIENT)
Dept: FAMILY MEDICINE | Facility: CLINIC | Age: 59
End: 2024-12-09
Payer: MEDICAID

## 2024-12-09 NOTE — TELEPHONE ENCOUNTER
----- Message from Zoran Cuevas MD sent at 12/6/2024  8:28 AM CST -----  Regarding: labs  Please set up renal panel in 2 week s

## 2024-12-23 ENCOUNTER — LAB VISIT (OUTPATIENT)
Dept: LAB | Facility: HOSPITAL | Age: 59
End: 2024-12-23
Attending: STUDENT IN AN ORGANIZED HEALTH CARE EDUCATION/TRAINING PROGRAM
Payer: MEDICARE

## 2024-12-23 DIAGNOSIS — E87.8 LOW BICARBONATE: ICD-10-CM

## 2024-12-23 DIAGNOSIS — N18.4 CHRONIC KIDNEY DISEASE, STAGE 4 (SEVERE): ICD-10-CM

## 2024-12-23 LAB
ALBUMIN SERPL BCP-MCNC: 3.4 G/DL (ref 3.5–5.2)
ANION GAP SERPL CALC-SCNC: 10 MMOL/L (ref 8–16)
BUN SERPL-MCNC: 25 MG/DL (ref 6–20)
CALCIUM SERPL-MCNC: 9.4 MG/DL (ref 8.7–10.5)
CHLORIDE SERPL-SCNC: 108 MMOL/L (ref 95–110)
CO2 SERPL-SCNC: 22 MMOL/L (ref 23–29)
CREAT SERPL-MCNC: 2.8 MG/DL (ref 0.5–1.4)
EST. GFR  (NO RACE VARIABLE): 18.7 ML/MIN/1.73 M^2
GLUCOSE SERPL-MCNC: 95 MG/DL (ref 70–110)
PHOSPHATE SERPL-MCNC: 4 MG/DL (ref 2.7–4.5)
POTASSIUM SERPL-SCNC: 4.8 MMOL/L (ref 3.5–5.1)
SODIUM SERPL-SCNC: 140 MMOL/L (ref 136–145)

## 2024-12-23 PROCEDURE — 36415 COLL VENOUS BLD VENIPUNCTURE: CPT | Mod: PO | Performed by: STUDENT IN AN ORGANIZED HEALTH CARE EDUCATION/TRAINING PROGRAM

## 2024-12-23 PROCEDURE — 80069 RENAL FUNCTION PANEL: CPT | Performed by: STUDENT IN AN ORGANIZED HEALTH CARE EDUCATION/TRAINING PROGRAM

## 2024-12-24 ENCOUNTER — TELEPHONE (OUTPATIENT)
Dept: FAMILY MEDICINE | Facility: CLINIC | Age: 59
End: 2024-12-24
Payer: MEDICAID

## 2024-12-24 NOTE — TELEPHONE ENCOUNTER
----- Message from Zoran Cuevas MD sent at 12/24/2024  6:51 AM CST -----  Bicarb improved to goal reec follow up with nephrology

## 2025-01-06 ENCOUNTER — CLINICAL SUPPORT (OUTPATIENT)
Dept: FAMILY MEDICINE | Facility: CLINIC | Age: 60
End: 2025-01-06
Payer: MEDICARE

## 2025-01-06 DIAGNOSIS — E53.8 B12 DEFICIENCY: Primary | ICD-10-CM

## 2025-01-06 PROCEDURE — 96372 THER/PROPH/DIAG INJ SC/IM: CPT | Mod: S$GLB,,, | Performed by: STUDENT IN AN ORGANIZED HEALTH CARE EDUCATION/TRAINING PROGRAM

## 2025-01-06 PROCEDURE — 99499 UNLISTED E&M SERVICE: CPT | Mod: S$GLB,,, | Performed by: STUDENT IN AN ORGANIZED HEALTH CARE EDUCATION/TRAINING PROGRAM

## 2025-01-06 RX ADMIN — CYANOCOBALAMIN 1000 MCG: 1000 INJECTION, SOLUTION INTRAMUSCULAR; SUBCUTANEOUS at 02:01

## 2025-01-27 ENCOUNTER — TELEPHONE (OUTPATIENT)
Dept: FAMILY MEDICINE | Facility: CLINIC | Age: 60
End: 2025-01-27
Payer: MEDICARE

## 2025-01-27 NOTE — TELEPHONE ENCOUNTER
----- Message from Francia sent at 1/27/2025  3:03 PM CST -----  Contact: self  Type:  Sooner Appointment Request    Caller is requesting a sooner appointment.  Caller declined first available appointment listed below.  Caller will not accept being placed on the waitlist and is requesting a message be sent to doctor.    Name of Caller:  pt  When is the first available appointment?  N/a  Symptoms:  stomach has been hurting for a week  Would the patient rather a call back or a response via [facility]? call  Best Call Back Number:  [phone]   Additional Information:  please call

## 2025-01-29 ENCOUNTER — OFFICE VISIT (OUTPATIENT)
Dept: FAMILY MEDICINE | Facility: CLINIC | Age: 60
End: 2025-01-29
Payer: MEDICARE

## 2025-01-29 ENCOUNTER — HOSPITAL ENCOUNTER (OUTPATIENT)
Dept: RADIOLOGY | Facility: HOSPITAL | Age: 60
Discharge: HOME | End: 2025-01-29
Attending: PHYSICIAN ASSISTANT
Payer: MEDICARE

## 2025-01-29 VITALS
HEIGHT: 62 IN | BODY MASS INDEX: 40.65 KG/M2 | DIASTOLIC BLOOD PRESSURE: 76 MMHG | SYSTOLIC BLOOD PRESSURE: 138 MMHG | WEIGHT: 220.88 LBS | TEMPERATURE: 98 F

## 2025-01-29 DIAGNOSIS — I50.32 CHRONIC DIASTOLIC CHF (CONGESTIVE HEART FAILURE): ICD-10-CM

## 2025-01-29 DIAGNOSIS — E66.01 MORBID OBESITY: ICD-10-CM

## 2025-01-29 DIAGNOSIS — A04.8 H. PYLORI INFECTION: ICD-10-CM

## 2025-01-29 DIAGNOSIS — K29.70 HELICOBACTER PYLORI GASTRITIS: ICD-10-CM

## 2025-01-29 DIAGNOSIS — N28.1 RENAL CYST: ICD-10-CM

## 2025-01-29 DIAGNOSIS — B96.81 HELICOBACTER PYLORI GASTRITIS: ICD-10-CM

## 2025-01-29 DIAGNOSIS — R10.9 ABDOMINAL PAIN, UNSPECIFIED ABDOMINAL LOCATION: Primary | ICD-10-CM

## 2025-01-29 DIAGNOSIS — R10.9 ABDOMINAL PAIN, UNSPECIFIED ABDOMINAL LOCATION: ICD-10-CM

## 2025-01-29 DIAGNOSIS — N18.4 CHRONIC KIDNEY DISEASE, STAGE 4 (SEVERE): ICD-10-CM

## 2025-01-29 DIAGNOSIS — Z98.84 S/P LAPAROSCOPIC SLEEVE GASTRECTOMY: ICD-10-CM

## 2025-01-29 DIAGNOSIS — K29.70 GASTRITIS, PRESENCE OF BLEEDING UNSPECIFIED, UNSPECIFIED CHRONICITY, UNSPECIFIED GASTRITIS TYPE: ICD-10-CM

## 2025-01-29 DIAGNOSIS — I69.354 HEMIPARESIS AFFECTING LEFT SIDE AS LATE EFFECT OF CEREBROVASCULAR ACCIDENT: ICD-10-CM

## 2025-01-29 DIAGNOSIS — I10 ESSENTIAL HYPERTENSION: ICD-10-CM

## 2025-01-29 DIAGNOSIS — R10.13 EPIGASTRIC PAIN: ICD-10-CM

## 2025-01-29 DIAGNOSIS — K21.00 GASTROESOPHAGEAL REFLUX DISEASE WITH ESOPHAGITIS WITHOUT HEMORRHAGE: ICD-10-CM

## 2025-01-29 PROCEDURE — 3008F BODY MASS INDEX DOCD: CPT | Mod: CPTII,S$GLB,, | Performed by: PHYSICIAN ASSISTANT

## 2025-01-29 PROCEDURE — 3078F DIAST BP <80 MM HG: CPT | Mod: CPTII,S$GLB,, | Performed by: PHYSICIAN ASSISTANT

## 2025-01-29 PROCEDURE — 3075F SYST BP GE 130 - 139MM HG: CPT | Mod: CPTII,S$GLB,, | Performed by: PHYSICIAN ASSISTANT

## 2025-01-29 PROCEDURE — 1159F MED LIST DOCD IN RCRD: CPT | Mod: CPTII,S$GLB,, | Performed by: PHYSICIAN ASSISTANT

## 2025-01-29 PROCEDURE — 74176 CT ABD & PELVIS W/O CONTRAST: CPT | Mod: 26,,, | Performed by: RADIOLOGY

## 2025-01-29 PROCEDURE — 99999 PR PBB SHADOW E&M-EST. PATIENT-LVL V: CPT | Mod: PBBFAC,,, | Performed by: PHYSICIAN ASSISTANT

## 2025-01-29 PROCEDURE — 99214 OFFICE O/P EST MOD 30 MIN: CPT | Mod: S$GLB,,, | Performed by: PHYSICIAN ASSISTANT

## 2025-01-29 PROCEDURE — 74176 CT ABD & PELVIS W/O CONTRAST: CPT | Mod: TC

## 2025-01-29 PROCEDURE — 1160F RVW MEDS BY RX/DR IN RCRD: CPT | Mod: CPTII,S$GLB,, | Performed by: PHYSICIAN ASSISTANT

## 2025-01-29 PROCEDURE — G2211 COMPLEX E/M VISIT ADD ON: HCPCS | Mod: S$GLB,,, | Performed by: PHYSICIAN ASSISTANT

## 2025-01-29 RX ORDER — SUCRALFATE 1 G/1
1 TABLET ORAL
Qty: 40 TABLET | Refills: 0 | Status: SHIPPED | OUTPATIENT
Start: 2025-01-29

## 2025-01-29 RX ORDER — PANTOPRAZOLE SODIUM 40 MG/1
40 TABLET, DELAYED RELEASE ORAL DAILY
Qty: 30 TABLET | Refills: 5 | Status: SHIPPED | OUTPATIENT
Start: 2025-01-29 | End: 2025-02-07 | Stop reason: ALTCHOICE

## 2025-01-29 NOTE — PROGRESS NOTES
Subjective:       Patient ID: Faustina Rae is a 60 y.o. female.    Chief Complaint: Abdominal Pain (X1week )    Mrs. Rae is a 59 year old female with GERD, HTN, and HLD who presents with abdominal pain x 1 week. She states that her abdominal pain is a 8/10, sharp pain located in the epigastric region without radiation elsewhere. She reports that the abdominal pain begins after every meal that she eats and lasts for hours. She states that along with food, hitting bumps in the car, and lying on her back makes the pain worse. She states that she was worried that it was her bowels in which she took one Xlax pill 2 days ago and had a normal bowel movement without improvement in the pain. Reports experiencing one episode of nausea without vomiting yesterday. She denies fever, fatigue, vomiting, diarrhea, constipation, vaginal pain, back pain, regurgitation, belching, CP, or SOB. Patient states that she restarted Wegovy 2 weeks ago.     Abdominal Pain  Associated symptoms include nausea (transient). Pertinent negatives include no belching, constipation, diarrhea, dysuria, fever, headaches, hematochezia, hematuria, melena or vomiting.     Review of patient's allergies indicates:  No Known Allergies      Current Outpatient Medications:     albuterol (PROVENTIL) 2.5 mg /3 mL (0.083 %) nebulizer solution, Take 3 mLs (2.5 mg total) by nebulization every 6 (six) hours as needed., Disp: 120 each, Rfl: 11    albuterol (PROVENTIL/VENTOLIN HFA) 90 mcg/actuation inhaler, INHALE TWO PUFFS BY MOUTH INTO THE LUNGS FOUR TIMES DAILY, Disp: 20.1 g, Rfl: 1    amLODIPine (NORVASC) 10 MG tablet, TAKE ONE TABLET BY MOUTH DAILY AT 9 AM, Disp: 90 tablet, Rfl: 1    atorvastatin (LIPITOR) 80 MG tablet, TAKE ONE TABLET BY MOUTH DAILY AT 5 PM every evening., Disp: 90 tablet, Rfl: 2    b complex vitamins tablet, Take 1 tablet by mouth once daily., Disp: , Rfl:     BREO ELLIPTA 200-25 mcg/dose DsDv diskus inhaler, Inhale 1 puff into the lungs  once daily., Disp: 180 each, Rfl: 3    calcium citrate-vitamin D3 315-200 mg (CITRACAL+D) 315-200 mg-unit per tablet, Take 1 tablet by mouth once daily. , Disp: , Rfl:     clarithromycin (BIAXIN) 500 MG tablet, Take 1 tablet (500 mg total) by mouth 2 (two) times a day., Disp: 28 tablet, Rfl: 0    colchicine (COLCRYS) 0.6 mg tablet, Take 1 tablet (0.6 mg total) by mouth every 72 hours. Take at onset of gout and take for 10 days, Disp: 10 tablet, Rfl: 3    diazePAM (VALIUM) 5 MG tablet, Take 1 tablet (5 mg total) by mouth daily as needed for Anxiety., Disp: 2 tablet, Rfl: 0    EScitalopram oxalate (LEXAPRO) 10 MG tablet, TAKE ONE TABLET BY MOUTH DAILY AT 9 AM, Disp: 90 tablet, Rfl: 2    ferrous sulfate 325 (65 FE) MG EC tablet, Take 1 tablet (325 mg total) by mouth once daily., Disp: 90 tablet, Rfl: 3    fluticasone propionate (FLONASE) 50 mcg/actuation nasal spray, 2 sprays (100 mcg total) by Each Nostril route once daily., Disp: 16 g, Rfl: 11    fluticasone-umeclidin-vilanter (TRELEGY ELLIPTA) 200-62.5-25 mcg inhaler, Inhale 1 puff into the lungs once daily., Disp: 60 each, Rfl: 11    gabapentin (NEURONTIN) 300 MG capsule, Take 1 capsule (300 mg total) by mouth 3 (three) times daily., Disp: 90 capsule, Rfl: 11    hydrALAZINE (APRESOLINE) 50 MG tablet, TAKE ONE TABLET BY MOUTH EVERY TWELVE HOURS @ 9AM & 9PM, Disp: 270 tablet, Rfl: 1    isosorbide dinitrate (ISORDIL) 5 MG Tab, Take 1 tablet (5 mg total) by mouth 3 (three) times daily., Disp: 270 tablet, Rfl: 2    meclizine (ANTIVERT) 12.5 mg tablet, TAKE 1 TABLET(12.5 MG) BY MOUTH THREE TIMES DAILY AS NEEDED FOR DIZZINESS, Disp: 30 tablet, Rfl: 0    montelukast (SINGULAIR) 10 mg tablet, Take 1 tablet (10 mg total) by mouth every evening., Disp: 90 tablet, Rfl: 3    multivitamin (THERAGRAN) per tablet, Take 1 tablet by mouth once daily., Disp: 90 tablet, Rfl: 3    nystatin (MYCOSTATIN ORAL), Take 1 tablet by mouth Daily., Disp: , Rfl:     pantoprazole (PROTONIX) 40 MG  tablet, Take 1 tablet (40 mg total) by mouth once daily., Disp: 30 tablet, Rfl: 5    predniSONE (DELTASONE) 20 MG tablet, 3 for 3 days then 2 for 3 days then one for 3 days and repeat for breathing problems, Disp: 36 tablet, Rfl: 1    semaglutide, weight loss, (WEGOVY) 0.5 mg/0.5 mL PnIj, Inject 0.5 mg into the skin every 7 days., Disp: 2 mL, Rfl: 0    semaglutide, weight loss, (WEGOVY) 1 mg/0.5 mL PnIj, Inject 1 mg into the skin every 7 days., Disp: 2 mL, Rfl: 0    sodium bicarbonate 650 MG tablet, Take 1 tablet (650 mg total) by mouth 2 (two) times daily., Disp: 60 tablet, Rfl: 11    spironolactone (ALDACTONE) 25 MG tablet, TAKE ONE TABLET BY MOUTH DAILY, Disp: 90 tablet, Rfl: 2    tetracycline (ACHROMYCIN,SUMYCIN) 500 MG capsule, Take 1 capsule (500 mg total) by mouth 4 (four) times daily., Disp: 56 capsule, Rfl: 0    traMADoL (ULTRAM) 50 mg tablet, Take 1 tablet (50 mg total) by mouth every 6 (six) hours as needed for Pain., Disp: 20 each, Rfl: 0    traZODone (DESYREL) 50 MG tablet, Take 50 mg by mouth nightly as needed., Disp: , Rfl:     valsartan (DIOVAN) 160 MG tablet, Take 1 tablet (160 mg total) by mouth once daily., Disp: 90 tablet, Rfl: 3    VELTASSA 8.4 gram PwPk, Take by mouth., Disp: , Rfl:     aspirin (ECOTRIN) 81 MG EC tablet, Take 1 tablet (81 mg total) by mouth once daily., Disp: 90 tablet, Rfl: 3    chlorthalidone (HYGROTEN) 25 MG Tab, Take 1 tablet (25 mg total) by mouth once daily., Disp: 90 tablet, Rfl: 3    clopidogreL (PLAVIX) 75 mg tablet, Take 1 tablet (75 mg total) by mouth once daily., Disp: 30 tablet, Rfl: 1    Current Facility-Administered Medications:     cyanocobalamin injection 1,000 mcg, 1,000 mcg, Intramuscular, Q30 Days, , 1,000 mcg at 01/06/25 1458    Lab Results   Component Value Date    WBC 6.34 08/26/2024    HGB 11.7 (L) 08/26/2024    HCT 37.4 08/26/2024     08/26/2024    CHOL 216 (H) 04/30/2024    TRIG 186 (H) 04/30/2024    HDL 43 04/30/2024    ALT 10 12/04/2024    AST  14 12/04/2024     12/23/2024    K 4.8 12/23/2024     12/23/2024    CREATININE 2.8 (H) 12/23/2024    BUN 25 (H) 12/23/2024    CO2 22 (L) 12/23/2024    TSH 2.110 07/12/2023    INR 0.9 07/13/2023    HGBA1C 5.3 08/26/2024       Review of Systems   Constitutional:  Negative for activity change, chills, fatigue and fever.   Respiratory:  Negative for cough, chest tightness and shortness of breath.    Cardiovascular:  Negative for chest pain.   Gastrointestinal:  Positive for abdominal distention, abdominal pain and nausea (transient). Negative for anal bleeding, blood in stool, constipation, diarrhea, hematochezia, melena, rectal pain and vomiting.   Endocrine: Negative for polydipsia, polyphagia and polyuria.   Genitourinary:  Negative for difficulty urinating, dysuria, flank pain, hematuria, menstrual problem, pelvic pain and vaginal pain.   Neurological:  Negative for headaches.       Objective:      Physical Exam  Constitutional:       Appearance: Normal appearance. She is obese. She is not toxic-appearing or diaphoretic.      Comments: Pt intermittently leaning over in pain during exam.    HENT:      Head: Normocephalic and atraumatic.      Mouth/Throat:      Mouth: Mucous membranes are moist.   Cardiovascular:      Rate and Rhythm: Normal rate and regular rhythm.   Abdominal:      General: Bowel sounds are normal. There is no distension.      Tenderness: There is abdominal tenderness (epigastric region). There is guarding. There is no right CVA tenderness or left CVA tenderness.   Skin:     General: Skin is warm and dry.   Neurological:      General: No focal deficit present.      Mental Status: She is alert.         Assessment:       1. Abdominal pain, unspecified abdominal location    2. Essential hypertension    3. S/P laparoscopic sleeve gastrectomy    4. Chronic diastolic CHF (congestive heart failure)    5. Morbid obesity    6. Hemiparesis affecting left side as late effect of cerebrovascular  accident    7. Chronic kidney disease, stage 4 (severe)    8. Gastroesophageal reflux disease with esophagitis without hemorrhage        Plan:     Faustina was seen today for abdominal pain.    Diagnoses and all orders for this visit:    Abdominal pain, unspecified abdominal location  -     CT Abdomen Pelvis  Without Contrast; Future  -     Comprehensive Metabolic Panel; Future  -     CBC Auto Differential; Future  -     LIPASE; Future  -     Urinalysis; Future  -     Urine Culture High Risk; Future  -     H. pylori antigen, stool; Future  -     sucralfate (CARAFATE) 1 gram tablet; Take 1 tablet (1 g total) by mouth 4 (four) times daily before meals and nightly.  -     Ambulatory referral/consult to Gastroenterology; Future  -     pantoprazole (PROTONIX) 40 MG tablet; Take 1 tablet (40 mg total) by mouth once daily.  CT reviewed - no acute processes   Referral to GI     Essential hypertension  Controlled   Low salt diet   Continue current medication    S/P laparoscopic sleeve gastrectomy    Chronic diastolic CHF (congestive heart failure)  Continue current medication    Morbid obesity  Continue healthy diet and exercise   Pt restarted Wegovy 2 weeks ago    Hemiparesis affecting left side as late effect of cerebrovascular accident  stable  Chronic kidney disease, stage 4 (severe)  Continue current medication    Gastroesophageal reflux disease with esophagitis without hemorrhage  Avoid trigger foods   Begin Pantoprazole 40 mg once daily     Helicobacter pylori gastritis  Referral to GI     H. pylori infection  -     pantoprazole (PROTONIX) 40 MG tablet; Take 1 tablet (40 mg total) by mouth once daily.    Epigastric pain  -     pantoprazole (PROTONIX) 40 MG tablet; Take 1 tablet (40 mg total) by mouth once daily.    Gastritis, presence of bleeding unspecified, unspecified chronicity, unspecified gastritis type  -     pantoprazole (PROTONIX) 40 MG tablet; Take 1 tablet (40 mg total) by mouth once daily.    Renal cyst  -      US Retroperitoneal Complete; Future       Referral to Gastroenterology

## 2025-01-30 ENCOUNTER — OFFICE VISIT (OUTPATIENT)
Dept: GASTROENTEROLOGY | Facility: CLINIC | Age: 60
End: 2025-01-30
Payer: MEDICARE

## 2025-01-30 ENCOUNTER — HOSPITAL ENCOUNTER (OUTPATIENT)
Dept: RADIOLOGY | Facility: HOSPITAL | Age: 60
Discharge: HOME | End: 2025-01-30
Payer: MEDICARE

## 2025-01-30 VITALS — BODY MASS INDEX: 40.16 KG/M2 | HEIGHT: 62 IN | WEIGHT: 218.25 LBS

## 2025-01-30 DIAGNOSIS — R19.4 CHANGE IN BOWEL HABITS: ICD-10-CM

## 2025-01-30 DIAGNOSIS — R10.13 EPIGASTRIC ABDOMINAL PAIN: ICD-10-CM

## 2025-01-30 DIAGNOSIS — R10.9 ABDOMINAL PAIN, UNSPECIFIED ABDOMINAL LOCATION: ICD-10-CM

## 2025-01-30 DIAGNOSIS — R10.9 ABDOMINAL PAIN, UNSPECIFIED ABDOMINAL LOCATION: Primary | ICD-10-CM

## 2025-01-30 PROCEDURE — 3008F BODY MASS INDEX DOCD: CPT | Mod: CPTII,S$GLB,,

## 2025-01-30 PROCEDURE — 99999 PR PBB SHADOW E&M-EST. PATIENT-LVL III: CPT | Mod: PBBFAC,,,

## 2025-01-30 PROCEDURE — 74018 RADEX ABDOMEN 1 VIEW: CPT | Mod: 26,,, | Performed by: RADIOLOGY

## 2025-01-30 PROCEDURE — 99214 OFFICE O/P EST MOD 30 MIN: CPT | Mod: S$GLB,,,

## 2025-01-30 PROCEDURE — 74018 RADEX ABDOMEN 1 VIEW: CPT | Mod: TC

## 2025-01-30 NOTE — PATIENT INSTRUCTIONS
Recommend daily exercise as tolerated, adequate water intake (six 8-oz glasses of water daily), and high fiber diet. OTC fiber supplements are recommended if diet does not reach daily fiber goal (20-30 grams daily), such as Metamucil, Citrucel, or FiberCon (take as directed, separate from other oral medications by >2 hours).    -Recommend taking an OTC stool softener such as Colace as directed to avoid hard stools and straining with bowel movements PRN    -Recommend trying OTC MiraLax once daily (17g PO) as directed    - If no improvement with above recommendations, try intermittently dosed Dulcolax OTC as directed (every 3-4 days) PRN to facilitate bowel movements    -If still no improvement with these measures, call/follow-up

## 2025-01-30 NOTE — PROGRESS NOTES
Subjective:       Patient ID: Faustina Rae is a 60 y.o. female Body mass index is 39.92 kg/m².    Chief Complaint: Abdominal Pain    This patient is new to me.  Referring Provider: Lorena Beck for abdominal pain.  Established patient of Dr. Gomez.     On ozempic - started about 2-3 weeks ago. Pain started a week ago.  BMs - soft - was daily now every other day    No IBD family history  No colon cancer family    1/29/25 CT A/P  FINDINGS:  The liver is of normal size contour and CT density without focal defect.  The gallbladder is absent with surgical clips noted.  The pancreas appears of normal contour and CT density without edema or mass.  The spleen is of normal size and CT density.     The adrenal glands are not enlarged.  The kidneys are of normal size contour and CT density.  There is a new 1.2 cm soft tissue density mass on the lower pole of the left kidney.  Ultrasound is recommended to determine if this is a bloody cyst or a solid lesion.  Hydronephrosis or stone is not seen.  The abdominal aorta and inferior vena cava are of normal caliber.     The patient has had a gastric sleeve surgery with suture material noted.  Small bowel dilatation or air-fluid levels are not seen.  The colon appears of normal configuration with few diverticuli noted in the sigmoid colon.: Distention or mass is not seen.  A normal appendix is noted.  Free fluid or free air is not identified.     The bladder is nearly empty.  A uterus is not seen.     Impression:     New 12 mm soft tissue density mass of the lower pole of the left kidney.  Ultrasound is recommended to determine if this is a bloody cyst or a solid lesion.  Prior gastric sleeve procedure.  Prior cholecystectomy.  Prior hysterectomy.  Mild diverticulosis coli.    10/2017 EGD with Dr oGmez  Findings:       The examined esophagus was normal.        The Z-line was regular and was found 37 cm from the incisors.        A small hiatal hernia was present.         Evidence of a sleeve gastrectomy was found in the gastric body. This        was characterized by healthy appearing mucosa.        Localized mildly erythematous mucosa without bleeding was found in        the prepyloric region of the stomach. Biopsies were taken with a        cold forceps for histology.        The examined duodenum was normal.     1/29/25 PCP OV  Chief Complaint: Abdominal Pain (X1week )     Mrs. Rae is a 59 year old female with GERD, HTN, and HLD who presents with abdominal pain x 1 week. She states that her abdominal pain is a 8/10, sharp pain located in the epigastric region without radiation elsewhere. She reports that the abdominal pain begins after every meal that she eats and lasts for hours. She states that along with food, hitting bumps in the car, and lying on her back makes the pain worse. She states that she was worried that it was her bowels in which she took one Xlax pill 2 days ago and had a normal bowel movement without improvement in the pain. Reports experiencing one episode of nausea without vomiting yesterday. She denies fever, fatigue, vomiting, diarrhea, constipation, vaginal pain, back pain, regurgitation, belching, CP, or SOB. Patient states that she restarted Wegovy 2 weeks ago.      Abdominal Pain  Associated symptoms include nausea (transient). Pertinent negatives include no belching, constipation, diarrhea, dysuria, fever, headaches, hematochezia, hematuria, melena or vomiting.    Abdominal pain, unspecified abdominal location  -     CT Abdomen Pelvis  Without Contrast; Future  -     Comprehensive Metabolic Panel; Future  -     CBC Auto Differential; Future  -     LIPASE; Future  -     Urinalysis; Future  -     Urine Culture High Risk; Future  -     H. pylori antigen, stool; Future  -     sucralfate (CARAFATE) 1 gram tablet; Take 1 tablet (1 g total) by mouth 4 (four) times daily before meals and nightly.  -     Ambulatory referral/consult to Gastroenterology; Future  -      pantoprazole (PROTONIX) 40 MG tablet; Take 1 tablet (40 mg total) by mouth once daily.  CT reviewed - no acute processes   Referral to GI      Essential hypertension  Controlled   Low salt diet   Continue current medication     S/P laparoscopic sleeve gastrectomy     Chronic diastolic CHF (congestive heart failure)  Continue current medication     Morbid obesity  Continue healthy diet and exercise   Pt restarted Wegovy 2 weeks ago     Hemiparesis affecting left side as late effect of cerebrovascular accident     Chronic kidney disease, stage 4 (severe)  Continue current medication     Gastroesophageal reflux disease with esophagitis without hemorrhage  Avoid trigger foods   Begin Pantoprazole 40 mg once daily      Helicobacter pylori gastritis  Referral to GI      H. pylori infection  -     pantoprazole (PROTONIX) 40 MG tablet; Take 1 tablet (40 mg total) by mouth once daily.     Epigastric pain  -     pantoprazole (PROTONIX) 40 MG tablet; Take 1 tablet (40 mg total) by mouth once daily.     Gastritis, presence of bleeding unspecified, unspecified chronicity, unspecified gastritis type  -     pantoprazole (PROTONIX) 40 MG tablet; Take 1 tablet (40 mg total) by mouth once daily.     Renal cyst  -     US Retroperitoneal Complete; Future         Referral to Gastroenterology       Abdominal Pain  This is a new problem. The current episode started 1 to 4 weeks ago. The onset quality is sudden. The problem occurs constantly. The problem has been waxing and waning. The pain is located in the epigastric region and generalized abdominal region. The pain is at a severity of 7/10. The pain is moderate. Pertinent negatives include no constipation, diarrhea, fever, nausea or vomiting. Prior diagnostic workup includes GI consult and CT scan. Her past medical history is significant for GERD.     Review of Systems   Constitutional:  Positive for activity change and appetite change. Negative for fatigue, fever and unexpected  weight change.   HENT:  Negative for sore throat and trouble swallowing.    Respiratory:  Negative for cough and shortness of breath.    Cardiovascular:  Negative for chest pain.   Gastrointestinal:  Positive for abdominal pain. Negative for abdominal distention, anal bleeding, blood in stool, constipation, diarrhea, nausea, rectal pain and vomiting.       No LMP recorded (lmp unknown). Patient has had a hysterectomy.  Past Medical History:   Diagnosis Date    Anxiety     Arthritis     Asthma     CHF (congestive heart failure)     COPD (chronic obstructive pulmonary disease)     Hyperlipemia     Hypertension     Leaky heart valve     Obese     Obese     Obstructive sleep apnea     lost her CPAP    Pneumonia     Rheumatoid arthritis(714.0)     Stroke      Past Surgical History:   Procedure Laterality Date    CAROTID STENT      3 years ago    CHOLECYSTECTOMY      HYSTERECTOMY      SLEEVE GASTROPLASTY  02/21/2017     Family History   Problem Relation Name Age of Onset    Arthritis Mother      Cancer Mother      Diabetes Mother      Hyperlipidemia Mother      Hypertension Mother      Stroke Mother      Breast cancer Mother      Heart disease Father      Hypertension Father      Asthma Son      Hypertension Sister      Hypertension Sister      Kidney disease Neg Hx       Social History     Tobacco Use    Smoking status: Never     Passive exposure: Never    Smokeless tobacco: Never   Substance Use Topics    Alcohol use: Yes     Comment: rare, about once a month    Drug use: No     Wt Readings from Last 10 Encounters:   01/30/25 99 kg (218 lb 4.1 oz)   01/29/25 100.2 kg (220 lb 14.4 oz)   11/25/24 103 kg (227 lb 1.2 oz)   09/26/24 100.5 kg (221 lb 10.8 oz)   09/09/24 98.8 kg (217 lb 13 oz)   08/26/24 100.3 kg (221 lb 1.9 oz)   08/01/24 100.4 kg (221 lb 5.5 oz)   07/19/24 101.5 kg (223 lb 12.3 oz)   06/21/24 94.9 kg (209 lb 3.5 oz)   05/22/24 104.1 kg (229 lb 8 oz)     Lab Results   Component Value Date    WBC 7.12  01/29/2025    HGB 12.4 01/29/2025    HCT 38.5 01/29/2025    MCV 92 01/29/2025     01/29/2025     CMP  Sodium   Date Value Ref Range Status   01/29/2025 140 136 - 145 mmol/L Final     Potassium   Date Value Ref Range Status   01/29/2025 3.4 (L) 3.5 - 5.1 mmol/L Final     Chloride   Date Value Ref Range Status   01/29/2025 107 95 - 110 mmol/L Final     CO2   Date Value Ref Range Status   01/29/2025 22 (L) 23 - 29 mmol/L Final     Glucose   Date Value Ref Range Status   01/29/2025 114 (H) 70 - 110 mg/dL Final     BUN   Date Value Ref Range Status   01/29/2025 24 (H) 8 - 23 mg/dL Final     Creatinine   Date Value Ref Range Status   01/29/2025 2.3 (H) 0.5 - 1.4 mg/dL Final   08/10/2013 1.4 0.5 - 1.4 mg/dL Final     Calcium   Date Value Ref Range Status   01/29/2025 9.2 8.7 - 10.5 mg/dL Final   08/10/2013 10.0 8.7 - 10.5 mg/dL Final     Total Protein   Date Value Ref Range Status   01/29/2025 8.1 6.0 - 8.4 g/dL Final     Albumin   Date Value Ref Range Status   01/29/2025 3.6 3.5 - 5.2 g/dL Final     Total Bilirubin   Date Value Ref Range Status   01/29/2025 0.3 0.1 - 1.0 mg/dL Final     Comment:     For infants and newborns, interpretation of results should be based  on gestational age, weight and in agreement with clinical  observations.    Premature Infant recommended reference ranges:  Up to 24 hours.............<8.0 mg/dL  Up to 48 hours............<12.0 mg/dL  3-5 days..................<15.0 mg/dL  6-29 days.................<15.0 mg/dL       Alkaline Phosphatase   Date Value Ref Range Status   01/29/2025 72 40 - 150 U/L Final   11/20/2012 54 (L) 55 - 135 U/L Final     AST   Date Value Ref Range Status   01/29/2025 12 10 - 40 U/L Final   11/20/2012 12 10 - 40 U/L Final     ALT   Date Value Ref Range Status   01/29/2025 8 (L) 10 - 44 U/L Final     Anion Gap   Date Value Ref Range Status   01/29/2025 11 8 - 16 mmol/L Final   08/10/2013 13 5 - 15 meq/L Final     eGFR if    Date Value Ref Range  "Status   05/09/2022 40.7 (A) >60 mL/min/1.73 m^2 Final     eGFR if non    Date Value Ref Range Status   05/09/2022 35.3 (A) >60 mL/min/1.73 m^2 Final     Comment:     Calculation used to obtain the estimated glomerular filtration  rate (eGFR) is the CKD-EPI equation.        Lab Results   Component Value Date    AMYLASE 130 (H) 04/01/2022     Lab Results   Component Value Date    LIPASE 50 01/29/2025     No results found for: "LIPASERES"  Lab Results   Component Value Date    TSH 2.110 07/12/2023       Reviewed prior medical records including radiology report of CT A/P 1/2025 & endoscopy history (see surgical history).    Objective:      Physical Exam  Vitals and nursing note reviewed.   Constitutional:       General: She is not in acute distress.     Appearance: She is obese. She is not ill-appearing.   HENT:      Head: Normocephalic and atraumatic.      Mouth/Throat:      Mouth: Mucous membranes are moist.      Pharynx: Oropharynx is clear.   Eyes:      Conjunctiva/sclera: Conjunctivae normal.   Cardiovascular:      Rate and Rhythm: Normal rate and regular rhythm.      Pulses: Normal pulses.   Pulmonary:      Effort: Pulmonary effort is normal. No respiratory distress.   Abdominal:      General: Abdomen is flat. There is no distension.      Palpations: Abdomen is soft.      Tenderness: There is abdominal tenderness. There is guarding.   Skin:     General: Skin is warm and dry.      Capillary Refill: Capillary refill takes 2 to 3 seconds.   Neurological:      Mental Status: She is alert and oriented to person, place, and time.         Assessment:       1. Abdominal pain, unspecified abdominal location    2. Change in bowel habits    3. Epigastric abdominal pain        Plan:       Abdominal pain, unspecified abdominal location  Patient is on ozempic - started about 3 weeks ago. Pain started about a week ago.    X-ray to assess stool burden in colon  - schedule Colonoscopy, discussed procedure with the " patient, including risks and benefits, patient verbalized understanding    -     Ambulatory referral/consult to Gastroenterology  -     X-Ray Abdomen AP 1 View; Future; Expected date: 01/30/2025    Change in bowel habits  - schedule Colonoscopy, discussed procedure with the patient, including risks and benefits, patient verbalized understanding    Epigastric abdominal pain  - schedule EGD, discussed procedure with patient, including risks and benefits, patient verbalized understanding  History of H. Pylori in 2023. On PPI so unable to get stool test    No follow-ups on file.      If no improvement in symptoms or symptoms worsen, call/follow-up at clinic or go to ER.       NANDO Whitehead, MAYITOP-C    Encounter includes face to face time and non-face to face time preparing to see the patient (eg, review of tests), obtaining and/or reviewing separately obtained history, documenting clinical information in the electronic or other health record, independently interpreting results (not separately reported) and communicating results to the patient/family/caregiver, or care coordination (not separately reported).     A dictation software program was used for this note. Please expect some simple typographical errors in this note.

## 2025-02-06 ENCOUNTER — TELEPHONE (OUTPATIENT)
Dept: FAMILY MEDICINE | Facility: CLINIC | Age: 60
End: 2025-02-06
Payer: MEDICARE

## 2025-02-06 NOTE — TELEPHONE ENCOUNTER
----- Message from Saumya sent at 2/6/2025 12:27 PM CST -----  Type:  Sooner Appointment Request    Caller is requesting a sooner appointment.  Caller declined first available appointment listed below.  Caller will not accept being placed on the waitlist and is requesting a message be sent to doctor.    Name of Caller:  pt   When is the first available appointment?  02/05 1:30  Symptoms:  HFU  Would the patient rather a call back or a response via [facility]? Call back  Best Call Back Number:  [phone]   Additional Information:  pt would like to be seen at 2:00 tomorrow instead of 1:30 Call back to advise. Thanks!

## 2025-02-06 NOTE — TELEPHONE ENCOUNTER
Spoke to pt confirmed appt and advised no later times available with Kiran COY. Pt states she will be there for 1:30

## 2025-02-07 ENCOUNTER — TELEPHONE (OUTPATIENT)
Dept: FAMILY MEDICINE | Facility: CLINIC | Age: 60
End: 2025-02-07
Payer: MEDICARE

## 2025-02-07 ENCOUNTER — OFFICE VISIT (OUTPATIENT)
Dept: FAMILY MEDICINE | Facility: CLINIC | Age: 60
End: 2025-02-07
Payer: MEDICARE

## 2025-02-07 VITALS
TEMPERATURE: 98 F | BODY MASS INDEX: 40.37 KG/M2 | DIASTOLIC BLOOD PRESSURE: 88 MMHG | WEIGHT: 219.38 LBS | HEIGHT: 62 IN | SYSTOLIC BLOOD PRESSURE: 136 MMHG

## 2025-02-07 DIAGNOSIS — R10.9 ABDOMINAL PAIN, UNSPECIFIED ABDOMINAL LOCATION: ICD-10-CM

## 2025-02-07 DIAGNOSIS — E78.5 HYPERLIPIDEMIA, UNSPECIFIED HYPERLIPIDEMIA TYPE: ICD-10-CM

## 2025-02-07 DIAGNOSIS — I10 ESSENTIAL HYPERTENSION: Primary | ICD-10-CM

## 2025-02-07 DIAGNOSIS — R10.13 EPIGASTRIC PAIN: ICD-10-CM

## 2025-02-07 DIAGNOSIS — K29.70 GASTRITIS, PRESENCE OF BLEEDING UNSPECIFIED, UNSPECIFIED CHRONICITY, UNSPECIFIED GASTRITIS TYPE: ICD-10-CM

## 2025-02-07 DIAGNOSIS — E66.01 MORBID OBESITY: ICD-10-CM

## 2025-02-07 DIAGNOSIS — J44.9 CHRONIC OBSTRUCTIVE PULMONARY DISEASE, UNSPECIFIED COPD TYPE: ICD-10-CM

## 2025-02-07 PROCEDURE — 3008F BODY MASS INDEX DOCD: CPT | Mod: CPTII,S$GLB,, | Performed by: PHYSICIAN ASSISTANT

## 2025-02-07 PROCEDURE — 99999 PR PBB SHADOW E&M-EST. PATIENT-LVL V: CPT | Mod: PBBFAC,,, | Performed by: PHYSICIAN ASSISTANT

## 2025-02-07 PROCEDURE — G2211 COMPLEX E/M VISIT ADD ON: HCPCS | Mod: S$GLB,,, | Performed by: PHYSICIAN ASSISTANT

## 2025-02-07 PROCEDURE — 3075F SYST BP GE 130 - 139MM HG: CPT | Mod: CPTII,S$GLB,, | Performed by: PHYSICIAN ASSISTANT

## 2025-02-07 PROCEDURE — 1159F MED LIST DOCD IN RCRD: CPT | Mod: CPTII,S$GLB,, | Performed by: PHYSICIAN ASSISTANT

## 2025-02-07 PROCEDURE — 3079F DIAST BP 80-89 MM HG: CPT | Mod: CPTII,S$GLB,, | Performed by: PHYSICIAN ASSISTANT

## 2025-02-07 PROCEDURE — 99214 OFFICE O/P EST MOD 30 MIN: CPT | Mod: S$GLB,,, | Performed by: PHYSICIAN ASSISTANT

## 2025-02-07 PROCEDURE — 1160F RVW MEDS BY RX/DR IN RCRD: CPT | Mod: CPTII,S$GLB,, | Performed by: PHYSICIAN ASSISTANT

## 2025-02-07 RX ORDER — PANTOPRAZOLE SODIUM 40 MG/1
40 TABLET, DELAYED RELEASE ORAL 2 TIMES DAILY
Qty: 60 TABLET | Refills: 1 | Status: SHIPPED | OUTPATIENT
Start: 2025-02-07

## 2025-02-07 NOTE — TELEPHONE ENCOUNTER
----- Message from Iram sent at 2/7/2025  9:43 AM CST -----  Regarding: Call back  Type:  Patient Returning Call    Who Called:Pt    Who Left Message for Patient:Charito    Does the patient know what this is regarding?:n/a    Would the patient rather a call back or a response via [facility]? Call    Best Call Back Number:[phone]    Additional Information: Pt missed a call an sis requesting a call back.Thanks

## 2025-02-07 NOTE — PROGRESS NOTES
Subjective:       Patient ID: Faustina Rae is a 60 y.o. female.    Chief Complaint: Follow-up (1 week f/u) and Abdominal Pain    Mrs. Rae is a 60 year old female with GERD, HTN, and HLD who presents for a follow up regarding abdominal pain. She states improvement in pain but not complete resolution. She states that she went on a clear liquid diet for 2 days in which there was no improvement during or after that time. She states that she continues to have nausea without vomiting. Reports that she is having normal bowel movements. States that she is compliant with the carafate and protonix. She states that her symptoms were not increased or improved by her last Wegovy injection on February 3rd. Pt had an appt with GI on Jan 30th - EGD/Colonoscopy is scheduled for Feb 17th.     Follow-up  Associated symptoms include abdominal pain and nausea. Pertinent negatives include no arthralgias, chest pain, fatigue, fever, headaches, numbness, sore throat, vomiting or weakness.   Abdominal Pain  Associated symptoms include nausea. Pertinent negatives include no arthralgias, constipation, diarrhea, dysuria, fever, headaches, hematuria or vomiting.     Review of patient's allergies indicates:  No Known Allergies      Current Outpatient Medications:     albuterol (PROVENTIL) 2.5 mg /3 mL (0.083 %) nebulizer solution, Take 3 mLs (2.5 mg total) by nebulization every 6 (six) hours as needed., Disp: 120 each, Rfl: 11    albuterol (PROVENTIL/VENTOLIN HFA) 90 mcg/actuation inhaler, INHALE TWO PUFFS BY MOUTH INTO THE LUNGS FOUR TIMES DAILY, Disp: 20.1 g, Rfl: 1    amLODIPine (NORVASC) 10 MG tablet, TAKE ONE TABLET BY MOUTH DAILY AT 9 AM, Disp: 90 tablet, Rfl: 1    amoxicillin-clavulanate 500-125mg (AUGMENTIN) 500-125 mg Tab, Take 1 tablet (500 mg total) by mouth 2 (two) times daily., Disp: 10 tablet, Rfl: 0    atorvastatin (LIPITOR) 80 MG tablet, TAKE ONE TABLET BY MOUTH DAILY AT 5 PM every evening., Disp: 90 tablet, Rfl: 2    b  complex vitamins tablet, Take 1 tablet by mouth once daily., Disp: , Rfl:     BREO ELLIPTA 200-25 mcg/dose DsDv diskus inhaler, Inhale 1 puff into the lungs once daily., Disp: 180 each, Rfl: 3    calcium citrate-vitamin D3 315-200 mg (CITRACAL+D) 315-200 mg-unit per tablet, Take 1 tablet by mouth once daily. , Disp: , Rfl:     colchicine (COLCRYS) 0.6 mg tablet, Take 1 tablet (0.6 mg total) by mouth every 72 hours. Take at onset of gout and take for 10 days, Disp: 10 tablet, Rfl: 3    diazePAM (VALIUM) 5 MG tablet, Take 1 tablet (5 mg total) by mouth daily as needed for Anxiety., Disp: 2 tablet, Rfl: 0    EScitalopram oxalate (LEXAPRO) 10 MG tablet, TAKE ONE TABLET BY MOUTH DAILY AT 9 AM, Disp: 90 tablet, Rfl: 2    ferrous sulfate 325 (65 FE) MG EC tablet, Take 1 tablet (325 mg total) by mouth once daily., Disp: 90 tablet, Rfl: 3    fluticasone propionate (FLONASE) 50 mcg/actuation nasal spray, 2 sprays (100 mcg total) by Each Nostril route once daily., Disp: 16 g, Rfl: 11    fluticasone-umeclidin-vilanter (TRELEGY ELLIPTA) 200-62.5-25 mcg inhaler, Inhale 1 puff into the lungs once daily., Disp: 60 each, Rfl: 11    gabapentin (NEURONTIN) 300 MG capsule, Take 1 capsule (300 mg total) by mouth 3 (three) times daily., Disp: 90 capsule, Rfl: 11    hydrALAZINE (APRESOLINE) 50 MG tablet, TAKE ONE TABLET BY MOUTH EVERY TWELVE HOURS @ 9AM & 9PM, Disp: 270 tablet, Rfl: 1    isosorbide dinitrate (ISORDIL) 5 MG Tab, Take 1 tablet (5 mg total) by mouth 3 (three) times daily., Disp: 270 tablet, Rfl: 2    meclizine (ANTIVERT) 12.5 mg tablet, TAKE 1 TABLET(12.5 MG) BY MOUTH THREE TIMES DAILY AS NEEDED FOR DIZZINESS, Disp: 30 tablet, Rfl: 0    montelukast (SINGULAIR) 10 mg tablet, Take 1 tablet (10 mg total) by mouth every evening., Disp: 90 tablet, Rfl: 3    multivitamin (THERAGRAN) per tablet, Take 1 tablet by mouth once daily., Disp: 90 tablet, Rfl: 3    nystatin (MYCOSTATIN ORAL), Take 1 tablet by mouth Daily., Disp: , Rfl:      pantoprazole (PROTONIX) 40 MG tablet, Take 1 tablet (40 mg total) by mouth once daily., Disp: 30 tablet, Rfl: 5    predniSONE (DELTASONE) 20 MG tablet, 3 for 3 days then 2 for 3 days then one for 3 days and repeat for breathing problems, Disp: 36 tablet, Rfl: 1    semaglutide, weight loss, (WEGOVY) 1 mg/0.5 mL PnIj, Inject 1 mg into the skin every 7 days., Disp: 2 mL, Rfl: 0    sodium bicarbonate 650 MG tablet, Take 1 tablet (650 mg total) by mouth 2 (two) times daily., Disp: 60 tablet, Rfl: 11    spironolactone (ALDACTONE) 25 MG tablet, TAKE ONE TABLET BY MOUTH DAILY, Disp: 90 tablet, Rfl: 2    sucralfate (CARAFATE) 1 gram tablet, Take 1 tablet (1 g total) by mouth 4 (four) times daily before meals and nightly., Disp: 40 tablet, Rfl: 0    tetracycline (ACHROMYCIN,SUMYCIN) 500 MG capsule, Take 1 capsule (500 mg total) by mouth 4 (four) times daily., Disp: 56 capsule, Rfl: 0    traMADoL (ULTRAM) 50 mg tablet, Take 1 tablet (50 mg total) by mouth every 6 (six) hours as needed for Pain., Disp: 20 each, Rfl: 0    traZODone (DESYREL) 50 MG tablet, Take 50 mg by mouth nightly as needed., Disp: , Rfl:     valsartan (DIOVAN) 160 MG tablet, Take 1 tablet (160 mg total) by mouth once daily., Disp: 90 tablet, Rfl: 3    VELTASSA 8.4 gram PwPk, Take by mouth., Disp: , Rfl:     aspirin (ECOTRIN) 81 MG EC tablet, Take 1 tablet (81 mg total) by mouth once daily., Disp: 90 tablet, Rfl: 3    chlorthalidone (HYGROTEN) 25 MG Tab, Take 1 tablet (25 mg total) by mouth once daily., Disp: 90 tablet, Rfl: 3    clopidogreL (PLAVIX) 75 mg tablet, Take 1 tablet (75 mg total) by mouth once daily., Disp: 30 tablet, Rfl: 1    Current Facility-Administered Medications:     cyanocobalamin injection 1,000 mcg, 1,000 mcg, Intramuscular, Q30 Days, , 1,000 mcg at 01/06/25 1458    Lab Results   Component Value Date    WBC 7.12 01/29/2025    HGB 12.4 01/29/2025    HCT 38.5 01/29/2025     01/29/2025    CHOL 216 (H) 04/30/2024    TRIG 186 (H)  04/30/2024    HDL 43 04/30/2024    ALT 8 (L) 01/29/2025    AST 12 01/29/2025     01/29/2025    K 3.4 (L) 01/29/2025     01/29/2025    CREATININE 2.3 (H) 01/29/2025    BUN 24 (H) 01/29/2025    CO2 22 (L) 01/29/2025    TSH 2.110 07/12/2023    INR 0.9 07/13/2023    HGBA1C 5.3 08/26/2024       Review of Systems   Constitutional:  Negative for activity change, appetite change, fatigue, fever and unexpected weight change.   HENT:  Negative for sinus pressure, sore throat and trouble swallowing.    Respiratory:  Negative for chest tightness, shortness of breath and wheezing.    Cardiovascular:  Negative for chest pain, palpitations and leg swelling.   Gastrointestinal:  Positive for abdominal pain and nausea. Negative for abdominal distention, anal bleeding, blood in stool, constipation, diarrhea, rectal pain and vomiting.   Endocrine: Negative for polydipsia, polyphagia and polyuria.   Genitourinary:  Negative for difficulty urinating, dysuria, flank pain, hematuria, menstrual problem, pelvic pain and vaginal pain.   Musculoskeletal:  Negative for arthralgias and back pain.   Skin:  Negative for color change and pallor.   Neurological:  Negative for weakness, numbness and headaches.   Hematological:  Does not bruise/bleed easily.       Objective:      Physical Exam  Constitutional:       Appearance: Normal appearance. She is obese.   HENT:      Head: Normocephalic and atraumatic.      Mouth/Throat:      Mouth: Mucous membranes are moist.   Cardiovascular:      Rate and Rhythm: Normal rate and regular rhythm.      Pulses: Normal pulses.      Heart sounds: Normal heart sounds.   Pulmonary:      Effort: Pulmonary effort is normal.      Breath sounds: Normal breath sounds.   Abdominal:      General: Bowel sounds are normal. There is no distension.      Tenderness: There is abdominal tenderness (Tenderness to palpation of epigastric and RUQ- mild. improved from last week). There is no right CVA tenderness, left CVA  tenderness or rebound.   Skin:     General: Skin is warm and dry.   Neurological:      General: No focal deficit present.      Mental Status: She is alert and oriented to person, place, and time.   Psychiatric:         Mood and Affect: Mood normal.         Behavior: Behavior normal.         Assessment:       1. Essential hypertension    2. Abdominal pain, unspecified abdominal location    3. Epigastric pain    4. Gastritis, presence of bleeding unspecified, unspecified chronicity, unspecified gastritis type    5. Chronic obstructive pulmonary disease, unspecified COPD type    6. Hyperlipidemia, unspecified hyperlipidemia type    7. Morbid obesity        Plan:   Faustina was seen today for follow-up and abdominal pain.    Diagnoses and all orders for this visit:    Essential hypertension  Controlled  Low salt diet  Continue current medication  Abdominal pain, unspecified abdominal location  -     pantoprazole (PROTONIX) 40 MG tablet; Take 1 tablet (40 mg total) by mouth 2 (two) times daily.    Epigastric pain  -     pantoprazole (PROTONIX) 40 MG tablet; Take 1 tablet (40 mg total) by mouth 2 (two) times daily.    Gastritis, presence of bleeding unspecified, unspecified chronicity, unspecified gastritis type  -     pantoprazole (PROTONIX) 40 MG tablet; Take 1 tablet (40 mg total) by mouth 2 (two) times daily.    Chronic obstructive pulmonary disease, unspecified COPD type  stable  Hyperlipidemia, unspecified hyperlipidemia type  High fiber diet  Continue current medication  Morbid obesity  Hold wegovy at this time until improvement in GI symptoms.       Follow up in 3-4 weeks or sooner if needed.

## 2025-02-11 ENCOUNTER — TELEPHONE (OUTPATIENT)
Dept: PODIATRY | Facility: CLINIC | Age: 60
End: 2025-02-11
Payer: MEDICARE

## 2025-02-11 ENCOUNTER — OFFICE VISIT (OUTPATIENT)
Dept: PODIATRY | Facility: CLINIC | Age: 60
End: 2025-02-11
Payer: MEDICARE

## 2025-02-11 VITALS — WEIGHT: 218.25 LBS | HEIGHT: 62 IN | BODY MASS INDEX: 40.16 KG/M2

## 2025-02-11 DIAGNOSIS — M77.51 CAPSULITIS OF METATARSOPHALANGEAL (MTP) JOINT OF RIGHT FOOT: Primary | ICD-10-CM

## 2025-02-11 DIAGNOSIS — M79.671 RIGHT FOOT PAIN: ICD-10-CM

## 2025-02-11 PROCEDURE — 99213 OFFICE O/P EST LOW 20 MIN: CPT | Mod: S$GLB,,, | Performed by: PODIATRIST

## 2025-02-11 PROCEDURE — 1159F MED LIST DOCD IN RCRD: CPT | Mod: CPTII,S$GLB,, | Performed by: PODIATRIST

## 2025-02-11 PROCEDURE — 99999 PR PBB SHADOW E&M-EST. PATIENT-LVL IV: CPT | Mod: PBBFAC,,, | Performed by: PODIATRIST

## 2025-02-11 PROCEDURE — 1160F RVW MEDS BY RX/DR IN RCRD: CPT | Mod: CPTII,S$GLB,, | Performed by: PODIATRIST

## 2025-02-11 PROCEDURE — 3008F BODY MASS INDEX DOCD: CPT | Mod: CPTII,S$GLB,, | Performed by: PODIATRIST

## 2025-02-11 RX ORDER — METHYLPREDNISOLONE 4 MG/1
TABLET ORAL
Qty: 21 EACH | Refills: 0 | Status: SHIPPED | OUTPATIENT
Start: 2025-02-11 | End: 2025-03-04

## 2025-02-11 NOTE — TELEPHONE ENCOUNTER
----- Message from Shaquille sent at 2/11/2025  7:59 AM CST -----  Contact: Self  Type:  Sooner Appointment Request    Caller is requesting a sooner appointment.  Caller declined first available appointment listed below.  Caller will not accept being placed on the waitlist and is requesting a message be sent to doctor.    Name of Caller:  Patient  When is the first available appointment?  Tomorrow  Symptoms:  Foot pain / difficulty walking  Would the patient rather a call back or a response via [facility]? Call Back  Best Call Back Number:  [phone]  Additional Information:  Patient is requesting a call back, she is asking to be seen today.

## 2025-02-11 NOTE — PROGRESS NOTES
"  [address] [facility] [facility] [address]  [place] [address]  Phone: [phone]   [phone]    Patient's PCP:Zoran Cuevas MD  Referring Provider: No ref. provider found    Subjective:      Chief Complaint:: Foot Pain (Right foot pain)    Foot Pain  Associated symptoms include arthralgias and joint swelling. Pertinent negatives include no abdominal pain, chest pain, chills, coughing, fatigue, fever, headaches, myalgias, nausea, neck pain, numbness, rash or weakness.       Faustina Rae is a 60 y.o. female who presents today with a complaint of Right foot pain, bottom of the foot . The current episode started about 2 days ago.  The symptoms include aching, throbbing pain. Probable cause of complaint unknown.  The symptoms are aggravated by prolonged walked and standing. The problem has stayed the same. Treatment to date have included otc pain meds, walker, ace wrap, compression sock, ice, elevation, and taping which provided no relief.     Vitals:    02/11/25 1128   Weight: 99 kg (218 lb 4.1 oz)   Height: 5' 2" (1.575 m)   PainSc: 10-Worst pain ever      Shoe Size: 6.5-7    Past Surgical History:   Procedure Laterality Date    CAROTID STENT      3 years ago    CHOLECYSTECTOMY      HYSTERECTOMY      SLEEVE GASTROPLASTY  02/21/2017     Past Medical History:   Diagnosis Date    Anxiety     Arthritis     Asthma     CHF (congestive heart failure)     COPD (chronic obstructive pulmonary disease)     Hyperlipemia     Hypertension     Leaky heart valve     Obese     Obese     Obstructive sleep apnea     lost her CPAP    Pneumonia     Rheumatoid arthritis(714.0)     Stroke      Family History   Problem Relation Name Age of Onset    Arthritis Mother      Cancer Mother      Diabetes Mother      Hyperlipidemia Mother      Hypertension Mother      Stroke Mother      Breast cancer Mother      Heart disease Father      Hypertension Father      Asthma Son      Hypertension Sister      Hypertension Sister      Kidney " disease Neg Hx          Social History:   Marital Status:   Alcohol History:  reports current alcohol use.  Tobacco History:  reports that she has never smoked. She has never been exposed to tobacco smoke. She has never used smokeless tobacco.  Drug History:  reports no history of drug use.    Review of patient's allergies indicates:   Allergen Reactions    Nsaids (non-steroidal anti-inflammatory drug) Other (See Comments)       Current Outpatient Medications   Medication Sig Dispense Refill    albuterol (PROVENTIL) 2.5 mg /3 mL (0.083 %) nebulizer solution Take 3 mLs (2.5 mg total) by nebulization every 6 (six) hours as needed. 120 each 11    albuterol (PROVENTIL/VENTOLIN HFA) 90 mcg/actuation inhaler INHALE TWO PUFFS BY MOUTH INTO THE LUNGS FOUR TIMES DAILY 20.1 g 1    amLODIPine (NORVASC) 10 MG tablet TAKE ONE TABLET BY MOUTH DAILY AT 9 AM 90 tablet 1    amoxicillin-clavulanate 500-125mg (AUGMENTIN) 500-125 mg Tab Take 1 tablet (500 mg total) by mouth 2 (two) times daily. 10 tablet 0    atorvastatin (LIPITOR) 80 MG tablet TAKE ONE TABLET BY MOUTH DAILY AT 5 PM every evening. 90 tablet 2    b complex vitamins tablet Take 1 tablet by mouth once daily.      BREO ELLIPTA 200-25 mcg/dose DsDv diskus inhaler Inhale 1 puff into the lungs once daily. 180 each 3    calcium citrate-vitamin D3 315-200 mg (CITRACAL+D) 315-200 mg-unit per tablet Take 1 tablet by mouth once daily.       colchicine (COLCRYS) 0.6 mg tablet Take 1 tablet (0.6 mg total) by mouth every 72 hours. Take at onset of gout and take for 10 days 10 tablet 3    diazePAM (VALIUM) 5 MG tablet Take 1 tablet (5 mg total) by mouth daily as needed for Anxiety. 2 tablet 0    EScitalopram oxalate (LEXAPRO) 10 MG tablet TAKE ONE TABLET BY MOUTH DAILY AT 9 AM 90 tablet 2    ferrous sulfate 325 (65 FE) MG EC tablet Take 1 tablet (325 mg total) by mouth once daily. 90 tablet 3    fluticasone propionate (FLONASE) 50 mcg/actuation nasal spray 2 sprays (100 mcg  total) by Each Nostril route once daily. 16 g 11    fluticasone-umeclidin-vilanter (TRELEGY ELLIPTA) 200-62.5-25 mcg inhaler Inhale 1 puff into the lungs once daily. 60 each 11    gabapentin (NEURONTIN) 300 MG capsule Take 1 capsule (300 mg total) by mouth 3 (three) times daily. 90 capsule 11    hydrALAZINE (APRESOLINE) 50 MG tablet TAKE ONE TABLET BY MOUTH EVERY TWELVE HOURS @ 9AM & 9PM 270 tablet 1    isosorbide dinitrate (ISORDIL) 5 MG Tab Take 1 tablet (5 mg total) by mouth 3 (three) times daily. 270 tablet 2    meclizine (ANTIVERT) 12.5 mg tablet TAKE 1 TABLET(12.5 MG) BY MOUTH THREE TIMES DAILY AS NEEDED FOR DIZZINESS 30 tablet 0    montelukast (SINGULAIR) 10 mg tablet Take 1 tablet (10 mg total) by mouth every evening. 90 tablet 3    multivitamin (THERAGRAN) per tablet Take 1 tablet by mouth once daily. 90 tablet 3    nystatin (MYCOSTATIN ORAL) Take 1 tablet by mouth Daily.      pantoprazole (PROTONIX) 40 MG tablet Take 1 tablet (40 mg total) by mouth 2 (two) times daily. 60 tablet 1    predniSONE (DELTASONE) 20 MG tablet 3 for 3 days then 2 for 3 days then one for 3 days and repeat for breathing problems 36 tablet 1    semaglutide, weight loss, (WEGOVY) 1 mg/0.5 mL PnIj Inject 1 mg into the skin every 7 days. 2 mL 0    sodium bicarbonate 650 MG tablet Take 1 tablet (650 mg total) by mouth 2 (two) times daily. 60 tablet 11    spironolactone (ALDACTONE) 25 MG tablet TAKE ONE TABLET BY MOUTH DAILY 90 tablet 2    sucralfate (CARAFATE) 1 gram tablet Take 1 tablet (1 g total) by mouth 4 (four) times daily before meals and nightly. 40 tablet 0    tetracycline (ACHROMYCIN,SUMYCIN) 500 MG capsule Take 1 capsule (500 mg total) by mouth 4 (four) times daily. 56 capsule 0    traMADoL (ULTRAM) 50 mg tablet Take 1 tablet (50 mg total) by mouth every 6 (six) hours as needed for Pain. 20 each 0    traZODone (DESYREL) 50 MG tablet Take 50 mg by mouth nightly as needed.      valsartan (DIOVAN) 160 MG tablet Take 1 tablet (160  mg total) by mouth once daily. 90 tablet 3    VELTASSA 8.4 gram PwPk Take by mouth.      aspirin (ECOTRIN) 81 MG EC tablet Take 1 tablet (81 mg total) by mouth once daily. 90 tablet 3    chlorthalidone (HYGROTEN) 25 MG Tab Take 1 tablet (25 mg total) by mouth once daily. 90 tablet 3    clopidogreL (PLAVIX) 75 mg tablet Take 1 tablet (75 mg total) by mouth once daily. 30 tablet 1    methylPREDNISolone (MEDROL DOSEPACK) 4 mg tablet use as directed 21 each 0     Current Facility-Administered Medications   Medication Dose Route Frequency Provider Last Rate Last Admin    cyanocobalamin injection 1,000 mcg  1,000 mcg Intramuscular Q30 Days    1,000 mcg at 01/06/25 7887       Review of Systems   Constitutional:  Negative for chills, fatigue, fever and unexpected weight change.   HENT:  Negative for hearing loss and trouble swallowing.    Eyes:  Negative for photophobia and visual disturbance.   Respiratory:  Negative for cough, shortness of breath and wheezing.    Cardiovascular:  Negative for chest pain, palpitations and leg swelling.   Gastrointestinal:  Negative for abdominal pain and nausea.   Genitourinary:  Negative for dysuria and frequency.   Musculoskeletal:  Positive for arthralgias, gait problem and joint swelling. Negative for back pain, myalgias and neck pain.   Skin:  Negative for rash and wound.   Neurological:  Negative for tremors, seizures, weakness, numbness and headaches.   Hematological:  Does not bruise/bleed easily.         Objective:        Physical Exam:   Foot Exam    General  General Appearance: appears stated age and healthy   Orientation: alert and oriented to person, place, and time   Affect: appropriate   Gait: antalgic       Right Foot/Ankle     Inspection and Palpation  Ecchymosis: none  Tenderness: lesser metatarsophalangeal joints   Swelling: none   Arch: normal  Hallux limitus: yes  Skin Exam: dry skin; no drainage, no ulcer and no erythema   Neurovascular  Dorsalis pedis: 2+  Posterior  tibial: 2+  Capillary Refill: 2+  Varicose veins: not present  Saphenous nerve sensation: normal  Tibial nerve sensation: normal  Superficial peroneal nerve sensation: normal  Deep peroneal nerve sensation: normal  Sural nerve sensation: normal    Edema  Type of edema: non-pitting    Muscle Strength  Ankle dorsiflexion: 5  Ankle plantar flexion: 5  Ankle inversion: 5  Ankle eversion: 5  Great toe extension: 5  Great toe flexion: 5    Range of Motion    Normal right ankle ROM    Comments  Pain on range of motion of all digits        Physical Exam  Cardiovascular:      Pulses:           Dorsalis pedis pulses are 2+ on the right side.        Posterior tibial pulses are 2+ on the right side.   Feet:      Right foot:      Skin integrity: Dry skin present. No ulcer or erythema.               Right Ankle/Foot Exam     Tenderness   The patient is tender to palpation of the lesser metatarsophalangeal joints.    Range of Motion   The patient has normal right ankle ROM.    Comments:  Pain on range of motion of all digits        Muscle Strength   Right Lower Extremity   Ankle Dorsiflexion:  5   Plantar flexion:  5/5    Vascular Exam     Right Pulses  Dorsalis Pedis:      2+  Posterior Tibial:      2+           Imaging:            Assessment:       1. Capsulitis of metatarsophalangeal (MTP) joint of right foot    2. Right foot pain      Plan:   Capsulitis of metatarsophalangeal (MTP) joint of right foot  -     methylPREDNISolone (MEDROL DOSEPACK) 4 mg tablet; use as directed  Dispense: 21 each; Refill: 0  -     AIR CAST WALKER BOOT FOR HOME USE    Right foot pain  -     methylPREDNISolone (MEDROL DOSEPACK) 4 mg tablet; use as directed  Dispense: 21 each; Refill: 0  -     AIR CAST WALKER BOOT FOR HOME USE      Follow up if symptoms worsen or fail to improve.    Procedures        I explained what joint inflammation is and  conservative treatment of off loading the joint with OTC inserts and pads vs custom made orthotics.  The use  of ice, heat, oral antiinflammatory and topical antiinflammatory and shoe modification as well as rest of the affected area with decrease walking, standing and non-impact exercising.    I did also discussed with the patient that her history of gout could also be contributing factor.    CAM walker boot with weight bearing  Ice to painful area, 15 minutes at a time  Ace-wrap to help with swelling  No barefoot   Keep affected extremity elevated while seated      Counseling:     I provided patient education verbally regarding:   Patient diagnosis, treatment options, as well as alternatives, risks, and benefits.     This note was created using Dragon voice recognition software that occasionally misinterpreted phrases or words.

## 2025-02-17 DIAGNOSIS — J44.89 COPD WITH ASTHMA: ICD-10-CM

## 2025-02-17 DIAGNOSIS — J45.51 SEVERE PERSISTENT ASTHMA WITH ACUTE EXACERBATION: ICD-10-CM

## 2025-02-17 RX ORDER — ALBUTEROL SULFATE 90 UG/1
INHALANT RESPIRATORY (INHALATION)
Qty: 20.1 G | Refills: 3 | Status: SHIPPED | OUTPATIENT
Start: 2025-02-17

## 2025-02-17 NOTE — TELEPHONE ENCOUNTER
Care Due:                  Date            Visit Type   Department     Provider  --------------------------------------------------------------------------------                                VIRTUAL      [facility]  Last Visit: 12-      AUDIO ONLY   MEDICINE       Zoran Cuevas  Next Visit: None Scheduled  None         None Found                                                            Last  Test          Frequency    Reason                     Performed    Due Date  --------------------------------------------------------------------------------    HBA1C.......  6 months...  semaglutide,.............  08- 02-    Lipid Panel.  12 months..  atorvastatin.............  04- 04-    [facility] Embedded Care Due Messages. Reference number: [id number].   2/17/2025 7:03:21 AM CST

## 2025-02-17 NOTE — TELEPHONE ENCOUNTER
Provider Staff:  Action required for this patient    Requires labs      Please see care gap opportunities below in Care Due Message.    Thanks!  [facility]     Appointments      Date Provider   Last Visit   12/6/2024 Zoran Cuevas MD   Next Visit   Visit date not found Zoran Cuevas MD     Refill Decision Note   Faustina Rae  is requesting a refill authorization.  Brief Assessment and Rationale for Refill:  Approve     Medication Therapy Plan:         Comments:     Note composed:4:37 PM 02/17/2025

## 2025-02-18 ENCOUNTER — TELEPHONE (OUTPATIENT)
Dept: GASTROENTEROLOGY | Facility: CLINIC | Age: 60
End: 2025-02-18
Payer: MEDICARE

## 2025-02-18 NOTE — TELEPHONE ENCOUNTER
----- Message from Monique sent at 2/18/2025 11:24 AM CST -----  Contact: Patient  Type:  Needs Medical AdviceWho Called:  PatientWould the patient rather a call back or a response via [facility]?   Call Brando Call Back Number:   [phone] Information:  States she would like to speak with someone about her procedure tomorrow (2/19) - states she has questions - please call - thank you

## 2025-02-19 ENCOUNTER — TELEPHONE (OUTPATIENT)
Dept: GASTROENTEROLOGY | Facility: CLINIC | Age: 60
End: 2025-02-19
Payer: MEDICARE

## 2025-02-19 ENCOUNTER — NURSE TRIAGE (OUTPATIENT)
Dept: ADMINISTRATIVE | Facility: CLINIC | Age: 60
End: 2025-02-19
Payer: MEDICARE

## 2025-02-19 ENCOUNTER — OFFICE VISIT (OUTPATIENT)
Dept: FAMILY MEDICINE | Facility: CLINIC | Age: 60
End: 2025-02-19
Payer: MEDICARE

## 2025-02-19 VITALS
HEIGHT: 62 IN | BODY MASS INDEX: 40.25 KG/M2 | TEMPERATURE: 98 F | DIASTOLIC BLOOD PRESSURE: 80 MMHG | RESPIRATION RATE: 18 BRPM | OXYGEN SATURATION: 99 % | WEIGHT: 218.69 LBS | SYSTOLIC BLOOD PRESSURE: 114 MMHG | HEART RATE: 72 BPM

## 2025-02-19 DIAGNOSIS — J06.9 ACUTE UPPER RESPIRATORY INFECTION: Primary | ICD-10-CM

## 2025-02-19 DIAGNOSIS — J45.991 COUGH VARIANT ASTHMA: ICD-10-CM

## 2025-02-19 DIAGNOSIS — J45.50 SEVERE PERSISTENT ASTHMA, UNCOMPLICATED: ICD-10-CM

## 2025-02-19 LAB
CTP QC/QA: YES
CTP QC/QA: YES
POC MOLECULAR INFLUENZA A AGN: NEGATIVE
POC MOLECULAR INFLUENZA B AGN: NEGATIVE
SARS-COV-2 RDRP RESP QL NAA+PROBE: NEGATIVE

## 2025-02-19 RX ORDER — CODEINE PHOSPHATE AND GUAIFENESIN 10; 100 MG/5ML; MG/5ML
5 SOLUTION ORAL EVERY 4 HOURS PRN
Qty: 180 ML | Refills: 0 | Status: SHIPPED | OUTPATIENT
Start: 2025-02-19 | End: 2025-02-21 | Stop reason: SDUPTHER

## 2025-02-19 RX ORDER — PREDNISONE 20 MG/1
TABLET ORAL
Qty: 10 TABLET | Refills: 0 | Status: SHIPPED | OUTPATIENT
Start: 2025-02-19

## 2025-02-19 NOTE — TELEPHONE ENCOUNTER
----- Message from Cassie sent at 2/19/2025  8:15 AM CST -----  Contact: pt [phone]  Type: Needs Medical AdviceWho Called:  Pt Best Call Back Number: [phone] Information: Pt calling to cxl procedure today due to having the flu. Pls call back and advise

## 2025-02-19 NOTE — PROGRESS NOTES
Subjective:       Patient ID: Faustina Rae is a 60 y.o. female.    Chief Complaint: No chief complaint on file.    Patient here for UC visit.  Onset yesterday with fever, chills and body aches.  HA.  Dry cough.  Some wheezing c Hx of Asthma.    Flu and Covid negative here today.    Review of Systems   Constitutional:  Positive for fatigue. Negative for fever.   HENT:  Positive for congestion and sore throat.    Respiratory:  Positive for cough and wheezing. Negative for shortness of breath.    Cardiovascular:  Negative for chest pain.   Gastrointestinal:  Negative for abdominal pain and nausea.   Skin:  Negative for rash.   All other systems reviewed and are negative.      Objective:      Physical Exam  Vitals reviewed.   Constitutional:       General: She is not in acute distress.     Appearance: She is well-developed.   HENT:      Right Ear: Tympanic membrane normal. Tympanic membrane is not erythematous.      Left Ear: Tympanic membrane normal. Tympanic membrane is not erythematous.      Nose: Mucosal edema present.      Right Sinus: No maxillary sinus tenderness.      Left Sinus: No maxillary sinus tenderness.      Mouth/Throat:      Pharynx: Posterior oropharyngeal erythema present.   Cardiovascular:      Rate and Rhythm: Normal rate and regular rhythm.      Heart sounds: No murmur heard.  Pulmonary:      Effort: Pulmonary effort is normal.      Breath sounds: Wheezing (occ. mild end-exp) present.   Musculoskeletal:      Cervical back: Neck supple.   Lymphadenopathy:      Cervical: No cervical adenopathy.   Skin:     General: Skin is warm and dry.   Neurological:      Mental Status: She is alert.         Assessment:       1. Acute upper respiratory infection    2. Severe persistent asthma, uncomplicated    3. Cough variant asthma        Plan:       Acute upper respiratory infection  -     POCT COVID-19 Rapid Screening  -     POCT Influenza A/B Molecular  -     predniSONE (DELTASONE) 20 MG tablet; One BID  for 3 days then once a day orally  Dispense: 10 tablet; Refill: 0  -     guaiFENesin-codeine 100-10 mg/5 ml (TUSSI-ORGANIDIN NR)  mg/5 mL syrup; Take 5 mLs by mouth every 4 (four) hours as needed for Cough.  Dispense: 180 mL; Refill: 0    Severe persistent asthma, uncomplicated  -     predniSONE (DELTASONE) 20 MG tablet; One BID for 3 days then once a day orally  Dispense: 10 tablet; Refill: 0    Cough variant asthma      Patient Instructions   Push fluids intake.  Drink plenty of water.     Contact your PCP if any worsening or for any new concerns as we discussed.

## 2025-02-19 NOTE — PATIENT INSTRUCTIONS
Push fluids intake.  Drink plenty of water.     Contact your PCP if any worsening or for any new concerns as we discussed.

## 2025-02-19 NOTE — TELEPHONE ENCOUNTER
Pt states that she has the flu. Pt states that she received flu shot 2 weeks ago. Now c/o feeling hot/cold, oral temp 100, dry cough, runny nose and shortness of breath at rest. Pt states that she has hx of asthma. Pt also c/o now urinating on herself and urinary frequency. Denies pain with urinating.  Advised to go to ED now per protocol. VU. Encounter routed to provider.   Reason for Disposition   MODERATE difficulty breathing (e.g., speaks in phrases, SOB even at rest, pulse 100-120) of new-onset or worse than normal    Additional Information   Negative: SEVERE difficulty breathing (e.g., struggling for each breath, speaks in single words, pulse > 120)   Negative: Breathing stopped and hasn't returned   Negative: Choking on something   Negative: Bluish (or gray) lips or face   Negative: Difficult to awaken or acting confused (e.g., disoriented, slurred speech)   Negative: Passed out (e.g., fainted, lost consciousness, blacked out and was not responding)   Negative: Wheezing started suddenly after medicine, an allergic food, or bee sting   Negative: Stridor (harsh sound while breathing in)   Negative: Slow, shallow and weak breathing   Negative: Sounds like a life-threatening emergency to the triager    Protocols used: Breathing Difficulty-A-OH

## 2025-02-21 DIAGNOSIS — J06.9 ACUTE UPPER RESPIRATORY INFECTION: ICD-10-CM

## 2025-02-21 RX ORDER — CODEINE PHOSPHATE AND GUAIFENESIN 10; 100 MG/5ML; MG/5ML
5 SOLUTION ORAL EVERY 4 HOURS PRN
Qty: 180 ML | Refills: 0 | Status: SHIPPED | OUTPATIENT
Start: 2025-02-21 | End: 2025-03-03

## 2025-02-21 NOTE — TELEPHONE ENCOUNTER
Please resend cough medication is not available at [facility]   Medication pended to [facility]

## 2025-02-21 NOTE — TELEPHONE ENCOUNTER
----- Message from Erica sent at 2/21/2025  7:39 AM CST -----  Type:  Needs Medical AdviceWho Called: pt Pharmacy name and phone #:  [facility]/pharmacy #[id number] - [place] [address] [address]: [phone] Fax: [phone]  Would the patient rather a call back or a response via [facility]? Call back Best Call Back Number: [phone] Additional Information: medication that was sent in for cough syrup isn't available at [facility] until next week. Pt wants it to be sent to [facility]..     Please call back to advise. Thank you.

## 2025-02-25 ENCOUNTER — OFFICE VISIT (OUTPATIENT)
Dept: FAMILY MEDICINE | Facility: CLINIC | Age: 60
End: 2025-02-25
Payer: MEDICARE

## 2025-02-25 ENCOUNTER — TELEPHONE (OUTPATIENT)
Dept: FAMILY MEDICINE | Facility: CLINIC | Age: 60
End: 2025-02-25

## 2025-02-25 ENCOUNTER — PATIENT MESSAGE (OUTPATIENT)
Dept: FAMILY MEDICINE | Facility: CLINIC | Age: 60
End: 2025-02-25

## 2025-02-25 DIAGNOSIS — M45.9 RHEUMATOID ARTHRITIS INVOLVING VERTEBRA, UNSPECIFIED WHETHER RHEUMATOID FACTOR PRESENT: ICD-10-CM

## 2025-02-25 DIAGNOSIS — I10 ESSENTIAL HYPERTENSION: ICD-10-CM

## 2025-02-25 DIAGNOSIS — N18.32 STAGE 3B CHRONIC KIDNEY DISEASE: ICD-10-CM

## 2025-02-25 DIAGNOSIS — R05.2 SUBACUTE COUGH: Primary | ICD-10-CM

## 2025-02-25 DIAGNOSIS — J45.50 SEVERE PERSISTENT ASTHMA, UNCOMPLICATED: ICD-10-CM

## 2025-02-25 DIAGNOSIS — J44.89 COPD WITH ASTHMA: ICD-10-CM

## 2025-02-25 DIAGNOSIS — J01.00 SUBACUTE MAXILLARY SINUSITIS: ICD-10-CM

## 2025-02-25 PROCEDURE — 98006 SYNCH AUDIO-VIDEO EST MOD 30: CPT | Mod: 95,,, | Performed by: STUDENT IN AN ORGANIZED HEALTH CARE EDUCATION/TRAINING PROGRAM

## 2025-02-25 RX ORDER — PROMETHAZINE HYDROCHLORIDE AND DEXTROMETHORPHAN HYDROBROMIDE 6.25; 15 MG/5ML; MG/5ML
5 SYRUP ORAL EVERY 6 HOURS PRN
Qty: 118 ML | Refills: 1 | Status: SHIPPED | OUTPATIENT
Start: 2025-02-25 | End: 2025-03-07

## 2025-02-25 NOTE — TELEPHONE ENCOUNTER
----- Message from Kumar sent at 2/25/2025  2:25 PM CST -----  Contact: self [phone]  Patient is returning a phone call.Who left a message for the patient: Beryl patient know what this is regarding:  labsWould you like a call back, or a response through your [facility] portal?:   kishor back Comments:

## 2025-02-25 NOTE — TELEPHONE ENCOUNTER
----- Message from Zoran Cuevas MD sent at 2/25/2025 10:45 AM CST -----  Regarding: lab work  Please set up labs

## 2025-02-25 NOTE — PROGRESS NOTES
[facility] Primary Care Note    Subjective:    Chief Complaint:   Chief Complaint   Patient presents with    Cough       The HPI and pertinent ROS is included in the Diagnostic Impression Remarks section at the end of the note. Please see below for further details.     Faustina is a pleasant intelligent patient who is here for evaluation.     I saw patient on video with audio on [employer] because patient could not use [employer] video easily. Verbal consent obtained.       The following portions of the patient's history were reviewed and updated as appropriate: allergies, current medications, past family history, past medical history, past social history, past surgical history and problem list.    She  has a past medical history of Anxiety, Arthritis, Asthma, CHF (congestive heart failure), COPD (chronic obstructive pulmonary disease), Hyperlipemia, Hypertension, Leaky heart valve, Obese, Obese, Obstructive sleep apnea, Pneumonia, Rheumatoid arthritis(714.0), and Stroke.  She  has a past surgical history that includes Hysterectomy; Cholecystectomy; Sleeve Gastroplasty (02/21/2017); and Carotid stent.  She  reports that she has never smoked. She has never been exposed to tobacco smoke. She has never used smokeless tobacco. She reports current alcohol use. She reports that she does not use drugs.  She family history includes Arthritis in her mother; Asthma in her son; Breast cancer in her mother; Cancer in her mother; Diabetes in her mother; Heart disease in her father; Hyperlipidemia in her mother; Hypertension in her father, mother, sister, and sister; Stroke in her mother.    Review of patient's allergies indicates:   Allergen Reactions    Nsaids (non-steroidal anti-inflammatory drug) Other (See Comments)       Physical Examination  LMP  (LMP Unknown) Comment: Does not menstruate    274}  Wt Readings from Last 3 Encounters:   02/19/25 99.2 kg (218 lb 11.1 oz)   02/11/25 99 kg (218 lb 4.1 oz)   02/07/25 99.5 kg  "(219 lb 5.7 oz)     BP Readings from Last 3 Encounters:   02/19/25 114/80   02/07/25 136/88   01/29/25 138/76     Estimated body mass index is 40 kg/m² as calculated from the following:    Height as of 2/19/25: 5' 2" (1.575 m).    Weight as of 2/19/25: 99.2 kg (218 lb 11.1 oz).     CONSTITUTIONAL: No apparent distress. Does not appear acutely ill or septic. Appears adequately hydrated.  PULM: Breathing unlabored.  PSYCHIATRIC: Alert and conversant and grossly oriented. Mood is grossly neutral. Affect appropriate. Judgment and insight grossly intact.  Integument: normal coloration and turgor, no rashes, no suspicious skin lesions noted.    Data reviewed 274}  Previous medical records reviewed and summarized in HPI.     Laboratory  274}  I have reviewed old labs below:  Lab Results   Component Value Date    WBC 7.12 01/29/2025    HGB 12.4 01/29/2025    HCT 38.5 01/29/2025    MCV 92 01/29/2025     01/29/2025     01/29/2025    K 3.4 (L) 01/29/2025     01/29/2025    CALCIUM 9.2 01/29/2025    PHOS 4.0 12/23/2024    CO2 22 (L) 01/29/2025     (H) 01/29/2025    BUN 24 (H) 01/29/2025    CREATININE 2.3 (H) 01/29/2025    ANIONGAP 11 01/29/2025    ESTGFRAFRICA 40.7 (A) 05/09/2022    EGFRNONAA 35.3 (A) 05/09/2022    PROT 8.1 01/29/2025    ALBUMIN 3.6 01/29/2025    BILITOT 0.3 01/29/2025    ALKPHOS 72 01/29/2025    ALT 8 (L) 01/29/2025    AST 12 01/29/2025    INR 0.9 07/13/2023    CHOL 216 (H) 04/30/2024    TRIG 186 (H) 04/30/2024    HDL 43 04/30/2024    LDLCALC 135.8 04/30/2024    TSH 2.110 07/12/2023    HGBA1C 5.3 08/26/2024     Lab reviewed by me: Particular labs of significance that I will monitor, workup, or treat to improve are mentioned below in diagnostic impression remarks.  :[id number]}274}  Imaging/EKG: I have reviewed the pertinent results/findings and my personal findings are noted below in diagnostic impression remarks.  274}    CC:   Chief Complaint   Patient presents with    Cough        " "274}  Assessment/Plan  Faustina Rae is a 60 y.o. female who presents with:    1. Subacute cough    2. Subacute maxillary sinusitis    3. Essential hypertension    4. Severe persistent asthma, uncomplicated    5. COPD with asthma    6. Stage 3b chronic kidney disease    7. Rheumatoid arthritis involving vertebra, unspecified whether rheumatoid factor present        Diagnostic Impression Remarks + HPI     The patient location is:  Patient Home [place]  The chief complaint leading to consultation is:   Chief Complaint   Patient presents with    Cough     Total time spent with patient: 20 minutes     Visit type: Virtual visit with synchronous audio and video  Each patient to whom he or she provides medical services by telemedicine is:  (1) informed of the relationship between the physician and patient and the respective role of any other health care provider with respect to management of the patient; and (2) notified that he or she may decline to receive medical services by telemedicine and may withdraw from such care at any time.    This service was not originating from a related E/M service provided within the previous 7 days nor will  to an E/M service or procedure within the next 24 hours or my soonest available appointment.  Prevailing standard of care was able to be met in this synchronous audio and video visit    Documentation entered by me for this encounter may have been done in part using speech-recognition technology. Although I have made an effort to ensure accuracy, "sound like" errors may exist and should be interpreted in context.     Cough-patient has dry cough no chest pain will monitor viral infection   Sinusitis can take antihistamine dramatic treatment   Hypertension -stable continue current meds monitor no headache or chest pain f/u blood work   COPD-stable continue current inhalers no recent flares  The patient's chronic kidney disease was monitored, evaluated, addressed and/or " treated. Recommend to avoid NSAIDs and renally dose medications. ACE/ARB inhibitor if indicated and optimize blood pressure and A1c to goal.   Rheumatoid arthritis-stable continue current medications monitor blood work and follow-up with rheumatology         This is the extent of the patient's complaints at this present time. She denies chest pain upon exertion, dyspnea, nausea, vomiting, diaphoresis, and syncope. No pleuritic chest pain, unilateral leg swelling, calf tenderness, or calf pain. Denies unintentional wt loss sweats and fevers.     Faustina will return to clinic in a few months for further workup and reassessment or sooner as needed. She was instructed to call the clinic or go to the emergency department if her symptoms do not improve, worsens, or if new symptoms develop. As we discussed that symptoms could worsen over the next 24 hours she was advised that if any increased swelling, pain, or numbness arise to go immediately to the ED. Patient knows to call any time if an emergency arises. Shared decision making occurred and she verbalized understanding in agreement with this plan.      274}      She was counseled about the importance of cancer screening.     Medication Monitoring    In today's visit, monitoring for drug toxicity was accomplished. Proper use of medications was also discussed.     I discussed imaging findings, diagnosis, possibilities, treatment options, medications, risks, and benefits. She had many questions regarding the options and long-term effects. All questions were answered. She expressed understanding after counseling regarding the diagnosis and recommendations. She was capable and demonstrated competence with understanding of these options. Shared decision making was performed resulting in her choosing the current treatment plan. Patient handout was given with instructions and recommendations. Advised the patient that if they become pregnant to alert us immediately to assess for  medication changes.     I also discussed the importance of close follow up to discuss labs, change or modify her medications if needed, monitor side effects, and further evaluation of medical problems.     Additional workup planned: see labs ordered below.    See below for labs and meds ordered with associated diagnosis    1. Subacute cough  - promethazine-dextromethorphan (PROMETHAZINE-DM) 6.25-15 mg/5 mL Syrp; Take 5 mLs by mouth every 6 (six) hours as needed (cough).  Dispense: 118 mL; Refill: 1    2. Subacute maxillary sinusitis    3. Essential hypertension  - Comprehensive Metabolic Panel; Future  - Magnesium; Future    4. Severe persistent asthma, uncomplicated    5. COPD with asthma    6. Stage 3b chronic kidney disease    7. Rheumatoid arthritis involving vertebra, unspecified whether rheumatoid factor present       Medication List with Changes/Refills   New Medications    PROMETHAZINE-DEXTROMETHORPHAN (PROMETHAZINE-DM) 6.25-15 MG/5 ML SYRP    Take 5 mLs by mouth every 6 (six) hours as needed (cough).   Current Medications    ALBUTEROL (PROVENTIL) 2.5 MG /3 ML (0.083 %) NEBULIZER SOLUTION    Take 3 mLs (2.5 mg total) by nebulization every 6 (six) hours as needed.    ALBUTEROL (PROVENTIL/VENTOLIN HFA) 90 MCG/ACTUATION INHALER    INHALE TWO PUFFS BY MOUTH INTO THE LUNGS FOUR TIMES DAILY    AMLODIPINE (NORVASC) 10 MG TABLET    TAKE ONE TABLET BY MOUTH DAILY AT 9 AM    AMOXICILLIN-CLAVULANATE 500-125MG (AUGMENTIN) 500-125 MG TAB    Take 1 tablet (500 mg total) by mouth 2 (two) times daily.    ASPIRIN (ECOTRIN) 81 MG EC TABLET    Take 1 tablet (81 mg total) by mouth once daily.    ATORVASTATIN (LIPITOR) 80 MG TABLET    TAKE ONE TABLET BY MOUTH DAILY AT 5 PM every evening.    B COMPLEX VITAMINS TABLET    Take 1 tablet by mouth once daily.    BREO ELLIPTA 200-25 MCG/DOSE DSDV DISKUS INHALER    Inhale 1 puff into the lungs once daily.    CALCIUM CITRATE-VITAMIN D3 315-200 MG (CITRACAL+D) 315-200 MG-UNIT PER TABLET     Take 1 tablet by mouth once daily.     CHLORTHALIDONE (HYGROTEN) 25 MG TAB    Take 1 tablet (25 mg total) by mouth once daily.    CLOPIDOGREL (PLAVIX) 75 MG TABLET    Take 1 tablet (75 mg total) by mouth once daily.    COLCHICINE (COLCRYS) 0.6 MG TABLET    Take 1 tablet (0.6 mg total) by mouth every 72 hours. Take at onset of gout and take for 10 days    DIAZEPAM (VALIUM) 5 MG TABLET    Take 1 tablet (5 mg total) by mouth daily as needed for Anxiety.    ESCITALOPRAM OXALATE (LEXAPRO) 10 MG TABLET    TAKE ONE TABLET BY MOUTH DAILY AT 9 AM    FERROUS SULFATE 325 (65 FE) MG EC TABLET    Take 1 tablet (325 mg total) by mouth once daily.    FLUTICASONE PROPIONATE (FLONASE) 50 MCG/ACTUATION NASAL SPRAY    2 sprays (100 mcg total) by Each Nostril route once daily.    FLUTICASONE-UMECLIDIN-VILANTER (TRELEGY ELLIPTA) 200-62.5-25 MCG INHALER    Inhale 1 puff into the lungs once daily.    GABAPENTIN (NEURONTIN) 300 MG CAPSULE    Take 1 capsule (300 mg total) by mouth 3 (three) times daily.    GUAIFENESIN-CODEINE 100-10 MG/5 ML (TUSSI-ORGANIDIN NR)  MG/5 ML SYRUP    Take 5 mLs by mouth every 4 (four) hours as needed for Cough.    HYDRALAZINE (APRESOLINE) 50 MG TABLET    TAKE ONE TABLET BY MOUTH EVERY TWELVE HOURS @ 9AM & 9PM    ISOSORBIDE DINITRATE (ISORDIL) 5 MG TAB    Take 1 tablet (5 mg total) by mouth 3 (three) times daily.    MECLIZINE (ANTIVERT) 12.5 MG TABLET    TAKE 1 TABLET(12.5 MG) BY MOUTH THREE TIMES DAILY AS NEEDED FOR DIZZINESS    MONTELUKAST (SINGULAIR) 10 MG TABLET    Take 1 tablet (10 mg total) by mouth every evening.    MULTIVITAMIN (THERAGRAN) PER TABLET    Take 1 tablet by mouth once daily.    NYSTATIN (MYCOSTATIN ORAL)    Take 1 tablet by mouth Daily.    PANTOPRAZOLE (PROTONIX) 40 MG TABLET    Take 1 tablet (40 mg total) by mouth 2 (two) times daily.    PREDNISONE (DELTASONE) 20 MG TABLET    3 for 3 days then 2 for 3 days then one for 3 days and repeat for breathing problems    PREDNISONE (DELTASONE)  "20 MG TABLET    One BID for 3 days then once a day orally    SEMAGLUTIDE, WEIGHT LOSS, (WEGOVY) 1 MG/0.5 ML PNIJ    Inject 1 mg into the skin every 7 days.    SODIUM BICARBONATE 650 MG TABLET    Take 1 tablet (650 mg total) by mouth 2 (two) times daily.    SPIRONOLACTONE (ALDACTONE) 25 MG TABLET    TAKE ONE TABLET BY MOUTH DAILY    SUCRALFATE (CARAFATE) 1 GRAM TABLET    Take 1 tablet (1 g total) by mouth 4 (four) times daily before meals and nightly.    TETRACYCLINE (ACHROMYCIN,SUMYCIN) 500 MG CAPSULE    Take 1 capsule (500 mg total) by mouth 4 (four) times daily.    TRAMADOL (ULTRAM) 50 MG TABLET    Take 1 tablet (50 mg total) by mouth every 6 (six) hours as needed for Pain.    TRAZODONE (DESYREL) 50 MG TABLET    Take 50 mg by mouth nightly as needed.    VALSARTAN (DIOVAN) 160 MG TABLET    Take 1 tablet (160 mg total) by mouth once daily.    VELTASSA 8.4 GRAM PWPK    Take by mouth.     Modified Medications    No medications on file       Zoran Cuevas MD  02/25/2025     Documentation entered by me for this encounter may have been done in part using speech-recognition technology. Although I have made an effort to ensure accuracy, "sound like" errors may exist and should be interpreted in context.    If you are due for any health screening(s) below please notify me so we can arrange them to be ordered and scheduled to maintain your health. Most healthy patients at your age complete them, but you are free to accept or refuse.   Tests to Keep You Healthy    Mammogram: DUE  Colon Cancer Screening: SCHEDULED  Last Blood Pressure <= 139/89 (2/19/2025): Yes       "

## 2025-03-03 ENCOUNTER — LAB VISIT (OUTPATIENT)
Dept: LAB | Facility: HOSPITAL | Age: 60
End: 2025-03-03
Attending: STUDENT IN AN ORGANIZED HEALTH CARE EDUCATION/TRAINING PROGRAM
Payer: MEDICARE

## 2025-03-03 ENCOUNTER — OFFICE VISIT (OUTPATIENT)
Dept: FAMILY MEDICINE | Facility: CLINIC | Age: 60
End: 2025-03-03
Payer: MEDICARE

## 2025-03-03 VITALS
SYSTOLIC BLOOD PRESSURE: 134 MMHG | HEIGHT: 62 IN | BODY MASS INDEX: 40.65 KG/M2 | DIASTOLIC BLOOD PRESSURE: 70 MMHG | TEMPERATURE: 98 F | WEIGHT: 220.88 LBS

## 2025-03-03 DIAGNOSIS — I10 ESSENTIAL HYPERTENSION: ICD-10-CM

## 2025-03-03 DIAGNOSIS — I50.32 CHRONIC DIASTOLIC CHF (CONGESTIVE HEART FAILURE): ICD-10-CM

## 2025-03-03 DIAGNOSIS — M54.9 BACK PAIN, UNSPECIFIED BACK LOCATION, UNSPECIFIED BACK PAIN LATERALITY, UNSPECIFIED CHRONICITY: ICD-10-CM

## 2025-03-03 DIAGNOSIS — I10 ESSENTIAL HYPERTENSION: Primary | ICD-10-CM

## 2025-03-03 DIAGNOSIS — E66.01 MORBID OBESITY WITH BMI OF 40.0-44.9, ADULT: ICD-10-CM

## 2025-03-03 DIAGNOSIS — J45.50 SEVERE PERSISTENT ASTHMA, UNCOMPLICATED: ICD-10-CM

## 2025-03-03 DIAGNOSIS — K21.00 GASTROESOPHAGEAL REFLUX DISEASE WITH ESOPHAGITIS WITHOUT HEMORRHAGE: ICD-10-CM

## 2025-03-03 DIAGNOSIS — N64.89 BILATERAL PENDULOUS BREASTS: ICD-10-CM

## 2025-03-03 DIAGNOSIS — F32.5 MAJOR DEPRESSIVE DISORDER, SINGLE EPISODE, IN FULL REMISSION: ICD-10-CM

## 2025-03-03 LAB
ALBUMIN SERPL BCP-MCNC: 2.5 G/DL (ref 3.5–5.2)
ALP SERPL-CCNC: 53 U/L (ref 40–150)
ALT SERPL W/O P-5'-P-CCNC: 13 U/L (ref 10–44)
ANION GAP SERPL CALC-SCNC: 9 MMOL/L (ref 8–16)
AST SERPL-CCNC: 19 U/L (ref 10–40)
BILIRUB SERPL-MCNC: 0.2 MG/DL (ref 0.1–1)
BUN SERPL-MCNC: 30 MG/DL (ref 8–23)
CALCIUM SERPL-MCNC: 8.9 MG/DL (ref 8.7–10.5)
CHLORIDE SERPL-SCNC: 111 MMOL/L (ref 95–110)
CO2 SERPL-SCNC: 18 MMOL/L (ref 23–29)
CREAT SERPL-MCNC: 2.2 MG/DL (ref 0.5–1.4)
EST. GFR  (NO RACE VARIABLE): 24.9 ML/MIN/1.73 M^2
GLUCOSE SERPL-MCNC: 151 MG/DL (ref 70–110)
MAGNESIUM SERPL-MCNC: 1.5 MG/DL (ref 1.6–2.6)
POTASSIUM SERPL-SCNC: 4.6 MMOL/L (ref 3.5–5.1)
PROT SERPL-MCNC: 6.8 G/DL (ref 6–8.4)
SODIUM SERPL-SCNC: 138 MMOL/L (ref 136–145)

## 2025-03-03 PROCEDURE — 83735 ASSAY OF MAGNESIUM: CPT | Performed by: STUDENT IN AN ORGANIZED HEALTH CARE EDUCATION/TRAINING PROGRAM

## 2025-03-03 PROCEDURE — 3075F SYST BP GE 130 - 139MM HG: CPT | Mod: CPTII,S$GLB,, | Performed by: PHYSICIAN ASSISTANT

## 2025-03-03 PROCEDURE — 80053 COMPREHEN METABOLIC PANEL: CPT | Performed by: STUDENT IN AN ORGANIZED HEALTH CARE EDUCATION/TRAINING PROGRAM

## 2025-03-03 PROCEDURE — 1159F MED LIST DOCD IN RCRD: CPT | Mod: CPTII,S$GLB,, | Performed by: PHYSICIAN ASSISTANT

## 2025-03-03 PROCEDURE — G2211 COMPLEX E/M VISIT ADD ON: HCPCS | Mod: S$GLB,,, | Performed by: PHYSICIAN ASSISTANT

## 2025-03-03 PROCEDURE — 3078F DIAST BP <80 MM HG: CPT | Mod: CPTII,S$GLB,, | Performed by: PHYSICIAN ASSISTANT

## 2025-03-03 PROCEDURE — 99214 OFFICE O/P EST MOD 30 MIN: CPT | Mod: S$GLB,,, | Performed by: PHYSICIAN ASSISTANT

## 2025-03-03 PROCEDURE — 1160F RVW MEDS BY RX/DR IN RCRD: CPT | Mod: CPTII,S$GLB,, | Performed by: PHYSICIAN ASSISTANT

## 2025-03-03 PROCEDURE — 36415 COLL VENOUS BLD VENIPUNCTURE: CPT | Mod: PO | Performed by: STUDENT IN AN ORGANIZED HEALTH CARE EDUCATION/TRAINING PROGRAM

## 2025-03-03 PROCEDURE — 99999 PR PBB SHADOW E&M-EST. PATIENT-LVL V: CPT | Mod: PBBFAC,,, | Performed by: PHYSICIAN ASSISTANT

## 2025-03-03 PROCEDURE — 3008F BODY MASS INDEX DOCD: CPT | Mod: CPTII,S$GLB,, | Performed by: PHYSICIAN ASSISTANT

## 2025-03-03 NOTE — PROGRESS NOTES
Subjective:       Patient ID: Faustina Rae is a 60 y.o. female.    Chief Complaint: No chief complaint on file.    Mrs. Rae is a 60 year old female with GERD, HTN, and HLD who presents for a follow up regarding abdominal pain. Patient reports that her abdominal pain is much improved since being off of the Wegovy injection. She reports that her last injection was around 1-2 months ago. She states that she is still experiencing regurgitation at night after eating. Denies heart burn, nausea, vomiting, belching, increased flatulence, abdominal pain. Pt states that she is continuing to take protonix and states that it helps with abd pain and regurg. She would like to discuss getting back on Wegovy injection.    Patient is currently being followed by Gastro & had to reschedule her EGD for 3/19 due to having the flu at the last scheduled date.    Patient would also like to discuss being referred for a breast reduction. Patient states that she has always suffered from larger breasts. Reports that she is experiencing significant breast and back pain due to the weight of her breasts. She reports that on most days she is having to wear 2 bras to help support the breasts. She states that she experiences recurrent rashes under bilateral breasts. She reports a deep indentation on bilateral shoulders. She denies any medications that helps improve symptoms.       Review of patient's allergies indicates:   Allergen Reactions    Nsaids (non-steroidal anti-inflammatory drug) Other (See Comments)       Current Medications[1]    Lab Results   Component Value Date    WBC 7.12 01/29/2025    HGB 12.4 01/29/2025    HCT 38.5 01/29/2025     01/29/2025    CHOL 216 (H) 04/30/2024    TRIG 186 (H) 04/30/2024    HDL 43 04/30/2024    ALT 8 (L) 01/29/2025    AST 12 01/29/2025     01/29/2025    K 3.4 (L) 01/29/2025     01/29/2025    CREATININE 2.3 (H) 01/29/2025    BUN 24 (H) 01/29/2025    CO2 22 (L) 01/29/2025    TSH 2.110  07/12/2023    INR 0.9 07/13/2023    HGBA1C 5.3 08/26/2024       Review of Systems   Constitutional:  Negative for activity change, appetite change, fatigue and fever.   HENT:  Negative for congestion, postnasal drip, rhinorrhea, sinus pressure and sinus pain.    Eyes:  Negative for visual disturbance.   Respiratory:  Negative for cough, chest tightness and shortness of breath.    Cardiovascular:  Negative for chest pain, palpitations and leg swelling.   Gastrointestinal:  Negative for abdominal distention, abdominal pain, constipation, diarrhea and vomiting. Anal bleeding: Breast pain. Blood in stool: Post-prandial regurgitation.       GERD   Genitourinary:  Negative for difficulty urinating and dysuria.   Musculoskeletal:  Positive for back pain and myalgias. Negative for arthralgias.   Skin:  Negative for color change.   Neurological:  Negative for dizziness, numbness and headaches.   Hematological:  Negative for adenopathy.   Psychiatric/Behavioral:  The patient is not nervous/anxious.        Objective:      Physical Exam  Constitutional:       Appearance: Normal appearance. She is obese.   HENT:      Head: Normocephalic and atraumatic.   Eyes:      Conjunctiva/sclera: Conjunctivae normal.   Cardiovascular:      Rate and Rhythm: Normal rate and regular rhythm.      Pulses: Normal pulses.      Heart sounds: Normal heart sounds.   Pulmonary:      Effort: Pulmonary effort is normal. No respiratory distress.      Breath sounds: Normal breath sounds. No wheezing.   Chest:      Comments: Large, pendulous breasts bilaterally  Abdominal:      General: Bowel sounds are normal. There is no distension.      Palpations: There is no mass.      Tenderness: There is no abdominal tenderness. There is no guarding.      Hernia: No hernia is present.   Musculoskeletal:      Right shoulder: Swelling present.      Left shoulder: Swelling present.      Right lower leg: No edema.      Left lower leg: No edema.      Comments: Bilateral  significant indention due to bra straps    Lymphadenopathy:      Cervical: No cervical adenopathy.   Skin:     General: Skin is warm and dry.      Findings: No erythema.   Neurological:      General: No focal deficit present.      Mental Status: She is alert and oriented to person, place, and time.   Psychiatric:         Mood and Affect: Mood normal.         Behavior: Behavior normal.         Assessment:       1. Essential hypertension    2. Morbid obesity with BMI of 40.0-44.9, adult    3. Chronic diastolic CHF (congestive heart failure)    4. Severe persistent asthma, uncomplicated    5. Major depressive disorder, single episode, in full remission    6. Bilateral pendulous breasts    7. Back pain, unspecified back location, unspecified back pain laterality, unspecified chronicity    8. Gastroesophageal reflux disease with esophagitis without hemorrhage        Plan:       Faustnia was seen today for follow-up.    Diagnoses and all orders for this visit:    Morbid obesity with BMI of 40.0-44.9, adult  Continue healthy diet & exercise  Will discuss continuing wt loss medication after EGD results   Will not resume wegovy until EGD results return.   Essential hypertension  Low salt, high fiber diet   Continue current medications     Chronic diastolic CHF (congestive heart failure)  Continue current medications     Severe persistent asthma, uncomplicated  Continue current medications     Major depressive disorder, single episode, in full remission  Continute current medication     Bilateral pendulous breasts  -     Ambulatory referral/consult to Plastic Surgery; Future  Referral sent to Plastics     Back pain, unspecified back location, unspecified back pain laterality, unspecified chronicity  -     Ambulatory referral/consult to Plastic Surgery; Future    Gastroesophageal reflux disease with esophagitis without hemorrhage  Continue current medication  Stop eating 3 hours before bed  EGD with GI        Follow up in April    Follow up as scheduled with GI for EGD          [1]   Current Outpatient Medications:     albuterol (PROVENTIL) 2.5 mg /3 mL (0.083 %) nebulizer solution, Take 3 mLs (2.5 mg total) by nebulization every 6 (six) hours as needed., Disp: 120 each, Rfl: 11    albuterol (PROVENTIL/VENTOLIN HFA) 90 mcg/actuation inhaler, INHALE TWO PUFFS BY MOUTH INTO THE LUNGS FOUR TIMES DAILY, Disp: 20.1 g, Rfl: 3    amLODIPine (NORVASC) 10 MG tablet, TAKE ONE TABLET BY MOUTH DAILY AT 9 AM, Disp: 90 tablet, Rfl: 1    amoxicillin-clavulanate 500-125mg (AUGMENTIN) 500-125 mg Tab, Take 1 tablet (500 mg total) by mouth 2 (two) times daily. (Patient not taking: Reported on 2/19/2025), Disp: 10 tablet, Rfl: 0    aspirin (ECOTRIN) 81 MG EC tablet, Take 1 tablet (81 mg total) by mouth once daily., Disp: 90 tablet, Rfl: 3    atorvastatin (LIPITOR) 80 MG tablet, TAKE ONE TABLET BY MOUTH DAILY AT 5 PM every evening., Disp: 90 tablet, Rfl: 2    b complex vitamins tablet, Take 1 tablet by mouth once daily., Disp: , Rfl:     BREO ELLIPTA 200-25 mcg/dose DsDv diskus inhaler, Inhale 1 puff into the lungs once daily., Disp: 180 each, Rfl: 3    calcium citrate-vitamin D3 315-200 mg (CITRACAL+D) 315-200 mg-unit per tablet, Take 1 tablet by mouth once daily. , Disp: , Rfl:     chlorthalidone (HYGROTEN) 25 MG Tab, Take 1 tablet (25 mg total) by mouth once daily., Disp: 90 tablet, Rfl: 3    clopidogreL (PLAVIX) 75 mg tablet, Take 1 tablet (75 mg total) by mouth once daily. (Patient not taking: Reported on 2/19/2025), Disp: 30 tablet, Rfl: 1    colchicine (COLCRYS) 0.6 mg tablet, Take 1 tablet (0.6 mg total) by mouth every 72 hours. Take at onset of gout and take for 10 days, Disp: 10 tablet, Rfl: 3    diazePAM (VALIUM) 5 MG tablet, Take 1 tablet (5 mg total) by mouth daily as needed for Anxiety. (Patient not taking: Reported on 2/19/2025), Disp: 2 tablet, Rfl: 0    EScitalopram oxalate (LEXAPRO) 10 MG tablet, TAKE ONE TABLET BY MOUTH DAILY AT 9 AM, Disp:  90 tablet, Rfl: 2    ferrous sulfate 325 (65 FE) MG EC tablet, Take 1 tablet (325 mg total) by mouth once daily., Disp: 90 tablet, Rfl: 3    fluticasone propionate (FLONASE) 50 mcg/actuation nasal spray, 2 sprays (100 mcg total) by Each Nostril route once daily., Disp: 16 g, Rfl: 11    fluticasone-umeclidin-vilanter (TRELEGY ELLIPTA) 200-62.5-25 mcg inhaler, Inhale 1 puff into the lungs once daily., Disp: 60 each, Rfl: 11    gabapentin (NEURONTIN) 300 MG capsule, Take 1 capsule (300 mg total) by mouth 3 (three) times daily., Disp: 90 capsule, Rfl: 11    guaiFENesin-codeine 100-10 mg/5 ml (TUSSI-ORGANIDIN NR)  mg/5 mL syrup, Take 5 mLs by mouth every 4 (four) hours as needed for Cough., Disp: 180 mL, Rfl: 0    hydrALAZINE (APRESOLINE) 50 MG tablet, TAKE ONE TABLET BY MOUTH EVERY TWELVE HOURS @ 9AM & 9PM, Disp: 270 tablet, Rfl: 1    isosorbide dinitrate (ISORDIL) 5 MG Tab, Take 1 tablet (5 mg total) by mouth 3 (three) times daily., Disp: 270 tablet, Rfl: 2    meclizine (ANTIVERT) 12.5 mg tablet, TAKE 1 TABLET(12.5 MG) BY MOUTH THREE TIMES DAILY AS NEEDED FOR DIZZINESS, Disp: 30 tablet, Rfl: 0    montelukast (SINGULAIR) 10 mg tablet, Take 1 tablet (10 mg total) by mouth every evening., Disp: 90 tablet, Rfl: 3    multivitamin (THERAGRAN) per tablet, Take 1 tablet by mouth once daily. (Patient not taking: Reported on 2/19/2025), Disp: 90 tablet, Rfl: 3    nystatin (MYCOSTATIN ORAL), Take 1 tablet by mouth Daily., Disp: , Rfl:     pantoprazole (PROTONIX) 40 MG tablet, Take 1 tablet (40 mg total) by mouth 2 (two) times daily., Disp: 60 tablet, Rfl: 1    predniSONE (DELTASONE) 20 MG tablet, 3 for 3 days then 2 for 3 days then one for 3 days and repeat for breathing problems, Disp: 36 tablet, Rfl: 1    predniSONE (DELTASONE) 20 MG tablet, One BID for 3 days then once a day orally, Disp: 10 tablet, Rfl: 0    promethazine-dextromethorphan (PROMETHAZINE-DM) 6.25-15 mg/5 mL Syrp, Take 5 mLs by mouth every 6 (six) hours as  needed (cough)., Disp: 118 mL, Rfl: 1    sodium bicarbonate 650 MG tablet, Take 1 tablet (650 mg total) by mouth 2 (two) times daily., Disp: 60 tablet, Rfl: 11    spironolactone (ALDACTONE) 25 MG tablet, TAKE ONE TABLET BY MOUTH DAILY, Disp: 90 tablet, Rfl: 2    sucralfate (CARAFATE) 1 gram tablet, Take 1 tablet (1 g total) by mouth 4 (four) times daily before meals and nightly., Disp: 40 tablet, Rfl: 0    tetracycline (ACHROMYCIN,SUMYCIN) 500 MG capsule, Take 1 capsule (500 mg total) by mouth 4 (four) times daily., Disp: 56 capsule, Rfl: 0    traMADoL (ULTRAM) 50 mg tablet, Take 1 tablet (50 mg total) by mouth every 6 (six) hours as needed for Pain., Disp: 20 each, Rfl: 0    traZODone (DESYREL) 50 MG tablet, Take 50 mg by mouth nightly as needed., Disp: , Rfl:     valsartan (DIOVAN) 160 MG tablet, Take 1 tablet (160 mg total) by mouth once daily., Disp: 90 tablet, Rfl: 3    VELTASSA 8.4 gram PwPk, Take by mouth., Disp: , Rfl:     Current Facility-Administered Medications:     cyanocobalamin injection 1,000 mcg, 1,000 mcg, Intramuscular, Q30 Days, , 1,000 mcg at 01/06/25 0373

## 2025-03-05 ENCOUNTER — RESULTS FOLLOW-UP (OUTPATIENT)
Dept: FAMILY MEDICINE | Facility: CLINIC | Age: 60
End: 2025-03-05
Payer: MEDICARE

## 2025-03-05 ENCOUNTER — TELEPHONE (OUTPATIENT)
Dept: FAMILY MEDICINE | Facility: CLINIC | Age: 60
End: 2025-03-05
Payer: MEDICARE

## 2025-03-05 ENCOUNTER — PATIENT MESSAGE (OUTPATIENT)
Dept: FAMILY MEDICINE | Facility: CLINIC | Age: 60
End: 2025-03-05
Payer: MEDICARE

## 2025-03-05 DIAGNOSIS — R05.2 SUBACUTE COUGH: Primary | ICD-10-CM

## 2025-03-05 DIAGNOSIS — E83.42 HYPOMAGNESEMIA: Primary | ICD-10-CM

## 2025-03-05 RX ORDER — LANOLIN ALCOHOL/MO/W.PET/CERES
400 CREAM (GRAM) TOPICAL DAILY
Qty: 90 TABLET | Refills: 3 | Status: SHIPPED | OUTPATIENT
Start: 2025-03-05

## 2025-03-05 NOTE — TELEPHONE ENCOUNTER
----- Message from Zoran Cuevas MD sent at 3/5/2025  6:37 AM CST -----  Please let pt know her potassium was normal but magnesium was low and sent in supplement   ----- Message -----  From: Kevin, [employer] Interface  Sent: 3/3/2025  10:31 PM CST  To: Zoran Cuevas MD

## 2025-03-05 NOTE — TELEPHONE ENCOUNTER
The medication for the cough you sent to pharmacy was unable to fill , she ask if you can send something different

## 2025-03-11 ENCOUNTER — TELEPHONE (OUTPATIENT)
Dept: GASTROENTEROLOGY | Facility: CLINIC | Age: 60
End: 2025-03-11
Payer: MEDICARE

## 2025-03-11 ENCOUNTER — PATIENT MESSAGE (OUTPATIENT)
Dept: ADMINISTRATIVE | Facility: HOSPITAL | Age: 60
End: 2025-03-11
Payer: MEDICARE

## 2025-03-11 NOTE — TELEPHONE ENCOUNTER
----- Message from Lisa sent at 3/11/2025  2:12 PM CDT -----  Type:  Patient Returning CallWho Called: PTWho Left Message for Patient:Does the patient know what this is regarding?:Would the patient rather a call back or a response via [facility]? [employer] Call Back Number:[phone] Additional Information:  Please call back to advise. Thank you!

## 2025-03-19 ENCOUNTER — HOSPITAL ENCOUNTER (OUTPATIENT)
Facility: HOSPITAL | Age: 60
Discharge: HOME | End: 2025-03-19
Attending: INTERNAL MEDICINE | Admitting: INTERNAL MEDICINE
Payer: MEDICARE

## 2025-03-19 ENCOUNTER — ANESTHESIA (OUTPATIENT)
Dept: ENDOSCOPY | Facility: HOSPITAL | Age: 60
End: 2025-03-19
Payer: MEDICARE

## 2025-03-19 ENCOUNTER — ANESTHESIA EVENT (OUTPATIENT)
Dept: ENDOSCOPY | Facility: HOSPITAL | Age: 60
End: 2025-03-19
Payer: MEDICARE

## 2025-03-19 VITALS
OXYGEN SATURATION: 99 % | HEART RATE: 77 BPM | WEIGHT: 200 LBS | HEIGHT: 62 IN | TEMPERATURE: 98 F | RESPIRATION RATE: 16 BRPM | BODY MASS INDEX: 36.8 KG/M2 | SYSTOLIC BLOOD PRESSURE: 167 MMHG | DIASTOLIC BLOOD PRESSURE: 115 MMHG

## 2025-03-19 DIAGNOSIS — K63.5 POLYP OF COLON, UNSPECIFIED PART OF COLON, UNSPECIFIED TYPE: ICD-10-CM

## 2025-03-19 DIAGNOSIS — R10.9 ABDOMINAL PAIN: ICD-10-CM

## 2025-03-19 DIAGNOSIS — K29.70 GASTRITIS, PRESENCE OF BLEEDING UNSPECIFIED, UNSPECIFIED CHRONICITY, UNSPECIFIED GASTRITIS TYPE: Primary | ICD-10-CM

## 2025-03-19 PROCEDURE — 37000008 HC ANESTHESIA 1ST 15 MINUTES: Performed by: INTERNAL MEDICINE

## 2025-03-19 PROCEDURE — 25000003 PHARM REV CODE 250: Performed by: INTERNAL MEDICINE

## 2025-03-19 PROCEDURE — 45385 COLONOSCOPY W/LESION REMOVAL: CPT | Performed by: INTERNAL MEDICINE

## 2025-03-19 PROCEDURE — 43239 EGD BIOPSY SINGLE/MULTIPLE: CPT | Mod: 51,,, | Performed by: INTERNAL MEDICINE

## 2025-03-19 PROCEDURE — 45385 COLONOSCOPY W/LESION REMOVAL: CPT | Mod: 22,,, | Performed by: INTERNAL MEDICINE

## 2025-03-19 PROCEDURE — 45380 COLONOSCOPY AND BIOPSY: CPT | Mod: 59 | Performed by: INTERNAL MEDICINE

## 2025-03-19 PROCEDURE — 88305 TISSUE EXAM BY PATHOLOGIST: CPT | Mod: TC,59 | Performed by: PATHOLOGY

## 2025-03-19 PROCEDURE — 43239 EGD BIOPSY SINGLE/MULTIPLE: CPT | Performed by: INTERNAL MEDICINE

## 2025-03-19 PROCEDURE — 27201089 HC SNARE, DISP (ANY): Performed by: INTERNAL MEDICINE

## 2025-03-19 PROCEDURE — 37000009 HC ANESTHESIA EA ADD 15 MINS: Performed by: INTERNAL MEDICINE

## 2025-03-19 PROCEDURE — 63600175 PHARM REV CODE 636 W HCPCS: Performed by: NURSE ANESTHETIST, CERTIFIED REGISTERED

## 2025-03-19 PROCEDURE — 45380 COLONOSCOPY AND BIOPSY: CPT | Mod: 59,,, | Performed by: INTERNAL MEDICINE

## 2025-03-19 PROCEDURE — 27201012 HC FORCEPS, HOT/COLD, DISP: Performed by: INTERNAL MEDICINE

## 2025-03-19 RX ORDER — LABETALOL HYDROCHLORIDE 5 MG/ML
INJECTION, SOLUTION INTRAVENOUS
Status: DISCONTINUED | OUTPATIENT
Start: 2025-03-19 | End: 2025-03-19

## 2025-03-19 RX ORDER — SODIUM CHLORIDE 9 MG/ML
INJECTION, SOLUTION INTRAVENOUS CONTINUOUS
Status: DISCONTINUED | OUTPATIENT
Start: 2025-03-19 | End: 2025-03-19 | Stop reason: HOSPADM

## 2025-03-19 RX ORDER — LIDOCAINE HYDROCHLORIDE 20 MG/ML
INJECTION INTRAVENOUS
Status: DISCONTINUED | OUTPATIENT
Start: 2025-03-19 | End: 2025-03-19

## 2025-03-19 RX ORDER — PROPOFOL 10 MG/ML
VIAL (ML) INTRAVENOUS
Status: DISCONTINUED | OUTPATIENT
Start: 2025-03-19 | End: 2025-03-19

## 2025-03-19 RX ADMIN — PROPOFOL 100 MG: 10 INJECTION, EMULSION INTRAVENOUS at 11:03

## 2025-03-19 RX ADMIN — PROPOFOL 50 MG: 10 INJECTION, EMULSION INTRAVENOUS at 11:03

## 2025-03-19 RX ADMIN — LIDOCAINE HYDROCHLORIDE 100 MG: 20 INJECTION, SOLUTION INTRAVENOUS at 11:03

## 2025-03-19 RX ADMIN — LABETALOL HYDROCHLORIDE 5 MG: 5 INJECTION, SOLUTION INTRAVENOUS at 11:03

## 2025-03-19 RX ADMIN — SODIUM CHLORIDE: 9 INJECTION, SOLUTION INTRAVENOUS at 10:03

## 2025-03-19 NOTE — H&P
CC: Epigastric pain, change in bowel habits    60 year old female with above. States that symptoms are stable, no alleviating/exacerbating factors.  No bleeding or weight loss.     ROS:  No headache, no fever/chills, no chest pain/SOB, no nausea/vomiting/diarrhea/constipation/GI bleeding/abdominal pain, no dysuria/hematuria.    VSSAF   Exam:   Alert and oriented x 3; no apparent distress   PERRLA, sclera anicteric  CV: Regular rate/rhythm, normal PMI   Lungs: Clear bilaterally with no wheeze/rales   Abdomen: Soft, NT/ND, normal bowel sounds   Ext: No cyanosis, clubbing     Impression:   As above    Plan:   Proceed with endoscopy. Further recs to follow.

## 2025-03-19 NOTE — PLAN OF CARE
Vss, balbir po fluids, denies pain, ambulates easily. IV removed, catheter intact. Discharge instructions provided and states understanding. States ready to go home.  Discharged from facility with family per wheelchair.

## 2025-03-19 NOTE — ANESTHESIA POSTPROCEDURE EVALUATION
Anesthesia Post Evaluation    Patient: Faustina Rae    Procedure(s) Performed: Procedure(s) (LRB):  EGD (ESOPHAGOGASTRODUODENOSCOPY) (N/A)  COLONOSCOPY (N/A)    Final Anesthesia Type: general      Patient location during evaluation: PACU  Patient participation: Yes- Able to Participate  Level of consciousness: awake and alert and oriented  Post-procedure vital signs: reviewed and stable  Pain management: adequate  Airway patency: patent    PONV status at discharge: No PONV  Anesthetic complications: no      Cardiovascular status: blood pressure returned to baseline and stable  Respiratory status: unassisted and spontaneous ventilation  Hydration status: euvolemic  Follow-up not needed.              Vitals Value Taken Time   /115 03/19/25 12:10   Temp 36.7 °C (98.1 °F) 03/19/25 12:10   Pulse 77 03/19/25 12:10   Resp 16 03/19/25 12:10   SpO2 99 % 03/19/25 12:10         Event Time   Out of Recovery 12:24:03         Pain/Divya Score: Divya Score: 10 (3/19/2025 12:00 PM)

## 2025-03-19 NOTE — [employer] PATIENT INSTRUCTIONS
Discharge Summary/Instructions after an Endoscopic Procedure  Patient Name: Faustina Rae  Patient MRN: [id number]  Patient YOB: 1965 Wednesday, March 19, 2025  Steven Arellano MD  Dear patient,  As a result of recent federal legislation (The Federal Cures Act), you may   receive lab or pathology results from your procedure in your [facility]   account before your physician is able to contact you. Your physician or   their representative will relay the results to you with their   recommendations at their soonest availability.  Thank you,  RESTRICTIONS:  During your procedure today, you received medications for sedation.  These   medications may affect your judgment, balance and coordination.  Therefore,   for 24 hours, you have the following restrictions:   - DO NOT drive a car, operate machinery, make legal/financial decisions,   sign important papers or drink alcohol.    ACTIVITY:  Today: no heavy lifting, straining or running due to procedural   sedation/anesthesia.  The following day: return to full activity including work.  DIET:  Eat and drink normally unless instructed otherwise.     TREATMENT FOR COMMON SIDE EFFECTS:  - Mild abdominal pain, nausea, belching, bloating or excessive gas:  rest,   eat lightly and use a heating pad.  - Sore Throat: treat with throat lozenges and/or gargle with warm salt   water.  - Because air was used during the procedure, expelling large amounts of air   from your rectum or belching is normal.  - If a bowel prep was taken, you may not have a bowel movement for 1-3 days.    This is normal.  SYMPTOMS TO WATCH FOR AND REPORT TO YOUR PHYSICIAN:  1. Abdominal pain or bloating, other than gas cramps.  2. Chest pain.  3. Back pain.  4. Signs of infection such as: chills or fever occurring within 24 hours   after the procedure.  5. Rectal bleeding, which would show as bright red, maroon, or black stools.   (A tablespoon of blood from the rectum is not serious, especially if    hemorrhoids are present.)  6. Vomiting.  7. Weakness or dizziness.  GO DIRECTLY TO THE NEAREST EMERGENCY ROOM IF YOU HAVE ANY OF THE FOLLOWING:      Difficulty breathing              Chills and/or fever over 101 F   Persistent vomiting and/or vomiting blood   Severe abdominal pain   Severe chest pain   Black, tarry stools   Bleeding- more than one tablespoon   Any other symptom or condition that you feel may need urgent attention  Your doctor recommends these additional instructions:  If any biopsies were taken, your doctors clinic will contact you in 1 to 2   weeks with any results.  - Patient has a contact number available for emergencies.  The signs and   symptoms of potential delayed complications were discussed with the   patient.  Return to normal activities tomorrow.  Written discharge   instructions were provided to the patient.   - Resume previous diet.   - Continue present medications.   - No aspirin, ibuprofen, naproxen, or other non-steroidal anti-inflammatory   drugs.   - Await pathology results.   - Discharge patient to home (ambulatory).   - Follow an antireflux regimen.   - Perform a colonoscopy today.   - Return to GI office after studies are complete.  For questions, problems or results please call your physician - Steven Arellano MD at Work:  [phone].  [facility], EMERGENCY ROOM PHONE NUMBER: [phone]  IF A COMPLICATION OR EMERGENCY SITUATION ARISES AND YOU ARE UNABLE TO REACH   YOUR PHYSICIAN - GO DIRECTLY TO THE EMERGENCY ROOM.  Steven Arellano MD  3/19/2025 11:24:59 AM  This report has been verified and signed electronically.  Dear patient,  As a result of recent federal legislation (The Federal Cures Act), you may   receive lab or pathology results from your procedure in your [facility]   account before your physician is able to contact you. Your physician or   their representative will relay the results to you with their   recommendations at their soonest  availability.  Thank you,  PROVATION

## 2025-03-19 NOTE — TRANSFER OF CARE
"Anesthesia Transfer of Care Note    Patient: Faustina Rae    Procedure(s) Performed: Procedure(s) (LRB):  EGD (ESOPHAGOGASTRODUODENOSCOPY) (N/A)  COLONOSCOPY (N/A)    Patient location: PACU    Anesthesia Type: general    Transport from OR: Transported from OR on 2-3 L/min O2 by NC with adequate spontaneous ventilation    Post pain: adequate analgesia    Post assessment: no apparent anesthetic complications and tolerated procedure well    Post vital signs: stable    Level of consciousness: awake, alert and oriented    Nausea/Vomiting: no nausea/vomiting    Complications: none    Transfer of care protocol was followed    Last vitals: Visit Vitals  BP (!) 183/101 (BP Location: Left arm, Patient Position: Lying)   Pulse 89   Temp 36.4 °C (97.5 °F) (Skin)   Resp 18   Ht 5' 2" (1.575 m)   Wt 90.7 kg (200 lb)   LMP  (LMP Unknown)   SpO2 96%   Breastfeeding No   BMI 36.58 kg/m²     "

## 2025-03-19 NOTE — PROVATION PATIENT INSTRUCTIONS
Discharge Summary/Instructions after an Endoscopic Procedure  Patient Name: Faustina Rae  Patient MRN: [id number]  Patient YOB: 1965 Wednesday, March 19, 2025  Steven Arellano MD  Dear patient,  As a result of recent federal legislation (The Federal Cures Act), you may   receive lab or pathology results from your procedure in your [facility]   account before your physician is able to contact you. Your physician or   their representative will relay the results to you with their   recommendations at their soonest availability.  Thank you,  RESTRICTIONS:  During your procedure today, you received medications for sedation.  These   medications may affect your judgment, balance and coordination.  Therefore,   for 24 hours, you have the following restrictions:   - DO NOT drive a car, operate machinery, make legal/financial decisions,   sign important papers or drink alcohol.    ACTIVITY:  Today: no heavy lifting, straining or running due to procedural   sedation/anesthesia.  The following day: return to full activity including work.  DIET:  Eat and drink normally unless instructed otherwise.     TREATMENT FOR COMMON SIDE EFFECTS:  - Mild abdominal pain, nausea, belching, bloating or excessive gas:  rest,   eat lightly and use a heating pad.  - Sore Throat: treat with throat lozenges and/or gargle with warm salt   water.  - Because air was used during the procedure, expelling large amounts of air   from your rectum or belching is normal.  - If a bowel prep was taken, you may not have a bowel movement for 1-3 days.    This is normal.  SYMPTOMS TO WATCH FOR AND REPORT TO YOUR PHYSICIAN:  1. Abdominal pain or bloating, other than gas cramps.  2. Chest pain.  3. Back pain.  4. Signs of infection such as: chills or fever occurring within 24 hours   after the procedure.  5. Rectal bleeding, which would show as bright red, maroon, or black stools.   (A tablespoon of blood from the rectum is not serious, especially if    hemorrhoids are present.)  6. Vomiting.  7. Weakness or dizziness.  GO DIRECTLY TO THE NEAREST EMERGENCY ROOM IF YOU HAVE ANY OF THE FOLLOWING:      Difficulty breathing              Chills and/or fever over 101 F   Persistent vomiting and/or vomiting blood   Severe abdominal pain   Severe chest pain   Black, tarry stools   Bleeding- more than one tablespoon   Any other symptom or condition that you feel may need urgent attention  Your doctor recommends these additional instructions:  If any biopsies were taken, your doctors clinic will contact you in 1 to 2   weeks with any results.  - Patient has a contact number available for emergencies.  The signs and   symptoms of potential delayed complications were discussed with the   patient.  Return to normal activities tomorrow.  Written discharge   instructions were provided to the patient.   - High fiber diet.   - Continue present medications.   - Await pathology results.   - Repeat colonoscopy in 3 years for surveillance.   - Discharge patient to home (ambulatory).   - Return to GI office after studies are complete.  For questions, problems or results please call your physician - Steven Arellano MD at Work:  [phone].  [facility] EMERGENCY ROOM PHONE NUMBER: [phone]  IF A COMPLICATION OR EMERGENCY SITUATION ARISES AND YOU ARE UNABLE TO REACH   YOUR PHYSICIAN - GO DIRECTLY TO THE EMERGENCY ROOM.  Steven Arellano MD  3/19/2025 11:49:44 AM  This report has been verified and signed electronically.  Dear patient,  As a result of recent federal legislation (The Federal Cures Act), you may   receive lab or pathology results from your procedure in your [facility]   account before your physician is able to contact you. Your physician or   their representative will relay the results to you with their   recommendations at their soonest availability.  Thank you,  PROVATION

## 2025-03-19 NOTE — ANESTHESIA PREPROCEDURE EVALUATION
03/19/2025  Faustina Rae is a 60 y.o., female.      Pre-op Assessment    I have reviewed the Patient Summary Reports.     I have reviewed the Nursing Notes. I have reviewed the NPO Status.   I have reviewed the Medications.     Review of Systems  Anesthesia Hx:  No problems with previous Anesthesia                Social:  Non-Smoker       Cardiovascular:     Hypertension, well controlled       CHF    hyperlipidemia                               Pulmonary:  Pneumonia COPD Asthma  Shortness of breath                  Renal/:  Chronic Renal Disease, CKD                Musculoskeletal:  Arthritis (RA)               Neurological:   CVA, residual symptoms Neuromuscular Disease,                                   Endocrine:  Endocrine Normal          Obesity / BMI > 30, Morbid Obesity / BMI > 40  Psych:  Psychiatric History anxiety               Physical Exam  General: Well nourished, Cooperative, Alert and Oriented    Airway:  Mallampati: II   Mouth Opening: Normal  TM Distance: Normal  Neck ROM: Normal ROM    Anesthesia Plan  Type of Anesthesia, risks & benefits discussed:    Anesthesia Type: Gen ETT, Gen Supraglottic Airway, Gen Natural Airway, MAC  Intra-op Monitoring Plan: Standard ASA Monitors  Post Op Pain Control Plan: multimodal analgesia  Induction:  IV  Airway Plan: Direct, Video and Fiberoptic, Post-Induction  Informed Consent: Informed consent signed with the Patient and all parties understand the risks and agree with anesthesia plan.  All questions answered.   ASA Score: 3    Ready For Surgery From Anesthesia Perspective.   .

## 2025-03-25 ENCOUNTER — RESULTS FOLLOW-UP (OUTPATIENT)
Dept: GASTROENTEROLOGY | Facility: CLINIC | Age: 60
End: 2025-03-25

## 2025-03-25 NOTE — PROGRESS NOTES
Please notify patient that biopsies reviewed and showed no bacteria.  Continue current meds and follow up as previously planned.    Please advise patient that polyp pathology was reviewed and is benign and is the adenomatous type which is precancerous/risk factor for cancer.  Repeat colonoscopy recommended in 3 years.  No evidence of colitis on biopsies.  Place reminder in system for repeat colonoscopy.

## 2025-03-31 ENCOUNTER — OFFICE VISIT (OUTPATIENT)
Dept: PULMONOLOGY | Facility: CLINIC | Age: 60
End: 2025-03-31
Payer: MEDICARE

## 2025-03-31 VITALS
DIASTOLIC BLOOD PRESSURE: 90 MMHG | SYSTOLIC BLOOD PRESSURE: 167 MMHG | BODY MASS INDEX: 41.17 KG/M2 | WEIGHT: 223.75 LBS | HEIGHT: 62 IN | HEART RATE: 99 BPM | OXYGEN SATURATION: 97 %

## 2025-03-31 DIAGNOSIS — J44.89 COPD WITH ASTHMA: ICD-10-CM

## 2025-03-31 DIAGNOSIS — J45.51 SEVERE PERSISTENT ASTHMA WITH ACUTE EXACERBATION: ICD-10-CM

## 2025-03-31 PROCEDURE — 99213 OFFICE O/P EST LOW 20 MIN: CPT | Mod: S$GLB,,, | Performed by: INTERNAL MEDICINE

## 2025-03-31 PROCEDURE — 1159F MED LIST DOCD IN RCRD: CPT | Mod: CPTII,S$GLB,, | Performed by: INTERNAL MEDICINE

## 2025-03-31 PROCEDURE — 3080F DIAST BP >= 90 MM HG: CPT | Mod: CPTII,S$GLB,, | Performed by: INTERNAL MEDICINE

## 2025-03-31 PROCEDURE — 99999 PR PBB SHADOW E&M-EST. PATIENT-LVL V: CPT | Mod: PBBFAC,,, | Performed by: INTERNAL MEDICINE

## 2025-03-31 PROCEDURE — 3077F SYST BP >= 140 MM HG: CPT | Mod: CPTII,S$GLB,, | Performed by: INTERNAL MEDICINE

## 2025-03-31 PROCEDURE — 3008F BODY MASS INDEX DOCD: CPT | Mod: CPTII,S$GLB,, | Performed by: INTERNAL MEDICINE

## 2025-03-31 RX ORDER — ALBUTEROL SULFATE 0.83 MG/ML
2.5 SOLUTION RESPIRATORY (INHALATION) EVERY 6 HOURS PRN
Qty: 120 EACH | Refills: 11 | Status: SHIPPED | OUTPATIENT
Start: 2025-03-31 | End: 2026-03-31

## 2025-03-31 RX ORDER — MONTELUKAST SODIUM 10 MG/1
10 TABLET ORAL NIGHTLY
Qty: 90 TABLET | Refills: 3 | Status: SHIPPED | OUTPATIENT
Start: 2025-03-31

## 2025-03-31 RX ORDER — ALBUTEROL SULFATE 90 UG/1
2 INHALANT RESPIRATORY (INHALATION) EVERY 4 HOURS PRN
Qty: 20.1 G | Refills: 3 | Status: SHIPPED | OUTPATIENT
Start: 2025-03-31

## 2025-03-31 RX ORDER — PROMETHAZINE HYDROCHLORIDE AND DEXTROMETHORPHAN HYDROBROMIDE 6.25; 15 MG/5ML; MG/5ML
5 SYRUP ORAL EVERY 6 HOURS PRN
COMMUNITY
Start: 2025-03-11

## 2025-03-31 NOTE — PROGRESS NOTES
3/31/2025    Faustina Rae  New Patient Consult    Chief Complaint   Patient presents with    Follow-up    COPD    Asthma       HPI:     3/31/2025 pt need prednisone twice since last visit.  Uses breo.  Uses albuterol bid as mucous excessive.  Sleeps poorly due to noc arousal for voiding.   No strokes but still limited by plantar fascitis.      2024 pt had cough and needed resp rx last pm only.  Watched 5 month old kid yesterday.  Had been doing well..      Pt uses breo daily , no prednisone since last visit..    Pt was on b12 shots -- stopped 4 months ago.   Pt did better on shots b12 wrt fatigue....    Patient Instructions   Asthma may be flaring  Use promethazine dm as needed for cough    Take azitromycin antibiotic now.    Prednisoen 20 mg daily for 3 days should clear asthma  Prednisone 60 mg dosing should improve gout     Podiatrist diagnosed plantar fascitis  causing  pain  and not gout..    If b12 low (was low in )-- shots may be needed if b12 low.        Would consider special asthma shots if you need prednisone again ---     2024-- asthma still active -- will have chest tightness and sob with crowd exposure.  Bolden walking fast.  Use albuterol bid and breo daily.  No prednisone used..    Pt doesn't recall prednisone use.    Had sleep eval in  past and no sleepa apnea..    Patient Instructions   You have severe persistent asthma    Check lung capacity    You should take prednisone to stabilize asthma -- 20 mg daily for 3 days should clear out asthma..    Use trelegy daily    Use albuterol as needed...    Cxr November was good  2023 pt had asthma as kid, outgrew and asthma returned.  Pt had stroke with left side paresis-- 3yrs -- good recovery. Pt worked .  Pt has loss  10 yrs ago -- he  massive mi.    Pt has household chores -- slow but able to do--- no limits.      Uses albuterol 2 times daily. No cough from albuterol.      Sleeps well -- no apnea in past-- had  bariatric sleeve in past.       Pt cough -- violent with no fx, nor syncope nor incontinence but will have severe abd pains.  No recent admits nor steroids.    Cough induced by cold air, grass mowing...  Patient Instructions   Breo daily is good controller, trelegy may be better (has breo and another medication)-- continue daily    Use albuterol as needed-- 2- 4 puffs up to every 4 hrs as needed.    Would recommend  singulair -- effective for asthma 8/10- not all patients benefit.  Stop if no help.      Action plan-- prednisone 1 daily for 3 days maybe enough-- try now, repeat if flares but use lowest dose able and get off quick.  Breo/Trelegy has prednisone to keep clear...      May consider asthma shots???  May need breathing test..      Ct abd 2022 viewed and good lower lungs.  Chest xray In past good.      Thyroid nodule right suspected-- defer to Dr Cuevas-- we discuss and offer ultradound...             PFSH:  Past Medical History:   Diagnosis Date    Anxiety     Arthritis     Asthma     CHF (congestive heart failure)     Chronic kidney disease, unspecified     COPD (chronic obstructive pulmonary disease)     Hyperlipemia     Hypertension     Leaky heart valve     Obese     Obese     Obstructive sleep apnea     lost her CPAP    Pneumonia     Rheumatoid arthritis(714.0)     Stroke          Past Surgical History:   Procedure Laterality Date    CAROTID STENT      3 years ago    CHOLECYSTECTOMY      COLONOSCOPY N/A 3/19/2025    Procedure: COLONOSCOPY;  Surgeon: Steven Gomez MD;  Location: [facility];  Service: Endoscopy;  Laterality: N/A;    ESOPHAGOGASTRODUODENOSCOPY N/A 3/19/2025    Procedure: EGD (ESOPHAGOGASTRODUODENOSCOPY);  Surgeon: Steven Gomez MD;  Location: [facility];  Service: Endoscopy;  Laterality: N/A;    HYSTERECTOMY      SLEEVE GASTROPLASTY  02/21/2017     Social History     Tobacco Use    Smoking status: Never     Passive exposure: Never    Smokeless tobacco: Never   Substance Use  "Topics    Alcohol use: Not Currently     Comment: rare, about once a month    Drug use: No     Family History   Problem Relation Name Age of Onset    Arthritis Mother      Cancer Mother      Diabetes Mother      Hyperlipidemia Mother      Hypertension Mother      Stroke Mother      Breast cancer Mother      Heart disease Father      Hypertension Father      Asthma Son      Hypertension Sister      Hypertension Sister      Kidney disease Neg Hx       Review of patient's allergies indicates:   Allergen Reactions    Nsaids (non-steroidal anti-inflammatory drug) Other (See Comments)          Review of Systems:  a review of eleven systems covering constitutional, Eye, HEENT, Psych, Respiratory, Cardiac, GI, , Musculoskeletal, Endocrine, Dermatologic was negative except for pertinent findings as listed ABOVE and below:  pertinent positives as above, rest good        Exam:Comprehensive exam done. BP (!) 167/90 (BP Location: Left forearm, Patient Position: Sitting) Comment: stressed out from driving in bad weather  Pulse 99   Ht 5' 2" (1.575 m)   Wt 101.5 kg (223 lb 12.3 oz)   LMP  (LMP Unknown) Comment: Does not menstruate   SpO2 97% Comment: on room air at rest  BMI 40.93 kg/m²   Exam included Vitals as listed, and patient's appearance and affect and alertness and mood, oral exam for yeast and hygiene and pharynx lesions and Mallapatti (M) score, neck with inspection for jvd and masses and thyroid abnormalities and lymph nodes (supraclavicular and infraclavicular nodes and axillary also examined and noted if abn), chest exam included symmetry and effort and fremitus and percussion and auscultation, cardiac exam included rhythm and gallops and murmur and rubs and jvd and edema, abdominal exam for mass and hepatosplenomegaly and tenderness and hernias and bowel sounds, Musculoskeletal exam with muscle tone and posture and mobility/gait and  strength, and skin for rashes and cyanosis and pallor and turgor, " extremity for clubbing.  Findings were normal except for pertinent findings listed below:  M4, chest is symmetric, no distress, normal percussion, normal fremitus and good normal breath sounds  No edema    Right thyroid nodule suspected.         Radiographs (ct chest and cxr) reviewed: view by direct vision      Labs reviewed           PFT will be done and results to be reviewed       Plan:  Clinical impression is apparently straight forward and impression with management as below.     Faustina was seen today for follow-up, copd and asthma.    Diagnoses and all orders for this visit:    Severe persistent asthma with acute exacerbation  -     albuterol (PROVENTIL) 2.5 mg /3 mL (0.083 %) nebulizer solution; Take 3 mLs (2.5 mg total) by nebulization every 6 (six) hours as needed.  -     Spirometry with/without bronchodilator; Future  -     albuterol (PROVENTIL/VENTOLIN HFA) 90 mcg/actuation inhaler; Inhale 2 puffs into the lungs every 4 (four) hours as needed for Wheezing. Rescue  -     montelukast (SINGULAIR) 10 mg tablet; Take 1 tablet (10 mg total) by mouth every evening.    COPD with asthma  -     albuterol (PROVENTIL/VENTOLIN HFA) 90 mcg/actuation inhaler; Inhale 2 puffs into the lungs every 4 (four) hours as needed for Wheezing. Rescue  -     montelukast (SINGULAIR) 10 mg tablet; Take 1 tablet (10 mg total) by mouth every evening.              Follow up in about 3 months (around 6/30/2025), or if symptoms worsen or fail to improve.    Discussed with patient above for education the following:      Patient Instructions     Despirte breo daily, you have had to use prednisone  twice last 6 months and use albuterol twice daily .   Short of breath is common.      Would check pulmonary functions-- may dose tezspire as asthma has been severe persistent.      Use prednisone if needed

## 2025-03-31 NOTE — PATIENT INSTRUCTIONS
Despirte breo daily, you have had to use prednisone  twice last 6 months and use albuterol twice daily .   Short of breath is common.      Would check pulmonary functions-- may dose tezspire as asthma has been severe persistent.      Use prednisone if needed

## 2025-04-04 ENCOUNTER — OFFICE VISIT (OUTPATIENT)
Dept: FAMILY MEDICINE | Facility: CLINIC | Age: 60
End: 2025-04-04
Payer: MEDICARE

## 2025-04-04 VITALS
OXYGEN SATURATION: 98 % | BODY MASS INDEX: 40.82 KG/M2 | SYSTOLIC BLOOD PRESSURE: 132 MMHG | DIASTOLIC BLOOD PRESSURE: 64 MMHG | HEART RATE: 99 BPM | TEMPERATURE: 98 F | HEIGHT: 62 IN | RESPIRATION RATE: 18 BRPM | WEIGHT: 221.81 LBS

## 2025-04-04 DIAGNOSIS — J45.50 SEVERE PERSISTENT ASTHMA, UNCOMPLICATED: ICD-10-CM

## 2025-04-04 DIAGNOSIS — E66.01 MORBID OBESITY WITH BMI OF 40.0-44.9, ADULT: ICD-10-CM

## 2025-04-04 DIAGNOSIS — E78.5 HYPERLIPIDEMIA, UNSPECIFIED HYPERLIPIDEMIA TYPE: Primary | ICD-10-CM

## 2025-04-04 DIAGNOSIS — N18.32 STAGE 3B CHRONIC KIDNEY DISEASE: ICD-10-CM

## 2025-04-04 DIAGNOSIS — J45.20 INTERMITTENT ASTHMA WITHOUT COMPLICATION, UNSPECIFIED ASTHMA SEVERITY: ICD-10-CM

## 2025-04-04 DIAGNOSIS — Z12.39 ENCOUNTER FOR SCREENING FOR MALIGNANT NEOPLASM OF BREAST, UNSPECIFIED SCREENING MODALITY: ICD-10-CM

## 2025-04-04 DIAGNOSIS — Z98.84 S/P LAPAROSCOPIC SLEEVE GASTRECTOMY: ICD-10-CM

## 2025-04-04 DIAGNOSIS — Z12.31 ENCOUNTER FOR SCREENING MAMMOGRAM FOR MALIGNANT NEOPLASM OF BREAST: ICD-10-CM

## 2025-04-04 DIAGNOSIS — E83.42 HYPOMAGNESEMIA: ICD-10-CM

## 2025-04-04 PROCEDURE — 3078F DIAST BP <80 MM HG: CPT | Mod: CPTII,S$GLB,, | Performed by: PHYSICIAN ASSISTANT

## 2025-04-04 PROCEDURE — 99214 OFFICE O/P EST MOD 30 MIN: CPT | Mod: S$GLB,,, | Performed by: PHYSICIAN ASSISTANT

## 2025-04-04 PROCEDURE — 3075F SYST BP GE 130 - 139MM HG: CPT | Mod: CPTII,S$GLB,, | Performed by: PHYSICIAN ASSISTANT

## 2025-04-04 PROCEDURE — G2211 COMPLEX E/M VISIT ADD ON: HCPCS | Mod: S$GLB,,, | Performed by: PHYSICIAN ASSISTANT

## 2025-04-04 PROCEDURE — 1160F RVW MEDS BY RX/DR IN RCRD: CPT | Mod: CPTII,S$GLB,, | Performed by: PHYSICIAN ASSISTANT

## 2025-04-04 PROCEDURE — 99999 PR PBB SHADOW E&M-EST. PATIENT-LVL V: CPT | Mod: PBBFAC,,, | Performed by: PHYSICIAN ASSISTANT

## 2025-04-04 PROCEDURE — 1159F MED LIST DOCD IN RCRD: CPT | Mod: CPTII,S$GLB,, | Performed by: PHYSICIAN ASSISTANT

## 2025-04-04 PROCEDURE — 3008F BODY MASS INDEX DOCD: CPT | Mod: CPTII,S$GLB,, | Performed by: PHYSICIAN ASSISTANT

## 2025-04-04 RX ORDER — SEMAGLUTIDE 0.25 MG/.5ML
0.25 INJECTION, SOLUTION SUBCUTANEOUS
Qty: 2 ML | Refills: 0 | Status: CANCELLED | OUTPATIENT
Start: 2025-04-04

## 2025-04-04 RX ORDER — SEMAGLUTIDE 0.5 MG/.5ML
0.5 INJECTION, SOLUTION SUBCUTANEOUS
Qty: 2 ML | Refills: 0 | Status: CANCELLED | OUTPATIENT
Start: 2025-05-02

## 2025-04-07 NOTE — PROGRESS NOTES
Subjective:       Patient ID: Faustina Rae is a 60 y.o. female.    Chief Complaint: Follow-up    Ms. Rae is a 60 year old female with HLD and CKD. She is s/p bariatric surgery but her surgeon is no longer practicing in the area. The patient was on GLP 1 agonist but it caused GI problems. She would like to resume the medication despite side effects. The patient is due for mammogram to be scheduled.       Review of patient's allergies indicates:   Allergen Reactions    Nsaids (non-steroidal anti-inflammatory drug) Other (See Comments)       Current Medications[1]    Lab Results   Component Value Date    WBC 7.12 01/29/2025    HGB 12.4 01/29/2025    HCT 38.5 01/29/2025     01/29/2025    CHOL 216 (H) 04/30/2024    TRIG 186 (H) 04/30/2024    HDL 43 04/30/2024    ALT 13 03/03/2025    AST 19 03/03/2025     03/03/2025    K 4.6 03/03/2025     (H) 03/03/2025    CREATININE 2.2 (H) 03/03/2025    BUN 30 (H) 03/03/2025    CO2 18 (L) 03/03/2025    TSH 2.110 07/12/2023    INR 0.9 07/13/2023    HGBA1C 5.3 08/26/2024       Review of Systems   Constitutional:  Negative for activity change, appetite change and fever.   HENT:  Negative for postnasal drip, rhinorrhea and sinus pressure.    Eyes:  Negative for visual disturbance.   Respiratory:  Negative for cough and shortness of breath.    Cardiovascular:  Negative for chest pain.   Gastrointestinal:  Negative for abdominal distention and abdominal pain.   Genitourinary:  Negative for difficulty urinating and dysuria.   Musculoskeletal:  Negative for arthralgias and myalgias.   Neurological:  Negative for headaches.   Hematological:  Negative for adenopathy.   Psychiatric/Behavioral:  The patient is not nervous/anxious.        Objective:      Physical Exam  Constitutional:       Appearance: Normal appearance.   HENT:      Head: Normocephalic and atraumatic.   Eyes:      Conjunctiva/sclera: Conjunctivae normal.   Cardiovascular:      Rate and Rhythm: Normal rate  and regular rhythm.   Pulmonary:      Effort: Pulmonary effort is normal. No respiratory distress.      Breath sounds: Normal breath sounds. No wheezing.   Abdominal:      General: There is no distension.      Palpations: There is no mass.      Tenderness: There is no abdominal tenderness.   Musculoskeletal:      Right lower leg: No edema.      Left lower leg: No edema.   Lymphadenopathy:      Cervical: No cervical adenopathy.   Skin:     Findings: No erythema.   Neurological:      Mental Status: She is alert and oriented to person, place, and time.   Psychiatric:         Behavior: Behavior normal.         Assessment:       1. Hyperlipidemia, unspecified hyperlipidemia type    2. S/P laparoscopic sleeve gastrectomy    3. Morbid obesity with BMI of 40.0-44.9, adult    4. Intermittent asthma without complication, unspecified asthma severity    5. Stage 3b chronic kidney disease    6. Severe persistent asthma, uncomplicated    7. Encounter for screening for malignant neoplasm of breast, unspecified screening modality    8. Encounter for screening mammogram for malignant neoplasm of breast    9. Hypomagnesemia        Plan:       Faustina was seen today for follow-up.    Diagnoses and all orders for this visit:    Hyperlipidemia, unspecified hyperlipidemia type  High fiber diet  Continue current medication  S/P laparoscopic sleeve gastrectomy  -     Ambulatory referral/consult to Bariatric Surgery; Future    Morbid obesity with BMI of 40.0-44.9, adult  -     Ambulatory referral/consult to Bariatric Surgery; Future    Intermittent asthma without complication, unspecified asthma severity  stable  Stage 3b chronic kidney disease  -     Magnesium; Future  -     Basic Metabolic Panel; Future    Severe persistent asthma, uncomplicated  Stable  Continue current medication  Encounter for screening for malignant neoplasm of breast, unspecified screening modality  -     Mammo Digital Screening Bilat w/ Ramin (XPD); Future    Encounter  for screening mammogram for malignant neoplasm of breast  -     Mammo Digital Screening Bilat w/ Ramin (XPD); Future    Hypomagnesemia  -     Magnesium; Future  -     Basic Metabolic Panel; Future    Other orders  The following orders have not been finalized:  -     Cancel: semaglutide, weight loss, (WEGOVY) 0.25 mg/0.5 mL PnIj  -     Cancel: semaglutide, weight loss, (WEGOVY) 0.5 mg/0.5 mL PnIj               [1]   Current Outpatient Medications:     albuterol (PROVENTIL) 2.5 mg /3 mL (0.083 %) nebulizer solution, Take 3 mLs (2.5 mg total) by nebulization every 6 (six) hours as needed., Disp: 120 each, Rfl: 11    albuterol (PROVENTIL/VENTOLIN HFA) 90 mcg/actuation inhaler, Inhale 2 puffs into the lungs every 4 (four) hours as needed for Wheezing. Rescue, Disp: 20.1 g, Rfl: 3    amLODIPine (NORVASC) 10 MG tablet, TAKE ONE TABLET BY MOUTH DAILY AT 9 AM, Disp: 90 tablet, Rfl: 1    atorvastatin (LIPITOR) 80 MG tablet, TAKE ONE TABLET BY MOUTH DAILY AT 5 PM every evening., Disp: 90 tablet, Rfl: 2    b complex vitamins tablet, Take 1 tablet by mouth once daily., Disp: , Rfl:     BREO ELLIPTA 200-25 mcg/dose DsDv diskus inhaler, Inhale 1 puff into the lungs once daily., Disp: 180 each, Rfl: 3    calcium citrate-vitamin D3 315-200 mg (CITRACAL+D) 315-200 mg-unit per tablet, Take 1 tablet by mouth once daily. , Disp: , Rfl:     colchicine (COLCRYS) 0.6 mg tablet, Take 1 tablet (0.6 mg total) by mouth every 72 hours. Take at onset of gout and take for 10 days, Disp: 10 tablet, Rfl: 3    diazePAM (VALIUM) 5 MG tablet, Take 1 tablet (5 mg total) by mouth daily as needed for Anxiety., Disp: 2 tablet, Rfl: 0    EScitalopram oxalate (LEXAPRO) 10 MG tablet, TAKE ONE TABLET BY MOUTH DAILY AT 9 AM, Disp: 90 tablet, Rfl: 2    ferrous sulfate 325 (65 FE) MG EC tablet, Take 1 tablet (325 mg total) by mouth once daily., Disp: 90 tablet, Rfl: 3    fluticasone propionate (FLONASE) 50 mcg/actuation nasal spray, 2 sprays (100 mcg total) by  Each Nostril route once daily., Disp: 16 g, Rfl: 11    gabapentin (NEURONTIN) 300 MG capsule, Take 1 capsule (300 mg total) by mouth 3 (three) times daily., Disp: 90 capsule, Rfl: 11    hydrALAZINE (APRESOLINE) 50 MG tablet, TAKE ONE TABLET BY MOUTH EVERY TWELVE HOURS @ 9AM & 9PM, Disp: 270 tablet, Rfl: 1    isosorbide dinitrate (ISORDIL) 5 MG Tab, Take 1 tablet (5 mg total) by mouth 3 (three) times daily., Disp: 270 tablet, Rfl: 2    magnesium oxide (MAG-OX) 400 mg (241.3 mg magnesium) tablet, Take 1 tablet (400 mg total) by mouth once daily., Disp: 90 tablet, Rfl: 3    meclizine (ANTIVERT) 12.5 mg tablet, TAKE 1 TABLET(12.5 MG) BY MOUTH THREE TIMES DAILY AS NEEDED FOR DIZZINESS, Disp: 30 tablet, Rfl: 0    montelukast (SINGULAIR) 10 mg tablet, Take 1 tablet (10 mg total) by mouth every evening., Disp: 90 tablet, Rfl: 3    multivitamin (THERAGRAN) per tablet, Take 1 tablet by mouth once daily., Disp: 90 tablet, Rfl: 3    nystatin (MYCOSTATIN ORAL), Take 1 tablet by mouth Daily., Disp: , Rfl:     pantoprazole (PROTONIX) 40 MG tablet, Take 1 tablet (40 mg total) by mouth 2 (two) times daily., Disp: 60 tablet, Rfl: 1    predniSONE (DELTASONE) 20 MG tablet, 3 for 3 days then 2 for 3 days then one for 3 days and repeat for breathing problems, Disp: 36 tablet, Rfl: 1    promethazine-dextromethorphan (PROMETHAZINE-DM) 6.25-15 mg/5 mL Syrp, Take 5 mLs by mouth every 6 (six) hours as needed., Disp: , Rfl:     sodium bicarbonate 650 MG tablet, Take 1 tablet (650 mg total) by mouth 2 (two) times daily., Disp: 60 tablet, Rfl: 11    spironolactone (ALDACTONE) 25 MG tablet, TAKE ONE TABLET BY MOUTH DAILY, Disp: 90 tablet, Rfl: 2    sucralfate (CARAFATE) 1 gram tablet, Take 1 tablet (1 g total) by mouth 4 (four) times daily before meals and nightly., Disp: 40 tablet, Rfl: 0    tetracycline (ACHROMYCIN,SUMYCIN) 500 MG capsule, Take 1 capsule (500 mg total) by mouth 4 (four) times daily., Disp: 56 capsule, Rfl: 0    traMADoL  (ULTRAM) 50 mg tablet, Take 1 tablet (50 mg total) by mouth every 6 (six) hours as needed for Pain., Disp: 20 each, Rfl: 0    traZODone (DESYREL) 50 MG tablet, Take 50 mg by mouth nightly as needed., Disp: , Rfl:     valsartan (DIOVAN) 160 MG tablet, Take 1 tablet (160 mg total) by mouth once daily., Disp: 90 tablet, Rfl: 3    VELTASSA 8.4 gram PwPk, Take by mouth., Disp: , Rfl:     aspirin (ECOTRIN) 81 MG EC tablet, Take 1 tablet (81 mg total) by mouth once daily., Disp: 90 tablet, Rfl: 3    chlorthalidone (HYGROTEN) 25 MG Tab, Take 1 tablet (25 mg total) by mouth once daily., Disp: 90 tablet, Rfl: 3    clopidogreL (PLAVIX) 75 mg tablet, Take 1 tablet (75 mg total) by mouth once daily. (Patient not taking: Reported on 2/19/2025), Disp: 30 tablet, Rfl: 1    Current Facility-Administered Medications:     cyanocobalamin injection 1,000 mcg, 1,000 mcg, Intramuscular, Q30 Days, , 1,000 mcg at 01/06/25 1459

## 2025-04-09 DIAGNOSIS — I10 ACCELERATED HYPERTENSION: ICD-10-CM

## 2025-04-09 RX ORDER — AMLODIPINE BESYLATE 10 MG/1
TABLET ORAL
Qty: 90 TABLET | Refills: 3 | Status: SHIPPED | OUTPATIENT
Start: 2025-04-09

## 2025-04-09 NOTE — TELEPHONE ENCOUNTER
No care due was identified.  [facility] Embedded Care Due Messages. Reference number: [id number].   4/09/2025 12:35:29 PM CDT

## 2025-04-09 NOTE — TELEPHONE ENCOUNTER
Refill Decision Note   Faustina Rae  is requesting a refill authorization.  Brief Assessment and Rationale for Refill:  Approve     Medication Therapy Plan:        Comments:     Note composed:12:50 PM 04/09/2025

## 2025-04-16 ENCOUNTER — PATIENT MESSAGE (OUTPATIENT)
Dept: BARIATRICS | Facility: CLINIC | Age: 60
End: 2025-04-16
Payer: MEDICARE

## 2025-04-16 ENCOUNTER — TELEPHONE (OUTPATIENT)
Dept: BARIATRICS | Facility: CLINIC | Age: 60
End: 2025-04-16
Payer: MEDICARE

## 2025-04-16 NOTE — TELEPHONE ENCOUNTER
Message sent via patient portal    ----- Message from Priscila sent at 4/16/2025 10:10 AM CDT -----  Regarding: appointment  Contact: patient  Type:  Sooner Appointment RequestCaller is requesting a sooner appointment.  Caller declined first available appointment listed below.  Caller will not accept being placed on the waitlist and is requesting a message be sent to doctor.Name of Caller:  patientWhen is the first available appointment?  Symptoms:  Would the patient rather a call back or a response via [facility]? [facility] Call Back Number:  [phone] (home) Additional Information:  Please call patient to advise.  Thanks!

## 2025-04-21 ENCOUNTER — LAB VISIT (OUTPATIENT)
Dept: LAB | Facility: HOSPITAL | Age: 60
End: 2025-04-21
Attending: PHYSICIAN ASSISTANT
Payer: MEDICARE

## 2025-04-21 DIAGNOSIS — N18.32 STAGE 3B CHRONIC KIDNEY DISEASE: ICD-10-CM

## 2025-04-21 DIAGNOSIS — E83.42 HYPOMAGNESEMIA: ICD-10-CM

## 2025-04-21 LAB
ANION GAP (OHS): 10 MMOL/L (ref 8–16)
BUN SERPL-MCNC: 38 MG/DL (ref 8–23)
CALCIUM SERPL-MCNC: 9.1 MG/DL (ref 8.7–10.5)
CHLORIDE SERPL-SCNC: 112 MMOL/L (ref 95–110)
CO2 SERPL-SCNC: 22 MMOL/L (ref 23–29)
CREAT SERPL-MCNC: 2.1 MG/DL (ref 0.5–1.4)
GFR SERPLBLD CREATININE-BSD FMLA CKD-EPI: 26 ML/MIN/1.73/M2
GLUCOSE SERPL-MCNC: 92 MG/DL (ref 70–110)
MAGNESIUM SERPL-MCNC: 1.7 MG/DL (ref 1.6–2.6)
POTASSIUM SERPL-SCNC: 4 MMOL/L (ref 3.5–5.1)
SODIUM SERPL-SCNC: 144 MMOL/L (ref 136–145)

## 2025-04-21 PROCEDURE — 83735 ASSAY OF MAGNESIUM: CPT

## 2025-04-21 PROCEDURE — 80048 BASIC METABOLIC PNL TOTAL CA: CPT

## 2025-04-21 PROCEDURE — 36415 COLL VENOUS BLD VENIPUNCTURE: CPT | Mod: PO

## 2025-04-22 ENCOUNTER — TELEPHONE (OUTPATIENT)
Dept: PULMONOLOGY | Facility: CLINIC | Age: 60
End: 2025-04-22
Payer: MEDICARE

## 2025-04-22 ENCOUNTER — RESULTS FOLLOW-UP (OUTPATIENT)
Dept: FAMILY MEDICINE | Facility: CLINIC | Age: 60
End: 2025-04-22

## 2025-04-22 ENCOUNTER — TELEPHONE (OUTPATIENT)
Dept: BARIATRICS | Facility: CLINIC | Age: 60
End: 2025-04-22
Payer: MEDICARE

## 2025-04-22 NOTE — TELEPHONE ENCOUNTER
Returned call to pt to inform her that she needs to follow-up with Dr. Joel at [facility] since he performed her bariatric surgery in 2017. Pt did not answer, mailbox full so I could not leave a message.     ----- Message from Monique sent at 4/22/2025 12:33 PM CDT -----  Contact: Patient  Type:  Appointment RequestCaller is requesting a sooner appointment.  Caller declined first available appointment listed below.  Caller will not accept being placed on the waitlist and is requesting a message be sent to doctor.Name of Caller:  PatientWhen is the first available appointment?  N/AWould the patient rather a call back or a response via [facility]?   Call [facility] Call Back Number:  [phone] Information:   States she was told to call [facility] to schedule an appointment - please call - thank you

## 2025-04-23 ENCOUNTER — TELEPHONE (OUTPATIENT)
Dept: FAMILY MEDICINE | Facility: CLINIC | Age: 60
End: 2025-04-23
Payer: MEDICARE

## 2025-04-23 NOTE — TELEPHONE ENCOUNTER
----- Message from Josselyn sent at 4/23/2025  4:25 PM CDT -----  Type:  Sooner Appointment RequestCaller is requesting a sooner appointment.  Caller declined first available appointment listed below.  Caller will not accept being placed on the waitlist and is requesting a message be sent to doctor.Name of Caller:  pt When is the first available appointment?  4/28 Symptoms:  uti Would the patient rather a call back or a response via [facility]? [facility] Call Back Number:  [phone] (home) Additional Information:  please advise

## 2025-04-23 NOTE — TELEPHONE ENCOUNTER
----- Message from Josselyn sent at 4/23/2025  4:25 PM CDT -----  Type:  Sooner Appointment RequestCaller is requesting a sooner appointment.  Caller declined first available appointment listed below.  Caller will not accept being placed on the waitlist and is requesting a message be sent to doctor.Name of Caller:  pt When is the first available appointment?  4/28 Symptoms:  uti Would the patient rather a call back or a response via [facility]? [facility] Call Back Number:  [phone] (home) Additional Information:  please advise

## 2025-04-24 ENCOUNTER — OFFICE VISIT (OUTPATIENT)
Dept: FAMILY MEDICINE | Facility: CLINIC | Age: 60
End: 2025-04-24
Payer: MEDICARE

## 2025-04-24 ENCOUNTER — LAB VISIT (OUTPATIENT)
Dept: LAB | Facility: HOSPITAL | Age: 60
End: 2025-04-24
Attending: PHYSICIAN ASSISTANT
Payer: MEDICARE

## 2025-04-24 VITALS
SYSTOLIC BLOOD PRESSURE: 130 MMHG | WEIGHT: 227.31 LBS | HEIGHT: 62 IN | RESPIRATION RATE: 18 BRPM | HEART RATE: 85 BPM | DIASTOLIC BLOOD PRESSURE: 70 MMHG | BODY MASS INDEX: 41.83 KG/M2 | OXYGEN SATURATION: 96 %

## 2025-04-24 DIAGNOSIS — R39.15 URINARY URGENCY: Primary | ICD-10-CM

## 2025-04-24 DIAGNOSIS — N18.4 STAGE 4 CHRONIC KIDNEY DISEASE: ICD-10-CM

## 2025-04-24 DIAGNOSIS — R35.0 URINARY FREQUENCY: ICD-10-CM

## 2025-04-24 DIAGNOSIS — R39.15 URINARY URGENCY: ICD-10-CM

## 2025-04-24 LAB
BILIRUB UR QL STRIP.AUTO: NEGATIVE
CLARITY UR: CLEAR
COLOR UR AUTO: YELLOW
GLUCOSE UR QL STRIP: NEGATIVE
HGB UR QL STRIP: NEGATIVE
HOLD SPECIMEN: NORMAL
HOLD SPECIMEN: NORMAL
KETONES UR QL STRIP: NEGATIVE
LEUKOCYTE ESTERASE UR QL STRIP: NEGATIVE
NITRITE UR QL STRIP: NEGATIVE
PH UR STRIP: 6 [PH]
PROT UR QL STRIP: NEGATIVE
SP GR UR STRIP: 1.02
UROBILINOGEN UR STRIP-ACNC: NEGATIVE EU/DL

## 2025-04-24 PROCEDURE — 99999 PR PBB SHADOW E&M-EST. PATIENT-LVL V: CPT | Mod: PBBFAC,,, | Performed by: PHYSICIAN ASSISTANT

## 2025-04-24 PROCEDURE — 1160F RVW MEDS BY RX/DR IN RCRD: CPT | Mod: CPTII,S$GLB,, | Performed by: PHYSICIAN ASSISTANT

## 2025-04-24 PROCEDURE — 1159F MED LIST DOCD IN RCRD: CPT | Mod: CPTII,S$GLB,, | Performed by: PHYSICIAN ASSISTANT

## 2025-04-24 PROCEDURE — 81003 URINALYSIS AUTO W/O SCOPE: CPT

## 2025-04-24 PROCEDURE — 3008F BODY MASS INDEX DOCD: CPT | Mod: CPTII,S$GLB,, | Performed by: PHYSICIAN ASSISTANT

## 2025-04-24 PROCEDURE — 3075F SYST BP GE 130 - 139MM HG: CPT | Mod: CPTII,S$GLB,, | Performed by: PHYSICIAN ASSISTANT

## 2025-04-24 PROCEDURE — 99214 OFFICE O/P EST MOD 30 MIN: CPT | Mod: S$GLB,,, | Performed by: PHYSICIAN ASSISTANT

## 2025-04-24 PROCEDURE — 3078F DIAST BP <80 MM HG: CPT | Mod: CPTII,S$GLB,, | Performed by: PHYSICIAN ASSISTANT

## 2025-04-24 NOTE — PROGRESS NOTES
OFFICE VISIT   4/24/2025    Patient ID: Faustina Rae is a 60 y.o. female    SUBJECTIVE:  Urinary Tract Infection      HPI/ROS:  History of Present Illness    CHIEF COMPLAINT:  Patient presents today for frequent urination and urinary urgency.    URINARY SYMPTOMS:  She reports frequent urination and urinary urgency for approximately one week. She experienced urinary incontinence at [employer] and reports occasional stress incontinence with coughing or sneezing. She denies burning with urination, blood in urine, back pain, pelvic pain, and abdominal pain.    MEDICAL HISTORY:  She has a history of stroke with residual limping and kidney issues requiring medication.    DIET AND FLUID INTAKE:  She consumes large quantities of tea throughout the day, along with lemonade from fast food establishments and orange juice in the mornings.    MEDICATIONS:  She discontinued sodium bicarbonate one month ago in March.         Review of Systems   Constitutional:  Negative for chills and fever.   Respiratory:  Negative for shortness of breath.    Gastrointestinal:  Negative for nausea and vomiting.   Genitourinary:  Positive for frequency and urgency. Negative for decreased urine volume, difficulty urinating, dysuria, flank pain, hematuria, pelvic pain and vaginal discharge.   Musculoskeletal:  Negative for myalgias.   Neurological:  Negative for weakness.       Past Medical History:   Diagnosis Date    Anxiety     Arthritis     Asthma     CHF (congestive heart failure)     Chronic kidney disease, unspecified     COPD (chronic obstructive pulmonary disease)     Hyperlipemia     Hypertension     Leaky heart valve     Obese     Obese     Obstructive sleep apnea     lost her CPAP    Pneumonia     Rheumatoid arthritis(714.0)     Stroke        Social History[1]    Past Surgical History:   Procedure Laterality Date    CAROTID STENT      3 years ago    CHOLECYSTECTOMY      COLONOSCOPY N/A 3/19/2025    Procedure: COLONOSCOPY;  Surgeon:  "Steven Gomez MD;  Location: [facility];  Service: Endoscopy;  Laterality: N/A;    ESOPHAGOGASTRODUODENOSCOPY N/A 3/19/2025    Procedure: EGD (ESOPHAGOGASTRODUODENOSCOPY);  Surgeon: Steven Gomez MD;  Location: [facility];  Service: Endoscopy;  Laterality: N/A;    HYSTERECTOMY      SLEEVE GASTROPLASTY  02/21/2017       OBJECTIVE:    /70 (BP Location: Right arm, Patient Position: Sitting)   Pulse 85   Resp 18   Ht 5' 2" (1.575 m)   Wt 103.1 kg (227 lb 4.7 oz)   LMP  (LMP Unknown) Comment: Does not menstruate   SpO2 96%   BMI 41.57 kg/m²     Current Medications[2]    Physical Exam  Constitutional:       General: She is not in acute distress.     Appearance: Normal appearance. She is obese. She is not ill-appearing.   HENT:      Head: Normocephalic and atraumatic.      Right Ear: External ear normal.      Left Ear: External ear normal.      Nose: Nose normal.      Mouth/Throat:      Mouth: Mucous membranes are moist.      Pharynx: Oropharynx is clear.   Eyes:      Extraocular Movements: Extraocular movements intact.      Conjunctiva/sclera: Conjunctivae normal.   Cardiovascular:      Rate and Rhythm: Normal rate and regular rhythm.      Pulses: Normal pulses.      Heart sounds: Normal heart sounds.   Pulmonary:      Effort: Pulmonary effort is normal. No respiratory distress.      Breath sounds: Normal breath sounds.   Abdominal:      General: Abdomen is flat.      Palpations: Abdomen is soft.      Tenderness: There is no abdominal tenderness.   Musculoskeletal:         General: No swelling. Normal range of motion.      Cervical back: Normal range of motion and neck supple.      Right lower leg: No edema.      Left lower leg: No edema.   Skin:     General: Skin is warm and dry.   Neurological:      General: No focal deficit present.      Mental Status: She is alert and oriented to person, place, and time. Mental status is at baseline.   Psychiatric:         Mood and Affect: Mood normal.         " Behavior: Behavior normal.         Thought Content: Thought content normal.         Judgment: Judgment normal.         Assessment/Plan:  Assessment & Plan    N39.41 Urge incontinence  N39.3 Stress incontinence (female) (male)  R39.15 Urgency of urination  I69.359 Hemiplegia and hemiparesis following cerebral infarction affecting unspecified side  N28.9 Disorder of kidney and ureter, unspecified    IMPRESSION:  - Considered UTI as potential cause of urinary symptoms, though atypical presentation without burning or other classic UTI symptoms.  - High consumption of caffeinated and sugary beverages may be contributing to symptoms.  - Diuretics (spironolactone and chlorthalidone) could be contributing to increased urination however patient is unsure if she is taking these medications.  - Patient had stopped taking sodium bicarbonate, which was prescribed for renal function support and is replacing something the kidneys can no longer produce. Recommend restarting based on recent metabolic panel results showing slight deficiency.    URGE INCONTINENCE AND URGENCY OF URINATION:  - Patient reports urinary urgency and incontinence for about a week, with large volume of urine, including an incident at [employer] due to inability to reach the bathroom in time.  - Patient also reports waking up at night to urinate.  - Denied burning sensation, hematuria, or other UTI symptoms.  - Considered possibility of urinary urge incontinence and plan to rule out UTI.  - Recommend limiting caffeine intake, including tea and lemonade.  - Advised the patient to urinate on a schedule every 2-3 hours during the day    STRESS INCONTINENCE:  - Patient reports occasional urine leakage when coughing or sneezing.    HEMIPLEGIA AND HEMIPARESIS FOLLOWING STROKE:  - Patient mentions walking with a limp due to a previous stroke.    KIDNEY DISORDER:  - Patient mentions being prescribed medication for kidneys.  - Noted the patient's potassium level is  good.  - Reviewed patient's medication history and noted sodium bicarbonate prescription.  - Recommend resuming sodium bicarbonate medication.  - Advised the patient to contact nephrologist for follow up.    FOLLOW-UP AND REFERRAL:  - Consider referral to urogynecologist if urinalysis is negative for UTI.  - Ordered urinalysis to evaluate for UTI before initiating treatment.  - Contact the office if symptoms worsen or if there are any other concerns.          Problem List Items Addressed This Visit    None  Visit Diagnoses         Urinary urgency    -  Primary    Relevant Orders    Urinalysis, Reflex to Urine Culture      Urinary frequency        Relevant Orders    Urinalysis, Reflex to Urine Culture      Stage 4 chronic kidney disease                Patient verbalizes understanding of instructions and healthcare topics reviewed. All of patient's questions were answered.  Patient encouraged to contact office if other questions arise.    Health Maintenance Due   Topic Date Due    TETANUS VACCINE  Never done    Shingles Vaccine (1 of 2) Never done    RSV Vaccine (Age 60+ and Pregnant patients) (1 - Risk 60-74 years 1-dose series) Never done    COVID-19 Vaccine (3 - 2024-25 season) 09/01/2024    Mammogram  01/19/2025       Follow up if symptoms worsen or fail to improve.    This note was generated with the assistance of ambient listening technology. Verbal consent was obtained by the patient and accompanying visitor(s) for the recording of patient appointment to facilitate this note. I attest to having reviewed and edited the generated note for accuracy, though some syntax or spelling errors may persist. Please contact the author of this note for any clarification.       Carmella Gardner PA-C  Family Medicine Physician Assistant          [1]   Social History  Tobacco Use    Smoking status: Never     Passive exposure: Never    Smokeless tobacco: Never   Substance Use Topics    Alcohol use: Not Currently      Comment: rare, about once a month    Drug use: No   [2]   Current Outpatient Medications:     albuterol (PROVENTIL) 2.5 mg /3 mL (0.083 %) nebulizer solution, Take 3 mLs (2.5 mg total) by nebulization every 6 (six) hours as needed., Disp: 120 each, Rfl: 11    albuterol (PROVENTIL/VENTOLIN HFA) 90 mcg/actuation inhaler, Inhale 2 puffs into the lungs every 4 (four) hours as needed for Wheezing. Rescue, Disp: 20.1 g, Rfl: 3    amLODIPine (NORVASC) 10 MG tablet, TAKE ONE TABLET BY MOUTH DAILY AT 9 AM, Disp: 90 tablet, Rfl: 3    aspirin (ECOTRIN) 81 MG EC tablet, Take 1 tablet (81 mg total) by mouth once daily., Disp: 90 tablet, Rfl: 3    atorvastatin (LIPITOR) 80 MG tablet, TAKE ONE TABLET BY MOUTH DAILY AT 5 PM every evening., Disp: 90 tablet, Rfl: 2    b complex vitamins tablet, Take 1 tablet by mouth once daily., Disp: , Rfl:     BREO ELLIPTA 200-25 mcg/dose DsDv diskus inhaler, Inhale 1 puff into the lungs once daily., Disp: 180 each, Rfl: 3    calcium citrate-vitamin D3 315-200 mg (CITRACAL+D) 315-200 mg-unit per tablet, Take 1 tablet by mouth once daily. , Disp: , Rfl:     chlorthalidone (HYGROTEN) 25 MG Tab, Take 1 tablet (25 mg total) by mouth once daily., Disp: 90 tablet, Rfl: 3    colchicine (COLCRYS) 0.6 mg tablet, Take 1 tablet (0.6 mg total) by mouth every 72 hours. Take at onset of gout and take for 10 days, Disp: 10 tablet, Rfl: 3    EScitalopram oxalate (LEXAPRO) 10 MG tablet, TAKE ONE TABLET BY MOUTH DAILY AT 9 AM, Disp: 90 tablet, Rfl: 2    ferrous sulfate 325 (65 FE) MG EC tablet, Take 1 tablet (325 mg total) by mouth once daily., Disp: 90 tablet, Rfl: 3    fluticasone propionate (FLONASE) 50 mcg/actuation nasal spray, 2 sprays (100 mcg total) by Each Nostril route once daily., Disp: 16 g, Rfl: 11    gabapentin (NEURONTIN) 300 MG capsule, Take 1 capsule (300 mg total) by mouth 3 (three) times daily., Disp: 90 capsule, Rfl: 11    hydrALAZINE (APRESOLINE) 50 MG tablet, TAKE ONE TABLET BY MOUTH EVERY  TWELVE HOURS @ 9AM & 9PM, Disp: 270 tablet, Rfl: 1    isosorbide dinitrate (ISORDIL) 5 MG Tab, Take 1 tablet (5 mg total) by mouth 3 (three) times daily., Disp: 270 tablet, Rfl: 2    magnesium oxide (MAG-OX) 400 mg (241.3 mg magnesium) tablet, Take 1 tablet (400 mg total) by mouth once daily., Disp: 90 tablet, Rfl: 3    meclizine (ANTIVERT) 12.5 mg tablet, TAKE 1 TABLET(12.5 MG) BY MOUTH THREE TIMES DAILY AS NEEDED FOR DIZZINESS, Disp: 30 tablet, Rfl: 0    montelukast (SINGULAIR) 10 mg tablet, Take 1 tablet (10 mg total) by mouth every evening., Disp: 90 tablet, Rfl: 3    multivitamin (THERAGRAN) per tablet, Take 1 tablet by mouth once daily., Disp: 90 tablet, Rfl: 3    nystatin (MYCOSTATIN ORAL), Take 1 tablet by mouth Daily., Disp: , Rfl:     pantoprazole (PROTONIX) 40 MG tablet, Take 1 tablet (40 mg total) by mouth 2 (two) times daily., Disp: 60 tablet, Rfl: 1    promethazine-dextromethorphan (PROMETHAZINE-DM) 6.25-15 mg/5 mL Syrp, Take 5 mLs by mouth every 6 (six) hours as needed., Disp: , Rfl:     spironolactone (ALDACTONE) 25 MG tablet, TAKE ONE TABLET BY MOUTH DAILY, Disp: 90 tablet, Rfl: 2    sucralfate (CARAFATE) 1 gram tablet, Take 1 tablet (1 g total) by mouth 4 (four) times daily before meals and nightly., Disp: 40 tablet, Rfl: 0    traMADoL (ULTRAM) 50 mg tablet, Take 1 tablet (50 mg total) by mouth every 6 (six) hours as needed for Pain., Disp: 20 each, Rfl: 0    traZODone (DESYREL) 50 MG tablet, Take 50 mg by mouth nightly as needed., Disp: , Rfl:     valsartan (DIOVAN) 160 MG tablet, Take 1 tablet (160 mg total) by mouth once daily., Disp: 90 tablet, Rfl: 3    VELTASSA 8.4 gram PwPk, Take by mouth., Disp: , Rfl:     clopidogreL (PLAVIX) 75 mg tablet, Take 1 tablet (75 mg total) by mouth once daily. (Patient not taking: Reported on 4/24/2025), Disp: 30 tablet, Rfl: 1    diazePAM (VALIUM) 5 MG tablet, Take 1 tablet (5 mg total) by mouth daily as needed for Anxiety. (Patient not taking: Reported on  4/24/2025), Disp: 2 tablet, Rfl: 0    predniSONE (DELTASONE) 20 MG tablet, 3 for 3 days then 2 for 3 days then one for 3 days and repeat for breathing problems (Patient not taking: Reported on 4/24/2025), Disp: 36 tablet, Rfl: 1    sodium bicarbonate 650 MG tablet, Take 1 tablet (650 mg total) by mouth 2 (two) times daily. (Patient not taking: Reported on 4/24/2025), Disp: 60 tablet, Rfl: 11    tetracycline (ACHROMYCIN,SUMYCIN) 500 MG capsule, Take 1 capsule (500 mg total) by mouth 4 (four) times daily. (Patient not taking: Reported on 4/24/2025), Disp: 56 capsule, Rfl: 0    Current Facility-Administered Medications:     cyanocobalamin injection 1,000 mcg, 1,000 mcg, Intramuscular, Q30 Days, , 1,000 mcg at 01/06/25 [id number]

## 2025-04-25 ENCOUNTER — RESULTS FOLLOW-UP (OUTPATIENT)
Dept: FAMILY MEDICINE | Facility: CLINIC | Age: 60
End: 2025-04-25

## 2025-04-25 DIAGNOSIS — R35.0 URINARY FREQUENCY: ICD-10-CM

## 2025-04-25 DIAGNOSIS — R39.15 URINARY URGENCY: Primary | ICD-10-CM

## 2025-05-12 ENCOUNTER — NURSE TRIAGE (OUTPATIENT)
Dept: ADMINISTRATIVE | Facility: CLINIC | Age: 60
End: 2025-05-12
Payer: MEDICARE

## 2025-05-12 ENCOUNTER — OFFICE VISIT (OUTPATIENT)
Dept: FAMILY MEDICINE | Facility: CLINIC | Age: 60
End: 2025-05-12
Payer: MEDICARE

## 2025-05-12 ENCOUNTER — TELEPHONE (OUTPATIENT)
Dept: FAMILY MEDICINE | Facility: CLINIC | Age: 60
End: 2025-05-12
Payer: MEDICARE

## 2025-05-12 VITALS
HEART RATE: 89 BPM | DIASTOLIC BLOOD PRESSURE: 76 MMHG | OXYGEN SATURATION: 99 % | SYSTOLIC BLOOD PRESSURE: 134 MMHG | BODY MASS INDEX: 41.42 KG/M2 | TEMPERATURE: 98 F | HEIGHT: 62 IN | WEIGHT: 225.06 LBS | RESPIRATION RATE: 18 BRPM

## 2025-05-12 DIAGNOSIS — M25.561 ACUTE PAIN OF RIGHT KNEE: Primary | ICD-10-CM

## 2025-05-12 DIAGNOSIS — N18.4 CHRONIC KIDNEY DISEASE, STAGE 4 (SEVERE): ICD-10-CM

## 2025-05-12 PROCEDURE — 3075F SYST BP GE 130 - 139MM HG: CPT | Mod: CPTII,HCNC,S$GLB, | Performed by: PHYSICIAN ASSISTANT

## 2025-05-12 PROCEDURE — 1160F RVW MEDS BY RX/DR IN RCRD: CPT | Mod: CPTII,HCNC,S$GLB, | Performed by: PHYSICIAN ASSISTANT

## 2025-05-12 PROCEDURE — 99214 OFFICE O/P EST MOD 30 MIN: CPT | Mod: HCNC,S$GLB,, | Performed by: PHYSICIAN ASSISTANT

## 2025-05-12 PROCEDURE — 99999 PR PBB SHADOW E&M-EST. PATIENT-LVL V: CPT | Mod: PBBFAC,HCNC,, | Performed by: PHYSICIAN ASSISTANT

## 2025-05-12 PROCEDURE — 1159F MED LIST DOCD IN RCRD: CPT | Mod: CPTII,HCNC,S$GLB, | Performed by: PHYSICIAN ASSISTANT

## 2025-05-12 PROCEDURE — 3008F BODY MASS INDEX DOCD: CPT | Mod: CPTII,HCNC,S$GLB, | Performed by: PHYSICIAN ASSISTANT

## 2025-05-12 PROCEDURE — 3078F DIAST BP <80 MM HG: CPT | Mod: CPTII,HCNC,S$GLB, | Performed by: PHYSICIAN ASSISTANT

## 2025-05-12 RX ORDER — METHYLPREDNISOLONE 4 MG/1
TABLET ORAL
Qty: 21 EACH | Refills: 0 | Status: SHIPPED | OUTPATIENT
Start: 2025-05-12

## 2025-05-12 NOTE — TELEPHONE ENCOUNTER
----- Message from Audra sent at 5/12/2025  9:00 AM CDT -----  Sooner Appointment Request Caller is requesting a sooner appointment.  Caller declined first available appointment listed below.  Caller will not accept being placed on the waitlist and is requesting a message be sent to doctor.Name of Caller:FIOR CHILDRESS [[phone]]When is the first available appointment?05/28/2025Symptoms:right leg and knee pain for a weekWould the patient rather a call back or a response via [facility]? CallBest Call Back Number:Telephone Information:Mobile          [phone] Information: Pt been in pain for the past week and needs to be seen today if possible.

## 2025-05-12 NOTE — PROGRESS NOTES
OFFICE VISIT   5/12/2025    Patient ID: Faustina Rae is a 60 y.o. female    SUBJECTIVE:  Knee Pain (Right knee  pain x1 week )      HPI/ROS:  History of Present Illness    CHIEF COMPLAINT:  Patient presents today for right knee pain.    HISTORY OF PRESENT ILLNESS:  She reports right knee pain that started one week ago without preceding injury or trauma. The pain has progressively worsened since onset, currently rated as 10/10, and is exacerbated by weight bearing and walking. She requires support while walking and tries to hold onto objects for assistance. She denies swelling, warmth in the affected knee, fevers, or chills.    MEDICAL HISTORY:  She has a history of gout, previous left knee sprain, and foot problems including fractures.    MEDICATIONS:  She takes Tylenol every 4 hours for knee pain         Review of Systems   Constitutional:  Negative for chills and fever.   Musculoskeletal:  Positive for arthralgias and gait problem.   Skin:  Negative for color change.   Neurological:  Negative for weakness.       Past Medical History:   Diagnosis Date    Anxiety     Arthritis     Asthma     CHF (congestive heart failure)     Chronic kidney disease, unspecified     COPD (chronic obstructive pulmonary disease)     Hyperlipemia     Hypertension     Leaky heart valve     Obese     Obese     Obstructive sleep apnea     lost her CPAP    Pneumonia     Rheumatoid arthritis(714.0)     Stroke        Social History[1]    Past Surgical History:   Procedure Laterality Date    CAROTID STENT      3 years ago    CHOLECYSTECTOMY      COLONOSCOPY N/A 3/19/2025    Procedure: COLONOSCOPY;  Surgeon: Steven Gomez MD;  Location: [facility];  Service: Endoscopy;  Laterality: N/A;    ESOPHAGOGASTRODUODENOSCOPY N/A 3/19/2025    Procedure: EGD (ESOPHAGOGASTRODUODENOSCOPY);  Surgeon: Steven Gomez MD;  Location: [facility];  Service: Endoscopy;  Laterality: N/A;    HYSTERECTOMY      SLEEVE GASTROPLASTY  02/21/2017  "      OBJECTIVE:    /76 (BP Location: Right arm, Patient Position: Sitting)   Pulse 89   Temp 98.2 °F (36.8 °C) (Oral)   Resp 18   Ht 5' 2" (1.575 m)   Wt 102.1 kg (225 lb 1.4 oz)   LMP  (LMP Unknown) Comment: Does not menstruate   SpO2 99%   BMI 41.17 kg/m²     Current Medications[2]    Physical Exam  Constitutional:       General: She is not in acute distress.     Appearance: Normal appearance. She is normal weight. She is not ill-appearing.   HENT:      Head: Normocephalic and atraumatic.      Right Ear: External ear normal.      Left Ear: External ear normal.      Nose: Nose normal.   Eyes:      Extraocular Movements: Extraocular movements intact.      Conjunctiva/sclera: Conjunctivae normal.   Cardiovascular:      Rate and Rhythm: Normal rate and regular rhythm.      Pulses: Normal pulses.      Heart sounds: Normal heart sounds.   Pulmonary:      Effort: Pulmonary effort is normal.      Breath sounds: Normal breath sounds.   Musculoskeletal:         General: No swelling.      Cervical back: Normal range of motion and neck supple.      Right knee: No swelling or deformity. Decreased range of motion. No tenderness.      Right lower leg: No edema.      Left lower leg: No edema.      Comments: No knee instability noted.   Skin:     General: Skin is warm and dry.   Neurological:      General: No focal deficit present.      Mental Status: She is alert and oriented to person, place, and time. Mental status is at baseline.   Psychiatric:         Mood and Affect: Mood normal.         Behavior: Behavior normal.         Thought Content: Thought content normal.         Judgment: Judgment normal.         Assessment/Plan:  Assessment & Plan    M25.561 Pain in right knee  M25.661 Stiffness of right knee, not elsewhere classified  R26.89 Other abnormalities of gait and mobility  S83.8X2D Sprain of other specified parts of left knee, subsequent encounter  Z87.311 Personal history of (healed) other pathological " fracture    IMPRESSION:  - Less suspicion for gout as potential cause of knee pain based on lack of typical symptoms like swelling, warmth, and tenderness.  - Suspect possible arthritis as underlying cause of knee pain.    RIGHT KNEE PAIN:  - Patient reports right knee pain for a week, worsening with standing and walking, with pain level 10/10  - Examination revealed no swelling, warmth, or fever associated with the pain.  - Ordered XR Right Knee to evaluate condition and determine appropriate treatment.  - Prescribed Medrol Dosepak (steroid) to address pain and inflammation, along with Tylenol Arthritis for pain management.  - Advised to apply ice periodically and use an Ace bandage or compression sleeve for support.    RIGHT KNEE STIFFNESS:  - Patient reports difficulty bending and straightening the right knee.  - Management is coordinated with treatment for knee pain, including the prescribed Medrol Dosepak and recommendation for brace or compression sleeve support.    GAIT ABNORMALITY:  - Patient reports holding onto something while walking due to instability    OTHER CONSIDERATIONS:  - Explained that gout typically affects smaller joints like toes, ankles, or wrists, not typically larger joints like the knee.  - Considered pain management options given history of chronic kidney disease  - Ordered uric acid level blood test to rule out gout/assess uric acid levels         Problem List Items Addressed This Visit       Chronic kidney disease, stage 4 (severe)     Other Visit Diagnoses         Acute pain of right knee    -  Primary    Relevant Medications    methylPREDNISolone (MEDROL DOSEPACK) 4 mg tablet    Other Relevant Orders    X-Ray Knee 1 or 2 View Right    Ambulatory referral/consult to Orthopedics    Uric Acid            Patient verbalizes understanding of instructions and healthcare topics reviewed. All of patient's questions were answered.  Patient encouraged to contact office if other questions  arise.    Health Maintenance Due   Topic Date Due    TETANUS VACCINE  Never done    Shingles Vaccine (1 of 2) Never done    RSV Vaccine (Age 60+ and Pregnant patients) (1 - Risk 60-74 years 1-dose series) Never done    COVID-19 Vaccine (3 - 2024-25 season) 09/01/2024    Mammogram  01/19/2025       Follow up if symptoms worsen or fail to improve.    This note was generated with the assistance of ambient listening technology. Verbal consent was obtained by the patient and accompanying visitor(s) for the recording of patient appointment to facilitate this note. I attest to having reviewed and edited the generated note for accuracy, though some syntax or spelling errors may persist. Please contact the author of this note for any clarification.       Carmella Gardner PA-C  Family Medicine Physician Assistant          [1]   Social History  Tobacco Use    Smoking status: Never     Passive exposure: Never    Smokeless tobacco: Never   Substance Use Topics    Alcohol use: Not Currently     Comment: rare, about once a month    Drug use: No   [2]   Current Outpatient Medications:     albuterol (PROVENTIL) 2.5 mg /3 mL (0.083 %) nebulizer solution, Take 3 mLs (2.5 mg total) by nebulization every 6 (six) hours as needed., Disp: 120 each, Rfl: 11    albuterol (PROVENTIL/VENTOLIN HFA) 90 mcg/actuation inhaler, Inhale 2 puffs into the lungs every 4 (four) hours as needed for Wheezing. Rescue, Disp: 20.1 g, Rfl: 3    amLODIPine (NORVASC) 10 MG tablet, TAKE ONE TABLET BY MOUTH DAILY AT 9 AM, Disp: 90 tablet, Rfl: 3    atorvastatin (LIPITOR) 80 MG tablet, TAKE ONE TABLET BY MOUTH DAILY AT 5 PM every evening., Disp: 90 tablet, Rfl: 2    b complex vitamins tablet, Take 1 tablet by mouth once daily., Disp: , Rfl:     BREO ELLIPTA 200-25 mcg/dose DsDv diskus inhaler, Inhale 1 puff into the lungs once daily., Disp: 180 each, Rfl: 3    calcium citrate-vitamin D3 315-200 mg (CITRACAL+D) 315-200 mg-unit per tablet, Take 1 tablet by  mouth once daily. , Disp: , Rfl:     colchicine (COLCRYS) 0.6 mg tablet, Take 1 tablet (0.6 mg total) by mouth every 72 hours. Take at onset of gout and take for 10 days, Disp: 10 tablet, Rfl: 3    diazePAM (VALIUM) 5 MG tablet, Take 1 tablet (5 mg total) by mouth daily as needed for Anxiety., Disp: 2 tablet, Rfl: 0    EScitalopram oxalate (LEXAPRO) 10 MG tablet, TAKE ONE TABLET BY MOUTH DAILY AT 9 AM, Disp: 90 tablet, Rfl: 2    ferrous sulfate 325 (65 FE) MG EC tablet, Take 1 tablet (325 mg total) by mouth once daily., Disp: 90 tablet, Rfl: 3    fluticasone propionate (FLONASE) 50 mcg/actuation nasal spray, 2 sprays (100 mcg total) by Each Nostril route once daily., Disp: 16 g, Rfl: 11    hydrALAZINE (APRESOLINE) 50 MG tablet, TAKE ONE TABLET BY MOUTH EVERY TWELVE HOURS @ 9AM & 9PM, Disp: 270 tablet, Rfl: 1    isosorbide dinitrate (ISORDIL) 5 MG Tab, Take 1 tablet (5 mg total) by mouth 3 (three) times daily., Disp: 270 tablet, Rfl: 2    magnesium oxide (MAG-OX) 400 mg (241.3 mg magnesium) tablet, Take 1 tablet (400 mg total) by mouth once daily., Disp: 90 tablet, Rfl: 3    meclizine (ANTIVERT) 12.5 mg tablet, TAKE 1 TABLET(12.5 MG) BY MOUTH THREE TIMES DAILY AS NEEDED FOR DIZZINESS, Disp: 30 tablet, Rfl: 0    montelukast (SINGULAIR) 10 mg tablet, Take 1 tablet (10 mg total) by mouth every evening., Disp: 90 tablet, Rfl: 3    multivitamin (THERAGRAN) per tablet, Take 1 tablet by mouth once daily., Disp: 90 tablet, Rfl: 3    nystatin (MYCOSTATIN ORAL), Take 1 tablet by mouth Daily., Disp: , Rfl:     pantoprazole (PROTONIX) 40 MG tablet, Take 1 tablet (40 mg total) by mouth 2 (two) times daily., Disp: 60 tablet, Rfl: 1    predniSONE (DELTASONE) 20 MG tablet, 3 for 3 days then 2 for 3 days then one for 3 days and repeat for breathing problems, Disp: 36 tablet, Rfl: 1    promethazine-dextromethorphan (PROMETHAZINE-DM) 6.25-15 mg/5 mL Syrp, Take 5 mLs by mouth every 6 (six) hours as needed., Disp: , Rfl:     sodium  bicarbonate 650 MG tablet, Take 1 tablet (650 mg total) by mouth 2 (two) times daily., Disp: 60 tablet, Rfl: 11    spironolactone (ALDACTONE) 25 MG tablet, TAKE ONE TABLET BY MOUTH DAILY, Disp: 90 tablet, Rfl: 2    sucralfate (CARAFATE) 1 gram tablet, Take 1 tablet (1 g total) by mouth 4 (four) times daily before meals and nightly., Disp: 40 tablet, Rfl: 0    tetracycline (ACHROMYCIN,SUMYCIN) 500 MG capsule, Take 1 capsule (500 mg total) by mouth 4 (four) times daily., Disp: 56 capsule, Rfl: 0    traMADoL (ULTRAM) 50 mg tablet, Take 1 tablet (50 mg total) by mouth every 6 (six) hours as needed for Pain., Disp: 20 each, Rfl: 0    traZODone (DESYREL) 50 MG tablet, Take 50 mg by mouth nightly as needed., Disp: , Rfl:     valsartan (DIOVAN) 160 MG tablet, Take 1 tablet (160 mg total) by mouth once daily., Disp: 90 tablet, Rfl: 3    VELTASSA 8.4 gram PwPk, Take by mouth., Disp: , Rfl:     aspirin (ECOTRIN) 81 MG EC tablet, Take 1 tablet (81 mg total) by mouth once daily., Disp: 90 tablet, Rfl: 3    chlorthalidone (HYGROTEN) 25 MG Tab, Take 1 tablet (25 mg total) by mouth once daily., Disp: 90 tablet, Rfl: 3    clopidogreL (PLAVIX) 75 mg tablet, Take 1 tablet (75 mg total) by mouth once daily. (Patient not taking: Reported on 4/24/2025), Disp: 30 tablet, Rfl: 1    gabapentin (NEURONTIN) 300 MG capsule, Take 1 capsule (300 mg total) by mouth 3 (three) times daily., Disp: 90 capsule, Rfl: 11    methylPREDNISolone (MEDROL DOSEPACK) 4 mg tablet, use as directed, Disp: 21 each, Rfl: 0    Current Facility-Administered Medications:     cyanocobalamin injection 1,000 mcg, 1,000 mcg, Intramuscular, Q30 Days, , 1,000 mcg at 01/06/25 [id number]

## 2025-05-12 NOTE — TELEPHONE ENCOUNTER
Reason for Disposition   SEVERE pain (e.g., excruciating, unable to walk)    Additional Information   Negative: Followed a knee injury   Negative: Swollen knee joint and fever   Negative: Patient sounds very sick or weak to the triager   Negative: Can't move swollen joint at all   Negative: Thigh or calf pain and only 1 side and present > 1 hour   Negative: Thigh or calf swelling and only 1 side    Protocols used: Knee Pain-A-OH  Pt states she has an aching stabbing pain in her right knee present for a week. She rates the pain as 10/10 on pain scale. She denies injury to the knee. Advised to see a HCP today. Appointment set for 1100 with TONY COY. Instructed to call OOC back if new/worsening symptoms develop. Pt verbalized understanding.

## 2025-05-13 DIAGNOSIS — M25.561 RIGHT KNEE PAIN, UNSPECIFIED CHRONICITY: Primary | ICD-10-CM

## 2025-05-14 ENCOUNTER — TELEPHONE (OUTPATIENT)
Dept: BARIATRICS | Facility: CLINIC | Age: 60
End: 2025-05-14
Payer: MEDICARE

## 2025-05-14 NOTE — TELEPHONE ENCOUNTER
----- Message from Shanelle sent at 5/14/2025  8:16 AM CDT -----  Contact: self  Type:  Appointment RequestCaller is requesting a appointment. Name of Caller:pt Symptoms:Would the patient rather a call back or a response via [facility]? [email or web address] Call Back Number:[phone] Information: pt states she would like to [facility] appt, I inform pt that NP is not taking new pt.   Please call back to advise. Thanks!

## 2025-05-14 NOTE — TELEPHONE ENCOUNTER
called pt and informed her that she would only be able to come see us for M[facility] and the patient was happy with that.

## 2025-05-16 ENCOUNTER — TELEPHONE (OUTPATIENT)
Dept: FAMILY MEDICINE | Facility: CLINIC | Age: 60
End: 2025-05-16
Payer: MEDICARE

## 2025-05-16 NOTE — TELEPHONE ENCOUNTER
----- Message from Audra sent at 5/16/2025  1:03 PM CDT -----  Name of Who is Calling:FIOR CHILDRESS [[id number]] (Sara from [facility])  What is the request in detail:Sharon called regarding pt getting an authorization for a blood pressure machine so [facility] could cover it.  [facility] the [facility]Call  What Number to Call Back [facility] not [facility]Telephone Information:Mobile          [phone] [phone]

## 2025-05-23 ENCOUNTER — NURSE TRIAGE (OUTPATIENT)
Dept: ADMINISTRATIVE | Facility: CLINIC | Age: 60
End: 2025-05-23
Payer: MEDICARE

## 2025-05-23 ENCOUNTER — OCHSNER VIRTUAL EMERGENCY DEPARTMENT (OUTPATIENT)
Facility: CLINIC | Age: 60
End: 2025-05-23
Payer: MEDICARE

## 2025-05-23 DIAGNOSIS — R25.2 CALF CRAMP: Primary | ICD-10-CM

## 2025-05-23 NOTE — TELEPHONE ENCOUNTER
Pt reports last night started to have rachel horse like pains in her legs that has continued today and taking tylenol is not helping (pt states she can not take NSAIDs), pt reports the pain 'jumps from one leg toe the other, back and forth,' Pt denies any fever, chest pain, or breathing issues. Wanting to know if there is anything she can take or do to help the discomfort. Pt advised to be seen within 4 hours (or PCP triage) per protocol, pt was referred to Dr. Blayne Epperson states due to pt's medical history she can not advise for her to ingest more electrolytes or potassium without know what her levels are, which would mean an ED visit is needed if her discomfort is really bad. Pt informed and encouraged to call back with any worsening symptoms or questions. She verbalized understanding.    Reason for Disposition   [1] SEVERE pain (e.g., excruciating, unable to do any normal activities) AND [2] not improved after 2 hours of pain medicine    Additional Information   Negative: Looks like a broken bone or dislocated joint (e.g., crooked or deformed)   Negative: Sounds like a life-threatening emergency to the triager   Negative: Chest pain   Negative: Difficulty breathing   Negative: Unable to walk   Negative: Entire foot is cool or blue in comparison to other side   Negative: [1] Red area or streak AND [2] fever   Negative: [1] Swollen joint AND [2] fever   Negative: [1] Cast on leg or ankle AND [2] now increased pain   Negative: Patient sounds very sick or weak to the triager    Protocols used: Leg Pain-A-AH

## 2025-05-23 NOTE — PLAN OF CARE-OVED
"[facility] Emergency Department Plan of Care Note  Referral Source: Nurse On-Call                               Chief Complaint   Patient presents with    Spasms     Intermittent "rachel horses" in bilateral calves all day since last night, no back pain, no fevers/CP/SOB. Taking tylenol and eating bananas without improvement.        Recommendation: Treat in place                          Continue tylenol. With hx CKD, cannot recommend increasing K in her diet or OTC supplements. Unable to take NSAIDS due to CKD. Go to ER for electrolyte check and evaluation if pain unbearable.     Encounter Diagnosis   Name Primary?    Calf cramp Yes             "

## 2025-05-26 DIAGNOSIS — Z00.00 ENCOUNTER FOR MEDICARE ANNUAL WELLNESS EXAM: ICD-10-CM

## 2025-05-28 ENCOUNTER — OFFICE VISIT (OUTPATIENT)
Dept: FAMILY MEDICINE | Facility: CLINIC | Age: 60
End: 2025-05-28
Payer: MEDICARE

## 2025-05-28 VITALS
TEMPERATURE: 98 F | SYSTOLIC BLOOD PRESSURE: 138 MMHG | BODY MASS INDEX: 42.19 KG/M2 | HEART RATE: 81 BPM | OXYGEN SATURATION: 98 % | HEIGHT: 62 IN | DIASTOLIC BLOOD PRESSURE: 80 MMHG | RESPIRATION RATE: 16 BRPM | WEIGHT: 229.25 LBS

## 2025-05-28 DIAGNOSIS — G89.29 CHRONIC PAIN OF RIGHT KNEE: Primary | ICD-10-CM

## 2025-05-28 DIAGNOSIS — M54.2 NECK PAIN: ICD-10-CM

## 2025-05-28 DIAGNOSIS — I10 ESSENTIAL HYPERTENSION: ICD-10-CM

## 2025-05-28 DIAGNOSIS — J44.89 COPD WITH ASTHMA: ICD-10-CM

## 2025-05-28 DIAGNOSIS — R79.9 ABNORMAL FINDING OF BLOOD CHEMISTRY, UNSPECIFIED: ICD-10-CM

## 2025-05-28 DIAGNOSIS — E53.8 B12 DEFICIENCY: ICD-10-CM

## 2025-05-28 DIAGNOSIS — M25.561 CHRONIC PAIN OF RIGHT KNEE: Primary | ICD-10-CM

## 2025-05-28 DIAGNOSIS — J45.909 ASTHMA, UNSPECIFIED ASTHMA SEVERITY, UNSPECIFIED WHETHER COMPLICATED, UNSPECIFIED WHETHER PERSISTENT: ICD-10-CM

## 2025-05-28 DIAGNOSIS — Z12.31 ENCOUNTER FOR SCREENING MAMMOGRAM FOR MALIGNANT NEOPLASM OF BREAST: ICD-10-CM

## 2025-05-28 PROCEDURE — 99999 PR PBB SHADOW E&M-EST. PATIENT-LVL V: CPT | Mod: PBBFAC,HCNC,, | Performed by: STUDENT IN AN ORGANIZED HEALTH CARE EDUCATION/TRAINING PROGRAM

## 2025-05-28 RX ORDER — VALSARTAN 160 MG/1
160 TABLET ORAL DAILY
Qty: 90 TABLET | Refills: 3 | Status: SHIPPED | OUTPATIENT
Start: 2025-05-28 | End: 2026-05-28

## 2025-05-28 RX ORDER — CYANOCOBALAMIN 1000 UG/ML
1000 INJECTION, SOLUTION INTRAMUSCULAR; SUBCUTANEOUS
Status: SHIPPED | OUTPATIENT
Start: 2025-05-28 | End: 2026-04-23

## 2025-05-28 RX ORDER — ALBUTEROL SULFATE 0.83 MG/ML
2.5 SOLUTION RESPIRATORY (INHALATION) EVERY 6 HOURS PRN
Qty: 120 EACH | Refills: 11 | Status: SHIPPED | OUTPATIENT
Start: 2025-05-28 | End: 2026-05-28

## 2025-05-28 RX ORDER — DICLOFENAC SODIUM 10 MG/G
2 GEL TOPICAL 4 TIMES DAILY PRN
Qty: 200 G | Refills: 3 | Status: SHIPPED | OUTPATIENT
Start: 2025-05-28 | End: 2025-05-30 | Stop reason: SDUPTHER

## 2025-05-28 RX ORDER — GABAPENTIN 300 MG/1
300 CAPSULE ORAL 3 TIMES DAILY
Qty: 270 CAPSULE | Refills: 3 | Status: SHIPPED | OUTPATIENT
Start: 2025-05-28 | End: 2026-05-28

## 2025-05-28 RX ORDER — MONTELUKAST SODIUM 10 MG/1
10 TABLET ORAL NIGHTLY
Qty: 90 TABLET | Refills: 3 | Status: SHIPPED | OUTPATIENT
Start: 2025-05-28

## 2025-05-28 RX ORDER — CHLORTHALIDONE 25 MG/1
25 TABLET ORAL DAILY
Qty: 90 TABLET | Refills: 3 | Status: SHIPPED | OUTPATIENT
Start: 2025-05-28 | End: 2026-05-28

## 2025-05-28 RX ADMIN — CYANOCOBALAMIN 1000 MCG: 1000 INJECTION, SOLUTION INTRAMUSCULAR; SUBCUTANEOUS at 08:05

## 2025-05-28 NOTE — PROGRESS NOTES
Identified pts name and , B 12 administered IM, pt tolerated well. Aseptic technique maintained. Pain scale 0 out of 10 on pain scale. Pt instructed to wait in clinic for 15 minutes after injection was given.

## 2025-05-28 NOTE — PATIENT INSTRUCTIONS
José Manuel Weems,     As part of our ongoing commitment to providing you with the best possible care, I am excited to invite you to join the [facility] Digital Medicine Program. This new program will help you manage your blood pressure more effectively from the comfort of your home.    Heres how the program works:    · Convenient Monitoring: You will receive a digital blood pressure cuff that automatically sends your readings to your smartphone. These readings are then securely uploaded to your medical record.    · Team Support: Your dedicated Digital Medicine care team will monitor your blood pressure readings and work closely with me to make informed treatment decisions. You can easily contact them through the [facility] or by phone.    · Real-Time Adjustments: Licensed clinicians can promptly adjust your medications based on your home readings, which are often more accurate than those taken in a clinic.    · Personalized Health Coaching: You will receive support from health coaches to help you build a healthy lifestyle plan tailored to your needs.    · Clinician Support: [facility] clinicians will also help oversee your blood pressure management, ensuring a comprehensive approach to your care.    Joining the [facility] Digital Medicine Program is a simple but powerful way to manage your blood pressure more effectively from your home with the support of your care team. If you are interested in learning more about this program, please click [here] or feel free to message me directly.    Thank you for considering this opportunity to enhance your health management. I look forward to supporting you in achieving your health goals.    Warm regards,    MD José Manuel Rockwell,     If you are due for any health screening(s) below please notify me so we can arrange them to be ordered and scheduled. Most healthy patients at your age complete them, but you are free to accept or refuse.     If you can't do it, I'll  definitely understand. If you can, I'd certainly appreciate it!    Tests to Keep You Healthy    Mammogram: ORDERED BUT NOT SCHEDULED  Colon Cancer Screening: Met on 3/19/2025  Last Blood Pressure <= 139/89 (5/28/2025): Yes      Schedule your breast cancer screening today     Breast cancer is the second most common cancer in women,  and the second leading cause of death from cancer. Mammograms can detect breast cancer early, which significantly increases the chances of curing the cancer.       Our records indicate that you may be overdue for breast cancer screening. Cancer screenings save lives, so schedule yours today to stay healthy.     If you recently had a mammogram performed outside of [facility], please let your Health care team know so that they can update your health record.

## 2025-05-28 NOTE — PROGRESS NOTES
[facility] Note    Subjective:    The HPI and pertinent ROS is included in the Diagnostic Impression Remarks section at the end of the note. Please see below for further details. Chief complaint is at end of note.     Faustina is a pleasant intelligent patient who is here for evaluation.     Modified Medications    Modified Medication Previous Medication    ALBUTEROL (PROVENTIL) 2.5 MG /3 ML (0.083 %) NEBULIZER SOLUTION albuterol (PROVENTIL) 2.5 mg /3 mL (0.083 %) nebulizer solution       Take 3 mLs (2.5 mg total) by nebulization every 6 (six) hours as needed.    Take 3 mLs (2.5 mg total) by nebulization every 6 (six) hours as needed.    CHLORTHALIDONE (HYGROTEN) 25 MG TAB chlorthalidone (HYGROTEN) 25 MG Tab       Take 1 tablet (25 mg total) by mouth once daily.    Take 1 tablet (25 mg total) by mouth once daily.    GABAPENTIN (NEURONTIN) 300 MG CAPSULE gabapentin (NEURONTIN) 300 MG capsule       Take 1 capsule (300 mg total) by mouth 3 (three) times daily.    Take 1 capsule (300 mg total) by mouth 3 (three) times daily.    MONTELUKAST (SINGULAIR) 10 MG TABLET montelukast (SINGULAIR) 10 mg tablet       Take 1 tablet (10 mg total) by mouth every evening.    Take 1 tablet (10 mg total) by mouth every evening.    VALSARTAN (DIOVAN) 160 MG TABLET valsartan (DIOVAN) 160 MG tablet       Take 1 tablet (160 mg total) by mouth once daily.    Take 1 tablet (160 mg total) by mouth once daily.       Data reviewed 274}  Previous medical records reviewed and summarized in plan section at end of note.      If you are due for any health screening(s) below please notify me so we can arrange them to be ordered and scheduled. Most healthy patients at your age complete them, but you are free to accept or refuse. If you can't do it, I'll definitely understand. If you can, I'd certainly appreciate it!     Tests to Keep You Healthy    Mammogram: ORDERED BUT NOT SCHEDULED  Colon Cancer Screening: Met on 3/19/2025  Last Blood  Pressure <= 139/89 (5/28/2025): Yes      The following portions of the patient's history were reviewed and updated as appropriate: allergies, current medications, past family history, past medical history, past social history, past surgical history and problem list.    She  has a past medical history of Anxiety, Arthritis, Asthma, CHF (congestive heart failure), Chronic kidney disease, unspecified, COPD (chronic obstructive pulmonary disease), Hyperlipemia, Hypertension, Leaky heart valve, Obese, Obese, Obstructive sleep apnea, Pneumonia, Rheumatoid arthritis(714.0), and Stroke.  She  has a past surgical history that includes Hysterectomy; Cholecystectomy; Sleeve Gastroplasty (02/21/2017); Carotid stent; Esophagogastroduodenoscopy (N/A, 3/19/2025); and Colonoscopy (N/A, 3/19/2025).    She  reports that she has never smoked. She has never been exposed to tobacco smoke. She has never used smokeless tobacco. She reports that she does not currently use alcohol. She reports that she does not use drugs.  She family history includes Arthritis in her mother; Asthma in her son; Breast cancer in her mother; Cancer in her mother; Diabetes in her mother; Heart disease in her father; Hyperlipidemia in her mother; Hypertension in her father, mother, sister, and sister; Stroke in her mother.    Review of patient's allergies indicates:   Allergen Reactions    Nsaids (non-steroidal anti-inflammatory drug) Other (See Comments)       Tobacco Use: Low Risk  (5/28/2025)    Patient History     Smoking Tobacco Use: Never     Smokeless Tobacco Use: Never     Passive Exposure: Never     Physical Examination  Physical Exam    General: No acute distress. Well-developed. Well-nourished.  Eyes: EOMI. Sclerae anicteric.  HENT: Normocephalic. Atraumatic. Nares patent. Moist oral mucosa.  Cardiovascular: Regular rate. Regular rhythm. No murmurs. No rubs. No gallops. Normal S1, S2.  Respiratory: Normal respiratory effort. Clear to auscultation  "bilaterally. No rales. No rhonchi. No wheezing.  Musculoskeletal: No  obvious deformity.  Extremities: No lower extremity edema.  Neurological: Alert & oriented x3. No slurred speech. Normal gait.  Psychiatric: Normal mood. Normal affect. Good insight. Good judgment.  Skin: Warm. Dry. No rash.              BP Readings from Last 3 Encounters:   05/28/25 138/80   05/12/25 134/76   04/24/25 130/70     Wt Readings from Last 3 Encounters:   05/28/25 104 kg (229 lb 4.5 oz)   05/12/25 102.1 kg (225 lb 1.4 oz)   04/24/25 103.1 kg (227 lb 4.7 oz)     /80 (BP Location: Right arm, Patient Position: Sitting)   Pulse 81   Temp 98.1 °F (36.7 °C) (Oral)   Resp 16   Ht 5' 2" (1.575 m)   Wt 104 kg (229 lb 4.5 oz)   LMP  (LMP Unknown) Comment: Does not menstruate   SpO2 98%   BMI 41.94 kg/m²    274}  Laboratory: I have reviewed old labs below:    274}    Lab Results   Component Value Date    WBC 7.12 01/29/2025    HGB 12.4 01/29/2025    HCT 38.5 01/29/2025    MCV 92 01/29/2025     01/29/2025     04/21/2025    K 4.0 04/21/2025     (H) 04/21/2025    CALCIUM 9.1 04/21/2025    PHOS 4.0 12/23/2024    CO2 22 (L) 04/21/2025    GLUCOSE 176 (H) 08/10/2013    BUN 38 (H) 04/21/2025    CREATININE 2.1 (H) 04/21/2025    EGFRNORACEVR 26 (L) 04/21/2025    LABPROT 10.6 07/13/2023    ALBUMIN 2.5 (L) 03/03/2025    BILITOT 0.2 03/03/2025    ALKPHOS 53 03/03/2025    ALT 13 03/03/2025    AST 19 03/03/2025    INR 0.9 07/13/2023    CHOL 216 (H) 04/30/2024    TRIG 186 (H) 04/30/2024    HDL 43 04/30/2024    LDLCALC 135.8 04/30/2024    TSH 2.110 07/12/2023    HGBA1C 5.3 08/26/2024      Lab reviewed by me: Particular labs of significance that I will monitor, workup, or treat to improve are mentioned below in diagnostic impression remarks.      Imaging/EKG: I have reviewed the pertinent results and my findings are noted in remarks.  274}    CC:   Chief Complaint   Patient presents with    Leg Pain        274}    History of " Present Illness    CHIEF COMPLAINT:  Patient presents today for right knee pain.    MUSCULOSKELETAL:  She reports right knee pain described as a burning sensation, present both at rest and with ambulation. Pain occasionally radiates down to feet. Previous steroid injection by Dr. Loja provided no relief. She currently uses Tylenol for pain management at night.    RESPIRATORY:  She uses nebulizer nightly for breathing treatments. She currently has two nebulizer chambers but requires additional ones.    ALLERGIES:  She has an allergy to NSAIDs, specifically ibuprofen, which manifests as upset stomach and kidney issues.      ROS:  Gastrointestinal: +indigestion  Musculoskeletal: +joint pain, +limb pain, +pain with movement, +burning sensation          Assessment/Plan  Faustina Rae is a 60 y.o. female who presents to clinic with:  1. Chronic pain of right knee    2. Neck pain    3. COPD with asthma    4. Essential hypertension    5. Asthma, unspecified asthma severity, unspecified whether complicated, unspecified whether persistent    6. Encounter for screening mammogram for malignant neoplasm of breast    7. Abnormal finding of blood chemistry, unspecified    8. B12 deficiency       274}    Assessment & Plan    J44.89 COPD with asthma  M25.561, G89.29 Chronic pain of right knee  M54.2 Neck pain  I10 Essential hypertension  J45.909 Asthma, unspecified asthma severity, unspecified whether complicated, unspecified whether persistent  Z12.31 Encounter for screening mammogram for malignant neoplasm of breast  R79.9 Abnormal finding of blood chemistry, unspecified    PLAN SUMMARY:  - Started Voltaren gel for knee pain, apply up to 4 times daily  - Initiated gabapentin 300 mg TID for knee and potential nerve pain  - Previous steroid injection by Dr. Loja (sports medicine) noted as ineffective    CHRONIC PAIN OF RIGHT KNEE:  - Right knee pain described as burning and sharp, sometimes radiating down to the foot.  -  Previous steroid injection by Dr. Loja (sports medicine) was ineffective.  - Started gabapentin 300 mg TID for knee pain and potential nerve pain relief.  - Started Voltaren gel to be applied up to 4 times daily on the knee for pain management, which is recommended due to history of NSAID-related kidney issues as it has fewer systemic side effects compared to oral NSAIDs.         COPD-stable continue current inhalers no recent flares  Hypertension -stable continue current meds monitor no headache or chest pain f/u blood work   asthma-stable continue current inhalers no recent flares    Pt does not have an allergy to nsaids only ckd topical will not sig effect ckd.     This note was generated with the assistance of ambient listening technology. Verbal consent was obtained by the patient and accompanying visitor(s) for the recording of patient appointment to facilitate this note. I attest to having reviewed and edited the generated note for accuracy, though some syntax or spelling errors may persist. Please contact the author of this note for any clarification.       1. Chronic pain of right knee  - gabapentin (NEURONTIN) 300 MG capsule; Take 1 capsule (300 mg total) by mouth 3 (three) times daily.  Dispense: 270 capsule; Refill: 3  - diclofenac sodium (VOLTAREN) 1 % Gel; Apply 2 g topically 4 (four) times daily as needed (pain). Pt does not have allergy to nsaids can not take oral due to ckd can take topical  Dispense: 200 g; Refill: 3    2. Neck pain    3. COPD with asthma  - montelukast (SINGULAIR) 10 mg tablet; Take 1 tablet (10 mg total) by mouth every evening.  Dispense: 90 tablet; Refill: 3    4. Essential hypertension  - valsartan (DIOVAN) 160 MG tablet; Take 1 tablet (160 mg total) by mouth once daily.  Dispense: 90 tablet; Refill: 3  - Hypertension Digital Medicine (HDMP) Enrollment Order    5. Asthma, unspecified asthma severity, unspecified whether complicated, unspecified whether persistent  -  montelukast (SINGULAIR) 10 mg tablet; Take 1 tablet (10 mg total) by mouth every evening.  Dispense: 90 tablet; Refill: 3  - albuterol (PROVENTIL) 2.5 mg /3 mL (0.083 %) nebulizer solution; Take 3 mLs (2.5 mg total) by nebulization every 6 (six) hours as needed.  Dispense: 120 each; Refill: 11    6. Encounter for screening mammogram for malignant neoplasm of breast  - Mammo Digital Screening Bilat w/ Ramin (XPD); Future    7. Abnormal finding of blood chemistry, unspecified  - Lipid Panel; Future    8. B12 deficiency  - cyanocobalamin injection 1,000 mcg      Zoran Cuevas MD   274}    If you are due for any health screening(s) below please notify me so we can arrange them to be ordered and scheduled. Most healthy patients at your age complete them, but you are free to accept or refuse.     If you can't do it, I'll definitely understand. If you can, I'd certainly appreciate it!   Tests to Keep You Healthy    Mammogram: ORDERED BUT NOT SCHEDULED  Colon Cancer Screening: Met on 3/19/2025  Last Blood Pressure <= 139/89 (5/28/2025): Yes

## 2025-05-30 DIAGNOSIS — M25.561 CHRONIC PAIN OF RIGHT KNEE: ICD-10-CM

## 2025-05-30 DIAGNOSIS — G89.29 CHRONIC PAIN OF RIGHT KNEE: ICD-10-CM

## 2025-05-30 RX ORDER — DICLOFENAC SODIUM 10 MG/G
2 GEL TOPICAL 4 TIMES DAILY PRN
Qty: 200 G | Refills: 3 | Status: SHIPPED | OUTPATIENT
Start: 2025-05-30 | End: 2025-06-29

## 2025-05-30 NOTE — TELEPHONE ENCOUNTER
Care Due:                  Date            Visit Type   Department     Provider  --------------------------------------------------------------------------------                                EP -                              PRIMARY      [facility]  Last Visit: 05-      CARE ([facility])   VANESSA Cuevas                              EP -                              PRIMARY      [facility]  Next Visit: 12-      CARE ([facility])   MEDICINE       Zoran Cuevas                                                            Last  Test          Frequency    Reason                     Performed    Due Date  --------------------------------------------------------------------------------    Lipid Panel.  12 months..  atorvastatin.............  04- 04-    [facility] Embedded Care Due Messages. Reference number: [id number].   5/30/2025 10:11:06 AM CDT

## 2025-06-02 DIAGNOSIS — M25.561 RIGHT KNEE PAIN, UNSPECIFIED CHRONICITY: Primary | ICD-10-CM

## 2025-06-03 ENCOUNTER — TELEPHONE (OUTPATIENT)
Dept: FAMILY MEDICINE | Facility: CLINIC | Age: 60
End: 2025-06-03
Payer: MEDICARE

## 2025-06-03 DIAGNOSIS — Z86.73 HISTORY OF CVA (CEREBROVASCULAR ACCIDENT): ICD-10-CM

## 2025-06-03 DIAGNOSIS — F41.9 ANXIETY AND DEPRESSION: ICD-10-CM

## 2025-06-03 DIAGNOSIS — E78.5 HYPERLIPIDEMIA, UNSPECIFIED HYPERLIPIDEMIA TYPE: ICD-10-CM

## 2025-06-03 DIAGNOSIS — F32.A ANXIETY AND DEPRESSION: ICD-10-CM

## 2025-06-03 RX ORDER — ATORVASTATIN CALCIUM 80 MG/1
TABLET, FILM COATED ORAL
Qty: 90 TABLET | Refills: 2 | Status: SHIPPED | OUTPATIENT
Start: 2025-06-03

## 2025-06-03 RX ORDER — ESCITALOPRAM OXALATE 10 MG/1
TABLET ORAL
Qty: 90 TABLET | Refills: 3 | Status: SHIPPED | OUTPATIENT
Start: 2025-06-03

## 2025-06-04 ENCOUNTER — OFFICE VISIT (OUTPATIENT)
Dept: ORTHOPEDICS | Facility: CLINIC | Age: 60
End: 2025-06-04
Payer: MEDICARE

## 2025-06-04 ENCOUNTER — HOSPITAL ENCOUNTER (OUTPATIENT)
Dept: RADIOLOGY | Facility: HOSPITAL | Age: 60
Discharge: HOME | End: 2025-06-04
Attending: ORTHOPAEDIC SURGERY
Payer: MEDICARE

## 2025-06-04 VITALS — BODY MASS INDEX: 41.99 KG/M2 | WEIGHT: 228.19 LBS | HEIGHT: 62 IN

## 2025-06-04 DIAGNOSIS — M25.561 RIGHT KNEE PAIN, UNSPECIFIED CHRONICITY: ICD-10-CM

## 2025-06-04 DIAGNOSIS — M17.0 PRIMARY OSTEOARTHRITIS OF BOTH KNEES: Primary | ICD-10-CM

## 2025-06-04 PROCEDURE — 20610 DRAIN/INJ JOINT/BURSA W/O US: CPT | Mod: RT,S$GLB,, | Performed by: ORTHOPAEDIC SURGERY

## 2025-06-04 PROCEDURE — 99999 PR PBB SHADOW E&M-EST. PATIENT-LVL IV: CPT | Mod: PBBFAC,,, | Performed by: ORTHOPAEDIC SURGERY

## 2025-06-04 PROCEDURE — 73564 X-RAY EXAM KNEE 4 OR MORE: CPT | Mod: TC,PO,RT

## 2025-06-04 PROCEDURE — 73564 X-RAY EXAM KNEE 4 OR MORE: CPT | Mod: 26,RT,, | Performed by: RADIOLOGY

## 2025-06-04 PROCEDURE — 1159F MED LIST DOCD IN RCRD: CPT | Mod: CPTII,S$GLB,, | Performed by: ORTHOPAEDIC SURGERY

## 2025-06-04 PROCEDURE — 1160F RVW MEDS BY RX/DR IN RCRD: CPT | Mod: CPTII,S$GLB,, | Performed by: ORTHOPAEDIC SURGERY

## 2025-06-04 PROCEDURE — 3008F BODY MASS INDEX DOCD: CPT | Mod: CPTII,S$GLB,, | Performed by: ORTHOPAEDIC SURGERY

## 2025-06-04 PROCEDURE — 99204 OFFICE O/P NEW MOD 45 MIN: CPT | Mod: 25,S$GLB,, | Performed by: ORTHOPAEDIC SURGERY

## 2025-06-04 PROCEDURE — 4010F ACE/ARB THERAPY RXD/TAKEN: CPT | Mod: CPTII,S$GLB,, | Performed by: ORTHOPAEDIC SURGERY

## 2025-06-04 PROCEDURE — 73562 X-RAY EXAM OF KNEE 3: CPT | Mod: 26,59,LT, | Performed by: RADIOLOGY

## 2025-06-04 RX ORDER — METHYLPREDNISOLONE 4 MG/1
TABLET ORAL
Qty: 21 EACH | Refills: 0 | Status: CANCELLED | OUTPATIENT
Start: 2025-06-04 | End: 2025-06-25

## 2025-06-04 RX ORDER — TRIAMCINOLONE ACETONIDE 40 MG/ML
40 INJECTION, SUSPENSION INTRA-ARTICULAR; INTRAMUSCULAR
Status: DISCONTINUED | OUTPATIENT
Start: 2025-06-04 | End: 2025-06-04 | Stop reason: HOSPADM

## 2025-06-04 RX ADMIN — TRIAMCINOLONE ACETONIDE 40 MG: 40 INJECTION, SUSPENSION INTRA-ARTICULAR; INTRAMUSCULAR at 10:06

## 2025-06-05 DIAGNOSIS — I20.89 ANGINA AT REST: ICD-10-CM

## 2025-06-05 RX ORDER — ISOSORBIDE DINITRATE 5 MG/1
5 TABLET ORAL 3 TIMES DAILY
Qty: 270 TABLET | Refills: 3 | Status: SHIPPED | OUTPATIENT
Start: 2025-06-05

## 2025-06-06 ENCOUNTER — LAB VISIT (OUTPATIENT)
Dept: LAB | Facility: HOSPITAL | Age: 60
End: 2025-06-06
Attending: STUDENT IN AN ORGANIZED HEALTH CARE EDUCATION/TRAINING PROGRAM
Payer: MEDICARE

## 2025-06-06 DIAGNOSIS — M25.561 ACUTE PAIN OF RIGHT KNEE: ICD-10-CM

## 2025-06-06 DIAGNOSIS — R79.9 ABNORMAL FINDING OF BLOOD CHEMISTRY, UNSPECIFIED: ICD-10-CM

## 2025-06-06 LAB
CHOLEST SERPL-MCNC: 232 MG/DL (ref 120–199)
CHOLEST/HDLC SERPL: 3.6 {RATIO} (ref 2–5)
HDLC SERPL-MCNC: 64 MG/DL (ref 40–75)
HDLC SERPL: 27.6 % (ref 20–50)
LDLC SERPL CALC-MCNC: 149.6 MG/DL (ref 63–159)
NONHDLC SERPL-MCNC: 168 MG/DL
TRIGL SERPL-MCNC: 92 MG/DL (ref 30–150)
URATE SERPL-MCNC: 8.4 MG/DL (ref 2.4–5.7)

## 2025-06-06 PROCEDURE — 80061 LIPID PANEL: CPT

## 2025-06-06 PROCEDURE — 36415 COLL VENOUS BLD VENIPUNCTURE: CPT | Mod: PO

## 2025-06-06 PROCEDURE — 84550 ASSAY OF BLOOD/URIC ACID: CPT

## 2025-06-09 ENCOUNTER — RESULTS FOLLOW-UP (OUTPATIENT)
Dept: FAMILY MEDICINE | Facility: CLINIC | Age: 60
End: 2025-06-09

## 2025-06-10 ENCOUNTER — RESULTS FOLLOW-UP (OUTPATIENT)
Dept: FAMILY MEDICINE | Facility: CLINIC | Age: 60
End: 2025-06-10

## 2025-06-12 ENCOUNTER — HOSPITAL ENCOUNTER (OUTPATIENT)
Dept: RADIOLOGY | Facility: CLINIC | Age: 60
Discharge: HOME | End: 2025-06-12
Attending: STUDENT IN AN ORGANIZED HEALTH CARE EDUCATION/TRAINING PROGRAM
Payer: MEDICARE

## 2025-06-12 DIAGNOSIS — Z12.31 ENCOUNTER FOR SCREENING MAMMOGRAM FOR MALIGNANT NEOPLASM OF BREAST: ICD-10-CM

## 2025-06-12 PROCEDURE — 77067 SCR MAMMO BI INCL CAD: CPT | Mod: TC,PO

## 2025-06-12 PROCEDURE — 77067 SCR MAMMO BI INCL CAD: CPT | Mod: 26,,, | Performed by: RADIOLOGY

## 2025-06-12 PROCEDURE — 77063 BREAST TOMOSYNTHESIS BI: CPT | Mod: 26,,, | Performed by: RADIOLOGY

## 2025-06-18 ENCOUNTER — LAB VISIT (OUTPATIENT)
Dept: LAB | Facility: HOSPITAL | Age: 60
End: 2025-06-18
Attending: SURGERY
Payer: MEDICARE

## 2025-06-18 DIAGNOSIS — E66.01 MORBID OBESITY: Primary | ICD-10-CM

## 2025-06-18 DIAGNOSIS — G47.00 INSOMNIA WITH SLEEP APNEA: ICD-10-CM

## 2025-06-18 DIAGNOSIS — K21.9 GASTROESOPHAGEAL REFLUX DISEASE, UNSPECIFIED WHETHER ESOPHAGITIS PRESENT: ICD-10-CM

## 2025-06-18 DIAGNOSIS — J45.909 ASTHMATIC BRONCHITIS: ICD-10-CM

## 2025-06-18 DIAGNOSIS — G47.30 INSOMNIA WITH SLEEP APNEA: ICD-10-CM

## 2025-06-18 LAB
25(OH)D3+25(OH)D2 SERPL-MCNC: 31 NG/ML (ref 30–96)
ABSOLUTE EOSINOPHIL (OHS): 0.05 K/UL
ABSOLUTE MONOCYTE (OHS): 0.45 K/UL (ref 0.3–1)
ABSOLUTE NEUTROPHIL COUNT (OHS): 4.46 K/UL (ref 1.8–7.7)
ALBUMIN SERPL BCP-MCNC: 3.4 G/DL (ref 3.5–5.2)
ALP SERPL-CCNC: 63 UNIT/L (ref 40–150)
ALT SERPL W/O P-5'-P-CCNC: 11 UNIT/L (ref 10–44)
ANION GAP (OHS): 10 MMOL/L (ref 8–16)
AST SERPL-CCNC: 15 UNIT/L (ref 11–45)
BASOPHILS # BLD AUTO: 0.02 K/UL
BASOPHILS NFR BLD AUTO: 0.3 %
BILIRUB SERPL-MCNC: 0.3 MG/DL (ref 0.1–1)
BUN SERPL-MCNC: 35 MG/DL (ref 8–23)
CALCIUM SERPL-MCNC: 9.6 MG/DL (ref 8.7–10.5)
CHLORIDE SERPL-SCNC: 114 MMOL/L (ref 95–110)
CHOLEST SERPL-MCNC: 206 MG/DL (ref 120–199)
CHOLEST/HDLC SERPL: 4.4 {RATIO} (ref 2–5)
CO2 SERPL-SCNC: 19 MMOL/L (ref 23–29)
CREAT SERPL-MCNC: 2.4 MG/DL (ref 0.5–1.4)
EAG (OHS): 114 MG/DL (ref 68–131)
ERYTHROCYTE [DISTWIDTH] IN BLOOD BY AUTOMATED COUNT: 13.8 % (ref 11.5–14.5)
FOLATE SERPL-MCNC: 8.2 NG/ML (ref 4–24)
GFR SERPLBLD CREATININE-BSD FMLA CKD-EPI: 22 ML/MIN/1.73/M2
GLUCOSE SERPL-MCNC: 85 MG/DL (ref 70–110)
HBA1C MFR BLD: 5.6 % (ref 4–5.6)
HCT VFR BLD AUTO: 39 % (ref 37–48.5)
HDLC SERPL-MCNC: 47 MG/DL (ref 40–75)
HDLC SERPL: 22.8 % (ref 20–50)
HGB BLD-MCNC: 12.1 GM/DL (ref 12–16)
IMM GRANULOCYTES # BLD AUTO: 0.02 K/UL (ref 0–0.04)
IMM GRANULOCYTES NFR BLD AUTO: 0.3 % (ref 0–0.5)
LDLC SERPL CALC-MCNC: 130 MG/DL (ref 63–159)
LYMPHOCYTES # BLD AUTO: 1.58 K/UL (ref 1–4.8)
MAGNESIUM SERPL-MCNC: 1.9 MG/DL (ref 1.6–2.6)
MCH RBC QN AUTO: 29 PG (ref 27–31)
MCHC RBC AUTO-ENTMCNC: 31 G/DL (ref 32–36)
MCV RBC AUTO: 94 FL (ref 82–98)
NONHDLC SERPL-MCNC: 159 MG/DL
NUCLEATED RBC (/100WBC) (OHS): 0 /100 WBC
PLATELET # BLD AUTO: 267 K/UL (ref 150–450)
PMV BLD AUTO: 10.6 FL (ref 9.2–12.9)
POTASSIUM SERPL-SCNC: 5.1 MMOL/L (ref 3.5–5.1)
PROT SERPL-MCNC: 6.9 GM/DL (ref 6–8.4)
RBC # BLD AUTO: 4.17 M/UL (ref 4–5.4)
RELATIVE EOSINOPHIL (OHS): 0.8 %
RELATIVE LYMPHOCYTE (OHS): 24 % (ref 18–48)
RELATIVE MONOCYTE (OHS): 6.8 % (ref 4–15)
RELATIVE NEUTROPHIL (OHS): 67.8 % (ref 38–73)
SODIUM SERPL-SCNC: 143 MMOL/L (ref 136–145)
TRIGL SERPL-MCNC: 145 MG/DL (ref 30–150)
TSH SERPL-ACNC: 1.81 UIU/ML (ref 0.4–4)
VIT B12 SERPL-MCNC: 657 PG/ML (ref 210–950)
WBC # BLD AUTO: 6.58 K/UL (ref 3.9–12.7)

## 2025-06-18 PROCEDURE — 36415 COLL VENOUS BLD VENIPUNCTURE: CPT | Mod: PO

## 2025-06-18 PROCEDURE — 82306 VITAMIN D 25 HYDROXY: CPT

## 2025-06-18 PROCEDURE — 85025 COMPLETE CBC W/AUTO DIFF WBC: CPT

## 2025-06-18 PROCEDURE — 80053 COMPREHEN METABOLIC PANEL: CPT

## 2025-06-18 PROCEDURE — 82746 ASSAY OF FOLIC ACID SERUM: CPT

## 2025-06-18 PROCEDURE — 83735 ASSAY OF MAGNESIUM: CPT

## 2025-06-18 PROCEDURE — 83036 HEMOGLOBIN GLYCOSYLATED A1C: CPT

## 2025-06-18 PROCEDURE — 84425 ASSAY OF VITAMIN B-1: CPT

## 2025-06-18 PROCEDURE — 80061 LIPID PANEL: CPT

## 2025-06-18 PROCEDURE — 82607 VITAMIN B-12: CPT

## 2025-06-18 PROCEDURE — 84443 ASSAY THYROID STIM HORMONE: CPT

## 2025-06-24 LAB — W VITAMIN B1: 51 UG/L

## 2025-07-02 DIAGNOSIS — I10 ESSENTIAL HYPERTENSION: ICD-10-CM

## 2025-07-02 RX ORDER — HYDRALAZINE HYDROCHLORIDE 50 MG/1
TABLET, FILM COATED ORAL
Qty: 270 TABLET | Refills: 11 | Status: SHIPPED | OUTPATIENT
Start: 2025-07-02

## 2025-07-02 NOTE — TELEPHONE ENCOUNTER
No care due was identified.  [facility] Embedded Care Due Messages. Reference number: [id number].   7/02/2025 6:26:14 AM CDT

## 2025-07-09 ENCOUNTER — LAB VISIT (OUTPATIENT)
Dept: LAB | Facility: HOSPITAL | Age: 60
End: 2025-07-09
Attending: PHYSICIAN ASSISTANT
Payer: MEDICARE

## 2025-07-09 ENCOUNTER — OFFICE VISIT (OUTPATIENT)
Dept: FAMILY MEDICINE | Facility: CLINIC | Age: 60
End: 2025-07-09
Payer: MEDICARE

## 2025-07-09 VITALS
HEART RATE: 74 BPM | RESPIRATION RATE: 18 BRPM | DIASTOLIC BLOOD PRESSURE: 88 MMHG | WEIGHT: 230.19 LBS | BODY MASS INDEX: 42.36 KG/M2 | SYSTOLIC BLOOD PRESSURE: 158 MMHG | HEIGHT: 62 IN | OXYGEN SATURATION: 99 %

## 2025-07-09 DIAGNOSIS — N32.81 OAB (OVERACTIVE BLADDER): ICD-10-CM

## 2025-07-09 DIAGNOSIS — E78.5 HYPERLIPIDEMIA, UNSPECIFIED HYPERLIPIDEMIA TYPE: ICD-10-CM

## 2025-07-09 DIAGNOSIS — R35.0 URINARY FREQUENCY: ICD-10-CM

## 2025-07-09 DIAGNOSIS — R32 URINARY INCONTINENCE, UNSPECIFIED TYPE: ICD-10-CM

## 2025-07-09 DIAGNOSIS — K21.00 GASTROESOPHAGEAL REFLUX DISEASE WITH ESOPHAGITIS WITHOUT HEMORRHAGE: ICD-10-CM

## 2025-07-09 DIAGNOSIS — I10 ESSENTIAL HYPERTENSION: Primary | ICD-10-CM

## 2025-07-09 DIAGNOSIS — R39.15 URINARY URGENCY: ICD-10-CM

## 2025-07-09 LAB
BACTERIA #/AREA URNS AUTO: ABNORMAL /HPF
BILIRUB UR QL STRIP.AUTO: NEGATIVE
BILIRUBIN, UA POC OHS: NEGATIVE
BLOOD, UA POC OHS: NEGATIVE
CLARITY UR: CLEAR
CLARITY, UA POC OHS: CLEAR
COLOR UR AUTO: COLORLESS
COLOR, UA POC OHS: YELLOW
GLUCOSE UR QL STRIP: NEGATIVE
GLUCOSE, UA POC OHS: NEGATIVE
HGB UR QL STRIP: NEGATIVE
KETONES UR QL STRIP: NEGATIVE
KETONES, UA POC OHS: ABNORMAL
LEUKOCYTE ESTERASE UR QL STRIP: ABNORMAL
LEUKOCYTES, UA POC OHS: ABNORMAL
MICROSCOPIC COMMENT: ABNORMAL
NITRITE UR QL STRIP: NEGATIVE
NITRITE, UA POC OHS: NEGATIVE
PH UR STRIP: 6 [PH]
PH, UA POC OHS: 5.5
PROT UR QL STRIP: ABNORMAL
PROTEIN, UA POC OHS: >=300
RBC #/AREA URNS AUTO: <1 /HPF (ref 0–4)
SP GR UR STRIP: 1.01
SPECIFIC GRAVITY, UA POC OHS: 1.02
SQUAMOUS #/AREA URNS AUTO: 1 /HPF
UROBILINOGEN UR STRIP-ACNC: NEGATIVE EU/DL
UROBILINOGEN, UA POC OHS: 0.2
WBC #/AREA URNS AUTO: 7 /HPF (ref 0–5)

## 2025-07-09 PROCEDURE — 4010F ACE/ARB THERAPY RXD/TAKEN: CPT | Mod: CPTII,HCNC,S$GLB, | Performed by: PHYSICIAN ASSISTANT

## 2025-07-09 PROCEDURE — 1159F MED LIST DOCD IN RCRD: CPT | Mod: CPTII,HCNC,S$GLB, | Performed by: PHYSICIAN ASSISTANT

## 2025-07-09 PROCEDURE — 3044F HG A1C LEVEL LT 7.0%: CPT | Mod: CPTII,HCNC,S$GLB, | Performed by: PHYSICIAN ASSISTANT

## 2025-07-09 PROCEDURE — 87186 SC STD MICRODIL/AGAR DIL: CPT | Mod: HCNC

## 2025-07-09 PROCEDURE — 3079F DIAST BP 80-89 MM HG: CPT | Mod: CPTII,HCNC,S$GLB, | Performed by: PHYSICIAN ASSISTANT

## 2025-07-09 PROCEDURE — 99214 OFFICE O/P EST MOD 30 MIN: CPT | Mod: HCNC,S$GLB,, | Performed by: PHYSICIAN ASSISTANT

## 2025-07-09 PROCEDURE — 99999 PR PBB SHADOW E&M-EST. PATIENT-LVL V: CPT | Mod: PBBFAC,HCNC,, | Performed by: PHYSICIAN ASSISTANT

## 2025-07-09 PROCEDURE — 3008F BODY MASS INDEX DOCD: CPT | Mod: CPTII,HCNC,S$GLB, | Performed by: PHYSICIAN ASSISTANT

## 2025-07-09 PROCEDURE — 81003 URINALYSIS AUTO W/O SCOPE: CPT | Mod: QW,HCNC,S$GLB, | Performed by: PHYSICIAN ASSISTANT

## 2025-07-09 PROCEDURE — 3077F SYST BP >= 140 MM HG: CPT | Mod: CPTII,HCNC,S$GLB, | Performed by: PHYSICIAN ASSISTANT

## 2025-07-09 PROCEDURE — 81001 URINALYSIS AUTO W/SCOPE: CPT | Mod: HCNC

## 2025-07-09 PROCEDURE — G2211 COMPLEX E/M VISIT ADD ON: HCPCS | Mod: HCNC,S$GLB,, | Performed by: PHYSICIAN ASSISTANT

## 2025-07-09 RX ORDER — OXYBUTYNIN CHLORIDE 5 MG/1
5 TABLET, EXTENDED RELEASE ORAL DAILY
Qty: 30 TABLET | Refills: 0 | Status: SHIPPED | OUTPATIENT
Start: 2025-07-09 | End: 2026-07-09

## 2025-07-09 NOTE — PROGRESS NOTES
Patient ID: Faustina Rae is a 60 y.o. female.        History of Present Illness    Ms. Rae presents today for urinary problems. She reports frequent urination with urgency for the past month. She describes having to urinate immediately after sitting down, often feeling an intense need to rush to the bathroom. Symptoms impact daily activities, including limiting fluid intake during a recent three-hour drive. She currently uses [employer] for incontinence. She was previously prescribed medication by Dr. Cuevas in December for urinary symptoms, but cannot locate the medication. She denies burning with urination, fever, or blood in urine. Symptoms initially resolved but have since returned. She expresses frustration with current urinary symptoms. She reports chronic back pain originating from a previous accident. She takes arthritis medication to manage her symptoms. Back pain persists consistently and she denies associated fever or other acute symptoms. She is uncertain about current adherence to diuretic medications. She reports previously taking spironolactone and chlorthalidone, but has recently relocated and is unsure of their current medication status due to moving and reorganizing medications.         Review of patient's allergies indicates:   Allergen Reactions    Nsaids (non-steroidal anti-inflammatory drug) Other (See Comments)           Lab Results   Component Value Date    WBC 6.58 06/18/2025    HGB 12.1 06/18/2025    HCT 39.0 06/18/2025     06/18/2025    CHOL 206 (H) 06/18/2025    TRIG 145 06/18/2025    HDL 47 06/18/2025    ALT 11 06/18/2025    AST 15 06/18/2025     06/18/2025    K 5.1 06/18/2025     (H) 06/18/2025    CREATININE 2.4 (H) 06/18/2025    BUN 35 (H) 06/18/2025    CO2 19 (L) 06/18/2025    TSH 1.811 06/18/2025    INR 0.9 07/13/2023    HGBA1C 5.6 06/18/2025       Review of Systems   Constitutional:  Negative for activity change, appetite change and fever.   HENT:  Negative  for postnasal drip, rhinorrhea and sinus pressure.    Eyes:  Negative for visual disturbance.   Respiratory:  Negative for cough and shortness of breath.    Cardiovascular:  Negative for chest pain.   Gastrointestinal:  Negative for abdominal distention and abdominal pain.   Genitourinary:  Negative for difficulty urinating and dysuria.   Musculoskeletal:  Negative for arthralgias and myalgias.   Neurological:  Negative for headaches.   Hematological:  Negative for adenopathy.   Psychiatric/Behavioral:  The patient is not nervous/anxious.        Objective:   Physical Exam  Constitutional:       Appearance: Normal appearance.   HENT:      Head: Normocephalic and atraumatic.   Eyes:      Conjunctiva/sclera: Conjunctivae normal.   Cardiovascular:      Rate and Rhythm: Normal rate and regular rhythm.   Pulmonary:      Effort: Pulmonary effort is normal. No respiratory distress.      Breath sounds: Normal breath sounds. No wheezing.   Abdominal:      General: There is no distension.      Palpations: There is no mass.      Tenderness: There is no abdominal tenderness.   Musculoskeletal:      Right lower leg: No edema.      Left lower leg: No edema.   Lymphadenopathy:      Cervical: No cervical adenopathy.   Skin:     Findings: No erythema.   Neurological:      Mental Status: She is alert and oriented to person, place, and time.   Psychiatric:         Behavior: Behavior normal.                Assessment/Plan     1. Essential hypertension    2. Gastroesophageal reflux disease with esophagitis without hemorrhage    3. Urinary frequency    4. Urinary incontinence, unspecified type    5. Hyperlipidemia, unspecified hyperlipidemia type       Assessment & Plan    Considered urinary symptoms and history of taking 2 diuretics (spironolactone and chlorthalidone).  Urinalysis shows protein and WBCs, not conclusive for UTI.  Concerned about proteinuria, may require 24-hour urine collection if protein levels remain  elevated.  Addressing elevated BP.         Faustina was seen today for urinary tract infection.    Diagnoses and all orders for this visit:    Essential hypertension  Unclear on what medications taking  RTC in 2 weeks to review all meds  Gastroesophageal reflux disease with esophagitis without hemorrhage  stable  Urinary frequency  -     POCT Urinalysis(Instrument)    Urinary incontinence, unspecified type  -     POCT Urinalysis(Instrument)    Hyperlipidemia, unspecified hyperlipidemia type  Stable  High fiber diet  Continue current medication  OAB (overactive bladder)  -     oxybutynin (DITROPAN-XL) 5 MG TR24; Take 1 tablet (5 mg total) by mouth once daily.               This note was generated with the assistance of ambient listening technology. Verbal consent was obtained by the patient and accompanying visitor(s) for the recording of patient appointment to facilitate this note. I attest to having reviewed and edited the generated note for accuracy, though some syntax or spelling errors may persist. Please contact the author of this note for any clarification.

## 2025-07-10 LAB — HOLD SPECIMEN: NORMAL

## 2025-07-12 LAB — BACTERIA UR CULT: ABNORMAL

## 2025-07-20 ENCOUNTER — HOSPITAL ENCOUNTER (EMERGENCY)
Facility: HOSPITAL | Age: 60
Discharge: HOME | End: 2025-07-20
Attending: STUDENT IN AN ORGANIZED HEALTH CARE EDUCATION/TRAINING PROGRAM
Payer: MEDICARE

## 2025-07-20 VITALS
SYSTOLIC BLOOD PRESSURE: 148 MMHG | DIASTOLIC BLOOD PRESSURE: 94 MMHG | HEART RATE: 90 BPM | WEIGHT: 250 LBS | RESPIRATION RATE: 20 BRPM | TEMPERATURE: 98 F | BODY MASS INDEX: 46.01 KG/M2 | HEIGHT: 62 IN | OXYGEN SATURATION: 98 %

## 2025-07-20 DIAGNOSIS — R52 PAIN: ICD-10-CM

## 2025-07-20 DIAGNOSIS — M79.672 FOOT PAIN, LEFT: Primary | ICD-10-CM

## 2025-07-20 DIAGNOSIS — M10.9 GOUT OF LEFT FOOT, UNSPECIFIED CAUSE, UNSPECIFIED CHRONICITY: ICD-10-CM

## 2025-07-20 PROCEDURE — 99283 EMERGENCY DEPT VISIT LOW MDM: CPT | Mod: 25

## 2025-07-20 RX ORDER — HYDROCODONE BITARTRATE AND ACETAMINOPHEN 5; 325 MG/1; MG/1
1 TABLET ORAL EVERY 6 HOURS PRN
Qty: 10 TABLET | Refills: 0 | Status: SHIPPED | OUTPATIENT
Start: 2025-07-20

## 2025-07-20 NOTE — ED PROVIDER NOTES
"Encounter Date: 7/20/2025       History     Chief Complaint   Patient presents with    Foot Pain     Pt reports left foot pain x3 days. Denies injury.       60-year-old female with a history of asthma, CHF, CKD, COPD, hyperlipidemia, hypertension , gout , previous CVA with a past surgical history of gastric sleeve, cystectomy, hysterectomy presents emergency department with complaint of nontraumatic pain and swelling to the left foot patient states her symptoms started about 2 days ago, is made worse with any type of movement, or ambulation.  Patient is complaining of pain to the entire dorsal surface of the foot, and reports plantar surface when she attempts to ambulate.  Patient states that she has tried taking over-the-counter medications, she also reports that she took 2 of her "gout medications" yesterday.  Patient denies any calf pain or swelling    The history is provided by the patient.     Review of patient's allergies indicates:   Allergen Reactions    Nsaids (non-steroidal anti-inflammatory drug) Other (See Comments)     Past Medical History:   Diagnosis Date    Anxiety     Arthritis     Asthma     CHF (congestive heart failure)     Chronic kidney disease, unspecified     COPD (chronic obstructive pulmonary disease)     Hyperlipemia     Hypertension     Leaky heart valve     Obese     Obese     Obstructive sleep apnea     lost her CPAP    Pneumonia     Rheumatoid arthritis(714.0)     Stroke      Past Surgical History:   Procedure Laterality Date    CAROTID STENT      3 years ago    CHOLECYSTECTOMY      COLONOSCOPY N/A 3/19/2025    Procedure: COLONOSCOPY;  Surgeon: Steven Gomez MD;  Location: [facility];  Service: Endoscopy;  Laterality: N/A;    ESOPHAGOGASTRODUODENOSCOPY N/A 3/19/2025    Procedure: EGD (ESOPHAGOGASTRODUODENOSCOPY);  Surgeon: Steven Gomez MD;  Location: [facility];  Service: Endoscopy;  Laterality: N/A;    HYSTERECTOMY      SLEEVE GASTROPLASTY  02/21/2017     Family History "   Problem Relation Name Age of Onset    Arthritis Mother      Cancer Mother      Diabetes Mother      Hyperlipidemia Mother      Hypertension Mother      Stroke Mother      Breast cancer Mother      Heart disease Father      Hypertension Father      Asthma Son      Hypertension Sister      Hypertension Sister      Kidney disease Neg Hx       Social History[1]  Review of Systems   HENT: Negative.     Respiratory: Negative.     Cardiovascular: Negative.    Genitourinary: Negative.    Musculoskeletal:         Left foot pain   Neurological: Negative.    Hematological: Negative.    Psychiatric/Behavioral: Negative.     All other systems reviewed and are negative.      Physical Exam     Initial Vitals [07/20/25 1054]   BP Pulse Resp Temp SpO2   (!) 170/100 93 18 98.3 °F (36.8 °C) 97 %      MAP       --         Physical Exam    Nursing note and vitals reviewed.  Constitutional: She appears well-developed and well-nourished.   HENT:   Head: Normocephalic and atraumatic.   Eyes: EOM are normal. Pupils are equal, round, and reactive to light.   Cardiovascular:  Normal rate, regular rhythm, normal heart sounds and intact distal pulses.           Musculoskeletal:      Comments: Patient complains of pain to the entire dorsal and plantar surface of the left foot, she has no obvious swelling to her foot, or ankle.  There is no swelling to the ankle that has no warmth, there is no erythema, she has no pain with inversion, eversion.  Pulses are intact DP and tibial pulses are normal.  Patient does have mild tenderness to the plantar surface     Neurological: She is alert and oriented to person, place, and time. She has normal strength.   Skin: Skin is warm.   Psychiatric: She has a normal mood and affect.         ED Course   Procedures  Labs Reviewed - No data to display       Imaging Results              X-Ray Ankle Complete Left (Final result)  Result time 07/20/25 12:12:29      Final result by Alfred Frye MD (07/20/25  12:12:29)                   Impression:      No acute osseous abnormality.    Minor osteoarthritic changes of the midfoot.      Electronically signed by: Alfred Frye  Date:    07/20/2025  Time:    12:12               Narrative:    EXAMINATION:  XR ANKLE COMPLETE 3 VIEW LEFT    CLINICAL HISTORY:  Pain, unspecified    COMPARISON:  None available    FINDINGS:  No acute fracture or malalignment of the left ankle.  Joint spaces are maintained.  Minor osteoarthritic changes of the midfoot.  Enthesopathy of the calcaneus at the Achilles tendon insertion.  No soft tissue gas or radiopaque foreign body.                                       X-Ray Foot Complete Left (Final result)  Result time 07/20/25 12:11:17      Final result by Alfred Frye MD (07/20/25 12:11:17)                   Impression:      No acute osseous abnormality.      Electronically signed by: Alfred Frye  Date:    07/20/2025  Time:    12:11               Narrative:    EXAMINATION:  XR FOOT COMPLETE 3 VIEW LEFT    CLINICAL HISTORY:  Pain, unspecified    COMPARISON:  Left foot radiography 08/26/2024    FINDINGS:  No acute fracture or malalignment of the left foot.  Joint spaces are maintained.  No osseous erosions.  Small calcaneal enthesophyte at the Achilles tendon insertion.  No soft tissue gas or radiopaque foreign body.                                       Medications - No data to display  Medical Decision Making  60-year-old female with a history of asthma, CHF, CKD, COPD, hyperlipidemia, hypertension , gout , previous CVA with a past surgical history of gastric sleeve, cystectomy, hysterectomy presents emergency department with complaint of nontraumatic pain and swelling to the left foot patient states her symptoms started about 2 days ago, is made worse with any type of movement, or ambulation.  Patient is complaining of pain to the entire dorsal surface of the foot, and reports plantar surface when she attempts to ambulate.  Patient states  "that she has tried taking over-the-counter medications, she also reports that she took 2 of her "gout medications" yesterday.  Patient denies any calf pain or swelling    The history is provided by the patient.     Considerations include but not limited to, gout, fracture, sprain, dislocation, cellulitis, plantar fasciitis    60-year-old female presents to the emergency department with complaint of pain to the left foot patient denies any known trauma.  She does have a history of gout, does have colchicine, and indomethacin at home as well as Voltaren.  Instructed the patient to take only 1 of these medications for gout as directed by her physician.  The patient has no swelling to the calf, and no tenderness negative Homans sign.  Distal pulses are intact.  She has no obvious swelling, or signs of trauma noted on physical exam she has no open wounds between her toes there is no warmth, erythema to the foot suggestive of cellulitis at this time.  Patient's exam is completely normal with the exception of her stating that she has pain.  X-ray imaging is unremarkable.  Patient will be discharged home with Norco.  She does have an elevated blood pressure reading however she states she did not take her medications as of yet and she has no symptoms related to an elevated blood pressure reading.    Amount and/or Complexity of Data Reviewed  Radiology: ordered. Decision-making details documented in ED Course.    Risk  Prescription drug management.                                          Clinical Impression:  Final diagnoses:  [R52] Pain  [M79.672] Foot pain, left (Primary)  [M10.9] Gout of left foot, unspecified cause, unspecified chronicity          ED Disposition Condition    Discharge Stable          ED Prescriptions       Medication Sig Dispense Start Date End Date Auth. Provider    HYDROcodone-acetaminophen (NORCO) 5-325 mg per tablet Take 1 tablet by mouth every 6 (six) hours as needed for Pain. 10 tablet 7/20/2025 -- " Lisa Suarez FNP          Follow-up Information       Follow up With Specialties Details Why Contact Info    Zoran Cuevas MD Family Medicine Schedule an appointment as soon as possible for a visit in 1 day  [address] [facility] [address]  [phone]                     [1]   Social History  Tobacco Use    Smoking status: Never     Passive exposure: Never    Smokeless tobacco: Never   Substance Use Topics    Alcohol use: Not Currently     Comment: rare, about once a month    Drug use: No        Lisa Suarez FNP  07/20/25 1257

## 2025-07-20 NOTE — DISCHARGE INSTRUCTIONS
Continue your got medications as directed by your physician  Fatimah as directed only if needed for severe pain  This medication may make you drowsy please do not drive, drink alcohol, operate heavy machinery, or take this medication while you were alone  Follow up as directed  Return if condition becomes worse for any concerns

## 2025-07-23 ENCOUNTER — OFFICE VISIT (OUTPATIENT)
Dept: FAMILY MEDICINE | Facility: CLINIC | Age: 60
End: 2025-07-23
Payer: MEDICARE

## 2025-07-23 ENCOUNTER — OFFICE VISIT (OUTPATIENT)
Dept: PULMONOLOGY | Facility: CLINIC | Age: 60
End: 2025-07-23
Payer: MEDICARE

## 2025-07-23 VITALS
BODY MASS INDEX: 42.6 KG/M2 | HEIGHT: 62 IN | OXYGEN SATURATION: 98 % | HEART RATE: 72 BPM | DIASTOLIC BLOOD PRESSURE: 90 MMHG | RESPIRATION RATE: 18 BRPM | WEIGHT: 231.5 LBS | SYSTOLIC BLOOD PRESSURE: 138 MMHG

## 2025-07-23 VITALS
SYSTOLIC BLOOD PRESSURE: 162 MMHG | DIASTOLIC BLOOD PRESSURE: 92 MMHG | BODY MASS INDEX: 42.5 KG/M2 | HEART RATE: 75 BPM | HEIGHT: 62 IN | OXYGEN SATURATION: 96 % | WEIGHT: 230.94 LBS

## 2025-07-23 DIAGNOSIS — I10 ESSENTIAL HYPERTENSION: Primary | ICD-10-CM

## 2025-07-23 DIAGNOSIS — J44.9 CHRONIC OBSTRUCTIVE PULMONARY DISEASE, UNSPECIFIED COPD TYPE: ICD-10-CM

## 2025-07-23 DIAGNOSIS — J45.50 SEVERE PERSISTENT ASTHMA, UNCOMPLICATED: ICD-10-CM

## 2025-07-23 DIAGNOSIS — M10.9 GOUT, UNSPECIFIED CAUSE, UNSPECIFIED CHRONICITY, UNSPECIFIED SITE: ICD-10-CM

## 2025-07-23 DIAGNOSIS — J45.991 COUGH VARIANT ASTHMA: ICD-10-CM

## 2025-07-23 DIAGNOSIS — K21.00 GASTROESOPHAGEAL REFLUX DISEASE WITH ESOPHAGITIS WITHOUT HEMORRHAGE: ICD-10-CM

## 2025-07-23 PROCEDURE — 3044F HG A1C LEVEL LT 7.0%: CPT | Mod: CPTII,HCNC,S$GLB, | Performed by: INTERNAL MEDICINE

## 2025-07-23 PROCEDURE — 99213 OFFICE O/P EST LOW 20 MIN: CPT | Mod: HCNC,S$GLB,, | Performed by: INTERNAL MEDICINE

## 2025-07-23 PROCEDURE — G2211 COMPLEX E/M VISIT ADD ON: HCPCS | Mod: HCNC,S$GLB,, | Performed by: PHYSICIAN ASSISTANT

## 2025-07-23 PROCEDURE — 4010F ACE/ARB THERAPY RXD/TAKEN: CPT | Mod: CPTII,HCNC,S$GLB, | Performed by: PHYSICIAN ASSISTANT

## 2025-07-23 PROCEDURE — 1159F MED LIST DOCD IN RCRD: CPT | Mod: CPTII,HCNC,S$GLB, | Performed by: PHYSICIAN ASSISTANT

## 2025-07-23 PROCEDURE — 1159F MED LIST DOCD IN RCRD: CPT | Mod: CPTII,HCNC,S$GLB, | Performed by: INTERNAL MEDICINE

## 2025-07-23 PROCEDURE — 3080F DIAST BP >= 90 MM HG: CPT | Mod: CPTII,HCNC,S$GLB, | Performed by: INTERNAL MEDICINE

## 2025-07-23 PROCEDURE — 99999 PR PBB SHADOW E&M-EST. PATIENT-LVL V: CPT | Mod: PBBFAC,HCNC,, | Performed by: INTERNAL MEDICINE

## 2025-07-23 PROCEDURE — 99999 PR PBB SHADOW E&M-EST. PATIENT-LVL V: CPT | Mod: PBBFAC,HCNC,, | Performed by: PHYSICIAN ASSISTANT

## 2025-07-23 PROCEDURE — 3044F HG A1C LEVEL LT 7.0%: CPT | Mod: CPTII,HCNC,S$GLB, | Performed by: PHYSICIAN ASSISTANT

## 2025-07-23 PROCEDURE — 3080F DIAST BP >= 90 MM HG: CPT | Mod: CPTII,HCNC,S$GLB, | Performed by: PHYSICIAN ASSISTANT

## 2025-07-23 PROCEDURE — 4010F ACE/ARB THERAPY RXD/TAKEN: CPT | Mod: CPTII,HCNC,S$GLB, | Performed by: INTERNAL MEDICINE

## 2025-07-23 PROCEDURE — 3008F BODY MASS INDEX DOCD: CPT | Mod: CPTII,HCNC,S$GLB, | Performed by: INTERNAL MEDICINE

## 2025-07-23 PROCEDURE — 3008F BODY MASS INDEX DOCD: CPT | Mod: CPTII,HCNC,S$GLB, | Performed by: PHYSICIAN ASSISTANT

## 2025-07-23 PROCEDURE — 3077F SYST BP >= 140 MM HG: CPT | Mod: CPTII,HCNC,S$GLB, | Performed by: INTERNAL MEDICINE

## 2025-07-23 PROCEDURE — 99213 OFFICE O/P EST LOW 20 MIN: CPT | Mod: HCNC,S$GLB,, | Performed by: PHYSICIAN ASSISTANT

## 2025-07-23 PROCEDURE — 1160F RVW MEDS BY RX/DR IN RCRD: CPT | Mod: CPTII,HCNC,S$GLB, | Performed by: PHYSICIAN ASSISTANT

## 2025-07-23 PROCEDURE — 3075F SYST BP GE 130 - 139MM HG: CPT | Mod: CPTII,HCNC,S$GLB, | Performed by: PHYSICIAN ASSISTANT

## 2025-07-23 RX ORDER — TEZEPELUMAB-EKKO 210 MG/1.9ML
210 INJECTION, SOLUTION SUBCUTANEOUS
Qty: 1.91 ML | Refills: 11 | Status: ACTIVE | OUTPATIENT
Start: 2025-07-23

## 2025-07-23 RX ORDER — COLCHICINE 0.6 MG/1
0.6 TABLET ORAL
Qty: 90 TABLET | Refills: 1 | Status: SHIPPED | OUTPATIENT
Start: 2025-07-23

## 2025-07-23 RX ORDER — PREDNISONE 20 MG/1
TABLET ORAL
Qty: 36 TABLET | Refills: 1 | Status: SHIPPED | OUTPATIENT
Start: 2025-07-23

## 2025-07-23 NOTE — PROGRESS NOTES
Patient is here for New Start Tezspire injection 210 mg/1.91 mL, 110 mg/mL. 2 patient identifiers used (Name and ). SQ injection given into the right abdomen.  No redness, swelling, bleeding, or reaction noted to injection site.  Pt tolerated well.    NDC: 87244-995-45  LOT#: [id number]  Expiration: 2026

## 2025-07-23 NOTE — PROGRESS NOTES
"Patient ID: Faustina Rae is a 60 y.o. female.        History of Present Illness    Ms. Rae presents today for blood pressure medication follow up. She reports current blood pressure regimen includes valsartan 160mg, isosorbide mononitrate, and amlodipine. She discontinued spironolactone due to elevated potassium levels. She expresses dissatisfaction with current medications, which she takes daily between 9:00 AM and 9:00 PM. She believes weight reduction may help with blood pressure management, though her weight has remained stable. She recently visited the emergency room for gout and was prescribed Norco for pain management. Colchicine was prescribed today by Dr. Matias. She reports the treatment has been effective but missed yesterday's dose due to concerns about sedation while caring for grandchildren. She reports significant breathing difficulties that have worsened per Dr. Matias's assessment this morning. She specifically notes inability to breathe when outside in hot weather. She has a breathing test scheduled for tomorrow during the day, followed by a sleep study at 8:30 PM.         Review of patient's allergies indicates:   Allergen Reactions    Ibuprofen Other (See Comments)     Shortness of breath.  "Like an asthma attack" patient reports she tolerates acetaminophen and voltaren gel.            Lab Results   Component Value Date    WBC 6.58 06/18/2025    HGB 12.1 06/18/2025    HCT 39.0 06/18/2025     06/18/2025    CHOL 206 (H) 06/18/2025    TRIG 145 06/18/2025    HDL 47 06/18/2025    ALT 11 06/18/2025    AST 15 06/18/2025     06/18/2025    K 5.1 06/18/2025     (H) 06/18/2025    CREATININE 2.4 (H) 06/18/2025    BUN 35 (H) 06/18/2025    CO2 19 (L) 06/18/2025    TSH 1.811 06/18/2025    INR 0.9 07/13/2023    HGBA1C 5.6 06/18/2025       Review of Systems   Constitutional:  Negative for activity change, appetite change and fever.   HENT:  Negative for postnasal drip, rhinorrhea and sinus " pressure.    Eyes:  Negative for visual disturbance.   Respiratory:  Negative for cough and shortness of breath.    Cardiovascular:  Negative for chest pain.   Gastrointestinal:  Negative for abdominal distention and abdominal pain.   Genitourinary:  Negative for difficulty urinating and dysuria.   Musculoskeletal:  Positive for arthralgias. Negative for myalgias.   Neurological:  Negative for headaches.   Hematological:  Negative for adenopathy.   Psychiatric/Behavioral:  The patient is not nervous/anxious.        Objective:   Physical Exam  Constitutional:       Appearance: Normal appearance.   HENT:      Head: Normocephalic and atraumatic.   Eyes:      Conjunctiva/sclera: Conjunctivae normal.   Cardiovascular:      Rate and Rhythm: Normal rate and regular rhythm.   Pulmonary:      Effort: Pulmonary effort is normal. No respiratory distress.      Breath sounds: Normal breath sounds. No wheezing.   Abdominal:      General: There is no distension.      Palpations: There is no mass.      Tenderness: There is no abdominal tenderness.   Musculoskeletal:      Right lower leg: No edema.      Left lower leg: No edema.   Lymphadenopathy:      Cervical: No cervical adenopathy.   Skin:     Findings: No erythema.   Neurological:      Mental Status: She is alert and oriented to person, place, and time.   Psychiatric:         Behavior: Behavior normal.                Assessment/Plan     1. Essential hypertension    2. Gastroesophageal reflux disease with esophagitis without hemorrhage    3. Chronic obstructive pulmonary disease, unspecified COPD type       Assessment & Plan    Assessed current medication regimen for BP management with potential inconsistencies in medication adherence.  Improvement in systolic BP, but diastolic remains elevated.  Considered adjustments to BP medications pending accurate medication reconciliation.  Recent gout flare and emergency room visit with potential sedation from pain medication (Norco)  prescribed in ER.  Upcoming sleep study to evaluate for possible sleep apnea.         Faustina was seen today for foot pain.    Diagnoses and all orders for this visit:    Essential hypertension  Patient remains unclear on what she should be taking for BP. Patient to RTC tomorrow for nurse visit / med rec  Will make decision about medications based on this.  Gastroesophageal reflux disease with esophagitis without hemorrhage  Stable  Continue current medication  Chronic obstructive pulmonary disease, unspecified COPD type  Stable  Continue follow up with pulmonolgoy                  This note was generated with the assistance of ambient listening technology. Verbal consent was obtained by the patient and accompanying visitor(s) for the recording of patient appointment to facilitate this note. I attest to having reviewed and edited the generated note for accuracy, though some syntax or spelling errors may persist. Please contact the author of this note for any clarification.

## 2025-07-23 NOTE — PROGRESS NOTES
7/23/2025    Faustina Rae  New Patient Consult    Chief Complaint   Patient presents with    Follow-up    Asthma       HPI:     7/23/2025 pt uses albuterol weekly  and has nocturnal arousals 2times weekly, used prednisoen for aasthma 2 + times since last visit.  You feel asthma control poor.    3/31/2025 pt need prednisone twice since last visit.  Uses breo.  Uses albuterol bid as mucous excessive.  Sleeps poorly due to noc arousal for voiding.   No strokes but still limited by plantar fascitis.  Patient Instructions     Despirte breo daily, you have had to use prednisone  twice last 6 months and use albuterol twice daily .   Short of breath is common.      Would check pulmonary functions-- may dose tezspire as asthma has been severe persistent.      Use prednisone if needed    9/26/2024 pt had cough and needed resp rx last pm only.  Watched 5 month old kid yesterday.  Had been doing well..      Pt uses breo daily , no prednisone since last visit..    Pt was on b12 shots -- stopped 4 months ago.   Pt did better on shots b12 wrt fatigue....    Patient Instructions   Asthma may be flaring  Use promethazine dm as needed for cough    Take azitromycin antibiotic now.    Prednisoen 20 mg daily for 3 days should clear asthma  Prednisone 60 mg dosing should improve gout     Podiatrist diagnosed plantar fascitis  causing  pain  and not gout..    If b12 low (was low in 2012)-- shots may be needed if b12 low.        Would consider special asthma shots if you need prednisone again ---     March 26, 2024-- asthma still active -- will have chest tightness and sob with crowd exposure.  Bolden walking fast.  Use albuterol bid and breo daily.  No prednisone used..    Pt doesn't recall prednisone use.    Had sleep eval in  past and no sleepa apnea..    Patient Instructions   You have severe persistent asthma    Check lung capacity    You should take prednisone to stabilize asthma -- 20 mg daily for 3 days should clear out  asthma..    Use trelegy daily    Use albuterol as needed...    Cxr November was good  2023 pt had asthma as kid, outgrew and asthma returned.  Pt had stroke with left side paresis-- 3yrs -- good recovery. Pt worked .  Pt has loss  10 yrs ago -- he  massive mi.    Pt has household chores -- slow but able to do--- no limits.      Uses albuterol 2 times daily. No cough from albuterol.      Sleeps well -- no apnea in past-- had bariatric sleeve in past.       Pt cough -- violent with no fx, nor syncope nor incontinence but will have severe abd pains.  No recent admits nor steroids.    Cough induced by cold air, grass mowing...  Patient Instructions   Breo daily is good controller, trelegy may be better (has breo and another medication)-- continue daily    Use albuterol as needed-- 2- 4 puffs up to every 4 hrs as needed.    Would recommend  singulair -- effective for asthma 8/10- not all patients benefit.  Stop if no help.      Action plan-- prednisone 1 daily for 3 days maybe enough-- try now, repeat if flares but use lowest dose able and get off quick.  Breo/Trelegy has prednisone to keep clear...      May consider asthma shots???  May need breathing test..      Ct abd  viewed and good lower lungs.  Chest xray In past good.      Thyroid nodule right suspected-- defer to Dr Cuevas-- we discuss and offer ultradound...             PFSH:  Past Medical History:   Diagnosis Date    Anxiety     Arthritis     Asthma     CHF (congestive heart failure)     Chronic kidney disease, unspecified     COPD (chronic obstructive pulmonary disease)     Hyperlipemia     Hypertension     Leaky heart valve     Obese     Obese     Obstructive sleep apnea     lost her CPAP    Pneumonia     Rheumatoid arthritis(714.0)     Stroke          Past Surgical History:   Procedure Laterality Date    CAROTID STENT      3 years ago    CHOLECYSTECTOMY      COLONOSCOPY N/A 3/19/2025    Procedure: COLONOSCOPY;  Surgeon: Patricia  "Steven BOND MD;  Location: [facility];  Service: Endoscopy;  Laterality: N/A;    ESOPHAGOGASTRODUODENOSCOPY N/A 3/19/2025    Procedure: EGD (ESOPHAGOGASTRODUODENOSCOPY);  Surgeon: Steven Gomez MD;  Location: [facility];  Service: Endoscopy;  Laterality: N/A;    HYSTERECTOMY      SLEEVE GASTROPLASTY  02/21/2017     Social History     Tobacco Use    Smoking status: Never     Passive exposure: Never    Smokeless tobacco: Never   Substance Use Topics    Alcohol use: Not Currently     Comment: rare, about once a month    Drug use: No     Family History   Problem Relation Name Age of Onset    Arthritis Mother      Cancer Mother      Diabetes Mother      Hyperlipidemia Mother      Hypertension Mother      Stroke Mother      Breast cancer Mother      Heart disease Father      Hypertension Father      Asthma Son      Hypertension Sister      Hypertension Sister      Kidney disease Neg Hx       Review of patient's allergies indicates:   Allergen Reactions    Ibuprofen Other (See Comments)     Shortness of breath.  "Like an asthma attack" patient reports she tolerates acetaminophen and voltaren gel.           Review of Systems:  a review of eleven systems covering constitutional, Eye, HEENT, Psych, Respiratory, Cardiac, GI, , Musculoskeletal, Endocrine, Dermatologic was negative except for pertinent findings as listed ABOVE and below:  pertinent positives as above, rest good        Exam:Comprehensive exam done. BP (!) 162/92 (BP Location: Right forearm, Patient Position: Sitting)   Pulse 75   Ht 5' 2" (1.575 m)   Wt 104.8 kg (230 lb 14.9 oz)   LMP  (LMP Unknown) Comment: Does not menstruate   SpO2 96% Comment: on room air at rest  BMI 42.24 kg/m²   Exam included Vitals as listed, and patient's appearance and affect and alertness and mood, oral exam for yeast and hygiene and pharynx lesions and Mallapatti (M) score, neck with inspection for jvd and masses and thyroid abnormalities and lymph nodes (supraclavicular and " infraclavicular nodes and axillary also examined and noted if abn), chest exam included symmetry and effort and fremitus and percussion and auscultation, cardiac exam included rhythm and gallops and murmur and rubs and jvd and edema, abdominal exam for mass and hepatosplenomegaly and tenderness and hernias and bowel sounds, Musculoskeletal exam with muscle tone and posture and mobility/gait and  strength, and skin for rashes and cyanosis and pallor and turgor, extremity for clubbing.  Findings were normal except for pertinent findings listed below:  M4, chest is symmetric, no distress, normal percussion, normal fremitus and sl wheezes  No edema    Right thyroid nodule suspected.         Radiographs (ct chest and cxr) reviewed: view by direct vision      Labs reviewed           PFT will be done and results to be reviewed       Plan:  Clinical impression is apparently straight forward and impression with management as below.     Faustina was seen today for follow-up and asthma.    Diagnoses and all orders for this visit:    Gout, unspecified cause, unspecified chronicity, unspecified site  -     colchicine (COLCRYS) 0.6 mg tablet; Take 1 tablet (0.6 mg total) by mouth every 72 hours. Take at onset gout    Severe persistent asthma, uncomplicated  -     predniSONE (DELTASONE) 20 MG tablet; 3 for 3 days then 2 for 3 days then one for 3 days and repeat for breathing problems  -     Complete PFT with bronchodilator; Future  -     tezepelumab-ekko (TEZSPIRE) 210 mg/1.91 mL (110 mg/mL) PnIj; Inject 1.91 mLs (210.1 mg total) into the skin every 28 days.  -     albuterol-budesonide (AIRSUPRA) 90-80 mcg/actuation; Inhale 2 puffs into the lungs every 4 (four) hours as needed.    Cough variant asthma  -     predniSONE (DELTASONE) 20 MG tablet; 3 for 3 days then 2 for 3 days then one for 3 days and repeat for breathing problems                Follow up in about 3 months (around 10/23/2025), or if symptoms worsen or fail to  improve.    Discussed with patient above for education the following:      Patient Instructions   We discuss gout -- uloric was recommenced by rheum 2023, you use lipitor interacts with uloric(?), uloric would be preferred to prevent gout if able...      Would defer gout treatment to Dr Cuevas.    Asthma still severe persistent and you had to use prednisone twice since March visit -- using trelegy daily.    Prednisone renewed.      Use albuterol (or airsupra) more......  up to every 4 hrs as needed      Will order tezspire -- you have severe persistent asthma but no significant eosinophilia seen.  May take up to 3 months to have good effect?     Will order airsupra to use in place albuterol (if covered)-- may decrease need for prednisone....      Khoa took 28 min

## 2025-07-23 NOTE — PATIENT INSTRUCTIONS
We discuss gout -- uloric was recommenced by rheum 2023, you use lipitor interacts with uloric(?), uloric would be preferred to prevent gout if able...      Would defer gout treatment to Dr Cuevas.    Asthma still severe persistent and you had to use prednisone twice since March visit -- using trelegy daily.    Prednisone renewed.      Use albuterol (or airsupra) more......  up to every 4 hrs as needed      Will order tezspire -- you have severe persistent asthma but no significant eosinophilia seen.  May take up to 3 months to have good effect?     Will order airsupra to use in place albuterol (if covered)-- may decrease need for prednisone....

## 2025-08-26 ENCOUNTER — TELEPHONE (OUTPATIENT)
Dept: ORTHOPEDICS | Facility: CLINIC | Age: 60
End: 2025-08-26
Payer: MEDICARE

## 2025-09-04 ENCOUNTER — PATIENT OUTREACH (OUTPATIENT)
Dept: ADMINISTRATIVE | Facility: HOSPITAL | Age: 60
End: 2025-09-04
Payer: MEDICARE

## 2025-09-04 DIAGNOSIS — N18.4 CHRONIC KIDNEY DISEASE, STAGE 4 (SEVERE): ICD-10-CM

## 2025-09-04 DIAGNOSIS — Z71.89 COMPLEX CARE COORDINATION: Primary | ICD-10-CM
